# Patient Record
Sex: MALE | Race: WHITE | NOT HISPANIC OR LATINO | Employment: OTHER | URBAN - METROPOLITAN AREA
[De-identification: names, ages, dates, MRNs, and addresses within clinical notes are randomized per-mention and may not be internally consistent; named-entity substitution may affect disease eponyms.]

---

## 2017-01-17 ENCOUNTER — TELEPHONE (OUTPATIENT)
Dept: INFECTIOUS DISEASES | Facility: CLINIC | Age: 72
End: 2017-01-17

## 2017-01-17 DIAGNOSIS — B20 HIV DISEASE: Primary | ICD-10-CM

## 2017-01-17 NOTE — TELEPHONE ENCOUNTER
----- Message from Sherry Castillo sent at 1/17/2017 12:44 PM CST -----  Contact: pt  [phone]  Jama   -   Pt called to get orders put in to have urine done - call back number [phone]  Thanks,

## 2017-01-24 ENCOUNTER — LAB VISIT (OUTPATIENT)
Dept: LAB | Facility: HOSPITAL | Age: 72
End: 2017-01-24
Attending: INTERNAL MEDICINE
Payer: MEDICARE

## 2017-01-24 DIAGNOSIS — B20 HIV DISEASE: ICD-10-CM

## 2017-01-24 LAB
ALBUMIN SERPL BCP-MCNC: 4 G/DL
ALP SERPL-CCNC: 49 U/L
ALT SERPL W/O P-5'-P-CCNC: 24 U/L
ANION GAP SERPL CALC-SCNC: 9 MMOL/L
AST SERPL-CCNC: 23 U/L
BASOPHILS # BLD AUTO: 0.02 K/UL
BASOPHILS NFR BLD: 0.3 %
BILIRUB SERPL-MCNC: 2.4 MG/DL
BUN SERPL-MCNC: 17 MG/DL
CALCIUM SERPL-MCNC: 10 MG/DL
CHLORIDE SERPL-SCNC: 103 MMOL/L
CO2 SERPL-SCNC: 25 MMOL/L
CREAT SERPL-MCNC: 0.9 MG/DL
DIFFERENTIAL METHOD: ABNORMAL
EOSINOPHIL # BLD AUTO: 0.2 K/UL
EOSINOPHIL NFR BLD: 2.5 %
ERYTHROCYTE [DISTWIDTH] IN BLOOD BY AUTOMATED COUNT: 14.1 %
EST. GFR  (AFRICAN AMERICAN): >60 ML/MIN/1.73 M^2
EST. GFR  (NON AFRICAN AMERICAN): >60 ML/MIN/1.73 M^2
GLUCOSE SERPL-MCNC: 88 MG/DL
HCT VFR BLD AUTO: 49.3 %
HGB BLD-MCNC: 17.1 G/DL
LYMPHOCYTES # BLD AUTO: 2.7 K/UL
LYMPHOCYTES NFR BLD: 45.3 %
MCH RBC QN AUTO: 36.8 PG
MCHC RBC AUTO-ENTMCNC: 34.7 %
MCV RBC AUTO: 106 FL
MONOCYTES # BLD AUTO: 0.7 K/UL
MONOCYTES NFR BLD: 11.5 %
NEUTROPHILS # BLD AUTO: 2.4 K/UL
NEUTROPHILS NFR BLD: 40.4 %
PLATELET # BLD AUTO: 254 K/UL
PMV BLD AUTO: 9.6 FL
POTASSIUM SERPL-SCNC: 4.6 MMOL/L
PROT SERPL-MCNC: 8.7 G/DL
RBC # BLD AUTO: 4.65 M/UL
SODIUM SERPL-SCNC: 137 MMOL/L
WBC # BLD AUTO: 5.89 K/UL

## 2017-01-24 PROCEDURE — 85025 COMPLETE CBC W/AUTO DIFF WBC: CPT

## 2017-01-24 PROCEDURE — 80053 COMPREHEN METABOLIC PANEL: CPT

## 2017-01-24 PROCEDURE — 36415 COLL VENOUS BLD VENIPUNCTURE: CPT | Mod: PO

## 2017-01-24 PROCEDURE — 86361 T CELL ABSOLUTE COUNT: CPT

## 2017-01-24 PROCEDURE — 87536 HIV-1 QUANT&REVRSE TRNSCRPJ: CPT

## 2017-01-25 LAB
CD3+CD4+ CELLS # BLD: 628 CELLS/UL (ref 300–1400)
CD3+CD4+ CELLS NFR BLD: 23.7 % (ref 28–57)

## 2017-01-27 LAB
HIV UQ DATE RECEIVED: NORMAL
HIV UQ DATE REPORTED: NORMAL
HIV1 RNA # SERPL NAA+PROBE: <40 COPIES/ML
HIV1 RNA SERPL NAA+PROBE-LOG#: <1.6 LOG (10) COPIES/ML
HIV1 RNA SERPL QL NAA+PROBE: NOT DETECTED

## 2017-01-31 ENCOUNTER — OFFICE VISIT (OUTPATIENT)
Dept: INFECTIOUS DISEASES | Facility: CLINIC | Age: 72
End: 2017-01-31
Payer: MEDICARE

## 2017-01-31 VITALS
HEART RATE: 96 BPM | WEIGHT: 214.75 LBS | BODY MASS INDEX: 30.74 KG/M2 | TEMPERATURE: 98 F | HEIGHT: 70 IN | DIASTOLIC BLOOD PRESSURE: 82 MMHG | SYSTOLIC BLOOD PRESSURE: 130 MMHG

## 2017-01-31 DIAGNOSIS — B20 LIPODYSTROPHY ASSOCIATED WITH HUMAN IMMUNODEFICIENCY VIRUS INFECTION: ICD-10-CM

## 2017-01-31 DIAGNOSIS — G31.84 MILD COGNITIVE IMPAIRMENT: Chronic | ICD-10-CM

## 2017-01-31 DIAGNOSIS — R21 RASH: Primary | ICD-10-CM

## 2017-01-31 DIAGNOSIS — E78.5 HYPERLIPIDEMIA, UNSPECIFIED HYPERLIPIDEMIA TYPE: Chronic | ICD-10-CM

## 2017-01-31 DIAGNOSIS — B20 HIV DISEASE: Chronic | ICD-10-CM

## 2017-01-31 DIAGNOSIS — E88.1 LIPODYSTROPHY ASSOCIATED WITH HUMAN IMMUNODEFICIENCY VIRUS INFECTION: ICD-10-CM

## 2017-01-31 PROCEDURE — 99213 OFFICE O/P EST LOW 20 MIN: CPT | Mod: PBBFAC | Performed by: INTERNAL MEDICINE

## 2017-01-31 PROCEDURE — 99999 PR PBB SHADOW E&M-EST. PATIENT-LVL III: CPT | Mod: PBBFAC,,, | Performed by: INTERNAL MEDICINE

## 2017-01-31 PROCEDURE — 99215 OFFICE O/P EST HI 40 MIN: CPT | Mod: S$PBB,,, | Performed by: INTERNAL MEDICINE

## 2017-01-31 NOTE — PROGRESS NOTES
Subjective:      Patient ID: Haritha Valle is a 71 y.o. male.    Chief Complaint:   Follow up      History of Present Illness    Mild cognitive impairment  Pt feels he is much improved on exelon patch. His partner confirms his observations. He says that he credits his elimination of much of his life stress to the improvement in his cognitive function.             HIV disease  On reyataz + combivir. HIV viral load <40. VH5=804. Has chronic stable lipdystrophy.      CBC, CMP are normal.      Hypertension  He is on lotensin/HCT 10/12.5 and norvasc 5 mg. BP appears well controlled on this. 130/82 today            He has occasional positional hand numbness.   Occasional giddiness without vertigo, lasting few seconds.  Some pain in right buttock with hamstring radiation.  Multiple non-healing sores on forearms.      Review of Systems   Constitution: Negative for chills, decreased appetite, fever, weakness, malaise/fatigue, night sweats, weight gain and weight loss.   HENT: Negative for congestion, ear pain, headaches, hearing loss, hoarse voice, sore throat and tinnitus.    Eyes: Negative for blurred vision, redness and visual disturbance.   Cardiovascular: Negative for chest pain, leg swelling and palpitations.   Respiratory: Negative for cough, hemoptysis, shortness of breath and sputum production.    Hematologic/Lymphatic: Negative for adenopathy. Does not bruise/bleed easily.   Skin: Negative for dry skin, itching, rash and suspicious lesions.   Musculoskeletal: Positive for neck pain. Negative for back pain, joint pain and myalgias.   Gastrointestinal: Negative for abdominal pain, constipation, diarrhea, heartburn, nausea and vomiting.   Genitourinary: Positive for flank pain. Negative for dysuria, frequency, hematuria, hesitancy and urgency.   Neurological: Positive for dizziness, numbness and paresthesias.   Psychiatric/Behavioral: Negative for depression and memory loss. The patient does not have insomnia and is  not nervous/anxious.      Objective:   Physical Exam   Constitutional: He is oriented to person, place, and time. He appears well-developed and well-nourished.   HENT:   Head: Normocephalic and atraumatic.   Mouth/Throat: Oropharynx is clear and moist.   Eyes: Conjunctivae and EOM are normal. Pupils are equal, round, and reactive to light.   Neck: Normal range of motion. Neck supple. No thyromegaly present.   Cardiovascular: Normal rate, regular rhythm and normal heart sounds.    No murmur heard.  Pulmonary/Chest: Effort normal and breath sounds normal. He has no wheezes. He has no rales.   Abdominal: Soft. Bowel sounds are normal. He exhibits no mass. There is no tenderness. There is no rebound.   Musculoskeletal: Normal range of motion.   Lymphadenopathy:     He has no cervical adenopathy.   Neurological: He is alert and oriented to person, place, and time.   Skin: Skin is warm and dry.   Keratosis and scabs on both forearms.   Psychiatric: He has a normal mood and affect. His behavior is normal.   Vitals reviewed.    Assessment:       1. Rash (? Actinic damage on forearms)   2. HIV disease , well controlled   3. 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 Hyperlipidemia, unspecified hyperlipidemia type    5. Mild cognitive impairment          Plan:        1. Continue present meds   2. Derm consult   3. RTC 4 mos with labs

## 2017-02-07 DIAGNOSIS — R79.89 LOW SERUM TESTOSTERONE LEVEL: ICD-10-CM

## 2017-02-07 RX ORDER — TESTOSTERONE CYPIONATE 200 MG/ML
300 INJECTION, SOLUTION INTRAMUSCULAR
Qty: 10 ML | Refills: 5 | Status: SHIPPED | OUTPATIENT
Start: 2017-02-07 | End: 2017-08-15 | Stop reason: SDUPTHER

## 2017-02-07 RX ORDER — AMLODIPINE BESYLATE 5 MG/1
5 TABLET ORAL DAILY
Qty: 30 TABLET | Refills: 11 | Status: SHIPPED | OUTPATIENT
Start: 2017-02-07 | End: 2018-03-15 | Stop reason: SDUPTHER

## 2017-03-22 DIAGNOSIS — G31.84 MILD COGNITIVE IMPAIRMENT: ICD-10-CM

## 2017-03-22 DIAGNOSIS — F32.A DEPRESSION, UNSPECIFIED DEPRESSION TYPE: ICD-10-CM

## 2017-03-22 RX ORDER — LAMIVUDINE AND ZIDOVUDINE 150; 300 MG/1; MG/1
1 TABLET, FILM COATED ORAL EVERY 12 HOURS
Qty: 60 TABLET | Refills: 6 | Status: SHIPPED | OUTPATIENT
Start: 2017-03-22 | End: 2017-11-22 | Stop reason: SDUPTHER

## 2017-03-22 RX ORDER — FLUOXETINE HYDROCHLORIDE 40 MG/1
40 CAPSULE ORAL DAILY
Qty: 30 CAPSULE | Refills: 2 | Status: SHIPPED | OUTPATIENT
Start: 2017-03-22 | End: 2017-07-20 | Stop reason: SDUPTHER

## 2017-04-12 ENCOUNTER — INITIAL CONSULT (OUTPATIENT)
Dept: DERMATOLOGY | Facility: CLINIC | Age: 72
End: 2017-04-12
Payer: MEDICARE

## 2017-04-12 DIAGNOSIS — L82.1 SEBORRHEIC KERATOSES: ICD-10-CM

## 2017-04-12 DIAGNOSIS — L81.4 LENTIGINES: ICD-10-CM

## 2017-04-12 DIAGNOSIS — D48.5 NEOPLASM OF UNCERTAIN BEHAVIOR OF SKIN: Primary | ICD-10-CM

## 2017-04-12 PROCEDURE — 99213 OFFICE O/P EST LOW 20 MIN: CPT | Mod: PBBFAC | Performed by: DERMATOLOGY

## 2017-04-12 PROCEDURE — 99999 PR PBB SHADOW E&M-EST. PATIENT-LVL III: CPT | Mod: PBBFAC,,, | Performed by: DERMATOLOGY

## 2017-04-12 PROCEDURE — 99202 OFFICE O/P NEW SF 15 MIN: CPT | Mod: S$PBB,,, | Performed by: DERMATOLOGY

## 2017-04-12 RX ORDER — MUPIROCIN 20 MG/G
OINTMENT TOPICAL
COMMUNITY
Start: 2017-03-20 | End: 2019-12-17

## 2017-04-12 NOTE — LETTER
April 14, 2017      Saeed Yun MD  [address]  [facility] [address]           [facility]  [address]  [facility] [address]  Phone: [phone]  Fax: [phone]          Patient: Haritha Valle   MR Number: [id number]   YOB: 1945   Date of Visit: 4/12/2017       Dear Dr. Saeed Yun:    Thank you for referring Haritha Valle to me for evaluation. Attached you will find relevant portions of my assessment and plan of care.    If you have questions, please do not hesitate to call me. I look forward to following Haritha Valle along with you.    Sincerely,    Yolis Coto MD    Enclosure  CC:  No Recipients    If you would like to receive this communication electronically, please contact [email or web address] or [phone] to request more information on [employer] Link access.    For providers and/or their staff who would like to refer a patient to [facility], please contact us through our one-stop-shop provider referral line, [facility], at [phone].    If you feel you have received this communication in error or would no longer like to receive these types of communications, please e-mail [email or web address]

## 2017-04-12 NOTE — PROGRESS NOTES
Subjective:       Patient ID:  Haritha Valle is a 71 y.o. male who presents for   Chief Complaint   Patient presents with    Skin Check     spots on face, x yrs, itchy, no tx     HPI  72 yo M presents for evaluation of spots on his face.  The brown spots on his face have been present for years, described as itchy, stable in size, no prior treatments    Derm Hx: BCC on L forehead (2013)  Past Medical History:   Diagnosis Date    Basal cell carcinoma     Depression     Hepatitis B     Hepatitis C antibody test positive     HIV infection     Hypertension     Immune disorder     Mild cognitive impairment 10/1/2010     Review of Systems   Constitutional: Negative for fever, chills, fatigue and malaise.   Skin: Positive for activity-related sunscreen use. Negative for daily sunscreen use and recent sunburn.   Hematologic/Lymphatic: Does not bruise/bleed easily.        Objective:    Physical Exam   Constitutional: He appears well-developed and well-nourished. No distress.   Neurological: He is alert and oriented to person, place, and time. He is not disoriented.   Psychiatric: He has a normal mood and affect.   Skin:   Areas Examined (abnormalities noted in diagram):   Scalp / Hair Palpated and Inspected  Head / Face Inspection Performed  Neck Inspection Performed  RUE Inspected  LUE Inspection Performed                  Diagram Legend     Erythematous scaling macule/papule c/w actinic keratosis       Vascular papule c/w angioma      Pigmented verrucoid papule/plaque c/w seborrheic keratosis      Yellow umbilicated papule c/w sebaceous hyperplasia      Irregularly shaped tan macule c/w lentigo     1-2 mm smooth white papules consistent with Milia      Movable subcutaneous cyst with punctum c/w epidermal inclusion cyst      Subcutaneous movable cyst c/w pilar cyst      Firm pink to brown papule c/w dermatofibroma      Pedunculated fleshy papule(s) c/w skin tag(s)      Evenly pigmented macule c/w junctional  nevus     Mildly variegated pigmented, slightly irregular-bordered macule c/w mildly atypical nevus      Flesh colored to evenly pigmented papule c/w intradermal nevus       Pink pearly papule/plaque c/w basal cell carcinoma      Erythematous hyperkeratotic cursted plaque c/w SCC      Surgical scar with no sign of skin cancer recurrence      Open and closed comedones      Inflammatory papules and pustules      Verrucoid papule consistent consistent with wart     Erythematous eczematous patches and plaques     Dystrophic onycholytic nail with subungual debris c/w onychomycosis     Umbilicated papule    Erythematous-base heme-crusted tan verrucoid plaque consistent with inflamed seborrheic keratosis     Erythematous Silvery Scaling Plaque c/w Psoriasis     See annotation      Assessment / Plan:        Neoplasm of uncertain behavior of skin  Suspect BCC.  Pt would like to postpone biopsy since he has an important event this weekend    Seborrheic keratoses  These are benign inherited growths without a malignant potential. Reassurance given to patient. No treatment is necessary. Discussed LN at follow up     Lentigines  These are benign hyperpigmented sun induced lesions. Daily sun protection will reduce the number of new lesions           Return for after Easter.

## 2017-05-02 DIAGNOSIS — R79.89 LOW SERUM TESTOSTERONE LEVEL: ICD-10-CM

## 2017-05-02 RX ORDER — ALPRAZOLAM 0.5 MG/1
0.5 TABLET ORAL 2 TIMES DAILY PRN
Qty: 30 TABLET | Refills: 5 | Status: SHIPPED | OUTPATIENT
Start: 2017-05-02 | End: 2017-11-21 | Stop reason: SDUPTHER

## 2017-05-05 ENCOUNTER — PROCEDURE VISIT (OUTPATIENT)
Dept: DERMATOLOGY | Facility: CLINIC | Age: 72
End: 2017-05-05
Payer: MEDICARE

## 2017-05-05 DIAGNOSIS — D48.5 NEOPLASM OF UNCERTAIN BEHAVIOR OF SKIN: Primary | ICD-10-CM

## 2017-05-05 DIAGNOSIS — L82.1 SEBORRHEIC KERATOSES: ICD-10-CM

## 2017-05-05 PROCEDURE — 99212 OFFICE O/P EST SF 10 MIN: CPT | Mod: S$PBB,,, | Performed by: DERMATOLOGY

## 2017-05-07 NOTE — PROGRESS NOTES
Subjective:       Patient ID:  Haritha Valle is a 71 y.o. male who presents for   Chief Complaint   Patient presents with    Biopsy     HPI  Pt presents for biopsy.  Last OV just before Easter and pt deferred biopsy at that time.     Derm Hx: BCC on L forehead 2013  Past Medical History:   Diagnosis Date    Basal cell carcinoma     Depression     Hepatitis B     Hepatitis C antibody test positive     HIV infection     Hypertension     Immune disorder     Mild cognitive impairment 10/1/2010        Review of Systems   Constitutional: Negative for fever, chills, fatigue and malaise.   Skin: Positive for activity-related sunscreen use. Negative for daily sunscreen use and recent sunburn.   Hematologic/Lymphatic: Does not bruise/bleed easily.        Objective:    Physical Exam   Constitutional: He appears well-developed and well-nourished.   Neurological: He is alert and oriented to person, place, and time.   Psychiatric: He has a normal mood and affect.   Skin:   Areas Examined (abnormalities noted in diagram):   Head / Face Inspection Performed              Diagram Legend     Erythematous scaling macule/papule c/w actinic keratosis       Vascular papule c/w angioma      Pigmented verrucoid papule/plaque c/w seborrheic keratosis      Yellow umbilicated papule c/w sebaceous hyperplasia      Irregularly shaped tan macule c/w lentigo     1-2 mm smooth white papules consistent with Milia      Movable subcutaneous cyst with punctum c/w epidermal inclusion cyst      Subcutaneous movable cyst c/w pilar cyst      Firm pink to brown papule c/w dermatofibroma      Pedunculated fleshy papule(s) c/w skin tag(s)      Evenly pigmented macule c/w junctional nevus     Mildly variegated pigmented, slightly irregular-bordered macule c/w mildly atypical nevus      Flesh colored to evenly pigmented papule c/w intradermal nevus       Pink pearly papule/plaque c/w basal cell carcinoma      Erythematous hyperkeratotic cursted  plaque c/w SCC      Surgical scar with no sign of skin cancer recurrence      Open and closed comedones      Inflammatory papules and pustules      Verrucoid papule consistent consistent with wart     Erythematous eczematous patches and plaques     Dystrophic onycholytic nail with subungual debris c/w onychomycosis     Umbilicated papule    Erythematous-base heme-crusted tan verrucoid plaque consistent with inflamed seborrheic keratosis     Erythematous Silvery Scaling Plaque c/w Psoriasis     See annotation      Assessment / Plan:        Neoplasm of uncertain behavior of skin  Lesion resolved.  No biopsy today    Seborrheic keratoses  These are benign inherited growths without a malignant potential. Reassurance given to patient. Discussed LN            Return in about 1 year (around 5/5/2018).

## 2017-06-13 DIAGNOSIS — Z21 HIV POSITIVE: ICD-10-CM

## 2017-06-13 RX ORDER — ATAZANAVIR 200 MG/1
400 CAPSULE ORAL DAILY
Qty: 60 CAPSULE | Refills: 6 | Status: SHIPPED | OUTPATIENT
Start: 2017-06-13 | End: 2018-03-20 | Stop reason: SDUPTHER

## 2017-07-12 ENCOUNTER — TELEPHONE (OUTPATIENT)
Dept: INFECTIOUS DISEASES | Facility: CLINIC | Age: 72
End: 2017-07-12

## 2017-07-12 DIAGNOSIS — Z21 HIV POSITIVE: Primary | ICD-10-CM

## 2017-07-12 DIAGNOSIS — E78.00 HYPERCHOLESTEROLEMIA: ICD-10-CM

## 2017-07-12 NOTE — TELEPHONE ENCOUNTER
----- Message from Raquel Leon MA sent at 7/11/2017  3:55 PM CDT -----  Contact: Pt: [phone]  Please put in lab orders for pt   ----- Message -----  From: Elizabeth England  Sent: 7/11/2017   2:26 PM  To: Jama [facility] Staff    Pt called and stated that he needs the staff to call him back in regards to getting labs scheduled.  Pt can be reached at [phone].    Thanks

## 2017-07-13 ENCOUNTER — LAB VISIT (OUTPATIENT)
Dept: LAB | Facility: HOSPITAL | Age: 72
End: 2017-07-13
Attending: INTERNAL MEDICINE
Payer: MEDICARE

## 2017-07-13 DIAGNOSIS — Z21 HIV POSITIVE: ICD-10-CM

## 2017-07-13 DIAGNOSIS — E78.00 HYPERCHOLESTEROLEMIA: ICD-10-CM

## 2017-07-13 LAB
ALBUMIN SERPL BCP-MCNC: 3.6 G/DL
ALP SERPL-CCNC: 33 U/L
ALT SERPL W/O P-5'-P-CCNC: 26 U/L
ANION GAP SERPL CALC-SCNC: 8 MMOL/L
AST SERPL-CCNC: 27 U/L
BASOPHILS # BLD AUTO: 0.01 K/UL
BASOPHILS NFR BLD: 0.2 %
BILIRUB SERPL-MCNC: 1.6 MG/DL
BUN SERPL-MCNC: 15 MG/DL
CALCIUM SERPL-MCNC: 9.5 MG/DL
CHLORIDE SERPL-SCNC: 102 MMOL/L
CHOLEST/HDLC SERPL: 7.1 {RATIO}
CO2 SERPL-SCNC: 25 MMOL/L
CREAT SERPL-MCNC: 0.9 MG/DL
DIFFERENTIAL METHOD: ABNORMAL
EOSINOPHIL # BLD AUTO: 0.1 K/UL
EOSINOPHIL NFR BLD: 1.9 %
ERYTHROCYTE [DISTWIDTH] IN BLOOD BY AUTOMATED COUNT: 15 %
EST. GFR  (AFRICAN AMERICAN): >60 ML/MIN/1.73 M^2
EST. GFR  (NON AFRICAN AMERICAN): >60 ML/MIN/1.73 M^2
GLUCOSE SERPL-MCNC: 100 MG/DL
HCT VFR BLD AUTO: 45 %
HDL/CHOLESTEROL RATIO: 14 %
HDLC SERPL-MCNC: 214 MG/DL
HDLC SERPL-MCNC: 30 MG/DL
HGB BLD-MCNC: 15.6 G/DL
LDLC SERPL CALC-MCNC: ABNORMAL MG/DL
LYMPHOCYTES # BLD AUTO: 1.9 K/UL
LYMPHOCYTES NFR BLD: 35.8 %
MCH RBC QN AUTO: 36.7 PG
MCHC RBC AUTO-ENTMCNC: 34.7 %
MCV RBC AUTO: 106 FL
MONOCYTES # BLD AUTO: 0.7 K/UL
MONOCYTES NFR BLD: 12.9 %
NEUTROPHILS # BLD AUTO: 2.6 K/UL
NEUTROPHILS NFR BLD: 49 %
NONHDLC SERPL-MCNC: 184 MG/DL
PLATELET # BLD AUTO: 230 K/UL
PMV BLD AUTO: 9.4 FL
POTASSIUM SERPL-SCNC: 4.1 MMOL/L
PROT SERPL-MCNC: 7.9 G/DL
RBC # BLD AUTO: 4.25 M/UL
SODIUM SERPL-SCNC: 135 MMOL/L
TRIGL SERPL-MCNC: 405 MG/DL
WBC # BLD AUTO: 5.36 K/UL

## 2017-07-13 PROCEDURE — 80061 LIPID PANEL: CPT

## 2017-07-13 PROCEDURE — 87536 HIV-1 QUANT&REVRSE TRNSCRPJ: CPT

## 2017-07-13 PROCEDURE — 85025 COMPLETE CBC W/AUTO DIFF WBC: CPT

## 2017-07-13 PROCEDURE — 86361 T CELL ABSOLUTE COUNT: CPT

## 2017-07-13 PROCEDURE — 80053 COMPREHEN METABOLIC PANEL: CPT

## 2017-07-13 PROCEDURE — 36415 COLL VENOUS BLD VENIPUNCTURE: CPT | Mod: PO

## 2017-07-14 LAB
CD3+CD4+ CELLS # BLD: 477 CELLS/UL (ref 300–1400)
CD3+CD4+ CELLS NFR BLD: 23 % (ref 28–57)

## 2017-07-20 DIAGNOSIS — F32.A DEPRESSION, UNSPECIFIED DEPRESSION TYPE: ICD-10-CM

## 2017-07-20 RX ORDER — FLUOXETINE HYDROCHLORIDE 40 MG/1
40 CAPSULE ORAL DAILY
Qty: 30 CAPSULE | Refills: 2 | Status: SHIPPED | OUTPATIENT
Start: 2017-07-20 | End: 2017-10-20 | Stop reason: SDUPTHER

## 2017-08-02 ENCOUNTER — OFFICE VISIT (OUTPATIENT)
Dept: INFECTIOUS DISEASES | Facility: CLINIC | Age: 72
End: 2017-08-02
Payer: MEDICARE

## 2017-08-02 ENCOUNTER — HOSPITAL ENCOUNTER (OUTPATIENT)
Dept: CARDIOLOGY | Facility: CLINIC | Age: 72
Discharge: HOME | End: 2017-08-02
Payer: MEDICARE

## 2017-08-02 VITALS
HEIGHT: 70 IN | WEIGHT: 213.38 LBS | DIASTOLIC BLOOD PRESSURE: 73 MMHG | BODY MASS INDEX: 30.55 KG/M2 | TEMPERATURE: 98 F | SYSTOLIC BLOOD PRESSURE: 131 MMHG | HEART RATE: 82 BPM

## 2017-08-02 DIAGNOSIS — I10 ESSENTIAL HYPERTENSION: Primary | ICD-10-CM

## 2017-08-02 DIAGNOSIS — I10 ESSENTIAL HYPERTENSION: ICD-10-CM

## 2017-08-02 PROCEDURE — 99215 OFFICE O/P EST HI 40 MIN: CPT | Mod: S$PBB,,, | Performed by: INTERNAL MEDICINE

## 2017-08-02 PROCEDURE — 1159F MED LIST DOCD IN RCRD: CPT | Mod: ,,, | Performed by: INTERNAL MEDICINE

## 2017-08-02 PROCEDURE — 93010 ELECTROCARDIOGRAM REPORT: CPT | Mod: S$PBB,,, | Performed by: INTERNAL MEDICINE

## 2017-08-02 PROCEDURE — 1126F AMNT PAIN NOTED NONE PRSNT: CPT | Mod: ,,, | Performed by: INTERNAL MEDICINE

## 2017-08-02 PROCEDURE — 99999 PR PBB SHADOW E&M-EST. PATIENT-LVL III: CPT | Mod: PBBFAC,,, | Performed by: INTERNAL MEDICINE

## 2017-08-02 PROCEDURE — 93005 ELECTROCARDIOGRAM TRACING: CPT | Mod: PBBFAC | Performed by: INTERNAL MEDICINE

## 2017-08-02 RX ORDER — MECLIZINE HCL 12.5 MG 12.5 MG/1
12.5 TABLET ORAL 3 TIMES DAILY PRN
Qty: 90 TABLET | Refills: 2 | Status: SHIPPED | OUTPATIENT
Start: 2017-08-02 | End: 2017-11-22 | Stop reason: SDUPTHER

## 2017-08-02 NOTE — PROGRESS NOTES
Subjective:      Patient ID: Haritha Valle is a 72 y.o. male.    Chief Complaint:   followup on multiple problems      History of Present Illness    Mild cognitive impairment  Pt feels he is much improved on exelon patch.            HIV disease  On reyataz + combivir. HIV viral load <40. FM0=271. Has chronic stable lipdystrophy.      CBC, CMP are normal.      Hypertension  He is on lotensin/HCT 10/12.5 and norvasc 5 mg. BP appears well controlled on this. 131/73 today    Hyperlipidemia  On pravastatin 20 mg daily  Lab Results   Component Value Date    CHOL 214 (H) 07/13/2017    CHOL 185 09/20/2016    CHOL 176 08/21/2015     Lab Results   Component Value Date    HDL 30 (L) 07/13/2017    HDL 29 (L) 09/20/2016    HDL 31 (L) 08/21/2015     Lab Results   Component Value Date    LDLCALC Invalid, Trig>400.0 07/13/2017    LDLCALC 108.4 09/20/2016    LDLCALC 70.2 08/21/2015     Lab Results   Component Value Date    TRIG 405 (H) 07/13/2017    TRIG 238 (H) 09/20/2016    TRIG 374 (H) 08/21/2015     Lab Results   Component Value Date    CHOLHDL 14.0 (L) 07/13/2017    CHOLHDL 15.7 (L) 09/20/2016    CHOLHDL 17.6 (L) 08/21/2015       Review of Systems   Constitution: Positive for weight gain. Negative for chills, decreased appetite, fever, weakness, malaise/fatigue, night sweats and weight loss.   HENT: Negative for congestion, ear pain, headaches, hearing loss, hoarse voice, sore throat and tinnitus.    Eyes: Negative for blurred vision, redness and visual disturbance.   Cardiovascular: Negative for chest pain, leg swelling and palpitations.   Respiratory: Negative for cough, hemoptysis, shortness of breath and sputum production.    Hematologic/Lymphatic: Negative for adenopathy. Does not bruise/bleed easily.   Skin: Negative for dry skin, itching, rash and suspicious lesions.   Musculoskeletal: Negative for back pain, joint pain, myalgias and neck pain.   Gastrointestinal: Negative for abdominal pain, constipation, diarrhea,  "heartburn, nausea and vomiting.   Genitourinary: Negative for dysuria, flank pain, frequency, hematuria, hesitancy and urgency.   Neurological: Negative for dizziness, numbness and paresthesias.   Psychiatric/Behavioral: Negative for depression and memory loss. The patient does not have insomnia and is not nervous/anxious.      Objective:   Physical Exam   Constitutional: He is oriented to person, place, and time. He appears well-developed and well-nourished.   HENT:   Head: Normocephalic and atraumatic.   Mouth/Throat: Oropharynx is clear and moist.   Eyes: Conjunctivae and EOM are normal. Pupils are equal, round, and reactive to light.   Neck: Normal range of motion. Neck supple. No thyromegaly present.   Cardiovascular: Normal rate, regular rhythm and normal heart sounds.    No murmur heard.  Pulmonary/Chest: Effort normal and breath sounds normal. He has no wheezes. He has no rales.   Abdominal: Soft. Bowel sounds are normal. He exhibits no mass. There is no tenderness. There is no rebound.   Central fat redistribution, unchanged   Musculoskeletal: Normal range of motion.   Lymphadenopathy:     He has no cervical adenopathy.   Neurological: He is alert and oriented to person, place, and time.   Skin: Skin is warm and dry.   Psychiatric: He has a normal mood and affect. His behavior is normal.   Vitals reviewed.    Assessment:       1. Essential hypertension      2. HIV doing well    3. Mild cognitive impairment, stable to improved   4. Hyperlipidemia, stable  Plan:        1. Trial of meclizine for his imbalance   2.  Trial of CoQ 10 to see if it improves his "energy"   4. RTC 4 mos with monitoring labs     "

## 2017-08-15 DIAGNOSIS — R79.89 LOW SERUM TESTOSTERONE LEVEL: ICD-10-CM

## 2017-08-15 RX ORDER — TESTOSTERONE CYPIONATE 200 MG/ML
300 INJECTION, SOLUTION INTRAMUSCULAR
Qty: 10 ML | Refills: 5 | Status: SHIPPED | OUTPATIENT
Start: 2017-08-15 | End: 2018-05-02 | Stop reason: SDUPTHER

## 2017-09-20 DIAGNOSIS — G31.84 MILD COGNITIVE IMPAIRMENT, SO STATED: ICD-10-CM

## 2017-09-20 RX ORDER — BENAZEPRIL HYDROCHLORIDE AND HYDROCHLOROTHIAZIDE 10; 12.5 MG/1; MG/1
1 TABLET ORAL DAILY
Qty: 90 TABLET | Refills: 4 | Status: SHIPPED | OUTPATIENT
Start: 2017-09-20 | End: 2018-09-18 | Stop reason: SDUPTHER

## 2017-09-27 ENCOUNTER — CLINICAL SUPPORT (OUTPATIENT)
Dept: INFECTIOUS DISEASES | Facility: CLINIC | Age: 72
End: 2017-09-27
Payer: MEDICARE

## 2017-09-27 DIAGNOSIS — Z23 NEED FOR VACCINATION: Primary | ICD-10-CM

## 2017-09-27 PROCEDURE — 99213 OFFICE O/P EST LOW 20 MIN: CPT | Mod: PBBFAC,25

## 2017-09-27 PROCEDURE — G0008 ADMIN INFLUENZA VIRUS VAC: HCPCS | Mod: PBBFAC

## 2017-09-27 PROCEDURE — 99999 PR PBB SHADOW E&M-EST. PATIENT-LVL III: CPT | Mod: PBBFAC,,,

## 2017-10-20 DIAGNOSIS — G31.84 MILD COGNITIVE IMPAIRMENT: ICD-10-CM

## 2017-10-20 DIAGNOSIS — F32.A DEPRESSION, UNSPECIFIED DEPRESSION TYPE: ICD-10-CM

## 2017-10-23 RX ORDER — PRAVASTATIN SODIUM 20 MG/1
20 TABLET ORAL DAILY
Qty: 90 TABLET | Refills: 4 | Status: SHIPPED | OUTPATIENT
Start: 2017-10-23 | End: 2018-05-09 | Stop reason: DRUGHIGH

## 2017-10-23 RX ORDER — FLUOXETINE HYDROCHLORIDE 40 MG/1
40 CAPSULE ORAL DAILY
Qty: 30 CAPSULE | Refills: 2 | Status: SHIPPED | OUTPATIENT
Start: 2017-10-23 | End: 2018-05-31 | Stop reason: SDUPTHER

## 2017-11-21 DIAGNOSIS — R42 DIZZINESS: Primary | ICD-10-CM

## 2017-11-21 DIAGNOSIS — G31.84 MILD COGNITIVE IMPAIRMENT: ICD-10-CM

## 2017-11-21 DIAGNOSIS — R79.89 LOW SERUM TESTOSTERONE LEVEL: ICD-10-CM

## 2017-11-22 RX ORDER — ALPRAZOLAM 0.5 MG/1
0.5 TABLET ORAL 2 TIMES DAILY PRN
Qty: 30 TABLET | Refills: 5 | Status: SHIPPED | OUTPATIENT
Start: 2017-11-22 | End: 2018-05-31 | Stop reason: SDUPTHER

## 2017-11-22 RX ORDER — MECLIZINE HCL 12.5 MG 12.5 MG/1
12.5 TABLET ORAL 3 TIMES DAILY PRN
Qty: 90 TABLET | Refills: 2 | Status: SHIPPED | OUTPATIENT
Start: 2017-11-22 | End: 2018-05-31 | Stop reason: SDUPTHER

## 2017-11-22 RX ORDER — LAMIVUDINE AND ZIDOVUDINE 150; 300 MG/1; MG/1
1 TABLET, FILM COATED ORAL EVERY 12 HOURS
Qty: 60 TABLET | Refills: 6 | Status: SHIPPED | OUTPATIENT
Start: 2017-11-22 | End: 2018-11-21 | Stop reason: SDUPTHER

## 2017-12-11 DIAGNOSIS — M54.40 ACUTE RIGHT-SIDED LOW BACK PAIN WITH SCIATICA, SCIATICA LATERALITY UNSPECIFIED: ICD-10-CM

## 2017-12-12 RX ORDER — CYCLOBENZAPRINE HCL 10 MG
10 TABLET ORAL 3 TIMES DAILY
Qty: 30 TABLET | Refills: 3 | Status: SHIPPED | OUTPATIENT
Start: 2017-12-12 | End: 2022-09-01

## 2017-12-29 DIAGNOSIS — E55.9 VITAMIN D DEFICIENCY: ICD-10-CM

## 2017-12-30 RX ORDER — ERGOCALCIFEROL 1.25 MG/1
50000 CAPSULE ORAL
Qty: 3 CAPSULE | Refills: 4 | Status: SHIPPED | OUTPATIENT
Start: 2017-12-30 | End: 2019-12-17

## 2018-03-15 RX ORDER — AMLODIPINE BESYLATE 5 MG/1
5 TABLET ORAL DAILY
Qty: 30 TABLET | Refills: 11 | Status: SHIPPED | OUTPATIENT
Start: 2018-03-15 | End: 2019-07-03 | Stop reason: SDUPTHER

## 2018-03-20 DIAGNOSIS — Z21 HIV POSITIVE: ICD-10-CM

## 2018-03-20 RX ORDER — ATAZANAVIR 200 MG/1
400 CAPSULE ORAL DAILY
Qty: 60 CAPSULE | Refills: 6 | Status: SHIPPED | OUTPATIENT
Start: 2018-03-20 | End: 2018-11-08 | Stop reason: SDUPTHER

## 2018-04-26 ENCOUNTER — TELEPHONE (OUTPATIENT)
Dept: INFECTIOUS DISEASES | Facility: CLINIC | Age: 73
End: 2018-04-26

## 2018-04-26 DIAGNOSIS — B20 HIV DISEASE: Primary | Chronic | ICD-10-CM

## 2018-04-26 DIAGNOSIS — B20 LIPODYSTROPHY ASSOCIATED WITH HUMAN IMMUNODEFICIENCY VIRUS INFECTION: ICD-10-CM

## 2018-04-26 DIAGNOSIS — E88.1 LIPODYSTROPHY ASSOCIATED WITH HUMAN IMMUNODEFICIENCY VIRUS INFECTION: ICD-10-CM

## 2018-04-26 DIAGNOSIS — E29.1 MALE HYPOGONADISM: ICD-10-CM

## 2018-04-30 ENCOUNTER — LAB VISIT (OUTPATIENT)
Dept: LAB | Facility: HOSPITAL | Age: 73
End: 2018-04-30
Attending: INTERNAL MEDICINE
Payer: MEDICARE

## 2018-04-30 DIAGNOSIS — E29.1 MALE HYPOGONADISM: ICD-10-CM

## 2018-04-30 DIAGNOSIS — B20 HIV DISEASE: Chronic | ICD-10-CM

## 2018-04-30 DIAGNOSIS — B20 LIPODYSTROPHY ASSOCIATED WITH HUMAN IMMUNODEFICIENCY VIRUS INFECTION: ICD-10-CM

## 2018-04-30 DIAGNOSIS — E88.1 LIPODYSTROPHY ASSOCIATED WITH HUMAN IMMUNODEFICIENCY VIRUS INFECTION: ICD-10-CM

## 2018-04-30 LAB
ALBUMIN SERPL BCP-MCNC: 3.9 G/DL
ALP SERPL-CCNC: 36 U/L
ALT SERPL W/O P-5'-P-CCNC: 23 U/L
ANION GAP SERPL CALC-SCNC: 9 MMOL/L
AST SERPL-CCNC: 21 U/L
BASOPHILS # BLD AUTO: 0.03 K/UL
BASOPHILS NFR BLD: 0.4 %
BILIRUB SERPL-MCNC: 0.5 MG/DL
BUN SERPL-MCNC: 33 MG/DL
CALCIUM SERPL-MCNC: 10.1 MG/DL
CHLORIDE SERPL-SCNC: 104 MMOL/L
CHOLEST SERPL-MCNC: 248 MG/DL
CHOLEST/HDLC SERPL: 7.8 {RATIO}
CO2 SERPL-SCNC: 24 MMOL/L
CREAT SERPL-MCNC: 1 MG/DL
DIFFERENTIAL METHOD: ABNORMAL
EOSINOPHIL # BLD AUTO: 0.2 K/UL
EOSINOPHIL NFR BLD: 2.5 %
ERYTHROCYTE [DISTWIDTH] IN BLOOD BY AUTOMATED COUNT: 13.6 %
EST. GFR  (AFRICAN AMERICAN): >60 ML/MIN/1.73 M^2
EST. GFR  (NON AFRICAN AMERICAN): >60 ML/MIN/1.73 M^2
GLUCOSE SERPL-MCNC: 86 MG/DL
HCT VFR BLD AUTO: 44.5 %
HDLC SERPL-MCNC: 32 MG/DL
HDLC SERPL: 12.9 %
HGB BLD-MCNC: 15.4 G/DL
IMM GRANULOCYTES # BLD AUTO: 0.01 K/UL
IMM GRANULOCYTES NFR BLD AUTO: 0.1 %
LDLC SERPL CALC-MCNC: ABNORMAL MG/DL
LYMPHOCYTES # BLD AUTO: 2.9 K/UL
LYMPHOCYTES NFR BLD: 41 %
MCH RBC QN AUTO: 36 PG
MCHC RBC AUTO-ENTMCNC: 34.6 G/DL
MCV RBC AUTO: 104 FL
MONOCYTES # BLD AUTO: 0.7 K/UL
MONOCYTES NFR BLD: 10.3 %
NEUTROPHILS # BLD AUTO: 3.3 K/UL
NEUTROPHILS NFR BLD: 45.7 %
NONHDLC SERPL-MCNC: 216 MG/DL
NRBC BLD-RTO: 0 /100 WBC
PLATELET # BLD AUTO: 236 K/UL
PMV BLD AUTO: 10 FL
POTASSIUM SERPL-SCNC: 4.5 MMOL/L
PROT SERPL-MCNC: 8.5 G/DL
RBC # BLD AUTO: 4.28 M/UL
SODIUM SERPL-SCNC: 137 MMOL/L
TRIGL SERPL-MCNC: 582 MG/DL
WBC # BLD AUTO: 7.12 K/UL

## 2018-04-30 PROCEDURE — 85025 COMPLETE CBC W/AUTO DIFF WBC: CPT

## 2018-04-30 PROCEDURE — 87536 HIV-1 QUANT&REVRSE TRNSCRPJ: CPT

## 2018-04-30 PROCEDURE — 80061 LIPID PANEL: CPT

## 2018-04-30 PROCEDURE — 82040 ASSAY OF SERUM ALBUMIN: CPT | Mod: 91

## 2018-04-30 PROCEDURE — 86361 T CELL ABSOLUTE COUNT: CPT

## 2018-04-30 PROCEDURE — 80053 COMPREHEN METABOLIC PANEL: CPT

## 2018-05-01 LAB
CD3+CD4+ CELLS # BLD: 685 CELLS/UL (ref 300–1400)
CD3+CD4+ CELLS NFR BLD: 23 % (ref 28–57)

## 2018-05-02 DIAGNOSIS — R79.89 LOW SERUM TESTOSTERONE LEVEL: ICD-10-CM

## 2018-05-02 RX ORDER — TESTOSTERONE CYPIONATE 200 MG/ML
300 INJECTION, SOLUTION INTRAMUSCULAR
Qty: 10 ML | Refills: 5 | Status: SHIPPED | OUTPATIENT
Start: 2018-05-02 | End: 2018-06-08 | Stop reason: SDUPTHER

## 2018-05-05 LAB
ALBUMIN SERPL-MCNC: 4.3 G/DL (ref 3.6–5.1)
SHBG SERPL-SCNC: 9 NMOL/L (ref 22–77)
TESTOST FREE SERPL-MCNC: 6.6 PG/ML (ref 6–73)
TESTOST SERPL-MCNC: 24 NG/DL (ref 250–1100)
TESTOSTERONE.FREE+WB SERPL-MCNC: 12.9 NG/DL (ref 15–150)

## 2018-05-09 ENCOUNTER — OFFICE VISIT (OUTPATIENT)
Dept: INFECTIOUS DISEASES | Facility: CLINIC | Age: 73
End: 2018-05-09
Payer: MEDICARE

## 2018-05-09 ENCOUNTER — TELEPHONE (OUTPATIENT)
Dept: DERMATOLOGY | Facility: CLINIC | Age: 73
End: 2018-05-09

## 2018-05-09 ENCOUNTER — CLINICAL SUPPORT (OUTPATIENT)
Dept: INFECTIOUS DISEASES | Facility: CLINIC | Age: 73
End: 2018-05-09
Payer: MEDICARE

## 2018-05-09 VITALS
DIASTOLIC BLOOD PRESSURE: 78 MMHG | TEMPERATURE: 98 F | WEIGHT: 213.38 LBS | HEART RATE: 93 BPM | BODY MASS INDEX: 30.55 KG/M2 | SYSTOLIC BLOOD PRESSURE: 130 MMHG | HEIGHT: 70 IN

## 2018-05-09 DIAGNOSIS — I10 ESSENTIAL HYPERTENSION: ICD-10-CM

## 2018-05-09 DIAGNOSIS — Z12.5 PROSTATE CANCER SCREENING: ICD-10-CM

## 2018-05-09 DIAGNOSIS — B20 HIV DISEASE: Primary | Chronic | ICD-10-CM

## 2018-05-09 DIAGNOSIS — G31.84 MILD COGNITIVE IMPAIRMENT: Chronic | ICD-10-CM

## 2018-05-09 DIAGNOSIS — E88.1 LIPODYSTROPHY ASSOCIATED WITH HUMAN IMMUNODEFICIENCY VIRUS INFECTION: ICD-10-CM

## 2018-05-09 DIAGNOSIS — B20 LIPODYSTROPHY ASSOCIATED WITH HUMAN IMMUNODEFICIENCY VIRUS INFECTION: ICD-10-CM

## 2018-05-09 DIAGNOSIS — B18.1 HEPATITIS B CARRIER: ICD-10-CM

## 2018-05-09 DIAGNOSIS — E29.1 MALE HYPOGONADISM: ICD-10-CM

## 2018-05-09 DIAGNOSIS — Z00.00 PREVENTATIVE HEALTH CARE: ICD-10-CM

## 2018-05-09 DIAGNOSIS — E78.2 MIXED HYPERLIPIDEMIA: Chronic | ICD-10-CM

## 2018-05-09 DIAGNOSIS — R76.8 HEPATITIS C ANTIBODY TEST POSITIVE: ICD-10-CM

## 2018-05-09 DIAGNOSIS — R76.8 HEPATITIS B SURFACE ANTIGEN POSITIVE: Chronic | ICD-10-CM

## 2018-05-09 DIAGNOSIS — B19.20 HEPATITIS C VIRUS INFECTION WITHOUT HEPATIC COMA, UNSPECIFIED CHRONICITY: ICD-10-CM

## 2018-05-09 PROCEDURE — 99999 PR PBB SHADOW E&M-EST. PATIENT-LVL III: CPT | Mod: PBBFAC,,, | Performed by: INTERNAL MEDICINE

## 2018-05-09 PROCEDURE — 99213 OFFICE O/P EST LOW 20 MIN: CPT | Mod: PBBFAC

## 2018-05-09 PROCEDURE — 99213 OFFICE O/P EST LOW 20 MIN: CPT | Mod: PBBFAC,27 | Performed by: INTERNAL MEDICINE

## 2018-05-09 PROCEDURE — 99215 OFFICE O/P EST HI 40 MIN: CPT | Mod: S$PBB,,, | Performed by: INTERNAL MEDICINE

## 2018-05-09 PROCEDURE — 90750 HZV VACC RECOMBINANT IM: CPT | Mod: PBBFAC

## 2018-05-09 PROCEDURE — 99999 PR PBB SHADOW E&M-EST. PATIENT-LVL III: CPT | Mod: PBBFAC,,,

## 2018-05-09 RX ORDER — PRAVASTATIN SODIUM 40 MG/1
40 TABLET ORAL DAILY
Qty: 90 TABLET | Refills: 3 | Status: SHIPPED | OUTPATIENT
Start: 2018-05-09 | End: 2019-06-09 | Stop reason: SDUPTHER

## 2018-05-09 RX ORDER — AMOXICILLIN AND CLAVULANATE POTASSIUM 875; 125 MG/1; MG/1
TABLET, FILM COATED ORAL
COMMUNITY
Start: 2018-04-30 | End: 2018-05-09 | Stop reason: ALTCHOICE

## 2018-05-09 NOTE — Clinical Note
Set up labs ordered today for 4 mos; do labs in BR at least 1 week before my appt. Also set up derm appt for him same day (skin check) and set up [facility] appt same day as me

## 2018-05-09 NOTE — PROGRESS NOTES
Subjective:      Patient ID: Haritha Valle is a 72 y.o. male.    Chief Complaint:Annual Exam      History of Present Illness    Mild cognitive impairment  Pt feels he is much improved and relates this to stress alleviation. He stopped Exelon patch.         HIV disease  On reyataz + combivir. HIV viral load <40. JE3=535. Has chronic stable lipdystrophy.      CBC, CMP are normal.      Hypertension  He is on lotensin/HCT 10/12.5 and norvasc 5 mg. BP appears well controlled on this. 130/78 today     Hyperlipidemia  On pravastatin 20 mg daily  Lab Results   Component Value Date    CHOL 248 (H) 04/30/2018    CHOL 214 (H) 07/13/2017    CHOL 185 09/20/2016     Lab Results   Component Value Date    HDL 32 (L) 04/30/2018    HDL 30 (L) 07/13/2017    HDL 29 (L) 09/20/2016     Lab Results   Component Value Date    LDLCALC Invalid, Trig>400.0 04/30/2018    LDLCALC Invalid, Trig>400.0 07/13/2017    LDLCALC 108.4 09/20/2016     Lab Results   Component Value Date    TRIG 582 (H) 04/30/2018    TRIG 405 (H) 07/13/2017    TRIG 238 (H) 09/20/2016     Lab Results   Component Value Date    CHOLHDL 12.9 (L) 04/30/2018    CHOLHDL 14.0 (L) 07/13/2017    CHOLHDL 15.7 (L) 09/20/2016     I will increase pravachol to 40 mg daily    Right arm/shoulder pain, hand tingling  Has pain in right trapezius for about a month and also has some tingling in his hand. No weakness.     Hypogonadism  He quit testosterone injections because felt it was causing hair loss. Also libido issues less important to him. T level is low (24)      Review of Systems   Constitution: Negative for chills, decreased appetite, fever, weakness, malaise/fatigue, night sweats, weight gain and weight loss.   HENT: Negative for congestion, ear pain, hearing loss, hoarse voice, sore throat and tinnitus.    Eyes: Negative for blurred vision, redness and visual disturbance.   Cardiovascular: Negative for chest pain, leg swelling and palpitations.   Respiratory: Negative for cough,  hemoptysis, shortness of breath and sputum production.    Hematologic/Lymphatic: Negative for adenopathy. Does not bruise/bleed easily.   Skin: Negative for dry skin, itching, rash and suspicious lesions.   Musculoskeletal: Positive for joint pain. Negative for back pain, myalgias and neck pain.        Pain right shoulder/trapezius area    Gastrointestinal: Negative for abdominal pain, constipation, diarrhea, heartburn, nausea and vomiting.   Genitourinary: Negative for dysuria, flank pain, frequency, hematuria, hesitancy and urgency.   Neurological: Negative for dizziness, headaches, numbness and paresthesias.   Psychiatric/Behavioral: Negative for depression and memory loss. The patient does not have insomnia and is not nervous/anxious.      Objective:   Physical Exam   Constitutional: He is oriented to person, place, and time. He appears well-developed and well-nourished. No distress.   Cardiovascular: Normal rate, regular rhythm, normal heart sounds and intact distal pulses.  Exam reveals no gallop and no friction rub.    No murmur heard.  Pulmonary/Chest: Effort normal and breath sounds normal. No respiratory distress. He has no wheezes. He has no rales.   Abdominal: Soft. He exhibits distension. He exhibits no mass. There is no tenderness. There is no rebound and no guarding.   Distension is most likely central fat redistribution   Neurological: He is alert and oriented to person, place, and time.   Psychiatric: He has a normal mood and affect. His behavior is normal. Thought content normal.     Assessment:       1. HIV disease    2. Prostate cancer screening    3. Preventative health care    4. 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 Mixed hyperlipidemia    6. Essential hypertension    7. Mild cognitive impairment    8. Male hypogonadism    9. Hepatitis C antibody test positive    10. Hepatitis B surface antigen positive          Plan:        1. Labs ordered for next visit  in 4 mos   2. Derm consult for next visit (same day as me)   3. Shingrix series to start today   4. Continue present meds

## 2018-05-09 NOTE — TELEPHONE ENCOUNTER
Spoke with pt for an appt. Pt is scheduled for June 13----- Message from Pema Madison sent at 5/9/2018 12:22 PM CDT -----  Contact: pt at [phone] kelsea Coto pt-Pt is from [facility] and would like to see Dr. Coto before she goes to [place].  Pt can be reached at above number.  Thanks

## 2018-05-31 DIAGNOSIS — R79.89 LOW SERUM TESTOSTERONE LEVEL: ICD-10-CM

## 2018-05-31 DIAGNOSIS — F32.A DEPRESSION, UNSPECIFIED DEPRESSION TYPE: ICD-10-CM

## 2018-05-31 DIAGNOSIS — R42 DIZZINESS: ICD-10-CM

## 2018-05-31 RX ORDER — ALPRAZOLAM 0.5 MG/1
0.5 TABLET ORAL 2 TIMES DAILY PRN
Qty: 30 TABLET | Refills: 5 | Status: SHIPPED | OUTPATIENT
Start: 2018-05-31 | End: 2018-06-07 | Stop reason: SDUPTHER

## 2018-05-31 RX ORDER — FLUOXETINE HYDROCHLORIDE 40 MG/1
40 CAPSULE ORAL DAILY
Qty: 30 CAPSULE | Refills: 11 | Status: SHIPPED | OUTPATIENT
Start: 2018-05-31 | End: 2018-06-07 | Stop reason: SDUPTHER

## 2018-05-31 RX ORDER — MECLIZINE HCL 12.5 MG 12.5 MG/1
12.5 TABLET ORAL 3 TIMES DAILY PRN
Qty: 90 TABLET | Refills: 2 | Status: SHIPPED | OUTPATIENT
Start: 2018-05-31 | End: 2018-06-07 | Stop reason: SDUPTHER

## 2018-06-07 DIAGNOSIS — R79.89 LOW SERUM TESTOSTERONE LEVEL: ICD-10-CM

## 2018-06-07 DIAGNOSIS — R42 DIZZINESS: ICD-10-CM

## 2018-06-07 DIAGNOSIS — F32.A DEPRESSION, UNSPECIFIED DEPRESSION TYPE: ICD-10-CM

## 2018-06-07 RX ORDER — MECLIZINE HCL 12.5 MG 12.5 MG/1
12.5 TABLET ORAL 3 TIMES DAILY PRN
Qty: 90 TABLET | Refills: 2 | Status: SHIPPED | OUTPATIENT
Start: 2018-06-07 | End: 2018-12-14 | Stop reason: SDUPTHER

## 2018-06-07 RX ORDER — FLUOXETINE HYDROCHLORIDE 40 MG/1
40 CAPSULE ORAL DAILY
Qty: 30 CAPSULE | Refills: 11 | Status: SHIPPED | OUTPATIENT
Start: 2018-06-07 | End: 2019-06-07

## 2018-06-07 RX ORDER — ALPRAZOLAM 0.5 MG/1
0.5 TABLET ORAL 2 TIMES DAILY PRN
Qty: 30 TABLET | Refills: 5 | Status: SHIPPED | OUTPATIENT
Start: 2018-06-07 | End: 2018-11-29 | Stop reason: SDUPTHER

## 2018-06-11 RX ORDER — TESTOSTERONE CYPIONATE 200 MG/ML
300 INJECTION, SOLUTION INTRAMUSCULAR
Qty: 10 ML | Refills: 5 | Status: SHIPPED | OUTPATIENT
Start: 2018-06-11 | End: 2019-03-18 | Stop reason: SDUPTHER

## 2018-06-13 ENCOUNTER — OFFICE VISIT (OUTPATIENT)
Dept: DERMATOLOGY | Facility: CLINIC | Age: 73
End: 2018-06-13
Payer: MEDICARE

## 2018-06-13 DIAGNOSIS — L98.8 RHYTIDES: ICD-10-CM

## 2018-06-13 DIAGNOSIS — D48.5 NEOPLASM OF UNCERTAIN BEHAVIOR OF SKIN: Primary | ICD-10-CM

## 2018-06-13 PROCEDURE — 99999 PR PBB SHADOW E&M-EST. PATIENT-LVL II: CPT | Mod: PBBFAC,,, | Performed by: DERMATOLOGY

## 2018-06-13 PROCEDURE — 99499 UNLISTED E&M SERVICE: CPT | Mod: S$PBB,,, | Performed by: DERMATOLOGY

## 2018-06-13 PROCEDURE — 88305 TISSUE EXAM BY PATHOLOGIST: CPT | Performed by: PATHOLOGY

## 2018-06-13 PROCEDURE — 11100 PR BIOPSY OF SKIN LESION: CPT | Mod: S$PBB,,, | Performed by: DERMATOLOGY

## 2018-06-13 PROCEDURE — 99212 OFFICE O/P EST SF 10 MIN: CPT | Mod: PBBFAC,25 | Performed by: DERMATOLOGY

## 2018-06-13 PROCEDURE — 11100 PR BIOPSY OF SKIN LESION: CPT | Mod: PBBFAC | Performed by: DERMATOLOGY

## 2018-06-13 RX ORDER — TRETINOIN 0.25 MG/G
CREAM TOPICAL NIGHTLY
Qty: 20 G | Refills: 1 | Status: SHIPPED | OUTPATIENT
Start: 2018-06-13 | End: 2019-12-17

## 2018-06-13 RX ORDER — OMEGA-3-ACID ETHYL ESTERS 1 G/1
CAPSULE, LIQUID FILLED ORAL
COMMUNITY
Start: 2018-05-30 | End: 2019-12-17

## 2018-07-11 ENCOUNTER — PROCEDURE VISIT (OUTPATIENT)
Dept: DERMATOLOGY | Facility: CLINIC | Age: 73
End: 2018-07-11
Payer: MEDICARE

## 2018-07-11 VITALS
HEIGHT: 70 IN | BODY MASS INDEX: 30.49 KG/M2 | WEIGHT: 213 LBS | DIASTOLIC BLOOD PRESSURE: 79 MMHG | HEART RATE: 86 BPM | SYSTOLIC BLOOD PRESSURE: 135 MMHG

## 2018-07-11 DIAGNOSIS — C44.311 BASAL CELL CARCINOMA OF LEFT NASAL TIP: Primary | ICD-10-CM

## 2018-07-11 PROCEDURE — 99499 UNLISTED E&M SERVICE: CPT | Mod: S$PBB,,, | Performed by: DERMATOLOGY

## 2018-07-11 PROCEDURE — 17312 MOHS ADDL STAGE: CPT | Mod: PBBFAC | Performed by: DERMATOLOGY

## 2018-07-11 PROCEDURE — 14060 TIS TRNFR E/N/E/L 10 SQ CM/<: CPT | Mod: PBBFAC | Performed by: DERMATOLOGY

## 2018-07-11 PROCEDURE — 14060 TIS TRNFR E/N/E/L 10 SQ CM/<: CPT | Mod: S$PBB,,, | Performed by: DERMATOLOGY

## 2018-07-11 PROCEDURE — 17311 MOHS 1 STAGE H/N/HF/G: CPT | Mod: 51,S$PBB,, | Performed by: DERMATOLOGY

## 2018-07-11 PROCEDURE — 17311 MOHS 1 STAGE H/N/HF/G: CPT | Mod: PBBFAC | Performed by: DERMATOLOGY

## 2018-07-11 PROCEDURE — 17312 MOHS ADDL STAGE: CPT | Mod: S$PBB,,, | Performed by: DERMATOLOGY

## 2018-07-11 RX ORDER — OXYCODONE AND ACETAMINOPHEN 5; 325 MG/1; MG/1
1 TABLET ORAL
Qty: 20 TABLET | Refills: 0 | Status: SHIPPED | OUTPATIENT
Start: 2018-07-11 | End: 2018-07-18

## 2018-07-11 RX ORDER — CEPHALEXIN 500 MG/1
500 CAPSULE ORAL 3 TIMES DAILY
Qty: 21 CAPSULE | Refills: 0 | Status: SHIPPED | OUTPATIENT
Start: 2018-07-11 | End: 2018-07-18

## 2018-07-11 RX ORDER — OXYCODONE AND ACETAMINOPHEN 5; 325 MG/1; MG/1
1 TABLET ORAL
Qty: 20 TABLET | Refills: 0 | Status: SHIPPED | OUTPATIENT
Start: 2018-07-11 | End: 2018-07-11 | Stop reason: SDUPTHER

## 2018-07-11 RX ORDER — CEPHALEXIN 500 MG/1
500 CAPSULE ORAL 3 TIMES DAILY
Qty: 21 CAPSULE | Refills: 0 | Status: SHIPPED | OUTPATIENT
Start: 2018-07-11 | End: 2018-07-11 | Stop reason: SDUPTHER

## 2018-07-11 NOTE — PROGRESS NOTES
PROCEDURE: Mohs' Micrographic Surgery    INDICATION: Location in mask areas of face including central face, nose, eyelids, eyebrows, lips, chin, preauricular, temple, and ear. Biopsy-proven skin cancer of cosmetically and functionally important areas, including head, neck, genital, hand, foot, or areas known for having difficulty in healing, such as the lower anterior legs. Tumor with ill-defined borders. Tumors in immunosuppressed patients.    REFERRING MD: Yolis Coto MD    CASE NUMBER:     ANESTHETIC: 2 cc 0.5% Lidocaine with Epi 1:200,000 mixed 1:1 with 0.5% Bupivacaine    SURGICAL PREP: Hibiclens    SURGEON: Dena Thompson MD    ASSISTANTS: Teri Logan PA-C and Jennifer Rod MA    PREOPERATIVE DIAGNOSIS: basal cell carcinoma    POSTOPERATIVE DIAGNOSIS: basal cell carcinoma    PATHOLOGIC DIAGNOSIS: basal cell carcinoma- with squamous differentiation, superficial    HISTOLOGY OF SPECIMENS IN FIRST STAGE:   Tumor Type: Tumor seen. Superficial basal cell carcinoma: Foci of basaloid cells with peripheral palisading and focal retraction artifact arising along the dermoepidermal junction and extending into the papillary dermis.   Depth of Invasion: epidermis, dermis and subcutaneous tissue  Perineural Invasion: No    HISTOLOGY OF SPECIMENS IN SUBSEQUENT STAGES:  · Tumor Type: No tumor seen.    STAGES OF MOHS' SURGERY PERFORMED: 2    TUMOR-FREE PLANE ACHIEVED: Yes - with deep tissue removal    HEMOSTASIS: electrocoagulation     SPECIMENS: 4 (2 in stage A and 2 in stage B)    LOCATION: left nasal tip. Patient verified location with hand held mirror.    INITIAL LESION SIZE: 0.4 x 0.4 cm    FINAL DEFECT SIZE: 0.6 x 0.7 cm    WOUND REPAIR/DISPOSITION: The patient tolerated Mohs' Micrographic Surgery for a basal cell carcinoma very well. When the tumor was completely removed, a repair of the surgical defect was undertaken.      PROCEDURE: Burow's Advancement Flap (Adjacent Tissue Transfer)    INDICATION: Status  post Mohs' Micrographic Surgery for basal cell carcinoma.    CASE NUMBER:     ANESTHETIC: 3 cc 1% Lidocaine with Epinephrine 1:100,000    SURGICAL PREP: Hibiclens    SURGEON: Dena Thompson MD    ASSISTANTS: Teri Logan PA-C and Jennifer Rod MA    LOCATION: left nasal tip    DEFECT SIZE: 0.6 x 0.7 cm    WOUND REPAIR/DISPOSITION:  After the patient's carcinoma had been completely removed with Mohs' Micrographic Surgery, a repair of the surgical defect was undertaken. The patient was returned to the operating suite where the area of left nasal tip was prepped, draped, and anesthetized in the usual sterile fashion. A Burow's advancement flap was designed in the inferior surrounding tissue of the nasal tip. A Burow's triangle was drawn in superiorly to help with tissue movement. Then with a #15 blade the flap was incised and the Burow's triangle was excised, the area was widely undermined in the submuscular tissue plane. Then, electrocoagulation was used to obtain meticulous hemostasis. A 5-0 Vicryl pexing suture was performed by attaching the underside of the most medial aspect of the flap to the nasal tip. The flap was then advanced in by a complex closure. Then, 5-0 Vicryl buried vertical mattress sutures were placed into the subcutaneous and dermal plane to close the wound and opal the cutaneous wound edge. The flap was then trimmed to fit the defect. The cutaneous wound edges were closed using interrupted 5-0 Prolene sutures.    The patient tolerated the procedure well.    The area was cleaned and dressed appropriately and the patient was given wound care instructions, as well as an appointment for follow-up evaluation. Patient was placed on Percocet 5 prn postop pain and Keflex 500 mg TID x 7 days.    SIZE OF FLAP: 4 cm squared    Vitals:    07/11/18 0734 07/11/18 1007   BP: 118/70 135/79   BP Location: Left arm    Patient Position: Sitting    BP Method: Medium (Automatic)    Pulse: 83 86   Weight: 96.6  "kg (213 lb)    Height: 5' 10" (1.778 m)          "

## 2018-07-18 ENCOUNTER — CLINICAL SUPPORT (OUTPATIENT)
Dept: INFECTIOUS DISEASES | Facility: CLINIC | Age: 73
End: 2018-07-18
Payer: MEDICARE

## 2018-07-18 ENCOUNTER — OFFICE VISIT (OUTPATIENT)
Dept: DERMATOLOGY | Facility: CLINIC | Age: 73
End: 2018-07-18
Payer: MEDICARE

## 2018-07-18 DIAGNOSIS — Z09 POSTOP CHECK: Primary | ICD-10-CM

## 2018-07-18 DIAGNOSIS — Z00.00 PREVENTATIVE HEALTH CARE: ICD-10-CM

## 2018-07-18 PROCEDURE — 90750 HZV VACC RECOMBINANT IM: CPT | Mod: PBBFAC

## 2018-07-18 PROCEDURE — 99024 POSTOP FOLLOW-UP VISIT: CPT | Mod: POP,,, | Performed by: DERMATOLOGY

## 2018-07-18 PROCEDURE — 99213 OFFICE O/P EST LOW 20 MIN: CPT | Mod: PBBFAC | Performed by: DERMATOLOGY

## 2018-07-18 PROCEDURE — 99999 PR PBB SHADOW E&M-EST. PATIENT-LVL III: CPT | Mod: PBBFAC,,, | Performed by: DERMATOLOGY

## 2018-07-18 NOTE — PROGRESS NOTES
Pt received the second Shingrix vaccination. Pt tolerated the injection well. Pt left the unit in NAD.

## 2018-07-18 NOTE — PROGRESS NOTES
73 y.o. male patient is here for suture removal following Mohs' surgery.    Patient reports no problems left nasal tip.    WOUND PE:  The left nasal tip sutures intact. Wound healing well. Good skin edges. No signs or symptoms of infection. Flap is pink and viable.    IMPRESSION:  Healing operative site from Mohs' surgery BCC, left nasal tip s/p Mohs with Burow's Advancement Flap, postop day # 7    PLAN:  Sutures removed today. Steri-strips applied.  Continue wound care.  Keep moist with Aquaphor.    RTC:  In 1 month.

## 2018-08-16 ENCOUNTER — TELEPHONE (OUTPATIENT)
Dept: DERMATOLOGY | Facility: CLINIC | Age: 73
End: 2018-08-16

## 2018-08-16 NOTE — TELEPHONE ENCOUNTER
Pt had surgery on 7/11/18 for BCC left nasal tip. Pt called today stating that he is having oozing, redness and swelling in the area. Pt also stated that it is sensitive to touch. Pt is scheduled for wound check on 8/22/18. Pt stated he will email a photo of the site.

## 2018-08-16 NOTE — TELEPHONE ENCOUNTER
Called pt back to informed him that I did not received the photo of his nose. Pt stated that he did not send it because the nose is not as sensitive as before. Pt stated he still have redness. Offered pt appointment today and he stated that he if fine and if he feels it has gotten worse he will call on Monday. Pt verbally appreciated the call back.

## 2018-08-16 NOTE — TELEPHONE ENCOUNTER
----- Message from Pema Tesfayemario alberto sent at 8/16/2018  9:34 AM CDT -----  Contact: pt at [phone]  Stjat-Qekzqt-My had surgery recently and states that his nose is red,swollen and has liquid oozing from time to time.  Please call pt today and advise.  Pt has a post op next week Aug. 22 and the soonest that he can get here is Tueday if need be.

## 2018-08-22 ENCOUNTER — LAB VISIT (OUTPATIENT)
Dept: LAB | Facility: HOSPITAL | Age: 73
End: 2018-08-22
Attending: INTERNAL MEDICINE
Payer: MEDICARE

## 2018-08-22 ENCOUNTER — OFFICE VISIT (OUTPATIENT)
Dept: INFECTIOUS DISEASES | Facility: CLINIC | Age: 73
End: 2018-08-22
Payer: MEDICARE

## 2018-08-22 ENCOUNTER — OFFICE VISIT (OUTPATIENT)
Dept: DERMATOLOGY | Facility: CLINIC | Age: 73
End: 2018-08-22
Payer: MEDICARE

## 2018-08-22 VITALS
HEIGHT: 70 IN | BODY MASS INDEX: 31.16 KG/M2 | WEIGHT: 217.63 LBS | TEMPERATURE: 98 F | HEART RATE: 90 BPM | SYSTOLIC BLOOD PRESSURE: 134 MMHG | DIASTOLIC BLOOD PRESSURE: 75 MMHG

## 2018-08-22 DIAGNOSIS — I10 ESSENTIAL HYPERTENSION: ICD-10-CM

## 2018-08-22 DIAGNOSIS — B20 LIPODYSTROPHY ASSOCIATED WITH HUMAN IMMUNODEFICIENCY VIRUS INFECTION: ICD-10-CM

## 2018-08-22 DIAGNOSIS — Z09 POSTOP CHECK: Primary | ICD-10-CM

## 2018-08-22 DIAGNOSIS — R60.9 EDEMA, UNSPECIFIED TYPE: ICD-10-CM

## 2018-08-22 DIAGNOSIS — R60.9 EDEMA, UNSPECIFIED TYPE: Primary | ICD-10-CM

## 2018-08-22 DIAGNOSIS — B20 HIV DISEASE: Chronic | ICD-10-CM

## 2018-08-22 DIAGNOSIS — E88.1 LIPODYSTROPHY ASSOCIATED WITH HUMAN IMMUNODEFICIENCY VIRUS INFECTION: ICD-10-CM

## 2018-08-22 LAB
BILIRUB UR QL STRIP: NEGATIVE
CLARITY UR REFRACT.AUTO: CLEAR
COLOR UR AUTO: YELLOW
GLUCOSE UR QL STRIP: NEGATIVE
HGB UR QL STRIP: NEGATIVE
KETONES UR QL STRIP: NEGATIVE
LEUKOCYTE ESTERASE UR QL STRIP: NEGATIVE
MICROSCOPIC COMMENT: NORMAL
NITRITE UR QL STRIP: NEGATIVE
PH UR STRIP: 5 [PH] (ref 5–8)
PROT UR QL STRIP: NEGATIVE
RBC #/AREA URNS AUTO: 1 /HPF (ref 0–4)
SP GR UR STRIP: 1.02 (ref 1–1.03)
URN SPEC COLLECT METH UR: NORMAL
UROBILINOGEN UR STRIP-ACNC: 2 EU/DL
WBC #/AREA URNS AUTO: 1 /HPF (ref 0–5)

## 2018-08-22 PROCEDURE — 99214 OFFICE O/P EST MOD 30 MIN: CPT | Mod: S$PBB,,, | Performed by: INTERNAL MEDICINE

## 2018-08-22 PROCEDURE — 99999 PR PBB SHADOW E&M-EST. PATIENT-LVL III: CPT | Mod: PBBFAC,,, | Performed by: DERMATOLOGY

## 2018-08-22 PROCEDURE — 99213 OFFICE O/P EST LOW 20 MIN: CPT | Mod: PBBFAC,27 | Performed by: DERMATOLOGY

## 2018-08-22 PROCEDURE — 99999 PR PBB SHADOW E&M-EST. PATIENT-LVL III: CPT | Mod: PBBFAC,,, | Performed by: INTERNAL MEDICINE

## 2018-08-22 PROCEDURE — 81001 URINALYSIS AUTO W/SCOPE: CPT

## 2018-08-22 PROCEDURE — 99213 OFFICE O/P EST LOW 20 MIN: CPT | Mod: PBBFAC | Performed by: INTERNAL MEDICINE

## 2018-08-22 PROCEDURE — 99024 POSTOP FOLLOW-UP VISIT: CPT | Mod: POP,,, | Performed by: DERMATOLOGY

## 2018-08-22 NOTE — PROGRESS NOTES
"Subjective:      Patient ID: Haritha Valle is a 73 y.o. male.    Chief Complaint:Follow-up      History of Present Illness    Pt came in extra today because of concern about leg edema of about 3 mos duration. He has been on amlodipine for about 3 years but did not start with swelling till recently. Has gained about 4 lb since last clinic visit. Has chronic complaint of "hard fat" distribution in central abdomen for years which seems unrelated to me.     Review of Systems   Constitution: Negative for chills, decreased appetite, fever, weakness, malaise/fatigue, night sweats, weight gain and weight loss.   HENT: Negative for congestion, ear pain, hearing loss, hoarse voice, sore throat and tinnitus.    Eyes: Negative for blurred vision, redness and visual disturbance.   Cardiovascular: Positive for leg swelling. Negative for chest pain and palpitations.   Respiratory: Negative for cough, hemoptysis, shortness of breath and sputum production.    Hematologic/Lymphatic: Negative for adenopathy. Does not bruise/bleed easily.   Skin: Negative for dry skin, itching, rash and suspicious lesions.   Musculoskeletal: Negative for back pain, joint pain, myalgias and neck pain.   Gastrointestinal: Negative for abdominal pain, constipation, diarrhea, heartburn, nausea and vomiting.   Genitourinary: Negative for dysuria, flank pain, frequency, hematuria, hesitancy and urgency.   Neurological: Negative for dizziness, headaches, numbness and paresthesias.   Psychiatric/Behavioral: Negative for depression and memory loss. The patient does not have insomnia and is not nervous/anxious.      Objective:   Physical Exam   Constitutional: He is oriented to person, place, and time. He appears well-developed and well-nourished. No distress.   HENT:   Mouth/Throat: Oropharynx is clear and moist.   Cardiovascular: Normal rate, regular rhythm and normal heart sounds. Exam reveals no gallop and no friction rub.   No murmur " heard.  Pulmonary/Chest: Effort normal and breath sounds normal. No stridor. No respiratory distress. He has no wheezes. He has no rales.   Abdominal: Soft. He exhibits no distension and no mass. There is no tenderness. There is no rebound.   Distension as before related to lipidystrophy.   Neurological: He is alert and oriented to person, place, and time.   Psychiatric: He has a normal mood and affect. His behavior is normal. Thought content normal.   Vitals reviewed.    Assessment:       1. Edema, unspecified type    2. 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 Essential hypertension          Plan:        1. DC amlodipine; continue other BP meds   2. Daily BP check at home for 3 weeks and send to me   3. CMP and UA today

## 2018-08-22 NOTE — PROGRESS NOTES
73 y.o. male patient is here for wound check after surgery.    Patient reports no problems.    WOUND PE:  The L nasal tip  Flap is well healed. Mild firmness and erythema of scar. No nodularity.        IMPRESSION:  Healing operative site from Mohs' surgery, BCC L nasal tip s/p Mohs with Burow's Advancement Flap, postop week # 6, well-healed and no evidence of recurrence.    PLAN:      Daily SPF.  Regular skin checks.    RTC:  In 3-6 months with Yolis Coto MD for skin check or sooner if new concern arises.

## 2018-08-23 ENCOUNTER — TELEPHONE (OUTPATIENT)
Dept: INFECTIOUS DISEASES | Facility: CLINIC | Age: 73
End: 2018-08-23

## 2018-08-23 NOTE — TELEPHONE ENCOUNTER
----- Message from Saeed Yun MD sent at 8/23/2018  3:19 PM CDT -----  Call him and tell him:  The amlodipine is to be stopped and he is to check his BP daily for a few weeks and sent to me. We will decide then what next steps are  [place]  ----- Message -----  From: Abbye Cabrera MA  Sent: 8/23/2018   2:52 PM  To: Saeed Yun MD    Patient inquires if going to change medication?  ----- Message -----  From: Saeed Yun MD  Sent: 8/23/2018   1:13 PM  To: Erma Swanson LPN    Please call Haritha and tell him his blood chemistries and UA are ok. No sign of kidney problems. The edema is from his BP med

## 2018-09-18 RX ORDER — BENAZEPRIL HYDROCHLORIDE AND HYDROCHLOROTHIAZIDE 10; 12.5 MG/1; MG/1
TABLET ORAL
Qty: 90 TABLET | Refills: 3 | Status: SHIPPED | OUTPATIENT
Start: 2018-09-18 | End: 2019-07-03 | Stop reason: SDUPTHER

## 2018-11-08 DIAGNOSIS — Z21 HIV POSITIVE: ICD-10-CM

## 2018-11-08 RX ORDER — ATAZANAVIR 200 MG/1
CAPSULE ORAL
Qty: 60 CAPSULE | Refills: 6 | Status: SHIPPED | OUTPATIENT
Start: 2018-11-08 | End: 2019-09-09 | Stop reason: SDUPTHER

## 2018-11-21 DIAGNOSIS — G31.84 MILD COGNITIVE IMPAIRMENT: ICD-10-CM

## 2018-11-21 RX ORDER — LAMIVUDINE AND ZIDOVUDINE 150; 300 MG/1; MG/1
TABLET, FILM COATED ORAL
Qty: 60 TABLET | Refills: 6 | Status: SHIPPED | OUTPATIENT
Start: 2018-11-21 | End: 2019-09-09 | Stop reason: SDUPTHER

## 2018-11-29 DIAGNOSIS — R79.89 LOW SERUM TESTOSTERONE LEVEL: ICD-10-CM

## 2018-11-29 RX ORDER — ALPRAZOLAM 0.5 MG/1
TABLET ORAL
Qty: 30 TABLET | Refills: 4 | Status: SHIPPED | OUTPATIENT
Start: 2018-11-29 | End: 2019-06-08 | Stop reason: SDUPTHER

## 2018-12-12 ENCOUNTER — IMMUNIZATION (OUTPATIENT)
Dept: INTERNAL MEDICINE | Facility: CLINIC | Age: 73
End: 2018-12-12
Payer: MEDICARE

## 2018-12-12 PROCEDURE — 90662 IIV NO PRSV INCREASED AG IM: CPT | Mod: PBBFAC,PO

## 2018-12-14 DIAGNOSIS — R42 DIZZINESS: ICD-10-CM

## 2018-12-14 RX ORDER — MECLIZINE HCL 12.5 MG 12.5 MG/1
TABLET ORAL
Qty: 90 TABLET | Refills: 2 | Status: SHIPPED | OUTPATIENT
Start: 2018-12-14 | End: 2019-04-10 | Stop reason: SDUPTHER

## 2019-03-18 DIAGNOSIS — R79.89 LOW SERUM TESTOSTERONE LEVEL: ICD-10-CM

## 2019-03-18 RX ORDER — TESTOSTERONE CYPIONATE 200 MG/ML
INJECTION, SOLUTION INTRAMUSCULAR
Qty: 10 ML | Refills: 3 | Status: SHIPPED | OUTPATIENT
Start: 2019-03-18 | End: 2019-07-29

## 2019-04-10 DIAGNOSIS — R42 DIZZINESS: ICD-10-CM

## 2019-04-10 RX ORDER — MECLIZINE HCL 12.5 MG 12.5 MG/1
TABLET ORAL
Qty: 90 TABLET | Refills: 2 | Status: SHIPPED | OUTPATIENT
Start: 2019-04-10 | End: 2019-09-09 | Stop reason: SDUPTHER

## 2019-06-08 DIAGNOSIS — R79.89 LOW SERUM TESTOSTERONE LEVEL: ICD-10-CM

## 2019-06-10 RX ORDER — ALPRAZOLAM 0.5 MG/1
TABLET ORAL
Qty: 30 TABLET | Refills: 3 | Status: SHIPPED | OUTPATIENT
Start: 2019-06-10 | End: 2019-07-03

## 2019-06-10 RX ORDER — PRAVASTATIN SODIUM 40 MG/1
TABLET ORAL
Qty: 90 TABLET | Refills: 3 | Status: SHIPPED | OUTPATIENT
Start: 2019-06-10 | End: 2020-08-13

## 2019-06-20 ENCOUNTER — TELEPHONE (OUTPATIENT)
Dept: INFECTIOUS DISEASES | Facility: CLINIC | Age: 74
End: 2019-06-20

## 2019-06-20 DIAGNOSIS — G31.84 MILD COGNITIVE IMPAIRMENT: Primary | Chronic | ICD-10-CM

## 2019-06-20 DIAGNOSIS — E78.00 PURE HYPERCHOLESTEROLEMIA: Chronic | ICD-10-CM

## 2019-06-20 DIAGNOSIS — Z12.5 ENCOUNTER FOR SCREENING FOR MALIGNANT NEOPLASM OF PROSTATE: ICD-10-CM

## 2019-06-20 DIAGNOSIS — I10 ESSENTIAL HYPERTENSION: ICD-10-CM

## 2019-06-20 DIAGNOSIS — E29.1 MALE HYPOGONADISM: ICD-10-CM

## 2019-06-20 DIAGNOSIS — B20 HIV DISEASE: Chronic | ICD-10-CM

## 2019-06-20 NOTE — TELEPHONE ENCOUNTER
----- Message from Edilma Juarez MA sent at 6/20/2019  2:02 PM CDT -----  Contact: Duane / Partner [phone]      ----- Message -----  From: Jacqui Heaton  Sent: 6/20/2019   1:49 PM  To: Jama [facility] Staff    PT is requesting a full blood workup to be done at [facility] in [place].

## 2019-06-25 ENCOUNTER — LAB VISIT (OUTPATIENT)
Dept: LAB | Facility: HOSPITAL | Age: 74
End: 2019-06-25
Attending: INTERNAL MEDICINE
Payer: MEDICARE

## 2019-06-25 DIAGNOSIS — E78.00 PURE HYPERCHOLESTEROLEMIA: Chronic | ICD-10-CM

## 2019-06-25 DIAGNOSIS — I10 ESSENTIAL HYPERTENSION: ICD-10-CM

## 2019-06-25 DIAGNOSIS — E29.1 MALE HYPOGONADISM: ICD-10-CM

## 2019-06-25 DIAGNOSIS — Z12.5 ENCOUNTER FOR SCREENING FOR MALIGNANT NEOPLASM OF PROSTATE: ICD-10-CM

## 2019-06-25 DIAGNOSIS — G31.84 MILD COGNITIVE IMPAIRMENT: Chronic | ICD-10-CM

## 2019-06-25 DIAGNOSIS — B20 HIV DISEASE: Chronic | ICD-10-CM

## 2019-06-25 LAB
ALBUMIN SERPL BCP-MCNC: 3.9 G/DL (ref 3.5–5.2)
ALP SERPL-CCNC: 35 U/L (ref 55–135)
ALT SERPL W/O P-5'-P-CCNC: 27 U/L (ref 10–44)
ANION GAP SERPL CALC-SCNC: 9 MMOL/L (ref 8–16)
AST SERPL-CCNC: 25 U/L (ref 10–40)
BASOPHILS # BLD AUTO: 0.02 K/UL (ref 0–0.2)
BASOPHILS NFR BLD: 0.4 % (ref 0–1.9)
BILIRUB SERPL-MCNC: 0.6 MG/DL (ref 0.1–1)
BUN SERPL-MCNC: 13 MG/DL (ref 8–23)
CALCIUM SERPL-MCNC: 10 MG/DL (ref 8.7–10.5)
CHLORIDE SERPL-SCNC: 103 MMOL/L (ref 95–110)
CHOLEST SERPL-MCNC: 214 MG/DL (ref 120–199)
CHOLEST/HDLC SERPL: 6.7 {RATIO} (ref 2–5)
CO2 SERPL-SCNC: 26 MMOL/L (ref 23–29)
CREAT SERPL-MCNC: 0.9 MG/DL (ref 0.5–1.4)
DIFFERENTIAL METHOD: ABNORMAL
EOSINOPHIL # BLD AUTO: 0.1 K/UL (ref 0–0.5)
EOSINOPHIL NFR BLD: 2.2 % (ref 0–8)
ERYTHROCYTE [DISTWIDTH] IN BLOOD BY AUTOMATED COUNT: 15.5 % (ref 11.5–14.5)
ERYTHROCYTE [SEDIMENTATION RATE] IN BLOOD BY WESTERGREN METHOD: 28 MM/HR (ref 0–23)
EST. GFR  (AFRICAN AMERICAN): >60 ML/MIN/1.73 M^2
EST. GFR  (NON AFRICAN AMERICAN): >60 ML/MIN/1.73 M^2
GLUCOSE SERPL-MCNC: 81 MG/DL (ref 70–110)
HCT VFR BLD AUTO: 50.2 % (ref 40–54)
HDLC SERPL-MCNC: 32 MG/DL (ref 40–75)
HDLC SERPL: 15 % (ref 20–50)
HGB BLD-MCNC: 16.5 G/DL (ref 14–18)
IMM GRANULOCYTES # BLD AUTO: 0.01 K/UL (ref 0–0.04)
IMM GRANULOCYTES NFR BLD AUTO: 0.2 % (ref 0–0.5)
LDLC SERPL CALC-MCNC: 103.6 MG/DL (ref 63–159)
LYMPHOCYTES # BLD AUTO: 2 K/UL (ref 1–4.8)
LYMPHOCYTES NFR BLD: 37.2 % (ref 18–48)
MCH RBC QN AUTO: 36 PG (ref 27–31)
MCHC RBC AUTO-ENTMCNC: 32.9 G/DL (ref 32–36)
MCV RBC AUTO: 110 FL (ref 82–98)
MONOCYTES # BLD AUTO: 0.6 K/UL (ref 0.3–1)
MONOCYTES NFR BLD: 11.1 % (ref 4–15)
NEUTROPHILS # BLD AUTO: 2.7 K/UL (ref 1.8–7.7)
NEUTROPHILS NFR BLD: 48.9 % (ref 38–73)
NONHDLC SERPL-MCNC: 182 MG/DL
NRBC BLD-RTO: 0 /100 WBC
PLATELET # BLD AUTO: 296 K/UL (ref 150–350)
PMV BLD AUTO: 9.5 FL (ref 9.2–12.9)
POTASSIUM SERPL-SCNC: 4.5 MMOL/L (ref 3.5–5.1)
PROT SERPL-MCNC: 8.2 G/DL (ref 6–8.4)
RBC # BLD AUTO: 4.58 M/UL (ref 4.6–6.2)
SODIUM SERPL-SCNC: 138 MMOL/L (ref 136–145)
TRIGL SERPL-MCNC: 392 MG/DL (ref 30–150)
WBC # BLD AUTO: 5.49 K/UL (ref 3.9–12.7)

## 2019-06-25 PROCEDURE — 86361 T CELL ABSOLUTE COUNT: CPT

## 2019-06-25 PROCEDURE — 84153 ASSAY OF PSA TOTAL: CPT

## 2019-06-25 PROCEDURE — 87536 HIV-1 QUANT&REVRSE TRNSCRPJ: CPT

## 2019-06-25 PROCEDURE — 80061 LIPID PANEL: CPT

## 2019-06-25 PROCEDURE — 84403 ASSAY OF TOTAL TESTOSTERONE: CPT

## 2019-06-25 PROCEDURE — 36415 COLL VENOUS BLD VENIPUNCTURE: CPT

## 2019-06-25 PROCEDURE — 80053 COMPREHEN METABOLIC PANEL: CPT

## 2019-06-25 PROCEDURE — 85025 COMPLETE CBC W/AUTO DIFF WBC: CPT

## 2019-06-25 PROCEDURE — 85652 RBC SED RATE AUTOMATED: CPT

## 2019-06-26 LAB
CD3+CD4+ CELLS # BLD: 570 CELLS/UL (ref 300–1400)
CD3+CD4+ CELLS NFR BLD: 23.1 % (ref 28–57)
COMPLEXED PSA SERPL-MCNC: 1.9 NG/ML (ref 0–4)
TESTOST SERPL-MCNC: 161 NG/DL (ref 304–1227)

## 2019-07-03 ENCOUNTER — OFFICE VISIT (OUTPATIENT)
Dept: INFECTIOUS DISEASES | Facility: CLINIC | Age: 74
End: 2019-07-03
Payer: MEDICARE

## 2019-07-03 ENCOUNTER — CLINICAL SUPPORT (OUTPATIENT)
Dept: INFECTIOUS DISEASES | Facility: CLINIC | Age: 74
End: 2019-07-03
Payer: MEDICARE

## 2019-07-03 ENCOUNTER — HOSPITAL ENCOUNTER (OUTPATIENT)
Dept: CARDIOLOGY | Facility: CLINIC | Age: 74
Discharge: HOME | End: 2019-07-03
Payer: MEDICARE

## 2019-07-03 VITALS
TEMPERATURE: 98 F | DIASTOLIC BLOOD PRESSURE: 85 MMHG | HEIGHT: 70 IN | WEIGHT: 218 LBS | HEART RATE: 89 BPM | BODY MASS INDEX: 31.21 KG/M2 | SYSTOLIC BLOOD PRESSURE: 144 MMHG

## 2019-07-03 DIAGNOSIS — F41.9 ANXIETY: ICD-10-CM

## 2019-07-03 DIAGNOSIS — E78.00 PURE HYPERCHOLESTEROLEMIA: Chronic | ICD-10-CM

## 2019-07-03 DIAGNOSIS — I10 ESSENTIAL HYPERTENSION: ICD-10-CM

## 2019-07-03 DIAGNOSIS — R79.89 LOW SERUM TESTOSTERONE LEVEL: ICD-10-CM

## 2019-07-03 DIAGNOSIS — G31.84 MILD COGNITIVE IMPAIRMENT: Chronic | ICD-10-CM

## 2019-07-03 DIAGNOSIS — R42 DIZZINESS: ICD-10-CM

## 2019-07-03 DIAGNOSIS — E29.1 MALE HYPOGONADISM: ICD-10-CM

## 2019-07-03 DIAGNOSIS — Z00.00 PREVENTATIVE HEALTH CARE: ICD-10-CM

## 2019-07-03 DIAGNOSIS — Z00.00 PREVENTATIVE HEALTH CARE: Primary | ICD-10-CM

## 2019-07-03 DIAGNOSIS — E88.1 LIPODYSTROPHY ASSOCIATED WITH HUMAN IMMUNODEFICIENCY VIRUS INFECTION: ICD-10-CM

## 2019-07-03 DIAGNOSIS — F32.0 CURRENT MILD EPISODE OF MAJOR DEPRESSIVE DISORDER WITHOUT PRIOR EPISODE: ICD-10-CM

## 2019-07-03 DIAGNOSIS — B20 HIV DISEASE: Chronic | ICD-10-CM

## 2019-07-03 DIAGNOSIS — R76.8 HEPATITIS B SURFACE ANTIGEN POSITIVE: Chronic | ICD-10-CM

## 2019-07-03 DIAGNOSIS — E78.2 MIXED HYPERLIPIDEMIA: Chronic | ICD-10-CM

## 2019-07-03 DIAGNOSIS — B20 LIPODYSTROPHY ASSOCIATED WITH HUMAN IMMUNODEFICIENCY VIRUS INFECTION: ICD-10-CM

## 2019-07-03 PROCEDURE — 99213 OFFICE O/P EST LOW 20 MIN: CPT | Mod: PBBFAC,25 | Performed by: INTERNAL MEDICINE

## 2019-07-03 PROCEDURE — 93010 EKG 12-LEAD: ICD-10-PCS | Mod: S$PBB,,, | Performed by: INTERNAL MEDICINE

## 2019-07-03 PROCEDURE — 99215 PR OFFICE/OUTPT VISIT, EST, LEVL V, 40-54 MIN: ICD-10-PCS | Mod: S$PBB,,, | Performed by: INTERNAL MEDICINE

## 2019-07-03 PROCEDURE — 93010 ELECTROCARDIOGRAM REPORT: CPT | Mod: S$PBB,,, | Performed by: INTERNAL MEDICINE

## 2019-07-03 PROCEDURE — 99999 PR PBB SHADOW E&M-EST. PATIENT-LVL III: ICD-10-PCS | Mod: PBBFAC,,, | Performed by: INTERNAL MEDICINE

## 2019-07-03 PROCEDURE — 90471 IMMUNIZATION ADMIN: CPT | Mod: PBBFAC

## 2019-07-03 PROCEDURE — 99999 PR PBB SHADOW E&M-EST. PATIENT-LVL III: CPT | Mod: PBBFAC,,, | Performed by: INTERNAL MEDICINE

## 2019-07-03 PROCEDURE — 99215 OFFICE O/P EST HI 40 MIN: CPT | Mod: S$PBB,,, | Performed by: INTERNAL MEDICINE

## 2019-07-03 PROCEDURE — 93005 ELECTROCARDIOGRAM TRACING: CPT | Mod: PBBFAC | Performed by: INTERNAL MEDICINE

## 2019-07-03 RX ORDER — ALPRAZOLAM 0.25 MG/1
TABLET ORAL
Qty: 60 TABLET | Refills: 4 | Status: SHIPPED | OUTPATIENT
Start: 2019-07-03 | End: 2019-10-07 | Stop reason: HOSPADM

## 2019-07-03 RX ORDER — AMLODIPINE BESYLATE 5 MG/1
5 TABLET ORAL DAILY
Qty: 90 TABLET | Refills: 3 | Status: SHIPPED | OUTPATIENT
Start: 2019-07-03 | End: 2019-07-03 | Stop reason: SINTOL

## 2019-07-03 RX ORDER — BENAZEPRIL HYDROCHLORIDE AND HYDROCHLOROTHIAZIDE 10; 12.5 MG/1; MG/1
1 TABLET ORAL DAILY
Qty: 90 TABLET | Refills: 3 | Status: SHIPPED | OUTPATIENT
Start: 2019-07-03 | End: 2019-11-08 | Stop reason: SDUPTHER

## 2019-07-03 NOTE — Clinical Note
Edilma, please set up:1. ENT evaluation by otology re chronic dizziness 3. Home BP record and send in 4. See me back after ENT eval is complete  6. Hep B markers ordered(can you add to existing blood? If not, have him redraw in [place])

## 2019-07-29 ENCOUNTER — OFFICE VISIT (OUTPATIENT)
Dept: UROLOGY | Facility: CLINIC | Age: 74
End: 2019-07-29
Payer: MEDICARE

## 2019-07-29 VITALS
BODY MASS INDEX: 31.56 KG/M2 | HEIGHT: 70 IN | DIASTOLIC BLOOD PRESSURE: 89 MMHG | SYSTOLIC BLOOD PRESSURE: 143 MMHG | WEIGHT: 220.44 LBS | HEART RATE: 101 BPM

## 2019-07-29 DIAGNOSIS — N52.01 ERECTILE DYSFUNCTION DUE TO ARTERIAL INSUFFICIENCY: ICD-10-CM

## 2019-07-29 DIAGNOSIS — E29.1 MALE HYPOGONADISM: Primary | ICD-10-CM

## 2019-07-29 PROCEDURE — 99213 OFFICE O/P EST LOW 20 MIN: CPT | Mod: PBBFAC | Performed by: UROLOGY

## 2019-07-29 PROCEDURE — 99204 PR OFFICE/OUTPT VISIT, NEW, LEVL IV, 45-59 MIN: ICD-10-PCS | Mod: S$PBB,,, | Performed by: UROLOGY

## 2019-07-29 PROCEDURE — 99999 PR PBB SHADOW E&M-EST. PATIENT-LVL III: CPT | Mod: PBBFAC,,, | Performed by: UROLOGY

## 2019-07-29 PROCEDURE — 99204 OFFICE O/P NEW MOD 45 MIN: CPT | Mod: S$PBB,,, | Performed by: UROLOGY

## 2019-07-29 PROCEDURE — 99999 PR PBB SHADOW E&M-EST. PATIENT-LVL III: ICD-10-PCS | Mod: PBBFAC,,, | Performed by: UROLOGY

## 2019-07-29 RX ORDER — AMLODIPINE BESYLATE 5 MG/1
5 TABLET ORAL DAILY
Refills: 3 | COMMUNITY
Start: 2019-07-03 | End: 2019-08-13

## 2019-07-29 RX ORDER — PAPAVERINE HYDROCHLORIDE 30 MG/ML
INJECTION INTRAMUSCULAR; INTRAVENOUS
Qty: 5 ML | Refills: 0 | Status: SHIPPED | OUTPATIENT
Start: 2019-07-29 | End: 2019-10-14

## 2019-07-29 RX ORDER — FLUOXETINE HYDROCHLORIDE 40 MG/1
40 CAPSULE ORAL DAILY
Refills: 0 | COMMUNITY
Start: 2019-07-16 | End: 2019-09-13 | Stop reason: SDUPTHER

## 2019-07-29 NOTE — PATIENT INSTRUCTIONS
Testosterone Implant  What is this medicine?  TESTOSTERONE (sraavanan TOS ter one) is the main male hormone. It supports normal male traits such as muscle growth, facial hair, and deep voice. This medicine is used in males to treat low testosterone levels.  This medicine may be used for other purposes; ask your health care provider or pharmacist if you have questions.  What should I tell my health care provider before I take this medicine?  They need to know if you have any of these conditions:  · breast cancer  · diabetes  · heart disease  · kidney disease  · liver disease  · lung disease  · prostate cancer, enlargement  · an unusual or allergic reaction to testosterone, other medicines, foods, dyes, or preservatives  · this drug is not for use in females  How should I use this medicine?  This medicine will be inserted under your skin by your doctor or health care professional.  Contact your pediatrician or health care professional regarding the use of this medicine in children. While this medicine may be prescribed for children for selected conditions, precautions do apply.  Overdosage: If you think you have taken too much of this medicine contact a poison control center or emergency room at once.  NOTE: This medicine is only for you. Do not share this medicine with others.  What if I miss a dose?  Try not to miss a dose. Your doctor or health care professional will tell you when your next dose is due. Notify the office if you are unable to keep an appointment.  What may interact with this medicine?  · medicines for diabetes  · medicines that treat or prevent blood clots like warfarin  · oxyphenbutazone  · propranolol  · steroid medicines like prednisone or cortisone  This list may not describe all possible interactions. Give your health care provider a list of all the medicines, herbs, non-prescription drugs, or dietary supplements you use. Also tell them if you smoke, drink alcohol, or use illegal drugs. Some items  may interact with your medicine.  What should I watch for while using this medicine?  Visit your doctor or health care professional for regular checks on your progress. They will need to check the level of testosterone in your blood.  This medicine may affect blood sugar levels. If you have diabetes, check with your doctor or health care professional before you change your diet or the dose of your diabetic medicine.  Call your doctor or health care professional if you notice a change in the way this medicine is working. It is possible that the pellets may accidentally fall out.  This drug is banned from use in athletes by most athletic organizations.  What side effects may I notice from receiving this medicine?  Side effects that you should report to your doctor or health care professional as soon as possible:  · allergic reactions like skin rash, itching or hives, swelling of the face, lips, or tongue  · Infection  · breast enlargement  · breathing problems  · changes in mood, especially anger, depression, or rage  · dark urine  · general ill feeling or flu-like symptoms  · light-colored stools  · loss of appetite, nausea  · nausea, vomiting  · right upper belly pain  · stomach pain  · swelling of ankles  · too frequent or persistent erections  · trouble passing urine or change in the amount of urine  · unusually weak or tired  · yellowing of eyes or skin  Side effects that usually do not require medical attention (report to your doctor or health care professional if they continue or are bothersome):  · acne  · change in sex drive or performance  · hair loss  · headache  This list may not describe all possible side effects. Call your doctor for medical advice about side effects. You may report side effects to FDA at 7-176-FDA-5935.  Where should I keep my medicine?  This drug will be inserted under your skin by your doctor. It will not be stored at home.  NOTE:This sheet is a summary. It may not cover all possible  information. If you have questions about this medicine, talk to your doctor, pharmacist, or health care provider. Copyright© 2011 [employer]

## 2019-07-29 NOTE — LETTER
July 29, 2019        Saeed Yun MD  [address]  [facility] [id number]             [facility] - Urology 4th Floor  [address]  [facility] [address]  Phone: [phone]   Patient: Haritha Valle   MR Number: [id number]   YOB: 1945   Date of Visit: 7/29/2019       Dear Dr. Yun:    Thank you for referring Haritha Valle to me for evaluation. Attached you will find relevant portions of my assessment and plan of care.    If you have questions, please do not hesitate to call me. I look forward to following Haritha Valle along with you.    Sincerely,      Pascual Kenny MD            CC  No Recipients    Enclosure

## 2019-07-29 NOTE — PROGRESS NOTES
CHIEF COMPLAINT:    Mr. Valle is a 74 y.o. male presenting for a consultation at the request of Dr. Yun. Patient presents with hypogonadism.    PRESENTING ILLNESS:    Haritha Valle is a 74 y.o. male who c/o hypogonadism.  He is on IM TRT.  While on TRT, he has more energy.  He'd like testopel.    He also c/o ED.  This has been present for > 1 year.  He has tried and failed Viagra. He's used ICI in the remote past with good results.    He has a decreased FOS, but he's pleased with how he voids.    REVIEW OF SYSTEMS:    Haritha Valle denies headache, blurred vision, fever, nausea, vomiting, chills, abdominal pain, bleeding per rectum, cough, SOB, recent loss of consciousness, recent mental status changes, seizures, dizziness, or upper or lower extremity weakness.    EUGENIA  1. 1  2. 1  3. 1  4. 2  5. 1      PATIENT HISTORY:    Past Medical History:   Diagnosis Date    Anxiety 7/3/2019    Basal cell carcinoma     Depression     Hepatitis B     Hepatitis C antibody test positive     HIV infection     Hypertension     Immune disorder     Mild cognitive impairment 10/1/2010       Past Surgical History:   Procedure Laterality Date    COLONOSCOPY N/A 11/3/2016    Performed by Maxi Villasenor MD at [facility] (4TH FLR)       Family History   Problem Relation Age of Onset    Melanoma Neg Hx     Eczema Neg Hx     Lupus Neg Hx     Psoriasis Neg Hx        Social History     Socioeconomic History    Marital status: Single     Spouse name: Not on file    Number of children: Not on file    Years of education: Not on file    Highest education level: Not on file   Occupational History    Occupation: Self-employed   Social Needs    Financial resource strain: Not on file    Food insecurity:     Worry: Not on file     Inability: Not on file    Transportation needs:     Medical: Not on file     Non-medical: Not on file   Tobacco Use    Smoking status: Never Smoker    Smokeless tobacco: Never Used   Substance  and Sexual Activity    Alcohol use: No    Drug use: No    Sexual activity: Yes     Partners: Male   Lifestyle    Physical activity:     Days per week: Not on file     Minutes per session: Not on file    Stress: Not on file   Relationships    Social connections:     Talks on phone: Not on file     Gets together: Not on file     Attends Jain service: Not on file     Active member of club or organization: Not on file     Attends meetings of clubs or organizations: Not on file     Relationship status: Not on file   Other Topics Concern    Not on file   Social History Narrative    Not on file       Allergies:  No known drug allergies    Medications:    Current Outpatient Medications:     ALPRAZolam (XANAX) 0.25 MG tablet, Take 2 tablets at bedtime every night. In addition, may take one tablet twice daily if needed for anxiety, Disp: 60 tablet, Rfl: 4    alprostadil 500 mcg/mL Soln 50 mcg, phentolamine 5 mg SolR 5 mg, papaverine 30 mg/mL Soln 30 mcg, Papaverine 30mg/ml 10 ml Add: phentolamine 10 mg Add: pge1 100 mcg Sig inject 0.25ml (25 units) as directed, Disp: , Rfl:     atazanavir (REYATAZ) 200 MG Cap, TAKE 2 CAPSULES BY MOUTH DAILY, Disp: 60 capsule, Rfl: 6    benazepril-hydrochlorthiazide (LOTENSIN HCT) 10-12.5 mg Tab, Take 1 tablet by mouth once daily., Disp: 90 tablet, Rfl: 3    cyclobenzaprine (FLEXERIL) 10 MG tablet, Take 1 tablet (10 mg total) by mouth 3 (three) times daily., Disp: 30 tablet, Rfl: 3    epinephrine (EPIPEN) 0.3 mg/0.3 mL (1:1,000) AtIn, Inject 0.3 mLs (0.3 mg total) into the muscle once., Disp: 0.3 mL, Rfl: 0    ergocalciferol (VITAMIN D2) 50,000 unit Cap, Take 1 capsule (50,000 Units total) by mouth every 30 days., Disp: 3 capsule, Rfl: 4    FLUoxetine (PROZAC) 40 MG capsule, Take 1 capsule (40 mg total) by mouth once daily., Disp: 30 capsule, Rfl: 11    lamivudine-zidovudine 150-300mg (COMBIVIR) 150-300 mg Tab, TAKE ONE TABLET BY MOUTH EVERY 12 HOURS, Disp: 60 tablet,  "Rfl: 6    meclizine (ANTIVERT) 12.5 mg tablet, TAKE ONE TABLET THREE TIMES DAILY AS NEEDED FOR DIZZINESS, Disp: 90 tablet, Rfl: 2    mupirocin (BACTROBAN) 2 % ointment, , Disp: , Rfl:     needle, disp, 21 G 21 x 1 " Ndle, , Disp: , Rfl:     omega-3 acid ethyl esters (LOVAZA) 1 gram capsule, , Disp: , Rfl:     pravastatin (PRAVACHOL) 40 MG tablet, TAKE (1) TABLET DAILY., Disp: 90 tablet, Rfl: 3    testosterone cypionate (DEPOTESTOTERONE CYPIONATE) 200 mg/mL injection, INJECT 1 & 1/2 MLS INTRAMUSCULARLY EVERY 14 DAYS, Disp: 10 mL, Rfl: 3    tretinoin (RETIN-A) 0.025 % cream, Apply topically every evening., Disp: 45 g, Rfl: 3    tretinoin (RETIN-A) 0.025 % cream, Apply topically every evening., Disp: 20 g, Rfl: 1    varicella-zoster gE-AS01B, PF, (SHINGRIX, PF,) 50 mcg/0.5 mL injection, Inject into the muscle., Disp: 1 mL, Rfl: 0    PHYSICAL EXAMINATION:    The patient generally appears in good health, is appropriately interactive, and is in no apparent distress.     Eyes: anicteric sclerae, moist conjunctivae; no lid-lag; PERRLA     HENT: Atraumatic; oropharynx clear with moist mucous membranes and no mucosal ulcerations;normal hard and soft palate.  No evidence of lymphadenopathy.    Neck: Trachea midline.  No thyromegaly.    Musculoskeletal: No abnormal gait.    Skin: No lesions.    Mental: Cooperative with normal affect.  Is oriented to time, place, and person.    Neuro: Grossly intact.    Chest: Normal inspiratory effort.   No accessory muscles.  No audible wheezes.  Respirations symmetric on inspiration and expiration.    Heart: Regular rhythm.      Abdomen:  Soft, non-tender. No masses or organomegaly. Bladder is not palpable. No evidence of flank discomfort. No evidence of inguinal hernia.    Genitourinary: The penis has no evidence of plaques or induration. The urethral meatus is normal. The testes, epididymides, and cord structures are normal in size and contour bilaterally. The scrotum is normal in " size and contour.    Normal anal sphincter tone. No rectal mass.    The prostate is 30 g. Normal landmarks. Lateral sulci. Median furrow intact.  No nodularity or induration. Seminal vesicles are normal.    Extremities: No clubbing, cyanosis, or edema      LABS:      Lab Results   Component Value Date    PSA 1.9 06/25/2019    PSA 1.2 05/09/2018    PSA 2.7 09/20/2016       IMPRESSION:    Encounter Diagnoses   Name Primary?    Male hypogonadism Yes         PLAN:    1. Discussed the risks and benefits of testosterone replacement today.  This included possible cardiac risks.  He would like to continue this.  He'd like testopel.  2. Risks and benefits of testopel were discussed today.  We discussed infection, pain, bleeding, pellet extrusion.  He was given the chance to ask questions.  3. Discussed options for his ED.  He'd like ICI. Side effects discussed.  A new Rx was given.  Will set up for teaching as it has been a long time since he used it.  4. Will observe his LUTS as they don't bother him.  5. RTC for testopel.    Copy to:

## 2019-08-13 ENCOUNTER — CLINICAL SUPPORT (OUTPATIENT)
Dept: AUDIOLOGY | Facility: CLINIC | Age: 74
End: 2019-08-13
Payer: MEDICARE

## 2019-08-13 ENCOUNTER — OFFICE VISIT (OUTPATIENT)
Dept: OTOLARYNGOLOGY | Facility: CLINIC | Age: 74
End: 2019-08-13
Payer: MEDICARE

## 2019-08-13 ENCOUNTER — OFFICE VISIT (OUTPATIENT)
Dept: INFECTIOUS DISEASES | Facility: CLINIC | Age: 74
End: 2019-08-13
Payer: MEDICARE

## 2019-08-13 VITALS
HEIGHT: 70 IN | BODY MASS INDEX: 31.25 KG/M2 | HEART RATE: 82 BPM | DIASTOLIC BLOOD PRESSURE: 99 MMHG | WEIGHT: 218.25 LBS | SYSTOLIC BLOOD PRESSURE: 154 MMHG | TEMPERATURE: 98 F

## 2019-08-13 VITALS
HEART RATE: 81 BPM | BODY MASS INDEX: 30.99 KG/M2 | SYSTOLIC BLOOD PRESSURE: 178 MMHG | DIASTOLIC BLOOD PRESSURE: 118 MMHG | WEIGHT: 216 LBS

## 2019-08-13 DIAGNOSIS — B20 HIV DISEASE: Chronic | ICD-10-CM

## 2019-08-13 DIAGNOSIS — E29.1 MALE HYPOGONADISM: ICD-10-CM

## 2019-08-13 DIAGNOSIS — R42 DIZZINESS: ICD-10-CM

## 2019-08-13 DIAGNOSIS — R76.8 HEPATITIS B SURFACE ANTIGEN POSITIVE: Chronic | ICD-10-CM

## 2019-08-13 DIAGNOSIS — H90.A21 SENSORINEURAL HEARING LOSS (SNHL) OF RIGHT EAR WITH RESTRICTED HEARING OF LEFT EAR: Primary | ICD-10-CM

## 2019-08-13 DIAGNOSIS — R76.8 HEPATITIS C ANTIBODY TEST POSITIVE: ICD-10-CM

## 2019-08-13 DIAGNOSIS — H90.A22 SENSORINEURAL HEARING LOSS (SNHL) OF LEFT EAR WITH RESTRICTED HEARING OF RIGHT EAR: Primary | ICD-10-CM

## 2019-08-13 DIAGNOSIS — I10 ESSENTIAL HYPERTENSION: Primary | ICD-10-CM

## 2019-08-13 DIAGNOSIS — H90.A32 MIXED CONDUCTIVE AND SENSORINEURAL HEARING LOSS OF LEFT EAR WITH RESTRICTED HEARING OF RIGHT EAR: ICD-10-CM

## 2019-08-13 PROCEDURE — 99203 OFFICE O/P NEW LOW 30 MIN: CPT | Mod: S$PBB,,, | Performed by: NURSE PRACTITIONER

## 2019-08-13 PROCEDURE — 99215 PR OFFICE/OUTPT VISIT, EST, LEVL V, 40-54 MIN: ICD-10-PCS | Mod: S$PBB,,, | Performed by: INTERNAL MEDICINE

## 2019-08-13 PROCEDURE — 99999 PR PBB SHADOW E&M-EST. PATIENT-LVL III: ICD-10-PCS | Mod: PBBFAC,,, | Performed by: INTERNAL MEDICINE

## 2019-08-13 PROCEDURE — 99999 PR PBB SHADOW E&M-EST. PATIENT-LVL I: ICD-10-PCS | Mod: PBBFAC,,,

## 2019-08-13 PROCEDURE — 99211 OFF/OP EST MAY X REQ PHY/QHP: CPT | Mod: PBBFAC

## 2019-08-13 PROCEDURE — 99999 PR PBB SHADOW E&M-EST. PATIENT-LVL III: CPT | Mod: PBBFAC,,, | Performed by: INTERNAL MEDICINE

## 2019-08-13 PROCEDURE — 92557 COMPREHENSIVE HEARING TEST: CPT | Mod: PBBFAC | Performed by: AUDIOLOGIST

## 2019-08-13 PROCEDURE — 92567 TYMPANOMETRY: CPT | Mod: PBBFAC | Performed by: AUDIOLOGIST

## 2019-08-13 PROCEDURE — 99999 PR PBB SHADOW E&M-EST. PATIENT-LVL III: CPT | Mod: PBBFAC,,, | Performed by: NURSE PRACTITIONER

## 2019-08-13 PROCEDURE — 99213 OFFICE O/P EST LOW 20 MIN: CPT | Mod: PBBFAC,27,25 | Performed by: NURSE PRACTITIONER

## 2019-08-13 PROCEDURE — 99215 OFFICE O/P EST HI 40 MIN: CPT | Mod: S$PBB,,, | Performed by: INTERNAL MEDICINE

## 2019-08-13 PROCEDURE — 99999 PR PBB SHADOW E&M-EST. PATIENT-LVL I: CPT | Mod: PBBFAC,,,

## 2019-08-13 PROCEDURE — 99203 PR OFFICE/OUTPT VISIT, NEW, LEVL III, 30-44 MIN: ICD-10-PCS | Mod: S$PBB,,, | Performed by: NURSE PRACTITIONER

## 2019-08-13 PROCEDURE — 99213 OFFICE O/P EST LOW 20 MIN: CPT | Mod: PBBFAC,27,25 | Performed by: INTERNAL MEDICINE

## 2019-08-13 PROCEDURE — 99999 PR PBB SHADOW E&M-EST. PATIENT-LVL III: ICD-10-PCS | Mod: PBBFAC,,, | Performed by: NURSE PRACTITIONER

## 2019-08-13 RX ORDER — METOPROLOL SUCCINATE 25 MG/1
25 TABLET, EXTENDED RELEASE ORAL DAILY
Qty: 30 TABLET | Refills: 11 | Status: SHIPPED | OUTPATIENT
Start: 2019-08-13 | End: 2020-02-06 | Stop reason: SDUPTHER

## 2019-08-13 NOTE — Clinical Note
See back in few weeks, same day as Dr. Long sees him. Do not make it before 2 weeks. Also, if he didn't draw hep B and C tests ordered while here, arrange to draw in BR or when he returns to clinic for next appt

## 2019-08-13 NOTE — LETTER
August 13, 2019      Saeed Yun MD  [address]  [facility] [address]           [facility] - Otorhinol[facility]ology  [address]  [facility] [address]  Phone: [phone]  Fax: [phone]          Patient: Haritha Valle   MR Number: [id number]   YOB: 1945   Date of Visit: 8/13/2019       Dear Dr. Saeed Yun:    Thank you for referring Haritha Valle to me for evaluation. Attached you will find relevant portions of my assessment and plan of care.    If you have questions, please do not hesitate to call me. I look forward to following Haritha Valle along with you.    Sincerely,    Checo Meza, NP    Enclosure  CC:  No Recipients    If you would like to receive this communication electronically, please contact [email or web address] or [phone] to request more information on [facility] access.    For providers and/or their staff who would like to refer a patient to [facility], please contact us through our one-stop-shop provider referral line, [facility], at [phone].    If you feel you have received this communication in error or would no longer like to receive these types of communications, please e-mail [email or web address]

## 2019-08-13 NOTE — PROGRESS NOTES
"Subjective:      Patient ID: Haritha Valle is a 74 y.o. male.    Chief Complaint:Follow-up      History of Present Illness    Dizziness  He  has seen ENT today; abnormal audiogram and he is schedule for additional dizzy workup.       HIV disease  On reyataz + combivir. HIV viral load <40. UQ8=633. Has chronic stable lipdystrophy.      CBC, CMP are normal. He asked about switching his ART to new rx, hoping his dizziness and fat redistribution might get better. I told him I would like him to see one of our other ID docs who do more HIV care than me for a "second opinion"      Hypertension  He is on lotensin/HCT 10/12.5. Amlodipine was stopped because of pedal edema which resolved with stopping it..  154/99  today. Home BP readings 144-161/82-98. Will add metoprolol 25 mg to existing lotensin/HCT.     Hypogonadism  He takes testosterone injections 300 mg every 2 weeks. T level was low on 6/25 which was 9 days after his previous T injection. Dr. Pascual Kenny saw him and recommended using testosterone pellet implant which is currently requested to be authorized by insurance company.      Review of Systems   Constitution: Negative for chills, decreased appetite, fever, malaise/fatigue, night sweats, weight gain and weight loss.   HENT: Negative for congestion, ear pain, hearing loss, hoarse voice, sore throat and tinnitus.    Eyes: Negative for blurred vision, redness and visual disturbance.   Cardiovascular: Negative for chest pain, leg swelling and palpitations.   Respiratory: Negative for cough, hemoptysis, shortness of breath and sputum production.    Hematologic/Lymphatic: Negative for adenopathy. Does not bruise/bleed easily.   Skin: Negative for dry skin, itching, rash and suspicious lesions.   Musculoskeletal: Negative for back pain, joint pain, myalgias and neck pain.   Gastrointestinal: Negative for abdominal pain, constipation, diarrhea, heartburn, nausea and vomiting.   Genitourinary: Negative for dysuria, " flank pain, frequency, hematuria, hesitancy and urgency.   Neurological: Negative for dizziness, headaches, numbness, paresthesias and weakness.   Psychiatric/Behavioral: Negative for depression and memory loss. The patient does not have insomnia and is not nervous/anxious.      Objective:   Physical Exam   Constitutional: He is oriented to person, place, and time. He appears well-developed and well-nourished. No distress.   Neurological: He is alert and oriented to person, place, and time.   Vitals reviewed.    Assessment:       1. Essential hypertension    2. Hepatitis B surface antigen positive    3. Hepatitis C antibody test positive    4. Male hypogonadism    5. HIV disease    6. Dizziness          Plan:        1. Recheck hep B DNA and hep C RNA   2. See Dr. Long   3. Add metoprolol 25 mg to rx   4. See back in 4 weeks

## 2019-08-13 NOTE — PROGRESS NOTES
"  Subjective:      Haritha Valle is a 74 y.o. male who was referred to me by Dr. Saeed Yun in consultation for hearing loss.    Ms. Valle reports hearing loss in both ears for the past several years. He denies tinnitus. 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 He also report dizziness for the past year. He describes he dizziness as a "drunken feeling." He states the dizziness occurs daily and last for several minutes. He denies any specific triggers. He denies any associated change in hearing, tinnitus or vision change.He states his dizziness has occurred while driving in which he felt the horizon move, but his dizziness has also occurred while getting out of his car.  He does have a long history of migraine. There is not a family history of hearing loss at a young age.  There is not a prior history of ear surgery.  There is not a prior history of ear infections .  He denies a history of significant noise exposure.  He does not wear hearing aids currently.  He has not had a hearing test recently.  ETDQ score 1.0 today.      Past Medical History  He has a past medical history of Anxiety, Basal cell carcinoma, Depression, Hepatitis B, Hepatitis C antibody test positive, HIV infection, Hypertension, Immune disorder, and Mild cognitive impairment.    Past Surgical History  He has a past surgical history that includes Colonoscopy (N/A, 11/3/2016).    Family History  His family history is not on file.    Social History  He reports that he has never smoked. He has never used smokeless tobacco. He reports that he does not drink alcohol or use drugs.    Allergies  He is allergic to no known drug allergies.    Medications  He has a current medication list which includes the following prescription(s): alprazolam, amlodipine, atazanavir, benazepril-hydrochlorthiazide, cyclobenzaprine, epinephrine, ergocalciferol, fluoxetine, lamivudine-zidovudine 150-300mg, meclizine, mupirocin, needle " (disp) 21 g, omega-3 acid ethyl esters, papaverine, pravastatin, tretinoin, tretinoin, and varicella-zoster ge-as01b (pf), and the following Facility-Administered Medications: testosterone.    Review of Systems   Constitutional: Negative for chills, fever and unexpected weight change.   HENT: Positive for hearing loss. Negative for ear discharge, ear pain, sore throat, tinnitus and trouble swallowing.    Eyes: Positive for visual disturbance. Negative for pain.   Respiratory: Negative for apnea and shortness of breath.    Cardiovascular: Negative for chest pain and palpitations.   Gastrointestinal: Negative for abdominal pain and nausea.   Endocrine: Negative for cold intolerance and heat intolerance.   Musculoskeletal: Negative for joint swelling and neck stiffness.   Skin: Negative for color change and rash.   Neurological: Positive for dizziness. Negative for facial asymmetry and headaches.   Hematological: Negative for adenopathy. Does not bruise/bleed easily.   Psychiatric/Behavioral: Negative for agitation and sleep disturbance. The patient is not nervous/anxious.           Objective:     BP (!) 178/118   Pulse 81   Wt 98 kg (216 lb)   BMI 30.99 kg/m²      Constitutional:   Vital signs are normal. He appears well-developed and well-nourished.     Head:  Normocephalic and atraumatic.     Ears:    Right Ear: No lacerations. No drainage, swelling or tenderness. No foreign bodies. No mastoid tenderness. Tympanic membrane is not injected, not scarred, not perforated, not erythematous, not retracted and not bulging. Tympanic membrane mobility is normal. No middle ear effusion. No hemotympanum. Decreased hearing is noted.   Left Ear: No lacerations. No drainage, swelling or tenderness. No foreign bodies. No mastoid tenderness. Tympanic membrane is not injected, not scarred, not perforated, not erythematous, not retracted and not bulging. Tympanic membrane mobility is normal.  No middle ear effusion. No  hemotympanum. Decreased hearing is noted.     Nose:  Nose normal including turbinates, nasal mucosa, sinuses and nasal septum.     Mouth/Throat  Lips, teeth, and gums normal and oropharynx normal.     Neck:  Neck normal without thyromegaly masses, asymmetry, normal tracheal structure, crepitus, and tenderness and no adenopathy.     Cardiovascular:   Normal heart sounds and normal pulses.      Pulmonary/Chest:   Effort normal and breath sounds normal.     Psychiatric:   He has a normal mood and affect.       Procedure    None      Data Reviewed    WBC (K/uL)   Date Value   06/25/2019 5.49     Platelets (K/uL)   Date Value   06/25/2019 296      Creatinine (mg/dL)   Date Value   06/25/2019 0.9     TSH (uIU/ml)   Date Value   09/23/2010 1.45     Glucose (mg/dL)   Date Value   06/25/2019 81     Hemoglobin A1C (%)   Date Value   12/02/2009 5.4       I independently reviewed the tracings of the complete audiometric evaluation performed today.  I reviewed the audiogram with the patient as well.  Pertinent findings include right SNHL with left Mixed hearing loss, normal tympanogram in both ears. SRT right 50 and SRT left 55. 72% discrimination at 90 db. in both ears..         Assessment:     1. Sensorineural hearing loss (SNHL) of right ear with restricted hearing of left ear    2. Dizziness    3. Mixed conductive and sensorineural hearing loss of left ear with restricted hearing of right ear         Plan:     I had a long discussion with the patient regarding his condition and the further workup and management options.    I recommend hearing aid evaluation. Mary Braden's card provided to patient.  Follow up in one year with audiogram.  I ordered VNG for evaluation of dizziness. I will call him with the results.

## 2019-08-13 NOTE — PROGRESS NOTES
Haritha Valle was seen today in the clinic for an audiologic evaluation.  Pts main complaint was hearing loss and dizziness.  Pt reported hearing loss that has been present for at least 10 years.  He feels his ears hear equally.  Pt reported a feeling of imbalance that began one year ago.  Pt feels that the landscape around him seems to shift as he moves.  Pt denied tinnitus or pain in his ears.      Tympanometry revealed Type A in the right ear and Type A in the left ear.   Audiogram results revealed mild sloping to severe sensorineural hearing loss (SNHL) in the right ear and mild sloping to profound SNHL in the left ear.  Asymmetry noted.       Recommendations:  1. Otologic evaluation  2. Annual audiogram  3. Noise protection when in noise  4. Hearing aid consultation

## 2019-08-21 ENCOUNTER — CLINICAL SUPPORT (OUTPATIENT)
Dept: AUDIOLOGY | Facility: CLINIC | Age: 74
End: 2019-08-21
Payer: MEDICARE

## 2019-08-21 ENCOUNTER — TELEPHONE (OUTPATIENT)
Dept: OTOLARYNGOLOGY | Facility: CLINIC | Age: 74
End: 2019-08-21

## 2019-08-21 DIAGNOSIS — H81.4 CENTRAL VESTIBULAR VERTIGO: Primary | ICD-10-CM

## 2019-08-21 DIAGNOSIS — R42 DIZZINESS: Primary | ICD-10-CM

## 2019-08-21 PROCEDURE — 92540 BASIC VESTIBULAR EVALUATION: CPT | Mod: PBBFAC | Performed by: AUDIOLOGIST-HEARING AID FITTER

## 2019-08-21 PROCEDURE — 92537 PR CALORIC VSTBLR TEST W/REC BITHERMAL: ICD-10-PCS | Mod: 26,S$PBB,, | Performed by: AUDIOLOGIST-HEARING AID FITTER

## 2019-08-21 PROCEDURE — 92540 PR VESTIBULAR EVAL NYSTAG FOVL&PERPH STIM OSCIL TRACKING: ICD-10-PCS | Mod: 26,S$PBB,, | Performed by: AUDIOLOGIST-HEARING AID FITTER

## 2019-08-21 PROCEDURE — 92540 BASIC VESTIBULAR EVALUATION: CPT | Mod: 26,S$PBB,, | Performed by: AUDIOLOGIST-HEARING AID FITTER

## 2019-08-21 PROCEDURE — 92537 CALORIC VSTBLR TEST W/REC: CPT | Mod: PBBFAC | Performed by: AUDIOLOGIST-HEARING AID FITTER

## 2019-08-21 PROCEDURE — 92537 CALORIC VSTBLR TEST W/REC: CPT | Mod: 26,S$PBB,, | Performed by: AUDIOLOGIST-HEARING AID FITTER

## 2019-08-21 NOTE — PROGRESS NOTES
"    VNG/Posturography Evaluation    Referring provider:  Checo Meza D.N.P.    74 y.o. male complains of lightheadedness, imbalance, headache (past history of migraines) and a "tilting of the horizon".  Symptoms typically occur spontaneously but can sometimes be provoked by bending over and looking up and have been recurring over the past 5 years with symptoms worsening over the past six months to a year. He is unsure if his medications are causing his symptoms. Mr. Valle reported his episodes last seconds to minutes and occur several times throughout the day. His most recent episode occurred this morning. He denied taking any medications prior to testing.      Gaze nystagmus was absent.    Oculomotor function was normal.    Spontaneous nystagmus was absent.    The head-hanging left Hallpike was negative.    The head-hanging right Hallpike was negative.    Static positional nystagmus was absent.    Unilateral weakness: 6% (left)  Directional preponderance 3% (right beating)    RW: 22 d/s  LW: 19 d/s  RC: 11 d/s  LC: 10 d/s    Fixation suppression was abnormal for all 4 caloric irrigations.      Impression: Possible central vestibular abnormality based on fixation failure with caloric testing.    Recommendations: Trial period with Cawthorne exercises to help reduce subjective symptoms. Mr. Valle was given a copy of these to try at home. May also consider a Neurology consult if patient has not yet been evaluated.   "

## 2019-08-21 NOTE — Clinical Note
Checo-Here are the VNG results on your patient. Please let me know if you have any questions.Thanks!Katiana

## 2019-08-29 ENCOUNTER — OFFICE VISIT (OUTPATIENT)
Dept: INFECTIOUS DISEASES | Facility: CLINIC | Age: 74
End: 2019-08-29
Payer: MEDICARE

## 2019-08-29 ENCOUNTER — LAB VISIT (OUTPATIENT)
Dept: LAB | Facility: HOSPITAL | Age: 74
End: 2019-08-29
Attending: INTERNAL MEDICINE
Payer: MEDICARE

## 2019-08-29 VITALS
WEIGHT: 215.63 LBS | TEMPERATURE: 98 F | HEART RATE: 68 BPM | BODY MASS INDEX: 30.87 KG/M2 | HEIGHT: 70 IN | SYSTOLIC BLOOD PRESSURE: 106 MMHG | DIASTOLIC BLOOD PRESSURE: 71 MMHG

## 2019-08-29 DIAGNOSIS — B20 HIV DISEASE: Primary | Chronic | ICD-10-CM

## 2019-08-29 DIAGNOSIS — R76.8 HEPATITIS C ANTIBODY TEST POSITIVE: ICD-10-CM

## 2019-08-29 DIAGNOSIS — R76.8 HEPATITIS B SURFACE ANTIGEN POSITIVE: Chronic | ICD-10-CM

## 2019-08-29 PROCEDURE — 36415 COLL VENOUS BLD VENIPUNCTURE: CPT

## 2019-08-29 PROCEDURE — 99999 PR PBB SHADOW E&M-EST. PATIENT-LVL IV: CPT | Mod: PBBFAC,,, | Performed by: INTERNAL MEDICINE

## 2019-08-29 PROCEDURE — 87522 HEPATITIS C REVRS TRNSCRPJ: CPT

## 2019-08-29 PROCEDURE — 87516 HEPATITIS B DNA AMP PROBE: CPT

## 2019-08-29 PROCEDURE — 99214 OFFICE O/P EST MOD 30 MIN: CPT | Mod: S$PBB,,, | Performed by: INTERNAL MEDICINE

## 2019-08-29 PROCEDURE — 99214 PR OFFICE/OUTPT VISIT, EST, LEVL IV, 30-39 MIN: ICD-10-PCS | Mod: S$PBB,,, | Performed by: INTERNAL MEDICINE

## 2019-08-29 PROCEDURE — 87340 HEPATITIS B SURFACE AG IA: CPT

## 2019-08-29 PROCEDURE — 99214 OFFICE O/P EST MOD 30 MIN: CPT | Mod: PBBFAC | Performed by: INTERNAL MEDICINE

## 2019-08-29 PROCEDURE — 86706 HEP B SURFACE ANTIBODY: CPT

## 2019-08-29 PROCEDURE — 87517 HEPATITIS B DNA QUANT: CPT

## 2019-08-29 PROCEDURE — 99999 PR PBB SHADOW E&M-EST. PATIENT-LVL IV: ICD-10-PCS | Mod: PBBFAC,,, | Performed by: INTERNAL MEDICINE

## 2019-08-29 NOTE — PROGRESS NOTES
Infectious Diseases Clinic Note    Subjective:       Patient ID: Haritha Valle is a 74 y.o. male.    Chief Complaint: No chief complaint on file.    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 HIV viral load <40. CF2=585.     Started initially on AZT, never had any issues with any ARVs  Issues with memory  Concerned about lipodystrophy    Owns construction company, from [employer], live in [facility] and with partner  Dx with HIV in 1984,       Past Medical History:   Diagnosis Date    Anxiety 7/3/2019    Basal cell carcinoma     Depression     Hepatitis B     Hepatitis C antibody test positive     HIV infection     Hypertension     Immune disorder     Mild cognitive impairment 10/1/2010       Social History     Socioeconomic History    Marital status: Single     Spouse name: Not on file    Number of children: Not on file    Years of education: Not on file    Highest education level: Not on file   Occupational History    Occupation: Self-employed   Social Needs    Financial resource strain: Not on file    Food insecurity:     Worry: Not on file     Inability: Not on file    Transportation needs:     Medical: Not on file     Non-medical: Not on file   Tobacco Use    Smoking status: Never Smoker    Smokeless tobacco: Never Used   Substance and Sexual Activity    Alcohol use: No    Drug use: No    Sexual activity: Yes     Partners: Male   Lifestyle    Physical activity:     Days per week: Not on file     Minutes per session: Not on file    Stress: Not on file   Relationships    Social connections:     Talks on phone: Not on file     Gets together: Not on file     Attends [employer] service: Not on file     Active member of club or organization: Not on file     Attends  "meetings of clubs or organizations: Not on file     Relationship status: Not on file   Other Topics Concern    Not on file   Social History Narrative    Not on file         Current Outpatient Medications:     atazanavir (REYATAZ) 200 MG Cap, TAKE 2 CAPSULES BY MOUTH DAILY, Disp: 60 capsule, Rfl: 6    FLUoxetine 40 MG capsule, Take 40 mg by mouth once daily., Disp: , Rfl: 0    lamivudine-zidovudine 150-300mg (COMBIVIR) 150-300 mg Tab, TAKE ONE TABLET BY MOUTH EVERY 12 HOURS, Disp: 60 tablet, Rfl: 6    meclizine (ANTIVERT) 12.5 mg tablet, TAKE ONE TABLET THREE TIMES DAILY AS NEEDED FOR DIZZINESS, Disp: 90 tablet, Rfl: 2    metoprolol succinate (TOPROL-XL) 25 MG 24 hr tablet, Take 1 tablet (25 mg total) by mouth once daily., Disp: 30 tablet, Rfl: 11    pravastatin (PRAVACHOL) 40 MG tablet, TAKE (1) TABLET DAILY., Disp: 90 tablet, Rfl: 3    ALPRAZolam (XANAX) 0.25 MG tablet, Take 2 tablets at bedtime every night. In addition, may take one tablet twice daily if needed for anxiety, Disp: 60 tablet, Rfl: 4    benazepril-hydrochlorthiazide (LOTENSIN HCT) 10-12.5 mg Tab, Take 1 tablet by mouth once daily., Disp: 90 tablet, Rfl: 3    cyclobenzaprine (FLEXERIL) 10 MG tablet, Take 1 tablet (10 mg total) by mouth 3 (three) times daily., Disp: 30 tablet, Rfl: 3    epinephrine (EPIPEN) 0.3 mg/0.3 mL (1:1,000) AtIn, Inject 0.3 mLs (0.3 mg total) into the muscle once., Disp: 0.3 mL, Rfl: 0    ergocalciferol (VITAMIN D2) 50,000 unit Cap, Take 1 capsule (50,000 Units total) by mouth every 30 days., Disp: 3 capsule, Rfl: 4    mupirocin (BACTROBAN) 2 % ointment, , Disp: , Rfl:     needle, disp, 21 G 21 x 1 " Ndle, , Disp: , Rfl:     omega-3 acid ethyl esters (LOVAZA) 1 gram capsule, , Disp: , Rfl:     papaverine 30 mg/mL injection, Add Phentolamine 1 mg/cc Add PGE1 10 mcg/cc  SIG: Bring to office for test dose in physician office, Disp: 5 mL, Rfl: 0    tretinoin (RETIN-A) 0.025 % cream, Apply topically every evening., " Disp: 20 g, Rfl: 1    varicella-zoster gE-AS01B, PF, (SHINGRIX, PF,) 50 mcg/0.5 mL injection, Inject into the muscle., Disp: 1 mL, Rfl: 0    Current Facility-Administered Medications:     [START ON 1/5/2020] testosterone Pllt 12 Pellet, 12 Pellet, Implant, 1 time in Clinic/HOD, Pascual Kenny MD    Review of Systems   Constitutional: Negative for activity change, appetite change, chills, fatigue and fever.   HENT: Negative for congestion, dental problem, mouth sores and sinus pressure.    Eyes: Negative for pain, redness and visual disturbance.   Respiratory: Negative for cough, shortness of breath and wheezing.    Cardiovascular: Negative for chest pain and leg swelling.   Gastrointestinal: Negative for abdominal distention, abdominal pain, diarrhea, nausea and vomiting.   Endocrine: Negative for polyuria.   Genitourinary: Negative for decreased urine volume, dysuria and frequency.   Musculoskeletal: Negative for joint swelling.   Skin: Negative for color change.   Allergic/Immunologic: Negative for food allergies.   Neurological: Negative for dizziness, weakness and headaches.   Hematological: Negative for adenopathy.   Psychiatric/Behavioral: Negative for agitation and confusion. The patient is not nervous/anxious.            Objective:       Vitals:    08/29/19 1506   BP: 106/71   Pulse: 68   Temp: 98.1 °F (36.7 °C)       There were no vitals filed for this visit.  Physical Exam   Constitutional: He is oriented to person, place, and time. He appears well-developed and well-nourished.   HENT:   Head: Normocephalic and atraumatic.   Mouth/Throat: Oropharynx is clear and moist.   Eyes: Conjunctivae are normal.   Neck: Neck supple.   Cardiovascular: Normal rate, regular rhythm and normal heart sounds.   No murmur heard.  Pulmonary/Chest: Effort normal and breath sounds normal. No respiratory distress. He has no wheezes.   Abdominal: Soft. Bowel sounds are normal. He exhibits no distension. There is no  tenderness.   Musculoskeletal: Normal range of motion. He exhibits no edema or tenderness.   Lymphadenopathy:     He has no cervical adenopathy.   Neurological: He is alert and oriented to person, place, and time. Coordination normal.   Skin: Skin is warm and dry. No rash noted.   Psychiatric: He has a normal mood and affect. His behavior is normal.           Assessment/Plan:       No diagnosis found.      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

## 2019-08-30 LAB
HBV SURFACE AB SER-ACNC: POSITIVE M[IU]/ML
HBV SURFACE AG SERPL QL IA: NEGATIVE

## 2019-09-03 LAB
HCV RNA SERPL NAA+PROBE-LOG IU: <1.08 LOG (10) IU/ML
HCV RNA SERPL QL NAA+PROBE: NOT DETECTED IU/ML
HCV RNA SPEC NAA+PROBE-ACNC: <12 IU/ML

## 2019-09-04 ENCOUNTER — TELEPHONE (OUTPATIENT)
Dept: INFECTIOUS DISEASES | Facility: HOSPITAL | Age: 74
End: 2019-09-04

## 2019-09-04 LAB
HBV DNA SERPL NAA+PROBE-ACNC: <10 IU/ML
HBV DNA SERPL NAA+PROBE-LOG IU: <1 LOG (10) IU/ML
HBV DNA SERPL QL NAA+PROBE: NOT DETECTED

## 2019-09-09 DIAGNOSIS — Z21 HIV POSITIVE: ICD-10-CM

## 2019-09-09 DIAGNOSIS — R42 DIZZINESS: ICD-10-CM

## 2019-09-09 DIAGNOSIS — G31.84 MILD COGNITIVE IMPAIRMENT: ICD-10-CM

## 2019-09-09 RX ORDER — MECLIZINE HCL 12.5 MG 12.5 MG/1
TABLET ORAL
Qty: 90 TABLET | Refills: 2 | Status: SHIPPED | OUTPATIENT
Start: 2019-09-09 | End: 2019-12-17

## 2019-09-09 RX ORDER — LAMIVUDINE AND ZIDOVUDINE 150; 300 MG/1; MG/1
TABLET, FILM COATED ORAL
Qty: 60 TABLET | Refills: 6 | Status: SHIPPED | OUTPATIENT
Start: 2019-09-09 | End: 2020-12-15 | Stop reason: SDUPTHER

## 2019-09-09 RX ORDER — ATAZANAVIR 200 MG/1
CAPSULE ORAL
Qty: 60 CAPSULE | Refills: 6 | Status: SHIPPED | OUTPATIENT
Start: 2019-09-09 | End: 2020-12-08

## 2019-09-16 RX ORDER — FLUOXETINE HYDROCHLORIDE 40 MG/1
CAPSULE ORAL
Qty: 30 CAPSULE | Refills: 0 | Status: SHIPPED | OUTPATIENT
Start: 2019-09-16 | End: 2019-12-13 | Stop reason: SDUPTHER

## 2019-10-01 ENCOUNTER — OFFICE VISIT (OUTPATIENT)
Dept: INFECTIOUS DISEASES | Facility: CLINIC | Age: 74
End: 2019-10-01
Payer: MEDICARE

## 2019-10-01 ENCOUNTER — CLINICAL SUPPORT (OUTPATIENT)
Dept: INFECTIOUS DISEASES | Facility: CLINIC | Age: 74
End: 2019-10-01
Payer: MEDICARE

## 2019-10-01 VITALS
TEMPERATURE: 98 F | HEIGHT: 70 IN | WEIGHT: 210.56 LBS | DIASTOLIC BLOOD PRESSURE: 87 MMHG | BODY MASS INDEX: 30.14 KG/M2 | SYSTOLIC BLOOD PRESSURE: 145 MMHG | HEART RATE: 88 BPM

## 2019-10-01 DIAGNOSIS — R42 DIZZINESS: ICD-10-CM

## 2019-10-01 DIAGNOSIS — E88.1 LIPODYSTROPHY ASSOCIATED WITH HUMAN IMMUNODEFICIENCY VIRUS INFECTION: ICD-10-CM

## 2019-10-01 DIAGNOSIS — B20 HIV DISEASE: Primary | Chronic | ICD-10-CM

## 2019-10-01 DIAGNOSIS — G31.84 MILD COGNITIVE IMPAIRMENT: Chronic | ICD-10-CM

## 2019-10-01 DIAGNOSIS — F32.1 CURRENT MODERATE EPISODE OF MAJOR DEPRESSIVE DISORDER WITHOUT PRIOR EPISODE: ICD-10-CM

## 2019-10-01 DIAGNOSIS — E55.9 VITAMIN D DEFICIENCY: ICD-10-CM

## 2019-10-01 DIAGNOSIS — E29.1 MALE HYPOGONADISM: ICD-10-CM

## 2019-10-01 DIAGNOSIS — R42 VERTIGO: ICD-10-CM

## 2019-10-01 DIAGNOSIS — B20 LIPODYSTROPHY ASSOCIATED WITH HUMAN IMMUNODEFICIENCY VIRUS INFECTION: ICD-10-CM

## 2019-10-01 PROCEDURE — 99999 PR PBB SHADOW E&M-EST. PATIENT-LVL IV: CPT | Mod: PBBFAC,,, | Performed by: INTERNAL MEDICINE

## 2019-10-01 PROCEDURE — 99215 PR OFFICE/OUTPT VISIT, EST, LEVL V, 40-54 MIN: ICD-10-PCS | Mod: S$PBB,,, | Performed by: INTERNAL MEDICINE

## 2019-10-01 PROCEDURE — 99214 OFFICE O/P EST MOD 30 MIN: CPT | Mod: PBBFAC | Performed by: INTERNAL MEDICINE

## 2019-10-01 PROCEDURE — 99215 OFFICE O/P EST HI 40 MIN: CPT | Mod: S$PBB,,, | Performed by: INTERNAL MEDICINE

## 2019-10-01 PROCEDURE — 90662 IIV NO PRSV INCREASED AG IM: CPT | Mod: PBBFAC

## 2019-10-01 PROCEDURE — 99999 PR PBB SHADOW E&M-EST. PATIENT-LVL IV: ICD-10-PCS | Mod: PBBFAC,,, | Performed by: INTERNAL MEDICINE

## 2019-10-01 NOTE — PROGRESS NOTES
Subjective:      Patient ID: Haritha Valle is a 74 y.o. male.    Chief Complaint:Follow-up from visit of 7/3/19      History of Present Illness    Dizziness  This is one of his main complaints today. He has been having this problem for about 2 years. Had abnormal  VNG and has f/u appt to see Dr. Cherry coming up    Mild cognitive impairment / Anxiety / Depression    pt expresses concern about low energy, low motivation and depression. Says he has had thoughts of suicide, but would never act on it because of his deep [employer] vidhi. I strongly urged him to see psychiatry and am trying to expedite appt now. I do not think he is an immediate threat. He has been on prozac for years.  He also requests f/u appt with Dr. Yocasta Talley who made the dx of MCI 7 years ago.    HIV disease  On reyataz + combivir. HIV viral load <40. NA0=996. Has chronic stable lipdystrophy.    He saw Dr. Fermin Long at my urging for a second opinion about his ART. He recommended trying Biktarvy, but the copay cost is prohibitive, so pt will remain on his current regimen.    Hypertension  He is on lotensin/HCT 10/12.5. Amlodipine was stopped because of pedal edema which resolved with stopping it.. BPmay be controlled on this. 130/89  today.        Hypogonadism  He takes testosterone injections 300 mg every 2 weeks. T level was low on 6/25 which was 9 days after his previous T injection. He saw Dr. Kelle Kenny who recommended testosterone implants pending insurance coverage, but the pt has not heard back from urology. Message sent to Dr. Kenny    Review of Systems   Constitution: Positive for malaise/fatigue. Negative for chills, decreased appetite, fever, night sweats, weight gain and weight loss.   HENT: Negative for congestion, ear pain, hearing loss, hoarse voice, sore throat and tinnitus.    Eyes: Negative for blurred vision, redness and visual disturbance.   Cardiovascular: Negative for chest pain, leg swelling and palpitations.    Respiratory: Negative for cough, hemoptysis, shortness of breath and sputum production.    Hematologic/Lymphatic: Negative for adenopathy. Does not bruise/bleed easily.   Skin: Negative for dry skin, itching, rash and suspicious lesions.   Musculoskeletal: Negative for back pain, joint pain, myalgias and neck pain.   Gastrointestinal: Negative for abdominal pain, constipation, diarrhea, heartburn, nausea and vomiting.   Genitourinary: Negative for dysuria, flank pain, frequency, hematuria, hesitancy and urgency.   Neurological: Positive for dizziness and weakness. Negative for headaches, numbness and paresthesias.   Psychiatric/Behavioral: Positive for depression and memory loss. The patient is nervous/anxious. The patient does not have insomnia.      Objective:   Physical Exam   Constitutional: He is oriented to person, place, and time. He appears well-developed and well-nourished. No distress.   Neurological: He is alert and oriented to person, place, and time.   Psychiatric: He has a normal mood and affect. His behavior is normal. Thought content normal.   Vitals reviewed.    Assessment:       1. R/O vitamin D deficiency    2. Current moderate episode of major depressive disorder without prior episode    3. Vertigo    4. Mild cognitive impairment    5. HIV disease    6. 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 Male hypogonadism    8. Dizziness          Plan:        1. Vitamin D level   2. Consult psychiatry, expedited   3. F/u apt with Dr. Talley (neurology) and Dr. Kenny (T implant)   4. F/u appt with Dr. Tal Cherry re vertigo   5. Flu shot

## 2019-10-02 ENCOUNTER — PATIENT MESSAGE (OUTPATIENT)
Dept: INFECTIOUS DISEASES | Facility: CLINIC | Age: 74
End: 2019-10-02

## 2019-10-04 ENCOUNTER — TELEPHONE (OUTPATIENT)
Dept: NEUROLOGY | Facility: CLINIC | Age: 74
End: 2019-10-04

## 2019-10-04 NOTE — TELEPHONE ENCOUNTER
----- Message from Saeed Yun MD sent at 10/1/2019 10:33 AM CDT -----  [place]   you saw him in 2012 for cognitive impairment. He wants to see you again, but nothing available for months. Could we arrrange? Not a rush but would like to do this year  Justino

## 2019-10-07 ENCOUNTER — LAB VISIT (OUTPATIENT)
Dept: LAB | Facility: HOSPITAL | Age: 74
End: 2019-10-07
Attending: INTERNAL MEDICINE
Payer: MEDICARE

## 2019-10-07 ENCOUNTER — OFFICE VISIT (OUTPATIENT)
Dept: PSYCHIATRY | Facility: CLINIC | Age: 74
End: 2019-10-07
Payer: MEDICARE

## 2019-10-07 VITALS
DIASTOLIC BLOOD PRESSURE: 80 MMHG | SYSTOLIC BLOOD PRESSURE: 170 MMHG | BODY MASS INDEX: 30.19 KG/M2 | HEART RATE: 68 BPM | HEIGHT: 70 IN | WEIGHT: 210.88 LBS

## 2019-10-07 DIAGNOSIS — E53.8 DEFICIENCY OF OTHER SPECIFIED B GROUP VITAMINS: ICD-10-CM

## 2019-10-07 DIAGNOSIS — F32.A DEPRESSION, UNSPECIFIED DEPRESSION TYPE: ICD-10-CM

## 2019-10-07 DIAGNOSIS — E03.9 HYPOTHYROIDISM, UNSPECIFIED TYPE: ICD-10-CM

## 2019-10-07 DIAGNOSIS — E55.9 VITAMIN D DEFICIENCY: ICD-10-CM

## 2019-10-07 DIAGNOSIS — F41.9 ANXIETY: Primary | ICD-10-CM

## 2019-10-07 PROCEDURE — 99205 PR OFFICE/OUTPT VISIT, NEW, LEVL V, 60-74 MIN: ICD-10-PCS | Mod: S$PBB,,, | Performed by: PSYCHIATRY & NEUROLOGY

## 2019-10-07 PROCEDURE — 36415 COLL VENOUS BLD VENIPUNCTURE: CPT

## 2019-10-07 PROCEDURE — 99205 OFFICE O/P NEW HI 60 MIN: CPT | Mod: S$PBB,,, | Performed by: PSYCHIATRY & NEUROLOGY

## 2019-10-07 PROCEDURE — 99999 PR PBB SHADOW E&M-EST. PATIENT-LVL III: ICD-10-PCS | Mod: PBBFAC,,,

## 2019-10-07 PROCEDURE — 82652 VIT D 1 25-DIHYDROXY: CPT

## 2019-10-07 PROCEDURE — 99999 PR PBB SHADOW E&M-EST. PATIENT-LVL III: CPT | Mod: PBBFAC,,,

## 2019-10-07 PROCEDURE — 99213 OFFICE O/P EST LOW 20 MIN: CPT | Mod: PBBFAC

## 2019-10-07 RX ORDER — MIRTAZAPINE 30 MG/1
30 TABLET, FILM COATED ORAL NIGHTLY
Qty: 30 TABLET | Refills: 2 | Status: SHIPPED | OUTPATIENT
Start: 2019-10-07 | End: 2019-12-17

## 2019-10-07 RX ORDER — LIOTHYRONINE SODIUM 25 UG/1
25 TABLET ORAL EVERY MORNING
Qty: 30 TABLET | Refills: 2 | Status: SHIPPED | OUTPATIENT
Start: 2019-10-07 | End: 2019-11-21

## 2019-10-07 RX ORDER — LIOTHYRONINE SODIUM 25 UG/1
25 TABLET ORAL DAILY
Qty: 30 TABLET | Refills: 2 | Status: SHIPPED | OUTPATIENT
Start: 2019-10-07 | End: 2019-10-07

## 2019-10-07 NOTE — PATIENT INSTRUCTIONS
F/u in 3-4 weeks. Contact number for clinic provided for pt to reach out if any questions or concerns

## 2019-10-07 NOTE — PROGRESS NOTES
Lab results received- TSH, T4 wnl. T3 low @ 2.0. B12 level on lower side of normal.     Called patient and significant other and informed of lab results. Discussed that hypothyroidism may cause symptoms of depression and fatigue. Discussed new medication order for Cytomel 25mcg qAM and for repeat lab testing to be done at next follow up. Patient and SO expresses understanding and agreement.    Discussed with Dr Shyla Jiménez MD    [facility]  PGY-2

## 2019-10-07 NOTE — PROGRESS NOTES
"Outpatient Psychiatry Initial Visit (MD/NP)    10/7/2019    Haritha Valle, a 74 y.o. male, presenting for initial evaluation visit. Met with patient and partner.    Reason for Encounter: Referral from Pt's PCP. Patient complains of anxiety and depression symptoms. Hx of mild cognitive impairment, well controlled HIV, Hep C, HTN.    History of Present Illness:   Pt was referred by his long term PCP, Dr Yun, after communicating with him regarding feelings of being overwhelmed during his last clinic visit. Pt states he is feeling "overwhelmed" more often over the course of the past couple of years- "it's like I have stage fright, like I'm about to jump out of my skin." Pt states these episodes are mostly triggered by his work and stressors in the office. 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 Runs a large [employer] called [employer] of which he is very proud of- "it's my baby." Pt has many employees under him that he is responsible for and also does much to give to pj and helping others in need. Pt is a [employer] and he states his vidhi is very important to him. Pt states he was diagnosed with HIV 33 years ago and has been regularly taking medications for it since; however, pt states he has never has had any symptoms of his illness, for which he is incredibly thankful and feels blessed. Pt states he tries to give back to others and help when he can as he recognizes that he is blessed.     Pt states in the past couple of years, pt has been having progressively more and more difficulty handling the stress of his job and his many responsibilities. States he often feels overwhelmed and has to step out of the office with "any drama that occurs." Long term partner who is in room at time of interview states pt has become more easy to anger and quick tempered recently. Pt denies any personal life stressors; " "states relationship with partner is wonderful. No children. Pt states that he has had thoughts that "sometimes I think my business would do better without me here." However, pt denies hx of SA and denies any actual intent to harm himself or plans to end his life. Pt states his vidhi is his foundation and would not allow that. Endorses increased fatigue and decreased energy level. Denies insomnia, anhedonia, decreased appetite. Pt takes a sleep aid called "Sleep Well" (per internet, contains antihistamine) almost nightly. Denies hx of raymon. Denies significant family hx. Denies hx of previous psych hospitalizations. Denies SI/HI/AVH.    Denies any alcohol or substance history. Non smoker. Pt states he is in the process of handing off his work responsibilities to a member of his company; however, this will take about a year. Pt states PCP prescribes him xanax for anxiety but this is not helpful and just makes him drowsy. Pt states he takes half a pill of the 0.25mg dosage a couple of times a week. Discussed with pt the risks and negative side effects of long term benzo use, including increased risk of falls in elderly. Pt amenable to discontinuing Xanax use at this time, as he uses it infrequently anyway. Pt has been taking same dosage of Prozac for at least 10 years; unsure if it is helpful. Partner manages pt's medications at home; pt noted to have difficulty with memory intermittently throughout interview. Per partner, pt able to perform ADLs independently and memory loss does not significantly impair pt's quality of life; however, pt is forgetful at times. Pt also has been having dizzy spells that PCP is aware about; pending neurology consult.     checked.      Review Of Systems:     Pertinent items are noted in HPI.    Current Evaluation:     Nutritional Screening: Considering the patient's height and weight, medications, medical history and preferences, should a referral be made to the dietitian? " "no    Constitutional  Vitals:  Most recent vital signs, dated less than 90 days prior to this appointment, were reviewed.    Vitals:    10/07/19 0952   BP: (!) 170/80   Pulse: 68   Weight: 95.7 kg (210 lb 13.9 oz)   Height: 5' 10" (1.778 m)        General:  unremarkable, age appropriate, smiling and laughing appropriately     Musculoskeletal  Muscle Strength/Tone:  no tremor, no atrophy   Gait & Station:  non-ataxic     Psychiatric  Speech:  no latency; no press   Mood & Affect:  steady, euthymic, anxious  congruent and appropriate   Thought Process:  normal and logical   Associations:  intact   Thought Content:  normal, no suicidality, no homicidality, delusions, or paranoia   Insight:  intact, has awareness of illness   Judgement: behavior is adequate to circumstances, age appropriate   Orientation:  grossly intact, person, place, situation, time/date, month of year, year   Memory: intact for content of interview, grossly intact, memory >3 objects at five mins, able to remember recent events- yes, able to remember remote events- yes   Language: grossly intact   Attention Span & Concentration:  able to focus   Fund of Knowledge:  intact and appropriate to age and level of education       Relevant Elements of Neurological Exam: normal gait, walks independently    Functioning in Relationships:  Spouse/partner: long term partner of 20+ years. States it is healthy and happy  Peers: Many employees. Pt states he "doesn't know a stranger" and loves speaking with people.   Employers: self employed    Laboratory Data  No visits with results within 1 Month(s) from this visit.   Latest known visit with results is:   Lab Visit on 08/29/2019   Component Date Value Ref Range Status    Hep B S Ab 08/29/2019 Positive*  Final    Hepatitis B Surface Ag 08/29/2019 Negative   Final    Hep B Viral DNA IU/ML 08/29/2019 <10  <10 IU/mL Final    Log HBV IU/mL 08/29/2019 <1.00  <1.00 Log (10) IU/mL Final    Hepatitis B Virus DNA " "08/29/2019 Not detected  Not detected Final    HCV Log 08/29/2019 <1.08  <1.08 Log (10) IU/mL Final    HCV, Qualitative 08/29/2019 Not detected  Not detected IU/mL Final    HCV RNA Quant PCR 08/29/2019 <12  <12 IU/mL Final         Medications  Outpatient Encounter Medications as of 10/7/2019   Medication Sig Dispense Refill    ALPRAZolam (XANAX) 0.25 MG tablet Take 2 tablets at bedtime every night. In addition, may take one tablet twice daily if needed for anxiety 60 tablet 4    atazanavir (REYATAZ) 200 MG Cap TAKE TWO CAPSULES BY MOUTH DAILY 60 capsule 6    benazepril-hydrochlorthiazide (LOTENSIN HCT) 10-12.5 mg Tab Take 1 tablet by mouth once daily. 90 tablet 3    kbnyskebw-tekqmzqq-uiovsdc ala (BIKTARVY) -25 mg per tablet Take 1 tablet by mouth once daily. 90 tablet 5    cyclobenzaprine (FLEXERIL) 10 MG tablet Take 1 tablet (10 mg total) by mouth 3 (three) times daily. 30 tablet 3    epinephrine (EPIPEN) 0.3 mg/0.3 mL (1:1,000) AtIn Inject 0.3 mLs (0.3 mg total) into the muscle once. 0.3 mL 0    ergocalciferol (VITAMIN D2) 50,000 unit Cap Take 1 capsule (50,000 Units total) by mouth every 30 days. 3 capsule 4    FLUoxetine 40 MG capsule TAKE 1 CAPSULE BY MOUTH ONCE DAILY 30 capsule 0    folic acid-vit B6-vit B12 2.5-25-2 mg (FOLBIC OR EQUIV) 2.5-25-2 mg Tab Take 1 tablet by mouth once daily. 30 tablet 3    lamivudine-zidovudine 150-300mg (COMBIVIR) 150-300 mg Tab TAKE ONE TABLET EVERY 12  HOURS 60 tablet 6    meclizine (ANTIVERT) 12.5 mg tablet TAKE ONE TABLET THREE TIMES DAILY AS NEEDED FOR DIZZINESS 90 tablet 2    metoprolol succinate (TOPROL-XL) 25 MG 24 hr tablet Take 1 tablet (25 mg total) by mouth once daily. 30 tablet 11    mirtazapine (REMERON) 30 MG tablet Take 1 tablet (30 mg total) by mouth every evening. 30 tablet 2    mupirocin (BACTROBAN) 2 % ointment       needle, disp, 21 G 21 x 1 " Ndle       omega-3 acid ethyl esters (LOVAZA) 1 gram capsule       papaverine 30 mg/mL " injection Add Phentolamine 1 mg/cc  Add PGE1 10 mcg/cc    SIG:  Bring to office for test dose in physician office 5 mL 0    pravastatin (PRAVACHOL) 40 MG tablet TAKE (1) TABLET DAILY. 90 tablet 3    tretinoin (RETIN-A) 0.025 % cream Apply topically every evening. 20 g 1    varicella-zoster gE-AS01B, PF, (SHINGRIX, PF,) 50 mcg/0.5 mL injection Inject into the muscle. 1 mL 0     Facility-Administered Encounter Medications as of 10/7/2019   Medication Dose Route Frequency Provider Last Rate Last Dose    [START ON 1/5/2020] testosterone Pllt 12 Pellet  12 Pellet Implant 1 time in Clinic/HOD Pascual Kenny MD               Assessment - Diagnosis - Goals:     Impression:   Generalized Anxiety Disorder  Unspecified Mood disorder  Normal reaction to acute work related stress      Strengths and Liabilities: Strength: Patient accepts guidance/feedback, Strength: Patient is intelligent., Strength: Patient is motivated for change., Strength: Patient is stable.    Treatment Goals:  Specify outcomes written in observable, behavioral terms:   Anxiety: eliminating all anxiety symptoms (SCL-90-R scores in normal range) and reducing physical symptoms of anxiety    Treatment Plan/Recommendations:   - Discontinue home Xanax  - Continue home Prozac 40mg qd  - Start Remeron 30mg qPM  - Start Folbic supplementation  - F/u labs for TSH, T4, T3, B12 to rule out metabolic/organic causes of depression/anxiety      Return to Clinic: 3-4 weeks    Counseling time: 1.5 hrs  Total time: 2 hrs  Consulting clinician was informed of the encounter and consult note.

## 2019-10-09 LAB — 1,25(OH)2D3 SERPL-MCNC: 48 PG/ML (ref 20–79)

## 2019-10-09 NOTE — PROGRESS NOTES
Staff Note:      Pt was interviewed personally by me.   I agree with the Resident's findings.     Pt was receptive to augmentation of partially successful medications.  Psychosocial remodeling was emphasized as appropriate to his phase of life and a non-pharmacologic approach to relieving his primary concerns.    Low T3 was discovered in lab drawn after this visit.  Cytomel was started by phone.    BECKI

## 2019-10-14 ENCOUNTER — OFFICE VISIT (OUTPATIENT)
Dept: UROLOGY | Facility: CLINIC | Age: 74
End: 2019-10-14
Payer: MEDICARE

## 2019-10-14 VITALS
SYSTOLIC BLOOD PRESSURE: 136 MMHG | WEIGHT: 216.69 LBS | HEART RATE: 73 BPM | HEIGHT: 71 IN | DIASTOLIC BLOOD PRESSURE: 84 MMHG | BODY MASS INDEX: 30.34 KG/M2

## 2019-10-14 DIAGNOSIS — E78.2 MIXED HYPERLIPIDEMIA: Chronic | ICD-10-CM

## 2019-10-14 DIAGNOSIS — I10 ESSENTIAL HYPERTENSION: ICD-10-CM

## 2019-10-14 DIAGNOSIS — N13.8 BPH WITH URINARY OBSTRUCTION: ICD-10-CM

## 2019-10-14 DIAGNOSIS — E29.1 MALE HYPOGONADISM: Primary | ICD-10-CM

## 2019-10-14 DIAGNOSIS — N40.1 BPH WITH URINARY OBSTRUCTION: ICD-10-CM

## 2019-10-14 DIAGNOSIS — N52.01 ERECTILE DYSFUNCTION DUE TO ARTERIAL INSUFFICIENCY: ICD-10-CM

## 2019-10-14 DIAGNOSIS — Z79.899 ENCOUNTER FOR LONG-TERM (CURRENT) USE OF HIGH-RISK MEDICATION: ICD-10-CM

## 2019-10-14 PROCEDURE — 99212 OFFICE O/P EST SF 10 MIN: CPT | Mod: PBBFAC | Performed by: UROLOGY

## 2019-10-14 PROCEDURE — 99999 PR PBB SHADOW E&M-EST. PATIENT-LVL II: CPT | Mod: PBBFAC,,, | Performed by: UROLOGY

## 2019-10-14 PROCEDURE — 99214 PR OFFICE/OUTPT VISIT, EST, LEVL IV, 30-39 MIN: ICD-10-PCS | Mod: S$PBB,25,, | Performed by: UROLOGY

## 2019-10-14 PROCEDURE — 99214 OFFICE O/P EST MOD 30 MIN: CPT | Mod: S$PBB,25,, | Performed by: UROLOGY

## 2019-10-14 PROCEDURE — 11980 IMPLANT HORMONE PELLET(S): CPT | Mod: PBBFAC | Performed by: UROLOGY

## 2019-10-14 PROCEDURE — 11980 IMPLANT HORMONE PELLET(S): CPT | Mod: S$PBB,,, | Performed by: UROLOGY

## 2019-10-14 PROCEDURE — 11980 PR IMPLANT,HORMONE,SUBCUTANEOUS: ICD-10-PCS | Mod: S$PBB,,, | Performed by: UROLOGY

## 2019-10-14 PROCEDURE — 99999 PR PBB SHADOW E&M-EST. PATIENT-LVL II: ICD-10-PCS | Mod: PBBFAC,,, | Performed by: UROLOGY

## 2019-10-14 PROCEDURE — S0189 TESTOSTERONE PELLET 75 MG: HCPCS | Mod: PBBFAC | Performed by: UROLOGY

## 2019-10-14 RX ORDER — AMLODIPINE BESYLATE 5 MG/1
5 TABLET ORAL DAILY
Refills: 3 | COMMUNITY
Start: 2019-10-03 | End: 2019-12-17

## 2019-10-14 RX ORDER — PAPAVERINE HYDROCHLORIDE 30 MG/ML
INJECTION INTRAMUSCULAR; INTRAVENOUS
Qty: 10 ML | Refills: 11 | Status: SHIPPED | OUTPATIENT
Start: 2019-10-14 | End: 2019-12-17

## 2019-10-14 RX ADMIN — TESTOSTERONE 12 PELLET: 75 PELLET SUBCUTANEOUS at 04:10

## 2019-10-14 NOTE — PROGRESS NOTES
CHIEF COMPLAINT:    Mr. Valle is a 74 y.o. male presenting for a consultation at the request of Dr. Yun. Patient presents with hypogonadism.    PRESENTING ILLNESS:    Haritha Valle is a 74 y.o. male who c/o hypogonadism.  He is on IM TRT.  While on TRT, he has more energy.  He'd like testopel.    He also c/o ED.  This has been present for > 1 year.  He has tried and failed Viagra. He's used ICI in the remote past with good results.    He has a decreased FOS, but he's pleased with how he voids.    REVIEW OF SYSTEMS:    Haritha Valle denies headache, blurred vision, fever, nausea, vomiting, chills, abdominal pain, bleeding per rectum, cough, SOB, recent loss of consciousness, recent mental status changes, seizures, dizziness, or upper or lower extremity weakness.    EUGENIA  1. 1  2. 1  3. 1  4. 2  5. 1      PATIENT HISTORY:    Past Medical History:   Diagnosis Date    Anxiety 7/3/2019    Basal cell carcinoma     Depression     Hepatitis B     Hepatitis C antibody test positive     HIV infection     Hypertension     Immune disorder     Mild cognitive impairment 10/1/2010       Past Surgical History:   Procedure Laterality Date    COLONOSCOPY N/A 11/3/2016    Procedure: COLONOSCOPY;  Surgeon: Maxi Villasenor MD;  Location: [address]);  Service: Endoscopy;  Laterality: N/A;  last colonoscopy with Dr Jarrell       Family History   Problem Relation Age of Onset    Melanoma Neg Hx     Eczema Neg Hx     Lupus Neg Hx     Psoriasis Neg Hx        Social History     Socioeconomic History    Marital status: Significant Other     Spouse name: Shadi    Number of children: 0    Years of education: Not on file    Highest education level: Some college, no degree   Occupational History    Occupation: Self-employed   Social Needs    Financial resource strain: Not on file    Food insecurity:     Worry: Not on file     Inability: Not on file    Transportation needs:     Medical: Not on file      Non-medical: Not on file   Tobacco Use    Smoking status: Never Smoker    Smokeless tobacco: Never Used   Substance and Sexual Activity    Alcohol use: No    Drug use: No    Sexual activity: Yes     Partners: Male   Lifestyle    Physical activity:     Days per week: Not on file     Minutes per session: Not on file    Stress: Not on file   Relationships    Social connections:     Talks on phone: Not on file     Gets together: Not on file     Attends Buddhism service: Not on file     Active member of club or organization: Not on file     Attends meetings of clubs or organizations: Not on file     Relationship status: Living with partner   Other Topics Concern    Not on file   Social History Narrative    Pt states normal developmental history. Recognized he was attracted to males around age 7, which caused him some distress throughout his adolescence but pt currently satisfied and happy with his identity. Pt lives with long term partner. No children. Self employed and financially comfortable.       Allergies:  No known drug allergies    Medications:    Current Outpatient Medications:     atazanavir (REYATAZ) 200 MG Cap, TAKE TWO CAPSULES BY MOUTH DAILY, Disp: 60 capsule, Rfl: 6    benazepril-hydrochlorthiazide (LOTENSIN HCT) 10-12.5 mg Tab, Take 1 tablet by mouth once daily., Disp: 90 tablet, Rfl: 3    owseywmii-dfhrgdqa-ledzggx ala (BIKTARVY) -25 mg per tablet, Take 1 tablet by mouth once daily., Disp: 90 tablet, Rfl: 5    cyclobenzaprine (FLEXERIL) 10 MG tablet, Take 1 tablet (10 mg total) by mouth 3 (three) times daily., Disp: 30 tablet, Rfl: 3    epinephrine (EPIPEN) 0.3 mg/0.3 mL (1:1,000) AtIn, Inject 0.3 mLs (0.3 mg total) into the muscle once., Disp: 0.3 mL, Rfl: 0    ergocalciferol (VITAMIN D2) 50,000 unit Cap, Take 1 capsule (50,000 Units total) by mouth every 30 days., Disp: 3 capsule, Rfl: 4    FLUoxetine 40 MG capsule, TAKE 1 CAPSULE BY MOUTH ONCE DAILY, Disp: 30 capsule, Rfl: 0     "folic acid-vit B6-vit B12 2.5-25-2 mg (FOLBIC OR EQUIV) 2.5-25-2 mg Tab, Take 1 tablet by mouth once daily., Disp: 30 tablet, Rfl: 3    lamivudine-zidovudine 150-300mg (COMBIVIR) 150-300 mg Tab, TAKE ONE TABLET EVERY 12  HOURS, Disp: 60 tablet, Rfl: 6    liothyronine (CYTOMEL) 25 MCG Tab, Take 1 tablet (25 mcg total) by mouth every morning., Disp: 30 tablet, Rfl: 2    meclizine (ANTIVERT) 12.5 mg tablet, TAKE ONE TABLET THREE TIMES DAILY AS NEEDED FOR DIZZINESS, Disp: 90 tablet, Rfl: 2    metoprolol succinate (TOPROL-XL) 25 MG 24 hr tablet, Take 1 tablet (25 mg total) by mouth once daily., Disp: 30 tablet, Rfl: 11    mirtazapine (REMERON) 30 MG tablet, Take 1 tablet (30 mg total) by mouth every evening., Disp: 30 tablet, Rfl: 2    mupirocin (BACTROBAN) 2 % ointment, , Disp: , Rfl:     needle, disp, 21 G 21 x 1 " Ndle, , Disp: , Rfl:     omega-3 acid ethyl esters (LOVAZA) 1 gram capsule, , Disp: , Rfl:     papaverine 30 mg/mL injection, Add Phentolamine 1 mg/cc Add PGE1 10 mcg/cc  SIG: Bring to office for test dose in physician office, Disp: 5 mL, Rfl: 0    pravastatin (PRAVACHOL) 40 MG tablet, TAKE (1) TABLET DAILY., Disp: 90 tablet, Rfl: 3    tretinoin (RETIN-A) 0.025 % cream, Apply topically every evening., Disp: 20 g, Rfl: 1    varicella-zoster gE-AS01B, PF, (SHINGRIX, PF,) 50 mcg/0.5 mL injection, Inject into the muscle., Disp: 1 mL, Rfl: 0    Current Facility-Administered Medications:     [START ON 1/5/2020] testosterone Pllt 12 Pellet, 12 Pellet, Implant, 1 time in Clinic/HOD, Pascual Kenny MD    PHYSICAL EXAMINATION:    The patient generally appears in good health, is appropriately interactive, and is in no apparent distress.     Eyes: anicteric sclerae, moist conjunctivae; no lid-lag; PERRLA     HENT: Atraumatic; oropharynx clear with moist mucous membranes and no mucosal ulcerations;normal hard and soft palate.  No evidence of lymphadenopathy.    Neck: Trachea midline.  No " thyromegaly.    Musculoskeletal: No abnormal gait.    Skin: No lesions.    Mental: Cooperative with normal affect.  Is oriented to time, place, and person.    Neuro: Grossly intact.    Chest: Normal inspiratory effort.   No accessory muscles.  No audible wheezes.  Respirations symmetric on inspiration and expiration.    Heart: Regular rhythm.      Abdomen:  Soft, non-tender. No masses or organomegaly. Bladder is not palpable. No evidence of flank discomfort. No evidence of inguinal hernia.    Genitourinary: The penis has no evidence of plaques or induration. The urethral meatus is normal. The testes, epididymides, and cord structures are normal in size and contour bilaterally. The scrotum is normal in size and contour.    Normal anal sphincter tone. No rectal mass.    The prostate is 30 g. Normal landmarks. Lateral sulci. Median furrow intact.  No nodularity or induration. Seminal vesicles are normal.    Extremities: No clubbing, cyanosis, or edema      LABS:      Lab Results   Component Value Date    PSA 1.9 06/25/2019    PSA 1.2 05/09/2018    PSA 2.7 09/20/2016       IMPRESSION:    Encounter Diagnoses   Name Primary?    Male hypogonadism Yes    Erectile dysfunction due to arterial insufficiency     BPH with urinary obstruction     Mixed hyperlipidemia     Essential hypertension     Encounter for long-term (current) use of high-risk medication    HTN, controlled  Hyperlipidemia, controlled      PLAN:    1. Discussed options for his ED.  Continue ICI.  2. Will observe his LUTS as they don't bother him.  3. Procedure Report:Testopel Insertion    A Time Out was performed with the patient and nurse in the room.   The site was marked with a yes. Verbal consent was obtained. The risks and benefits of testosterone replacement were explained. The alternatives of observation and treatment with injections and gel therapy were discussed.     The risks of testopel explained to the patient include but are not limited to  worsening of BPH, edema with or without congestive heart failure, gynecomastia, cellulitis and abscess, venous thromboembolic events, testicular atrophy, possible cardiac sequelae and with high dose testosterone the risk of liver function elevation (peliosis hepatitis) and hepatocellular carcinoma. Peliosis hepatitis can be a fatal complication.     12 gluteal region prepped and draped in sterile fashion.  20 cc of 2% lidocaine used for local analgesia.  Small incision made with a 15 blade.  Standard trocars used for subcutaneous insertion of L pellets total in a V shaped tract.  No active bleeding noted.  Area cleaned and dried. Steri strips applied. Dressing applied.      He can RTC 6 months with T, psa, cbc, hepatic panel lipid panel.    Will check a T in 1 month.    Copy to:

## 2019-11-01 ENCOUNTER — TELEPHONE (OUTPATIENT)
Dept: INTERNAL MEDICINE | Facility: CLINIC | Age: 74
End: 2019-11-01

## 2019-11-01 NOTE — TELEPHONE ENCOUNTER
Patient has been set up for appointment for 11/21/2019 for 10:40 AM as per Dr Andrade request.    Marie.

## 2019-11-01 NOTE — TELEPHONE ENCOUNTER
Please call  Tori.  Dr. Yun is retiring and he has asked us to  Mr. Valle as a patient.  Please help Mr Valle get an appointment to establish care in our clinic.    Thanks.  D

## 2019-11-04 ENCOUNTER — PATIENT MESSAGE (OUTPATIENT)
Dept: INFECTIOUS DISEASES | Facility: CLINIC | Age: 74
End: 2019-11-04

## 2019-11-08 RX ORDER — BENAZEPRIL HYDROCHLORIDE AND HYDROCHLOROTHIAZIDE 10; 12.5 MG/1; MG/1
TABLET ORAL
Qty: 90 TABLET | Refills: 3 | Status: SHIPPED | OUTPATIENT
Start: 2019-11-08 | End: 2020-07-31 | Stop reason: SDUPTHER

## 2019-11-12 ENCOUNTER — OFFICE VISIT (OUTPATIENT)
Dept: NEUROLOGY | Facility: CLINIC | Age: 74
End: 2019-11-12
Payer: MEDICARE

## 2019-11-12 VITALS
HEART RATE: 77 BPM | DIASTOLIC BLOOD PRESSURE: 92 MMHG | HEIGHT: 71 IN | BODY MASS INDEX: 30.23 KG/M2 | SYSTOLIC BLOOD PRESSURE: 152 MMHG

## 2019-11-12 DIAGNOSIS — R42 POSTURAL DIZZINESS WITH PRESYNCOPE: Primary | ICD-10-CM

## 2019-11-12 DIAGNOSIS — R55 POSTURAL DIZZINESS WITH PRESYNCOPE: Primary | ICD-10-CM

## 2019-11-12 DIAGNOSIS — G31.84 MILD COGNITIVE IMPAIRMENT: Chronic | ICD-10-CM

## 2019-11-12 PROCEDURE — 99999 PR PBB SHADOW E&M-EST. PATIENT-LVL IV: ICD-10-PCS | Mod: PBBFAC,,, | Performed by: PSYCHIATRY & NEUROLOGY

## 2019-11-12 PROCEDURE — 99203 PR OFFICE/OUTPT VISIT, NEW, LEVL III, 30-44 MIN: ICD-10-PCS | Mod: S$PBB,,, | Performed by: PSYCHIATRY & NEUROLOGY

## 2019-11-12 PROCEDURE — 99999 PR PBB SHADOW E&M-EST. PATIENT-LVL IV: CPT | Mod: PBBFAC,,, | Performed by: PSYCHIATRY & NEUROLOGY

## 2019-11-12 PROCEDURE — 99203 OFFICE O/P NEW LOW 30 MIN: CPT | Mod: S$PBB,,, | Performed by: PSYCHIATRY & NEUROLOGY

## 2019-11-12 PROCEDURE — 99214 OFFICE O/P EST MOD 30 MIN: CPT | Mod: PBBFAC | Performed by: PSYCHIATRY & NEUROLOGY

## 2019-11-12 NOTE — PROGRESS NOTES
"Last Name: Tori REINA. Chief Complaints during this visit:   f/u visit for cognitive problems     History of present illness:   74 y.o. M seen for dizziness.  Accompanied by partner, Shadi.  I have seen him 7 years ago for cognitive impairment, but today is new for "dizziness."    For past year, he has sensation of environmental tilt.  Especially when standing up or sitting up from recliner.  Takes a moment to pass, then he is ok.    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  Still under pressure and stress.      My 2012 note:  66 yo M seen in f/u for cognitive impairment.  I last saw him in 2010 when his symptoms and testing were compatible with a mild dementia.  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  He cannot always multitask and this causes some irritability and strain at times.  He also continues to be unable to understand numbers said aloud.  That being said, he comes today because of some recent events over the past two weeks that scared him.  He was unable to drive and get directions from [place] while partner was talking on his phone; he could not understand the word "thirty" when spoken by phone to him and he continues to feel mentally exhausted by end of day.  Partner, Shadi, came with him today and says that the frustrating part for him is that Haritha's difficulties fluctuate.  He can seem to multitask and do a lot one day (drove to an unfamiliar town and gave a eulogy), then melt down over something "simple" the next day.  Haritha does admit do feeling anxious or depressed.    II. Review of systems As in HPI, otherwise, balance 3 systems reviewed and are negative.   III. Past Medical history: HIV x 25 years   Family history: mother had sundowning in her 90's   Social history: No tob or etoh, lives with partner, Shadi; owns a conference center.     Current medications: Reviewed MEDCARD  Namenda   FLUoxetine (Fluoxetine)   Vitamin D (Ergocalciferol (Vitamin D2))   Pravastatin " "  REYATAZ (Atazanavir Sulfate)   AcTOS (Pioglitazone)   Flexeril (Cyclobenzaprine)   Hyzaar (Losartan-Hydrochlorothiazide)   Tricor (Fenofibrate Nanocrystallized)   Valium (Diazepam)   Combivir (Lamivudine-Zidovudine)   Allergies: Per patient/ family report: nkda     IV. Physical Exam     Vitals:    11/12/19 1323 11/12/19 1350 11/12/19 1352   BP: (!) 150/98 (!) 142/82 (!) 152/92   Patient Position:  Sitting    Pulse: 89 91 77   Height: 5' 11" (1.803 m)           General appearance: Well nourished, well developed, no acute distress     -------------------------------------------------------------   Facial Expression: Normal (0)   Affect: full   Orientation to time & place: Oriented to time, place, person and situation   Attention & concentration: Normal attention span and concentration   Memory: 3/5 at 5min   Language: Spontaneous, fluent able to repeat and name objects   Fund of knowledge: Aware of current events   Speech: normal (0)    Cerebellar/ coordination:   Gait: normal (0)   Bradykinesia: none   Tremor at rest: None (0)   Arising from chair: normal (0)   Posture: normal (0)   Postural stability: normal (0)     V. Laboratory/ Radiological Data: Reviewed:   Neuropsych testing 10/2010:  To summarize, Mr. Casillass general cognitive abilities as assessed by the RBANS were in the average range, with mildly impaired attention; low average immediate verbal memory; average language and delayed memory; and high average visuospatial/constructional abilities. Further assessment of specific cognitive abilities revealed no deficits in temporal orientation, naming, verbal fluency, facial recognition, and constructional ability but abstract reasoning, psychomotor speed, and mental flexibility were impaired. Additional memory assessment revealed moderately impaired immediate and delayed memory. Personality test data revealed no evidence of significant depression or anxiety. Evaluation results reveal mildly impaired attention " and abstract reasoning, moderately impaired psychomotor speed and mental flexibility, and variable memory with performances ranging from average to moderately impaired. Memory test performances declined as the amount and complexity of information presented increased. Language abilities, temporal orientation, and visual-perceptual skills are unimpaired. Neuropsychological testing is consistent with very mild dementia which may be related to his history of HIV.      MRI brain 9/23/10: (personally reviewed and agree with findings) IMPRESSION: GENERALIZED CEREBRAL VOLUME LOSS ADVANCED FOR AGE WHICH IN LIGHT OF HISTORY, MAY BE SEQUELA OF UNDERLYING HIV ENCEPHALOPATHY. CLINICAL CORRELATION ADVISED. NO FOCAL PARENCHYMAL SIGNAL ABNORMALITY TO SUGGEST FOCAL LESION. NO EVIDENCE FOR ACUTE INFARCTION.     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     VI. Medical Decision Making       Problem List Items Addressed This Visit        1 - High    Postural dizziness with presyncope - Primary    Current Assessment & Plan     New, positional presyncopal episodes without OH.   -> imaging to r/o cns stenosis         Relevant Orders    MRI Brain Without Contrast    MRA Neck without contrast    MRA Brain without contrast    Creatinine, serum (Completed)       2     Mild cognitive impairment (Chronic)    Relevant Orders    MRI Brain Without Contrast    MRA Neck without contrast    MRA Brain without contrast          No follow-ups on file.

## 2019-11-12 NOTE — LETTER
November 15, 2019      Saeed Yun MD  [address]  [facility] [address]           [facility]  [address]  [facility] [address]  Phone: [phone]  Fax: [phone]          Patient: Haritha Valle   MR Number: [id number]   YOB: 1945   Date of Visit: 11/12/2019       Dear Dr. Saeed Yun:    Thank you for referring Haritha Valle to me for evaluation. Attached you will find relevant portions of my assessment and plan of care.    If you have questions, please do not hesitate to call me. I look forward to following Haritha Valle along with you.    Sincerely,    Yocasta Talley MD    Enclosure  CC:  No Recipients    If you would like to receive this communication electronically, please contact [email or web address] or [phone] to request more information on [employer] Link access.    For providers and/or their staff who would like to refer a patient to [facility], please contact us through our one-stop-shop provider referral line, [facility], at [phone].    If you feel you have received this communication in error or would no longer like to receive these types of communications, please e-mail [email or web address]

## 2019-11-18 DIAGNOSIS — R79.89 LOW SERUM TESTOSTERONE LEVEL: ICD-10-CM

## 2019-11-18 RX ORDER — ALPRAZOLAM 0.5 MG/1
TABLET ORAL
Qty: 30 TABLET | Refills: 3 | Status: SHIPPED | OUTPATIENT
Start: 2019-11-18 | End: 2019-12-17

## 2019-11-21 ENCOUNTER — OFFICE VISIT (OUTPATIENT)
Dept: INTERNAL MEDICINE | Facility: CLINIC | Age: 74
End: 2019-11-21
Payer: MEDICARE

## 2019-11-21 VITALS
HEART RATE: 94 BPM | BODY MASS INDEX: 31.57 KG/M2 | SYSTOLIC BLOOD PRESSURE: 160 MMHG | OXYGEN SATURATION: 98 % | WEIGHT: 225.5 LBS | HEIGHT: 71 IN | DIASTOLIC BLOOD PRESSURE: 90 MMHG

## 2019-11-21 DIAGNOSIS — Z00.00 ROUTINE HISTORY AND PHYSICAL EXAMINATION OF ADULT: Primary | ICD-10-CM

## 2019-11-21 DIAGNOSIS — Z79.899 ENCOUNTER FOR LONG-TERM (CURRENT) USE OF HIGH-RISK MEDICATION: ICD-10-CM

## 2019-11-21 DIAGNOSIS — B20 HIV DISEASE: Chronic | ICD-10-CM

## 2019-11-21 PROCEDURE — 1126F AMNT PAIN NOTED NONE PRSNT: CPT | Mod: ,,, | Performed by: INTERNAL MEDICINE

## 2019-11-21 PROCEDURE — 99999 PR PBB SHADOW E&M-EST. PATIENT-LVL III: CPT | Mod: PBBFAC,,, | Performed by: INTERNAL MEDICINE

## 2019-11-21 PROCEDURE — 1159F MED LIST DOCD IN RCRD: CPT | Mod: ,,, | Performed by: INTERNAL MEDICINE

## 2019-11-21 PROCEDURE — 99999 PR PBB SHADOW E&M-EST. PATIENT-LVL III: ICD-10-PCS | Mod: PBBFAC,,, | Performed by: INTERNAL MEDICINE

## 2019-11-21 PROCEDURE — 99214 OFFICE O/P EST MOD 30 MIN: CPT | Mod: S$PBB,,, | Performed by: INTERNAL MEDICINE

## 2019-11-21 PROCEDURE — 99214 PR OFFICE/OUTPT VISIT, EST, LEVL IV, 30-39 MIN: ICD-10-PCS | Mod: S$PBB,,, | Performed by: INTERNAL MEDICINE

## 2019-11-21 PROCEDURE — 1159F PR MEDICATION LIST DOCUMENTED IN MEDICAL RECORD: ICD-10-PCS | Mod: ,,, | Performed by: INTERNAL MEDICINE

## 2019-11-21 PROCEDURE — 1126F PR PAIN SEVERITY QUANTIFIED, NO PAIN PRESENT: ICD-10-PCS | Mod: ,,, | Performed by: INTERNAL MEDICINE

## 2019-11-21 PROCEDURE — 99213 OFFICE O/P EST LOW 20 MIN: CPT | Mod: PBBFAC | Performed by: INTERNAL MEDICINE

## 2019-11-21 NOTE — PROGRESS NOTES
Subjective:       Patient ID: Haritha Valle is a 74 y.o. male.    Chief Complaint: Establish Care and Annual Exam    HPI - Mr. Valle and his longtime partner are transferring care to me from Dr. Yun, who is retiring.  Mr. Valle feels well, but he doesn't know his medication list well.  He's a nondrinker, nonsmoker.  Occasional marijuana use. Sexually active with one male partner.    PMH:  HIV, with excellent control - ND viral load and CD4 count over 500  HTN  Anxiety and depression  Cognitive impairment  Dizziness, undergoing workup at the moment  Hypogonadism    Meds:  Attempted to review and reconcile with patient today, but he was uncertain.  For instance, not sure if he took one or two pills for HIV.    Review of Systems   Constitutional: Negative for activity change and unexpected weight change.   HENT: Positive for hearing loss. Negative for rhinorrhea and trouble swallowing.    Eyes: Negative for discharge and visual disturbance.   Respiratory: Negative for chest tightness and wheezing.    Cardiovascular: Negative for chest pain and palpitations.   Gastrointestinal: Negative for blood in stool, constipation, diarrhea and vomiting.   Endocrine: Negative for polydipsia and polyuria.   Genitourinary: Negative for difficulty urinating, hematuria and urgency.   Musculoskeletal: Negative for arthralgias, joint swelling and neck pain.   Skin: Negative for rash.   Neurological: Negative for weakness and headaches.   Psychiatric/Behavioral: Negative for confusion and dysphoric mood.       Objective:      Physical Exam   Constitutional: He is oriented to person, place, and time. He appears well-developed and well-nourished. No distress.   Friendly, well-appearing man in no distress   HENT:   Head: Normocephalic and atraumatic.   Cardiovascular: Normal rate, regular rhythm and normal heart sounds. Exam reveals no gallop and no friction rub.   No murmur heard.  Pulmonary/Chest: No respiratory distress. He has no  wheezes. He has no rales. He exhibits no tenderness.   Neurological: He is alert and oriented to person, place, and time.   Skin: Skin is warm. He is not diaphoretic. No erythema.   Psychiatric: He has a normal mood and affect. Thought content normal.   Nursing note and vitals reviewed.      Assessment:       1. Routine history and physical examination of adult    2. HIV disease    3. Encounter for long-term (current) use of high-risk medication        Plan:       Haritha TORRES was seen today for establish care and annual exam.    Diagnoses and all orders for this visit:    Routine history and physical examination of adult - we reviewed recent labs and health maintenance.  Everything looks good.  I did not do a MMSE at this visit, but will complete upon return to clinic.  He should bring all pill bottles with him next visit.    HIV disease - stable    Encounter for long-term (current) use of high-risk medication - stable    rtc one month.    KATHARINE Andrade MD MPH  Staff Internist

## 2019-11-26 ENCOUNTER — TELEPHONE (OUTPATIENT)
Dept: RADIOLOGY | Facility: HOSPITAL | Age: 74
End: 2019-11-26

## 2019-11-27 ENCOUNTER — HOSPITAL ENCOUNTER (OUTPATIENT)
Dept: RADIOLOGY | Facility: HOSPITAL | Age: 74
Discharge: HOME | End: 2019-11-27
Attending: PSYCHIATRY & NEUROLOGY
Payer: MEDICARE

## 2019-11-27 DIAGNOSIS — R55 POSTURAL DIZZINESS WITH PRESYNCOPE: ICD-10-CM

## 2019-11-27 DIAGNOSIS — R42 POSTURAL DIZZINESS WITH PRESYNCOPE: ICD-10-CM

## 2019-11-27 DIAGNOSIS — G31.84 MILD COGNITIVE IMPAIRMENT: Chronic | ICD-10-CM

## 2019-11-27 DIAGNOSIS — G31.84 MILD COGNITIVE IMPAIRMENT: ICD-10-CM

## 2019-11-27 PROCEDURE — 70544 MR ANGIOGRAPHY HEAD W/O DYE: CPT | Mod: TC

## 2019-11-27 PROCEDURE — 70548 MRA NECK WITH CONTRAST: ICD-10-PCS | Mod: 26,,, | Performed by: RADIOLOGY

## 2019-11-27 PROCEDURE — A9585 GADOBUTROL INJECTION: HCPCS | Performed by: PSYCHIATRY & NEUROLOGY

## 2019-11-27 PROCEDURE — 25500020 PHARM REV CODE 255: Performed by: PSYCHIATRY & NEUROLOGY

## 2019-11-27 PROCEDURE — 70553 MRI BRAIN W WO CONTRAST: ICD-10-PCS | Mod: 26,,, | Performed by: RADIOLOGY

## 2019-11-27 PROCEDURE — 70544 MRA BRAIN WITHOUT CONTRAST: ICD-10-PCS | Mod: 26,59,, | Performed by: RADIOLOGY

## 2019-11-27 PROCEDURE — 70553 MRI BRAIN STEM W/O & W/DYE: CPT | Mod: TC

## 2019-11-27 PROCEDURE — 70548 MR ANGIOGRAPHY NECK W/DYE: CPT | Mod: TC

## 2019-11-27 PROCEDURE — 70548 MR ANGIOGRAPHY NECK W/DYE: CPT | Mod: 26,,, | Performed by: RADIOLOGY

## 2019-11-27 PROCEDURE — 70544 MR ANGIOGRAPHY HEAD W/O DYE: CPT | Mod: 26,59,, | Performed by: RADIOLOGY

## 2019-11-27 PROCEDURE — 70553 MRI BRAIN STEM W/O & W/DYE: CPT | Mod: 26,,, | Performed by: RADIOLOGY

## 2019-11-27 RX ORDER — GADOBUTROL 604.72 MG/ML
10 INJECTION INTRAVENOUS
Status: COMPLETED | OUTPATIENT
Start: 2019-11-27 | End: 2019-11-27

## 2019-11-27 RX ADMIN — GADOBUTROL 10 ML: 604.72 INJECTION INTRAVENOUS at 03:11

## 2019-12-03 ENCOUNTER — PATIENT OUTREACH (OUTPATIENT)
Dept: ADMINISTRATIVE | Facility: HOSPITAL | Age: 74
End: 2019-12-03

## 2019-12-14 RX ORDER — FLUOXETINE HYDROCHLORIDE 40 MG/1
40 CAPSULE ORAL DAILY
Qty: 30 CAPSULE | Refills: 11 | Status: SHIPPED | OUTPATIENT
Start: 2019-12-14 | End: 2021-02-22

## 2019-12-17 ENCOUNTER — OFFICE VISIT (OUTPATIENT)
Dept: INTERNAL MEDICINE | Facility: CLINIC | Age: 74
End: 2019-12-17
Payer: MEDICARE

## 2019-12-17 VITALS
DIASTOLIC BLOOD PRESSURE: 80 MMHG | OXYGEN SATURATION: 97 % | WEIGHT: 227.31 LBS | HEART RATE: 74 BPM | HEIGHT: 71 IN | BODY MASS INDEX: 31.82 KG/M2 | SYSTOLIC BLOOD PRESSURE: 130 MMHG

## 2019-12-17 DIAGNOSIS — B00.1 FEVER BLISTER: ICD-10-CM

## 2019-12-17 DIAGNOSIS — F32.A DEPRESSION, UNSPECIFIED DEPRESSION TYPE: ICD-10-CM

## 2019-12-17 DIAGNOSIS — N13.8 BPH WITH URINARY OBSTRUCTION: ICD-10-CM

## 2019-12-17 DIAGNOSIS — N40.1 BPH WITH URINARY OBSTRUCTION: ICD-10-CM

## 2019-12-17 DIAGNOSIS — I10 ESSENTIAL HYPERTENSION: ICD-10-CM

## 2019-12-17 DIAGNOSIS — J06.9 UPPER RESPIRATORY TRACT INFECTION, UNSPECIFIED TYPE: ICD-10-CM

## 2019-12-17 DIAGNOSIS — G31.84 MILD COGNITIVE IMPAIRMENT: Primary | Chronic | ICD-10-CM

## 2019-12-17 PROCEDURE — 1126F AMNT PAIN NOTED NONE PRSNT: CPT | Mod: ,,, | Performed by: INTERNAL MEDICINE

## 2019-12-17 PROCEDURE — 1126F PR PAIN SEVERITY QUANTIFIED, NO PAIN PRESENT: ICD-10-PCS | Mod: ,,, | Performed by: INTERNAL MEDICINE

## 2019-12-17 PROCEDURE — 99999 PR PBB SHADOW E&M-EST. PATIENT-LVL III: CPT | Mod: PBBFAC,,, | Performed by: INTERNAL MEDICINE

## 2019-12-17 PROCEDURE — 1159F PR MEDICATION LIST DOCUMENTED IN MEDICAL RECORD: ICD-10-PCS | Mod: ,,, | Performed by: INTERNAL MEDICINE

## 2019-12-17 PROCEDURE — 1159F MED LIST DOCD IN RCRD: CPT | Mod: ,,, | Performed by: INTERNAL MEDICINE

## 2019-12-17 PROCEDURE — 99214 PR OFFICE/OUTPT VISIT, EST, LEVL IV, 30-39 MIN: ICD-10-PCS | Mod: S$PBB,,, | Performed by: INTERNAL MEDICINE

## 2019-12-17 PROCEDURE — 99213 OFFICE O/P EST LOW 20 MIN: CPT | Mod: PBBFAC | Performed by: INTERNAL MEDICINE

## 2019-12-17 PROCEDURE — 99214 OFFICE O/P EST MOD 30 MIN: CPT | Mod: S$PBB,,, | Performed by: INTERNAL MEDICINE

## 2019-12-17 PROCEDURE — 99999 PR PBB SHADOW E&M-EST. PATIENT-LVL III: ICD-10-PCS | Mod: PBBFAC,,, | Performed by: INTERNAL MEDICINE

## 2019-12-17 RX ORDER — IBUPROFEN 100 MG/5ML
1000 SUSPENSION, ORAL (FINAL DOSE FORM) ORAL DAILY
COMMUNITY
End: 2022-09-01

## 2019-12-17 RX ORDER — ACYCLOVIR 400 MG/1
400 TABLET ORAL 3 TIMES DAILY
Qty: 30 TABLET | Refills: 0 | Status: SHIPPED | OUTPATIENT
Start: 2019-12-17 | End: 2021-06-24

## 2019-12-17 RX ORDER — DONEPEZIL HYDROCHLORIDE 10 MG/1
10 TABLET, FILM COATED ORAL NIGHTLY
Qty: 90 TABLET | Refills: 3 | Status: SHIPPED | OUTPATIENT
Start: 2019-12-17 | End: 2020-06-18

## 2019-12-17 RX ORDER — VITAMIN E 268 MG
400 CAPSULE ORAL DAILY
COMMUNITY
End: 2019-12-17

## 2019-12-17 RX ORDER — UBIDECARENONE 30 MG
100 CAPSULE ORAL 3 TIMES DAILY
COMMUNITY
End: 2022-09-01

## 2019-12-17 NOTE — PROGRESS NOTES
"Subjective:       Patient ID: Haritha Valle is a 74 y.o. male.    Chief Complaint: Hypertension (follow up)    HPI - Mr. Vlale returned for short, one-month f/u for BP and medication reconciliation.  He brought all meds and his  to the visit.  BP looks much better.  We addressed his cognitive insufficiency - has difficulty concentrating when there are multiple things happening at a time (like at their work place), but o/w does OK.  MMSE score was 28/30, but was slow to some answers.  Has a cold.  Wants medication for HSV outbreaks, "just in case".    PMH:  HIV, with excellent control - ND viral load and CD4 count over 500  HTN  Anxiety and depression  Cognitive impairment  Dizziness, undergoing workup at the moment  Hypogonadism     Meds: Reviewed and reconciled in EPIC with patient during visit today.    Review of Systems   Constitutional: Negative for activity change and unexpected weight change.   HENT: Positive for hearing loss. Negative for rhinorrhea and trouble swallowing.    Eyes: Negative for discharge and visual disturbance.   Respiratory: Negative for chest tightness and wheezing.    Cardiovascular: Negative for chest pain and palpitations.   Gastrointestinal: Negative for blood in stool, constipation, diarrhea and vomiting.   Endocrine: Negative for polydipsia and polyuria.   Genitourinary: Negative for difficulty urinating, hematuria and urgency.   Musculoskeletal: Negative for arthralgias, joint swelling and neck pain.   Skin: Negative for rash.   Neurological: Negative for weakness and headaches.   Psychiatric/Behavioral: Negative for confusion and dysphoric mood.       Objective:      Physical Exam   Constitutional: He is oriented to person, place, and time. He appears well-developed and well-nourished. No distress.   HENT:   Head: Normocephalic and atraumatic.   Cardiovascular: Normal rate, regular rhythm and normal heart sounds. Exam reveals no gallop and no friction rub.   No murmur " heard.  Pulmonary/Chest: No stridor. No respiratory distress. He has no wheezes. He has no rales. He exhibits no tenderness.   Neurological: He is alert and oriented to person, place, and time.   Skin: Skin is warm. He is not diaphoretic. No erythema.   Psychiatric: He has a normal mood and affect. Thought content normal.   Nursing note and vitals reviewed.      Assessment:       1. Mild cognitive impairment    2. Essential hypertension    3. Depression, unspecified depression type    4. BPH with urinary obstruction    5. Fever blister    6. Upper respiratory tract infection, unspecified type        Plan:       Haritha was seen today for hypertension.    Diagnoses and all orders for this visit:    Mild cognitive impairment - Discussed importance of exercise of both mind and body to maintain cognition.  Recommended stopping 4 medications which can interfere - xanax, mirtazepine, muscle relaxant and meclizine.  They agreed.  Starting aricept instead of mirtazepine b/c he doesn't have sleep disturbance or appetite concerns  -     donepezil (ARICEPT) 10 MG tablet; Take 1 tablet (10 mg total) by mouth every evening.    Essential hypertension - at goal today.  Stay the course    Depression, unspecified depression type - stable on fluoxetine    BPH with urinary obstruction    Fever blister - will give some acyclovir to keep at home  -     acyclovir (ZOVIRAX) 400 MG tablet; Take 1 tablet (400 mg total) by mouth 3 (three) times daily.    Upper respiratory tract infection, unspecified type -  a good cough syrup.  Stay warm, dry.  Fluids, tylenol for aches and pains.  Rest, stay home and warm until recovered.    rtc prn, or in 6 months    KATHARINE Andrade MD MPH  Staff Internist

## 2019-12-24 RX ORDER — ALPRAZOLAM 0.25 MG/1
TABLET ORAL
Qty: 60 TABLET | Refills: 3 | Status: SHIPPED | OUTPATIENT
Start: 2019-12-24 | End: 2020-08-13

## 2020-01-06 ENCOUNTER — PATIENT MESSAGE (OUTPATIENT)
Dept: INTERNAL MEDICINE | Facility: CLINIC | Age: 75
End: 2020-01-06

## 2020-01-08 ENCOUNTER — PATIENT MESSAGE (OUTPATIENT)
Dept: INTERNAL MEDICINE | Facility: CLINIC | Age: 75
End: 2020-01-08

## 2020-01-08 DIAGNOSIS — R00.2 PALPITATIONS: Primary | ICD-10-CM

## 2020-01-08 DIAGNOSIS — R42 DIZZINESS: ICD-10-CM

## 2020-01-08 RX ORDER — MECLIZINE HCL 12.5 MG 12.5 MG/1
TABLET ORAL
Qty: 90 TABLET | Refills: 2 | OUTPATIENT
Start: 2020-01-08

## 2020-01-14 DIAGNOSIS — R00.2 PALPITATIONS: Primary | ICD-10-CM

## 2020-01-15 ENCOUNTER — LAB VISIT (OUTPATIENT)
Dept: LAB | Facility: HOSPITAL | Age: 75
End: 2020-01-15
Attending: INTERNAL MEDICINE
Payer: MEDICARE

## 2020-01-15 ENCOUNTER — CLINICAL SUPPORT (OUTPATIENT)
Dept: CARDIOLOGY | Facility: CLINIC | Age: 75
End: 2020-01-15
Payer: MEDICARE

## 2020-01-15 ENCOUNTER — OFFICE VISIT (OUTPATIENT)
Dept: CARDIOLOGY | Facility: CLINIC | Age: 75
End: 2020-01-15
Payer: MEDICARE

## 2020-01-15 VITALS
HEART RATE: 78 BPM | SYSTOLIC BLOOD PRESSURE: 136 MMHG | DIASTOLIC BLOOD PRESSURE: 80 MMHG | WEIGHT: 215.19 LBS | BODY MASS INDEX: 30.01 KG/M2

## 2020-01-15 DIAGNOSIS — R55 SYNCOPE AND COLLAPSE: ICD-10-CM

## 2020-01-15 DIAGNOSIS — R00.2 PALPITATIONS: ICD-10-CM

## 2020-01-15 DIAGNOSIS — E29.1 MALE HYPOGONADISM: ICD-10-CM

## 2020-01-15 DIAGNOSIS — I10 ESSENTIAL HYPERTENSION: ICD-10-CM

## 2020-01-15 DIAGNOSIS — R07.89 OTHER CHEST PAIN: ICD-10-CM

## 2020-01-15 DIAGNOSIS — E78.2 MIXED HYPERLIPIDEMIA: Chronic | ICD-10-CM

## 2020-01-15 DIAGNOSIS — B20 HIV DISEASE: Chronic | ICD-10-CM

## 2020-01-15 DIAGNOSIS — N52.01 ERECTILE DYSFUNCTION DUE TO ARTERIAL INSUFFICIENCY: ICD-10-CM

## 2020-01-15 DIAGNOSIS — R55 SYNCOPE AND COLLAPSE: Primary | ICD-10-CM

## 2020-01-15 PROCEDURE — 99999 PR PBB SHADOW E&M-EST. PATIENT-LVL III: ICD-10-PCS | Mod: PBBFAC,,, | Performed by: INTERNAL MEDICINE

## 2020-01-15 PROCEDURE — 93010 EKG 12-LEAD: ICD-10-PCS | Mod: S$PBB,,, | Performed by: INTERNAL MEDICINE

## 2020-01-15 PROCEDURE — 99205 OFFICE O/P NEW HI 60 MIN: CPT | Mod: S$PBB,,, | Performed by: INTERNAL MEDICINE

## 2020-01-15 PROCEDURE — 99205 PR OFFICE/OUTPT VISIT, NEW, LEVL V, 60-74 MIN: ICD-10-PCS | Mod: S$PBB,,, | Performed by: INTERNAL MEDICINE

## 2020-01-15 PROCEDURE — 82088 ASSAY OF ALDOSTERONE: CPT

## 2020-01-15 PROCEDURE — 82533 TOTAL CORTISOL: CPT

## 2020-01-15 PROCEDURE — 83835 ASSAY OF METANEPHRINES: CPT

## 2020-01-15 PROCEDURE — 93010 ELECTROCARDIOGRAM REPORT: CPT | Mod: S$PBB,,, | Performed by: INTERNAL MEDICINE

## 2020-01-15 PROCEDURE — 99999 PR PBB SHADOW E&M-EST. PATIENT-LVL III: CPT | Mod: PBBFAC,,, | Performed by: INTERNAL MEDICINE

## 2020-01-15 PROCEDURE — 1159F MED LIST DOCD IN RCRD: CPT | Mod: ,,, | Performed by: INTERNAL MEDICINE

## 2020-01-15 PROCEDURE — 99213 OFFICE O/P EST LOW 20 MIN: CPT | Mod: PBBFAC | Performed by: INTERNAL MEDICINE

## 2020-01-15 PROCEDURE — 93005 ELECTROCARDIOGRAM TRACING: CPT | Mod: PBBFAC | Performed by: INTERNAL MEDICINE

## 2020-01-15 PROCEDURE — 1159F PR MEDICATION LIST DOCUMENTED IN MEDICAL RECORD: ICD-10-PCS | Mod: ,,, | Performed by: INTERNAL MEDICINE

## 2020-01-15 PROCEDURE — 36415 COLL VENOUS BLD VENIPUNCTURE: CPT

## 2020-01-15 RX ORDER — ICOSAPENT ETHYL 1000 MG/1
2 CAPSULE ORAL 2 TIMES DAILY WITH MEALS
Qty: 60 CAPSULE | Refills: 3 | Status: SHIPPED | OUTPATIENT
Start: 2020-01-15 | End: 2020-02-06 | Stop reason: SDUPTHER

## 2020-01-15 NOTE — PROGRESS NOTES
Subjective:   Patient ID:  Haritha Valle is a 74 y.o. male who presents for cardiac consult of Dizziness; Palpitations; and Loss of Consciousness      HPI  The patient came in today for cardiac consult of Dizziness; Palpitations; and Loss of Consciousness    Referring Physician: Hesham Andrade II, MD   Reason for consult: LOC, palpitations    1/15/20  Hartiha Valle is a 74 y.o. male pt with HTN, HLD, HIV, anxiety, depression, hypogonadism, ED presents for initial CV eval of LOC, palpitations.     About two weeks ago he felt dizzy and couldn't stand and fell then around Dec 30th - BP was 193/114, he was at [facility], vomited and was very weak.  He has been more anxious and fatigue. He had another episode where he blacked out when he got into a verbal argument. His BP dropped 80s/40s. He has been dizzy for a while, had ENT eval as well. He is with partner who explained most of symptoms, pt feels more anxious and not really himself lately he said.     Patient feels  no sob, no leg swelling, no PND.     Patient has fairly good exercise tolerance.    Patient is compliant with medications.    ECG - NSR with sinus arrhythmia, LAD    Past Medical History:   Diagnosis Date    Anxiety 7/3/2019    Basal cell carcinoma     Depression     Hepatitis B     Hepatitis C antibody test positive     HIV infection     Hypertension     Immune disorder     Mild cognitive impairment 10/1/2010       Past Surgical History:   Procedure Laterality Date    APPENDECTOMY      CHOLECYSTECTOMY      COLONOSCOPY N/A 11/3/2016    Procedure: COLONOSCOPY;  Surgeon: Maxi Villasenor MD;  Location: [address];  Service: Endoscopy;  Laterality: N/A;  last colonoscopy with Dr Jarrell       Social History     Tobacco Use    Smoking status: Never Smoker    Smokeless tobacco: Never Used   Substance Use Topics    Alcohol use: No     Frequency: Monthly or less     Drinks per session: 1 or 2     Binge frequency: Never    Drug  use: Not on file       Family History   Problem Relation Age of Onset    Heart disease Father     Heart failure Father     Diabetes Neg Hx     Hypertension Neg Hx        Patient's Medications   New Prescriptions    ICOSAPENT ETHYL (VASCEPA) 1 GRAM CAP    Take 2 g by mouth 2 (two) times daily with meals.   Previous Medications    ACYCLOVIR (ZOVIRAX) 400 MG TABLET    Take 1 tablet (400 mg total) by mouth 3 (three) times daily.    ALPRAZOLAM (XANAX) 0.25 MG TABLET    TAKE TWO TABLETS AT BEDTIME, IN ADDITION, MAY TAKE ONE TABLET TWICE DAILY IF NEEDED FOR ANXIETY    ASCORBIC ACID, VITAMIN C, (VITAMIN C) 1000 MG TABLET    Take 1,000 mg by mouth once daily.    ATAZANAVIR (REYATAZ) 200 MG CAP    TAKE TWO CAPSULES BY MOUTH DAILY    BENAZEPRIL-HYDROCHLORTHIAZIDE (LOTENSIN HCT) 10-12.5 MG TAB    TAKE 1 TABLET BY MOUTH ONCE DAILY    CO-ENZYME Q-10 30 MG CAPSULE    Take 100 mg by mouth 3 (three) times daily.    CYCLOBENZAPRINE (FLEXERIL) 10 MG TABLET    Take 1 tablet (10 mg total) by mouth 3 (three) times daily.    DONEPEZIL (ARICEPT) 10 MG TABLET    Take 1 tablet (10 mg total) by mouth every evening.    FLUOXETINE 40 MG CAPSULE    Take 1 capsule (40 mg total) by mouth once daily.    LAMIVUDINE-ZIDOVUDINE 150-300MG (COMBIVIR) 150-300 MG TAB    TAKE ONE TABLET EVERY 12  HOURS    METOPROLOL SUCCINATE (TOPROL-XL) 25 MG 24 HR TABLET    Take 1 tablet (25 mg total) by mouth once daily.    PRAVASTATIN (PRAVACHOL) 40 MG TABLET    TAKE (1) TABLET DAILY.   Modified Medications    No medications on file   Discontinued Medications    No medications on file       Review of Systems   Constitutional: Positive for malaise/fatigue.   HENT: Negative.    Eyes: Negative.    Respiratory: Positive for shortness of breath.    Cardiovascular: Positive for chest pain and palpitations.   Gastrointestinal: Negative.    Genitourinary: Negative.    Musculoskeletal: Negative.    Skin: Negative.    Neurological: Positive for dizziness and loss of  consciousness.   Endo/Heme/Allergies: Negative.    Psychiatric/Behavioral: Negative.    All 12 systems otherwise negative.      Wt Readings from Last 3 Encounters:   01/15/20 97.6 kg (215 lb 2.7 oz)   12/17/19 103.1 kg (227 lb 4.7 oz)   11/21/19 102.3 kg (225 lb 8.5 oz)     Temp Readings from Last 3 Encounters:   10/01/19 97.5 °F (36.4 °C) (Oral)   08/29/19 98.1 °F (36.7 °C) (Oral)   08/13/19 97.9 °F (36.6 °C) (Oral)     BP Readings from Last 3 Encounters:   01/15/20 136/80   12/17/19 130/80   11/21/19 (!) 160/90     Pulse Readings from Last 3 Encounters:   01/15/20 78   12/17/19 74   11/21/19 94       /80 (BP Location: Right arm, Patient Position: Sitting, BP Method: Medium (Manual))   Pulse 78   Wt 97.6 kg (215 lb 2.7 oz)   BMI 30.01 kg/m²     Objective:   Physical Exam   Constitutional: He is oriented to person, place, and time. He appears well-developed and well-nourished. No distress.   HENT:   Head: Normocephalic and atraumatic.   Nose: Nose normal.   Mouth/Throat: Oropharynx is clear and moist.   Eyes: Conjunctivae and EOM are normal. No scleral icterus.   Neck: Normal range of motion. Neck supple. No JVD present. No thyromegaly present.   Cardiovascular: Normal rate, regular rhythm, S1 normal and S2 normal. Exam reveals no gallop, no S3, no S4 and no friction rub.   No murmur heard.  Pulmonary/Chest: Effort normal and breath sounds normal. No stridor. No respiratory distress. He has no wheezes. He has no rales. He exhibits no tenderness.   Abdominal: Soft. Bowel sounds are normal. He exhibits no distension and no mass. There is no tenderness. There is no rebound.   Genitourinary:   Genitourinary Comments: Deferred   Musculoskeletal: Normal range of motion. He exhibits no edema, tenderness or deformity.   Lymphadenopathy:     He has no cervical adenopathy.   Neurological: He is alert and oriented to person, place, and time. He exhibits normal muscle tone. Coordination normal.   Skin: Skin is warm and  dry. No rash noted. He is not diaphoretic. No erythema. No pallor.   Psychiatric: He has a normal mood and affect. His behavior is normal. Judgment and thought content normal.   Nursing note and vitals reviewed.      Lab Results   Component Value Date     06/25/2019    K 4.5 06/25/2019     06/25/2019    CO2 26 06/25/2019    BUN 13 06/25/2019    CREATININE 1.2 11/12/2019    GLU 81 06/25/2019    HGBA1C 5.4 12/02/2009    MG 2.2 01/26/2012    AST 25 06/25/2019    ALT 27 06/25/2019    ALBUMIN 3.9 06/25/2019    ALBUMIN 4.3 04/30/2018    PROT 8.2 06/25/2019    BILITOT 0.6 06/25/2019    WBC 5.49 06/25/2019    HGB 16.5 06/25/2019    HCT 50.2 06/25/2019     (H) 06/25/2019     06/25/2019    INR 1.0 11/10/2004    TSH 1.105 10/07/2019    CHOL 214 (H) 06/25/2019    HDL 32 (L) 06/25/2019    LDLCALC 103.6 06/25/2019    TRIG 392 (H) 06/25/2019     Assessment:      1. Syncope and collapse    2. Mixed hyperlipidemia    3. Essential hypertension    4. Erectile dysfunction due to arterial insufficiency    5. HIV disease    6. Male hypogonadism    7. Other chest pain        Plan:   1. HTN  - cont meds  - episodes of HTN urgency - r/o sec causes - labs and renal u/s ordered  - Holter and ECHO     2. HLD with elevated TGs  - cont meds  - add - vascepa     3. HIV  - cont meds per PCP/ID    4. ED/hypogonadism  - cont meds per PCP PRN    5. Syncope/dizziness/palpitations with CP  - exercise nuclear stress test to r/o ischemia  - 2D ECHO ordered   - Holter ordered     Thank you for allowing me to participate in this patient's care. Please do not hesitate to contact me with any questions or concerns. Consult note has been forwarded to the referral physician.

## 2020-01-15 NOTE — LETTER
January 15, 2020      Hesham Andrade II, MD  [address]  [facility] [address]           [facility]  [address] [facility] [address] [address]  Phone: [phone]  Fax: [phone]          Patient: Haritha Valle   MR Number: [id number]   YOB: 1945   Date of Visit: 1/15/2020       Dear Dr. Hesham Andarde II:    Thank you for referring Haritha Valle to me for evaluation. Attached you will find relevant portions of my assessment and plan of care.    If you have questions, please do not hesitate to call me. I look forward to following Haritha Valle along with you.    Sincerely,    Deni Nguyen MD    Enclosure  CC:  No Recipients    If you would like to receive this communication electronically, please contact [email or web address] or [phone] to request more information on [employer] Link access.    For providers and/or their staff who would like to refer a patient to [facility], please contact us through our one-stop-shop provider referral line, [facility] , at [phone].    If you feel you have received this communication in error or would no longer like to receive these types of communications, please e-mail [email or web address]

## 2020-01-15 NOTE — H&P (VIEW-ONLY)
Subjective:   Patient ID:  Haritha Valle is a 74 y.o. male who presents for cardiac consult of Dizziness; Palpitations; and Loss of Consciousness      HPI  The patient came in today for cardiac consult of Dizziness; Palpitations; and Loss of Consciousness    Referring Physician: Hesham Andrade II, MD   Reason for consult: LOC, palpitations    1/15/20  Haritha Valle is a 74 y.o. male pt with HTN, HLD, HIV, anxiety, depression, hypogonadism, ED presents for initial CV eval of LOC, palpitations.     About two weeks ago he felt dizzy and couldn't stand and fell then around Dec 30th - BP was 193/114, he was at [facility], vomited and was very weak.  He has been more anxious and fatigue. He had another episode where he blacked out when he got into a verbal argument. His BP dropped 80s/40s. He has been dizzy for a while, had ENT eval as well. He is with partner who explained most of symptoms, pt feels more anxious and not really himself lately he said.     Patient feels  no sob, no leg swelling, no PND.     Patient has fairly good exercise tolerance.    Patient is compliant with medications.    ECG - NSR with sinus arrhythmia, LAD    Past Medical History:   Diagnosis Date    Anxiety 7/3/2019    Basal cell carcinoma     Depression     Hepatitis B     Hepatitis C antibody test positive     HIV infection     Hypertension     Immune disorder     Mild cognitive impairment 10/1/2010       Past Surgical History:   Procedure Laterality Date    APPENDECTOMY      CHOLECYSTECTOMY      COLONOSCOPY N/A 11/3/2016    Procedure: COLONOSCOPY;  Surgeon: Maxi Villasenor MD;  Location: [address];  Service: Endoscopy;  Laterality: N/A;  last colonoscopy with Dr Jarrell       Social History     Tobacco Use    Smoking status: Never Smoker    Smokeless tobacco: Never Used   Substance Use Topics    Alcohol use: No     Frequency: Monthly or less     Drinks per session: 1 or 2     Binge frequency: Never    Drug  use: Not on file       Family History   Problem Relation Age of Onset    Heart disease Father     Heart failure Father     Diabetes Neg Hx     Hypertension Neg Hx        Patient's Medications   New Prescriptions    ICOSAPENT ETHYL (VASCEPA) 1 GRAM CAP    Take 2 g by mouth 2 (two) times daily with meals.   Previous Medications    ACYCLOVIR (ZOVIRAX) 400 MG TABLET    Take 1 tablet (400 mg total) by mouth 3 (three) times daily.    ALPRAZOLAM (XANAX) 0.25 MG TABLET    TAKE TWO TABLETS AT BEDTIME, IN ADDITION, MAY TAKE ONE TABLET TWICE DAILY IF NEEDED FOR ANXIETY    ASCORBIC ACID, VITAMIN C, (VITAMIN C) 1000 MG TABLET    Take 1,000 mg by mouth once daily.    ATAZANAVIR (REYATAZ) 200 MG CAP    TAKE TWO CAPSULES BY MOUTH DAILY    BENAZEPRIL-HYDROCHLORTHIAZIDE (LOTENSIN HCT) 10-12.5 MG TAB    TAKE 1 TABLET BY MOUTH ONCE DAILY    CO-ENZYME Q-10 30 MG CAPSULE    Take 100 mg by mouth 3 (three) times daily.    CYCLOBENZAPRINE (FLEXERIL) 10 MG TABLET    Take 1 tablet (10 mg total) by mouth 3 (three) times daily.    DONEPEZIL (ARICEPT) 10 MG TABLET    Take 1 tablet (10 mg total) by mouth every evening.    FLUOXETINE 40 MG CAPSULE    Take 1 capsule (40 mg total) by mouth once daily.    LAMIVUDINE-ZIDOVUDINE 150-300MG (COMBIVIR) 150-300 MG TAB    TAKE ONE TABLET EVERY 12  HOURS    METOPROLOL SUCCINATE (TOPROL-XL) 25 MG 24 HR TABLET    Take 1 tablet (25 mg total) by mouth once daily.    PRAVASTATIN (PRAVACHOL) 40 MG TABLET    TAKE (1) TABLET DAILY.   Modified Medications    No medications on file   Discontinued Medications    No medications on file       Review of Systems   Constitutional: Positive for malaise/fatigue.   HENT: Negative.    Eyes: Negative.    Respiratory: Positive for shortness of breath.    Cardiovascular: Positive for chest pain and palpitations.   Gastrointestinal: Negative.    Genitourinary: Negative.    Musculoskeletal: Negative.    Skin: Negative.    Neurological: Positive for dizziness and loss of  consciousness.   Endo/Heme/Allergies: Negative.    Psychiatric/Behavioral: Negative.    All 12 systems otherwise negative.      Wt Readings from Last 3 Encounters:   01/15/20 97.6 kg (215 lb 2.7 oz)   12/17/19 103.1 kg (227 lb 4.7 oz)   11/21/19 102.3 kg (225 lb 8.5 oz)     Temp Readings from Last 3 Encounters:   10/01/19 97.5 °F (36.4 °C) (Oral)   08/29/19 98.1 °F (36.7 °C) (Oral)   08/13/19 97.9 °F (36.6 °C) (Oral)     BP Readings from Last 3 Encounters:   01/15/20 136/80   12/17/19 130/80   11/21/19 (!) 160/90     Pulse Readings from Last 3 Encounters:   01/15/20 78   12/17/19 74   11/21/19 94       /80 (BP Location: Right arm, Patient Position: Sitting, BP Method: Medium (Manual))   Pulse 78   Wt 97.6 kg (215 lb 2.7 oz)   BMI 30.01 kg/m²     Objective:   Physical Exam   Constitutional: He is oriented to person, place, and time. He appears well-developed and well-nourished. No distress.   HENT:   Head: Normocephalic and atraumatic.   Nose: Nose normal.   Mouth/Throat: Oropharynx is clear and moist.   Eyes: Conjunctivae and EOM are normal. No scleral icterus.   Neck: Normal range of motion. Neck supple. No JVD present. No thyromegaly present.   Cardiovascular: Normal rate, regular rhythm, S1 normal and S2 normal. Exam reveals no gallop, no S3, no S4 and no friction rub.   No murmur heard.  Pulmonary/Chest: Effort normal and breath sounds normal. No stridor. No respiratory distress. He has no wheezes. He has no rales. He exhibits no tenderness.   Abdominal: Soft. Bowel sounds are normal. He exhibits no distension and no mass. There is no tenderness. There is no rebound.   Genitourinary:   Genitourinary Comments: Deferred   Musculoskeletal: Normal range of motion. He exhibits no edema, tenderness or deformity.   Lymphadenopathy:     He has no cervical adenopathy.   Neurological: He is alert and oriented to person, place, and time. He exhibits normal muscle tone. Coordination normal.   Skin: Skin is warm and  dry. No rash noted. He is not diaphoretic. No erythema. No pallor.   Psychiatric: He has a normal mood and affect. His behavior is normal. Judgment and thought content normal.   Nursing note and vitals reviewed.      Lab Results   Component Value Date     06/25/2019    K 4.5 06/25/2019     06/25/2019    CO2 26 06/25/2019    BUN 13 06/25/2019    CREATININE 1.2 11/12/2019    GLU 81 06/25/2019    HGBA1C 5.4 12/02/2009    MG 2.2 01/26/2012    AST 25 06/25/2019    ALT 27 06/25/2019    ALBUMIN 3.9 06/25/2019    ALBUMIN 4.3 04/30/2018    PROT 8.2 06/25/2019    BILITOT 0.6 06/25/2019    WBC 5.49 06/25/2019    HGB 16.5 06/25/2019    HCT 50.2 06/25/2019     (H) 06/25/2019     06/25/2019    INR 1.0 11/10/2004    TSH 1.105 10/07/2019    CHOL 214 (H) 06/25/2019    HDL 32 (L) 06/25/2019    LDLCALC 103.6 06/25/2019    TRIG 392 (H) 06/25/2019     Assessment:      1. Syncope and collapse    2. Mixed hyperlipidemia    3. Essential hypertension    4. Erectile dysfunction due to arterial insufficiency    5. HIV disease    6. Male hypogonadism    7. Other chest pain        Plan:   1. HTN  - cont meds  - episodes of HTN urgency - r/o sec causes - labs and renal u/s ordered  - Holter and ECHO     2. HLD with elevated TGs  - cont meds  - add - vascepa     3. HIV  - cont meds per PCP/ID    4. ED/hypogonadism  - cont meds per PCP PRN    5. Syncope/dizziness/palpitations with CP  - exercise nuclear stress test to r/o ischemia  - 2D ECHO ordered   - Holter ordered     Thank you for allowing me to participate in this patient's care. Please do not hesitate to contact me with any questions or concerns. Consult note has been forwarded to the referral physician.

## 2020-01-17 LAB — CORTIS SERPL-MCNC: 8.4 UG/DL

## 2020-01-20 LAB
ALDOST SERPL-MCNC: 6 NG/DL
ALDOST/RENIN PLAS-RTO: 4.6 RATIO
RENIN PLAS-CCNC: 1.3 NG/ML/HR

## 2020-01-23 LAB
METANEPH FREE SERPL-MCNC: 49 PG/ML
METANEPHS SERPL-MCNC: 137 PG/ML
NORMETANEPH FREE SERPL-MCNC: 88 PG/ML

## 2020-01-24 ENCOUNTER — TELEPHONE (OUTPATIENT)
Dept: RADIOLOGY | Facility: HOSPITAL | Age: 75
End: 2020-01-24

## 2020-01-27 ENCOUNTER — CLINICAL SUPPORT (OUTPATIENT)
Dept: CARDIOLOGY | Facility: CLINIC | Age: 75
End: 2020-01-27
Attending: INTERNAL MEDICINE
Payer: MEDICARE

## 2020-01-27 ENCOUNTER — HOSPITAL ENCOUNTER (OUTPATIENT)
Dept: RADIOLOGY | Facility: HOSPITAL | Age: 75
Discharge: HOME | End: 2020-01-27
Attending: INTERNAL MEDICINE
Payer: MEDICARE

## 2020-01-27 DIAGNOSIS — R07.89 OTHER CHEST PAIN: ICD-10-CM

## 2020-01-27 DIAGNOSIS — R55 SYNCOPE AND COLLAPSE: ICD-10-CM

## 2020-01-27 LAB
DIASTOLIC DYSFUNCTION: NO
DIASTOLIC DYSFUNCTION: YES
ESTIMATED PA SYSTOLIC PRESSURE: 27.01
MITRAL VALVE MOBILITY: NORMAL
MITRAL VALVE REGURGITATION: ABNORMAL
RETIRED EF AND QEF - SEE NOTES: 45 (ref 55–65)
TRICUSPID VALVE REGURGITATION: ABNORMAL

## 2020-01-27 PROCEDURE — 78452 HT MUSCLE IMAGE SPECT MULT: CPT | Mod: 26,,, | Performed by: INTERNAL MEDICINE

## 2020-01-27 PROCEDURE — 93018 NM MULTI TREAD STRESS CARDIAC COMPONENT: ICD-10-PCS | Mod: S$PBB,,, | Performed by: INTERNAL MEDICINE

## 2020-01-27 PROCEDURE — 93018 CV STRESS TEST I&R ONLY: CPT | Mod: S$PBB,,, | Performed by: INTERNAL MEDICINE

## 2020-01-27 PROCEDURE — 78452 NM MULTI TREAD STRESS CARDIAC COMPONENT: ICD-10-PCS | Mod: 26,,, | Performed by: INTERNAL MEDICINE

## 2020-01-27 PROCEDURE — A9502 TC99M TETROFOSMIN: HCPCS

## 2020-01-27 PROCEDURE — 93017 CV STRESS TEST TRACING ONLY: CPT | Mod: PBBFAC | Performed by: INTERNAL MEDICINE

## 2020-01-27 PROCEDURE — 93016 CV STRESS TEST SUPVJ ONLY: CPT | Mod: S$PBB,,, | Performed by: INTERNAL MEDICINE

## 2020-01-27 PROCEDURE — 93016 NM MULTI TREAD STRESS CARDIAC COMPONENT: ICD-10-PCS | Mod: S$PBB,,, | Performed by: INTERNAL MEDICINE

## 2020-01-27 PROCEDURE — 93306 2D ECHO WITH COLOR FLOW DOPPLER: ICD-10-PCS | Mod: 26,S$PBB,, | Performed by: INTERNAL MEDICINE

## 2020-01-27 PROCEDURE — 93306 TTE W/DOPPLER COMPLETE: CPT | Mod: PBBFAC | Performed by: INTERNAL MEDICINE

## 2020-01-28 ENCOUNTER — TELEPHONE (OUTPATIENT)
Dept: CARDIOLOGY | Facility: CLINIC | Age: 75
End: 2020-01-28

## 2020-01-28 ENCOUNTER — PATIENT MESSAGE (OUTPATIENT)
Dept: CARDIOLOGY | Facility: CLINIC | Age: 75
End: 2020-01-28

## 2020-01-28 ENCOUNTER — HOSPITAL ENCOUNTER (OUTPATIENT)
Dept: RADIOLOGY | Facility: HOSPITAL | Age: 75
Discharge: HOME | End: 2020-01-28
Attending: INTERNAL MEDICINE
Payer: MEDICARE

## 2020-01-28 DIAGNOSIS — R94.39 ABNORMAL STRESS TEST: Primary | ICD-10-CM

## 2020-01-28 DIAGNOSIS — I10 ESSENTIAL HYPERTENSION: ICD-10-CM

## 2020-01-28 DIAGNOSIS — R55 SYNCOPE AND COLLAPSE: ICD-10-CM

## 2020-01-28 PROCEDURE — 93975 VASCULAR STUDY: CPT | Mod: 26,,, | Performed by: RADIOLOGY

## 2020-01-28 PROCEDURE — 93975 US RENAL ARTERY STENOSIS HYPERTEN (XPD): ICD-10-PCS | Mod: 26,,, | Performed by: RADIOLOGY

## 2020-01-28 PROCEDURE — 93975 VASCULAR STUDY: CPT | Mod: TC

## 2020-01-28 RX ORDER — ASPIRIN 81 MG/1
81 TABLET ORAL DAILY
Qty: 30 TABLET | Refills: 0 | Status: SHIPPED | OUTPATIENT
Start: 2020-01-28 | End: 2022-01-21

## 2020-01-28 NOTE — TELEPHONE ENCOUNTER
----- Message from Chele Ko MD sent at 1/28/2020 10:26 AM CST -----  Regarding: RE: Abnormal stress - LHC  Yes next week is fine  ----- Message -----  From: Deni Nguyen MD  Sent: 1/28/2020   9:18 AM CST  To: Chele Ko MD  Subject: Abnormal stress - LHC                            Hi this is a patient I saw HTN, HLD, HIV, anxiety, depression, hypogonadism, ED presented for initial CV eval of LOC, palpitations.     Overall not sure about the presentation or ACS symptoms but stress test is abnormal with mild EF drop, I will treat medically but would you cath him? Thanks    - PM

## 2020-01-28 NOTE — TELEPHONE ENCOUNTER
Telephoned patient to schedule heart cath reviewed NPO status, continuing medications, arrival time 7:30 for 9am start.  Will get lab work today and will get instructions from patient portal.

## 2020-01-31 ENCOUNTER — PATIENT MESSAGE (OUTPATIENT)
Dept: CARDIOLOGY | Facility: CLINIC | Age: 75
End: 2020-01-31

## 2020-01-31 NOTE — TELEPHONE ENCOUNTER
Scheduled for Heart Catheterization with Dr. Chele Ko on Monday, February 3, 2020 for 9:00AM     From  Lyly Mays LPN To  Haritha Valle Sent  1/28/2020  2:28 PM   Please Report to First Floor Registration Dept of [facility] off [address]  for 7:30AM     Do Not Eat or Drink Anything after midnight except for usual morning heart and blood pressure medications no Diabetic or diuretic (fluid pills)   Please continue to take your Aspirin 81 mg daily     Wear loose-fitting clothing and bring an overnight bag depending on findings you may need to stay overnight     Please arrange for transportation home as you will not be allowed to drive after heart cath due to sedation         Coronary Angiography     Angiography is a special type of moving X-ray that lets your doctor view your coronary arteries to see if the blood vessels to your heart are narrowed or blocked. This test is done when someone is having a heart attack. Or it may be done if symptoms may mean a heart attack. It also may be done after an irregular stress test.      Before the procedure    Tell your healthcare team what medicines you take and any allergies you may have.        Tell your healthcare team if you've had a reaction to contrast dye or have had any kidney problems.        Don't eat or drink anything for at least 6 to 8 hours before the procedure. You will likely be told not to have anything after midnight, the night before the procedure.        A nurse will place an IV (intravenous) catheter in your vein to give fluids, and medicine to relieve pain and help you feel less anxious.        He or she will clean your skin and shave the area where the catheter will be inserted, if needed.     During the procedure    Your doctor will place a long, thin tube called a catheter inside an artery in your groin or arm and guide it into your heart. You may feel pressure with the insertion of the catheter.        He or she will inject a  contrast dye through the catheter into your blood vessels or heart chambers. You may feel a warm sensation or feeling like you have to urinate when the contrast is injected. This is normal.        X-rays are taken to show images of the inside of your heart and coronary arteries.     After the procedure    Your healthcare team will tell you how long to lie down and keep the insertion site still.        If the insertion site was in your groin, you may need to lie down with your leg still for several hours. If the insertion site was in your wrist, a pressure bandage will be put on the site. It will be taken off when there is no sign of bleeding. If bleeding occurs, a nurse will put pressure on the area to control it.        A nurse will check your blood pressure and the insertion site often. This is to make sure you remain stable after the procedure.        You may be asked to drink fluid to help flush the contrast liquid out of your system.        Have someone drive you home from the hospital.        If your doctor uses angioplasty or stent to treat a blocked artery, you may stay the night in the hospital. If there are multiple blockages that can't be fixed with a stent or angioplasty, you may need surgery to bypass the blockages (bypass surgery). Your doctor will explain the results of your test and what treatment options that may be best for you.        It's normal to find a small bruise or lump at the insertion site. The lump may be the collagen plug or stitch that you feel, or a small bruise. These common side effects should disappear within a few weeks.        You will be given instructions by your healthcare team on recovering from the coronary angiography. In general, don't lift anything heavier than a gallon of milk for several days. This gives time for the puncture site in the artery wall to heal. Try not to get the puncture site wet. Don't put it under water. Showers are OK. Don't soak in a bathtub,  swimming pool, or hot tub until the skin has healed.     When to call your healthcare provider     Contact your healthcare provider right away if you have any of these:    Chest pain         Shortness of breath        Pain, swelling, redness, bleeding, or drainage at the insertion site        Severe pain, coldness, or a bluish color in the leg or arm that held the catheter        Fever over 100.4°F (38°C), or as advised by your provider     Coronary Angioplasty     Your healthcare team will talk to you about your heart problem and explain how angioplasty can help. Angioplasty relieves symptoms of coronary artery disease by improving blood flow to your heart. Chest pain (angina) can be caused by poor blood flow through a narrow or blocked artery that would normally supply oxygen and nutrients to the heart muscle. Not all blockages can be fixed by coronary angioplasty alone. You may need other treatments including medicines, surgery, or coronary stents to treat your coronary artery disease. A heart specialist called an interventional cardiologist does the angioplasty procedure. He or she has specialized training in using the equipment and in doing the procedure as safely as possible.     The balloon compresses the plaque against the artery wall.       Blood flow to the heart muscle increases.       During the procedure    A member of the team will numb the skin at the insertion site (usually the groin or the wrist) with a local anesthetic. Next, your doctor will make a  needle puncture to insert the catheter.          Your doctor will insert a guide wire through the guiding catheter (a thin, flexible tube) and move it to the narrow spot in your heart artery. Your doctor will use an angiogram to see the blockage. An angiogram is an X-ray movie of blood flow through the heart arteries using contrast.        Your doctor will insert a balloon-tipped catheter through the guiding catheter and thread it over the  guide wire. He or she will position it at the narrow part of the artery.        Next, he or she will inflate and deflate the balloon several times to press the plaque against the artery wall. You may feel pressure or chest pain when the balloon is inflated. Tell your doctor if you do.        Often, a stent is also placed in the artery. This is a small, metal mesh tube that helps prop the sides of the blood vessel open and keeps it from closing again.        Finally, your doctor deflates the balloon and removes the catheters and guide wire. The artery is now open, and blood flow to the heart muscle increases.     After the procedure    A member of the healthcare team will tell you how long to lie down and keep the insertion site still. The amount of time you must lie still may depend on whether a closure device such as a stitch or collagen plug was used to close the opening that was made in your artery. The time you must be still may be shorter if one of these devices was used. The amount of time will also depend on if there is any bleeding at the artery site.        A nurse will check the insertion site and your blood pressure. Before going home, you may have an electrocardiogram (ECG) or other tests.        You usually stay in the hospital for several hours or overnight.        Plan to have someone drive you home.        You may be started on new medicines to prevent blood clots from forming at the site in your artery where the angioplasty was done. Make sure you take this medicine as directed. Other medicines that are often prescribed are to prevent renarrowing of the arteries or to prevent a heart attack. These medicines commonly include a cholesterol-lowering medicine (statin), aspirin, and a medicine such as nitroglycerin to take if you have chest pain.        Your activity will be restricted while the puncture site (groin or wrist) is healing. Your healthcare team will tell you exactly how long  "though. It's often 3 to7 days.        Keep the puncture site clean and dry until the skin heals in the area. Showering is OK. But you should not soak in a bathtub, hot tub, or swimming pool until the skin has healed.        It's normal to have a bruise or to feel a pea-sized bump under the skin at the puncture site. This bump may be a collagen plug or stitches that were used to close the artery. It should get smaller as time goes by. You should not have active bleeding or a growing bruise at the site.     When to call your healthcare provider     Contact your healthcare provider if you have any of the following:    You have chest pain        The insertion site has pain, swelling, redness, bleeding, or drainage        You have severe pain, coldness, or a bluish color in the leg or arm that held the catheter        You have blood in your urine, black or tarry stools, or any other kind of bleeding        You have a fever of 100.4°F (38°C) or higher, or as directed by your healthcare provider        Discharge Instructions for Cardiac Catheterization     Cardiac catheterization is an invasive procedure to evaluate for certain heart problems involving the hearts chambers, valves, and blood vessels.  A thin, flexible tube (catheter) is put in a blood vessel in your groin or arm. Once the catheter is advanced into the heart measurements can be taken to assess blood flow, pressure, and oxygen. The healthcare provider can inject contrast fluid into your blood, which then flows to your heart. X-rays pictures can then be taken of your heart.     Often "coronary angiography" is performed as part of a cardiac catheterization which looks for blocked areas in the arteries that send blood to the heart. If a significant blockage is found your doctor may attempt to open up the artery which often involves placing a stent. Your provider will review the results of your procedure with you. Be sure to ask any questions you have " before you leave. This sheet will help you take care of yourself at home.     Home care    Don't drive or make any important decisions for at least 24 hours after getting any type of sedation or anesthesia.          Arrange to have a responsible adult drive you home after your procedure.        Only do light and easy activities for the next  2 to 3 days. Ask for help with chores and errands while you recover. Have someone drive you to your appointments.        Don't lift anything heavy until your healthcare team tells you when it's safe to lift again.        Ask your healthcare team when you can expect to return to work. Unless your job involves lifting, you may be able to return to your normal activities within a couple of days.        Take your medicines as directed. Don't skip doses.        Drink  6 to 8 glasses of water a day. This is to help flush the contrast dye out of your body. Call your healthcare team if your urine has any change in color.        Take your temperature each day for 7 days. If you feel cold and clammy or start sweating, take your temperature right away and call your healthcare team.        Check your incisions every day for signs of infection. These include redness, swelling, and drainage. It's normal to have a small bruise or bump where the catheter was inserted. A bruise that's getting larger is not normal and should be reported to your healthcare team. If you see blood forming in the incision, call your healthcare team. Go to the emergency department if you have uncontrolled bleeding from the artery site. This is especially true if you take medicines that make it hard for your blood to clot. Examples are aspirin, clopidogrel, and warfarin.        Eat a healthy diet. Make sure it's low in fat, salt, and cholesterol. Ask your healthcare team for diet information.        Stop smoking. Enroll in a stop-smoking program or ask your healthcare team for help. Stop-smoking programs can  be life saving.        Exercise as your healthcare team tells you to. Your healthcare team may recommend you start a cardiac rehabilitation program. Cardiac rehab is an exercise program in which trained healthcare staff watch your progress and stress on your heart while you exercise. Ask your team how to enroll.        Don't swim or take baths until your healthcare team says it's OK. You can shower the day after the procedure. Keep the site clean and dry. This keeps the incision from getting wet and infected until the skin and artery can heal.        Be sure to follow all after-care instructions.     Follow-up care    Make a follow-up appointment as advised by our staff. It's common to have a follow-up appointment 2 to 4 weeks after an angioplasty or coronary stent procedure.        Make a yearly appointment, too. This is to make sure you are still doing well and not having any new symptoms.        Don't wait for a follow-up appointment if your medicines aren't working or you are having heart-related symptoms.       When to seek medical care     Call your healthcare provider right away if you have any of the following:    Chest pain        Constant or increasing pain or numbness in your leg        Fever of 100.4° F ( 38.0°C) or higher, or as directed by your healthcare provider        Symptoms of infection. These include redness, swelling, drainage, or warmth at the incision site.        Shortness of breath        A leg that feels cold or appears blue        Bleeding, bruising, or a lot of swelling where the catheter was inserted        Blood in your urine        Black or tarry stools        Any unusual bleeding

## 2020-02-03 ENCOUNTER — HOSPITAL ENCOUNTER (OUTPATIENT)
Facility: HOSPITAL | Age: 75
Discharge: HOME | End: 2020-02-03
Attending: INTERNAL MEDICINE | Admitting: INTERNAL MEDICINE
Payer: MEDICARE

## 2020-02-03 ENCOUNTER — PATIENT MESSAGE (OUTPATIENT)
Dept: CARDIOLOGY | Facility: CLINIC | Age: 75
End: 2020-02-03

## 2020-02-03 VITALS
HEIGHT: 71 IN | SYSTOLIC BLOOD PRESSURE: 136 MMHG | RESPIRATION RATE: 19 BRPM | HEART RATE: 68 BPM | BODY MASS INDEX: 30.1 KG/M2 | OXYGEN SATURATION: 96 % | TEMPERATURE: 98 F | WEIGHT: 215 LBS | DIASTOLIC BLOOD PRESSURE: 68 MMHG

## 2020-02-03 DIAGNOSIS — R93.1 ABNORMAL NUCLEAR CARDIAC IMAGING TEST: Primary | ICD-10-CM

## 2020-02-03 DIAGNOSIS — R55 SYNCOPE AND COLLAPSE: ICD-10-CM

## 2020-02-03 DIAGNOSIS — R94.39 ABNORMAL STRESS TEST: ICD-10-CM

## 2020-02-03 PROCEDURE — 99152 CATH LAB PROCEDURE: ICD-10-PCS | Mod: ,,, | Performed by: INTERNAL MEDICINE

## 2020-02-03 PROCEDURE — 99152 MOD SED SAME PHYS/QHP 5/>YRS: CPT | Mod: ,,, | Performed by: INTERNAL MEDICINE

## 2020-02-03 PROCEDURE — 93458 CATH LAB PROCEDURE: ICD-10-PCS | Mod: 26,,, | Performed by: INTERNAL MEDICINE

## 2020-02-03 PROCEDURE — 99152 MOD SED SAME PHYS/QHP 5/>YRS: CPT

## 2020-02-03 PROCEDURE — 25000003 PHARM REV CODE 250

## 2020-02-03 PROCEDURE — 93458 L HRT ARTERY/VENTRICLE ANGIO: CPT

## 2020-02-03 PROCEDURE — 63600175 PHARM REV CODE 636 W HCPCS

## 2020-02-03 PROCEDURE — 93458 L HRT ARTERY/VENTRICLE ANGIO: CPT | Mod: 26,,, | Performed by: INTERNAL MEDICINE

## 2020-02-03 RX ORDER — SODIUM CHLORIDE 9 MG/ML
INJECTION, SOLUTION INTRAVENOUS CONTINUOUS
Status: DISCONTINUED | OUTPATIENT
Start: 2020-02-03 | End: 2020-02-03 | Stop reason: HOSPADM

## 2020-02-03 RX ORDER — NAPROXEN SODIUM 220 MG/1
81 TABLET, FILM COATED ORAL ONCE
Status: COMPLETED | OUTPATIENT
Start: 2020-02-03 | End: 2020-02-03

## 2020-02-03 RX ORDER — DIPHENHYDRAMINE HCL 50 MG
50 CAPSULE ORAL ONCE
Status: COMPLETED | OUTPATIENT
Start: 2020-02-03 | End: 2020-02-03

## 2020-02-03 RX ORDER — DIAZEPAM 5 MG/1
5 TABLET ORAL
Status: DISCONTINUED | OUTPATIENT
Start: 2020-02-03 | End: 2020-02-03 | Stop reason: HOSPADM

## 2020-02-03 RX ADMIN — Medication 50 MG: at 07:02

## 2020-02-03 RX ADMIN — NAPROXEN SODIUM 81 MG: 220 TABLET, FILM COATED ORAL at 07:02

## 2020-02-03 RX ADMIN — SODIUM CHLORIDE: 9 INJECTION, SOLUTION INTRAVENOUS at 07:02

## 2020-02-03 RX ADMIN — DIAZEPAM 5 MG: 5 TABLET ORAL at 07:02

## 2020-02-03 NOTE — BRIEF OP NOTE
Date: 02/03/2020  Surgeon/Physician: Mary Ko MD  Assistants: none    Pre Op Diagnosis: abnormal nuclear stress test.    Post OP Diagnosis: abnormal nuclear stress test.    Procedure Performed: [facility]    ANESTHESIA:RN IV SEDATION    COMPLICATION: none    Specimen / Tissue Removed: No    Estimated Blood Loss: <50 cc    Prostetics/Devices: None    Findings / Operative Note:   Non obs rca disease otherwise normal.      PLAN:  Reassure medical therapy      Discharge Note  Short Stay      SUMMARY     Admit Date: 2/3/2020    Attending Physician: Chele Ko MD     Discharge Physician: Mary Ko MD     Discharge Date: 2/3/2020    Final Diagnosis: abnormal nuclear stress test non obs cad    Outcome of Stay:patient tolerated procedure well no complications. He was deemed stable for discharge.he has non obs cad with normal lvf. He will follow up with DR WAYNE.    Disposition: Home or Self Care    Patient Instructions:   Current Discharge Medication List      CONTINUE these medications which have NOT CHANGED    Details   ALPRAZolam (XANAX) 0.25 MG tablet TAKE TWO TABLETS AT BEDTIME, IN ADDITION, MAY TAKE ONE TABLET TWICE DAILY IF NEEDED FOR ANXIETY  Qty: 60 tablet, Refills: 3    Comments: This prescription was filled on 12/13/2019. Any refills authorized will be placed on file.      ascorbic acid, vitamin C, (VITAMIN C) 1000 MG tablet Take 1,000 mg by mouth once daily.      aspirin (ECOTRIN) 81 MG EC tablet Take 1 tablet (81 mg total) by mouth once daily.  Qty: 30 tablet, Refills: 0      atazanavir (REYATAZ) 200 MG Cap TAKE TWO CAPSULES BY MOUTH DAILY  Qty: 60 capsule, Refills: 6    Comments: This prescription was filled on 8/14/2019. Any refills authorized will be placed on file.  Associated Diagnoses: HIV positive      benazepril-hydrochlorthiazide (LOTENSIN HCT) 10-12.5 mg Tab TAKE 1 TABLET BY MOUTH ONCE DAILY  Qty: 90 tablet, Refills: 3    Comments: This prescription was filled on 8/14/2019. Any refills  authorized will be placed on file.      co-enzyme Q-10 30 mg capsule Take 100 mg by mouth 3 (three) times daily.      donepezil (ARICEPT) 10 MG tablet Take 1 tablet (10 mg total) by mouth every evening.  Qty: 90 tablet, Refills: 3    Associated Diagnoses: Mild cognitive impairment      FLUoxetine 40 MG capsule Take 1 capsule (40 mg total) by mouth once daily.  Qty: 30 capsule, Refills: 11      icosapent ethyl (VASCEPA) 1 gram Cap Take 2 g by mouth 2 (two) times daily with meals.  Qty: 60 capsule, Refills: 3    Associated Diagnoses: Mixed hyperlipidemia      lamivudine-zidovudine 150-300mg (COMBIVIR) 150-300 mg Tab TAKE ONE TABLET EVERY 12  HOURS  Qty: 60 tablet, Refills: 6    Comments: This prescription was filled on 8/14/2019. Any refills authorized will be placed on file.  Associated Diagnoses: Mild cognitive impairment      metoprolol succinate (TOPROL-XL) 25 MG 24 hr tablet Take 1 tablet (25 mg total) by mouth once daily.  Qty: 30 tablet, Refills: 11      pravastatin (PRAVACHOL) 40 MG tablet TAKE (1) TABLET DAILY.  Qty: 90 tablet, Refills: 3      acyclovir (ZOVIRAX) 400 MG tablet Take 1 tablet (400 mg total) by mouth 3 (three) times daily.  Qty: 30 tablet, Refills: 0    Associated Diagnoses: Fever blister      cyclobenzaprine (FLEXERIL) 10 MG tablet Take 1 tablet (10 mg total) by mouth 3 (three) times daily.  Qty: 30 tablet, Refills: 3    Associated Diagnoses: Acute right-sided low back pain with sciatica, sciatica laterality unspecified             Discharge Procedure Orders (must include Diet, Follow-up, Activity)   Discharge Procedure Orders (must include Diet, Follow-up, Activity)   Diet general     Call MD for:  temperature >100.4     Call MD for:  persistent nausea and vomiting     Call MD for:  severe uncontrolled pain     Call MD for:  difficulty breathing, headache or visual disturbances     Call MD for:  redness, tenderness, or signs of infection (pain, swelling, redness, odor or green/yellow discharge  around incision site)     Call MD for:  hives     Call MD for:  persistent dizziness or light-headedness     Call MD for:  extreme fatigue     Follow-up Information     Deni Nguyen Md, MD In 1 week.    Specialties:  Cardiology, Internal Medicine  Contact information:  [address]  [place] [address] [phone]

## 2020-02-03 NOTE — DISCHARGE INSTRUCTIONS
"Post-op Heart Catheterization    1. DIET: It is advisable for you to follow a diet that limits the intake of salt, sugar, saturated fats and cholesterol.     2. DRIVING: Due to sedation you received during your procedure, DO NOT drive or operate machinery for 24 hours. Avoid making critical decisions or signing legal documents until tomorrow.    3. ACTIVITY: AVOID activities that require bending of the affected arm/wrist for 3 days and submerging the site in water for 3 days. REMOVE the dressing the day after  the procedure, and shower.  Apply a bandaid to site after shower.  WEAR wrist immobilizer until tomorrow night.    You may RESUME your normal activities or prescribed exercise program as instructed by your physician after 3 days.                                                                                                                 4. WOUND CARE: It is not unusual to have a small amount of bruising to appear at or near the puncture site. It is also common to have a tender "knot" develop beneath the skin at the puncture site of the wrist/arm. This is usually scar tissue and is not a cause for concern or alarm. This tender knot may take several weeks to fully resolve. The bruise will usually spread over several days. However, if the lump gets bigger, call your doctor immediately.    5. DISCOMFORT: For general discomfort at the puncture site, you may take 1 or 2 Acetaminophen (Tylenol) tablets every 4 - 6 hours as needed. (Do not take more than 4000 mg a day)    6. SIGNS AND SYMPTOMS TO REPORT:  Call your physician immediately if any of the following occur:                                            1. Loss of feeling, warmth or color to the affected arm/wrist                                                                                                          2. Mild beeding from the site                 3. Pain that is sudden, sharp or persistent in the affected arm/wrist                 4. Swelling " "or a change in "lump" size, increased redness or drainage at the puncture site                                                                               5. High fever (101 degrees or higher)    7. GO TO  THE EMERGENCY ROOM OR CALL 911 IF YOU HAVE: Chest pains or discomforts not relieved with 3 nitroglycerin doses (sublingual tablets or spray), numbness or severe pain of limb, if your limb becomes cold or discolored or if you develop uncontrolled bleeding from the puncture site (quickly apply firm, direct pressure above the site).    "

## 2020-02-06 ENCOUNTER — OFFICE VISIT (OUTPATIENT)
Dept: CARDIOLOGY | Facility: CLINIC | Age: 75
End: 2020-02-06
Payer: MEDICARE

## 2020-02-06 ENCOUNTER — CLINICAL SUPPORT (OUTPATIENT)
Dept: CARDIOLOGY | Facility: CLINIC | Age: 75
End: 2020-02-06
Attending: INTERNAL MEDICINE
Payer: MEDICARE

## 2020-02-06 ENCOUNTER — PATIENT MESSAGE (OUTPATIENT)
Dept: PULMONOLOGY | Facility: CLINIC | Age: 75
End: 2020-02-06

## 2020-02-06 VITALS
SYSTOLIC BLOOD PRESSURE: 134 MMHG | BODY MASS INDEX: 30.4 KG/M2 | HEART RATE: 80 BPM | WEIGHT: 217.13 LBS | HEIGHT: 71 IN | DIASTOLIC BLOOD PRESSURE: 78 MMHG | OXYGEN SATURATION: 97 %

## 2020-02-06 DIAGNOSIS — I10 ESSENTIAL HYPERTENSION: ICD-10-CM

## 2020-02-06 DIAGNOSIS — G47.39 OTHER SLEEP APNEA: ICD-10-CM

## 2020-02-06 DIAGNOSIS — R00.2 PALPITATIONS: ICD-10-CM

## 2020-02-06 DIAGNOSIS — R93.1 ABNORMAL NUCLEAR CARDIAC IMAGING TEST: Primary | ICD-10-CM

## 2020-02-06 DIAGNOSIS — E78.2 MIXED HYPERLIPIDEMIA: Chronic | ICD-10-CM

## 2020-02-06 PROCEDURE — 93227 HOLTER MONITOR - 48 HOUR: ICD-10-PCS | Mod: S$PBB,,, | Performed by: INTERNAL MEDICINE

## 2020-02-06 PROCEDURE — 93227 XTRNL ECG REC<48 HR R&I: CPT | Mod: S$PBB,,, | Performed by: INTERNAL MEDICINE

## 2020-02-06 PROCEDURE — 99214 PR OFFICE/OUTPT VISIT, EST, LEVL IV, 30-39 MIN: ICD-10-PCS | Mod: S$PBB,,, | Performed by: INTERNAL MEDICINE

## 2020-02-06 PROCEDURE — 99213 OFFICE O/P EST LOW 20 MIN: CPT | Mod: PBBFAC,25 | Performed by: INTERNAL MEDICINE

## 2020-02-06 PROCEDURE — 99999 PR PBB SHADOW E&M-EST. PATIENT-LVL III: CPT | Mod: PBBFAC,,, | Performed by: INTERNAL MEDICINE

## 2020-02-06 PROCEDURE — 93226 XTRNL ECG REC<48 HR SCAN A/R: CPT | Mod: PBBFAC | Performed by: INTERNAL MEDICINE

## 2020-02-06 PROCEDURE — 99214 OFFICE O/P EST MOD 30 MIN: CPT | Mod: S$PBB,,, | Performed by: INTERNAL MEDICINE

## 2020-02-06 PROCEDURE — 99999 PR PBB SHADOW E&M-EST. PATIENT-LVL III: ICD-10-PCS | Mod: PBBFAC,,, | Performed by: INTERNAL MEDICINE

## 2020-02-06 RX ORDER — ICOSAPENT ETHYL 1000 MG/1
2 CAPSULE ORAL 2 TIMES DAILY WITH MEALS
Qty: 60 CAPSULE | Refills: 3 | Status: SHIPPED | OUTPATIENT
Start: 2020-02-06 | End: 2020-02-11 | Stop reason: SDUPTHER

## 2020-02-06 RX ORDER — METOPROLOL SUCCINATE 50 MG/1
50 TABLET, EXTENDED RELEASE ORAL DAILY
Qty: 30 TABLET | Refills: 3 | Status: SHIPPED | OUTPATIENT
Start: 2020-02-06 | End: 2020-06-30 | Stop reason: SDUPTHER

## 2020-02-06 NOTE — PROGRESS NOTES
Subjective:   Patient ID:  Haritha Valle is a 74 y.o. male who presents for cardiac consult of Follow-up; Hyperlipidemia; and Hypertension      Follow-up   Associated symptoms include chest pain.   Hyperlipidemia   Associated symptoms include chest pain and shortness of breath.   Hypertension   Associated symptoms include chest pain, malaise/fatigue, palpitations and shortness of breath.     The patient came in today for cardiac consult of Follow-up; Hyperlipidemia; and Hypertension    Referring Physician: Hesham Andrade II, MD   Reason for initial consult: LOC, palpitations      Haritha Valle is a 74 y.o. male pt with HTN, HLD, HIV, anxiety, depression, hypogonadism, ED presents for follow up CV eval of LOC, palpitations.     1/15/20  About two weeks ago he felt dizzy and couldn't stand and fell then around Dec 30th - BP was 193/114, he was at [facility], vomited and was very weak.  He has been more anxious and fatigue. He had another episode where he blacked out when he got into a verbal argument. His BP dropped 80s/40s. He has been dizzy for a while, had ENT eval as well. He is with partner who explained most of symptoms, pt feels more anxious and not really himself lately he said.     2/6/20  Pt had abnormal nuclear stress and LHC overall normal with non obs CAD. ECHO EF 45-50%. Holter pending. BP still goes up periodically, occ can go up 104 diastolic. Will r/o RAMONITA.     Patient feels  no sob, no leg swelling, no PND.     Patient has fairly good exercise tolerance.    Patient is compliant with medications.    ECG - NSR with sinus arrhythmia, LAD      Nuclear Quantitative Functional Analysis:   LVEF: 44 %    Impression: ABNORMAL MYOCARDIAL PERFUSION  1. There is evidence for mild myocardial ischemia in the inferior and inferoapical walls of the left ventricle.   2. The perfusion scan is free of evidence for myocardial injury.   3. Resting wall motion is physiologic.   4. There is resting LV dysfunction  with a reduced ejection fraction of 44 %.   5. The ventricular volumes are normal at rest and stress.   6. The extracardiac distribution of radioactivity is normal.     This document has been electronically    SIGNED BY: Stephon Corcoran MD On: 01/27/2020 16:38    2/3/20   1.   Non-obstructive CAD.   2.   low normal to mildy depressed ef.      CONCLUSIONS     1 - Mildly depressed left ventricular systolic function (EF 45-50%).     2 - Impaired LV relaxation, normal LAP (grade 1 diastolic dysfunction).     3 - Normal right ventricular systolic function .     4 - The estimated PA systolic pressure is 27 mmHg.     5 - Mild mitral regurgitation.     6 - Mild tricuspid regurgitation.       This document has been electronically    SIGNED BY: Deni Nguyen MD On: 01/27/2020 18:01    Past Medical History:   Diagnosis Date    Anxiety 7/3/2019    Basal cell carcinoma     Depression     Hepatitis B     Hepatitis C antibody test positive     HIV infection     Hypertension     Immune disorder     Mild cognitive impairment 10/1/2010       Past Surgical History:   Procedure Laterality Date    APPENDECTOMY      CHOLECYSTECTOMY      COLONOSCOPY N/A 11/3/2016    Procedure: COLONOSCOPY;  Surgeon: Maxi Villasenor MD;  Location: [facility] ENDO ([address] [place] [address]);  Service: Endoscopy;  Laterality: N/A;  last colonoscopy with Dr Jarrell    LEFT HEART CATHETERIZATION Left 2/3/2020    Procedure: CATHETERIZATION, HEART, LEFT;  Surgeon: Chele Ko MD;  Location: [facility] CATH LAB;  Service: Cardiology;  Laterality: Left;  saima pt       Social History     Tobacco Use    Smoking status: Never Smoker    Smokeless tobacco: Never Used   Substance Use Topics    Alcohol use: No     Frequency: Monthly or less     Drinks per session: 1 or 2     Binge frequency: Never    Drug use: Not on file       Family History   Problem Relation Age of Onset    Heart disease Father     Heart failure Father     Diabetes Neg Hx     Hypertension Neg Hx         Patient's Medications   New Prescriptions    No medications on file   Previous Medications    ACYCLOVIR (ZOVIRAX) 400 MG TABLET    Take 1 tablet (400 mg total) by mouth 3 (three) times daily.    ALPRAZOLAM (XANAX) 0.25 MG TABLET    TAKE TWO TABLETS AT BEDTIME, IN ADDITION, MAY TAKE ONE TABLET TWICE DAILY IF NEEDED FOR ANXIETY    ASCORBIC ACID, VITAMIN C, (VITAMIN C) 1000 MG TABLET    Take 1,000 mg by mouth once daily.    ASPIRIN (ECOTRIN) 81 MG EC TABLET    Take 1 tablet (81 mg total) by mouth once daily.    ATAZANAVIR (REYATAZ) 200 MG CAP    TAKE TWO CAPSULES BY MOUTH DAILY    BENAZEPRIL-HYDROCHLORTHIAZIDE (LOTENSIN HCT) 10-12.5 MG TAB    TAKE 1 TABLET BY MOUTH ONCE DAILY    CO-ENZYME Q-10 30 MG CAPSULE    Take 100 mg by mouth 3 (three) times daily.    CYCLOBENZAPRINE (FLEXERIL) 10 MG TABLET    Take 1 tablet (10 mg total) by mouth 3 (three) times daily.    DONEPEZIL (ARICEPT) 10 MG TABLET    Take 1 tablet (10 mg total) by mouth every evening.    FLUOXETINE 40 MG CAPSULE    Take 1 capsule (40 mg total) by mouth once daily.    LAMIVUDINE-ZIDOVUDINE 150-300MG (COMBIVIR) 150-300 MG TAB    TAKE ONE TABLET EVERY 12  HOURS    PRAVASTATIN (PRAVACHOL) 40 MG TABLET    TAKE (1) TABLET DAILY.   Modified Medications    Modified Medication Previous Medication    ICOSAPENT ETHYL (VASCEPA) 1 GRAM CAP icosapent ethyl (VASCEPA) 1 gram Cap       Take 2 g by mouth 2 (two) times daily with meals.    Take 2 g by mouth 2 (two) times daily with meals.    METOPROLOL SUCCINATE (TOPROL-XL) 50 MG 24 HR TABLET metoprolol succinate (TOPROL-XL) 25 MG 24 hr tablet       Take 1 tablet (50 mg total) by mouth once daily.    Take 1 tablet (25 mg total) by mouth once daily.   Discontinued Medications    No medications on file       Review of Systems   Constitutional: Positive for malaise/fatigue.   HENT: Negative.    Eyes: Negative.    Respiratory: Positive for shortness of breath.    Cardiovascular: Positive for chest pain and palpitations.  "  Gastrointestinal: Negative.    Genitourinary: Negative.    Musculoskeletal: Negative.    Skin: Negative.    Neurological: Positive for dizziness and loss of consciousness.   Endo/Heme/Allergies: Negative.    Psychiatric/Behavioral: Negative.    All 12 systems otherwise negative.      Wt Readings from Last 3 Encounters:   02/06/20 98.5 kg (217 lb 2.5 oz)   02/03/20 97.5 kg (215 lb)   01/15/20 97.6 kg (215 lb 2.7 oz)     Temp Readings from Last 3 Encounters:   02/03/20 97.5 °F (36.4 °C) (Temporal)   10/01/19 97.5 °F (36.4 °C) (Oral)   08/29/19 98.1 °F (36.7 °C) (Oral)     BP Readings from Last 3 Encounters:   02/06/20 134/78   02/03/20 136/68   01/15/20 136/80     Pulse Readings from Last 3 Encounters:   02/06/20 80   02/03/20 68   01/15/20 78       /78 (BP Location: Right arm, Patient Position: Sitting, BP Method: Large (Manual))   Pulse 80   Ht 5' 11" (1.803 m)   Wt 98.5 kg (217 lb 2.5 oz)   SpO2 97%   BMI 30.29 kg/m²     Objective:   Physical Exam   Constitutional: He is oriented to person, place, and time. He appears well-developed and well-nourished. No distress.   HENT:   Head: Normocephalic and atraumatic.   Nose: Nose normal.   Mouth/Throat: Oropharynx is clear and moist.   Eyes: Conjunctivae and EOM are normal. No scleral icterus.   Neck: Normal range of motion. Neck supple. No JVD present. No thyromegaly present.   Cardiovascular: Normal rate, regular rhythm, S1 normal and S2 normal. Exam reveals no gallop, no S3, no S4 and no friction rub.   No murmur heard.  Pulmonary/Chest: Effort normal and breath sounds normal. No stridor. No respiratory distress. He has no wheezes. He has no rales. He exhibits no tenderness.   Abdominal: Soft. Bowel sounds are normal. He exhibits no distension and no mass. There is no tenderness. There is no rebound.   Genitourinary:   Genitourinary Comments: Deferred   Musculoskeletal: Normal range of motion. He exhibits no edema, tenderness or deformity. "   Lymphadenopathy:     He has no cervical adenopathy.   Neurological: He is alert and oriented to person, place, and time. He exhibits normal muscle tone. Coordination normal.   Skin: Skin is warm and dry. No rash noted. He is not diaphoretic. No erythema. No pallor.   Psychiatric: He has a normal mood and affect. His behavior is normal. Judgment and thought content normal.   Nursing note and vitals reviewed.      Lab Results   Component Value Date     01/28/2020    K 4.2 01/28/2020     01/28/2020    CO2 24 01/28/2020    BUN 18 01/28/2020    CREATININE 0.9 01/28/2020    GLU 90 01/28/2020    HGBA1C 5.4 12/02/2009    MG 2.2 01/26/2012    AST 25 06/25/2019    ALT 27 06/25/2019    ALBUMIN 3.9 06/25/2019    ALBUMIN 4.3 04/30/2018    PROT 8.2 06/25/2019    BILITOT 0.6 06/25/2019    WBC 6.06 01/28/2020    HGB 17.7 01/28/2020    HCT 52.3 01/28/2020     (H) 01/28/2020     01/28/2020    INR 1.0 01/28/2020    TSH 1.105 10/07/2019    CHOL 214 (H) 06/25/2019    HDL 32 (L) 06/25/2019    LDLCALC 103.6 06/25/2019    TRIG 392 (H) 06/25/2019     Assessment:      1. Abnormal nuclear cardiac imaging test    2. Mixed hyperlipidemia    3. Essential hypertension    4. Other sleep apnea    5. Palpitations        Plan:   1. HTN  - cont meds - increase BB  - episodes of HTN urgency - r/o sec causes - labs and renal u/s ordered - neg  - r/o RAMONITA  - Holter and ECHO - neg    2. HLD with elevated TGs  - cont meds  - add - vascepa     3. HIV  - cont meds per PCP/ID    4. ED/hypogonadism  - cont meds per PCP PRN    5. Syncope/dizziness/palpitations with CP  - exercise nuclear stress test to r/o ischemia - abnormal, LHC neg  - 2D ECHO - mildly dec  - Holter ordered   - r/o RAMONITA - send for sleep study    6. Mild CHF  - cont meds - Toprol and ACE -I    Thank you for allowing me to participate in this patient's care. Please do not hesitate to contact me with any questions or concerns. Consult note has been forwarded to the  referral physician.

## 2020-02-10 NOTE — TELEPHONE ENCOUNTER
2nd Attempt    Left voicemail for patient on mobile number asking for call back to schedule pulmonary appointment based on referral received by pulmonary department work queue.     Per individual who answered home number, patient unavailable at home number today.    Please direct any possible call back from patient to Sandeep Encinas, patient care navigator, pulmonary services.

## 2020-02-11 DIAGNOSIS — E78.2 MIXED HYPERLIPIDEMIA: Chronic | ICD-10-CM

## 2020-02-11 RX ORDER — ICOSAPENT ETHYL 1000 MG/1
2 CAPSULE ORAL 2 TIMES DAILY WITH MEALS
Qty: 60 CAPSULE | Refills: 3 | Status: SHIPPED | OUTPATIENT
Start: 2020-02-11 | End: 2020-06-18

## 2020-02-11 RX ORDER — ICOSAPENT ETHYL 1000 MG/1
2 CAPSULE ORAL 2 TIMES DAILY WITH MEALS
Qty: 60 CAPSULE | Refills: 3 | Status: CANCELLED | OUTPATIENT
Start: 2020-02-11

## 2020-02-12 DIAGNOSIS — R29.818 SUSPECTED SLEEP APNEA: Primary | ICD-10-CM

## 2020-03-11 NOTE — PROGRESS NOTES
Subjective:       Patient ID: Haritha Valle is a 74 y.o. male.  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    Anxiety    Erectile dysfunction due to arterial insufficiency    Postural dizziness with presyncope    BPH with urinary obstruction    Encounter for long-term (current) use of high-risk medication    Abnormal nuclear cardiac imaging test    Obstructive sleep apnea syndrome      Social History     Tobacco Use   Smoking Status Never Smoker   Smokeless Tobacco Never Used      Immunization History   Administered Date(s) Administered    Influenza - High Dose - PF (65 years and older) 09/24/2013, 12/02/2014, 09/27/2016, 09/27/2017, 12/12/2018, 10/01/2019    Influenza - Quadrivalent - PF (6 months and older) 10/24/2007, 01/08/2009, 11/03/2009, 09/23/2010, 01/24/2013    Influenza A (H1N1) 2009 Monovalent - IM 11/03/2009    Influenza Split 01/24/2013    Pneumococcal Conjugate - 13 Valent 07/23/2015    Pneumococcal Polysaccharide - 23 Valent 09/27/2016    Tdap 01/08/2009, 07/03/2019    Zoster 01/08/2009    Zoster Recombinant 05/09/2018, 06/08/2018, 07/18/2018      EPWORTH SLEEPINESS SCALE 3/12/2020   Sitting and reading 2   Watching TV 3   Sitting, inactive in a public place (e.g. a theatre or a meeting) 0   As a passenger in a car for an hour without a break 3   Lying down to rest in the afternoon when circumstances permit 3   Sitting and talking to someone 0   Sitting quietly after a lunch without alcohol 2   In a car, while stopped for a few minutes in traffic 0   Total score 13      STOP BANG Questionnaire  Patient diagnosed with Obstructive Sleep Apnea?: (YES)  Has loud snoring: (YES)  Disturbed sleep, daytime fatigue,  "daytime somnolence: (YES)  Observed to have interrupted breathing during sleep: (YES)  Takes medication for high blood pressure: (YES)  BMI (Calculated): 29.6  Has large neck size >40cm (15.7in., large male shirt size, large male collar size >16): (17")     Chief Complaint:   Haritha Valle is 74 y.o.   Asked to see me by Hesham Andrade Ii, MD   Asked to see me by Dr Nguyen  Elevated BP and palpations: multiple medication classes, ER 2 months ago for elevated BP  Snoring, Apnea  Fall asleep in movie theater   Sleep disorder questionnaire reviewed.  Bedtime 11:00 p.m. wake-up time 7:00 a.m..  Sleep latency 5 min.  Frequent leg jerks at night.  Take caffeinated beverages after 7:00 p.m.  Take naps.  Treated for anxiety.  Patient endorses having very loud snoring, nocturnal diaphoresis, find it hard to stay a long-acting daytime.  Worked as     The following portions of the patient's history were reviewed and updated as appropriate:   He  has a past medical history of Anxiety (7/3/2019), Basal cell carcinoma, Depression, Hepatitis B, Hepatitis C antibody test positive, HIV infection, Hypertension, Immune disorder, and Mild cognitive impairment (10/1/2010).  He does not have any pertinent problems on file.  He  has a past surgical history that includes Colonoscopy (N/A, 11/3/2016); Cholecystectomy; Appendectomy; and Left heart catheterization (Left, 2/3/2020).  His family history includes Heart disease in his father; Heart failure in his father.  He  reports that he has never smoked. He has never used smokeless tobacco. He reports that he does not drink alcohol. His drug history is not on file.  He has a current medication list which includes the following prescription(s): acyclovir, alprazolam, ascorbic acid (vitamin c), aspirin, atazanavir, benazepril-hydrochlorthiazide, co-enzyme q-10, cyclobenzaprine, donepezil, fluoxetine, icosapent ethyl, lamivudine-zidovudine 150-300mg, metoprolol succinate, and " pravastatin, and the following Facility-Administered Medications: testosterone.  Current Outpatient Medications on File Prior to Visit   Medication Sig Dispense Refill    acyclovir (ZOVIRAX) 400 MG tablet Take 1 tablet (400 mg total) by mouth 3 (three) times daily. 30 tablet 0    ALPRAZolam (XANAX) 0.25 MG tablet TAKE TWO TABLETS AT BEDTIME, IN ADDITION, MAY TAKE ONE TABLET TWICE DAILY IF NEEDED FOR ANXIETY 60 tablet 3    ascorbic acid, vitamin C, (VITAMIN C) 1000 MG tablet Take 1,000 mg by mouth once daily.      aspirin (ECOTRIN) 81 MG EC tablet Take 1 tablet (81 mg total) by mouth once daily. 30 tablet 0    atazanavir (REYATAZ) 200 MG Cap TAKE TWO CAPSULES BY MOUTH DAILY 60 capsule 6    benazepril-hydrochlorthiazide (LOTENSIN HCT) 10-12.5 mg Tab TAKE 1 TABLET BY MOUTH ONCE DAILY 90 tablet 3    co-enzyme Q-10 30 mg capsule Take 100 mg by mouth 3 (three) times daily.      cyclobenzaprine (FLEXERIL) 10 MG tablet Take 1 tablet (10 mg total) by mouth 3 (three) times daily. 30 tablet 3    donepezil (ARICEPT) 10 MG tablet Take 1 tablet (10 mg total) by mouth every evening. 90 tablet 3    FLUoxetine 40 MG capsule Take 1 capsule (40 mg total) by mouth once daily. 30 capsule 11    icosapent ethyl (VASCEPA) 1 gram Cap Take 2 g by mouth 2 (two) times daily with meals. 60 capsule 3    lamivudine-zidovudine 150-300mg (COMBIVIR) 150-300 mg Tab TAKE ONE TABLET EVERY 12  HOURS 60 tablet 6    metoprolol succinate (TOPROL-XL) 50 MG 24 hr tablet Take 1 tablet (50 mg total) by mouth once daily. 30 tablet 3    pravastatin (PRAVACHOL) 40 MG tablet TAKE (1) TABLET DAILY. 90 tablet 3     Current Facility-Administered Medications on File Prior to Visit   Medication Dose Route Frequency Provider Last Rate Last Dose    [START ON 3/22/2020] testosterone Pllt 12 Pellet  12 Pellet Implant 1 time in Clinic/HOD Pascual Kenny MD         He is allergic to no known drug allergies..     Review of Systems   Respiratory: Positive for  "sleep disturbances due to breathing and snoring.    All other systems reviewed and are negative.     Objective:        Vitals:    03/12/20 0947   BP: 130/82   Pulse: 89   Resp: 17   SpO2: 95%   Weight: 96.3 kg (212 lb 4.9 oz)   Height: 5' 11" (1.803 m)     Physical Exam   Constitutional: He is oriented to person, place, and time. He appears well-developed and well-nourished.   HENT:   Head: Normocephalic and atraumatic.   Nose: Nose normal.   Mouth/Throat: Oropharynx is clear and moist.   malamapati 2   Eyes: Pupils are equal, round, and reactive to light. EOM are normal.   Neck: Normal range of motion. Neck supple.   17"   Cardiovascular: Normal rate, regular rhythm and normal heart sounds. Exam reveals no friction rub.   No murmur heard.  Pulmonary/Chest: Effort normal and breath sounds normal. No stridor. No respiratory distress. He has no wheezes. He has no rales.   Abdominal: Soft.   Musculoskeletal: Normal range of motion. He exhibits no edema.   Neurological: He is alert and oriented to person, place, and time. No cranial nerve deficit.   Skin: Skin is warm and dry. Capillary refill takes 2 to 3 seconds.   Psychiatric: He has a normal mood and affect.   Nursing note and vitals reviewed.       Data Review:   CBC:   Lab Results   Component Value Date    WBC 6.06 01/28/2020    RBC 4.96 01/28/2020    HGB 17.7 01/28/2020    HCT 52.3 01/28/2020     01/28/2020     BMP:   Lab Results   Component Value Date    GLU 90 01/28/2020     01/28/2020    K 4.2 01/28/2020     01/28/2020    CO2 24 01/28/2020    BUN 18 01/28/2020    CREATININE 0.9 01/28/2020    CALCIUM 10.2 01/28/2020     Radiology review: *   Diagnostics: Chest x-ray:  EXAMINATION:  XR CHEST PA AND LATERAL    CLINICAL HISTORY:  Other symptoms and signs involving the nervous system    TECHNIQUE:  PA and lateral views of the chest were performed.    COMPARISON:  04/01/2015    FINDINGS:  The lungs are clear and free of infiltrate.  No pleural " effusion or pneumothorax. The heart is enlarged.  There is tortuosity of the descending thoracic aorta with calcification of the aortic knob.      Impression       1.  No acute cardiopulmonary process.            Assessment:      Problem List Items Addressed This Visit     Mild cognitive impairment (Chronic)     STABLE EXCELON and ARICEPT         HIV disease (Chronic)     On reyataz + combivir. HIV viral load not detectable. [id number]. Has chronic stable lipdystrophy         Hyperlipidemia (Chronic)     STABLE on PRAVACHOL and VASCEPA         Essential hypertension     STABLE on LOTENSIN         Obstructive sleep apnea syndrome - Primary     [place] score 13  Snoring.  Comorbidity: HTN HLD, Dementia    My recommendation at this point would be to set up a HOME SLEEP TEST or INLAB POLYSOMNOGRAPHY  through [facility] ( [phone].    We have discussed weight loss , non supine position, good sleep hygiene, and  other interventions to foster good natural healthy sleep.  We have discussed behavioral modifications, as well.  After her study, she  could certainly try PAP therapy or out suitable alternatives            Relevant Orders    Polysomnogram (CPAP will be added if patient meets diagnostic criteria.)         Plan:     My recommendation at this point would be to set up a HOME SLEEP TEST or INLAB POLYSOMNOGRAPHY  through the [facility] ( [phone].    We have discussed weight loss , non supine position, good sleep hygiene, and  other interventions to foster good natural healthy sleep.  We have discussed behavioral modifications, as well.  After her study, she  could certainly try PAP therapy or out suitable alternatives        Follow up in about 8 weeks (around 5/7/2020), or follow after PSG.    This note was prepared using voice recognition system and is likely to have sound alike errors that may have been overlooked even after proof reading.  Please call me with any  questions    Discussed diagnosis, its evaluation, treatment and usual course. All questions answered.    Thank you for the courtesy of participating in the care of this patient    Rico Pineda MD

## 2020-03-12 ENCOUNTER — HOSPITAL ENCOUNTER (OUTPATIENT)
Dept: RADIOLOGY | Facility: HOSPITAL | Age: 75
Discharge: HOME | End: 2020-03-12
Attending: INTERNAL MEDICINE
Payer: MEDICARE

## 2020-03-12 ENCOUNTER — OFFICE VISIT (OUTPATIENT)
Dept: SLEEP MEDICINE | Facility: CLINIC | Age: 75
End: 2020-03-12
Payer: MEDICARE

## 2020-03-12 VITALS
OXYGEN SATURATION: 95 % | HEIGHT: 71 IN | HEART RATE: 89 BPM | WEIGHT: 212.31 LBS | DIASTOLIC BLOOD PRESSURE: 82 MMHG | RESPIRATION RATE: 17 BRPM | SYSTOLIC BLOOD PRESSURE: 130 MMHG | BODY MASS INDEX: 29.72 KG/M2

## 2020-03-12 DIAGNOSIS — B20 HIV DISEASE: Chronic | ICD-10-CM

## 2020-03-12 DIAGNOSIS — I10 ESSENTIAL HYPERTENSION: ICD-10-CM

## 2020-03-12 DIAGNOSIS — R29.818 SUSPECTED SLEEP APNEA: ICD-10-CM

## 2020-03-12 DIAGNOSIS — E78.00 PURE HYPERCHOLESTEROLEMIA: Chronic | ICD-10-CM

## 2020-03-12 DIAGNOSIS — G47.33 OBSTRUCTIVE SLEEP APNEA SYNDROME: Primary | ICD-10-CM

## 2020-03-12 DIAGNOSIS — G31.84 MILD COGNITIVE IMPAIRMENT: Chronic | ICD-10-CM

## 2020-03-12 PROCEDURE — 71046 X-RAY EXAM CHEST 2 VIEWS: CPT | Mod: 26,,, | Performed by: RADIOLOGY

## 2020-03-12 PROCEDURE — 99999 PR PBB SHADOW E&M-EST. PATIENT-LVL IV: ICD-10-PCS | Mod: PBBFAC,,, | Performed by: INTERNAL MEDICINE

## 2020-03-12 PROCEDURE — 71046 XR CHEST PA AND LATERAL: ICD-10-PCS | Mod: 26,,, | Performed by: RADIOLOGY

## 2020-03-12 PROCEDURE — 99205 PR OFFICE/OUTPT VISIT, NEW, LEVL V, 60-74 MIN: ICD-10-PCS | Mod: S$PBB,,, | Performed by: INTERNAL MEDICINE

## 2020-03-12 PROCEDURE — 99214 OFFICE O/P EST MOD 30 MIN: CPT | Mod: PBBFAC,25 | Performed by: INTERNAL MEDICINE

## 2020-03-12 PROCEDURE — 71046 X-RAY EXAM CHEST 2 VIEWS: CPT | Mod: TC

## 2020-03-12 PROCEDURE — 99205 OFFICE O/P NEW HI 60 MIN: CPT | Mod: S$PBB,,, | Performed by: INTERNAL MEDICINE

## 2020-03-12 PROCEDURE — 99999 PR PBB SHADOW E&M-EST. PATIENT-LVL IV: CPT | Mod: PBBFAC,,, | Performed by: INTERNAL MEDICINE

## 2020-03-12 NOTE — PATIENT INSTRUCTIONS
Your provider has scheduled you for a sleep study.   You should be receiving a phone call from the sleep lab shortly after your study has been approved by your insurance. Please make sure you have your current phone numbers [facility]. If you do not hear from anyone in the next 10 business days, please call the sleep lab at [phone] to schedule your sleep study. The sleep studies are performed at [facility] seven nights a week.  When you are scheduling your sleep study, they will also make you a follow up appointment with your provider. This follow up appointment will be 10-14 days after your sleep study to review the results. If it is noted that you do have sleep apnea on your initial sleep study, you may receive a call back for a second night study with the CPAP before you come back to the office.       Obstructive Sleep Apnea  Obstructive sleep apnea is a condition that causes your air passages to become narrowed or blocked during sleep. As a result, breathing stops for short periods. Your body wakes up enough for breathing to begin again, though you don't remember it. The cycle of stopped breathing and brief awakenings can repeat dozens of times a night. This prevents the body from getting to the deeper stages of sleep that are needed for good rest and may cause your body's oxygen level to fall.  Signs of sleep apnea include loud snoring, noisy breathing, and gasping sounds during sleep. Daytime symptoms include waking up tired after a full night's sleep, waking up with headaches, feeling very sleepy or falling asleep during the day, and having problems with memory or concentration.  Risk factors for sleep apnea include:  · Being overweight  · Being a man, or a woman in menopause  · Smoking  · Using alcohol or sedating medicines  · Having enlarged structures in the nose or throat  Home care  Lifestyle changes that can help treat snoring and sleep apnea include the  following:  · If you are overweight, lose weight. Talk to your healthcare provider about a weight-loss plan for you.  · Avoid alcohol for 3 to 4 hours before bedtime. Avoid sedating medications. Ask your healthcare provider about the medicines you take.  · If you smoke, talk to your healthcare provider about ways to quit.  · Sleep on your side. This can help prevent gravity from pulling relaxed throat tissues into your breathing passages.  · If you have allergies or sinus problems that block your nose, ask your healthcare provider for help.  Follow-up care  Follow up with your healthcare provider, or as advised. A diagnosis of sleep apnea is made with a sleep study. Your healthcare provider can tell you more about this test.  When to seek medical advice  Sleep apnea can make you more likely to have certain health problems. These include high blood pressure, heart attack, stroke, and sexual dysfunction. If you have sleep apnea, talk to your healthcare provider about the best treatments for you.  Date Last Reviewed: 4/1/2017  © 5315-6251 [employer] [address] [place] [address], [place] [address]. All rights reserved. This information is not intended as a substitute for professional medical care. Always follow your healthcare professional's instructions.

## 2020-03-12 NOTE — ASSESSMENT & PLAN NOTE
[place] score 13  Snoring.  Comorbidity: HTN HLD, Dementia    My recommendation at this point would be to set up a HOME SLEEP TEST or INLAB POLYSOMNOGRAPHY  through [facility] ( [phone].    We have discussed weight loss , non supine position, good sleep hygiene, and  other interventions to foster good natural healthy sleep.  We have discussed behavioral modifications, as well.  After her study, she  could certainly try PAP therapy or out suitable alternatives

## 2020-03-12 NOTE — LETTER
March 12, 2020      Deni Nguyen MD  [address]  [place] [address]           [facility]  [address]  [place] [address]  Phone: [phone]  Fax: [phone]          Patient: Haritha Valle   MR Number: [id number]   YOB: 1945   Date of Visit: 3/12/2020       Dear Dr. Deni Nguyen:    Thank you for referring Haritha Valle to me for evaluation. Attached you will find relevant portions of my assessment and plan of care.    If you have questions, please do not hesitate to call me. I look forward to following Haritha Valle along with you.    Sincerely,    Rico Pineda MD    Enclosure  CC:  No Recipients    If you would like to receive this communication electronically, please contact [email or web address] or [phone] to request more information on [employer] access.    For providers and/or their staff who would like to refer a patient to [facility], please contact us through our one-stop-shop provider referral line, [facility], at [phone].    If you feel you have received this communication in error or would no longer like to receive these types of communications, please e-mail [email or web address]

## 2020-03-24 ENCOUNTER — TELEPHONE (OUTPATIENT)
Dept: NEUROLOGY | Facility: CLINIC | Age: 75
End: 2020-03-24

## 2020-03-24 NOTE — TELEPHONE ENCOUNTER
Called and spoke to  assistant regarding his appt with  on tomorrow. I stated to her that I was contacting him to switch his appt to a video visit instead of traveling the distance to come in to see . She stated that would be a good idea due to her trying to keep him home. She will give me a call back regarding this matter.      ALL PHONE CALL ROUTES TO HER PHONE!!!

## 2020-03-31 ENCOUNTER — PATIENT MESSAGE (OUTPATIENT)
Dept: UROLOGY | Facility: CLINIC | Age: 75
End: 2020-03-31

## 2020-04-02 ENCOUNTER — PATIENT MESSAGE (OUTPATIENT)
Dept: UROLOGY | Facility: CLINIC | Age: 75
End: 2020-04-02

## 2020-04-08 ENCOUNTER — TELEPHONE (OUTPATIENT)
Dept: UROLOGY | Facility: CLINIC | Age: 75
End: 2020-04-08

## 2020-04-08 NOTE — TELEPHONE ENCOUNTER
Spoke to  on phone. Informed his testopel appointment would need to be rescheduled due to covid pandemic. Would reschedule appt for labs and testopel in June. Pt verbalized understanding.

## 2020-05-08 RX ORDER — ALPRAZOLAM 0.25 MG/1
TABLET ORAL
Qty: 60 TABLET | Refills: 3 | OUTPATIENT
Start: 2020-05-08

## 2020-05-23 DIAGNOSIS — Z21 HIV POSITIVE: ICD-10-CM

## 2020-05-23 DIAGNOSIS — G31.84 MILD COGNITIVE IMPAIRMENT: ICD-10-CM

## 2020-05-25 RX ORDER — ATAZANAVIR 200 MG/1
CAPSULE ORAL
Qty: 60 CAPSULE | Refills: 6 | OUTPATIENT
Start: 2020-05-25

## 2020-05-25 RX ORDER — LAMIVUDINE AND ZIDOVUDINE 150; 300 MG/1; MG/1
TABLET, FILM COATED ORAL
Qty: 60 TABLET | Refills: 6 | OUTPATIENT
Start: 2020-05-25

## 2020-05-27 DIAGNOSIS — G47.33 OBSTRUCTIVE SLEEP APNEA SYNDROME: Primary | ICD-10-CM

## 2020-06-04 ENCOUNTER — PATIENT OUTREACH (OUTPATIENT)
Dept: ADMINISTRATIVE | Facility: HOSPITAL | Age: 75
End: 2020-06-04

## 2020-06-08 ENCOUNTER — HOSPITAL ENCOUNTER (EMERGENCY)
Facility: HOSPITAL | Age: 75
Discharge: HOME | End: 2020-06-08
Attending: EMERGENCY MEDICINE
Payer: MEDICARE

## 2020-06-08 VITALS
TEMPERATURE: 98 F | WEIGHT: 212 LBS | SYSTOLIC BLOOD PRESSURE: 131 MMHG | OXYGEN SATURATION: 98 % | HEIGHT: 70 IN | DIASTOLIC BLOOD PRESSURE: 79 MMHG | HEART RATE: 78 BPM | RESPIRATION RATE: 18 BRPM | BODY MASS INDEX: 30.35 KG/M2

## 2020-06-08 DIAGNOSIS — Z20.822 COVID-19 VIRUS NOT DETECTED: Primary | ICD-10-CM

## 2020-06-08 LAB — SARS-COV-2 RDRP RESP QL NAA+PROBE: NEGATIVE

## 2020-06-08 PROCEDURE — 99282 EMERGENCY DEPT VISIT SF MDM: CPT

## 2020-06-08 PROCEDURE — U0002 COVID-19 LAB TEST NON-CDC: HCPCS

## 2020-06-08 NOTE — ED NOTES
The patient is awake, alert and cooperative with a calm affect, patient is aware of environment, airway is open and patent, respirations are spontaneous, normal effort and rate noted, skin warm and dry, moves all extremities well, appearance: no apparent distress noted. Explanation of care provided to patient. Patient offers no complaints at this time. Patient came to be tested for Covid. Naso-swab collected. Will continue to monitor patient.

## 2020-06-08 NOTE — ED PROVIDER NOTES
HISTORY     Chief Complaint   Patient presents with    COVID-19 Concerns     Wants to be tested for covid     Review of patient's allergies indicates:   Allergen Reactions    No known drug allergies         HPI   75 year old male presents to the ER for covid 19 testing. Pt reports he is scheduled for a sleep apnea test this week and is required to have a negative covid 19 test before he have his testing performed. Pt denies any symptoms     The history is provided by the patient. No  was used.        PCP: Hesham Andrade Ii, MD     Past Medical History:  Past Medical History:   Diagnosis Date    Anxiety 7/3/2019    Basal cell carcinoma     Depression     Hepatitis B     Hepatitis C antibody test positive     HIV infection     Hypertension     Immune disorder     Mild cognitive impairment 10/1/2010        Past Surgical History:  Past Surgical History:   Procedure Laterality Date    APPENDECTOMY      CHOLECYSTECTOMY      COLONOSCOPY N/A 11/3/2016    Procedure: COLONOSCOPY;  Surgeon: Maxi Villasenor MD;  Location: [address]);  Service: Endoscopy;  Laterality: N/A;  last colonoscopy with Dr Jarrell    LEFT HEART CATHETERIZATION Left 2/3/2020    Procedure: CATHETERIZATION, HEART, LEFT;  Surgeon: Chele Ko MD;  Location: [facility] CATH LAB;  Service: Cardiology;  Laterality: Left;  malur pt        Family History:  Family History   Problem Relation Age of Onset    Heart disease Father     Heart failure Father     Diabetes Neg Hx     Hypertension Neg Hx         Social History:  Social History     Tobacco Use    Smoking status: Never Smoker    Smokeless tobacco: Never Used   Substance and Sexual Activity    Alcohol use: No     Frequency: Monthly or less     Drinks per session: 1 or 2     Binge frequency: Never    Drug use: Not on file    Sexual activity: Yes     Partners: Male         ROS   Review of Systems   Constitutional: Negative for chills, fatigue and  "fever.   HENT: Negative for sore throat.    Respiratory: Negative for shortness of breath.    Cardiovascular: Negative for chest pain.   Gastrointestinal: Negative for nausea.   Genitourinary: Negative for dysuria.   Musculoskeletal: Negative for back pain.   Skin: Negative for rash.   Neurological: Negative for weakness.   Hematological: Does not bruise/bleed easily.       PHYSICAL EXAM     Initial Vitals [06/08/20 1207]   BP Pulse Resp Temp SpO2   (!) 119/58 91 18 98.4 °F (36.9 °C) 95 %      MAP       --           Physical Exam    Nursing note and vitals reviewed.  Constitutional: He appears well-developed and well-nourished. He is not diaphoretic. No distress.   HENT:   Head: Normocephalic and atraumatic.   Right Ear: External ear normal.   Left Ear: External ear normal.   Mouth/Throat: Oropharynx is clear and moist. No oropharyngeal exudate.   Eyes: Conjunctivae are normal. Right eye exhibits no discharge. Left eye exhibits no discharge.   Neck: Normal range of motion. Neck supple.   Cardiovascular: Normal rate.   Pulmonary/Chest: Breath sounds normal. No respiratory distress. He has no wheezes. He has no rhonchi. He has no rales.   Abdominal: Soft. Bowel sounds are normal.   Musculoskeletal: Normal range of motion.   Neurological: He is alert and oriented to person, place, and time. He has normal strength.   Skin: Skin is warm and dry.   Psychiatric: He has a normal mood and affect. His behavior is normal. Thought content normal.          ED COURSE   Procedures  ED ONGOING VITALS:  Vitals:    06/08/20 1207   BP: (!) 119/58   Pulse: 91   Resp: 18   Temp: 98.4 °F (36.9 °C)   TempSrc: Oral   SpO2: 95%   Weight: 96.2 kg (212 lb)   Height: 5' 10" (1.778 m)         ABNORMAL LAB VALUES:  Labs Reviewed   SARS-COV-2 RNA AMPLIFICATION, QUAL         ALL LAB VALUES:  Results for orders placed or performed during the hospital encounter of 06/08/20   COVID-19 Rapid Screening   Result Value Ref Range    SARS-CoV-2 RNA, " Amplification, Qual Negative Negative           RADIOLOGY STUDIES:  Imaging Results    None                   The above vital signs and test results have been reviewed by the emergency provider.     ED Medications:  Current Discharge Medication List        Discharge Medications:  New Prescriptions    No medications on file      Follow-up Information     Hesham Andrade Ii, MD.    Specialty:  Internal Medicine  Why:  As needed  Contact information:  [address]  [facility] [address]  [phone]                  1:52 PM    I discussed with patient and/or family/caretaker that evaluation in the ED does not suggest any emergent or life threatening medical conditions requiring immediate intervention beyond what was provided in the ED, and I believe patient is safe for discharge. Regardless, an unremarkable evaluation in the ED does not preclude the development or presence of a serious or life threatening condition. As such, patient was instructed to return immediately for any worsening or change in current symptoms.        MEDICAL DECISION MAKING                 CLINICAL IMPRESSION       ICD-10-CM ICD-9-CM   1. Covid-19 Virus not Detected [id number]        Disposition:   Disposition: Discharged  Condition: Stable         Curt Sykes NP  06/08/20 [id number]

## 2020-06-09 ENCOUNTER — LAB VISIT (OUTPATIENT)
Dept: LAB | Facility: HOSPITAL | Age: 75
End: 2020-06-09
Attending: UROLOGY
Payer: MEDICARE

## 2020-06-09 DIAGNOSIS — N40.1 BPH WITH URINARY OBSTRUCTION: ICD-10-CM

## 2020-06-09 DIAGNOSIS — N13.8 BPH WITH URINARY OBSTRUCTION: ICD-10-CM

## 2020-06-09 DIAGNOSIS — E29.1 MALE HYPOGONADISM: ICD-10-CM

## 2020-06-09 DIAGNOSIS — Z79.899 ENCOUNTER FOR LONG-TERM (CURRENT) USE OF HIGH-RISK MEDICATION: ICD-10-CM

## 2020-06-09 LAB
ALBUMIN SERPL BCP-MCNC: 3.8 G/DL (ref 3.5–5.2)
ALP SERPL-CCNC: 35 U/L (ref 55–135)
ALT SERPL W/O P-5'-P-CCNC: 35 U/L (ref 10–44)
AST SERPL-CCNC: 29 U/L (ref 10–40)
BASOPHILS # BLD AUTO: 0.02 K/UL (ref 0–0.2)
BASOPHILS NFR BLD: 0.3 % (ref 0–1.9)
BILIRUB DIRECT SERPL-MCNC: 0.3 MG/DL (ref 0.1–0.3)
BILIRUB SERPL-MCNC: 3 MG/DL (ref 0.1–1)
CHOLEST SERPL-MCNC: 351 MG/DL (ref 120–199)
CHOLEST/HDLC SERPL: 10.6 {RATIO} (ref 2–5)
COMPLEXED PSA SERPL-MCNC: 1.3 NG/ML (ref 0–4)
DIFFERENTIAL METHOD: ABNORMAL
EOSINOPHIL # BLD AUTO: 0.2 K/UL (ref 0–0.5)
EOSINOPHIL NFR BLD: 3 % (ref 0–8)
ERYTHROCYTE [DISTWIDTH] IN BLOOD BY AUTOMATED COUNT: 14.3 % (ref 11.5–14.5)
HCT VFR BLD AUTO: 41 % (ref 40–54)
HDLC SERPL-MCNC: 33 MG/DL (ref 40–75)
HDLC SERPL: 9.4 % (ref 20–50)
HGB BLD-MCNC: 13.9 G/DL (ref 14–18)
IMM GRANULOCYTES # BLD AUTO: 0.01 K/UL (ref 0–0.04)
IMM GRANULOCYTES NFR BLD AUTO: 0.2 % (ref 0–0.5)
LDLC SERPL CALC-MCNC: ABNORMAL MG/DL (ref 63–159)
LYMPHOCYTES # BLD AUTO: 2.5 K/UL (ref 1–4.8)
LYMPHOCYTES NFR BLD: 39 % (ref 18–48)
MCH RBC QN AUTO: 37.5 PG (ref 27–31)
MCHC RBC AUTO-ENTMCNC: 33.9 G/DL (ref 32–36)
MCV RBC AUTO: 111 FL (ref 82–98)
MONOCYTES # BLD AUTO: 0.7 K/UL (ref 0.3–1)
MONOCYTES NFR BLD: 11.2 % (ref 4–15)
NEUTROPHILS # BLD AUTO: 3 K/UL (ref 1.8–7.7)
NEUTROPHILS NFR BLD: 46.3 % (ref 38–73)
NONHDLC SERPL-MCNC: 318 MG/DL
NRBC BLD-RTO: 0 /100 WBC
PLATELET # BLD AUTO: 241 K/UL (ref 150–350)
PMV BLD AUTO: 9.9 FL (ref 9.2–12.9)
PROT SERPL-MCNC: 8.6 G/DL (ref 6–8.4)
RBC # BLD AUTO: 3.71 M/UL (ref 4.6–6.2)
TESTOST SERPL-MCNC: 27 NG/DL (ref 304–1227)
TRIGL SERPL-MCNC: 1358 MG/DL (ref 30–150)
WBC # BLD AUTO: 6.44 K/UL (ref 3.9–12.7)

## 2020-06-09 PROCEDURE — 84153 ASSAY OF PSA TOTAL: CPT

## 2020-06-09 PROCEDURE — 85025 COMPLETE CBC W/AUTO DIFF WBC: CPT

## 2020-06-09 PROCEDURE — 84403 ASSAY OF TOTAL TESTOSTERONE: CPT

## 2020-06-09 PROCEDURE — 36415 COLL VENOUS BLD VENIPUNCTURE: CPT

## 2020-06-09 PROCEDURE — 80061 LIPID PANEL: CPT

## 2020-06-09 PROCEDURE — 80076 HEPATIC FUNCTION PANEL: CPT

## 2020-06-10 ENCOUNTER — HOSPITAL ENCOUNTER (OUTPATIENT)
Dept: SLEEP MEDICINE | Facility: HOSPITAL | Age: 75
Discharge: HOME | End: 2020-06-10
Attending: INTERNAL MEDICINE
Payer: MEDICARE

## 2020-06-10 ENCOUNTER — TELEPHONE (OUTPATIENT)
Dept: UROLOGY | Facility: CLINIC | Age: 75
End: 2020-06-10

## 2020-06-10 DIAGNOSIS — G47.61 PERIODIC LIMB MOVEMENT DISORDER (PLMD): ICD-10-CM

## 2020-06-10 DIAGNOSIS — Z72.821 INADEQUATE SLEEP HYGIENE: ICD-10-CM

## 2020-06-10 DIAGNOSIS — G47.33 OBSTRUCTIVE SLEEP APNEA SYNDROME: Primary | ICD-10-CM

## 2020-06-10 DIAGNOSIS — F51.12 BEHAVIORALLY INDUCED INSUFFICIENT SLEEP SYNDROME: ICD-10-CM

## 2020-06-10 PROCEDURE — 95810 PR POLYSOMNOGRAPHY, 4 OR MORE: ICD-10-PCS | Mod: 26,,, | Performed by: PSYCHOLOGIST

## 2020-06-10 PROCEDURE — 95810 POLYSOM 6/> YRS 4/> PARAM: CPT

## 2020-06-10 PROCEDURE — 95810 POLYSOM 6/> YRS 4/> PARAM: CPT | Mod: 26,,, | Performed by: PSYCHOLOGIST

## 2020-06-10 NOTE — TELEPHONE ENCOUNTER
Please notify his lipids are high.  He should see his PCP regarding this.    He didn't do his 1 month lab after testopel.

## 2020-06-17 ENCOUNTER — TELEPHONE (OUTPATIENT)
Dept: UROLOGY | Facility: CLINIC | Age: 75
End: 2020-06-17

## 2020-06-17 NOTE — TELEPHONE ENCOUNTER
Spoke to pt, informed testosterone pellets are on backorder and would not be in til august 2020. Pt verbalized understanding. New date scheduled.

## 2020-06-18 ENCOUNTER — OFFICE VISIT (OUTPATIENT)
Dept: INTERNAL MEDICINE | Facility: CLINIC | Age: 75
End: 2020-06-18
Payer: MEDICARE

## 2020-06-18 ENCOUNTER — PATIENT MESSAGE (OUTPATIENT)
Dept: INTERNAL MEDICINE | Facility: CLINIC | Age: 75
End: 2020-06-18

## 2020-06-18 VITALS
BODY MASS INDEX: 31.82 KG/M2 | DIASTOLIC BLOOD PRESSURE: 80 MMHG | HEIGHT: 70 IN | WEIGHT: 222.25 LBS | OXYGEN SATURATION: 97 % | SYSTOLIC BLOOD PRESSURE: 110 MMHG | TEMPERATURE: 98 F | HEART RATE: 86 BPM

## 2020-06-18 DIAGNOSIS — R42 POSTURAL DIZZINESS WITH PRESYNCOPE: ICD-10-CM

## 2020-06-18 DIAGNOSIS — I10 ESSENTIAL HYPERTENSION: ICD-10-CM

## 2020-06-18 DIAGNOSIS — E29.1 MALE HYPOGONADISM: Primary | ICD-10-CM

## 2020-06-18 DIAGNOSIS — G47.33 OSA (OBSTRUCTIVE SLEEP APNEA): Primary | ICD-10-CM

## 2020-06-18 DIAGNOSIS — R53.83 FATIGUE, UNSPECIFIED TYPE: ICD-10-CM

## 2020-06-18 DIAGNOSIS — B20 HIV DISEASE: Chronic | ICD-10-CM

## 2020-06-18 DIAGNOSIS — E78.2 MIXED HYPERLIPIDEMIA: Chronic | ICD-10-CM

## 2020-06-18 DIAGNOSIS — R55 POSTURAL DIZZINESS WITH PRESYNCOPE: ICD-10-CM

## 2020-06-18 PROBLEM — Z79.899 ENCOUNTER FOR LONG-TERM (CURRENT) USE OF HIGH-RISK MEDICATION: Status: RESOLVED | Noted: 2019-10-14 | Resolved: 2020-06-18

## 2020-06-18 PROCEDURE — 99215 OFFICE O/P EST HI 40 MIN: CPT | Mod: PBBFAC | Performed by: INTERNAL MEDICINE

## 2020-06-18 PROCEDURE — 99999 PR PBB SHADOW E&M-EST. PATIENT-LVL V: CPT | Mod: PBBFAC,,, | Performed by: INTERNAL MEDICINE

## 2020-06-18 PROCEDURE — 99214 OFFICE O/P EST MOD 30 MIN: CPT | Mod: S$PBB,,, | Performed by: INTERNAL MEDICINE

## 2020-06-18 PROCEDURE — 99214 PR OFFICE/OUTPT VISIT, EST, LEVL IV, 30-39 MIN: ICD-10-PCS | Mod: S$PBB,,, | Performed by: INTERNAL MEDICINE

## 2020-06-18 PROCEDURE — 99999 PR PBB SHADOW E&M-EST. PATIENT-LVL V: ICD-10-PCS | Mod: PBBFAC,,, | Performed by: INTERNAL MEDICINE

## 2020-06-18 RX ORDER — TESTOSTERONE CYPIONATE 1000 MG/10ML
100 INJECTION, SOLUTION INTRAMUSCULAR
Qty: 10 ML | Refills: 0 | Status: SHIPPED | OUTPATIENT
Start: 2020-06-18 | End: 2020-08-13 | Stop reason: SDUPTHER

## 2020-06-18 NOTE — PROCEDURES
Patient Name: Haritha Valle   Date of Report: 20  Date of PS/10/2020   [facility] No.: [id number]   : 1945                      Time of PSG:  10:25:49 PM - 5:02:54 AM  Sex:  Male   Age:  75   Weight:  212.0 lbs Height:  5  11          Type of PSG:  Diagnostic     REASONS FOR REFERRAL: Mr. Valle is a 75 year old male, referred to Dr. Rico Pineda and the [facility] by [place]. Deni Nguyen for evaluation of possible obstructive sleep apnea / hypopnea syndrome (OSAHS).  Dr. Pineda requested diagnostic polysomnography based on the patients reported loud snoring, observed respiratory pauses in sleep, unrefreshing sleep and daytime somnolence (all positive on STOP-bang).  His Butler Sleepiness Scale score was 13, clinically significant, and his STOP - BANG score was 7, high risk of RAMONITA.  Dr. Hesham Andrade is the patients primary care physician.     STUDY PARAMETERS: This diagnostic study involved analysis of the patient's sleep pattern while breathing unassisted. The study was performed with a sleep technologist in attendance for the entire test period, with video monitoring throughout the study, and routine laboratory clinical parameters recorded:  NOTE: The polysomnography electrophysiological record for the patient has been reviewed in its entirety by Dr. Allison.    SUMMARY STATEMENTS  DIAGNOSTIC IMPRESSIONS  G47.33  /  327.23  Moderate to Severe Obstructive Sleep Apnea, Adult (OSAHS)  G47.61  /  327.51  Severe Periodic Limb Movement (PLM) Disorder   F51.12   /  307.44  Mild Insufficient Sleep Syndrome  Z72.821 /  V69.4  Inadequate Sleep Hygiene      PRIMARY TREATMENT RECOMMENDATIONS  Treat, or refer to Sleep Disorders Center.  1. The diagnostic polysomnography revealed a moderate to severe obstructive sleep apnea / hypopnea syndrome (A + H Index = 26.8 events / hr asleep with 7.0 respiratory event - related arousals / hr asleep for the study, and no RERAs (respiratory effort -  related arousals).   The mean SpO2 value was 90.0 %, significant, minimum oxygen saturation during sleep was   79.0 %, and waking baseline SpO2 was 97 %.   Sporadic / infrequent , moderately loud snoring was noted.  A  CPAP titration polysomnography is recommended.      2. Weight loss to the normal range is recommended as it can decrease respiratory events and snoring in overweight patients.  3. The following changes in sleep hygiene / sleep - related behavior are recommended after medical treatments are successful   Regular bedtimes and wake times, including weekends: Total sleep time / night should not be more than one hour more             than usual, and bedtime or wake time should not be more than one hour earlier or later than usual.     Do not attempt to make up lost sleep by extending sleep periods.     Avoid naps; none longer than 20 min or later than mid - afternoon.    SECONDARY TREATMENT RECOMMENDATIONS  Treat, or refer to SDC if problems are not satisfactorily resolved by the above.  1. A severe  PLM disorder was observed (PLMS Index = 67.6 / hr asleep, but treatment might not be optimal because the PLMS were not very disruptive of sleep (6.6 arousals / hr asleep), and there were no signs of restless legs syndrome in the SDI,   H & P or PSG;  consider treatment of PLM disorder if PLMS symptoms are sufficiently bothersome to the patient.  Note that the benzodiazepine medications sometimes used to treat PLM disorder (e.g., alprazolam / Xanax) may exacerbate some sleep - related respiratory disorders, and that dopaminergic medications such as Mirapex and Requip can be used in such instances.    2. Mr. Valle is taking fluoxetine / Prozac.  Most tricyclic, and many SSRI antidepressants (e.g., fluoxetine - Prozac, sertraline   Zoloft, venlafaxine - Effexor), are associated with increased PLMS in some studies and increased RLS in others.  Consider   replacing Prozac with a medication known not to exacerbate PLMS or  RLS.  3. Consider behavioral and cognitive / behavioral treatments for anxiety and depression to complement the medication and to increase the probability of long - term adaptive change.  Sleep (quality) might be expected to further improve.  4. IMPORTANT  NOTE:   RAMONITA, PLMS, and stress - related arousals (anxiety and depression)  interact to significantly impair sleep quality and restoratives, making treatment of each and all of these disorders very important  for restorative sleep.     See below for a complete interpretation of data from the polysomnography and Sleep Disorders Inventory.     Thank you for referring this patient to the [facility].      Golden Allison, Ph.D., ABPP; Diplomate, American Board of Sleep Medicine

## 2020-06-18 NOTE — PROGRESS NOTES
Subjective:       Patient ID: Haritha Valle is a 75 y.o. male.    Chief Complaint:   Follow-up    HPI - Mr Tori and his long term partner came to evaluate his fatigue.  He has had overwhelming fatigue for months; has been off his testosterone for this period as well.  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  Lots of financial stress.  Uro appt canceled d/t lack of medication.  Last testosterone level was remarkably low.  He's in the midst of evaluation for RAMONITA as well.  HIV has been well controlled as has BP.    PMH:  HIV, with excellent control - ND viral load and CD4 count over 500  HTN  Anxiety and depression  Cognitive impairment  Dizziness, undergoing workup at the moment  Hypogonadism     Meds: Reviewed and reconciled in EPIC with patient during visit today.     Review of Systems   Constitutional: Negative for activity change and unexpected weight change.   HENT: Negative for hearing loss, rhinorrhea and trouble swallowing.    Eyes: Negative for discharge and visual disturbance.   Respiratory: Negative for chest tightness and wheezing.    Cardiovascular: Negative for chest pain and palpitations.   Gastrointestinal: Negative for blood in stool, constipation, diarrhea and vomiting.   Endocrine: Negative for polydipsia and polyuria.   Genitourinary: Negative for difficulty urinating, hematuria and urgency.   Musculoskeletal: Negative for arthralgias, joint swelling and neck pain.   Skin: Negative for rash.   Neurological: Negative for weakness and headaches.   Psychiatric/Behavioral: Negative for confusion and dysphoric mood.       Objective:      Physical Exam  Vitals signs and nursing note reviewed.   Constitutional:       General: He is not in acute distress.     Appearance: He is well-developed. He is not diaphoretic.      Comments: Friendly, well-appearing man in no distress   HENT:      Head: Normocephalic and atraumatic.   Cardiovascular:      Rate and Rhythm: Normal rate  and regular rhythm.      Heart sounds: Normal heart sounds. No murmur. No friction rub. No gallop.    Pulmonary:      Effort: No respiratory distress.      Breath sounds: No wheezing or rales.   Chest:      Chest wall: No tenderness.   Skin:     General: Skin is warm.      Findings: No erythema.   Neurological:      Mental Status: He is alert and oriented to person, place, and time.   Psychiatric:         Thought Content: Thought content normal.         Assessment:       1. Male hypogonadism    2. Essential hypertension    3. HIV disease    4. Fatigue, unspecified type    5. Postural dizziness with presyncope    6. Mixed hyperlipidemia        Plan:       Haritha was seen today for follow-up.    Diagnoses and all orders for this visit:    Male hypogonadism - will go back to injections until more sophisticated prescription is available  -     testosterone cypionate (DEPOTESTOTERONE CYPIONATE) 100 mg/mL injection; Inject 1 mL (100 mg total) into the muscle every 14 (fourteen) days.    Essential hypertension - at goal, stay the course    HIV disease - stable    Fatigue, unspecified type - likely d/t untreated hypogonadism    Postural dizziness with presyncope - as above.  Hard to interpret in this setting of profound hypogonadism    Mixed hyperlipidemia - numbers are quite high    rtc prn, or in 6 months    G Brandy Andrade MD MPH  Staff Internist

## 2020-06-18 NOTE — PROGRESS NOTES
Orders Placed This Encounter   Procedures    CPAP Titration (Must have dx of RAMONITA from previous sleep study.)     1. The diagnostic polysomnography revealed a moderate to severe   obstructive sleep apnea / hypopnea syndrome (A + H Index = 26.8   events / hr asleep with 7.0 respiratory event - related arousals   / hr asleep for the study, and no RERAs (respiratory effort -     related arousals).  The mean SpO2 value was 90.0 %, significant,   minimum oxygen saturation during sleep was   79.0 %, and waking baseline SpO2 was 97 %.   Sporadic /   infrequent , moderately loud snoring was noted.  A  CPAP   titration polysomnography is recommended     Standing Status:   Future     Standing Expiration Date:   6/18/2021

## 2020-06-19 ENCOUNTER — TELEPHONE (OUTPATIENT)
Dept: PULMONOLOGY | Facility: CLINIC | Age: 75
End: 2020-06-19

## 2020-06-19 NOTE — TELEPHONE ENCOUNTER
----- Message from Rico Pineda MD sent at 6/18/2020  5:26 PM CDT -----  Please let patient know test shows MODERATE OBSTRUCTIVE SLEEP APNEA AHI 26.8/hr.  Needs to return for second test with CPAP, I will order

## 2020-06-19 NOTE — TELEPHONE ENCOUNTER
Left vm informing pt of sleep study results and that Girish will call to schedule cpap titration. Return call with any questions.

## 2020-06-30 RX ORDER — METOPROLOL SUCCINATE 50 MG/1
50 TABLET, EXTENDED RELEASE ORAL DAILY
Qty: 30 TABLET | Refills: 3 | Status: SHIPPED | OUTPATIENT
Start: 2020-06-30 | End: 2020-12-14

## 2020-07-05 ENCOUNTER — PATIENT MESSAGE (OUTPATIENT)
Dept: INTERNAL MEDICINE | Facility: CLINIC | Age: 75
End: 2020-07-05

## 2020-07-09 ENCOUNTER — OFFICE VISIT (OUTPATIENT)
Dept: SLEEP MEDICINE | Facility: CLINIC | Age: 75
End: 2020-07-09
Payer: MEDICARE

## 2020-07-09 VITALS
DIASTOLIC BLOOD PRESSURE: 80 MMHG | SYSTOLIC BLOOD PRESSURE: 120 MMHG | BODY MASS INDEX: 31.78 KG/M2 | HEART RATE: 77 BPM | HEIGHT: 70 IN | RESPIRATION RATE: 18 BRPM | WEIGHT: 222 LBS | OXYGEN SATURATION: 96 %

## 2020-07-09 DIAGNOSIS — E78.2 MIXED HYPERLIPIDEMIA: Chronic | ICD-10-CM

## 2020-07-09 DIAGNOSIS — G47.33 OBSTRUCTIVE SLEEP APNEA SYNDROME: Primary | ICD-10-CM

## 2020-07-09 DIAGNOSIS — B20 HIV DISEASE: Chronic | ICD-10-CM

## 2020-07-09 DIAGNOSIS — G47.61 PERIODIC LIMB MOVEMENT DISORDER (PLMD): ICD-10-CM

## 2020-07-09 DIAGNOSIS — I10 ESSENTIAL HYPERTENSION: ICD-10-CM

## 2020-07-09 PROCEDURE — 99213 OFFICE O/P EST LOW 20 MIN: CPT | Mod: PBBFAC | Performed by: INTERNAL MEDICINE

## 2020-07-09 PROCEDURE — 99999 PR PBB SHADOW E&M-EST. PATIENT-LVL III: ICD-10-PCS | Mod: PBBFAC,,, | Performed by: INTERNAL MEDICINE

## 2020-07-09 PROCEDURE — 99214 PR OFFICE/OUTPT VISIT, EST, LEVL IV, 30-39 MIN: ICD-10-PCS | Mod: S$PBB,,, | Performed by: INTERNAL MEDICINE

## 2020-07-09 PROCEDURE — 99214 OFFICE O/P EST MOD 30 MIN: CPT | Mod: S$PBB,,, | Performed by: INTERNAL MEDICINE

## 2020-07-09 PROCEDURE — 99999 PR PBB SHADOW E&M-EST. PATIENT-LVL III: CPT | Mod: PBBFAC,,, | Performed by: INTERNAL MEDICINE

## 2020-07-09 NOTE — ASSESSMENT & PLAN NOTE
[place] score 3  PSG 06/10/2020: AHI 26.8/hr  Comorbidity: HTN HLD, Dementia    My recommendation at this point would be to set up a CPAP titration  Severe PLMD

## 2020-07-09 NOTE — PATIENT INSTRUCTIONS
What Are CPAP and Other Air Pressure Treatments?   Continuous positive air pressure (CPAP) uses gentle air pressure to hold the airway open. CPAP is often the most effective treatment for sleep apnea and severe snoring. It works very well for many people. But keep in mind that it can take several adjustments before the setup is right for you.   How CPAP Works   A small portable pump beside the bed sends air through a hose, which is held over your nose by a mask. Air is gently pushed through your airway. The air pressure nudges sagging tissues aside. This widens the airway so you can breathe better. CPAP may be combined with other kinds of therapy for sleep apnea.      A mask over the nose gently directs air into the throat to keep the airway open.      Types of Air Pressure Treatments   There are different types of CPAP. Your doctor or CPAP technician will help you decide which type is best for you:   Basic CPAP keeps the pressure constant all night long.   A bilevel device gives out more pressure when you breathe in and less when you breathe out.   An autoCPAP device automatically adjusts pressure throughout the night and in response to changes such as body position, sleep stage, and snoring.      Please call office [phone] for any questions

## 2020-07-09 NOTE — ASSESSMENT & PLAN NOTE
Index was     PLMS Index = 67.6 / hr asleep,     Does not have symptoms  This may be related to untreated obstructive sleep apnea  Will monitor after CPAP night titrated

## 2020-07-09 NOTE — PROGRESS NOTES
Subjective:       Patient ID: Haritha Valle is a 75 y.o. male.  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    Anxiety    Erectile dysfunction due to arterial insufficiency    Postural dizziness with presyncope    BPH with urinary obstruction    Abnormal nuclear cardiac imaging test    Obstructive sleep apnea syndrome    Periodic limb movement disorder (PLMD)    Behaviorally induced insufficient sleep syndrome    Inadequate sleep hygiene      Social History     Tobacco Use   Smoking Status Never Smoker   Smokeless Tobacco Never Used      Immunization History   Administered Date(s) Administered    Influenza - High Dose - PF (65 years and older) 09/24/2013, 12/02/2014, 09/27/2016, 09/27/2017, 12/12/2018, 10/01/2019    Influenza - Quadrivalent - PF (6 months and older) 10/24/2007, 01/08/2009, 11/03/2009, 09/23/2010, 01/24/2013    Influenza A (H1N1) 2009 Monovalent - IM 11/03/2009    Influenza Split 01/24/2013    Pneumococcal Conjugate - 13 Valent 07/23/2015    Pneumococcal Polysaccharide - 23 Valent 09/27/2016    Tdap 01/08/2009, 07/03/2019    Zoster 01/08/2009    Zoster Recombinant 05/09/2018, 06/08/2018, 07/18/2018      EPWORTH SLEEPINESS SCALE 7/9/2020   Sitting and reading 0   Watching TV 1   Sitting, inactive in a public place (e.g. a theatre or a meeting) 0   As a passenger in a car for an hour without a break 0   Lying down to rest in the afternoon when circumstances permit 1   Sitting and talking to someone 0   Sitting quietly after a lunch without alcohol 1   In a car, while stopped for a few minutes in traffic 0   Total score 3       STOP BANG Questionnaire  BMI (Calculated): 31.9     Chief Complaint:   Haritha Valle  is 75 y.o.   Here to review PSG: +ve for severe RAMONITA  Goals of CPAP discussed  Will follow after CPAP study  Also had.  It limb movements although not symptomatic  Will follow up after titration    Worked as     The following portions of the patient's history were reviewed and updated as appropriate:   He  has a past medical history of Anxiety (7/3/2019), Basal cell carcinoma, Depression, Hepatitis B, Hepatitis C antibody test positive, HIV infection, Hypertension, Immune disorder, and Mild cognitive impairment (10/1/2010).  He does not have any pertinent problems on file.  He  has a past surgical history that includes Colonoscopy (N/A, 11/3/2016); Cholecystectomy; Appendectomy; and Left heart catheterization (Left, 2/3/2020).  His family history includes Heart disease in his father; Heart failure in his father.  He  reports that he has never smoked. He has never used smokeless tobacco. He reports that he does not drink alcohol. No history on file for drug.  He has a current medication list which includes the following prescription(s): acyclovir, alprazolam, ascorbic acid (vitamin c), aspirin, atazanavir, benazepril-hydrochlorthiazide, co-enzyme q-10, cyclobenzaprine, fluoxetine, lamivudine-zidovudine 150-300mg, metoprolol succinate, pravastatin, and testosterone cypionate, and the following Facility-Administered Medications: testosterone.  Current Outpatient Medications on File Prior to Visit   Medication Sig Dispense Refill    acyclovir (ZOVIRAX) 400 MG tablet Take 1 tablet (400 mg total) by mouth 3 (three) times daily. 30 tablet 0    ALPRAZolam (XANAX) 0.25 MG tablet TAKE TWO TABLETS AT BEDTIME, IN ADDITION, MAY TAKE ONE TABLET TWICE DAILY IF NEEDED FOR ANXIETY 60 tablet 3    ascorbic acid, vitamin C, (VITAMIN C) 1000 MG tablet Take 1,000 mg by mouth once daily.      aspirin (ECOTRIN) 81 MG EC tablet Take 1 tablet (81 mg total) by mouth once daily. 30 tablet 0    atazanavir (REYATAZ) 200 MG Cap  "TAKE TWO CAPSULES BY MOUTH DAILY 60 capsule 6    benazepril-hydrochlorthiazide (LOTENSIN HCT) 10-12.5 mg Tab TAKE 1 TABLET BY MOUTH ONCE DAILY 90 tablet 3    co-enzyme Q-10 30 mg capsule Take 100 mg by mouth 3 (three) times daily.      cyclobenzaprine (FLEXERIL) 10 MG tablet Take 1 tablet (10 mg total) by mouth 3 (three) times daily. 30 tablet 3    FLUoxetine 40 MG capsule Take 1 capsule (40 mg total) by mouth once daily. 30 capsule 11    lamivudine-zidovudine 150-300mg (COMBIVIR) 150-300 mg Tab TAKE ONE TABLET EVERY 12  HOURS 60 tablet 6    metoprolol succinate (TOPROL-XL) 50 MG 24 hr tablet Take 1 tablet (50 mg total) by mouth once daily. 30 tablet 3    pravastatin (PRAVACHOL) 40 MG tablet TAKE (1) TABLET DAILY. 90 tablet 3    testosterone cypionate (DEPOTESTOTERONE CYPIONATE) 100 mg/mL injection Inject 1 mL (100 mg total) into the muscle every 14 (fourteen) days. 10 mL 0     Current Facility-Administered Medications on File Prior to Visit   Medication Dose Route Frequency Provider Last Rate Last Dose    testosterone Pllt 12 Pellet  12 Pellet Implant 1 time in Clinic/HOD Pascual Kenny MD         He is allergic to no known drug allergies..     Review of Systems   Respiratory: Positive for sleep disturbances due to breathing and snoring.    All other systems reviewed and are negative.     Objective:        Vitals:    07/09/20 0817   BP: 120/80   Pulse: 77   Resp: 18   SpO2: 96%   Weight: 100.7 kg (222 lb 0.1 oz)   Height: 5' 10" (1.778 m)     Physical Exam  Vitals signs and nursing note reviewed.   Constitutional:       Appearance: He is well-developed.   HENT:      Head: Normocephalic and atraumatic.      Nose: Nose normal.   Eyes:      Pupils: Pupils are equal, round, and reactive to light.   Neck:      Musculoskeletal: Normal range of motion and neck supple.      Comments: 17"  Cardiovascular:      Rate and Rhythm: Normal rate and regular rhythm.      Heart sounds: Normal heart sounds. No murmur. No " friction rub.   Pulmonary:      Effort: Pulmonary effort is normal. No respiratory distress.      Breath sounds: Normal breath sounds. No stridor. No wheezing or rales.   Abdominal:      Palpations: Abdomen is soft.   Musculoskeletal: Normal range of motion.   Skin:     General: Skin is warm and dry.      Capillary Refill: Capillary refill takes 2 to 3 seconds.   Neurological:      Mental Status: He is alert and oriented to person, place, and time.      Cranial Nerves: No cranial nerve deficit.          Data Review:   CBC:   Lab Results   Component Value Date    WBC 6.44 06/09/2020    RBC 3.71 (L) 06/09/2020    HGB 13.9 (L) 06/09/2020    HCT 41.0 06/09/2020     06/09/2020     BMP:   Lab Results   Component Value Date    GLU 90 01/28/2020     01/28/2020    K 4.2 01/28/2020     01/28/2020    CO2 24 01/28/2020    BUN 18 01/28/2020    CREATININE 0.9 01/28/2020    CALCIUM 10.2 01/28/2020     Radiology review: *   Diagnostics:   SLEEP STUDY  The diagnostic polysomnography revealed a moderate to severe   obstructive sleep apnea / hypopnea syndrome (A + H Index = 26.8   events / hr asleep with 7.0 respiratory event - related arousals   / hr asleep for the study, and no RERAs (respiratory effort -     related arousals).  The mean SpO2 value was 90.0 %, significant,   minimum oxygen saturation during sleep was   79.0 %, and waking baseline SpO2 was 97 %.   Sporadic /   infrequent , moderately loud snoring was noted.  A  CPAP   titration polysomnography is recommended           Assessment:      Problem List Items Addressed This Visit     HIV disease (Chronic)     On reyataz + combivir. HIV viral load not detectable. SY0=677. Has chronic stable lipdystrophy         Hyperlipidemia (Chronic)     STABLE on PRAVACHOL and VASCEPA         Essential hypertension     STABLE on LOTENSIN         Obstructive sleep apnea syndrome - Primary     [place] score 3  PSG 06/10/2020: AHI 26.8/hr  Comorbidity: HTN HLD,  Dementia    My recommendation at this point would be to set up a CPAP titration  Severe PLMD         Periodic limb movement disorder (PLMD)     Index was     PLMS Index = 67.6 / hr asleep,     Does not have symptoms  This may be related to untreated obstructive sleep apnea  Will monitor after CPAP night titrated              Plan:     Discussed therapeutic goals for positive airway pressure therapy(CPAP or BiPAP):   Ideal is usage 100% of nights for 6 - 8 hours per night. Minimum usage is 70% of night for at least 4 hours per night used. Pateint expressed understanding. All Questions answered.          Follow up in about 3 months (around 10/9/2020), or virtual visit after CPAP study and using CPAP.    This note was prepared using voice recognition system and is likely to have sound alike errors that may have been overlooked even after proof reading.  Please call me with any questions    Discussed diagnosis, its evaluation, treatment and usual course. All questions answered.    Thank you for the courtesy of participating in the care of this patient    Rico Pineda MD

## 2020-07-13 ENCOUNTER — LAB VISIT (OUTPATIENT)
Dept: OTOLARYNGOLOGY | Facility: CLINIC | Age: 75
End: 2020-07-13
Payer: MEDICARE

## 2020-07-13 DIAGNOSIS — G47.33 OBSTRUCTIVE SLEEP APNEA SYNDROME: ICD-10-CM

## 2020-07-13 PROCEDURE — U0003 INFECTIOUS AGENT DETECTION BY NUCLEIC ACID (DNA OR RNA); SEVERE ACUTE RESPIRATORY SYNDROME CORONAVIRUS 2 (SARS-COV-2) (CORONAVIRUS DISEASE [COVID-19]), AMPLIFIED PROBE TECHNIQUE, MAKING USE OF HIGH THROUGHPUT TECHNOLOGIES AS DESCRIBED BY CMS-2020-01-R: HCPCS

## 2020-07-14 LAB — SARS-COV-2 RNA RESP QL NAA+PROBE: NOT DETECTED

## 2020-07-16 ENCOUNTER — HOSPITAL ENCOUNTER (OUTPATIENT)
Dept: SLEEP MEDICINE | Facility: HOSPITAL | Age: 75
Discharge: HOME | End: 2020-07-16
Attending: INTERNAL MEDICINE
Payer: MEDICARE

## 2020-07-16 DIAGNOSIS — G47.33 OBSTRUCTIVE SLEEP APNEA: Primary | ICD-10-CM

## 2020-07-16 DIAGNOSIS — G47.61 PERIODIC LIMB MOVEMENT DISORDER (PLMD): ICD-10-CM

## 2020-07-16 DIAGNOSIS — Z72.821 INADEQUATE SLEEP HYGIENE: ICD-10-CM

## 2020-07-16 DIAGNOSIS — F51.12 BEHAVIORALLY INDUCED INSUFFICIENT SLEEP SYNDROME: ICD-10-CM

## 2020-07-16 PROCEDURE — 95811 POLYSOM 6/>YRS CPAP 4/> PARM: CPT

## 2020-07-16 PROCEDURE — 95811 PR POLYSOMNOGRAPHY W/CPAP: ICD-10-PCS | Mod: 26,,, | Performed by: PSYCHOLOGIST

## 2020-07-16 PROCEDURE — 95811 POLYSOM 6/>YRS CPAP 4/> PARM: CPT | Mod: 26,,, | Performed by: PSYCHOLOGIST

## 2020-07-20 NOTE — TELEPHONE ENCOUNTER
Pt has been notified his lipid level is elevated and he needs to follow up with pcp. Pt also notified testopel is on backorder and would not expecting delivery until late august. Pt verbalized understanding. appt rescheduled after expected arrival date of testopel.

## 2020-07-22 NOTE — PROCEDURES
Patient Name: Haritha Valle   Date of Report: 20     Date of PS20  [facility] No.: [id number]   : 46                   Time of PSG:  10:25 PM - 5:00 AM  Sex:  M   Age:  75   Weight:  212 lbs  Height:  5  11       Type of PSG:  CPAP titration    REASONS FOR REFERRAL: Mr. Valle  is a 75 year old male, referred for CPAP titration polysomnography by Rico Pineda, based on his diagnostic PSG of 6-10-20, which revealed Apnea + Hypopnea Index = 26.8 events / hr asleep, moderate to severe obstructive sleep apnea / hypopnea syndrome (OSAHS).  CPAP titration was recommended by the interpreting sleep specialist, Dr. Golden Allison, and Dr. Pineda, the originating referring physician ordered it.  Dr. Hesham Andrade is the patients primary care physician.    STUDY PARAMETERS: This study involved analysis of the patient's sleep pattern while breathing was assisted. The study was performed with a sleep technologist in attendance for the entire test period, with video monitoring throughout the study, and routine laboratory clinical parameters recorded:  NOTE: The polysomnography electrophysiological record for the patient has been reviewed in its entirety by Dr. Allison.    DIAGNOSTIC IMPRESSIONS  G47.33  /  327.23  Moderate to Severe Obstructive Sleep Apnea, Adult (OSAHS)  G47.61  /  327.51  Severe Periodic Limb Movement (PLM) Disorder   F51.12   /   307.44  Mild Insufficient Sleep Syndrome  Z72.821 /  V69.4  Inadequate Sleep Hygiene    ADDITIONAL OR REVISED DIAGNOSTIC IMPRESSIONS   None    SUMMARY STATEMENTS    PRIMARY TREATMENT RECOMMENDATIONS  Treat, or refer to Sleep Disorders Center.  1. CPAP was initiated at  10:26 pm.  The titration polysomnography revealed that 7 cm H2O pressure (C - Flex 3, humidity 2), the most effective pressure most completely tested, was optimal, as it reduced the rate of respiratory events 94 % to A + H Index = 1.3 events / hr asleep in 3.2 hrs sleep (0.5 hrs REM sleep).   Higher pressure also would be successful (9 cm A + H I = 3.0, with 0.4 hrs REM sleep in 1.0 hrs sleep). Snoring was eliminated at all pressures tested. AutoCPAP (4 cm - 20 cm) could be tried if necessary.    The overall A + H Index was 1.7 / hr asleep, with  no respiratory event - related arousals or RERAs for the titration trial.  The mean SpO2 value was 94.1 % throughout the study,  moderate, with a minimum oxygen saturation during sleep of   88 %  (waking baseline SpO2 was 97 %).   A medium ResMed Air Fit  F20 [place]  full -  face  CPAP mask was used and was well - tolerated.  Please see PAP trial  outcomes table, below.  2     Mr. Marc is taking alprazolam / Xanax, a benzodiazepine that can cause daytime hypersomnolence and may exacerbate         OSAHS (and possibly snoring) because of its muscle relaxant properties.  3.    Weight loss to the normal range is recommended as it can decrease respiratory events and snoring in overweight patients.    SECONDARY TREATMENT RECOMMENDATIONS  Treat, or refer to SDC if problems are not satisfactorily resolved by the above.  1. A severe PLM disorder was observed (PLMS Index = 53.8 / hr sleep), but treatment might not be optimal because the PLMS were not disruptive of sleep (2.1 arousals / hr asleep), and there were no signs of restless legs syndrome in the SDI, H & P or PSG;  He also had a severe PLMS disorder during the dx PSG (67.6 events / hr asleep) which suggests considering treatment of PLM disorder if PLMS symptoms are sufficiently bothersome to the patient. Note that the benzodiazepine medications sometimes used to treat PLM disorder (e.g., alprazolam / Xanax) may exacerbate some sleep - related respiratory disorders, and that dopaminergic medications such as Mirapex and Requip can be used in such instances.      The following treatment recommendations from the Sleep Disorder Evaluation of  6-18-20  likely still apply:    PRIMARY TREATMENT RECOMMENDATIONS  Treat,  or refer to Sleep Disorders Center.  1. The diagnostic polysomnography revealed a moderate to severe obstructive sleep apnea / hypopnea syndrome (A + H Index = 26.8 events / hr asleep with 7.0 respiratory event - related arousals / hr asleep for the study, and no RERAs (respiratory effort -  related arousals).  The mean SpO2 value was 90.0 %, significant, minimum oxygen saturation during sleep was 79.0 %, and waking baseline SpO2 was 97 %.   Sporadic / infrequent , moderately loud snoring was noted.  A  CPAP titration polysomnography is recommended.      2. Weight loss to the normal range is recommended as it can decrease respiratory events and snoring in overweight patients.  3. The following changes in sleep hygiene / sleep - related behavior are recommended after medical treatments are successful   Regular bedtimes and wake times, including weekends: Total sleep time / night should not be more than one hour more              than usual, and bedtime or wake time should not be more than one hour earlier or later than usual.     Do not attempt to make up lost sleep by extending sleep periods.     Avoid naps; none longer than 20 min or later than mid - afternoon.    SECONDARY TREATMENT RECOMMENDATIONS  Treat, or refer to SDC if problems are not satisfactorily resolved by the above.  2. A severe  PLM disorder was observed (PLMS Index = 67.6 / hr asleep, but treatment might not be optimal because the PLMS were not very disruptive of sleep (6.6 arousals / hr asleep), and there were no signs of restless legs syndrome in the SDI,   H & P or PSG;  consider treatment of PLM disorder if PLMS symptoms are sufficiently bothersome to the patient.  Note that the benzodiazepine medications sometimes used to treat PLM disorder (e.g., alprazolam / Xanax) may exacerbate some sleep - related respiratory disorders, and that dopaminergic medications such as Mirapex and Requip can be used in such instances.    3. Mr. Valle is taking  fluoxetine / Prozac.  Most tricyclic, and many SSRI antidepressants (e.g., fluoxetine - Prozac, sertraline -   Zoloft, venlafaxine - Effexor), are associated with increased PLMS in some studies and increased RLS in others.  Consider   replacing Prozac with a medication known not to exacerbate PLMS or RLS.  4. Consider behavioral and cognitive / behavioral treatments for anxiety and depression to complement the medication and to increase the probability of long - term adaptive change.  Sleep (quality) might be expected to further improve.  5. IMPORTANT  NOTE:  RAMONITA, PLMS, and stress - related arousals (anxiety and depression)  interact to significantly impair sleep quality and restorativeness, making treatment of each and all of these disorders very important  for restorative sleep     See below for a complete interpretation of data from the polysomnography and Sleep Disorders Inventory.     Thank you for referring this patient to the [facility].      Golden Allison, Ph.D., ABPP; Diplomate, American Board of Sleep Medicine

## 2020-07-24 ENCOUNTER — TELEPHONE (OUTPATIENT)
Dept: PULMONOLOGY | Facility: CLINIC | Age: 75
End: 2020-07-24

## 2020-07-24 DIAGNOSIS — G47.33 OSA (OBSTRUCTIVE SLEEP APNEA): Primary | ICD-10-CM

## 2020-07-24 NOTE — TELEPHONE ENCOUNTER
Orders Placed This Encounter   Procedures    [facility] PATIENT ENTERED CPAP USAGE     Order Specific Question:   Patient consents to share [place] CPAP usage and settings data with [facility]?     Answer:   Yes    CPAP FOR HOME USE     CPAP was initiated at  10:26 pm.  The titration   polysomnography revealed that 7 cm H2O pressure (C - Flex 3,   humidity 2), the most effective pressure most completely tested,   was optimal, as it reduced the rate of respiratory events 94 % to   A + H Index = 1.3 events / hr asleep in 3.2 hrs sleep (0.5 hrs   REM sleep).  Higher pressure also would be successful (9 cm A + H   I = 3.0, with 0.4 hrs REM sleep in 1.0 hrs sleep). Snoring was   eliminated at all pressures tested. AutoCPAP (4 cm - 20 cm) could   be tried if necessary.     Order Specific Question:   Type:     Answer:   CPAP     Order Specific Question:   CPAP setting (cmH20):     Answer:   7     Order Specific Question:   Length of need (1-99 months):     Answer:   99     Order Specific Question:   Humidification:     Answer:   Heated     Order Specific Question:   Type of mask:     Answer:   FFM     Order Specific Question:   Headgear?     Answer:   Yes     Order Specific Question:   Tubing?     Answer:   Yes     Order Specific Question:   Humidifier chamber?     Answer:   Yes     Order Specific Question:   Chin strap?     Answer:   Yes     Order Specific Question:   Filters?     Answer:   Yes

## 2020-07-30 ENCOUNTER — PATIENT MESSAGE (OUTPATIENT)
Dept: INTERNAL MEDICINE | Facility: CLINIC | Age: 75
End: 2020-07-30

## 2020-08-03 ENCOUNTER — PATIENT MESSAGE (OUTPATIENT)
Dept: INTERNAL MEDICINE | Facility: CLINIC | Age: 75
End: 2020-08-03

## 2020-08-03 DIAGNOSIS — G44.59 OTHER COMPLICATED HEADACHE SYNDROME: Primary | ICD-10-CM

## 2020-08-04 ENCOUNTER — TELEPHONE (OUTPATIENT)
Dept: NEUROLOGY | Facility: CLINIC | Age: 75
End: 2020-08-04

## 2020-08-04 NOTE — TELEPHONE ENCOUNTER
Pt returned call. He is not looking for an appt for ha. He really just wanted to follow up with Dr. Talley. He stated he is having all kinds of problems. His most concerning issue is acute dizziness. I explained I do not have any established appts avail with Dr. Talley and she is out until further notice. I attempted to find np for dizziness and pt declined first available with Dr. Roman in Nov. Pt is going to call around and see if he can an appt in [place]. I explained I did look but nothing came back as available. Pt is going to call and see if he can be fitted in. Pt was placed on wait list.

## 2020-08-06 ENCOUNTER — TELEPHONE (OUTPATIENT)
Dept: PULMONOLOGY | Facility: CLINIC | Age: 75
End: 2020-08-06

## 2020-08-06 ENCOUNTER — TELEPHONE (OUTPATIENT)
Dept: INTERNAL MEDICINE | Facility: CLINIC | Age: 75
End: 2020-08-06

## 2020-08-06 DIAGNOSIS — G47.33 OSA ON CPAP: Primary | ICD-10-CM

## 2020-08-06 NOTE — TELEPHONE ENCOUNTER
Orders Placed This Encounter   Procedures    CPAP/BIPAP SUPPLIES     [facility] for CPAP/Oxygen/Nebulizer supplies.  Customer Service: [phone]  Call: [phone]  Fax: [phone]  Billing Inquiries: [phone] or [phone]     Order Specific Question:   Type of mask:     Answer:   Nasal     Order Specific Question:   Headgear?     Answer:   Yes     Order Specific Question:   Tubing?     Answer:   Yes     Order Specific Question:   Humidifier chamber?     Answer:   Yes     Order Specific Question:   Chin strap?     Answer:   Yes     Order Specific Question:   Filters?     Answer:   Yes     Order Specific Question:   Cushions?     Answer:   Yes     Order Specific Question:   Length of need (1-99 months):     Answer:   99

## 2020-08-06 NOTE — TELEPHONE ENCOUNTER
----- Message from Kamala Corcoran sent at 8/6/2020  2:29 PM CDT -----  Contact: [facility]/ [phone]  Please call Celestina with [facility] , patient would like to change the order to nasal pillow for his cpap machine.. Please fax order and clinical notes with the titration study to .

## 2020-08-06 NOTE — TELEPHONE ENCOUNTER
----- Message from Flaquita Gómez MA sent at 8/6/2020  3:51 PM CDT -----  Contact: pt  Can you put in a order for pt to get nasal mask instead of full face so I can fax to [email or web address].   ----- Message -----  From: Clare Mckeon  Sent: 8/6/2020  11:41 AM CDT  To: Miriam Gómez Staff    Pt called to request a call back regarding sleep machine

## 2020-08-07 NOTE — TELEPHONE ENCOUNTER
"Please call.  There is no option for "nasal pillow" in ordering cpap in Epic.  I'm fine with that option - see if she will take a verbal order.    Please print out the cpap study and send to her as well.    Thank you.    D  "

## 2020-08-13 ENCOUNTER — OFFICE VISIT (OUTPATIENT)
Dept: CARDIOLOGY | Facility: CLINIC | Age: 75
End: 2020-08-13
Payer: MEDICARE

## 2020-08-13 ENCOUNTER — OFFICE VISIT (OUTPATIENT)
Dept: INTERNAL MEDICINE | Facility: CLINIC | Age: 75
End: 2020-08-13
Payer: MEDICARE

## 2020-08-13 VITALS
HEIGHT: 70 IN | WEIGHT: 224.63 LBS | OXYGEN SATURATION: 94 % | DIASTOLIC BLOOD PRESSURE: 73 MMHG | BODY MASS INDEX: 32.16 KG/M2 | SYSTOLIC BLOOD PRESSURE: 122 MMHG | HEART RATE: 107 BPM

## 2020-08-13 VITALS
SYSTOLIC BLOOD PRESSURE: 140 MMHG | OXYGEN SATURATION: 96 % | HEART RATE: 81 BPM | BODY MASS INDEX: 32.01 KG/M2 | HEIGHT: 70 IN | DIASTOLIC BLOOD PRESSURE: 90 MMHG | WEIGHT: 223.56 LBS

## 2020-08-13 DIAGNOSIS — I10 ESSENTIAL HYPERTENSION: ICD-10-CM

## 2020-08-13 DIAGNOSIS — I49.9 IRREGULAR CARDIAC RHYTHM: Primary | ICD-10-CM

## 2020-08-13 DIAGNOSIS — H92.09 OTALGIA, UNSPECIFIED LATERALITY: ICD-10-CM

## 2020-08-13 DIAGNOSIS — E78.2 MIXED HYPERLIPIDEMIA: ICD-10-CM

## 2020-08-13 DIAGNOSIS — E78.2 MIXED HYPERLIPIDEMIA: Chronic | ICD-10-CM

## 2020-08-13 DIAGNOSIS — I49.9 IRREGULAR CARDIAC RHYTHM: ICD-10-CM

## 2020-08-13 DIAGNOSIS — G47.33 OBSTRUCTIVE SLEEP APNEA: ICD-10-CM

## 2020-08-13 DIAGNOSIS — B20 HIV DISEASE: Chronic | ICD-10-CM

## 2020-08-13 DIAGNOSIS — R06.02 SHORTNESS OF BREATH: ICD-10-CM

## 2020-08-13 DIAGNOSIS — R51.9 NONINTRACTABLE EPISODIC HEADACHE, UNSPECIFIED HEADACHE TYPE: ICD-10-CM

## 2020-08-13 DIAGNOSIS — H81.10 BENIGN PAROXYSMAL POSITIONAL VERTIGO, UNSPECIFIED LATERALITY: Primary | ICD-10-CM

## 2020-08-13 DIAGNOSIS — R55 POSTURAL DIZZINESS WITH PRESYNCOPE: ICD-10-CM

## 2020-08-13 DIAGNOSIS — R42 POSTURAL DIZZINESS WITH PRESYNCOPE: ICD-10-CM

## 2020-08-13 DIAGNOSIS — E29.1 MALE HYPOGONADISM: ICD-10-CM

## 2020-08-13 PROCEDURE — 99999 PR PBB SHADOW E&M-EST. PATIENT-LVL V: ICD-10-PCS | Mod: PBBFAC,,, | Performed by: INTERNAL MEDICINE

## 2020-08-13 PROCEDURE — 99214 OFFICE O/P EST MOD 30 MIN: CPT | Mod: S$PBB,,, | Performed by: INTERNAL MEDICINE

## 2020-08-13 PROCEDURE — 93010 ELECTROCARDIOGRAM REPORT: CPT | Mod: S$PBB,,, | Performed by: INTERNAL MEDICINE

## 2020-08-13 PROCEDURE — 93010 EKG 12-LEAD: ICD-10-PCS | Mod: S$PBB,,, | Performed by: INTERNAL MEDICINE

## 2020-08-13 PROCEDURE — 99999 PR PBB SHADOW E&M-EST. PATIENT-LVL V: CPT | Mod: PBBFAC,,, | Performed by: INTERNAL MEDICINE

## 2020-08-13 PROCEDURE — 93005 ELECTROCARDIOGRAM TRACING: CPT | Mod: PBBFAC | Performed by: INTERNAL MEDICINE

## 2020-08-13 PROCEDURE — 99215 OFFICE O/P EST HI 40 MIN: CPT | Mod: PBBFAC,25 | Performed by: INTERNAL MEDICINE

## 2020-08-13 PROCEDURE — 99214 PR OFFICE/OUTPT VISIT, EST, LEVL IV, 30-39 MIN: ICD-10-PCS | Mod: S$PBB,,, | Performed by: INTERNAL MEDICINE

## 2020-08-13 PROCEDURE — 99215 OFFICE O/P EST HI 40 MIN: CPT | Mod: PBBFAC,27,25 | Performed by: INTERNAL MEDICINE

## 2020-08-13 RX ORDER — TESTOSTERONE CYPIONATE 1000 MG/10ML
100 INJECTION, SOLUTION INTRAMUSCULAR
Qty: 10 ML | Refills: 0 | Status: SHIPPED | OUTPATIENT
Start: 2020-08-13 | End: 2021-12-10

## 2020-08-13 RX ORDER — ROSUVASTATIN CALCIUM 20 MG/1
20 TABLET, COATED ORAL DAILY
Qty: 90 TABLET | Refills: 3 | Status: SHIPPED | OUTPATIENT
Start: 2020-08-13 | End: 2021-09-01

## 2020-08-13 RX ORDER — MECLIZINE HYDROCHLORIDE 25 MG/1
25 TABLET ORAL 3 TIMES DAILY PRN
Qty: 30 TABLET | Refills: 1 | Status: SHIPPED | OUTPATIENT
Start: 2020-08-13 | End: 2020-10-14 | Stop reason: SDUPTHER

## 2020-08-13 NOTE — PROGRESS NOTES
Subjective:       Patient ID: Haritha Valle is a 75 y.o. male.    Chief Complaint:   Hypertension (with dizziness)    HPI - since our last visit, Mr. Valle has had vertigo - worse with movement, associated with nausea.  Described as dizziness.  No syncope/presyncope.  No falls.  Some gait instability, but mainly vertigo.  He already takes bid meclizine. Occasional headache with this. He's also got worsening sciatica.  Wants refills on his testosterone.  Lab review showed ridiculously high lipids    PMH:  HIV, with excellent control - ND viral load and CD4 count over 500  HTN  HLP  Anxiety and depression  Mild Cognitive impairment  Vertigo, not responding to standard treatment  Hypogonadism     Meds: Reviewed and reconciled in EPIC with patient during visit today.     Review of Systems   Constitutional: Positive for fatigue.   HENT: Negative for congestion.    Respiratory: Positive for shortness of breath.    Cardiovascular: Negative for chest pain.   Gastrointestinal: Negative for abdominal pain.   Genitourinary: Negative for difficulty urinating.   Musculoskeletal: Positive for back pain.   Skin: Negative for rash.   Neurological: Positive for dizziness, light-headedness and headaches.   Psychiatric/Behavioral: Negative for sleep disturbance.       Objective:      Physical Exam  Constitutional:       General: He is not in acute distress.     Appearance: He is well-developed. He is not diaphoretic.   HENT:      Head: Normocephalic and atraumatic.   Cardiovascular:      Rate and Rhythm: Normal rate. Rhythm irregular.      Heart sounds: Normal heart sounds. No murmur. No friction rub. No gallop.    Pulmonary:      Effort: No respiratory distress.      Breath sounds: No wheezing or rales.   Chest:      Chest wall: No tenderness.   Skin:     General: Skin is warm.      Findings: No erythema.   Neurological:      Mental Status: He is alert and oriented to person, place, and time.   Psychiatric:         Thought Content:  Thought content normal.         Assessment:       1. Benign paroxysmal positional vertigo, unspecified laterality    2. Otalgia, unspecified laterality    3. Nonintractable episodic headache, unspecified headache type    4. Essential hypertension    5. HIV disease    6. Irregular cardiac rhythm    7. Mixed hyperlipidemia    8. Male hypogonadism        Plan:       Haritha was seen today for hypertension.    Diagnoses and all orders for this visit:    Benign paroxysmal positional vertigo, unspecified laterality - worsening.  Refilling and increasing doseage  -     meclizine (ANTIVERT) 25 mg tablet; Take 1 tablet (25 mg total) by mouth 3 (three) times daily as needed.    Otalgia, unspecified laterality - more headache on the left.  tylenol    Nonintractable episodic headache, unspecified headache type    Essential hypertension - elevated today, but he's in a little distress    HIV disease - very stable    Irregular cardiac rhythm - noted on exam.  EKG showed PAC's and sinus pauses.  Will ask for cardiology opinion  -     EKG 12-lead  -     Ambulatory referral/consult to Cardiology; Future    Mixed hyperlipidemia - trigs and cholesterol are too high.  Switch to rosuva; consider adding a trig-lowering agent  -     rosuvastatin (CRESTOR) 20 MG tablet; Take 1 tablet (20 mg total) by mouth once daily.    Male hypogonadism - refilling T  -     testosterone cypionate (DEPOTESTOTERONE CYPIONATE) 100 mg/mL injection; Inject 1 mL (100 mg total) into the muscle every 14 (fourteen) days.    rtc prn    KATHARINE Andrade MD MPH  Staff Internist

## 2020-08-13 NOTE — LETTER
August 14, 2020      Hesham Andrade II, MD  [address]  [facility] [address]           [facility] 3rd Floor  [address]  [facility] [address]  Phone: [phone]          Patient: Haritha Valle   MR Number: [id number]   YOB: 1945   Date of Visit: 8/13/2020       Dear Dr. Hesham Andrade II:    Thank you for referring Haritha Valle to me for evaluation. Attached you will find relevant portions of my assessment and plan of care.    If you have questions, please do not hesitate to call me. I look forward to following Haritha Valle along with you.    Sincerely,    Cipriano Rodriguez MD    Enclosure  CC:  No Recipients    If you would like to receive this communication electronically, please contact [email or web address] or [phone] to request more information on [email or web address] Link access.    For providers and/or their staff who would like to refer a patient to [facility], please contact us through our one-stop-shop provider referral line, [facility] [place], at [phone].    If you feel you have received this communication in error or would no longer like to receive these types of communications, please e-mail [email or web address]

## 2020-08-14 NOTE — PROGRESS NOTES
Subjective:   Chief Complaint: Irregular cardiac rhythm and Shortness of Breath (x 2-3 months)  Last Clinic Visit: New Patient    History of Present Illness: Haritha Valle is a 75 y.o. gentleman with HTN, HLD, HIV, remote HCV, hypertriglyceridemia, who has recently been dealing with vertigo.  He has multiple different symptoms around one issue - intermittently light headed, then describes a vertigo type symptom.  He also reports a feeling like he is continually falling over - cannot control coordination.  During the past two months he also has been very short of breath, increased dyspnea on exertion.  Increasing nausea which is also new.  One episode of syncope.  In February he had extensive cardiovascular work up with Dr. Nguyen in [place] - including stress test which was positive in inferior wall - then LHC without significant obstructive disease.  TTE with low normal mildly down EF.  Holter monitor with PACs but no significant AV conduction issues. He has seen Yocasta Tallye in the past, has a good relationship with her, was ruled out for significant dementia in the past.    Dx:  HTN  HLD  HIV  Remote HCV  Hypertriglyceridemia  Past medical and surgical history reviewed as documented.    Review of Systems   Constitution: Negative.   HENT: Negative.    Eyes: Negative.    Cardiovascular: Positive for near-syncope.   Respiratory: Positive for shortness of breath.    Hematologic/Lymphatic: Negative.    Skin: Negative.    Musculoskeletal: Negative.    Gastrointestinal: Negative.    Genitourinary: Negative.    Neurological: Positive for disturbances in coordination and vertigo.     Medications:  Outpatient Encounter Medications as of 8/13/2020   Medication Sig Dispense Refill    acyclovir (ZOVIRAX) 400 MG tablet Take 1 tablet (400 mg total) by mouth 3 (three) times daily. 30 tablet 0    ascorbic acid, vitamin C, (VITAMIN C) 1000 MG tablet Take 1,000 mg by mouth once daily.      aspirin (ECOTRIN) 81 MG EC tablet  Take 1 tablet (81 mg total) by mouth once daily. 30 tablet 0    atazanavir (REYATAZ) 200 MG Cap TAKE TWO CAPSULES BY MOUTH DAILY 60 capsule 6    benazepril-hydrochlorthiazide (LOTENSIN HCT) 10-12.5 mg Tab Take 1 tablet by mouth once daily. 90 tablet 3    co-enzyme Q-10 30 mg capsule Take 100 mg by mouth 3 (three) times daily.      cyclobenzaprine (FLEXERIL) 10 MG tablet Take 1 tablet (10 mg total) by mouth 3 (three) times daily. 30 tablet 3    FLUoxetine 40 MG capsule Take 1 capsule (40 mg total) by mouth once daily. 30 capsule 11    lamivudine-zidovudine 150-300mg (COMBIVIR) 150-300 mg Tab TAKE ONE TABLET EVERY 12  HOURS 60 tablet 6    meclizine (ANTIVERT) 25 mg tablet Take 1 tablet (25 mg total) by mouth 3 (three) times daily as needed. 30 tablet 1    metoprolol succinate (TOPROL-XL) 50 MG 24 hr tablet Take 1 tablet (50 mg total) by mouth once daily. 30 tablet 3    rosuvastatin (CRESTOR) 20 MG tablet Take 1 tablet (20 mg total) by mouth once daily. 90 tablet 3    testosterone cypionate (DEPOTESTOTERONE CYPIONATE) 100 mg/mL injection Inject 1 mL (100 mg total) into the muscle every 14 (fourteen) days. 10 mL 0    [DISCONTINUED] ALPRAZolam (XANAX) 0.25 MG tablet TAKE TWO TABLETS AT BEDTIME, IN ADDITION, MAY TAKE ONE TABLET TWICE DAILY IF NEEDED FOR ANXIETY (Patient not taking: Reported on 8/13/2020) 60 tablet 3    [DISCONTINUED] pravastatin (PRAVACHOL) 40 MG tablet TAKE (1) TABLET DAILY. 90 tablet 3    [DISCONTINUED] testosterone cypionate (DEPOTESTOTERONE CYPIONATE) 100 mg/mL injection Inject 1 mL (100 mg total) into the muscle every 14 (fourteen) days. 10 mL 0    [DISCONTINUED] testosterone Pllt 12 Pellet        No facility-administered encounter medications on file as of 8/13/2020.      Family History:  Haritha's family history includes Heart disease in his father; Heart failure in his father.    Social History:  Haritha reports that he has never smoked. He has never used smokeless tobacco. He reports  "that he does not drink alcohol.    Objective:   /73 (BP Location: Left arm, Patient Position: Sitting, BP Method: Large (Automatic))   Pulse 107   Ht 5' 10" (1.778 m)   Wt 101.9 kg (224 lb 10.4 oz)   SpO2 (!) 94%   BMI 32.23 kg/m²     Physical Exam   Constitutional: He is oriented to person, place, and time. He appears distressed.   Gait instability   HENT:   Head: Normocephalic and atraumatic.   Mouth/Throat: No oropharyngeal exudate.   Eyes: EOM are normal. No scleral icterus.   Neck: No JVD present. No tracheal deviation present. No thyromegaly present.   Cardiovascular: Normal rate. Exam reveals no gallop and no friction rub.   Murmur heard.  Occasional ectopic beat   Pulmonary/Chest: Effort normal and breath sounds normal. No respiratory distress. He has no wheezes. He has no rales. He exhibits no tenderness.   Abdominal: Soft. He exhibits no distension. There is no abdominal tenderness. There is no rebound and no guarding.   Musculoskeletal:         General: No edema.   Neurological: He is alert and oriented to person, place, and time.   Skin: Skin is warm and dry. He is not diaphoretic. No erythema.   Psychiatric: Affect normal.     EKG:  My independent visualization of most recent EKG is Normal Sinus rhythm with PACs    TTE:  1/27/20  CONCLUSIONS     1 - Mildly depressed left ventricular systolic function (EF 45-50%).     2 - Impaired LV relaxation, normal LAP (grade 1 diastolic dysfunction).     3 - Normal right ventricular systolic function .     4 - The estimated PA systolic pressure is 27 mmHg.     5 - Mild mitral regurgitation.     6 - Mild tricuspid regurgitation.     Lipids:  Recent Labs   Lab 06/09/20  0813   LDL Cholesterol Invalid, Trig>400.0   HDL 33 L   Cholesterol 351 H      Renal:  Recent Labs   Lab 01/28/20  1439   Creatinine 0.9   Potassium 4.2   CO2 24   BUN, Bld 18     Liver:  Recent Labs   Lab 06/09/20  0813   AST 29   ALT 35     Holter Monitor  2/6/20  TEST DESCRIPTION "   PREDOMINANT RHYTHM   1. Sinus rhythm with heart rates varying between 50 and 107 bpm with an average of 75 bpm.   VENTRICULAR ARRHYTHMIAS   1. There were very rare PVCs recorded totalling 32 and averaging less than 1 per hour.   2. There were no episodes of ventricular tachycardia.   SUPRA VENTRICULAR ARRHYTHMIAS   1. There were occasional PACs totalling 492 and averaging 10 per hour.  There were 12 monomorphic couplets. There were 12 monomorphic triplets.   2. There were no episdes of sustained supraventricular tachycardia.   SINUS NODE FUNCTION   1. For the 1st 24 hour period, the circadian pattern of sinus rate variability followed a typical curve with HRs.   2. There was no evidence of high grade SA carlo block.   AV CONDUCTION   1. There was no evidence of high grade AV block.   DIARY   1. The diary was not returned   MISCELLANEOUS   1. This was a tape of adequate length (48 hrs).     [facility]   2/3/20   1.   Non-obstructive CAD.    2.   low normal to mildy depressed ef.     SPECT  1/27/20  Impression: ABNORMAL MYOCARDIAL PERFUSION   1. There is evidence for mild myocardial ischemia in the inferior and inferoapical walls of the left ventricle.   2. The perfusion scan is free of evidence for myocardial injury.   3. Resting wall motion is physiologic.   4. There is resting LV dysfunction with a reduced ejection fraction of 44 %.   5. The ventricular volumes are normal at rest and stress.   6. The extracardiac distribution of radioactivity is normal.   Assessment:     1. Irregular cardiac rhythm    2. Shortness of breath    3. Postural dizziness with presyncope    4. Obstructive sleep apnea    5. HIV disease    6. Essential hypertension    7. Mixed hyperlipidemia      Plan:   1. Irregular cardiac rhythm  PACs on holter - nothing suggestive of etiology for symptoms at this time.  No significant pauses noted on holter or exam.  EF low normal, no significant obstructive CAD.  - Ambulatory referral/consult to  Cardiology    2. Shortness of breath  Multifactorial has never had PFTs recently - will recheck this   - Pulmonary function test; Future  - COVID-19 Routine Screening; Future    3. Postural dizziness with presyncope  Possible neurological component - describes blood pressures as all being within normal limits - so nothing there. Has seen ENT without help.  Wonder if there could be a cerebellar issue going on?  Does have some word finding difficulty - dementia+syndrome?  Will talk with Dr. Talley and get her thoughts.    4. Obstructive sleep apnea      5. HIV disease      6. Essential hypertension      7. Mixed hyperlipidemia  Suspect elevated triglycerides related to HAART and diet - will see how he does on crestor - if not <500 would add fibrate.    Follow up in 3 months      Cipriano Rodriguez MD [facility]

## 2020-08-23 ENCOUNTER — LAB VISIT (OUTPATIENT)
Dept: OTOLARYNGOLOGY | Facility: CLINIC | Age: 75
End: 2020-08-23
Payer: MEDICARE

## 2020-08-23 DIAGNOSIS — R06.02 SHORTNESS OF BREATH: ICD-10-CM

## 2020-08-23 PROCEDURE — U0003 INFECTIOUS AGENT DETECTION BY NUCLEIC ACID (DNA OR RNA); SEVERE ACUTE RESPIRATORY SYNDROME CORONAVIRUS 2 (SARS-COV-2) (CORONAVIRUS DISEASE [COVID-19]), AMPLIFIED PROBE TECHNIQUE, MAKING USE OF HIGH THROUGHPUT TECHNOLOGIES AS DESCRIBED BY CMS-2020-01-R: HCPCS

## 2020-08-24 LAB — SARS-COV-2 RNA RESP QL NAA+PROBE: NOT DETECTED

## 2020-08-26 ENCOUNTER — CLINICAL SUPPORT (OUTPATIENT)
Dept: PULMONOLOGY | Facility: CLINIC | Age: 75
End: 2020-08-26
Payer: MEDICARE

## 2020-08-26 DIAGNOSIS — R06.02 SHORTNESS OF BREATH: Primary | ICD-10-CM

## 2020-08-26 DIAGNOSIS — R06.02 SHORTNESS OF BREATH: ICD-10-CM

## 2020-08-26 PROCEDURE — 94729 DIFFUSING CAPACITY: CPT | Mod: PBBFAC

## 2020-08-26 PROCEDURE — 94726 PLETHYSMOGRAPHY LUNG VOLUMES: CPT | Mod: PBBFAC

## 2020-08-26 PROCEDURE — 94010 BREATHING CAPACITY TEST: CPT | Mod: 26,S$PBB,, | Performed by: INTERNAL MEDICINE

## 2020-08-26 PROCEDURE — 94010 BREATHING CAPACITY TEST: ICD-10-PCS | Mod: 26,S$PBB,, | Performed by: INTERNAL MEDICINE

## 2020-08-26 PROCEDURE — 94010 BREATHING CAPACITY TEST: CPT | Mod: PBBFAC

## 2020-08-26 PROCEDURE — 94726 PULM FUNCT TST PLETHYSMOGRAP: ICD-10-PCS | Mod: 26,S$PBB,, | Performed by: INTERNAL MEDICINE

## 2020-08-26 PROCEDURE — 94729 DIFFUSING CAPACITY: CPT | Mod: 26,S$PBB,, | Performed by: INTERNAL MEDICINE

## 2020-08-26 PROCEDURE — 94729 PR C02/MEMBANE DIFFUSE CAPACITY: ICD-10-PCS | Mod: 26,S$PBB,, | Performed by: INTERNAL MEDICINE

## 2020-08-26 PROCEDURE — 94726 PLETHYSMOGRAPHY LUNG VOLUMES: CPT | Mod: 26,S$PBB,, | Performed by: INTERNAL MEDICINE

## 2020-08-28 ENCOUNTER — PATIENT MESSAGE (OUTPATIENT)
Dept: CARDIOLOGY | Facility: CLINIC | Age: 75
End: 2020-08-28

## 2020-08-28 DIAGNOSIS — R06.02 SHORTNESS OF BREATH: ICD-10-CM

## 2020-08-28 DIAGNOSIS — R94.2 ABNORMAL PFTS: Primary | ICD-10-CM

## 2020-08-28 LAB
BRPFT: ABNORMAL
DLCO ADJ PRE: 21.55 ML/(MIN*MMHG) (ref 19.53–33.39)
DLCO SINGLE BREATH LLN: 19.53
DLCO SINGLE BREATH PRE REF: 79.8 %
DLCO SINGLE BREATH REF: 26.46
DLCOC SBVA LLN: 2.56
DLCOC SBVA PRE REF: 137 %
DLCOC SBVA REF: 3.7
DLCOC SINGLE BREATH LLN: 19.53
DLCOC SINGLE BREATH PRE REF: 81.4 %
DLCOC SINGLE BREATH REF: 26.46
DLCOVA LLN: 2.56
DLCOVA PRE REF: 134.2 %
DLCOVA PRE: 4.97 ML/(MIN*MMHG*L) (ref 2.56–4.85)
DLCOVA REF: 3.7
DLVAADJ PRE: 5.08 ML/(MIN*MMHG*L) (ref 2.56–4.85)
ERV LLN: -16448.99
ERV PRE REF: 26.7 %
ERV REF: 1.01
FEF 25 75 LLN: 0.93
FEF 25 75 PRE REF: 68.9 %
FEF 25 75 REF: 2.26
FEV1 FVC LLN: 61
FEV1 FVC PRE REF: 99.7 %
FEV1 FVC REF: 75
FEV1 LLN: 2.18
FEV1 PRE REF: 64.4 %
FEV1 REF: 3.07
FRCPLETH LLN: 2.75
FRCPLETH PREREF: 68.2 %
FRCPLETH REF: 3.74
FVC LLN: 3.01
FVC PRE REF: 64.2 %
FVC REF: 4.1
IVC PRE: 2.61 L (ref 3.01–5.2)
IVC SINGLE BREATH LLN: 3.01
IVC SINGLE BREATH PRE REF: 63.7 %
IVC SINGLE BREATH REF: 4.1
MVV LLN: 103
MVV PRE REF: 30.6 %
MVV REF: 121
PEF LLN: 5.64
PEF PRE REF: 86.4 %
PEF REF: 7.96
PRE DLCO: 21.11 ML/(MIN*MMHG) (ref 19.53–33.39)
PRE ERV: 0.27 L (ref -16448.99–16451.01)
PRE FEF 25 75: 1.55 L/S (ref 0.93–3.59)
PRE FET 100: 8.2 SEC
PRE FEV1 FVC: 75.11 % (ref 61.39–89.35)
PRE FEV1: 1.98 L (ref 2.18–3.96)
PRE FRC PL: 2.55 L
PRE FVC: 2.63 L (ref 3.01–5.2)
PRE MVV: 37 L/MIN (ref 102.61–138.83)
PRE PEF: 6.87 L/S (ref 5.64–10.28)
PRE RV: 2.18 L (ref 2.06–3.4)
PRE TLC: 4.98 L (ref 5.99–8.29)
RAW LLN: 3.06
RAW PRE REF: 192 %
RAW PRE: 5.87 CMH2O*S/L (ref 3.06–3.06)
RAW REF: 3.06
RV LLN: 2.06
RV PRE REF: 79.9 %
RV REF: 2.73
RVTLC LLN: 34
RVTLC PRE REF: 102.3 %
RVTLC PRE: 43.79 % (ref 33.84–51.8)
RVTLC REF: 43
TLC LLN: 5.99
TLC PRE REF: 69.8 %
TLC REF: 7.14
VA PRE: 4.25 L (ref 6.99–6.99)
VA SINGLE BREATH LLN: 6.99
VA SINGLE BREATH PRE REF: 60.7 %
VA SINGLE BREATH REF: 6.99
VC LLN: 3.01
VC PRE REF: 68.3 %
VC PRE: 2.8 L (ref 3.01–5.2)
VC REF: 4.1
VTGRAWPRE: 2.87 L

## 2020-09-06 ENCOUNTER — PATIENT OUTREACH (OUTPATIENT)
Dept: ADMINISTRATIVE | Facility: OTHER | Age: 75
End: 2020-09-06

## 2020-09-09 ENCOUNTER — OFFICE VISIT (OUTPATIENT)
Dept: NEUROLOGY | Facility: CLINIC | Age: 75
End: 2020-09-09
Payer: MEDICARE

## 2020-09-09 VITALS
HEIGHT: 70 IN | WEIGHT: 222.25 LBS | SYSTOLIC BLOOD PRESSURE: 158 MMHG | BODY MASS INDEX: 31.82 KG/M2 | DIASTOLIC BLOOD PRESSURE: 104 MMHG | HEART RATE: 85 BPM

## 2020-09-09 DIAGNOSIS — R42 POSTURAL DIZZINESS WITH PRESYNCOPE: Primary | ICD-10-CM

## 2020-09-09 DIAGNOSIS — R55 POSTURAL DIZZINESS WITH PRESYNCOPE: Primary | ICD-10-CM

## 2020-09-09 PROCEDURE — 99999 PR PBB SHADOW E&M-EST. PATIENT-LVL III: ICD-10-PCS | Mod: PBBFAC,,, | Performed by: NEUROMUSCULOSKELETAL MEDICINE & OMM

## 2020-09-09 PROCEDURE — 99213 PR OFFICE/OUTPT VISIT, EST, LEVL III, 20-29 MIN: ICD-10-PCS | Mod: S$PBB,,, | Performed by: NEUROMUSCULOSKELETAL MEDICINE & OMM

## 2020-09-09 PROCEDURE — 99999 PR PBB SHADOW E&M-EST. PATIENT-LVL III: CPT | Mod: PBBFAC,,, | Performed by: NEUROMUSCULOSKELETAL MEDICINE & OMM

## 2020-09-09 PROCEDURE — 99213 OFFICE O/P EST LOW 20 MIN: CPT | Mod: S$PBB,,, | Performed by: NEUROMUSCULOSKELETAL MEDICINE & OMM

## 2020-09-09 PROCEDURE — 99213 OFFICE O/P EST LOW 20 MIN: CPT | Mod: PBBFAC,PO | Performed by: NEUROMUSCULOSKELETAL MEDICINE & OMM

## 2020-09-09 NOTE — LETTER
September 9, 2020      Cipriano Rodriguez MD  [address] [address]           [place] - Neurology  [address]  [place] [address]  Phone: [phone]          Patient: Haritha Valle   MR Number: [id number]   YOB: 1945   Date of Visit: 9/9/2020       Dear Dr. Cipriano Rodriguez:    Thank you for referring Haritha Valle to me for evaluation. Attached you will find relevant portions of my assessment and plan of care.    If you have questions, please do not hesitate to call me. I look forward to following Haritha Valle along with you.    Sincerely,    Henry Daniel MD    Enclosure  CC:  No Recipients    If you would like to receive this communication electronically, please contact [email or web address] or [phone] to request more information on [facility] access.    For providers and/or their staff who would like to refer a patient to [facility], please contact us through our one-stop-shop provider referral line, [facility], at [phone].    If you feel you have received this communication in error or would no longer like to receive these types of communications, please e-mail [email or web address]

## 2020-09-09 NOTE — PROGRESS NOTES
At This note was dictated with Modal Fluency a word recognition program. There are occasionally word recognition errors which are missed on review.  Haritha TORRES Dea  1945  Review of patient's allergies indicates:   Allergen Reactions    No known drug allergies      [unfilled]    Past Medical History:   Diagnosis Date    Anxiety 7/3/2019    Basal cell carcinoma     Depression     Hepatitis B     Hepatitis C antibody test positive     RNA NEG 8/29/2019    HIV infection     Hypertension     Immune disorder     Mild cognitive impairment 10/1/2010     Social History     Socioeconomic History    Marital status: Significant Other     Spouse name: Shadi    Number of children: 0    Years of education: Not on file    Highest education level: Some college, no degree   Occupational History    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  [employer]   Social Needs    Financial resource strain: Not hard at all    Food insecurity     Worry: Never true     Inability: Never true    Transportation needs     Medical: No     Non-medical: No   Tobacco Use    Smoking status: Never Smoker    Smokeless tobacco: Never Used   Substance and Sexual Activity    Alcohol use: No     Frequency: Monthly or less     Drinks per session: 1 or 2     Binge frequency: Never    Drug use: Not on file    Sexual activity: Yes     Partners: Male   Lifestyle    Physical activity     Days per week: 1 day     Minutes per session: Not on file    Stress: To some extent   Relationships    Social connections     Talks on phone: More than three times a week     Gets together: Never     Attends Evangelical service: Not on file     Active member of club or organization: No     Attends meetings of clubs or organizations: Patient refused     Relationship status: Never    Other Topics Concern    Not on file   Social History Narrative    Not on file     Family History   Problem Relation Age of Onset    Heart  disease Father     Heart failure Father     Diabetes Neg Hx     Hypertension Neg Hx        Review of systems:  Constitutional-negative  Eyes-negative  ENT, mouth-negative  Cardiovascular-negative  Respiratory-negative  GI-negative  - negative  Musculoskeletal-negative  Skin-negative  Neurologic-negative  Psychiatric-negative  Endocrine-negative  Hematology/lymph nodes-negative  Allergies/immunology-negative  Haritha Valle  1945  Review of patient's allergies indicates:   Allergen Reactions    No known drug allergies      [unfilled]    Past Medical History:   Diagnosis Date    Anxiety 7/3/2019    Basal cell carcinoma     Depression     Hepatitis B     Hepatitis C antibody test positive     RNA NEG 8/29/2019    HIV infection     Hypertension     Immune disorder     Mild cognitive impairment 10/1/2010     Social History     Socioeconomic History    Marital status: Significant Other     Spouse name: Shadi    Number of children: 0    Years of education: Not on file    Highest education level: Some college, no degree   Occupational History    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  [employer]   Social Needs    Financial resource strain: Not hard at all    Food insecurity     Worry: Never true     Inability: Never true    Transportation needs     Medical: No     Non-medical: No   Tobacco Use    Smoking status: Never Smoker    Smokeless tobacco: Never Used   Substance and Sexual Activity    Alcohol use: No     Frequency: Monthly or less     Drinks per session: 1 or 2     Binge frequency: Never    Drug use: Not on file    Sexual activity: Yes     Partners: Male   Lifestyle    Physical activity     Days per week: 1 day     Minutes per session: Not on file    Stress: To some extent   Relationships    Social connections     Talks on phone: More than three times a week     Gets together: Never     Attends Confucianism service: Not on file     Active member of  club or organization: No     Attends meetings of clubs or organizations: Patient refused     Relationship status: Never    Other Topics Concern    Not on file   Social History Narrative    Not on file     Family History   Problem Relation Age of Onset    Heart disease Father     Heart failure Father     Diabetes Neg Hx     Hypertension Neg Hx        Review of systems:  Constitutional-negative  Eyes-negative  ENT, mouth-negative  Cardiovascular-negative  Respiratory-negative  GI-negative  - negative  Musculoskeletal-negative  Skin-negative  Neurologic-negative  Psychiatric-negative  Endocrine-negative  Hematology/lymph nodes-negative  Allergies/immunology-negative  Gen. 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  He is accompanied by his partner today; patient is HIV positive for many years.  Present history:  Patient is seen by me strictly for triage to refer to the appropriate doctors.  See Dr. Talley's previous notes for detailed history    This is a 75-year-old white male presents for history of memory problems and dizziness.  Patient has been followed by Dr. Talley who is not seeing patients at the present time.  He describes intermittent lightheaded dizziness for which he takes meclizine that is not working.  Patient has multiple etiologies for the dizziness with fluctuating blood pressure, multiple blood pressure medications, HIV medications, history of pulmonary disease, and uses a CPAP machine at night.    Neurological Exam:  Mental status-alert and oriented to person, place, and time; attention span and concentration is good. Fund of knowledge-patient is aware of current events and able to give detailed history of the current problem.recent and remote memory seems intact. Language function is normal with no  evidence of aphasia  Cranial nerves:Visual acuity to hand chart -normal; visual fields to confrontation normal;pupils were equal and reactive to light ;no evidence of ptosis ;  funduscopic examination was normal with sharp disc margins. external ocular movements were full with no nystagmus. Facial sensation to pinprick : normal ; corneal reflexes intact; Facial muscles were symmetrical. Hearing is unimpaired symmetrical finger rub; Tongue movements - normal ; palate movements - normal ;Swallowing unimpaired. Shoulder shrug was intact with good strength Speech was normal  Motor examination: Upper : normal                                      Lower extremities - Normal;muscle tone was normal ;                  Right-handed  Sensory examination:   Upper; normal pinprick and soft touch ;   Lower extremities - normal and symmetrical.   Vibration sense: 15-20 seconds @ toes  Deep tendon reflexes: upper extremities :1-2+ symmetrical ;     lower extremities KJ- 1 +; AJ - 1+ Both plantar responses were flexor  Cerebellar examination upper: Normal finger to nose and rapid alternating movements  Gait:  Unsteady gait-slightly wide-based;      heel and toe walk normal  Romberg test:  Positive     Tandem gait: Normal    Involuntary movements: Negative  TMJ - no tenderness  Cervical examination: Full range of motion with no pain Cervical tenderness :negative  Lumbar examination: Low back tenderness-negative                  Sciatic notchtenderness-negative            Straight leg raising : negative    Impression:  Dizziness-multiple etiologies including blood pressure and the medication.  Patient has pulmonary issues; HIV history    Recommendations/Plan :  Follow-up with Dr. Talley or coverage; follow-up with PCP; follow-up with pulmonary

## 2020-09-25 DIAGNOSIS — Z21 HIV POSITIVE: ICD-10-CM

## 2020-09-26 ENCOUNTER — PATIENT MESSAGE (OUTPATIENT)
Dept: INTERNAL MEDICINE | Facility: CLINIC | Age: 75
End: 2020-09-26

## 2020-09-28 ENCOUNTER — OFFICE VISIT (OUTPATIENT)
Dept: PULMONOLOGY | Facility: CLINIC | Age: 75
End: 2020-09-28
Payer: MEDICARE

## 2020-09-28 ENCOUNTER — IMMUNIZATION (OUTPATIENT)
Dept: INTERNAL MEDICINE | Facility: CLINIC | Age: 75
End: 2020-09-28
Payer: MEDICARE

## 2020-09-28 ENCOUNTER — PATIENT MESSAGE (OUTPATIENT)
Dept: INTERNAL MEDICINE | Facility: CLINIC | Age: 75
End: 2020-09-28

## 2020-09-28 VITALS
HEIGHT: 70 IN | WEIGHT: 224 LBS | HEART RATE: 75 BPM | BODY MASS INDEX: 32.07 KG/M2 | SYSTOLIC BLOOD PRESSURE: 118 MMHG | OXYGEN SATURATION: 95 % | DIASTOLIC BLOOD PRESSURE: 78 MMHG

## 2020-09-28 DIAGNOSIS — R94.2 ABNORMAL PFTS: ICD-10-CM

## 2020-09-28 DIAGNOSIS — R06.02 SHORTNESS OF BREATH: ICD-10-CM

## 2020-09-28 DIAGNOSIS — G47.33 OBSTRUCTIVE SLEEP APNEA: ICD-10-CM

## 2020-09-28 DIAGNOSIS — R05.9 COUGH: ICD-10-CM

## 2020-09-28 PROCEDURE — 99214 PR OFFICE/OUTPT VISIT, EST, LEVL IV, 30-39 MIN: ICD-10-PCS | Mod: S$PBB,,, | Performed by: INTERNAL MEDICINE

## 2020-09-28 PROCEDURE — G0008 ADMIN INFLUENZA VIRUS VAC: HCPCS | Mod: PBBFAC

## 2020-09-28 PROCEDURE — 99999 PR PBB SHADOW E&M-EST. PATIENT-LVL IV: ICD-10-PCS | Mod: PBBFAC,,, | Performed by: INTERNAL MEDICINE

## 2020-09-28 PROCEDURE — 90694 VACC AIIV4 NO PRSRV 0.5ML IM: CPT | Mod: PBBFAC

## 2020-09-28 PROCEDURE — 99214 OFFICE O/P EST MOD 30 MIN: CPT | Mod: S$PBB,,, | Performed by: INTERNAL MEDICINE

## 2020-09-28 PROCEDURE — 99999 PR PBB SHADOW E&M-EST. PATIENT-LVL IV: CPT | Mod: PBBFAC,,, | Performed by: INTERNAL MEDICINE

## 2020-09-28 PROCEDURE — 99214 OFFICE O/P EST MOD 30 MIN: CPT | Mod: PBBFAC,25 | Performed by: INTERNAL MEDICINE

## 2020-09-28 RX ORDER — ATAZANAVIR 200 MG/1
400 CAPSULE ORAL DAILY
Qty: 60 CAPSULE | Refills: 6 | OUTPATIENT
Start: 2020-09-28

## 2020-09-28 NOTE — PROGRESS NOTES
Subjective:       Patient ID: Haritha Valle is a 75 y.o. male.    Chief Complaint: No chief complaint on file.    HPI:   Haritha Valle is a 75 y.o. male who presents for evaluation of abnormal PFTs.    Patient reports Shortness of breath; Worse in the last 3 months  Sudden onset of light-headedness/dizziness  Out of breath when walking from recliner to the kitchen  More fatigued lately.  Sleeping a lot    Recently started using cpap  Can't tell if it's helping yet.  Wearing it 4-5 hours a night.    No childhood asthma.  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  Never smoker  No family history of lung problems.   History of HIV    Review of Systems   Constitutional: Positive for fatigue. Negative for fever, chills, weight loss and activity change.   HENT: Negative for postnasal drip, rhinorrhea, sinus pressure and congestion.    Respiratory: Positive for cough, shortness of breath and wheezing. Negative for hemoptysis and dyspnea on extertion.    Cardiovascular: Negative for chest pain and leg swelling.   Genitourinary: Negative for difficulty urinating.   Endocrine: Negative for cold intolerance and heat intolerance.    Musculoskeletal: Negative for joint swelling and myalgias.   Gastrointestinal: Negative for nausea, vomiting and abdominal pain.   Neurological: Negative for headaches.   Hematological: Negative for adenopathy. No excessive bruising.   Psychiatric/Behavioral: Negative for confusion and sleep disturbance.         Social History     Tobacco Use    Smoking status: Never Smoker    Smokeless tobacco: Never Used   Substance Use Topics    Alcohol use: No     Frequency: Monthly or less     Drinks per session: 1 or 2     Binge frequency: Never       Review of patient's allergies indicates:   Allergen Reactions    No known drug allergies      Past Medical History:   Diagnosis Date    Anxiety 7/3/2019    Basal cell carcinoma     Depression      Hepatitis B     Hepatitis C antibody test positive     RNA NEG 8/29/2019    HIV infection     Hypertension     Immune disorder     Mild cognitive impairment 10/1/2010     Past Surgical History:   Procedure Laterality Date    APPENDECTOMY      CARDIAC CATHETERIZATION      no stent    CHOLECYSTECTOMY      COLONOSCOPY N/A 11/3/2016    Procedure: COLONOSCOPY;  Surgeon: Maxi Villasenor MD;  Location: [facility] ([address] [place] [address]);  Service: Endoscopy;  Laterality: N/A;  last colonoscopy with Dr Jarrell    LEFT HEART CATHETERIZATION Left 2/3/2020    Procedure: CATHETERIZATION, HEART, LEFT;  Surgeon: Chele Ko MD;  Location: [facility] CATH LAB;  Service: Cardiology;  Laterality: Left;  malur pt     Current Outpatient Medications on File Prior to Visit   Medication Sig    acyclovir (ZOVIRAX) 400 MG tablet Take 1 tablet (400 mg total) by mouth 3 (three) times daily.    ascorbic acid, vitamin C, (VITAMIN C) 1000 MG tablet Take 1,000 mg by mouth once daily.    aspirin (ECOTRIN) 81 MG EC tablet Take 1 tablet (81 mg total) by mouth once daily.    atazanavir (REYATAZ) 200 MG Cap TAKE TWO CAPSULES BY MOUTH DAILY    benazepril-hydrochlorthiazide (LOTENSIN HCT) 10-12.5 mg Tab Take 1 tablet by mouth once daily.    co-enzyme Q-10 30 mg capsule Take 100 mg by mouth 3 (three) times daily.    cyclobenzaprine (FLEXERIL) 10 MG tablet Take 1 tablet (10 mg total) by mouth 3 (three) times daily.    FLUoxetine 40 MG capsule Take 1 capsule (40 mg total) by mouth once daily.    lamivudine-zidovudine 150-300mg (COMBIVIR) 150-300 mg Tab TAKE ONE TABLET EVERY 12  HOURS    metoprolol succinate (TOPROL-XL) 50 MG 24 hr tablet Take 1 tablet (50 mg total) by mouth once daily.    rosuvastatin (CRESTOR) 20 MG tablet Take 1 tablet (20 mg total) by mouth once daily.    testosterone cypionate (DEPOTESTOTERONE CYPIONATE) 100 mg/mL injection Inject 1 mL (100 mg total) into the muscle every 14 (fourteen) days.    meclizine (ANTIVERT) 25 mg  tablet Take 1 tablet (25 mg total) by mouth 3 (three) times daily as needed.     No current facility-administered medications on file prior to visit.        Objective:      Vitals:    09/28/20 1031   BP: 118/78   Pulse: 75   O2 Sat=95%  Physical Exam   Constitutional: He is oriented to person, place, and time. He appears well-developed and well-nourished. No distress.   HENT:   Head: Normocephalic.   Neck: Normal range of motion. No tracheal deviation present. No thyromegaly present.   Cardiovascular: Normal rate, regular rhythm and normal heart sounds.   No murmur heard.  Pulmonary/Chest: Effort normal. He has no wheezes. He has no rhonchi. He has rales (bibasilarly).   Expiratory wheezing with barking cough noted after deep breathing (seemed mostly upper airway in nature)   Abdominal: Soft. Bowel sounds are normal. He exhibits no distension. There is no abdominal tenderness.   Musculoskeletal: Normal range of motion.         General: No edema.   Lymphadenopathy: No supraclavicular adenopathy is present.     He has no cervical adenopathy.   Neurological: He is alert and oriented to person, place, and time.   Skin: Skin is warm and dry. He is not diaphoretic. No cyanosis. Nails show no clubbing.   Psychiatric: He has a normal mood and affect. His behavior is normal. Judgment and thought content normal.     Personal Diagnostic Review    PFTs: 8/28/20: moderate restriction, mildly reduced DLCO  CXR: 3/12/20: no acute pulmonary disease  Assessment:     Orders Placed This Encounter   Procedures    CT Chest Without Contrast     Standing Status:   Future     Standing Expiration Date:   9/28/2021     Scheduling Instructions:      Inspiratory and expiratory images.  R/O tracheomalacia and ILD     Order Specific Question:   May the Radiologist modify the order per protocol to meet the clinical needs of the patient?     Answer:   Yes     1. Abnormal PFTs    2. Shortness of breath        Plan:       Problem List Items  Addressed This Visit        Pulmonary    Abnormal PFTs    Overview     PFTs show moderate restriction with a slightly reduced DLCO.         Current Assessment & Plan     Reviewed PFTs with the patient and his partner.  Discussed the possibility of interstitial lung disease. Also possible that some element of heart failure may be contributing to the abnormalities noted.   Plan for CT Chest to assess for interstitial lung abnormalities and to assess for tracheomalacia and a source of the patient's shortness of breath.    If CT suggests interstitial abnormalities, plan to check 6MWT and have follow up re: potential anti-fibrotic therapy.            Other    Obstructive sleep apnea    Current Assessment & Plan     Continue CPAP nightly.         Shortness of breath      Other Visit Diagnoses     Cough        Relevant Orders    CT Chest Without Contrast          Problem List Items Addressed This Visit     None      Visit Diagnoses     Abnormal PFTs        Shortness of breath

## 2020-09-28 NOTE — LETTER
September 28, 2020      Cipriano Rodriguez MD  [address]  [facility] [address]           [facility] [address] [address]  [address]  [facility] [address]  Phone: [phone]          Patient: Haritha Valle   MR Number: [id number]   YOB: 1945   Date of Visit: 9/28/2020       Dear Dr. Cipriano Rodriguez:    Thank you for referring Haritha Valle to me for evaluation. Attached you will find relevant portions of my assessment and plan of care.    If you have questions, please do not hesitate to call me. I look forward to following Haritha Valle along with you.    Sincerely,    Cyndee Ervin MD    Enclosure  CC:  No Recipients    If you would like to receive this communication electronically, please contact [email or web address] or [phone] to request more information on [address] Link access.    For providers and/or their staff who would like to refer a patient to [facility], please contact us through our one-stop-shop provider referral line, [facility] [place], at [phone].    If you feel you have received this communication in error or would no longer like to receive these types of communications, please e-mail [email or web address]

## 2020-09-28 NOTE — ASSESSMENT & PLAN NOTE
Reviewed PFTs with the patient and his partner.  Discussed the possibility of interstitial lung disease. Also possible that some element of heart failure may be contributing to the abnormalities noted.   Plan for CT Chest to assess for interstitial lung abnormalities and to assess for tracheomalacia and a source of the patient's shortness of breath.    If CT suggests interstitial abnormalities, plan to check 6MWT and have follow up re: potential anti-fibrotic therapy.

## 2020-09-30 ENCOUNTER — HOSPITAL ENCOUNTER (OUTPATIENT)
Dept: RADIOLOGY | Facility: HOSPITAL | Age: 75
Discharge: HOME | End: 2020-09-30
Attending: INTERNAL MEDICINE
Payer: MEDICARE

## 2020-09-30 DIAGNOSIS — R05.9 COUGH: ICD-10-CM

## 2020-09-30 PROCEDURE — 71250 CT CHEST WITHOUT CONTRAST: ICD-10-PCS | Mod: 26,,, | Performed by: RADIOLOGY

## 2020-09-30 PROCEDURE — 71250 CT THORAX DX C-: CPT | Mod: TC

## 2020-09-30 PROCEDURE — 71250 CT THORAX DX C-: CPT | Mod: 26,,, | Performed by: RADIOLOGY

## 2020-10-01 ENCOUNTER — PATIENT MESSAGE (OUTPATIENT)
Dept: OTHER | Facility: OTHER | Age: 75
End: 2020-10-01

## 2020-10-02 DIAGNOSIS — R06.02 SHORTNESS OF BREATH: Primary | ICD-10-CM

## 2020-10-07 ENCOUNTER — PATIENT MESSAGE (OUTPATIENT)
Dept: PULMONOLOGY | Facility: CLINIC | Age: 75
End: 2020-10-07

## 2020-10-14 ENCOUNTER — PATIENT MESSAGE (OUTPATIENT)
Dept: INTERNAL MEDICINE | Facility: CLINIC | Age: 75
End: 2020-10-14

## 2020-10-14 DIAGNOSIS — H81.10 BENIGN PAROXYSMAL POSITIONAL VERTIGO, UNSPECIFIED LATERALITY: ICD-10-CM

## 2020-10-14 RX ORDER — MECLIZINE HYDROCHLORIDE 25 MG/1
25 TABLET ORAL 3 TIMES DAILY PRN
Qty: 90 TABLET | Refills: 0 | Status: SHIPPED | OUTPATIENT
Start: 2020-10-14 | End: 2020-12-01

## 2020-10-15 DIAGNOSIS — G31.84 MILD COGNITIVE IMPAIRMENT: ICD-10-CM

## 2020-10-16 ENCOUNTER — PATIENT MESSAGE (OUTPATIENT)
Dept: PULMONOLOGY | Facility: CLINIC | Age: 75
End: 2020-10-16

## 2020-10-16 RX ORDER — LAMIVUDINE AND ZIDOVUDINE 150; 300 MG/1; MG/1
TABLET, FILM COATED ORAL
Qty: 60 TABLET | Refills: 6 | OUTPATIENT
Start: 2020-10-16

## 2020-10-19 NOTE — TELEPHONE ENCOUNTER
Spoke with patient, informed him that I have received his message. Patient states that he would like to schedule his 6 minute walk test on 10/23 for 11:00 am. I verbalized to patient that I understand and advised patient that I will mail him a appointment letter. Patient verbalized that he understand.

## 2020-10-22 ENCOUNTER — TELEPHONE (OUTPATIENT)
Dept: PULMONOLOGY | Facility: CLINIC | Age: 75
End: 2020-10-22

## 2020-10-22 NOTE — TELEPHONE ENCOUNTER
----- Message from Sarah Stout sent at 10/22/2020  8:32 AM CDT -----  Regarding: Cpap Update  Good Morning,     Mr. Johnson let ms. Love know that his  told him to use a company called [employer].  They apparently set him up on 8/7/2020.

## 2020-10-23 ENCOUNTER — HOSPITAL ENCOUNTER (OUTPATIENT)
Dept: PULMONOLOGY | Facility: CLINIC | Age: 75
Discharge: HOME | End: 2020-10-23
Payer: MEDICARE

## 2020-10-23 VITALS — WEIGHT: 218 LBS | HEIGHT: 70 IN | BODY MASS INDEX: 31.21 KG/M2

## 2020-10-23 DIAGNOSIS — R06.02 SHORTNESS OF BREATH: ICD-10-CM

## 2020-10-23 PROCEDURE — 94618 PULMONARY STRESS TESTING: CPT | Mod: 26,S$PBB,, | Performed by: INTERNAL MEDICINE

## 2020-10-23 PROCEDURE — 94618 PULMONARY STRESS TESTING: CPT | Mod: PBBFAC | Performed by: INTERNAL MEDICINE

## 2020-10-23 PROCEDURE — 94618 PULMONARY STRESS TESTING: ICD-10-PCS | Mod: 26,S$PBB,, | Performed by: INTERNAL MEDICINE

## 2020-10-23 NOTE — PROCEDURES
Haritha Valle is a 75 y.o.  male patient, who presents for a 6 minute walk test ordered by MD Aziza.  The diagnosis is Shortness of Breath.  The patient's BMI is 31.3 kg/m2.  Predicted distance (lower limit of normal) is 297.85 meters.      Test Results:    The test was completed with stops.  The patient stopped 1 time for a total of 16 seconds.  The total time walked was 344 seconds.  During walking, the patient reported:  Dyspnea, Lightheadedness.  The patient used no assistive devices during testing.     10/23/2020---------Distance: 284.07 meters (932 feet)     O2 Sat % Supplemental Oxygen Heart Rate Blood Pressure Johnnie Scale   Pre-exercise  (Resting) 95 % Room Air 102 bpm 138/79 mmHg 4   During Exercise 94 % Room Air 115 bpm 142/87 mmHg 5-6   Post-exercise  (Recovery) 98 % Room Air  99 bpm       Recovery Time: 87 seconds    Performing nurse/tech: Josias BRAGA      PREVIOUS STUDY:   The patient has not had a previous study.      CLINICAL INTERPRETATION:  Six minute walk distance is 284.07 meters (932 feet) with heavy dyspnea.  During exercise, there was no significant desaturation while breathing room air.  Both blood pressure and heart rate remained stable with walking.  Tachycardia was present prior to exercise.  The patient reported non-pulmonary symptoms during exercise.  Significant exercise impairment is likely due to cardiovascular causes and subjective symptoms.  The patient did complete the study, walking 344 seconds of the 360 second test.  No previous study performed.  Based upon age and body mass index, exercise capacity is less than predicted.

## 2020-11-08 ENCOUNTER — PATIENT MESSAGE (OUTPATIENT)
Dept: INTERNAL MEDICINE | Facility: CLINIC | Age: 75
End: 2020-11-08

## 2020-12-11 ENCOUNTER — PATIENT MESSAGE (OUTPATIENT)
Dept: OTHER | Facility: OTHER | Age: 75
End: 2020-12-11

## 2020-12-14 DIAGNOSIS — G31.84 MILD COGNITIVE IMPAIRMENT: ICD-10-CM

## 2020-12-14 RX ORDER — METOPROLOL SUCCINATE 50 MG/1
TABLET, EXTENDED RELEASE ORAL
Qty: 90 TABLET | Refills: 3 | Status: SHIPPED | OUTPATIENT
Start: 2020-12-14 | End: 2021-11-01 | Stop reason: SDUPTHER

## 2020-12-15 RX ORDER — LAMIVUDINE AND ZIDOVUDINE 150; 300 MG/1; MG/1
1 TABLET, FILM COATED ORAL EVERY 12 HOURS
Qty: 180 TABLET | Refills: 3 | Status: SHIPPED | OUTPATIENT
Start: 2020-12-15 | End: 2021-11-01 | Stop reason: SDUPTHER

## 2021-01-11 ENCOUNTER — IMMUNIZATION (OUTPATIENT)
Dept: INTERNAL MEDICINE | Facility: CLINIC | Age: 76
End: 2021-01-11
Payer: MEDICARE

## 2021-01-11 DIAGNOSIS — Z23 NEED FOR VACCINATION: ICD-10-CM

## 2021-01-11 PROCEDURE — 91300 COVID-19, MRNA, LNP-S, PF, 30 MCG/0.3 ML DOSE VACCINE: CPT | Mod: PBBFAC | Performed by: FAMILY MEDICINE

## 2021-02-01 ENCOUNTER — IMMUNIZATION (OUTPATIENT)
Dept: INTERNAL MEDICINE | Facility: CLINIC | Age: 76
End: 2021-02-01
Payer: MEDICARE

## 2021-02-01 DIAGNOSIS — Z23 NEED FOR VACCINATION: Primary | ICD-10-CM

## 2021-02-01 PROCEDURE — 0002A COVID-19, MRNA, LNP-S, PF, 30 MCG/0.3 ML DOSE VACCINE: CPT | Mod: PBBFAC | Performed by: FAMILY MEDICINE

## 2021-02-01 PROCEDURE — 91300 COVID-19, MRNA, LNP-S, PF, 30 MCG/0.3 ML DOSE VACCINE: CPT | Mod: PBBFAC | Performed by: FAMILY MEDICINE

## 2021-03-21 ENCOUNTER — PATIENT MESSAGE (OUTPATIENT)
Dept: INTERNAL MEDICINE | Facility: CLINIC | Age: 76
End: 2021-03-21

## 2021-04-20 ENCOUNTER — PATIENT MESSAGE (OUTPATIENT)
Dept: INTERNAL MEDICINE | Facility: CLINIC | Age: 76
End: 2021-04-20

## 2021-04-20 DIAGNOSIS — H81.10 BENIGN PAROXYSMAL POSITIONAL VERTIGO, UNSPECIFIED LATERALITY: ICD-10-CM

## 2021-04-20 RX ORDER — MECLIZINE HYDROCHLORIDE 25 MG/1
25 TABLET ORAL 3 TIMES DAILY PRN
Qty: 90 TABLET | Refills: 3 | Status: SHIPPED | OUTPATIENT
Start: 2021-04-20 | End: 2021-09-08

## 2021-04-21 ENCOUNTER — PATIENT MESSAGE (OUTPATIENT)
Dept: INTERNAL MEDICINE | Facility: CLINIC | Age: 76
End: 2021-04-21

## 2021-04-21 RX ORDER — FLUOXETINE HYDROCHLORIDE 40 MG/1
40 CAPSULE ORAL DAILY
Qty: 90 CAPSULE | Refills: 3 | Status: SHIPPED | OUTPATIENT
Start: 2021-04-21 | End: 2021-11-01 | Stop reason: SDUPTHER

## 2021-04-26 ENCOUNTER — PATIENT MESSAGE (OUTPATIENT)
Dept: INTERNAL MEDICINE | Facility: CLINIC | Age: 76
End: 2021-04-26

## 2021-05-30 DIAGNOSIS — Z21 HIV POSITIVE: ICD-10-CM

## 2021-05-31 RX ORDER — ATAZANAVIR 200 MG/1
CAPSULE ORAL
Qty: 60 CAPSULE | Refills: 0 | Status: SHIPPED | OUTPATIENT
Start: 2021-05-31 | End: 2021-07-14 | Stop reason: SDUPTHER

## 2021-06-24 ENCOUNTER — OFFICE VISIT (OUTPATIENT)
Dept: INTERNAL MEDICINE | Facility: CLINIC | Age: 76
End: 2021-06-24
Payer: MEDICARE

## 2021-06-24 VITALS
OXYGEN SATURATION: 97 % | WEIGHT: 227.94 LBS | BODY MASS INDEX: 32.63 KG/M2 | SYSTOLIC BLOOD PRESSURE: 134 MMHG | HEART RATE: 71 BPM | HEIGHT: 70 IN | DIASTOLIC BLOOD PRESSURE: 90 MMHG

## 2021-06-24 DIAGNOSIS — G30.9 DEMENTIA IN ALZHEIMER'S DISEASE: ICD-10-CM

## 2021-06-24 DIAGNOSIS — B20 HIV DISEASE: ICD-10-CM

## 2021-06-24 DIAGNOSIS — E29.1 MALE HYPOGONADISM: ICD-10-CM

## 2021-06-24 DIAGNOSIS — Z91.81 PERSONAL HISTORY OF FALL: ICD-10-CM

## 2021-06-24 DIAGNOSIS — I10 ESSENTIAL HYPERTENSION: Primary | ICD-10-CM

## 2021-06-24 DIAGNOSIS — R42 VERTIGO: ICD-10-CM

## 2021-06-24 DIAGNOSIS — F02.80 DEMENTIA IN ALZHEIMER'S DISEASE: ICD-10-CM

## 2021-06-24 DIAGNOSIS — H55.00 NYSTAGMUS: ICD-10-CM

## 2021-06-24 PROBLEM — R06.02 SHORTNESS OF BREATH: Status: RESOLVED | Noted: 2020-09-28 | Resolved: 2021-06-24

## 2021-06-24 PROCEDURE — 99214 PR OFFICE/OUTPT VISIT, EST, LEVL IV, 30-39 MIN: ICD-10-PCS | Mod: S$PBB,,, | Performed by: INTERNAL MEDICINE

## 2021-06-24 PROCEDURE — 99215 OFFICE O/P EST HI 40 MIN: CPT | Mod: PBBFAC | Performed by: INTERNAL MEDICINE

## 2021-06-24 PROCEDURE — 99999 PR PBB SHADOW E&M-EST. PATIENT-LVL V: ICD-10-PCS | Mod: PBBFAC,,, | Performed by: INTERNAL MEDICINE

## 2021-06-24 PROCEDURE — 99214 OFFICE O/P EST MOD 30 MIN: CPT | Mod: S$PBB,,, | Performed by: INTERNAL MEDICINE

## 2021-06-24 PROCEDURE — 99999 PR PBB SHADOW E&M-EST. PATIENT-LVL V: CPT | Mod: PBBFAC,,, | Performed by: INTERNAL MEDICINE

## 2021-06-24 RX ORDER — VALSARTAN 40 MG/1
40 TABLET ORAL DAILY
Qty: 90 TABLET | Refills: 3 | Status: SHIPPED | OUTPATIENT
Start: 2021-06-24 | End: 2021-07-22 | Stop reason: SDUPTHER

## 2021-07-14 ENCOUNTER — PATIENT MESSAGE (OUTPATIENT)
Dept: INTERNAL MEDICINE | Facility: CLINIC | Age: 76
End: 2021-07-14

## 2021-07-14 DIAGNOSIS — Z21 HIV POSITIVE: ICD-10-CM

## 2021-07-14 RX ORDER — ATAZANAVIR 200 MG/1
400 CAPSULE ORAL DAILY
Qty: 180 CAPSULE | Refills: 0 | Status: SHIPPED | OUTPATIENT
Start: 2021-07-14 | End: 2021-10-19 | Stop reason: SDUPTHER

## 2021-07-21 ENCOUNTER — PATIENT MESSAGE (OUTPATIENT)
Dept: INTERNAL MEDICINE | Facility: CLINIC | Age: 76
End: 2021-07-21

## 2021-07-21 DIAGNOSIS — I10 ESSENTIAL HYPERTENSION: ICD-10-CM

## 2021-07-22 ENCOUNTER — PATIENT MESSAGE (OUTPATIENT)
Dept: INTERNAL MEDICINE | Facility: CLINIC | Age: 76
End: 2021-07-22

## 2021-07-22 RX ORDER — VALSARTAN 80 MG/1
80 TABLET ORAL DAILY
Qty: 90 TABLET | Refills: 3 | Status: SHIPPED | OUTPATIENT
Start: 2021-07-22 | End: 2021-08-04

## 2021-07-25 ENCOUNTER — PATIENT MESSAGE (OUTPATIENT)
Dept: INTERNAL MEDICINE | Facility: CLINIC | Age: 76
End: 2021-07-25

## 2021-08-04 ENCOUNTER — PATIENT MESSAGE (OUTPATIENT)
Dept: INTERNAL MEDICINE | Facility: CLINIC | Age: 76
End: 2021-08-04

## 2021-08-04 DIAGNOSIS — I10 ESSENTIAL HYPERTENSION: Primary | ICD-10-CM

## 2021-08-04 RX ORDER — VALSARTAN AND HYDROCHLOROTHIAZIDE 160; 12.5 MG/1; MG/1
1 TABLET, FILM COATED ORAL DAILY
Qty: 90 TABLET | Refills: 3 | Status: SHIPPED | OUTPATIENT
Start: 2021-08-04 | End: 2021-11-01 | Stop reason: SDUPTHER

## 2021-08-16 ENCOUNTER — PATIENT MESSAGE (OUTPATIENT)
Dept: INTERNAL MEDICINE | Facility: CLINIC | Age: 76
End: 2021-08-16

## 2021-08-16 DIAGNOSIS — I10 ESSENTIAL HYPERTENSION: Primary | ICD-10-CM

## 2021-08-17 ENCOUNTER — PATIENT MESSAGE (OUTPATIENT)
Dept: ADMINISTRATIVE | Facility: OTHER | Age: 76
End: 2021-08-17

## 2021-08-17 ENCOUNTER — PATIENT MESSAGE (OUTPATIENT)
Dept: INTERNAL MEDICINE | Facility: CLINIC | Age: 76
End: 2021-08-17

## 2021-09-08 ENCOUNTER — PATIENT MESSAGE (OUTPATIENT)
Dept: INTERNAL MEDICINE | Facility: CLINIC | Age: 76
End: 2021-09-08

## 2021-10-11 ENCOUNTER — IMMUNIZATION (OUTPATIENT)
Dept: INTERNAL MEDICINE | Facility: CLINIC | Age: 76
End: 2021-10-11
Payer: MEDICARE

## 2021-10-11 ENCOUNTER — OFFICE VISIT (OUTPATIENT)
Dept: INTERNAL MEDICINE | Facility: CLINIC | Age: 76
End: 2021-10-11
Payer: MEDICARE

## 2021-10-11 VITALS
SYSTOLIC BLOOD PRESSURE: 158 MMHG | BODY MASS INDEX: 32.69 KG/M2 | DIASTOLIC BLOOD PRESSURE: 80 MMHG | HEIGHT: 70 IN | WEIGHT: 228.38 LBS | HEART RATE: 64 BPM | OXYGEN SATURATION: 98 %

## 2021-10-11 DIAGNOSIS — I10 ESSENTIAL HYPERTENSION: Primary | ICD-10-CM

## 2021-10-11 DIAGNOSIS — B20 HIV DISEASE: Chronic | ICD-10-CM

## 2021-10-11 DIAGNOSIS — E78.2 MIXED HYPERLIPIDEMIA: ICD-10-CM

## 2021-10-11 DIAGNOSIS — L57.0 ACTINIC KERATOSES: ICD-10-CM

## 2021-10-11 DIAGNOSIS — C44.91 BASAL CELL CARCINOMA (BCC), UNSPECIFIED SITE: ICD-10-CM

## 2021-10-11 DIAGNOSIS — Z12.11 SCREENING FOR MALIGNANT NEOPLASM OF COLON: ICD-10-CM

## 2021-10-11 DIAGNOSIS — G31.84 MILD COGNITIVE IMPAIRMENT: Chronic | ICD-10-CM

## 2021-10-11 PROCEDURE — 99999 PR PBB SHADOW E&M-EST. PATIENT-LVL V: CPT | Mod: PBBFAC,,, | Performed by: INTERNAL MEDICINE

## 2021-10-11 PROCEDURE — 99215 OFFICE O/P EST HI 40 MIN: CPT | Mod: PBBFAC | Performed by: INTERNAL MEDICINE

## 2021-10-11 PROCEDURE — 90694 VACC AIIV4 NO PRSRV 0.5ML IM: CPT | Mod: PBBFAC

## 2021-10-11 PROCEDURE — 99214 PR OFFICE/OUTPT VISIT, EST, LEVL IV, 30-39 MIN: ICD-10-PCS | Mod: S$PBB,,, | Performed by: INTERNAL MEDICINE

## 2021-10-11 PROCEDURE — 99214 OFFICE O/P EST MOD 30 MIN: CPT | Mod: S$PBB,,, | Performed by: INTERNAL MEDICINE

## 2021-10-11 PROCEDURE — G0008 ADMIN INFLUENZA VIRUS VAC: HCPCS | Mod: PBBFAC

## 2021-10-11 PROCEDURE — 99999 PR PBB SHADOW E&M-EST. PATIENT-LVL V: ICD-10-PCS | Mod: PBBFAC,,, | Performed by: INTERNAL MEDICINE

## 2021-10-11 RX ORDER — FUROSEMIDE 20 MG/1
1 TABLET ORAL DAILY
COMMUNITY
Start: 2021-09-29 | End: 2021-10-11

## 2021-10-11 RX ORDER — AMLODIPINE BESYLATE 5 MG/1
5 TABLET ORAL
COMMUNITY
End: 2021-10-11

## 2021-10-14 ENCOUNTER — LAB VISIT (OUTPATIENT)
Dept: LAB | Facility: HOSPITAL | Age: 76
End: 2021-10-14
Attending: INTERNAL MEDICINE
Payer: MEDICARE

## 2021-10-14 DIAGNOSIS — I10 ESSENTIAL HYPERTENSION: ICD-10-CM

## 2021-10-14 DIAGNOSIS — B20 HIV DISEASE: Chronic | ICD-10-CM

## 2021-10-14 DIAGNOSIS — E78.2 MIXED HYPERLIPIDEMIA: ICD-10-CM

## 2021-10-14 LAB
ALBUMIN SERPL BCP-MCNC: 3.7 G/DL (ref 3.5–5.2)
ALP SERPL-CCNC: 31 U/L (ref 55–135)
ALT SERPL W/O P-5'-P-CCNC: 33 U/L (ref 10–44)
ANION GAP SERPL CALC-SCNC: 14 MMOL/L (ref 8–16)
AST SERPL-CCNC: 34 U/L (ref 10–40)
BILIRUB SERPL-MCNC: 3.9 MG/DL (ref 0.1–1)
BUN SERPL-MCNC: 20 MG/DL (ref 8–23)
CALCIUM SERPL-MCNC: 10.3 MG/DL (ref 8.7–10.5)
CHLORIDE SERPL-SCNC: 105 MMOL/L (ref 95–110)
CHOLEST SERPL-MCNC: 149 MG/DL (ref 120–199)
CHOLEST/HDLC SERPL: 4.3 {RATIO} (ref 2–5)
CO2 SERPL-SCNC: 19 MMOL/L (ref 23–29)
CREAT SERPL-MCNC: 0.9 MG/DL (ref 0.5–1.4)
EST. GFR  (AFRICAN AMERICAN): >60 ML/MIN/1.73 M^2
EST. GFR  (NON AFRICAN AMERICAN): >60 ML/MIN/1.73 M^2
GLUCOSE SERPL-MCNC: 116 MG/DL (ref 70–110)
HDLC SERPL-MCNC: 35 MG/DL (ref 40–75)
HDLC SERPL: 23.5 % (ref 20–50)
LDLC SERPL CALC-MCNC: ABNORMAL MG/DL (ref 63–159)
NONHDLC SERPL-MCNC: 114 MG/DL
POTASSIUM SERPL-SCNC: 4.2 MMOL/L (ref 3.5–5.1)
PROT SERPL-MCNC: 8 G/DL (ref 6–8.4)
SODIUM SERPL-SCNC: 138 MMOL/L (ref 136–145)
TRIGL SERPL-MCNC: 401 MG/DL (ref 30–150)

## 2021-10-14 PROCEDURE — 87389 HIV-1 AG W/HIV-1&-2 AB AG IA: CPT | Performed by: INTERNAL MEDICINE

## 2021-10-14 PROCEDURE — 80061 LIPID PANEL: CPT | Performed by: INTERNAL MEDICINE

## 2021-10-14 PROCEDURE — 86361 T CELL ABSOLUTE COUNT: CPT | Performed by: INTERNAL MEDICINE

## 2021-10-14 PROCEDURE — 36415 COLL VENOUS BLD VENIPUNCTURE: CPT | Performed by: INTERNAL MEDICINE

## 2021-10-14 PROCEDURE — 80053 COMPREHEN METABOLIC PANEL: CPT | Performed by: INTERNAL MEDICINE

## 2021-10-15 LAB
CD3+CD4+ CELLS # BLD: 557 CELLS/UL (ref 300–1400)
CD3+CD4+ CELLS NFR BLD: 20.6 % (ref 28–57)
HIV 1+2 AB+HIV1 P24 AG SERPL QL IA: ABNORMAL

## 2021-10-19 ENCOUNTER — PATIENT MESSAGE (OUTPATIENT)
Dept: INTERNAL MEDICINE | Facility: CLINIC | Age: 76
End: 2021-10-19

## 2021-10-19 ENCOUNTER — OFFICE VISIT (OUTPATIENT)
Dept: DERMATOLOGY | Facility: CLINIC | Age: 76
End: 2021-10-19
Payer: MEDICARE

## 2021-10-19 DIAGNOSIS — L57.0 ACTINIC KERATOSES: ICD-10-CM

## 2021-10-19 DIAGNOSIS — Z21 HIV POSITIVE: ICD-10-CM

## 2021-10-19 DIAGNOSIS — Z85.828 HISTORY OF NONMELANOMA SKIN CANCER: ICD-10-CM

## 2021-10-19 DIAGNOSIS — D48.5 NEOPLASM OF UNCERTAIN BEHAVIOR OF SKIN: Primary | ICD-10-CM

## 2021-10-19 PROCEDURE — 17003 DESTRUCT PREMALG LES 2-14: CPT | Mod: PBBFAC | Performed by: STUDENT IN AN ORGANIZED HEALTH CARE EDUCATION/TRAINING PROGRAM

## 2021-10-19 PROCEDURE — 11102 TANGNTL BX SKIN SINGLE LES: CPT | Mod: S$PBB,59,, | Performed by: STUDENT IN AN ORGANIZED HEALTH CARE EDUCATION/TRAINING PROGRAM

## 2021-10-19 PROCEDURE — 99999 PR PBB SHADOW E&M-EST. PATIENT-LVL III: ICD-10-PCS | Mod: PBBFAC,,, | Performed by: STUDENT IN AN ORGANIZED HEALTH CARE EDUCATION/TRAINING PROGRAM

## 2021-10-19 PROCEDURE — 17000 PR DESTRUCTION(LASER SURGERY,CRYOSURGERY,CHEMOSURGERY),PREMALIGNANT LESIONS,FIRST LESION: ICD-10-PCS | Mod: S$PBB,,, | Performed by: STUDENT IN AN ORGANIZED HEALTH CARE EDUCATION/TRAINING PROGRAM

## 2021-10-19 PROCEDURE — 88305 TISSUE EXAM BY PATHOLOGIST: CPT | Mod: 26,,, | Performed by: PATHOLOGY

## 2021-10-19 PROCEDURE — 17003 DESTRUCTION, PREMALIGNANT LESIONS; SECOND THROUGH 14 LESIONS: ICD-10-PCS | Mod: S$PBB,,, | Performed by: STUDENT IN AN ORGANIZED HEALTH CARE EDUCATION/TRAINING PROGRAM

## 2021-10-19 PROCEDURE — 11102 TANGNTL BX SKIN SINGLE LES: CPT | Mod: PBBFAC | Performed by: STUDENT IN AN ORGANIZED HEALTH CARE EDUCATION/TRAINING PROGRAM

## 2021-10-19 PROCEDURE — 17000 DESTRUCT PREMALG LESION: CPT | Mod: 59,PBBFAC | Performed by: STUDENT IN AN ORGANIZED HEALTH CARE EDUCATION/TRAINING PROGRAM

## 2021-10-19 PROCEDURE — 17003 DESTRUCT PREMALG LES 2-14: CPT | Mod: S$PBB,,, | Performed by: STUDENT IN AN ORGANIZED HEALTH CARE EDUCATION/TRAINING PROGRAM

## 2021-10-19 PROCEDURE — 99203 PR OFFICE/OUTPT VISIT, NEW, LEVL III, 30-44 MIN: ICD-10-PCS | Mod: 25,S$PBB,, | Performed by: STUDENT IN AN ORGANIZED HEALTH CARE EDUCATION/TRAINING PROGRAM

## 2021-10-19 PROCEDURE — 99213 OFFICE O/P EST LOW 20 MIN: CPT | Mod: PBBFAC | Performed by: STUDENT IN AN ORGANIZED HEALTH CARE EDUCATION/TRAINING PROGRAM

## 2021-10-19 PROCEDURE — 88305 TISSUE EXAM BY PATHOLOGIST: ICD-10-PCS | Mod: 26,,, | Performed by: PATHOLOGY

## 2021-10-19 PROCEDURE — 99203 OFFICE O/P NEW LOW 30 MIN: CPT | Mod: 25,S$PBB,, | Performed by: STUDENT IN AN ORGANIZED HEALTH CARE EDUCATION/TRAINING PROGRAM

## 2021-10-19 PROCEDURE — 11102 PR TANGENTIAL BIOPSY, SKIN, SINGLE LESION: ICD-10-PCS | Mod: S$PBB,59,, | Performed by: STUDENT IN AN ORGANIZED HEALTH CARE EDUCATION/TRAINING PROGRAM

## 2021-10-19 PROCEDURE — 17000 DESTRUCT PREMALG LESION: CPT | Mod: S$PBB,,, | Performed by: STUDENT IN AN ORGANIZED HEALTH CARE EDUCATION/TRAINING PROGRAM

## 2021-10-19 PROCEDURE — 88305 TISSUE EXAM BY PATHOLOGIST: CPT | Performed by: PATHOLOGY

## 2021-10-19 PROCEDURE — 99999 PR PBB SHADOW E&M-EST. PATIENT-LVL III: CPT | Mod: PBBFAC,,, | Performed by: STUDENT IN AN ORGANIZED HEALTH CARE EDUCATION/TRAINING PROGRAM

## 2021-10-19 RX ORDER — ATAZANAVIR 200 MG/1
400 CAPSULE ORAL DAILY
Qty: 180 CAPSULE | Refills: 3 | Status: SHIPPED | OUTPATIENT
Start: 2021-10-19 | End: 2021-11-01 | Stop reason: SDUPTHER

## 2021-10-21 ENCOUNTER — OFFICE VISIT (OUTPATIENT)
Dept: OPHTHALMOLOGY | Facility: CLINIC | Age: 76
End: 2021-10-21
Payer: MEDICARE

## 2021-10-21 DIAGNOSIS — H52.4 ASTIGMATISM WITH PRESBYOPIA, BILATERAL: ICD-10-CM

## 2021-10-21 DIAGNOSIS — H25.13 NUCLEAR SCLEROSIS, BILATERAL: Primary | ICD-10-CM

## 2021-10-21 DIAGNOSIS — H52.203 ASTIGMATISM WITH PRESBYOPIA, BILATERAL: ICD-10-CM

## 2021-10-21 DIAGNOSIS — I10 ESSENTIAL HYPERTENSION: ICD-10-CM

## 2021-10-21 PROCEDURE — 92015 DETERMINE REFRACTIVE STATE: CPT | Mod: ,,, | Performed by: OPTOMETRIST

## 2021-10-21 PROCEDURE — 92002 INTRM OPH EXAM NEW PATIENT: CPT | Mod: S$PBB,,, | Performed by: OPTOMETRIST

## 2021-10-21 PROCEDURE — 92015 PR REFRACTION: ICD-10-PCS | Mod: ,,, | Performed by: OPTOMETRIST

## 2021-10-21 PROCEDURE — 92002 PR EYE EXAM, NEW PATIENT,INTERMED: ICD-10-PCS | Mod: S$PBB,,, | Performed by: OPTOMETRIST

## 2021-10-21 PROCEDURE — 99212 OFFICE O/P EST SF 10 MIN: CPT | Mod: PBBFAC | Performed by: OPTOMETRIST

## 2021-10-21 PROCEDURE — 99999 PR PBB SHADOW E&M-EST. PATIENT-LVL II: CPT | Mod: PBBFAC,,, | Performed by: OPTOMETRIST

## 2021-10-21 PROCEDURE — 99999 PR PBB SHADOW E&M-EST. PATIENT-LVL II: ICD-10-PCS | Mod: PBBFAC,,, | Performed by: OPTOMETRIST

## 2021-10-22 LAB
FINAL PATHOLOGIC DIAGNOSIS: NORMAL
GROSS: NORMAL
Lab: NORMAL
MICROSCOPIC EXAM: NORMAL

## 2021-10-25 DIAGNOSIS — C44.310 BASAL CELL CARCINOMA, FACE: Primary | ICD-10-CM

## 2021-10-26 ENCOUNTER — TELEPHONE (OUTPATIENT)
Dept: DERMATOLOGY | Facility: CLINIC | Age: 76
End: 2021-10-26
Payer: MEDICARE

## 2021-10-29 ENCOUNTER — IMMUNIZATION (OUTPATIENT)
Dept: PHARMACY | Facility: CLINIC | Age: 76
End: 2021-10-29
Payer: MEDICARE

## 2021-10-29 DIAGNOSIS — Z23 NEED FOR VACCINATION: Primary | ICD-10-CM

## 2021-11-01 ENCOUNTER — PATIENT MESSAGE (OUTPATIENT)
Dept: INTERNAL MEDICINE | Facility: CLINIC | Age: 76
End: 2021-11-01
Payer: MEDICARE

## 2021-11-01 DIAGNOSIS — G31.84 MILD COGNITIVE IMPAIRMENT: ICD-10-CM

## 2021-11-01 DIAGNOSIS — I10 ESSENTIAL HYPERTENSION: ICD-10-CM

## 2021-11-01 DIAGNOSIS — Z21 HIV POSITIVE: ICD-10-CM

## 2021-11-01 DIAGNOSIS — E78.2 MIXED HYPERLIPIDEMIA: ICD-10-CM

## 2021-11-01 RX ORDER — FLUOXETINE HYDROCHLORIDE 40 MG/1
40 CAPSULE ORAL DAILY
Qty: 90 CAPSULE | Refills: 3 | Status: SHIPPED | OUTPATIENT
Start: 2021-11-01 | End: 2021-12-20

## 2021-11-01 RX ORDER — ATAZANAVIR 200 MG/1
400 CAPSULE ORAL DAILY
Qty: 180 CAPSULE | Refills: 3 | Status: SHIPPED | OUTPATIENT
Start: 2021-11-01 | End: 2021-11-09 | Stop reason: SDUPTHER

## 2021-11-01 RX ORDER — ROSUVASTATIN CALCIUM 20 MG/1
20 TABLET, COATED ORAL DAILY
Qty: 90 TABLET | Refills: 3 | Status: SHIPPED | OUTPATIENT
Start: 2021-11-01 | End: 2022-04-04 | Stop reason: SDUPTHER

## 2021-11-01 RX ORDER — VALSARTAN AND HYDROCHLOROTHIAZIDE 160; 12.5 MG/1; MG/1
1 TABLET, FILM COATED ORAL DAILY
Qty: 90 TABLET | Refills: 3 | Status: SHIPPED | OUTPATIENT
Start: 2021-11-01 | End: 2022-01-21

## 2021-11-01 RX ORDER — LAMIVUDINE AND ZIDOVUDINE 150; 300 MG/1; MG/1
1 TABLET, FILM COATED ORAL EVERY 12 HOURS
Qty: 180 TABLET | Refills: 3 | Status: SHIPPED | OUTPATIENT
Start: 2021-11-01 | End: 2023-08-21

## 2021-11-01 RX ORDER — METOPROLOL SUCCINATE 50 MG/1
50 TABLET, EXTENDED RELEASE ORAL DAILY
Qty: 90 TABLET | Refills: 3 | Status: SHIPPED | OUTPATIENT
Start: 2021-11-01 | End: 2022-08-17

## 2021-11-03 ENCOUNTER — TELEPHONE (OUTPATIENT)
Dept: INTERNAL MEDICINE | Facility: CLINIC | Age: 76
End: 2021-11-03
Payer: MEDICARE

## 2021-11-05 ENCOUNTER — TELEPHONE (OUTPATIENT)
Dept: INTERNAL MEDICINE | Facility: CLINIC | Age: 76
End: 2021-11-05
Payer: MEDICARE

## 2021-11-08 ENCOUNTER — PATIENT MESSAGE (OUTPATIENT)
Dept: INTERNAL MEDICINE | Facility: CLINIC | Age: 76
End: 2021-11-08
Payer: MEDICARE

## 2021-11-08 ENCOUNTER — TELEPHONE (OUTPATIENT)
Dept: INTERNAL MEDICINE | Facility: CLINIC | Age: 76
End: 2021-11-08
Payer: MEDICARE

## 2021-11-08 DIAGNOSIS — Z21 HIV POSITIVE: ICD-10-CM

## 2021-11-09 RX ORDER — ATAZANAVIR 200 MG/1
400 CAPSULE ORAL DAILY
Qty: 180 CAPSULE | Refills: 3 | Status: SHIPPED | OUTPATIENT
Start: 2021-11-09 | End: 2021-11-11 | Stop reason: SDUPTHER

## 2021-11-10 ENCOUNTER — TELEPHONE (OUTPATIENT)
Dept: INTERNAL MEDICINE | Facility: CLINIC | Age: 76
End: 2021-11-10
Payer: MEDICARE

## 2021-11-11 DIAGNOSIS — Z21 HIV POSITIVE: ICD-10-CM

## 2021-11-11 RX ORDER — ATAZANAVIR 200 MG/1
400 CAPSULE ORAL DAILY
Qty: 180 CAPSULE | Refills: 3 | Status: SHIPPED | OUTPATIENT
Start: 2021-11-11 | End: 2021-11-12 | Stop reason: SDUPTHER

## 2021-11-12 RX ORDER — ATAZANAVIR 200 MG/1
400 CAPSULE ORAL DAILY
Qty: 180 CAPSULE | Refills: 3 | Status: SHIPPED | OUTPATIENT
Start: 2021-11-12 | End: 2023-08-21

## 2021-11-17 ENCOUNTER — PATIENT OUTREACH (OUTPATIENT)
Dept: ADMINISTRATIVE | Facility: OTHER | Age: 76
End: 2021-11-17
Payer: MEDICARE

## 2021-11-18 ENCOUNTER — PROCEDURE VISIT (OUTPATIENT)
Dept: DERMATOLOGY | Facility: CLINIC | Age: 76
End: 2021-11-18
Payer: MEDICARE

## 2021-11-18 VITALS
DIASTOLIC BLOOD PRESSURE: 79 MMHG | HEIGHT: 70 IN | BODY MASS INDEX: 32.64 KG/M2 | SYSTOLIC BLOOD PRESSURE: 128 MMHG | WEIGHT: 228 LBS

## 2021-11-18 DIAGNOSIS — C44.310 BASAL CELL CARCINOMA, FACE: ICD-10-CM

## 2021-11-18 PROCEDURE — 13132 CMPLX RPR F/C/C/M/N/AX/G/H/F: CPT | Mod: PBBFAC,PO,51 | Performed by: DERMATOLOGY

## 2021-11-18 PROCEDURE — 13132 PR RECMPL WND HEAD,FAC,HAND 2.6-7.5 CM: ICD-10-PCS | Mod: S$PBB,51,, | Performed by: DERMATOLOGY

## 2021-11-18 PROCEDURE — 99499 NO LOS: ICD-10-PCS | Mod: S$PBB,,, | Performed by: DERMATOLOGY

## 2021-11-18 PROCEDURE — 17311 MOHS 1 STAGE H/N/HF/G: CPT | Mod: S$PBB,,, | Performed by: DERMATOLOGY

## 2021-11-18 PROCEDURE — 17311 MOHS 1 STAGE H/N/HF/G: CPT | Mod: PBBFAC,PO | Performed by: DERMATOLOGY

## 2021-11-18 PROCEDURE — 99499 UNLISTED E&M SERVICE: CPT | Mod: S$PBB,,, | Performed by: DERMATOLOGY

## 2021-11-18 PROCEDURE — 17311: ICD-10-PCS | Mod: S$PBB,,, | Performed by: DERMATOLOGY

## 2021-11-18 PROCEDURE — 13132 CMPLX RPR F/C/C/M/N/AX/G/H/F: CPT | Mod: S$PBB,51,, | Performed by: DERMATOLOGY

## 2021-11-19 ENCOUNTER — PATIENT MESSAGE (OUTPATIENT)
Dept: ENDOSCOPY | Facility: HOSPITAL | Age: 76
End: 2021-11-19
Payer: MEDICARE

## 2021-11-24 ENCOUNTER — CLINICAL SUPPORT (OUTPATIENT)
Dept: DERMATOLOGY | Facility: CLINIC | Age: 76
End: 2021-11-24
Payer: MEDICARE

## 2021-11-24 DIAGNOSIS — Z48.02 ENCOUNTER FOR REMOVAL OF SUTURES: Primary | ICD-10-CM

## 2021-11-24 PROCEDURE — 99999 PR PBB SHADOW E&M-EST. PATIENT-LVL I: ICD-10-PCS | Mod: PBBFAC,,,

## 2021-11-24 PROCEDURE — 99211 OFF/OP EST MAY X REQ PHY/QHP: CPT | Mod: PBBFAC,PO

## 2021-11-24 PROCEDURE — 99999 PR PBB SHADOW E&M-EST. PATIENT-LVL I: CPT | Mod: PBBFAC,,,

## 2021-12-10 ENCOUNTER — TELEPHONE (OUTPATIENT)
Dept: INTERNAL MEDICINE | Facility: CLINIC | Age: 76
End: 2021-12-10
Payer: MEDICARE

## 2021-12-13 ENCOUNTER — PATIENT MESSAGE (OUTPATIENT)
Dept: INTERNAL MEDICINE | Facility: CLINIC | Age: 76
End: 2021-12-13
Payer: MEDICARE

## 2021-12-14 ENCOUNTER — OFFICE VISIT (OUTPATIENT)
Dept: OPHTHALMOLOGY | Facility: CLINIC | Age: 76
End: 2021-12-14
Payer: MEDICARE

## 2021-12-14 DIAGNOSIS — H35.3111 EARLY DRY STAGE NONEXUDATIVE AGE-RELATED MACULAR DEGENERATION OF RIGHT EYE: ICD-10-CM

## 2021-12-14 DIAGNOSIS — H25.13 NUCLEAR SCLEROSIS, BILATERAL: ICD-10-CM

## 2021-12-14 DIAGNOSIS — H35.3220 EXUDATIVE AGE-RELATED MACULAR DEGENERATION OF LEFT EYE, UNSPECIFIED STAGE: Primary | ICD-10-CM

## 2021-12-14 PROCEDURE — 99999 PR PBB SHADOW E&M-EST. PATIENT-LVL II: ICD-10-PCS | Mod: PBBFAC,,, | Performed by: OPTOMETRIST

## 2021-12-14 PROCEDURE — 92014 COMPRE OPH EXAM EST PT 1/>: CPT | Mod: S$PBB,,, | Performed by: OPTOMETRIST

## 2021-12-14 PROCEDURE — 92014 PR EYE EXAM, EST PATIENT,COMPREHESV: ICD-10-PCS | Mod: S$PBB,,, | Performed by: OPTOMETRIST

## 2021-12-14 PROCEDURE — 92134 CPTRZ OPH DX IMG PST SGM RTA: CPT | Mod: PBBFAC | Performed by: OPTOMETRIST

## 2021-12-14 PROCEDURE — 99212 OFFICE O/P EST SF 10 MIN: CPT | Mod: PBBFAC | Performed by: OPTOMETRIST

## 2021-12-14 PROCEDURE — 99999 PR PBB SHADOW E&M-EST. PATIENT-LVL II: CPT | Mod: PBBFAC,,, | Performed by: OPTOMETRIST

## 2021-12-14 PROCEDURE — 92134 POSTERIOR SEGMENT OCT RETINA (OCULAR COHERENCE TOMOGRAPHY)-BOTH EYES: ICD-10-PCS | Mod: 26,S$PBB,, | Performed by: OPTOMETRIST

## 2021-12-20 RX ORDER — FLUOXETINE HYDROCHLORIDE 40 MG/1
CAPSULE ORAL
Qty: 90 CAPSULE | Refills: 3 | Status: SHIPPED | OUTPATIENT
Start: 2021-12-20 | End: 2022-11-11 | Stop reason: SDUPTHER

## 2021-12-30 ENCOUNTER — PATIENT OUTREACH (OUTPATIENT)
Dept: ADMINISTRATIVE | Facility: OTHER | Age: 76
End: 2021-12-30
Payer: MEDICARE

## 2021-12-31 PROCEDURE — 99457 RPM TX MGMT 1ST 20 MIN: CPT | Mod: S$PBB,,, | Performed by: INTERNAL MEDICINE

## 2021-12-31 PROCEDURE — 99457 PR MONITORING, PHYSIOL PARAM, REMOTE, 1ST 20 MINS, PER MONTH: ICD-10-PCS | Mod: S$PBB,,, | Performed by: INTERNAL MEDICINE

## 2022-01-03 ENCOUNTER — PATIENT MESSAGE (OUTPATIENT)
Dept: INTERNAL MEDICINE | Facility: CLINIC | Age: 77
End: 2022-01-03
Payer: MEDICARE

## 2022-01-04 ENCOUNTER — TELEPHONE (OUTPATIENT)
Dept: OPHTHALMOLOGY | Facility: CLINIC | Age: 77
End: 2022-01-04

## 2022-01-04 NOTE — TELEPHONE ENCOUNTER
----- Message from Mike Rodriguez sent at 1/4/2022  8:57 AM CST -----  Contact: self  Type:  Sooner Apoointment Request    Caller is requesting a sooner appointment.  Caller declined first available appointment listed below.  Caller will not accept being placed on the waitlist and is requesting a message be sent to doctor.  Name of Caller:Haritha Valle    When is the first available appointment? 1/4/2022  Symptoms:reschedule  Would the patient rather a call back or a response via [facility]? callback  Best Call Back Number:[phone]  Additional Information:

## 2022-01-06 ENCOUNTER — PATIENT MESSAGE (OUTPATIENT)
Dept: ENDOSCOPY | Facility: HOSPITAL | Age: 77
End: 2022-01-06
Payer: MEDICARE

## 2022-01-06 DIAGNOSIS — Z01.818 PRE-OP TESTING: Primary | ICD-10-CM

## 2022-01-07 RX ORDER — SODIUM, POTASSIUM,MAG SULFATES 17.5-3.13G
1 SOLUTION, RECONSTITUTED, ORAL ORAL DAILY
Qty: 1 KIT | Refills: 0 | Status: SHIPPED | OUTPATIENT
Start: 2022-01-07 | End: 2022-01-09

## 2022-01-12 ENCOUNTER — TELEPHONE (OUTPATIENT)
Dept: INTERNAL MEDICINE | Facility: CLINIC | Age: 77
End: 2022-01-12
Payer: MEDICARE

## 2022-01-12 ENCOUNTER — TELEPHONE (OUTPATIENT)
Dept: INTERNAL MEDICINE | Facility: CLINIC | Age: 77
End: 2022-01-12

## 2022-01-12 ENCOUNTER — PATIENT MESSAGE (OUTPATIENT)
Dept: INTERNAL MEDICINE | Facility: CLINIC | Age: 77
End: 2022-01-12
Payer: MEDICARE

## 2022-01-12 NOTE — TELEPHONE ENCOUNTER
Spoke to life partner Duane    Symptoms stared week ago     No numbness or  weakness    Patient is a little confusion/ slight mental statuses change     Patient has balance issue and  dizziness     Patient has been having headaches described as a sharp pain above his ear twice    Patient has been having slurring of speech     Patient has been extremely tired, naps frequently, falls asleep often      No facial drooping       Sent  to  for triage

## 2022-01-12 NOTE — TELEPHONE ENCOUNTER
LM for Mr Duane to return my call to triage Haritha Valle's symptoms. Urgent that he return my call at the [facility].

## 2022-01-13 ENCOUNTER — PATIENT MESSAGE (OUTPATIENT)
Dept: INTERNAL MEDICINE | Facility: CLINIC | Age: 77
End: 2022-01-13
Payer: MEDICARE

## 2022-01-13 NOTE — TELEPHONE ENCOUNTER
Slurred speech, no more severe headache, the slurring is intermittent, he is having sharp pain above his ear. Duane will talk to Haritha to try to get him to go to the Emergency Room. Thank you Dr. Andrade. I tried to reach them on yesterday but he thought it was a spam call.

## 2022-01-13 NOTE — TELEPHONE ENCOUNTER
Called home number and office number, answered by a gentlemen who states he will have Duane call me back at Dr. Andrade's office. Thank you.

## 2022-01-14 NOTE — TELEPHONE ENCOUNTER
Called pt and he states that he is feeling fine. Did not go to the ER. Has appt on 01/18. Has not checked BP today states that it is from high to low no particular readings given

## 2022-01-21 ENCOUNTER — OFFICE VISIT (OUTPATIENT)
Dept: OPHTHALMOLOGY | Facility: CLINIC | Age: 77
End: 2022-01-21
Payer: MEDICARE

## 2022-01-21 DIAGNOSIS — H35.3221 EXUDATIVE AGE-RELATED MACULAR DEGENERATION OF LEFT EYE WITH ACTIVE CHOROIDAL NEOVASCULARIZATION: Primary | ICD-10-CM

## 2022-01-21 PROBLEM — H35.712: Status: ACTIVE | Noted: 2022-01-21

## 2022-01-21 PROCEDURE — 99203 OFFICE O/P NEW LOW 30 MIN: CPT | Mod: 25,S$PBB,, | Performed by: OPHTHALMOLOGY

## 2022-01-21 PROCEDURE — 67028 INJECTION EYE DRUG: CPT | Mod: PBBFAC,LT | Performed by: OPHTHALMOLOGY

## 2022-01-21 PROCEDURE — 99213 OFFICE O/P EST LOW 20 MIN: CPT | Mod: PBBFAC | Performed by: OPHTHALMOLOGY

## 2022-01-21 PROCEDURE — 92134 POSTERIOR SEGMENT OCT RETINA (OCULAR COHERENCE TOMOGRAPHY)-BOTH EYES: ICD-10-PCS | Mod: 26,S$PBB,, | Performed by: OPHTHALMOLOGY

## 2022-01-21 PROCEDURE — 92134 CPTRZ OPH DX IMG PST SGM RTA: CPT | Mod: PBBFAC | Performed by: OPHTHALMOLOGY

## 2022-01-21 PROCEDURE — 99203 PR OFFICE/OUTPT VISIT, NEW, LEVL III, 30-44 MIN: ICD-10-PCS | Mod: 25,S$PBB,, | Performed by: OPHTHALMOLOGY

## 2022-01-21 PROCEDURE — 99999 PR PBB SHADOW E&M-EST. PATIENT-LVL III: ICD-10-PCS | Mod: PBBFAC,,, | Performed by: OPHTHALMOLOGY

## 2022-01-21 PROCEDURE — 67028 INJECTION EYE DRUG: CPT | Mod: S$PBB,LT,, | Performed by: OPHTHALMOLOGY

## 2022-01-21 PROCEDURE — 99999 PR PBB SHADOW E&M-EST. PATIENT-LVL III: CPT | Mod: PBBFAC,,, | Performed by: OPHTHALMOLOGY

## 2022-01-21 PROCEDURE — 67028 PR INJECT INTRAVITREAL PHARMCOLOGIC: ICD-10-PCS | Mod: S$PBB,LT,, | Performed by: OPHTHALMOLOGY

## 2022-01-21 RX ORDER — AMLODIPINE BESYLATE 10 MG/1
10 TABLET ORAL DAILY
Status: ON HOLD | COMMUNITY
Start: 2021-08-20 | End: 2022-01-25 | Stop reason: HOSPADM

## 2022-01-21 RX ADMIN — BEVACIZUMAB 1.25 MG: 100 INJECTION, SOLUTION INTRAVENOUS at 11:01

## 2022-01-21 NOTE — PROGRESS NOTES
===============================  Date today is 1/21/2022  Haritha Valle is a 76 y.o. male  Last visit [facility]: :Visit date not found   Last visit eye dept. 12/14/2021    Corrected distance visual acuity was 20/40 in the right eye and 20/70 in the left eye.  Tonometry     Tonometry (Applanation, 10:03 AM)       Right Left    Pressure 16 16              Not recorded       Not recorded       Not recorded       Chief Complaint   Patient presents with    Macular Degeneration     RETINAL EVAL PER DR. ESTEBAN     HPI     Macular Degeneration      Additional comments: RETINAL EVAL PER DR. ESTEBAN              Comments     No dm  No sx's          Last edited by Maya Babcock on 1/21/2022  9:58 AM. 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 Leaving untreated  Will attempt to get records from previous ophthalmologist  Vision should remain stable, maybe improve with injections        Injection Procedure Note:    1/21/2022  Diagnosis :  srn  Today: emergent  Avastin (Bevacizumab) 1.25 mg/0.05 ml Intravitreal Injection , OS    Follow up: 4 weeks    Instructed to call 24/7 for any worsening of vision. Check Both eyes daily. Gave patient my home phone number.  Risks, benefits, and alternatives to treatment discussed in detail with the patient.  The patient voiced understanding and wished to proceed with the procedure.     Patient Identified and Time Out complete  Subconjunctival bleb - xylocaine with epi 2%   and Betadine.  Inject at Avastin (Bevacizumab) 1.25 mg/0.05 ml Intravitreal Injection , OS 6:00 @ 3.5-4mm posterior to limbus  1 stop: no   Post Operative Dx: Same  Complications: None  Follow up as above.          ===============================

## 2022-01-22 ENCOUNTER — LAB VISIT (OUTPATIENT)
Dept: PRIMARY CARE CLINIC | Facility: CLINIC | Age: 77
End: 2022-01-22
Payer: MEDICARE

## 2022-01-22 DIAGNOSIS — Z01.818 PRE-OP TESTING: ICD-10-CM

## 2022-01-22 PROCEDURE — U0005 INFEC AGEN DETEC AMPLI PROBE: HCPCS | Performed by: FAMILY MEDICINE

## 2022-01-22 PROCEDURE — U0003 INFECTIOUS AGENT DETECTION BY NUCLEIC ACID (DNA OR RNA); SEVERE ACUTE RESPIRATORY SYNDROME CORONAVIRUS 2 (SARS-COV-2) (CORONAVIRUS DISEASE [COVID-19]), AMPLIFIED PROBE TECHNIQUE, MAKING USE OF HIGH THROUGHPUT TECHNOLOGIES AS DESCRIBED BY CMS-2020-01-R: HCPCS | Performed by: FAMILY MEDICINE

## 2022-01-24 LAB
SARS-COV-2 RNA RESP QL NAA+PROBE: NOT DETECTED
SARS-COV-2- CYCLE NUMBER: NORMAL

## 2022-01-25 ENCOUNTER — HOSPITAL ENCOUNTER (OUTPATIENT)
Facility: HOSPITAL | Age: 77
Discharge: HOME | End: 2022-01-25
Attending: FAMILY MEDICINE | Admitting: FAMILY MEDICINE
Payer: MEDICARE

## 2022-01-25 ENCOUNTER — ANESTHESIA (OUTPATIENT)
Dept: ENDOSCOPY | Facility: HOSPITAL | Age: 77
End: 2022-01-25
Payer: MEDICARE

## 2022-01-25 ENCOUNTER — ANESTHESIA EVENT (OUTPATIENT)
Dept: ENDOSCOPY | Facility: HOSPITAL | Age: 77
End: 2022-01-25
Payer: MEDICARE

## 2022-01-25 DIAGNOSIS — Z12.11 COLON CANCER SCREENING: Primary | ICD-10-CM

## 2022-01-25 DIAGNOSIS — K64.8 INTERNAL AND EXTERNAL HEMORRHOIDS WITHOUT COMPLICATION: ICD-10-CM

## 2022-01-25 DIAGNOSIS — K64.4 INTERNAL AND EXTERNAL HEMORRHOIDS WITHOUT COMPLICATION: ICD-10-CM

## 2022-01-25 DIAGNOSIS — K57.30 DIVERTICULOSIS OF COLON: ICD-10-CM

## 2022-01-25 DIAGNOSIS — Z86.010 HISTORY OF COLON POLYPS: ICD-10-CM

## 2022-01-25 DIAGNOSIS — K63.5 POLYP OF COLON, UNSPECIFIED PART OF COLON, UNSPECIFIED TYPE: ICD-10-CM

## 2022-01-25 PROBLEM — Z86.0100 HISTORY OF COLON POLYPS: Status: ACTIVE | Noted: 2022-01-25

## 2022-01-25 LAB
CTP QC/QA: YES
SARS-COV-2 AG RESP QL IA.RAPID: NEGATIVE

## 2022-01-25 PROCEDURE — 45385 COLONOSCOPY W/LESION REMOVAL: CPT | Mod: PT,,, | Performed by: FAMILY MEDICINE

## 2022-01-25 PROCEDURE — 45380 COLONOSCOPY AND BIOPSY: CPT | Performed by: FAMILY MEDICINE

## 2022-01-25 PROCEDURE — 37000009 HC ANESTHESIA EA ADD 15 MINS: Performed by: FAMILY MEDICINE

## 2022-01-25 PROCEDURE — 37000008 HC ANESTHESIA 1ST 15 MINUTES: Performed by: FAMILY MEDICINE

## 2022-01-25 PROCEDURE — 25000003 PHARM REV CODE 250: Performed by: FAMILY MEDICINE

## 2022-01-25 PROCEDURE — 27201012 HC FORCEPS, HOT/COLD, DISP: Performed by: FAMILY MEDICINE

## 2022-01-25 PROCEDURE — 27201089 HC SNARE, DISP (ANY): Performed by: FAMILY MEDICINE

## 2022-01-25 PROCEDURE — 88305 TISSUE EXAM BY PATHOLOGIST: CPT | Mod: 59 | Performed by: STUDENT IN AN ORGANIZED HEALTH CARE EDUCATION/TRAINING PROGRAM

## 2022-01-25 PROCEDURE — 88305 TISSUE EXAM BY PATHOLOGIST: ICD-10-PCS | Mod: 26,,, | Performed by: STUDENT IN AN ORGANIZED HEALTH CARE EDUCATION/TRAINING PROGRAM

## 2022-01-25 PROCEDURE — 88305 TISSUE EXAM BY PATHOLOGIST: CPT | Mod: 26,,, | Performed by: STUDENT IN AN ORGANIZED HEALTH CARE EDUCATION/TRAINING PROGRAM

## 2022-01-25 PROCEDURE — 45385 PR COLONOSCOPY,REMV LESN,SNARE: ICD-10-PCS | Mod: PT,,, | Performed by: FAMILY MEDICINE

## 2022-01-25 PROCEDURE — 45380 PR COLONOSCOPY,BIOPSY: ICD-10-PCS | Mod: 59,,, | Performed by: FAMILY MEDICINE

## 2022-01-25 PROCEDURE — 45380 COLONOSCOPY AND BIOPSY: CPT | Mod: 59,,, | Performed by: FAMILY MEDICINE

## 2022-01-25 PROCEDURE — 45385 COLONOSCOPY W/LESION REMOVAL: CPT | Performed by: FAMILY MEDICINE

## 2022-01-25 PROCEDURE — 63600175 PHARM REV CODE 636 W HCPCS: Performed by: FAMILY MEDICINE

## 2022-01-25 RX ORDER — LIDOCAINE HYDROCHLORIDE 20 MG/ML
INJECTION, SOLUTION EPIDURAL; INFILTRATION; INTRACAUDAL; PERINEURAL
Status: DISCONTINUED | OUTPATIENT
Start: 2022-01-25 | End: 2022-01-25

## 2022-01-25 RX ORDER — SODIUM CHLORIDE 9 MG/ML
INJECTION, SOLUTION INTRAVENOUS CONTINUOUS
Status: DISCONTINUED | OUTPATIENT
Start: 2022-01-25 | End: 2022-01-25 | Stop reason: HOSPADM

## 2022-01-25 RX ORDER — PROPOFOL 10 MG/ML
VIAL (ML) INTRAVENOUS
Status: DISCONTINUED | OUTPATIENT
Start: 2022-01-25 | End: 2022-01-25

## 2022-01-25 RX ORDER — PHENYLEPHRINE HYDROCHLORIDE 10 MG/ML
INJECTION INTRAVENOUS
Status: DISCONTINUED | OUTPATIENT
Start: 2022-01-25 | End: 2022-01-25

## 2022-01-25 RX ADMIN — PROPOFOL 20 MG: 10 INJECTION, EMULSION INTRAVENOUS at 06:01

## 2022-01-25 RX ADMIN — LIDOCAINE HYDROCHLORIDE 50 MG: 20 INJECTION, SOLUTION EPIDURAL; INFILTRATION; INTRACAUDAL; PERINEURAL at 06:01

## 2022-01-25 RX ADMIN — PROPOFOL 80 MG: 10 INJECTION, EMULSION INTRAVENOUS at 06:01

## 2022-01-25 RX ADMIN — PHENYLEPHRINE HYDROCHLORIDE 100 MCG: 10 INJECTION INTRAVENOUS at 07:01

## 2022-01-25 RX ADMIN — SODIUM CHLORIDE, SODIUM LACTATE, POTASSIUM CHLORIDE, AND CALCIUM CHLORIDE: .6; .31; .03; .02 INJECTION, SOLUTION INTRAVENOUS at 06:01

## 2022-01-25 NOTE — ANESTHESIA PREPROCEDURE EVALUATION
01/25/2022  Haritha Valle is a 76 y.o., male.    Anesthesia Evaluation    I have reviewed the Patient Summary Reports.    I have reviewed the Nursing Notes. I have reviewed the NPO Status.   I have reviewed the Medications.     Review of Systems  Anesthesia Hx:  No problems with previous Anesthesia  History of prior surgery of interest to airway management or planning:  Denies Personal Hx of Anesthesia complications.   Social:  Non-Smoker    Cardiovascular:   Hypertension    Pulmonary:  Pulmonary Normal    Renal/:  Renal/ Normal     Hepatic/GI:   Hepatitis    Neurological:  Neurology Normal    Endocrine:  Endocrine Normal    Psych:   Psychiatric History anxiety          Physical Exam  General:  Well nourished    Airway/Jaw/Neck:  Airway Findings: Mouth Opening: Normal Tongue: Normal  General Airway Assessment: Adult  Mallampati: II  TM Distance: Normal, at least 6 cm      Dental:  Dental Findings: In tact        Mental Status:  Mental Status Findings:  Cooperative, Alert and Oriented         Anesthesia Plan  Type of Anesthesia, risks & benefits discussed:  Anesthesia Type:  MAC    Patient's Preference:   Plan Factors:          Intra-op Monitoring Plan: standard ASA monitors  Intra-op Monitoring Plan Comments:   Post Op Pain Control Plan: per primary service following discharge from PACU and multimodal analgesia  Post Op Pain Control Plan Comments:     Induction:   IV  Beta Blocker:  Patient is not currently on a Beta-Blocker (No further documentation required).       Informed Consent: Patient understands risks and agrees with Anesthesia plan.  Questions answered. Anesthesia consent signed with patient.  ASA Score: 3     Day of Surgery Review of History & Physical:  There are no significant changes.  H&P update referred to the surgeon.         Ready For Surgery From Anesthesia Perspective.

## 2022-01-25 NOTE — TRANSFER OF CARE
Anesthesia Transfer of Care Note    Patient: Haritha Valle    Procedure(s) Performed: Procedure(s) (LRB):  COLONOSCOPY (N/A)    Patient location: GI    Anesthesia Type: MAC    Transport from OR: Transported from OR on room air with adequate spontaneous ventilation    Post pain: adequate analgesia    Post assessment: no apparent anesthetic complications    Post vital signs: stable    Level of consciousness: awake and responds to stimulation    Nausea/Vomiting: no nausea/vomiting    Complications: none    Transfer of care protocol was followed      Last vitals:   Visit Vitals  /79 (BP Location: Left arm, Patient Position: Lying)   Pulse 69   Temp 36.5 °C (97.7 °F) (Temporal)   Resp 17   SpO2 97%

## 2022-01-25 NOTE — ANESTHESIA POSTPROCEDURE EVALUATION
Anesthesia Post Evaluation    Patient: Haritha Valle    Procedure(s) Performed: Procedure(s) (LRB):  COLONOSCOPY (N/A)    Final Anesthesia Type: MAC      Patient participation: Yes- Able to Participate  Level of consciousness: awake and alert  Post-procedure vital signs: reviewed and stable  Pain management: adequate  Airway patency: patent    PONV status at discharge: No PONV  Anesthetic complications: no      Cardiovascular status: stable  Respiratory status: unassisted and spontaneous ventilation  Hydration status: euvolemic  Follow-up not needed.          Vitals Value Taken Time   BP 87/51 01/25/22 0720   Temp 37.1 °C (98.7 °F) 01/25/22 0720   Pulse 68 01/25/22 0720   Resp 17 01/25/22 0720   SpO2 95 % 01/25/22 0720         No case tracking events are documented in the log.      Pain/Timothy Score: No data recorded

## 2022-01-25 NOTE — PLAN OF CARE
Dr Rosales came to bedside and discussed findings. NO N/V,  no abdominal pain, no GI bleeding, and vitals stable.  Pt discharged from unit.

## 2022-01-25 NOTE — [employer] PATIENT INSTRUCTIONS
Discharge Summary/Instructions after an Endoscopic Procedure  Patient Name: Haritha Valle  Patient MRN: [id number]  Patient YOB: 1945 Tuesday, January 25, 2022 Silvino Rosales MD  Dear patient,  As a result of recent federal legislation (The Federal Cures Act), you may   receive lab or pathology results from your procedure in your [facility]   account before your physician is able to contact you. Your physician or   their representative will relay the results to you with their   recommendations at their soonest availability.  Thank you,  RESTRICTIONS:  During your procedure today, you received medications for sedation.  These   medications may affect your judgment, balance and coordination.  Therefore,   for 24 hours, you have the following restrictions:   - DO NOT drive a car, operate machinery, make legal/financial decisions,   sign important papers or drink alcohol.    ACTIVITY:  Today: no heavy lifting, straining or running due to procedural   sedation/anesthesia.  The following day: return to full activity including work.  DIET:  Eat and drink normally unless instructed otherwise.     TREATMENT FOR COMMON SIDE EFFECTS:  - Mild abdominal pain, nausea, belching, bloating or excessive gas:  rest,   eat lightly and use a heating pad.  - Sore Throat: treat with throat lozenges and/or gargle with warm salt   water.  - Because air was used during the procedure, expelling large amounts of air   from your rectum or belching is normal.  - If a bowel prep was taken, you may not have a bowel movement for 1-3 days.    This is normal.  SYMPTOMS TO WATCH FOR AND REPORT TO YOUR PHYSICIAN:  1. Abdominal pain or bloating, other than gas cramps.  2. Chest pain.  3. Back pain.  4. Signs of infection such as: chills or fever occurring within 24 hours   after the procedure.  5. Rectal bleeding, which would show as bright red, maroon, or black stools.   (A tablespoon of blood from the rectum is not serious, especially if    hemorrhoids are present.)  6. Vomiting.  7. Weakness or dizziness.  GO DIRECTLY TO THE NEAREST EMERGENCY ROOM IF YOU HAVE ANY OF THE FOLLOWING:      Difficulty breathing              Chills and/or fever over 101 F   Persistent vomiting and/or vomiting blood   Severe abdominal pain   Severe chest pain   Black, tarry stools   Bleeding- more than one tablespoon   Any other symptom or condition that you feel may need urgent attention  Your doctor recommends these additional instructions:  If any biopsies were taken, your doctors clinic will contact you in 1 to 2   weeks with any results.  - Patient has a contact number available for emergencies.  The signs and   symptoms of potential delayed complications were discussed with the   patient.  Return to normal activities tomorrow.  Written discharge   instructions were provided to the patient.   - Resume previous diet.   - Continue present medications.   - Await pathology results.   - Repeat colonoscopy in 3 years for surveillance.   - Telephone my office for pathology results in 2 weeks.   - Discharge patient to home (via wheelchair).  For questions, problems or results please call your physician Silvino Rosales MD at Work:  [phone]  If you have any questions about the above instructions, call the GI   department at [phone] or call the endoscopy unit at [phone]   from 7am until 3 pm.  [facility], EMERGENCY ROOM PHONE NUMBER:   [phone]  IF A COMPLICATION OR EMERGENCY SITUATION ARISES AND YOU ARE UNABLE TO REACH   YOUR PHYSICIAN - GO DIRECTLY TO THE EMERGENCY ROOM.  I have read or have had read to me these discharge instructions for my   procedure and have received a written copy.  I understand these   instructions and will follow-up with my physician if I have any questions.     __________________________________       _____________________________________  Nurse Signature                                           Patient/Designated   Responsible Party Signature  Silvino Rosales MD  1/25/2022 7:17:52 AM  This report has been verified and signed electronically.  Dear patient,  As a result of recent federal legislation (The Federal Cures Act), you may   receive lab or pathology results from your procedure in your [facility]   account before your physician is able to contact you. Your physician or   their representative will relay the results to you with their   recommendations at their soonest availability.  Thank you,  PROVATION

## 2022-01-25 NOTE — DISCHARGE INSTRUCTIONS
Patient Education       Colon Polypectomy Discharge Instructions   About this topic   The colon is also called the large intestine. It is a long, hollow tube at the end of your digestive tract. It absorbs water from solid waste and changes it from liquid to a solid bowel movement.  A colon polyp is a growth of extra tissue that is not normally in your colon. Colon polyps do not often cause any signs. Most colon polyps are not cancer, but some polyps may turn into cancer. The doctor takes the polyps out during a procedure called a colonoscopy and sends them to the lab for a check to make sure there is no cancer.  You may have a colon polyp or multiple polyps removed. The doctor will send them to the lab to see what type they are. If a polyp is very large, it may need to be removed by surgery.     What care is needed at home?   · Ask your doctor what you need to do when you go home. Make sure you ask questions if you do not understand what the doctor says.  · Do not drive for 24 hours after a colonoscopy.  · Take your drugs as ordered by your doctor.  · Go back to your normal diet unless your doctor has told you to make some changes in your diet.  · Rest  What follow-up care is needed?   · Your doctor may ask you to make visits to the office to check on your progress. Be sure to keep these visits.  · Your doctor may suggest you get tested regularly. People with colon polyps need to have a colonoscopy regularly to check for the growth of new polyps.  · Some polyps may not be removed and more surgery may be needed.  · The results of the polyp testing will be given to you at one of these visits.  What drugs may be needed?   The doctor may order drugs to:  · Prevent hard stools  · Help with pain  · Reduce your risk of colon polyps or colon cancer  Will physical activity be limited?   You may feel sleepy after the colonoscopy. Try to get some rest.  What can be done to prevent this health problem?   · Have regular  colonoscopies.  · Eat foods high in fiber.  · Eat foods low in fat.  · Limit your intake of beer, wine, and mixed drinks (alcohol).  · Ask your doctor or dietitian for a diet that is right for you. Include calcium in your diet. Good sources of calcium include milk, cheese, and yogurt.  When do I need to call the doctor?   · Signs of infection. These include a fever of 100.4°F (38°C) or higher, chills, and anal itching or pain.  · Bleeding from rectum that gets worse  · Belly becomes swollen and sore  · Upset stomach and throwing up continues after you return home  · Not being able to move your bowels  · Weight loss without trying  · Blood in your stool  Teach Back: Helping You Understand   The Teach Back Method helps you understand the information we are giving you. After you talk with the staff, tell them in your own words what you learned. This helps to make sure the staff has described each thing clearly. It also helps to explain things that may have been confusing. Before going home, make sure you can do these:  · I can tell you about my condition.  · I can tell you what changes I need to make with my diet.  · I can tell you what I will do if my stomach is swollen, I have belly pain, or there is blood in my stool.  Where can I learn more?   [employer]  https://www.betterhealth.tin.gov.au/health/ConditionsAndTreatments/colonoscopy   NHS  https://www.nhs.uk/conditions/bowel-polyps/   UpToDate  https://www.uptodate.com/contents/colon-polyps-beyond-the-basics   Last Reviewed Date   2021-03-10  Consumer Information Use and Disclaimer   This information is not specific medical advice and does not replace information you receive from your health care provider. This is only a brief summary of general information. It does NOT include all information about conditions, illnesses, injuries, tests, procedures, treatments, therapies, discharge instructions or life-style choices that may apply to you. You must talk with your  health care provider for complete information about your health and treatment options. This information should not be used to decide whether or not to accept your health care providers advice, instructions or recommendations. Only your health care provider has the knowledge and training to provide advice that is right for you.  Copyright   Copyright © 2021 UpToDate, Inc. and its affiliates and/or licensors. All rights reserved.  Patient Education       Hemorrhoids Discharge Instructions   About this topic   Hemorrhoids are swollen veins in the rectum. Your rectum is where stool leaves your body. You may be able to see or feel your hemorrhoids outside of your body, but some hemorrhoids are inside of your rectum and cannot be seen. Hemorrhoids can cause itching, pain, and bleeding. Being constipated or having hard stools can make your hemorrhoids worse.     What care is needed at home?   · Ask your doctor what you need to do when you go home. Make sure you ask questions if you do not understand what the doctor says. This way you will know what you need to do.  · Soak your bottom in a few inches of warm water for 10 to 15 minutes at a time. You can do this 2 to 3 times each day. Do not add soap, bubble bath, or anything to the water.  · Use over-the-counter medicines to treat your hemorrhoids. These include ointments and creams to help with pain and swelling. You can also use a product like witch hazel to help dry out the skin in the area.  · To help with constipation:  ? Use stool softeners when needed.  ? Eat high-fiber foods. These include whole grains, fruits, and vegetables.  ? Drink plenty of water and other fluids each day. This helps to keep your stools soft.  ? Set a regular schedule to try and have a bowel movement. Do not ignore the urge to go to the bathroom. Dont hold it in.  ? Give yourself plenty of time to have a bowel movement, but do not linger on the toilet either, by sitting and reading for a long  time.  ? Do mild exercise each day like taking a walk.  · Avoid heavy lifting or straining while the hemorrhoid is healing.  What follow-up care is needed?   If your problem does not get better, other care may be needed. Your doctor may ask you to make visits to the office to check on your progress. Be sure to keep these visits.  What drugs may be needed?   The doctor may order drugs to:  · Help with pain and swelling  · Ease itching  · Soften stools  Will physical activity be limited?   · Working out can help with digestion. It might help keep you from having hard stools. Ask your doctor about the best kind of exercise for you.  What problems could happen?   · You may have very bad bleeding.  · Sometimes, treatments do not work. Some hemorrhoids are very large. You might need surgery for either of these.  When do I need to call the doctor?   · You have a lot of bleeding from your rectum.  · Your bowel movement looks like tar.  · You are not able to pass stool because of pain from your hemorrhoids.  · Your pain gets worse and is not helped by over-the-counter medicines, warm water, or your home care.  · You have a fever of 100.4°F (38°C) or higher.  Teach Back: Helping You Understand   The Teach Back Method helps you understand the information we are giving you. After you talk with the staff, tell them in your own words what you learned. This helps to make sure the staff has described each thing clearly. It also helps to explain things that may have been confusing. Before going home, make sure you can do these:  · I can tell you about my condition.  · I can tell you what may help ease my pain.  · I can tell you what I will do if I have blood in my rectum.  Where can I learn more?   American Academy of Family Physicians  https://familydoctor.org/condition/hemorrhoids/   National Digestive Disease Information Clearinghouse  https://www.niddk.nih.gov/health-information/digestive-diseases/hemorrhoids/definition-facts    Last Reviewed Date   2021-09-28  Consumer Information Use and Disclaimer   This information is not specific medical advice and does not replace information you receive from your health care provider. This is only a brief summary of general information. It does NOT include all information about conditions, illnesses, injuries, tests, procedures, treatments, therapies, discharge instructions or life-style choices that may apply to you. You must talk with your health care provider for complete information about your health and treatment options. This information should not be used to decide whether or not to accept your health care providers advice, instructions or recommendations. Only your health care provider has the knowledge and training to provide advice that is right for you.  Copyright   Copyright © 2021 UpToDate, Inc. and its affiliates and/or licensors. All rights reserved.

## 2022-01-25 NOTE — H&P
Short Stay Endoscopy History and Physical    PCP - Hesham Andrade Ii, MD    Procedure - Colonoscopy  ASA - 2  Mallampati - per anesthesia  History of Anesthesia problems - no  Family history Anesthesia problems -  no     HPI:  This is a 76 y.o. male here for evaluation of :   Active Hospital Problems    Diagnosis  POA    Colon cancer screening [Z12.11]  Not Applicable    History of colon polyps [Z86.010]  Not Applicable      Resolved Hospital Problems   No resolved problems to display.         Health Maintenance       Date Due Completion Date    Colorectal Cancer Screening 11/03/2021 11/3/2016    Lipid Panel 10/14/2022 10/14/2021    TETANUS VACCINE 07/03/2029 7/3/2019          Screening - Yes  History of polyps - Yes     Diarrhea - no  Anemia - no  Blood in stools - no  Abdominal pain - no  Other - no    ROS:  CONSTITUTIONAL: Denies weight change,  fatigue, fevers, chills, night sweats.  CARDIOVASCULAR: Denies chest pain, shortness of breath, orthopnea and edema.  RESPIRATORY: Denies cough, hemoptysis, dyspnea, and wheezing.  GI: See HPI.    Medical History:   Past Medical History:   Diagnosis Date    Anxiety 7/3/2019    Basal cell carcinoma     Depression     Hepatitis B     Hepatitis C antibody test positive     RNA NEG 8/29/2019    HIV infection     Hypertension     Immune disorder     Mild cognitive impairment 10/1/2010       Surgical History:   Past Surgical History:   Procedure Laterality Date    APPENDECTOMY      CARDIAC CATHETERIZATION      no stent    CHOLECYSTECTOMY      COLONOSCOPY N/A 11/3/2016    Procedure: COLONOSCOPY;  Surgeon: Maxi Villasenor MD;  Location: [address]);  Service: Endoscopy;  Laterality: N/A;  last colonoscopy with Dr Jarrell    LEFT HEART CATHETERIZATION Left 2/3/2020    Procedure: CATHETERIZATION, HEART, LEFT;  Surgeon: Chele Ko MD;  Location[facility] CATH LAB;  Service: Cardiology;  Laterality: Left;  malur pt       Family History:   Family History    Problem Relation Age of Onset    Heart disease Father     Heart failure Father     Diabetes Neg Hx     Hypertension Neg Hx        Social History:   Social History     Tobacco Use    Smoking status: Never Smoker    Smokeless tobacco: Never Used   Substance Use Topics    Alcohol use: No       Allergies:   Review of patient's allergies indicates:   Allergen Reactions    No known drug allergies        Medications:   No current facility-administered medications on file prior to encounter.     Current Outpatient Medications on File Prior to Encounter   Medication Sig Dispense Refill    ascorbic acid, vitamin C, (VITAMIN C) 1000 MG tablet Take 1,000 mg by mouth once daily.      co-enzyme Q-10 30 mg capsule Take 100 mg by mouth 3 (three) times daily.      meclizine (ANTIVERT) 25 mg tablet TAKE ONE TABLET BY MOUTH THREE TIMES DAILY AS NEEDED 90 tablet 3    cyclobenzaprine (FLEXERIL) 10 MG tablet Take 1 tablet (10 mg total) by mouth 3 (three) times daily. 30 tablet 3     Physical Exam:  Vital Signs:   Vitals:    01/25/22 0637   BP: 125/79   Pulse: 69   Resp: 17   Temp: 97.7 °F (36.5 °C)     General Appearance: Well appearing in no acute distress  ENT: OP clear  Chest: CTA B  CV: RRR, no m/r/g  Abd: s/nt/nd/nabs  Ext: no edema    Labs:Reviewed    IMP:  Active Hospital Problems    Diagnosis  POA    Colon cancer screening [Z12.11]  Not Applicable    History of colon polyps [Z86.010]  Not Applicable      Resolved Hospital Problems   No resolved problems to display.         Plan:   I have explained the risks and benefits of colonoscopy to the patient including but not limited to bleeding, perforation, infection, and death. The patient wishes to proceed.

## 2022-01-26 VITALS
DIASTOLIC BLOOD PRESSURE: 69 MMHG | TEMPERATURE: 99 F | SYSTOLIC BLOOD PRESSURE: 118 MMHG | OXYGEN SATURATION: 95 % | HEART RATE: 68 BPM | RESPIRATION RATE: 17 BRPM

## 2022-01-31 LAB
FINAL PATHOLOGIC DIAGNOSIS: NORMAL
GROSS: NORMAL
Lab: NORMAL
MICROSCOPIC EXAM: NORMAL

## 2022-02-07 NOTE — PROGRESS NOTES
Dear Hesham Andrade Ii, MD,    I recently cared for Haritha Valle and performed an endoscopy.  Tissue was sent for pathology evaluation and I will have a letter written to ask the patient to repeat the colonoscopy in 3 years.  The pathology showed that there was adenomatous tissue present.  Thank you for allowing me to participate in the care of your patient.  Please call me for any questions that you might have.      Dr. Silvino Rosales  [phone] cell  [phone] office      NURSING STAFF:Please  inform the patient that I reviewed the recent pathology obtained at the time of colonoscopy.    The results showed that there was adenomatous tissue present which is benign and based on that, I recommend that the patient have a repeat colonoscopy performed in 3 years.     If the patient has MyChart, this message has been sent to them.  Confirm that they read the note.  If not, copy the information and print a letter to send to the patient at this time.  Confirm that a notation to the PCP was done.      Dear Haritha Valle,    This is to inform you that I have reviewed your recent colonoscopy pathology.  The results showed that you had adenomatous tissue present which is benign and based on that, I recommend that you have a repeat colonoscopy performed in 3 years.      Dr. Silvino Rosales  [phone]

## 2022-02-23 NOTE — PROGRESS NOTES
===============================  Date today is 2/24/2022  Haritha Valle is a 76 y.o. male  Last visit [facility]: :1/21/2022   Last visit eye dept. 1/21/2022    Corrected distance visual acuity was 20/40 in the right eye and 20/50 in the left eye.  Not recorded       Not recorded       Not recorded       Not recorded       Chief Complaint   Patient presents with    Macular Degeneration     Avastin os       Problem List Items Addressed This Visit    None     Visit Diagnoses     Exudative age-related macular degeneration of left eye with active choroidal neovascularization    -  Primary    Relevant Medications    bevacizumab (AVASTIN) 2.5 mg/0.10 mL injection 1.25 mg (Completed)    Other Relevant Orders    Prior authorization Order    Posterior Segment OCT Retina-Both eyes (Completed)          ________________  2/24/2022 today    OS SRN  OCT much better today    Vision is also better  Will continue with Avastin until improvement ceases, may need Eylea in the future      Injection Procedure Note:    2/24/2022  Diagnosis :  Os  srn  Today:  avastin  , OS   Follow up: rtc  4 weeks     Instructed to call 24/7 for any worsening of vision. Check Both eyes daily. Gave patient my home phone number.  Risks, benefits, and alternatives to treatment discussed in detail with the patient.  The patient voiced understanding and wished to proceed with the procedure.     Patient Identified and Time Out complete  Subconjunctival bleb - xylocaine with epi 2%   and Betadine.  Inject at Avastin (Bevacizumab) 1.25 mg/0.05 ml Intravitreal Injection , OS 6:00 @ 3.5-4mm posterior to limbus  1 stop: no   Post Operative Dx: Same  Complications: None  Follow up as above.      ===============================

## 2022-02-24 ENCOUNTER — PROCEDURE VISIT (OUTPATIENT)
Dept: OPHTHALMOLOGY | Facility: CLINIC | Age: 77
End: 2022-02-24
Payer: MEDICARE

## 2022-02-24 DIAGNOSIS — H35.3221 EXUDATIVE AGE-RELATED MACULAR DEGENERATION OF LEFT EYE WITH ACTIVE CHOROIDAL NEOVASCULARIZATION: Primary | ICD-10-CM

## 2022-02-24 PROCEDURE — 67028 PR INJECT INTRAVITREAL PHARMCOLOGIC: ICD-10-PCS | Mod: S$PBB,LT,, | Performed by: OPHTHALMOLOGY

## 2022-02-24 PROCEDURE — 67028 INJECTION EYE DRUG: CPT | Mod: PBBFAC,LT | Performed by: OPHTHALMOLOGY

## 2022-02-24 PROCEDURE — 99499 NO LOS: ICD-10-PCS | Mod: S$PBB,,, | Performed by: OPHTHALMOLOGY

## 2022-02-24 PROCEDURE — 99499 UNLISTED E&M SERVICE: CPT | Mod: S$PBB,,, | Performed by: OPHTHALMOLOGY

## 2022-02-24 PROCEDURE — 92134 CPTRZ OPH DX IMG PST SGM RTA: CPT | Mod: PBBFAC | Performed by: OPHTHALMOLOGY

## 2022-02-24 PROCEDURE — 67028 INJECTION EYE DRUG: CPT | Mod: S$PBB,LT,, | Performed by: OPHTHALMOLOGY

## 2022-02-24 PROCEDURE — 92134 POSTERIOR SEGMENT OCT RETINA (OCULAR COHERENCE TOMOGRAPHY)-BOTH EYES: ICD-10-PCS | Mod: 26,S$PBB,, | Performed by: OPHTHALMOLOGY

## 2022-02-24 RX ADMIN — BEVACIZUMAB 1.25 MG: 100 INJECTION, SOLUTION INTRAVENOUS at 02:02

## 2022-03-24 ENCOUNTER — PROCEDURE VISIT (OUTPATIENT)
Dept: OPHTHALMOLOGY | Facility: CLINIC | Age: 77
End: 2022-03-24
Payer: MEDICARE

## 2022-03-24 DIAGNOSIS — H35.3221 EXUDATIVE AGE-RELATED MACULAR DEGENERATION OF LEFT EYE WITH ACTIVE CHOROIDAL NEOVASCULARIZATION: Primary | ICD-10-CM

## 2022-03-24 PROCEDURE — 67028 INJECTION EYE DRUG: CPT | Mod: PBBFAC,LT | Performed by: OPHTHALMOLOGY

## 2022-03-24 PROCEDURE — 92134 POSTERIOR SEGMENT OCT RETINA (OCULAR COHERENCE TOMOGRAPHY)-BOTH EYES: ICD-10-PCS | Mod: 26,S$PBB,, | Performed by: OPHTHALMOLOGY

## 2022-03-24 PROCEDURE — 99499 UNLISTED E&M SERVICE: CPT | Mod: S$PBB,,, | Performed by: OPHTHALMOLOGY

## 2022-03-24 PROCEDURE — 99499 NO LOS: ICD-10-PCS | Mod: S$PBB,,, | Performed by: OPHTHALMOLOGY

## 2022-03-24 PROCEDURE — 67028 PR INJECT INTRAVITREAL PHARMCOLOGIC: ICD-10-PCS | Mod: S$PBB,LT,, | Performed by: OPHTHALMOLOGY

## 2022-03-24 PROCEDURE — 67028 INJECTION EYE DRUG: CPT | Mod: S$PBB,LT,, | Performed by: OPHTHALMOLOGY

## 2022-03-24 PROCEDURE — 92134 CPTRZ OPH DX IMG PST SGM RTA: CPT | Mod: PBBFAC | Performed by: OPHTHALMOLOGY

## 2022-03-24 RX ADMIN — BEVACIZUMAB 1.25 MG: 100 INJECTION, SOLUTION INTRAVENOUS at 01:03

## 2022-03-24 NOTE — PROGRESS NOTES
===============================  Date today is 3/24/2022  Haritha Valle is a 76 y.o. male  Last visit [facility]: :2/24/2022   Last visit eye dept. 2/24/2022    Corrected distance visual acuity was 20/25 -2 in the right eye and 20/40 in the left eye.  Tonometry     Tonometry (Tonopen, 1:21 PM)       Right Left    Pressure 17 16              Not recorded       Not recorded       Not recorded       No chief complaint on file.      Problem List Items Addressed This Visit    None     Visit Diagnoses     Exudative age-related macular degeneration of left eye with active choroidal neovascularization    -  Primary    Relevant Medications    bevacizumab (AVASTIN) 2.5 mg/0.10 mL injection 1.25 mg (Completed)    Other Relevant Orders    Posterior Segment OCT Retina-Both eyes (Completed)    Prior authorization Order          ________________  3/24/2022 today    OS SRN      OCT sl better  Will continue with monthly treatment    Injection Procedure Note:    3/24/2022  Diagnosis :  Os srn  Today:   avastin os , OS   Follow up: rtc  1mo       Instructed to call 24/7 for any worsening of vision. Check Both eyes daily. Gave patient my home phone number.  Risks, benefits, and alternatives to treatment discussed in detail with the patient.  The patient voiced understanding and wished to proceed with the procedure.     Patient Identified and Time Out complete  Subconjunctival bleb - xylocaine with epi 2%   and Betadine.  Inject at Avastin (Bevacizumab) 1.25 mg/0.05 ml Intravitreal Injection , OS 6:00 @ 3.5-4mm posterior to limbus  1 stop: no   Post Operative Dx: Same  Complications: None  Follow up as above.    ===============================

## 2022-04-04 ENCOUNTER — PATIENT MESSAGE (OUTPATIENT)
Dept: INTERNAL MEDICINE | Facility: CLINIC | Age: 77
End: 2022-04-04
Payer: MEDICARE

## 2022-04-04 DIAGNOSIS — E78.2 MIXED HYPERLIPIDEMIA: ICD-10-CM

## 2022-04-04 DIAGNOSIS — H81.10 BENIGN PAROXYSMAL POSITIONAL VERTIGO, UNSPECIFIED LATERALITY: ICD-10-CM

## 2022-04-04 NOTE — TELEPHONE ENCOUNTER
No new care gaps identified.  Powered by [employer]. Reference number: [id number].   4/04/2022 4:54:10 PM CDT

## 2022-04-05 RX ORDER — ROSUVASTATIN CALCIUM 20 MG/1
20 TABLET, COATED ORAL DAILY
Qty: 90 TABLET | Refills: 3 | Status: SHIPPED | OUTPATIENT
Start: 2022-04-05 | End: 2023-03-12

## 2022-04-05 RX ORDER — MECLIZINE HYDROCHLORIDE 25 MG/1
25 TABLET ORAL 3 TIMES DAILY PRN
Qty: 90 TABLET | Refills: 3 | Status: SHIPPED | OUTPATIENT
Start: 2022-04-05 | End: 2022-09-01 | Stop reason: SDUPTHER

## 2022-04-22 ENCOUNTER — IMMUNIZATION (OUTPATIENT)
Dept: PHARMACY | Facility: CLINIC | Age: 77
End: 2022-04-22
Payer: MEDICARE

## 2022-04-22 DIAGNOSIS — Z23 NEED FOR VACCINATION: Primary | ICD-10-CM

## 2022-04-28 ENCOUNTER — PROCEDURE VISIT (OUTPATIENT)
Dept: OPHTHALMOLOGY | Facility: CLINIC | Age: 77
End: 2022-04-28
Payer: MEDICARE

## 2022-04-28 DIAGNOSIS — H35.3221 EXUDATIVE AGE-RELATED MACULAR DEGENERATION OF LEFT EYE WITH ACTIVE CHOROIDAL NEOVASCULARIZATION: Primary | ICD-10-CM

## 2022-04-28 PROCEDURE — 92134 POSTERIOR SEGMENT OCT RETINA (OCULAR COHERENCE TOMOGRAPHY)-BOTH EYES: ICD-10-PCS | Mod: 26,S$PBB,, | Performed by: OPHTHALMOLOGY

## 2022-04-28 PROCEDURE — 99499 UNLISTED E&M SERVICE: CPT | Mod: S$PBB,,, | Performed by: OPHTHALMOLOGY

## 2022-04-28 PROCEDURE — 92134 CPTRZ OPH DX IMG PST SGM RTA: CPT | Mod: PBBFAC | Performed by: OPHTHALMOLOGY

## 2022-04-28 PROCEDURE — 67028 INJECTION EYE DRUG: CPT | Mod: PBBFAC,LT | Performed by: OPHTHALMOLOGY

## 2022-04-28 PROCEDURE — 67028 PR INJECT INTRAVITREAL PHARMCOLOGIC: ICD-10-PCS | Mod: S$PBB,LT,, | Performed by: OPHTHALMOLOGY

## 2022-04-28 PROCEDURE — 67028 INJECTION EYE DRUG: CPT | Mod: S$PBB,LT,, | Performed by: OPHTHALMOLOGY

## 2022-04-28 PROCEDURE — 99499 NO LOS: ICD-10-PCS | Mod: S$PBB,,, | Performed by: OPHTHALMOLOGY

## 2022-04-28 RX ADMIN — BEVACIZUMAB 1.25 MG: 100 INJECTION, SOLUTION INTRAVENOUS at 02:04

## 2022-04-28 NOTE — PROGRESS NOTES
===============================  Date today is 4/28/2022  Haritha Valle is a 76 y.o. male  Last visit [facility]: :3/24/2022   Last visit eye dept. 3/24/2022    Corrected distance visual acuity was 20/30 in the right eye and 20/40 in the left eye.  Tonometry     Tonometry (Applanation, 1:18 PM)       Right Left    Pressure 18 18              Not recorded       Not recorded       Not recorded       Chief Complaint   Patient presents with    Macular Degeneration     Avastin os     HPI     Macular Degeneration      Additional comments: Avastin os              Comments     No dm  No sx's    Avastin os 3-24-22          Last edited by Maya Babcock on 4/28/2022  1:06 PM. (History)      Problem List Items Addressed This Visit    None     Visit Diagnoses     Exudative age-related macular degeneration of left eye with active choroidal neovascularization    -  Primary    Relevant Medications    bevacizumab (AVASTIN) 2.5 mg/0.10 mL injection 1.25 mg (Completed) (Start on 4/28/2022  2:45 PM)    Other Relevant Orders    Posterior Segment OCT Retina-Both eyes (Completed)    Prior authorization Order          ________________  4/28/2022 today    Os srn  OCT stable    Injection Procedure Note:    4/28/2022  Diagnosis :  Os srn  Today:   Eylea (afibercept) 2 mg/0.05 ml Intravitreal Injection , OS   Follow up: rtc  1mo - better      Instructed to call 24/7 for any worsening of vision. Check Both eyes daily. Gave patient my home phone number.  Risks, benefits, and alternatives to treatment discussed in detail with the patient.  The patient voiced understanding and wished to proceed with the procedure.     Patient Identified and Time Out complete  Subconjunctival bleb - xylocaine with epi 2%   and Betadine.  Inject at Eylea (afibercept) 2 mg/0.05 ml Intravitreal Injection , OS 6:00 @ 3.5-4mm posterior to limbus  1 stop: no   Post Operative Dx: Same  Complications: None  Follow up as above.      ===============================

## 2022-05-16 ENCOUNTER — PATIENT MESSAGE (OUTPATIENT)
Dept: INTERNAL MEDICINE | Facility: CLINIC | Age: 77
End: 2022-05-16
Payer: MEDICARE

## 2022-05-24 NOTE — TELEPHONE ENCOUNTER
valsartan-hydrochlorothiazide (DIOVAN-HCT) 160-12.5 mg mg tablet D/C 01/21/22 by Maya Babcock    Is pt supposed to be on thi medication?

## 2022-05-24 NOTE — TELEPHONE ENCOUNTER
I can't answer that any more.  He's on the digital HTN program, and I can't tell what they're doing.  I don't know who Maya Babcock is.    He needs to come for a visit with his medication bottles.    D

## 2022-06-14 ENCOUNTER — TELEPHONE (OUTPATIENT)
Dept: OPHTHALMOLOGY | Facility: CLINIC | Age: 77
End: 2022-06-14
Payer: MEDICARE

## 2022-06-14 NOTE — TELEPHONE ENCOUNTER
----- Message from Sally Jane sent at 6/14/2022 10:39 AM CDT -----  Pt is needing a call back to reschedule his procedure he had scheduled. Please call .[phone]

## 2022-06-15 ENCOUNTER — TELEPHONE (OUTPATIENT)
Dept: INTERNAL MEDICINE | Facility: CLINIC | Age: 77
End: 2022-06-15
Payer: MEDICARE

## 2022-06-15 NOTE — TELEPHONE ENCOUNTER
Gali at [facility] wants know which medication the pt should be on. She says that Dr. Andrade sent a request for Valsartan hctz 160 hctz in March, and another provider, Dr. John Landeros sent in a request for valsartan 40 mg. Please advise.

## 2022-06-15 NOTE — TELEPHONE ENCOUNTER
----- Message from Ashley Domingo sent at 6/15/2022 11:15 AM CDT -----  Contact: [employer]/[phone]  Pharmacy called, requested a courtesy call from Dr Andrade's office needing clarification on medication valsartan. Please call and advise. Thank you

## 2022-06-15 NOTE — TELEPHONE ENCOUNTER
Well, I can't answer that question.  That medication isn't listed in his chart, and I think he might have seen someone near  for care.    Can you contact him and (1) ask what he's taking; and (2) get him to come for a visit.    Thanks.  D

## 2022-06-15 NOTE — TELEPHONE ENCOUNTER
LVM trying to schedule appt per dr's request and that the provider has questions regarding one of his medications. Advised pt to contact us here at the clinic to get scheduled.

## 2022-06-21 ENCOUNTER — PROCEDURE VISIT (OUTPATIENT)
Dept: OPHTHALMOLOGY | Facility: CLINIC | Age: 77
End: 2022-06-21
Payer: MEDICARE

## 2022-06-21 DIAGNOSIS — H35.3221 EXUDATIVE AGE-RELATED MACULAR DEGENERATION OF LEFT EYE WITH ACTIVE CHOROIDAL NEOVASCULARIZATION: Primary | ICD-10-CM

## 2022-06-21 PROCEDURE — 99499 UNLISTED E&M SERVICE: CPT | Mod: S$PBB,,, | Performed by: OPHTHALMOLOGY

## 2022-06-21 PROCEDURE — 99499 NO LOS: ICD-10-PCS | Mod: S$PBB,,, | Performed by: OPHTHALMOLOGY

## 2022-06-21 PROCEDURE — 67028 INJECTION EYE DRUG: CPT | Mod: S$PBB,LT,, | Performed by: OPHTHALMOLOGY

## 2022-06-21 PROCEDURE — 67028 PR INJECT INTRAVITREAL PHARMCOLOGIC: ICD-10-PCS | Mod: S$PBB,LT,, | Performed by: OPHTHALMOLOGY

## 2022-06-21 PROCEDURE — 92134 POSTERIOR SEGMENT OCT RETINA (OCULAR COHERENCE TOMOGRAPHY)-BOTH EYES: ICD-10-PCS | Mod: 26,S$PBB,, | Performed by: OPHTHALMOLOGY

## 2022-06-21 PROCEDURE — 67028 INJECTION EYE DRUG: CPT | Mod: PBBFAC,LT | Performed by: OPHTHALMOLOGY

## 2022-06-21 PROCEDURE — 92134 CPTRZ OPH DX IMG PST SGM RTA: CPT | Mod: PBBFAC | Performed by: OPHTHALMOLOGY

## 2022-06-21 RX ORDER — VALSARTAN AND HYDROCHLOROTHIAZIDE 160; 12.5 MG/1; MG/1
TABLET, FILM COATED ORAL
COMMUNITY
Start: 2022-03-23 | End: 2022-09-01

## 2022-06-21 RX ADMIN — BEVACIZUMAB 1.25 MG: 100 INJECTION, SOLUTION INTRAVENOUS at 03:06

## 2022-06-21 NOTE — PROGRESS NOTES
HPI     Macular Degeneration      Additional comments: Avastin os              Comments     No dm  No sx's    Avastin os 3-24-22  Eylea os 4/28/22          Last edited by Maya Babcock on 6/21/2022  2:10 PM. (History)            Assessment /Plan     For exam results, see Encounter Report.    There are no diagnoses linked to this encounter.  ***

## 2022-06-21 NOTE — PROGRESS NOTES
===============================  Date today is 6/21/2022  Haritha Valle is a 77 y.o. male  Last visit [facility]: :4/28/2022   Last visit eye dept. 4/28/2022    Corrected distance visual acuity was 20/30 in the right eye and 20/30 in the left eye.  Tonometry     Tonometry (Applanation, 2:32 PM)       Right Left    Pressure 16 16              Not recorded       Not recorded       Not recorded       Chief Complaint   Patient presents with    Macular Degeneration     Avastin os     HPI     Macular Degeneration      Additional comments: Avastin os              Comments     No dm  No sx's    Avastin os 3-24-22  4/28/22            Last edited by Maya Babcock on 6/21/2022  2:27 PM. (History)      Problem List Items Addressed This Visit    None     Visit Diagnoses     Exudative age-related macular degeneration of left eye with active choroidal neovascularization    -  Primary    Relevant Medications    bevacizumab (AVASTIN) 2.5 mg/0.10 mL injection 1.25 mg (Completed)    Other Relevant Orders    Prior authorization Order    Posterior Segment OCT Retina-Both eyes (Completed)          ________________  6/21/2022 today    OS SRN  today  7 1/2 weeks va better   oct sl worse rec 4 weeks for now    Injection Procedure Note:    6/21/2022  Diagnosis :  Os srn  Today:   Avastin (Bevacizumab) 1.25 mg/0.05 ml Intravitreal Injection , OS   Follow up: rtc 4 weeks      Instructed to call 24/7 for any worsening of vision. Check Both eyes daily. Gave patient my home phone number.  Risks, benefits, and alternatives to treatment discussed in detail with the patient.  The patient voiced understanding and wished to proceed with the procedure.     Patient Identified and Time Out complete  Subconjunctival bleb - xylocaine with epi 2%   and Betadine.  Inject at Avastin (Bevacizumab) 1.25 mg/0.05 ml Intravitreal Injection , OS 6:00 @ 3.5-4mm posterior to limbus  1 stop: no   Post Operative Dx: Same  Complications: None  Follow up as  above.    .      ===============================

## 2022-08-30 ENCOUNTER — PATIENT MESSAGE (OUTPATIENT)
Dept: ADMINISTRATIVE | Facility: OTHER | Age: 77
End: 2022-08-30
Payer: MEDICARE

## 2022-09-01 ENCOUNTER — PATIENT MESSAGE (OUTPATIENT)
Dept: INTERNAL MEDICINE | Facility: CLINIC | Age: 77
End: 2022-09-01

## 2022-09-01 ENCOUNTER — OFFICE VISIT (OUTPATIENT)
Dept: INTERNAL MEDICINE | Facility: CLINIC | Age: 77
End: 2022-09-01
Payer: MEDICARE

## 2022-09-01 ENCOUNTER — IMMUNIZATION (OUTPATIENT)
Dept: PHARMACY | Facility: CLINIC | Age: 77
End: 2022-09-01
Payer: MEDICARE

## 2022-09-01 ENCOUNTER — LAB VISIT (OUTPATIENT)
Dept: LAB | Facility: HOSPITAL | Age: 77
End: 2022-09-01
Attending: INTERNAL MEDICINE
Payer: MEDICARE

## 2022-09-01 VITALS
HEART RATE: 76 BPM | BODY MASS INDEX: 31.18 KG/M2 | WEIGHT: 217.81 LBS | SYSTOLIC BLOOD PRESSURE: 120 MMHG | HEIGHT: 70 IN | DIASTOLIC BLOOD PRESSURE: 80 MMHG | OXYGEN SATURATION: 97 %

## 2022-09-01 DIAGNOSIS — G30.9 DEMENTIA IN ALZHEIMER'S DISEASE: ICD-10-CM

## 2022-09-01 DIAGNOSIS — I10 ESSENTIAL HYPERTENSION: ICD-10-CM

## 2022-09-01 DIAGNOSIS — E78.2 MIXED HYPERLIPIDEMIA: ICD-10-CM

## 2022-09-01 DIAGNOSIS — H81.10 BENIGN PAROXYSMAL POSITIONAL VERTIGO, UNSPECIFIED LATERALITY: ICD-10-CM

## 2022-09-01 DIAGNOSIS — R42 DIZZINESS: ICD-10-CM

## 2022-09-01 DIAGNOSIS — R42 DIZZINESS: Primary | ICD-10-CM

## 2022-09-01 DIAGNOSIS — B20 HIV DISEASE: Chronic | ICD-10-CM

## 2022-09-01 DIAGNOSIS — F02.80 DEMENTIA IN ALZHEIMER'S DISEASE: ICD-10-CM

## 2022-09-01 DIAGNOSIS — F32.1 CURRENT MODERATE EPISODE OF MAJOR DEPRESSIVE DISORDER WITHOUT PRIOR EPISODE: ICD-10-CM

## 2022-09-01 LAB
ALBUMIN SERPL BCP-MCNC: 3.8 G/DL (ref 3.5–5.2)
ALP SERPL-CCNC: 32 U/L (ref 55–135)
ALT SERPL W/O P-5'-P-CCNC: 32 U/L (ref 10–44)
ANION GAP SERPL CALC-SCNC: 9 MMOL/L (ref 8–16)
AST SERPL-CCNC: 27 U/L (ref 10–40)
BASOPHILS # BLD AUTO: 0.02 K/UL (ref 0–0.2)
BASOPHILS NFR BLD: 0.3 % (ref 0–1.9)
BILIRUB SERPL-MCNC: 2.7 MG/DL (ref 0.1–1)
BUN SERPL-MCNC: 22 MG/DL (ref 8–23)
CALCIUM SERPL-MCNC: 9.8 MG/DL (ref 8.7–10.5)
CHLORIDE SERPL-SCNC: 106 MMOL/L (ref 95–110)
CO2 SERPL-SCNC: 24 MMOL/L (ref 23–29)
CREAT SERPL-MCNC: 0.9 MG/DL (ref 0.5–1.4)
DIFFERENTIAL METHOD: ABNORMAL
EOSINOPHIL # BLD AUTO: 0.1 K/UL (ref 0–0.5)
EOSINOPHIL NFR BLD: 2.2 % (ref 0–8)
ERYTHROCYTE [DISTWIDTH] IN BLOOD BY AUTOMATED COUNT: 13.1 % (ref 11.5–14.5)
EST. GFR  (NO RACE VARIABLE): >60 ML/MIN/1.73 M^2
GLUCOSE SERPL-MCNC: 108 MG/DL (ref 70–110)
HCT VFR BLD AUTO: 40.1 % (ref 40–54)
HGB BLD-MCNC: 14 G/DL (ref 14–18)
IMM GRANULOCYTES # BLD AUTO: 0.02 K/UL (ref 0–0.04)
IMM GRANULOCYTES NFR BLD AUTO: 0.3 % (ref 0–0.5)
LYMPHOCYTES # BLD AUTO: 2.8 K/UL (ref 1–4.8)
LYMPHOCYTES NFR BLD: 43.3 % (ref 18–48)
MCH RBC QN AUTO: 37.1 PG (ref 27–31)
MCHC RBC AUTO-ENTMCNC: 34.9 G/DL (ref 32–36)
MCV RBC AUTO: 106 FL (ref 82–98)
MONOCYTES # BLD AUTO: 0.8 K/UL (ref 0.3–1)
MONOCYTES NFR BLD: 12.2 % (ref 4–15)
NEUTROPHILS # BLD AUTO: 2.7 K/UL (ref 1.8–7.7)
NEUTROPHILS NFR BLD: 41.7 % (ref 38–73)
NRBC BLD-RTO: 0 /100 WBC
PLATELET # BLD AUTO: 248 K/UL (ref 150–450)
PMV BLD AUTO: 9.3 FL (ref 9.2–12.9)
POTASSIUM SERPL-SCNC: 4.1 MMOL/L (ref 3.5–5.1)
PROT SERPL-MCNC: 7.5 G/DL (ref 6–8.4)
RBC # BLD AUTO: 3.77 M/UL (ref 4.6–6.2)
SODIUM SERPL-SCNC: 139 MMOL/L (ref 136–145)
WBC # BLD AUTO: 6.39 K/UL (ref 3.9–12.7)

## 2022-09-01 PROCEDURE — 85025 COMPLETE CBC W/AUTO DIFF WBC: CPT | Performed by: INTERNAL MEDICINE

## 2022-09-01 PROCEDURE — 99999 PR PBB SHADOW E&M-EST. PATIENT-LVL III: CPT | Mod: PBBFAC,,, | Performed by: INTERNAL MEDICINE

## 2022-09-01 PROCEDURE — 36415 COLL VENOUS BLD VENIPUNCTURE: CPT | Performed by: INTERNAL MEDICINE

## 2022-09-01 PROCEDURE — 99213 OFFICE O/P EST LOW 20 MIN: CPT | Mod: PBBFAC | Performed by: INTERNAL MEDICINE

## 2022-09-01 PROCEDURE — 99999 PR PBB SHADOW E&M-EST. PATIENT-LVL III: ICD-10-PCS | Mod: PBBFAC,,, | Performed by: INTERNAL MEDICINE

## 2022-09-01 PROCEDURE — 99214 PR OFFICE/OUTPT VISIT, EST, LEVL IV, 30-39 MIN: ICD-10-PCS | Mod: S$PBB,,, | Performed by: INTERNAL MEDICINE

## 2022-09-01 PROCEDURE — 80053 COMPREHEN METABOLIC PANEL: CPT | Performed by: INTERNAL MEDICINE

## 2022-09-01 PROCEDURE — 99214 OFFICE O/P EST MOD 30 MIN: CPT | Mod: S$PBB,,, | Performed by: INTERNAL MEDICINE

## 2022-09-01 RX ORDER — VALSARTAN 40 MG/1
40 TABLET ORAL DAILY
COMMUNITY
Start: 2022-05-25 | End: 2023-09-22

## 2022-09-01 RX ORDER — MECLIZINE HYDROCHLORIDE 25 MG/1
25 TABLET ORAL 2 TIMES DAILY PRN
Qty: 180 TABLET | Refills: 3 | Status: SHIPPED | OUTPATIENT
Start: 2022-09-01 | End: 2023-06-01

## 2022-09-01 NOTE — PROGRESS NOTES
Subjective:       Patient ID: Haritha Valle is a 77 y.o. male.    Chief Complaint:   Follow-up    HPI - Mr. Valle was not feeling well today.  He c/o chronic dizziness, but had difficulty clarifying further.  Not sure if this is presyncope or vertigo, though he does get some relief from meclizine.  He's had a big workup from an outside cardiologist, but of course no records are available and he didn't bring anything.  He says he works for a couple of hours and stops d/t fatigue/dizziness.  He also has fatigue walking from bedroom to bathroom.  He's taking meds as prescribed, plus some otc's.  Agreed to stop the otc's today.  Getting MPX vaccine today; utd on covid vaccinations.  Not a smoker.    PMH:  Chronic dizziness  Living with HIV - followed by ID  HTN, at goal today  HLP  Anxiety and depression  Mild Cognitive impairment  Sleep apnea  Hypogonadism     Meds: Reviewed and reconciled in EPIC with patient during visit today.     Review of Systems   Constitutional:  Negative for activity change and unexpected weight change.   HENT:  Negative for hearing loss, rhinorrhea and trouble swallowing.    Eyes:  Negative for discharge and visual disturbance.   Respiratory:  Negative for chest tightness and wheezing.    Cardiovascular:  Negative for chest pain and palpitations.   Gastrointestinal:  Negative for blood in stool, constipation, diarrhea and vomiting.   Endocrine: Negative for polydipsia and polyuria.   Genitourinary:  Negative for difficulty urinating, hematuria and urgency.   Musculoskeletal:  Negative for arthralgias, joint swelling and neck pain.   Neurological:  Positive for weakness. Negative for headaches.   Psychiatric/Behavioral:  Positive for dysphoric mood. Negative for confusion.      Objective:      Physical Exam  Constitutional:       General: He is not in acute distress.     Appearance: He is well-developed. He is not diaphoretic.   HENT:      Head: Normocephalic and atraumatic.   Cardiovascular:       Rate and Rhythm: Normal rate and regular rhythm.      Heart sounds: Normal heart sounds. No murmur heard.    No friction rub. No gallop.   Pulmonary:      Effort: No respiratory distress.      Breath sounds: No wheezing or rales.   Chest:      Chest wall: No tenderness.   Musculoskeletal:      Comments: Negative porsha-hallpike   Skin:     General: Skin is warm.      Findings: No erythema.   Neurological:      Mental Status: He is alert and oriented to person, place, and time.   Psychiatric:         Thought Content: Thought content normal.       Assessment:       1. Dizziness    2. Current moderate episode of major depressive disorder without prior episode    3. HIV disease    4. Essential hypertension    5. Mixed hyperlipidemia    6. Dementia in Alzheimer's disease    7. Benign paroxysmal positional vertigo, unspecified laterality          Plan:       Haritha was seen today for follow-up.    Diagnoses and all orders for this visit:    Dizziness - worsening.  Please bring in notes and workup from outside physician.  Labs today  -     CBC Auto Differential; Future  -     COMPREHENSIVE METABOLIC PANEL; Future    Current moderate episode of major depressive disorder without prior episode - stable on medication    HIV disease - stable.  Followed by ID.  Stay the course    Essential hypertension - at goal.  Continue present care    Mixed hyperlipidemia    Dementia in Alzheimer's disease - stable.  Has a partner to help    Benign paroxysmal positional vertigo, unspecified laterality - worsening.  Will refill meclizine  -     meclizine (ANTIVERT) 25 mg tablet; Take 1 tablet (25 mg total) by mouth 2 (two) times daily as needed for Dizziness.    Rtc prn, or in 3 months    KATHARINE Andrade MD MPH  Staff Internist

## 2022-10-20 ENCOUNTER — IMMUNIZATION (OUTPATIENT)
Dept: INTERNAL MEDICINE | Facility: CLINIC | Age: 77
End: 2022-10-20
Payer: MEDICARE

## 2022-10-20 PROCEDURE — G0008 ADMIN INFLUENZA VIRUS VAC: HCPCS | Mod: PBBFAC,PN

## 2022-11-09 ENCOUNTER — NURSE TRIAGE (OUTPATIENT)
Dept: ADMINISTRATIVE | Facility: CLINIC | Age: 77
End: 2022-11-09
Payer: MEDICARE

## 2022-11-09 NOTE — TELEPHONE ENCOUNTER
Pt friend called to cancel appt for pt. Called pt directly to confirm, pt states would like to cancel due to testing positive for covid this AM.  Cancellation complete, advised pt to call back for any further questions or concerns.     Reason for Disposition   Question about upcoming scheduled test, no triage required and triager able to answer question    Protocols used: Information Only Call-A-AH

## 2022-11-11 ENCOUNTER — PATIENT MESSAGE (OUTPATIENT)
Dept: INTERNAL MEDICINE | Facility: CLINIC | Age: 77
End: 2022-11-11
Payer: MEDICARE

## 2022-12-02 ENCOUNTER — IMMUNIZATION (OUTPATIENT)
Dept: PHARMACY | Facility: CLINIC | Age: 77
End: 2022-12-02
Payer: MEDICARE

## 2022-12-02 DIAGNOSIS — Z23 NEED FOR VACCINATION: Primary | ICD-10-CM

## 2023-01-05 ENCOUNTER — PATIENT MESSAGE (OUTPATIENT)
Dept: INTERNAL MEDICINE | Facility: CLINIC | Age: 78
End: 2023-01-05
Payer: MEDICARE

## 2023-01-05 DIAGNOSIS — R42 DIZZINESS: Primary | ICD-10-CM

## 2023-01-06 ENCOUNTER — PATIENT MESSAGE (OUTPATIENT)
Dept: INTERNAL MEDICINE | Facility: CLINIC | Age: 78
End: 2023-01-06
Payer: MEDICARE

## 2023-02-06 ENCOUNTER — TELEPHONE (OUTPATIENT)
Dept: OPHTHALMOLOGY | Facility: CLINIC | Age: 78
End: 2023-02-06
Payer: MEDICARE

## 2023-02-06 NOTE — TELEPHONE ENCOUNTER
Called pt and let him know that Dr. Macario's staff is out of the office, but will be back on Thursday, so he should be able to hear from them around then to schedule Avastin eval.     ----- Message from Kenisha Stout sent at 2/6/2023 12:16 PM CST -----  Pt would like to speak with nurse about scheduling proc avastin OS please contact pt in portal he was last seen 7 month ago please advise

## 2023-02-07 ENCOUNTER — PATIENT MESSAGE (OUTPATIENT)
Dept: OPHTHALMOLOGY | Facility: CLINIC | Age: 78
End: 2023-02-07
Payer: MEDICARE

## 2023-02-19 ENCOUNTER — PATIENT MESSAGE (OUTPATIENT)
Dept: OPHTHALMOLOGY | Facility: CLINIC | Age: 78
End: 2023-02-19
Payer: MEDICARE

## 2023-03-09 ENCOUNTER — PATIENT MESSAGE (OUTPATIENT)
Dept: INTERNAL MEDICINE | Facility: CLINIC | Age: 78
End: 2023-03-09
Payer: MEDICARE

## 2023-03-16 ENCOUNTER — OFFICE VISIT (OUTPATIENT)
Dept: OPHTHALMOLOGY | Facility: CLINIC | Age: 78
End: 2023-03-16
Payer: MEDICARE

## 2023-03-16 DIAGNOSIS — H35.3231 EXUDATIVE AGE-RELATED MACULAR DEGENERATION OF BOTH EYES WITH ACTIVE CHOROIDAL NEOVASCULARIZATION: Primary | ICD-10-CM

## 2023-03-16 PROCEDURE — 92134 CPTRZ OPH DX IMG PST SGM RTA: CPT | Mod: PBBFAC | Performed by: OPHTHALMOLOGY

## 2023-03-16 PROCEDURE — 99999 PR PBB SHADOW E&M-EST. PATIENT-LVL II: CPT | Mod: PBBFAC,,, | Performed by: OPHTHALMOLOGY

## 2023-03-16 PROCEDURE — 67028 INJECTION EYE DRUG: CPT | Mod: 50,PBBFAC | Performed by: OPHTHALMOLOGY

## 2023-03-16 PROCEDURE — 67028 INJECTION EYE DRUG: CPT | Mod: 50,S$PBB,, | Performed by: OPHTHALMOLOGY

## 2023-03-16 PROCEDURE — 99999 PR PBB SHADOW E&M-EST. PATIENT-LVL II: ICD-10-PCS | Mod: PBBFAC,,, | Performed by: OPHTHALMOLOGY

## 2023-03-16 PROCEDURE — 99212 OFFICE O/P EST SF 10 MIN: CPT | Mod: PBBFAC | Performed by: OPHTHALMOLOGY

## 2023-03-16 PROCEDURE — 99214 OFFICE O/P EST MOD 30 MIN: CPT | Mod: 25,S$PBB,, | Performed by: OPHTHALMOLOGY

## 2023-03-16 PROCEDURE — 99214 PR OFFICE/OUTPT VISIT, EST, LEVL IV, 30-39 MIN: ICD-10-PCS | Mod: 25,S$PBB,, | Performed by: OPHTHALMOLOGY

## 2023-03-16 PROCEDURE — 67028 PR INJECT INTRAVITREAL PHARMCOLOGIC: ICD-10-PCS | Mod: 50,S$PBB,, | Performed by: OPHTHALMOLOGY

## 2023-03-16 PROCEDURE — 92134 POSTERIOR SEGMENT OCT RETINA (OCULAR COHERENCE TOMOGRAPHY)-BOTH EYES: ICD-10-PCS | Mod: 26,S$PBB,, | Performed by: OPHTHALMOLOGY

## 2023-03-16 RX ORDER — ALPRAZOLAM 0.5 MG/1
0.5 TABLET ORAL
COMMUNITY
Start: 2023-01-09 | End: 2023-09-22

## 2023-03-16 RX ORDER — ICOSAPENT ETHYL 1000 MG/1
CAPSULE ORAL
COMMUNITY
Start: 2022-10-17 | End: 2023-09-13

## 2023-03-16 RX ADMIN — Medication 1.25 MG: at 03:03

## 2023-03-16 RX ADMIN — BEVACIZUMAB 1.25 MG: 100 INJECTION, SOLUTION INTRAVENOUS at 03:03

## 2023-03-16 NOTE — PROGRESS NOTES
===============================  Date today is 3/16/2023  Haritha Valle is a 77 y.o. male  Last visit [facility]: :6/21/2022   Last visit eye dept. Visit date not found    Corrected distance visual acuity was 20/30 in the right eye and 20/60 in the left eye.  Tonometry       Tonometry (Applanation, 1:40 PM)         Right Left    Pressure 16 16                  Not recorded       Not recorded       Not recorded       Chief Complaint   Patient presents with    Macular Degeneration     Lost to follow up/   last avastin os 6/21/22     HPI     Macular Degeneration     Additional comments: Lost to follow up/   last avastin os 6/21/22           Comments    No dm  No sx's    Avastin os 3-24-22  4/28/22 6/21/22            Last edited by Maya Babcock on 3/16/2023  1:24 PM.      Problem List Items Addressed This Visit    None  Visit Diagnoses       Exudative age-related macular degeneration of both eyes with active choroidal neovascularization    -  Primary    Relevant Medications    bevacizumab (Avastin) 25 mg/mL ophthalmic injection syringe 1.25 mg (Completed) (Start on 3/16/2023  3:30 PM)    bevacizumab (Avastin) 25 mg/mL ophthalmic injection syringe 1.25 mg (Completed) (Start on 3/16/2023  3:30 PM)    Other Relevant Orders    Posterior Segment OCT Retina-Both eyes (Completed)    Prior authorization Order          Instructed to call 24/7 for any worsening of vision, visual distortion or pain.  Check OU independently daily.    Gave my office and personal cell phone number.  ________________  3/16/2023 today  Haritha Valle    OU SRN  OCT worse OU  2+ NS OU  New SRN OD today   Recommend emergent Avastin OU today    ..    Injection Procedure Note:    3/16/2023  Diagnosis :  OU SRN  Today:  Emergent Avastin (Bevacizumab) 1.25 mg/0.05 ml Intravitreal Injection , OU   Follow up: rtc 4-5 weeks     Instructed to call 24/7 for any worsening of vision. Check Both eyes daily. Gave patient my home phone number.  Risks, benefits, and  alternatives to treatment discussed in detail with the patient.  The patient voiced understanding and wished to proceed with the procedure.     Patient Identified and Time Out complete  Subconjunctival bleb - xylocaine with epi 2%   and Betadine.  Inject at Avastin (Bevacizumab) 1.25 mg/0.05 ml Intravitreal Injection , OU 6:00 @ 3.5-4mm posterior to limbus  1 stop: no   Post Operative Dx: Same  Complications: None  Follow up as above.    =============================

## 2023-04-13 ENCOUNTER — TELEPHONE (OUTPATIENT)
Dept: NEUROLOGY | Facility: CLINIC | Age: 78
End: 2023-04-13
Payer: MEDICARE

## 2023-04-13 NOTE — TELEPHONE ENCOUNTER
Called Pt to let him know his appt with Dr. Herrera was scheduled incorrectly. Pt has been rescheduled to May 30.

## 2023-04-27 ENCOUNTER — OFFICE VISIT (OUTPATIENT)
Dept: NEUROLOGY | Facility: CLINIC | Age: 78
End: 2023-04-27
Payer: MEDICARE

## 2023-04-27 ENCOUNTER — LAB VISIT (OUTPATIENT)
Dept: LAB | Facility: HOSPITAL | Age: 78
End: 2023-04-27
Attending: PSYCHIATRY & NEUROLOGY
Payer: MEDICARE

## 2023-04-27 VITALS — HEART RATE: 70 BPM | DIASTOLIC BLOOD PRESSURE: 89 MMHG | SYSTOLIC BLOOD PRESSURE: 149 MMHG

## 2023-04-27 DIAGNOSIS — R42 DIZZINESS: ICD-10-CM

## 2023-04-27 DIAGNOSIS — E53.8 DEFICIENCY OF OTHER SPECIFIED B GROUP VITAMINS: ICD-10-CM

## 2023-04-27 DIAGNOSIS — B20 NEUROPATHY DUE TO HIV: ICD-10-CM

## 2023-04-27 DIAGNOSIS — R26.89 IMBALANCE: ICD-10-CM

## 2023-04-27 DIAGNOSIS — B20 HIV INFECTION, UNSPECIFIED SYMPTOM STATUS: ICD-10-CM

## 2023-04-27 DIAGNOSIS — G63 NEUROPATHY DUE TO HIV: ICD-10-CM

## 2023-04-27 DIAGNOSIS — G47.33 OSA (OBSTRUCTIVE SLEEP APNEA): ICD-10-CM

## 2023-04-27 DIAGNOSIS — R06.02 SHORTNESS OF BREATH: ICD-10-CM

## 2023-04-27 DIAGNOSIS — R53.83 FATIGUE, UNSPECIFIED TYPE: ICD-10-CM

## 2023-04-27 DIAGNOSIS — R42 DIZZINESS: Primary | ICD-10-CM

## 2023-04-27 LAB
HCYS SERPL-SCNC: 7.3 UMOL/L (ref 4–16.5)
TSH SERPL DL<=0.005 MIU/L-ACNC: 1.14 UIU/ML (ref 0.4–4)
VIT B12 SERPL-MCNC: 218 PG/ML (ref 210–950)

## 2023-04-27 PROCEDURE — 99215 PR OFFICE/OUTPT VISIT, EST, LEVL V, 40-54 MIN: ICD-10-PCS | Mod: S$PBB,,, | Performed by: PSYCHIATRY & NEUROLOGY

## 2023-04-27 PROCEDURE — 83090 ASSAY OF HOMOCYSTEINE: CPT | Performed by: PSYCHIATRY & NEUROLOGY

## 2023-04-27 PROCEDURE — 99215 OFFICE O/P EST HI 40 MIN: CPT | Mod: S$PBB,,, | Performed by: PSYCHIATRY & NEUROLOGY

## 2023-04-27 PROCEDURE — 99215 OFFICE O/P EST HI 40 MIN: CPT | Mod: PBBFAC | Performed by: PSYCHIATRY & NEUROLOGY

## 2023-04-27 PROCEDURE — 99999 PR PBB SHADOW E&M-EST. PATIENT-LVL V: CPT | Mod: PBBFAC,,, | Performed by: PSYCHIATRY & NEUROLOGY

## 2023-04-27 PROCEDURE — 99999 PR PBB SHADOW E&M-EST. PATIENT-LVL V: ICD-10-PCS | Mod: PBBFAC,,, | Performed by: PSYCHIATRY & NEUROLOGY

## 2023-04-27 PROCEDURE — 86361 T CELL ABSOLUTE COUNT: CPT | Performed by: PSYCHIATRY & NEUROLOGY

## 2023-04-27 PROCEDURE — 82607 VITAMIN B-12: CPT | Performed by: PSYCHIATRY & NEUROLOGY

## 2023-04-27 PROCEDURE — 84443 ASSAY THYROID STIM HORMONE: CPT | Performed by: PSYCHIATRY & NEUROLOGY

## 2023-04-27 PROCEDURE — 83921 ORGANIC ACID SINGLE QUANT: CPT | Performed by: PSYCHIATRY & NEUROLOGY

## 2023-04-27 NOTE — PATIENT INSTRUCTIONS
B12, MMA, homocysteine, CD4 count ordered  Referral for pulmonology  Referral for HIV specialist  Referral for vestibular PT for balance  You do not feel your feet well due to neuropathy. Please wear footwear with good tread at all times. This recommendation includes wearing socks with tread on them. Make sure all of your walkways, hallways, and sourav are well lit at all times day and night. Use night lights to light up commonly used pathways in your home, meaning from the bedroom to the bathroom or kitchen. Make sure all of your walkways, hallways, and sourav are clear of obstacles at all times. Obstacles include decorations, rugs/mats, pet beds, shoes, and clothes. Make sure you have good hand holds to grab onto as you walk around your home.  Discontinue meclizine  Counseled on importance of wearing CPAP as helps prevent stop breathing at night

## 2023-04-27 NOTE — PROGRESS NOTES
"Chief Complaint: Dizziness    Subjective:     Referring Provider: Self Referral  Accompanied by: Partner    History of Present Illness    04/27/2023: Haritha Valle is a 77 y.o. male with h/o anxiety, basal cell carcinoma s/p resection, depression, HBV, HCV, HIV, HTN, HLD, RAMONITA not on CPAP, dementia who presents for dizziness evaluation. Dizziness started 2 years ago and has worsened in the last 1 year. He denies an inciting etiology for dizziness onset or for it's worsening including no injury, medication change, or health change. Per partner, he had had a cardiac workup and told heart fine and told years ago had "water on the brain" and discussed putting a valve back there. Dizziness is defined as imbalance and gets lightheadedness. He gets lightheadedness on standing that does not fully improve with waiting before he moves. He has tried meclizine that does not help and has been taking twice it day for 2 years. Per partner, he drinks a lot of water and he does not like salt. He has not tried increased hydration, extra salt, or compression stockings. During this time of dizziness, he has developed fatigued and SOB. SOB per description starts after a few steps and per partner at home he will get SOB from going to the bathroom and back. He states his taste changed 4 months ago where things do not taste as sweet or salty and has last 6-8 lbs as result. He has been on his same HIV medications for 25 years and once discussed new medication option that was too expensive and his specialist retired (per chart review has not seen someone since 2019) but he notes he recently missed his medications for 2 weeks and actually all of his symptoms felt better and he is now back on his normal HIV medication regimen. He states his headaches have improved a lot and per partner he has not complained for years about them. Per partner, he used to take testosterone injections and has stopped them. He denies weakness, numbness, tingling, " change in vision but per partner he will be have ocular injections for macular degeneration. He is sleeping well and takes Simply Sleep (per partner is benadryl based) and wakes up rested. Per partner, he snores and denies apnea. He is not wearing his CPAP. Mood is mood swings per partner and can quickly get angry and suddenly be fine.    Current Outpatient Medications on File Prior to Visit   Medication Sig Dispense Refill    ALPRAZolam (XANAX) 0.5 MG tablet Take by mouth.      atazanavir (REYATAZ) 200 MG Cap Take 2 capsules (400 mg total) by mouth once daily. 180 capsule 3    FLUoxetine 40 MG capsule Take 1 capsule (40 mg total) by mouth once daily. 90 capsule 3    lamivudine-zidovudine 150-300mg (COMBIVIR) 150-300 mg Tab Take 1 tablet by mouth every 12 (twelve) hours. 180 tablet 3    meclizine (ANTIVERT) 25 mg tablet Take 1 tablet (25 mg total) by mouth 2 (two) times daily as needed for Dizziness. 180 tablet 3    metoprolol succinate (TOPROL-XL) 50 MG 24 hr tablet TAKE 1 TABLET EVERY DAY 90 tablet 2    rosuvastatin (CRESTOR) 20 MG tablet TAKE 1 TABLET EVERY DAY 90 tablet 3    [DISCONTINUED] sars-cov-2, covid-19 pfizer omicron, (PFIZER COVID BIVAL,12Y UP,,PF,) 30 mcg/0.3 mL injection Inject into the muscle. 0.3 mL 0    valsartan (DIOVAN) 40 MG tablet Take 40 mg by mouth once daily.      VASCEPA 1 gram Cap Take by mouth.       No current facility-administered medications on file prior to visit.       Review of patient's allergies indicates:   Allergen Reactions    No known drug allergies        Family History   Problem Relation Age of Onset    Heart disease Father     Heart failure Father     Diabetes Neg Hx     Hypertension Neg Hx        Social History     Tobacco Use    Smoking status: Never    Smokeless tobacco: Never   Substance Use Topics    Alcohol use: No    Drug use: Not Currently       Review of Systems  Constitutional: No fevers, no chills, no change in weight  Eye/Vision: See HPI  Ear/Nose/Mouth/Throat:  See HPI; cough, no runny nose, no sore throat  Respiratory: No shortness of breath, no problems breathing  Cardiovascular: No chest pain  Gastrointestinal: See HPI, no diarrhea, no constipation  Genitourinary: No dysuria  Musculoskeletal: See HPI  Integumentary: No skin changes  Neurologic: See HPI  Psychiatric: Denies depression, anxiety, denies SI and HI.    Objective:     Vitals:    04/27/23 1256   BP: (!) 149/89   Pulse: 70       General: Alert and awake, no acute distress, well groomed, well nourished  Eyes: Pupils are equal, round and reactive to light; Extraocular movements are intact; Normal conjunctiva; no nystagmus; Visual fields are Full to finger counting; No saccadic intrusions of volitional ocular smooth pursuits.   HENT: Normocephalic, Rinne test positive bilaterally, non-lateralizing Chacko tuning fork exam, Oral mucosa is moist, No pharyngeal erythema.  Neck: Supple  Cardiovascular: Normal rate, Regular rhythm, No murmur, No carotid bruit  Musculoskeletal: No swelling.  Integumentary: Warm, Dry, Intact, No pallor, No rash.    Neurologic Exam  Mental Status: orientated to time, person, and place; good recent and remote memory; attention and concentration WNL; naming intact; adequate fund of knowledge. No aphasia or dysarthria. Repetition intact. Follows complex commands    Cranial Nerves: as above, V1-V3 temperature sensation WNL bilaterally, face symmetric, symmetrical palatal rise, SCM 5/5 bilaterally, tongue protrusion midline and movements WNL    Muscle Tone/Motor Function: Normal bulk and tone throughout. No drift. Normal rapidly alternating movements. No tremors. No abnormal movements                                                           Right                            Left                           Deltoid                    5/5                                5/5                           Biceps                    5/5                                 5/5                           Triceps                    5/5                                5/5                           Iliopsoas                 5/5                                5/5                           Quadriceps             5/5                                5/5                           Hamstring               5/5                                5/5                           Dorsiflexion             5/5                                5/5                             Sensory: Vibration sensation WNL x4, Temperature sensation absent below the knee bilaterally, Rhomberg negative    Reflexes: Symmetrical DTR's, Biceps 2+, Brachioradialis 2+, Patellar 2+; No Wartenberg    Coordination: No truncal ataxia. Finger to nose WNL bilaterally.    Gait: Gait WNL, Heel to toe walking impaired    Labs:    No visits with results within 6 Month(s) from this visit.   Latest known visit with results is:   Lab Visit on 09/01/2022   Component Date Value Ref Range Status    WBC 09/01/2022 6.39  3.90 - 12.70 K/uL Final    RBC 09/01/2022 3.77 (L)  4.60 - 6.20 M/uL Final    Hemoglobin 09/01/2022 14.0  14.0 - 18.0 g/dL Final    Hematocrit 09/01/2022 40.1  40.0 - 54.0 % Final    MCV 09/01/2022 106 (H)  82 - 98 fL Final    MCH 09/01/2022 37.1 (H)  27.0 - 31.0 pg Final    MCHC 09/01/2022 34.9  32.0 - 36.0 g/dL Final    RDW 09/01/2022 13.1  11.5 - 14.5 % Final    Platelets 09/01/2022 248  150 - 450 K/uL Final    MPV 09/01/2022 9.3  9.2 - 12.9 fL Final    Immature Granulocytes 09/01/2022 0.3  0.0 - 0.5 % Final    Gran # (ANC) 09/01/2022 2.7  1.8 - 7.7 K/uL Final    Immature Grans (Abs) 09/01/2022 0.02  0.00 - 0.04 K/uL Final    Comment: Mild elevation in immature granulocytes is non specific and   can be seen in a variety of conditions including stress response,   acute inflammation, trauma and pregnancy. Correlation with other   laboratory and clinical findings is essential.      Lymph # 09/01/2022 2.8  1.0 - 4.8 K/uL Final    Mono # 09/01/2022 0.8  0.3 - 1.0 K/uL Final    Eos  # 09/01/2022 0.1  0.0 - 0.5 K/uL Final    Baso # 09/01/2022 0.02  0.00 - 0.20 K/uL Final    nRBC 09/01/2022 0  0 /100 WBC Final    Gran % 09/01/2022 41.7  38.0 - 73.0 % Final    Lymph % 09/01/2022 43.3  18.0 - 48.0 % Final    Mono % 09/01/2022 12.2  4.0 - 15.0 % Final    Eosinophil % 09/01/2022 2.2  0.0 - 8.0 % Final    Basophil % 09/01/2022 0.3  0.0 - 1.9 % Final    Differential Method 09/01/2022 Automated   Final    Sodium 09/01/2022 139  136 - 145 mmol/L Final    Potassium 09/01/2022 4.1  3.5 - 5.1 mmol/L Final    Chloride 09/01/2022 106  95 - 110 mmol/L Final    CO2 09/01/2022 24  23 - 29 mmol/L Final    Glucose 09/01/2022 108  70 - 110 mg/dL Final    BUN 09/01/2022 22  8 - 23 mg/dL Final    Creatinine 09/01/2022 0.9  0.5 - 1.4 mg/dL Final    Calcium 09/01/2022 9.8  8.7 - 10.5 mg/dL Final    Total Protein 09/01/2022 7.5  6.0 - 8.4 g/dL Final    Albumin 09/01/2022 3.8  3.5 - 5.2 g/dL Final    Total Bilirubin 09/01/2022 2.7 (H)  0.1 - 1.0 mg/dL Final    Comment: For infants and newborns, interpretation of results should be based  on gestational age, weight and in agreement with clinical  observations.    Premature Infant recommended reference ranges:  Up to 24 hours.............<8.0 mg/dL  Up to 48 hours............<12.0 mg/dL  3-5 days..................<15.0 mg/dL  6-29 days.................<15.0 mg/dL      Alkaline Phosphatase 09/01/2022 32 (L)  55 - 135 U/L Final    AST 09/01/2022 27  10 - 40 U/L Final    ALT 09/01/2022 32  10 - 44 U/L Final    Anion Gap 09/01/2022 9  8 - 16 mmol/L Final    eGFR 09/01/2022 >60.0  >60 mL/min/1.73 m^2 Final      I personally reviewed all current labs noted in today's chart.    Imaging:    I personally reviewed all diagnostic images and reports and agree with findings in the radiographical report as noted below unless otherwise specified.    MRI Brain 11/27/19:  FINDINGS:  Intracranial Compartment:     There is similar diffuse cerebral atrophy with subsequent similar prominence of  the ventricular system.  Medial temporal lobe atrophy score of 1-2 suspected.  No midline shift or concerning mass effect present.  Stable right frontal lobe developmental venous anomaly present without concerning enhancement.     No restricted diffusion to suggest acute infarct.     Orbits are unremarkable.  Mastoid air cells and paranasal sinuses unremarkable.     Aorta: Normal 3 vessel arch.     Extracranial carotid circulation: No hemodynamically significant stenosis, aneurysmal dilatation, or dissection.     Extracranial vertebral circulation: No hemodynamically significant stenosis, aneurysmal dilatation, or dissection.     Intracranial Arteries: No focal high-grade stenosis, occlusion, or aneurysm.Persistent right PCA fetal circulation noted.     Impression:     No acute intracranial abnormality with similar diffuse atrophy.  Stable right frontal lobe developmental venous anomaly..     Unremarkable MRI brain/neck    My read: No acute intracranial abnormalities. Diffuse atrophy    I personally reviewed all diagnostic reports below.    MRI Brain PET 4/29/21:  FINDINGS:  Diffuse cortical hypometabolism is noted, consistent with diffuse atrophy.  No areas of significant worsening or sparing are seen to suggest a superimposed neurodegenerative process.  Subcortical uptake parallels cortical findings.  Cerebellar    uptake appears normal.     IMPRESSION:       1.  Diffuse atrophic changes, with no evidence for a superimposed neurodegenerative process.     Assessment:       ICD-10-CM ICD-9-CM    1. Dizziness  R42 780.4 Ambulatory referral/consult to Neurology      T-HELPER CELLS (CD4) COUNT      TSH      VITAMIN B12      Methylmalonic Acid, Serum      HOMOCYSTEINE, SERUM      Ambulatory referral/consult to Physical/Occupational Therapy      2. Fatigue, unspecified type  R53.83 780.79 T-HELPER CELLS (CD4) COUNT      TSH      VITAMIN B12      Methylmalonic Acid, Serum      HOMOCYSTEINE, SERUM      3. 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 RAMONITA (obstructive sleep apnea)  G47.33 327.23       5. Shortness of breath  R06.02 786.05 Ambulatory consult to Pulmonology      6. HIV infection, unspecified symptom status  B20 V08 Ambulatory referral/consult to Infectious Disease      7. Imbalance  R26.89 781.2 Ambulatory referral/consult to Physical/Occupational Therapy      8. Deficiency of other specified B group vitamins  E53.8 266.2 VITAMIN B12       77 y.o. male with h/o anxiety, basal cell carcinoma s/p resection, depression, HBV, HCV, HIV, HTN, HLD, RAMONITA not on CPAP, dementia who presents for dizziness evaluation. On exam, he has decreased temperature sensation below the knee bilaterally, imbalance. We discussed for dizziness, could be a combination of HIV neuropathy and meclizine as meclizine can worsen non-vertigo dizziness and can be from his older HIV medications, particularly since he felt better when he did not have access to them for a couple of weeks. For fatigue, could be related to metabolic etiologies and previous lab work indicates potential B12 deficiency so will get some basic fatigue labs. We discussed SOB could be related or unrelated to the above and will refer to pulmonology. We discussed re-establishing with HIV specialist to see if a newer medication may improve your current symptoms. We discussed lack of temperature sensation in lower legs is indicative of small fiber neuropathy, which is caused at least by HIV and other conditions like B12 deficiency could contribute to it.    Plan:     B12, MMA, homocysteine, CD4 count ordered  Referral for pulmonology  Referral for HIV specialist  Referral for vestibular PT for balance  You do not feel your feet well due to neuropathy. Please wear footwear with good tread at all times. This recommendation includes wearing socks with tread on them. Make sure all of your walkways, hallways, and sourav are well lit at all times day and night. Use night lights to  light up commonly used pathways in your home, meaning from the bedroom to the bathroom or kitchen. Make sure all of your walkways, hallways, and sourav are clear of obstacles at all times. Obstacles include decorations, rugs/mats, pet beds, shoes, and clothes. Make sure you have good hand holds to grab onto as you walk around your home.  Discontinue meclizine  Counseled on importance of wearing CPAP as helps prevent stop breathing at night    66 minutes were spent on the date of this patient encounter, which includes: preparing to see the patient, reviewing previous history, obtaining new patient history, performing the physical exam, counseling and educating the patient and/or family/caregiver, ordering necessary medications or tests or referrals, documenting in the electronic medical record, coordinating care.    Faustino Herrera MD  Sports Neurology

## 2023-04-28 LAB
CD3+CD4+ CELLS # BLD: 464 CELLS/UL (ref 300–1400)
CD3+CD4+ CELLS NFR BLD: 13.7 % (ref 28–57)

## 2023-05-02 ENCOUNTER — PROCEDURE VISIT (OUTPATIENT)
Dept: OPHTHALMOLOGY | Facility: CLINIC | Age: 78
End: 2023-05-02
Payer: MEDICARE

## 2023-05-02 DIAGNOSIS — H35.3231 EXUDATIVE AGE-RELATED MACULAR DEGENERATION OF BOTH EYES WITH ACTIVE CHOROIDAL NEOVASCULARIZATION: Primary | ICD-10-CM

## 2023-05-02 LAB — METHYLMALONATE SERPL-SCNC: 0.36 UMOL/L

## 2023-05-02 PROCEDURE — 99499 NO LOS: ICD-10-PCS | Mod: S$PBB,,, | Performed by: OPHTHALMOLOGY

## 2023-05-02 PROCEDURE — 67028 INJECTION EYE DRUG: CPT | Mod: 50,PBBFAC | Performed by: OPHTHALMOLOGY

## 2023-05-02 PROCEDURE — 92134 CPTRZ OPH DX IMG PST SGM RTA: CPT | Mod: PBBFAC | Performed by: OPHTHALMOLOGY

## 2023-05-02 PROCEDURE — 67028 INJECTION EYE DRUG: CPT | Mod: 50,S$PBB,, | Performed by: OPHTHALMOLOGY

## 2023-05-02 PROCEDURE — 67028 PR INJECT INTRAVITREAL PHARMCOLOGIC: ICD-10-PCS | Mod: 50,S$PBB,, | Performed by: OPHTHALMOLOGY

## 2023-05-02 PROCEDURE — 92134 POSTERIOR SEGMENT OCT RETINA (OCULAR COHERENCE TOMOGRAPHY)-BOTH EYES: ICD-10-PCS | Mod: 26,S$PBB,, | Performed by: OPHTHALMOLOGY

## 2023-05-02 PROCEDURE — 99499 UNLISTED E&M SERVICE: CPT | Mod: S$PBB,,, | Performed by: OPHTHALMOLOGY

## 2023-05-02 RX ADMIN — AFLIBERCEPT 2 MG: 40 INJECTION, SOLUTION INTRAVITREAL at 03:05

## 2023-05-02 NOTE — PROGRESS NOTES
===============================  Date today is 5/2/2023  Haritha Valle is a 77 y.o. male  Last visit [facility]: :3/16/2023   Last visit eye dept. 3/16/2023    Corrected distance visual acuity was 20/50 in the right eye and 20/30 in the left eye.  Not recorded       Not recorded       Not recorded       Not recorded       Chief Complaint   Patient presents with    srn     Avastin ou     HPI     srn     Additional comments: Avastin ou           Comments    SRN OS  Avastin OS Emergent 1/21/22, 2/24/22, 3/24/22, 4/28/22, 6/21/22  Emergent Avastin OU 3/16/23          Last edited by MARY JO Crowder on 5/2/2023  2:50 PM.      Problem List Items Addressed This Visit    None  Visit Diagnoses       Exudative age-related macular degeneration of both eyes with active choroidal neovascularization    -  Primary    Relevant Medications    aflibercept Soln 2 mg (Completed)    aflibercept Soln 2 mg (Completed)    Other Relevant Orders    Posterior Segment OCT Retina-Both eyes (Completed)    Prior authorization Order          Instructed to call 24/7 for any worsening of vision, visual distortion or pain.  Check OU independently daily.    Gave my office and personal cell phone number.  ________________  5/2/2023 today  Haritha Valle    OU SRN  OCT worse today  Subjectively worse  Avastin failure  Recommend emergent eylea today    ..    Injection Procedure Note:    5/2/2023  Diagnosis :  OU SRN  Today:  Emergent Eylea (afibercept) 2 mg/0.05 ml Intravitreal Injection , OU   Follow up: rtc 1 month     Instructed to call 24/7 for any worsening of vision. Check Both eyes daily. Gave patient my home phone number.  Risks, benefits, and alternatives to treatment discussed in detail with the patient.  The patient voiced understanding and wished to proceed with the procedure.     Patient Identified and Time Out complete  Subconjunctival bleb - xylocaine with epi 2%   and Betadine.  Inject at Eylea (afibercept) 2 mg/0.05 ml Intravitreal  Injection , OU 6:00 @ 3.5-4mm posterior to limbus  1 stop: no   Post Operative Dx: Same  Complications: None  Follow up as above.    =============================

## 2023-05-09 ENCOUNTER — TELEPHONE (OUTPATIENT)
Dept: NEUROLOGY | Facility: CLINIC | Age: 78
End: 2023-05-09
Payer: MEDICARE

## 2023-06-01 ENCOUNTER — PROCEDURE VISIT (OUTPATIENT)
Dept: OPHTHALMOLOGY | Facility: CLINIC | Age: 78
End: 2023-06-01
Payer: MEDICARE

## 2023-06-01 DIAGNOSIS — H35.3231 EXUDATIVE AGE-RELATED MACULAR DEGENERATION OF BOTH EYES WITH ACTIVE CHOROIDAL NEOVASCULARIZATION: Primary | ICD-10-CM

## 2023-06-01 PROCEDURE — 99499 NO LOS: ICD-10-PCS | Mod: S$PBB,,, | Performed by: OPHTHALMOLOGY

## 2023-06-01 PROCEDURE — 92134 POSTERIOR SEGMENT OCT RETINA (OCULAR COHERENCE TOMOGRAPHY)-BOTH EYES: ICD-10-PCS | Mod: 26,S$PBB,, | Performed by: OPHTHALMOLOGY

## 2023-06-01 PROCEDURE — 99499 UNLISTED E&M SERVICE: CPT | Mod: S$PBB,,, | Performed by: OPHTHALMOLOGY

## 2023-06-01 PROCEDURE — 92134 CPTRZ OPH DX IMG PST SGM RTA: CPT | Mod: PBBFAC | Performed by: OPHTHALMOLOGY

## 2023-06-01 NOTE — PROGRESS NOTES
===============================  Date today is 6/1/2023  Haritha Valle is a 78 y.o. male  Last visit [facility]: :5/2/2023   Last visit eye dept. 5/2/2023    Corrected distance visual acuity was 20/40 -2 in the right eye and 20/30 -2 in the left eye.  Tonometry       Tonometry (icare, 1:42 PM)         Right Left    Pressure 17 16                  Not recorded       Not recorded       Not recorded       Chief Complaint   Patient presents with    Procedure     HPI    SRN OS  Avastin OS Emergent 1/21/22, 2/24/22, 3/24/22, 4/28/22, 6/21/22  Emergent Avastin OU 3/16/23  Avastin failure  Emergent Eylea OU 5/2/23    Last edited by Jed Mares on 6/1/2023  1:33 PM.      Problem List Items Addressed This Visit    None  Visit Diagnoses       Exudative age-related macular degeneration of both eyes with active choroidal neovascularization    -  Primary    Relevant Orders    Posterior Segment OCT Retina-Both eyes (Completed)    Prior authorization Order          Instructed to call 24/7 for any worsening of vision, visual distortion or pain.  Check OU independently daily.    Gave my office and personal cell phone number.  ________________  6/1/2023 today  Haritha Valle      OU SRN  OCT OD worse, OS better  Worse with Eylea tx  Recommend Vabysmo  Will submit pre auth for Vabysmo    RTC 2-3 weeks for Vabysmo OU  Instructed to call 24/7 for any worsening of vision or symptoms. Check OU daily.   Gave my office and cell phone number.    =============================

## 2023-06-15 ENCOUNTER — OFFICE VISIT (OUTPATIENT)
Dept: NEUROLOGY | Facility: CLINIC | Age: 78
End: 2023-06-15
Payer: MEDICARE

## 2023-06-15 VITALS — DIASTOLIC BLOOD PRESSURE: 82 MMHG | SYSTOLIC BLOOD PRESSURE: 146 MMHG | HEART RATE: 71 BPM

## 2023-06-15 DIAGNOSIS — B20 NEUROPATHY DUE TO HIV: ICD-10-CM

## 2023-06-15 DIAGNOSIS — G63 NEUROPATHY DUE TO HIV: ICD-10-CM

## 2023-06-15 DIAGNOSIS — F34.89 OTHER SPECIFIED PERSISTENT MOOD DISORDERS: ICD-10-CM

## 2023-06-15 DIAGNOSIS — R06.02 SHORTNESS OF BREATH: ICD-10-CM

## 2023-06-15 DIAGNOSIS — R42 DIZZINESS: Primary | ICD-10-CM

## 2023-06-15 PROCEDURE — 99999 PR PBB SHADOW E&M-EST. PATIENT-LVL IV: ICD-10-PCS | Mod: PBBFAC,,, | Performed by: PSYCHIATRY & NEUROLOGY

## 2023-06-15 PROCEDURE — 99999 PR PBB SHADOW E&M-EST. PATIENT-LVL IV: CPT | Mod: PBBFAC,,, | Performed by: PSYCHIATRY & NEUROLOGY

## 2023-06-15 PROCEDURE — 99214 OFFICE O/P EST MOD 30 MIN: CPT | Mod: PBBFAC | Performed by: PSYCHIATRY & NEUROLOGY

## 2023-06-15 PROCEDURE — 99213 OFFICE O/P EST LOW 20 MIN: CPT | Mod: S$PBB,,, | Performed by: PSYCHIATRY & NEUROLOGY

## 2023-06-15 PROCEDURE — 99213 PR OFFICE/OUTPT VISIT, EST, LEVL III, 20-29 MIN: ICD-10-PCS | Mod: S$PBB,,, | Performed by: PSYCHIATRY & NEUROLOGY

## 2023-06-15 NOTE — PATIENT INSTRUCTIONS
B12, MMA, homocysteine, CD4 WNL  Referral for pulmonology placed previously  Referral for HIV specialist placed previously  Referral for vestibular PT for balance placed previously  You do not feel your feet well due to neuropathy. Please wear footwear with good tread at all times. This recommendation includes wearing socks with tread on them. Make sure all of your walkways, hallways, and sourav are well lit at all times day and night. Use night lights to light up commonly used pathways in your home, meaning from the bedroom to the bathroom or kitchen. Make sure all of your walkways, hallways, and sourav are clear of obstacles at all times. Obstacles include decorations, rugs/mats, pet beds, shoes, and clothes. Make sure you have good hand holds to grab onto as you walk around your home.  Counseled on importance of wearing CPAP as helps prevent stop breathing at night  Referral for psychology consultation. 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 The question being answered by this consultation is to further identify any mental health, sleep hygiene, and/or cognitive barriers impacting the patient's ability to heal from their TBI/neck injury. The information from this consultation will be used by myself and other providers/therapists to help guide an individual care plan to help treat this patient's symptoms from TBI/neck injury. Any delays or failures to address cognitive, sleep, psychological symptoms after TBI/neck injury can contribute to persistent symptoms, prolong their overall recovery process, and potentially lead to permanent symptoms.

## 2023-06-15 NOTE — PROGRESS NOTES
"Chief Complaint: Dizziness    Subjective:     History of Present Illness    Referring Provider: Self Referral  Accompanied by: Partner     04/27/2023: Haritha Valle is a 77 y.o. male with h/o anxiety, basal cell carcinoma s/p resection, depression, HBV, HCV, HIV, HTN, HLD, RAMONITA not on CPAP, dementia who presents for dizziness evaluation. Dizziness started 2 years ago and has worsened in the last 1 year. He denies an inciting etiology for dizziness onset or for it's worsening including no injury, medication change, or health change. Per partner, he had had a cardiac workup and told heart fine and told years ago had "water on the brain" and discussed putting a valve back there. Dizziness is defined as imbalance and gets lightheadedness. He gets lightheadedness on standing that does not fully improve with waiting before he moves. He has tried meclizine that does not help and has been taking twice it day for 2 years. Per partner, he drinks a lot of water and he does not like salt. He has not tried increased hydration, extra salt, or compression stockings. During this time of dizziness, he has developed fatigued and SOB. SOB per description starts after a few steps and per partner at home he will get SOB from going to the bathroom and back. He states his taste changed 4 months ago where things do not taste as sweet or salty and has last 6-8 lbs as result. He has been on his same HIV medications for 25 years and once discussed new medication option that was too expensive and his specialist retired (per chart review has not seen someone since 2019) but he notes he recently missed his medications for 2 weeks and actually all of his symptoms felt better and he is now back on his normal HIV medication regimen. He states his headaches have improved a lot and per partner he has not complained for years about them. Per partner, he used to take testosterone injections and has stopped them. He denies weakness, numbness, tingling, " change in vision but per partner he will be have ocular injections for macular degeneration. He is sleeping well and takes Simply Sleep (per partner is benadryl based) and wakes up rested. Per partner, he snores and denies apnea. He is not wearing his CPAP. Mood is mood swings per partner and can quickly get angry and suddenly be fine.    06/15/2023: Haritha Valle is a 78 y.o. male with h/o anxiety, basal cell carcinoma s/p resection, depression, HBV, HCV, HIV, HTN, HLD, RAMONITA not on CPAP, dementia who presents for follow up. On last visit, ordered B12, MMA, homocysteine, CD4 count and referred to pulmonology, HIV specialist, vestibular PT and counseled on neuropathy precautions and discontinued meclizine and counseled on CPAP. He states that he is not better or worse. Per partner, he stopped the meclizine. He states dizziness and fatigue is terrible. Dizziness is defined as spinning, imbalance, lightheadedness and worsens when looks up and when gets stressed that he noticed couple weeks. Dizziness is constant and is better in the morning and is bad by noon. He has not done vestibular PT and has not made an appointment yet. He is following with neuropathy precautions. He has not looked into new HIV specialist. He has not seen pulmonology. He is not using CPAP. He states when he tried marijuana gummies that helped calm him down as he describes increased anxiety. He is not following with a mental health provider currently.    Current Outpatient Medications on File Prior to Visit   Medication Sig Dispense Refill    ALPRAZolam (XANAX) 0.5 MG tablet Take by mouth.      atazanavir (REYATAZ) 200 MG Cap Take 2 capsules (400 mg total) by mouth once daily. 180 capsule 3    FLUoxetine 40 MG capsule Take 1 capsule (40 mg total) by mouth once daily. 90 capsule 3    lamivudine-zidovudine 150-300mg (COMBIVIR) 150-300 mg Tab Take 1 tablet by mouth every 12 (twelve) hours. 180 tablet 3    metoprolol succinate (TOPROL-XL) 50 MG 24 hr  tablet TAKE 1 TABLET EVERY DAY 90 tablet 2    rosuvastatin (CRESTOR) 20 MG tablet TAKE 1 TABLET EVERY DAY 90 tablet 3    valsartan (DIOVAN) 40 MG tablet Take 40 mg by mouth once daily.      VASCEPA 1 gram Cap Take by mouth.       No current facility-administered medications on file prior to visit.       Review of patient's allergies indicates:   Allergen Reactions    No known drug allergies        Family History   Problem Relation Age of Onset    Heart disease Father     Heart failure Father     Diabetes Neg Hx     Hypertension Neg Hx        Social History     Tobacco Use    Smoking status: Never    Smokeless tobacco: Never   Substance Use Topics    Alcohol use: No    Drug use: Not Currently       Review of Systems  Constitutional: No fevers, no chills, no change in weight  Eye/Vision: See HPI  Ear/Nose/Mouth/Throat: See HPI; no cough, no runny nose, no sore throat  Respiratory: shortness of breath, no problems breathing  Cardiovascular: No chest pain  Gastrointestinal: See HPI, no diarrhea, no constipation  Genitourinary: No dysuria  Musculoskeletal: See HPI  Integumentary: No skin changes  Neurologic: See HPI  Psychiatric: depression, anxiety, denies SI and HI.    Objective:     Vitals:    06/15/23 1411   BP: (!) 146/82   Pulse: 71     Labs:    No visits with results within 1 Month(s) from this visit.   Latest known visit with results is:   Lab Visit on 04/27/2023   Component Date Value Ref Range Status    CD4 % Fajardo T Cell 04/27/2023 13.7 (L)  28 - 57 % Final    Absolute CD4 04/27/2023 464  300 - 1400 cells/ul Final    Comment: This test was developed and its performance characteristics   determined by [facility].    It has not been cleared or approved by the U.S. Food and Drug   Administration. The FDA has determined that such clearance or   approval is not necessary.  This test is used for clinical purposes.    It should not be regarded as investigational or for  research.  This   laboratory is certified under CLIA-88 as qualified to perform high   complexity clinical laboratory testing.      TSH 04/27/2023 1.137  0.400 - 4.000 uIU/mL Final    Vitamin B-12 04/27/2023 218  210 - 950 pg/mL Final    Methlymalonic Acid 04/27/2023 0.36  <0.40 umol/L Final    Comment: If applicable, any drug confirmation testing reported  here was developed and the performance characteristics  determined by [facility]. This   confirmation testing has not been cleared or approved  by the FDA. The laboratory is regulated under CLIA as  qualified to perform high-complexity testing. This test  is used for patient testing purposes. It should not be  regarded as investigational or for research.    Test performed at [facility],  [address], [place]  [address]     [phone]  Jimena Eli MD, PhD - Medical Director      Homocysteine 04/27/2023 7.3  4.0 - 16.5 umol/L Final     I personally reviewed all current labs noted in today's chart.    Imaging:    No new studies    Assessment:       ICD-10-CM ICD-9-CM    1. Dizziness  R42 780.4       2. Shortness of breath  R06.02 786.05       3. 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 Other specified persistent mood disorders  F34.89 296.99 Ambulatory referral/consult to Psychology         78 y.o. male with h/o anxiety, basal cell carcinoma s/p resection, depression, HBV, HCV, HIV, HTN, HLD, RAMONITA not on CPAP, dementia who presents for follow up. On initial exam, he has decreased temperature sensation below the knee bilaterally, imbalance. We discussed previously for dizziness, could be a combination of HIV neuropathy and meclizine as meclizine can worsen non-vertigo dizziness and can be from his older HIV medications, particularly since he felt better when he did not have access to them for a couple of weeks. For fatigue, could be related to metabolic etiologies and recent labwork WNL. We discussed again SOB  could be related or unrelated to the above and will refer to pulmonology. We discussed again re-establishing with HIV specialist to see if a newer medication may improve your current symptoms. We discussed previously lack of temperature sensation in lower legs is indicative of small fiber neuropathy, which is caused at least by HIV and other conditions like B12 deficiency could contribute to it. Overall, his symptoms are persistent.    Plan:     B12, MMA, homocysteine, CD4 WNL  Referral for pulmonology placed previously  Referral for HIV specialist placed previously  Referral for vestibular PT for balance placed previously  You do not feel your feet well due to neuropathy. Please wear footwear with good tread at all times. This recommendation includes wearing socks with tread on them. Make sure all of your walkways, hallways, and sourav are well lit at all times day and night. Use night lights to light up commonly used pathways in your home, meaning from the bedroom to the bathroom or kitchen. Make sure all of your walkways, hallways, and sourav are clear of obstacles at all times. Obstacles include decorations, rugs/mats, pet beds, shoes, and clothes. Make sure you have good hand holds to grab onto as you walk around your home.  Counseled on importance of wearing CPAP as helps prevent stop breathing at night  Referral for psychology consultation. 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 The question being answered by this consultation is to further identify any mental health, sleep hygiene, and/or cognitive barriers impacting the patient's ability to heal from their TBI/neck injury. The information from this consultation will be used by myself and other providers/therapists to help guide an  individual care plan to help treat this patient's symptoms from TBI/neck injury. Any delays or failures to address cognitive, sleep, psychological symptoms after TBI/neck injury can contribute to persistent symptoms, prolong their overall recovery process, and potentially lead to permanent symptoms.    24 minutes were spent on the date of this patient encounter, which includes: preparing to see the patient, reviewing previous history, obtaining new patient history, performing the physical exam, counseling and educating the patient and/or family/caregiver, ordering necessary medications or tests or referrals, documenting in the electronic medical record, coordinating care.    Faustino Herrera MD  Sports Neurology

## 2023-06-21 ENCOUNTER — PROCEDURE VISIT (OUTPATIENT)
Dept: OPHTHALMOLOGY | Facility: CLINIC | Age: 78
End: 2023-06-21
Payer: MEDICARE

## 2023-06-21 ENCOUNTER — PATIENT MESSAGE (OUTPATIENT)
Dept: PHYSICAL MEDICINE AND REHAB | Facility: CLINIC | Age: 78
End: 2023-06-21
Payer: COMMERCIAL

## 2023-06-21 DIAGNOSIS — H35.3231 EXUDATIVE AGE-RELATED MACULAR DEGENERATION OF BOTH EYES WITH ACTIVE CHOROIDAL NEOVASCULARIZATION: Primary | ICD-10-CM

## 2023-06-21 PROCEDURE — 92134 CPTRZ OPH DX IMG PST SGM RTA: CPT | Mod: PBBFAC | Performed by: OPHTHALMOLOGY

## 2023-06-21 PROCEDURE — 92134 POSTERIOR SEGMENT OCT RETINA (OCULAR COHERENCE TOMOGRAPHY)-BOTH EYES: ICD-10-PCS | Mod: 26,S$PBB,, | Performed by: OPHTHALMOLOGY

## 2023-06-21 PROCEDURE — 99499 NO LOS: ICD-10-PCS | Mod: S$PBB,,, | Performed by: OPHTHALMOLOGY

## 2023-06-21 PROCEDURE — 99499 UNLISTED E&M SERVICE: CPT | Mod: S$PBB,,, | Performed by: OPHTHALMOLOGY

## 2023-06-21 PROCEDURE — 67028 INJECTION EYE DRUG: CPT | Mod: 50,S$PBB,, | Performed by: OPHTHALMOLOGY

## 2023-06-21 PROCEDURE — 67028 INJECTION EYE DRUG: CPT | Mod: 50,PBBFAC | Performed by: OPHTHALMOLOGY

## 2023-06-21 PROCEDURE — 67028 PR INJECT INTRAVITREAL PHARMCOLOGIC: ICD-10-PCS | Mod: 50,S$PBB,, | Performed by: OPHTHALMOLOGY

## 2023-06-21 NOTE — PROGRESS NOTES
===============================  Date today is 6/21/2023  Haritha TORRES Tori is a 78 y.o. male  Last visit [facility]: :6/1/2023   Last visit eye dept. 6/1/2023    Corrected distance visual acuity was 20/40 in the right eye and 20/30 in the left eye.  Tonometry       Tonometry (Applanation, 2:17 PM)         Right Left    Pressure 16 16                  Not recorded       Not recorded       Not recorded       Chief Complaint   Patient presents with    Macular Degeneration     Vabysmo ou     HPI     Macular Degeneration     Additional comments: Vabysmo ou           Comments    SRN OS  Avastin OS Emergent 1/21/22, 2/24/22, 3/24/22, 4/28/22, 6/21/22  Emergent Avastin OU 3/16/23  Avastin failure  Emergent Eylea OU 5/2/23            Last edited by Maya Babcock on 6/21/2023  1:54 PM.      Problem List Items Addressed This Visit    None  Visit Diagnoses       Exudative age-related macular degeneration of both eyes with active choroidal neovascularization    -  Primary    Relevant Medications    faricimab-svoa 6 mg/0.05 mL (120 mg/mL) injection 6 mg (Completed) (Start on 6/21/2023  4:30 PM)    faricimab-svoa 6 mg/0.05 mL (120 mg/mL) injection 6 mg (Completed) (Start on 6/21/2023  4:30 PM)    Other Relevant Orders    Posterior Segment OCT Retina-Both eyes (Completed)    Prior authorization Order          Instructed to call 24/7 for any worsening of vision, visual distortion or pain.  Check OU independently daily.    Gave my office and personal cell phone number.  ________________  6/21/2023 today  Haritha TORRES Dease  :  Ou srn worse  Rec vabysmo today    ..    Injection Procedure Note:    6/21/2023  Diagnosis :  ou srn  Today:    OU vabysmo   Follow up: rtc  2 weeks avastin if needed      Instructed to call 24/7 for any worsening of vision. Check Both eyes daily. Gave patient my home phone number.  Risks, benefits, and alternatives to treatment discussed in detail with the patient.  The patient voiced understanding and wished to  proceed with the procedure.     Patient Identified and Time Out complete  Subconjunctival bleb - xylocaine with epi 2%   and Betadine.  Inject Vabysmo, OU 6:00 @ 3.5-4mm posterior to limbus  1 stop: no   Post Operative Dx: Same  Complications: None  Follow up as above.      RTC 2 weeks avastin with JCC and CTL fitting DKT   Instructed to call 24/7 for any worsening of vision or symptoms. Check OU daily.   Gave my office and cell phone number.    =============================

## 2023-06-23 ENCOUNTER — PATIENT OUTREACH (OUTPATIENT)
Dept: ADMINISTRATIVE | Facility: HOSPITAL | Age: 78
End: 2023-06-23
Payer: COMMERCIAL

## 2023-06-23 ENCOUNTER — PATIENT MESSAGE (OUTPATIENT)
Dept: ADMINISTRATIVE | Facility: HOSPITAL | Age: 78
End: 2023-06-23
Payer: COMMERCIAL

## 2023-07-11 ENCOUNTER — OFFICE VISIT (OUTPATIENT)
Dept: OPHTHALMOLOGY | Facility: CLINIC | Age: 78
End: 2023-07-11
Payer: MEDICARE

## 2023-07-11 DIAGNOSIS — H35.3231 EXUDATIVE AGE-RELATED MACULAR DEGENERATION OF BOTH EYES WITH ACTIVE CHOROIDAL NEOVASCULARIZATION: Primary | ICD-10-CM

## 2023-07-11 DIAGNOSIS — H52.4 ASTIGMATISM WITH PRESBYOPIA, BILATERAL: ICD-10-CM

## 2023-07-11 DIAGNOSIS — H52.203 ASTIGMATISM WITH PRESBYOPIA, BILATERAL: ICD-10-CM

## 2023-07-11 DIAGNOSIS — H25.13 NUCLEAR SCLEROSIS, BILATERAL: ICD-10-CM

## 2023-07-11 PROCEDURE — 99212 OFFICE O/P EST SF 10 MIN: CPT | Mod: PBBFAC,PO | Performed by: OPTOMETRIST

## 2023-07-11 PROCEDURE — 99999 PR PBB SHADOW E&M-EST. PATIENT-LVL II: ICD-10-PCS | Mod: PBBFAC,,, | Performed by: OPTOMETRIST

## 2023-07-11 PROCEDURE — 99213 PR OFFICE/OUTPT VISIT, EST, LEVL III, 20-29 MIN: ICD-10-PCS | Mod: S$PBB,,, | Performed by: OPTOMETRIST

## 2023-07-11 PROCEDURE — 99213 OFFICE O/P EST LOW 20 MIN: CPT | Mod: S$PBB,,, | Performed by: OPTOMETRIST

## 2023-07-11 PROCEDURE — 99999 PR PBB SHADOW E&M-EST. PATIENT-LVL II: CPT | Mod: PBBFAC,,, | Performed by: OPTOMETRIST

## 2023-07-11 NOTE — Clinical Note
Ganesh Rhodes,  Mr Tori will require a bitoric RGP from [employer]. Please let me know when you call to collect and it is Ok for me to order. I'd need to call this one in myself because I need to compensate his Rx for monovision. But the type of lens will be a bitoric from [employer], optimum extreme material.   Thanks, DT

## 2023-07-12 NOTE — PROGRESS NOTES
HPI    Patient here today for RGP fit  Referred by [facility]    SRN OS  Avastin OS Emergent 1/21/22, 2/24/22, 3/24/22, 4/28/22, 6/21/22  Emergent Avastin OU 3/16/23  Avastin failure  Emergent Eylea OU 5/2/23  Vabysmo OU 6/21/23        Last edited by Tony Kennedy, OD on 7/11/2023  3:51 PM.            Assessment /Plan     For exam results, see Encounter Report.    Exudative age-related macular degeneration of both eyes with active choroidal neovascularization  Continue per Dr Macario,   Nuclear sclerosis, bilateral  Observe  Astigmatism with presbyopia, bilateral  Monovision RGP CTL fitted today; OD distance OS near. Will order lenses through [facility] with [facility]. Call patient when lenses arrive for dispense. Appointment needed.   Eyeglass Final Rx       Eyeglass Final Rx         Sphere Cylinder Axis Dist VA Add    Right -0.50 +4.25 085 20/25 +2.50    Left -0.75 +4.75 088 20/20 +2.50                        RTC when CTL arrives for dispense. Appointment needed.

## 2023-07-13 ENCOUNTER — PROCEDURE VISIT (OUTPATIENT)
Dept: OPHTHALMOLOGY | Facility: CLINIC | Age: 78
End: 2023-07-13
Payer: MEDICARE

## 2023-07-13 DIAGNOSIS — H35.3231 EXUDATIVE AGE-RELATED MACULAR DEGENERATION OF BOTH EYES WITH ACTIVE CHOROIDAL NEOVASCULARIZATION: Primary | ICD-10-CM

## 2023-07-13 PROCEDURE — 99499 NO LOS: ICD-10-PCS | Mod: S$PBB,,, | Performed by: OPHTHALMOLOGY

## 2023-07-13 PROCEDURE — 99499 UNLISTED E&M SERVICE: CPT | Mod: S$PBB,,, | Performed by: OPHTHALMOLOGY

## 2023-07-13 PROCEDURE — 92134 CPTRZ OPH DX IMG PST SGM RTA: CPT | Mod: PBBFAC | Performed by: OPHTHALMOLOGY

## 2023-07-13 PROCEDURE — 92134 POSTERIOR SEGMENT OCT RETINA (OCULAR COHERENCE TOMOGRAPHY)-BOTH EYES: ICD-10-PCS | Mod: 26,S$PBB,, | Performed by: OPHTHALMOLOGY

## 2023-07-13 NOTE — PROGRESS NOTES
===============================  Date today is 7/13/2023  Haritha Valle is a 78 y.o. male  Last visit [facility]: :6/21/2023   Last visit eye dept. 6/21/2023    Corrected distance visual acuity was 20/40 -2 in the right eye and 20/40 -2 in the left eye.  Tonometry       Tonometry (icare, 9:55 AM)         Right Left    Pressure 17 18                  Not recorded       Not recorded       Not recorded       Chief Complaint   Patient presents with    Procedure     HPI    SRN OS  Avastin OS Emergent 1/21/22, 2/24/22, 3/24/22, 4/28/22, 6/21/22  Emergent Avastin OU 3/16/23  Avastin failure  Emergent Eylea OU 5/2/23  Vabysmo OU 6/21/23    Monovision RGP OD dominant.    Last edited by Jed Mares on 7/13/2023  9:38 AM.      Problem List Items Addressed This Visit    None  Visit Diagnoses       Exudative age-related macular degeneration of both eyes with active choroidal neovascularization    -  Primary    Relevant Orders    Posterior Segment OCT Retina-Both eyes (Completed)    Prior authorization Order          Instructed to call 24/7 for any worsening of vision, visual distortion or pain.  Check OU independently daily.    Gave my office and personal cell phone number.  ________________  7/13/2023 today  Haritha Valle    OU SRN  OCT today massively better OU after Vabysmo   No injection today    RTC 2 weeks for Vabysmo OU  Instructed to call 24/7 for any worsening of vision or symptoms. Check OU daily.   Gave my office and cell phone number.    =============================

## 2023-07-17 DIAGNOSIS — R06.02 SOB (SHORTNESS OF BREATH): Primary | ICD-10-CM

## 2023-07-20 ENCOUNTER — CLINICAL SUPPORT (OUTPATIENT)
Dept: PULMONOLOGY | Facility: CLINIC | Age: 78
End: 2023-07-20
Payer: MEDICARE

## 2023-07-20 ENCOUNTER — OFFICE VISIT (OUTPATIENT)
Dept: PULMONOLOGY | Facility: CLINIC | Age: 78
End: 2023-07-20
Payer: MEDICARE

## 2023-07-20 ENCOUNTER — HOSPITAL ENCOUNTER (OUTPATIENT)
Dept: RADIOLOGY | Facility: HOSPITAL | Age: 78
Discharge: HOME | End: 2023-07-20
Attending: INTERNAL MEDICINE
Payer: MEDICARE

## 2023-07-20 VITALS
HEART RATE: 74 BPM | SYSTOLIC BLOOD PRESSURE: 118 MMHG | HEIGHT: 70 IN | BODY MASS INDEX: 30.39 KG/M2 | OXYGEN SATURATION: 92 % | WEIGHT: 212.31 LBS | RESPIRATION RATE: 17 BRPM | DIASTOLIC BLOOD PRESSURE: 68 MMHG

## 2023-07-20 DIAGNOSIS — G47.33 OSA (OBSTRUCTIVE SLEEP APNEA): ICD-10-CM

## 2023-07-20 DIAGNOSIS — R09.02 EXERCISE HYPOXEMIA: ICD-10-CM

## 2023-07-20 DIAGNOSIS — G47.34 NOCTURNAL HYPOXEMIA: ICD-10-CM

## 2023-07-20 DIAGNOSIS — R06.09 DOE (DYSPNEA ON EXERTION): ICD-10-CM

## 2023-07-20 DIAGNOSIS — J98.4 CHRONIC RESTRICTIVE LUNG DISEASE: ICD-10-CM

## 2023-07-20 DIAGNOSIS — I51.89 DIASTOLIC DYSFUNCTION: ICD-10-CM

## 2023-07-20 DIAGNOSIS — R06.02 SOB (SHORTNESS OF BREATH): ICD-10-CM

## 2023-07-20 DIAGNOSIS — G47.34 NOCTURNAL HYPOXEMIA: Primary | ICD-10-CM

## 2023-07-20 PROCEDURE — 94762 N-INVAS EAR/PLS OXIMTRY CONT: CPT | Mod: PBBFAC

## 2023-07-20 PROCEDURE — 99999 PR PBB SHADOW E&M-EST. PATIENT-LVL I: CPT | Mod: PBBFAC,,,

## 2023-07-20 PROCEDURE — 99215 OFFICE O/P EST HI 40 MIN: CPT | Mod: 25,S$PBB,, | Performed by: INTERNAL MEDICINE

## 2023-07-20 PROCEDURE — 99999 PR PBB SHADOW E&M-EST. PATIENT-LVL I: ICD-10-PCS | Mod: PBBFAC,,,

## 2023-07-20 PROCEDURE — 71046 X-RAY EXAM CHEST 2 VIEWS: CPT | Mod: 26,,, | Performed by: RADIOLOGY

## 2023-07-20 PROCEDURE — 99211 OFF/OP EST MAY X REQ PHY/QHP: CPT | Mod: PBBFAC,25

## 2023-07-20 PROCEDURE — 99999 PR PBB SHADOW E&M-EST. PATIENT-LVL IV: CPT | Mod: PBBFAC,,, | Performed by: INTERNAL MEDICINE

## 2023-07-20 PROCEDURE — 99215 PR OFFICE/OUTPT VISIT, EST, LEVL V, 40-54 MIN: ICD-10-PCS | Mod: 25,S$PBB,, | Performed by: INTERNAL MEDICINE

## 2023-07-20 PROCEDURE — 99214 OFFICE O/P EST MOD 30 MIN: CPT | Mod: PBBFAC,25,27 | Performed by: INTERNAL MEDICINE

## 2023-07-20 PROCEDURE — 71046 X-RAY EXAM CHEST 2 VIEWS: CPT | Mod: TC

## 2023-07-20 PROCEDURE — 99999 PR PBB SHADOW E&M-EST. PATIENT-LVL IV: ICD-10-PCS | Mod: PBBFAC,,, | Performed by: INTERNAL MEDICINE

## 2023-07-20 PROCEDURE — 71046 XR CHEST PA AND LATERAL: ICD-10-PCS | Mod: 26,,, | Performed by: RADIOLOGY

## 2023-07-20 RX ORDER — ALBUTEROL SULFATE 90 UG/1
2 AEROSOL, METERED RESPIRATORY (INHALATION) EVERY 4 HOURS PRN
Qty: 18 G | Refills: 11 | Status: SHIPPED | OUTPATIENT
Start: 2023-07-20 | End: 2023-08-21 | Stop reason: SDUPTHER

## 2023-07-20 NOTE — LETTER
July 20, 2023      Hesham Andrade II, MD  [address]  [facility] [id number]           [facility]  [address]  [place] [address]  Phone: [phone]  Fax: [phone]          Patient: Haritha Valle   MR Number: [id number]   YOB: 1945   Date of Visit: 7/20/2023           Thank you for referring Haritha Valle to me for evaluation. Attached you will find relevant portions of my assessment and plan of care.    If you have questions, please do not hesitate to call me. I look forward to following Haritha Valle along with you.    Sincerely,    Maksim Chavarria MD    Enclosure  CC:  Saeed Yun MD    If you would like to receive this communication electronically, please contact [email or web address] or [phone] to request [employer] Link access.    [employer] Link is a tool which provides read-only access to select patient information with whom you have a relationship. It's easy to use and provides real time access to review your patients record including encounter summaries, notes, results, and demographic information.    If you feel you have received this communication in error or would no longer like to receive these types of communications, please e-mail [email or web address]

## 2023-07-20 NOTE — PROGRESS NOTES
Subjective:     Patient ID: Haritha Valle is a 78 y.o. male.    Chief Complaint:  dizziness and vertigo    HPI 79 y/o with CC of vertigo and fatigue.   Hx of Migraines - Resolved serveral years ago      Dyspnea  Patient complains of shortness of breath. Symptoms occur at rest, while getting dressed, with one block walking. Symptoms began 1 year ago, gradually worsening since. Associated symptoms include  difficulty breathing, dyspnea on exertion, and shortness of breath. He denies chest pain, located left chest. He does not have had recent travel. Weight has been stable. Symptoms are exacerbated by minimal activity. Symptoms are alleviated by rest.     History of Obstructive Sleep Apnea - failed use of Continuous Positive Airway Pressure   Will evaluate for oxygen at night      Past Medical History:   Diagnosis Date    Anxiety 7/3/2019    Basal cell carcinoma     Depression     Hepatitis B     Hepatitis C antibody test positive     RNA NEG 8/29/2019    HIV infection     Hypertension     Immune disorder     Mild cognitive impairment 10/1/2010     Past Surgical History:   Procedure Laterality Date    APPENDECTOMY      CARDIAC CATHETERIZATION      no stent    CHOLECYSTECTOMY      COLONOSCOPY N/A 11/3/2016    Procedure: COLONOSCOPY;  Surgeon: Maxi Villasenor MD;  Location: [address];  Service: Endoscopy;  Laterality: N/A;  last colonoscopy with Dr Jarrell    COLONOSCOPY N/A 1/25/2022    Procedure: COLONOSCOPY;  Surgeon: Silvino Rosales MD;  Location: [facility];  Service: Endoscopy;  Laterality: N/A;    LEFT HEART CATHETERIZATION Left 2/3/2020    Procedure: CATHETERIZATION, HEART, LEFT;  Surgeon: Chele Ko MD;  Location: [facility] CATH LAB;  Service: Cardiology;  Laterality: Left;  malur pt     Review of patient's allergies indicates:   Allergen Reactions    No known drug allergies      Current Outpatient Medications on File Prior to Visit   Medication Sig Dispense Refill    ALPRAZolam (XANAX) 0.5 MG  tablet Take by mouth.      atazanavir (REYATAZ) 200 MG Cap Take 2 capsules (400 mg total) by mouth once daily. 180 capsule 3    FLUoxetine 40 MG capsule Take 1 capsule (40 mg total) by mouth once daily. 90 capsule 3    lamivudine-zidovudine 150-300mg (COMBIVIR) 150-300 mg Tab Take 1 tablet by mouth every 12 (twelve) hours. 180 tablet 3    metoprolol succinate (TOPROL-XL) 50 MG 24 hr tablet TAKE 1 TABLET EVERY DAY 90 tablet 2    rosuvastatin (CRESTOR) 20 MG tablet TAKE 1 TABLET EVERY DAY 90 tablet 3    valsartan (DIOVAN) 40 MG tablet Take 40 mg by mouth once daily.      VASCEPA 1 gram Cap Take by mouth.       No current facility-administered medications on file prior to visit.     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  [place] [employer]   Tobacco Use    Smoking status: Never    Smokeless tobacco: Never   Substance and Sexual Activity    Alcohol use: No    Drug use: Not Currently    Sexual activity: Yes     Partners: Male     Social Determinants of Health     Financial Resource Strain: Low Risk     Difficulty of Paying Living Expenses: Not hard at all   Food Insecurity: No Food Insecurity    Worried About Running Out of Food in the Last Year: Never true    Ran Out of Food in the Last Year: Never true   Transportation Needs: No Transportation Needs    Lack of Transportation (Medical): No    Lack of Transportation (Non-Medical): No   Physical Activity: Inactive    Days of Exercise per Week: 0 days    Minutes of Exercise per Session: 0 min   Stress: No Stress Concern Present    Feeling of Stress : Only a little   Social Connections: Unknown    Frequency of Communication with Friends and Family: Patient refused    Frequency of Social Gatherings with Friends and Family: Once a week    Active Member  "of Clubs or Organizations: No    Attends Club or Organization Meetings: Never    Marital Status: Living with partner   Housing Stability: Low Risk     Unable to Pay for Housing in the Last Year: No    Number of Places Lived in the Last Year: 1    Unstable Housing in the Last Year: No     Family History   Problem Relation Age of Onset    Heart disease Father     Heart failure Father     Diabetes Neg Hx     Hypertension Neg Hx        Review of Systems   Constitutional:  Positive for fatigue.   HENT:  Positive for postnasal drip.    Respiratory:  Positive for apnea and cough.    Cardiovascular: Negative.    Genitourinary: Negative.    Endocrine: endocrine negative    Musculoskeletal: Negative.    Skin: Negative.    Gastrointestinal: Negative.    Neurological: Negative.    Psychiatric/Behavioral:  Positive for confusion and sleep disturbance.      Objective:      /68   Pulse 74   Resp 17   Ht 5' 10" (1.778 m)   Wt 96.3 kg (212 lb 4.9 oz)   SpO2 (!) 92%   BMI 30.46 kg/m²   Physical Exam  Vitals and nursing note reviewed.   Constitutional:       Appearance: He is well-developed. He is obese. He is ill-appearing.   HENT:      Head: Normocephalic and atraumatic.      Nose: Nose normal.   Eyes:      Conjunctiva/sclera: Conjunctivae normal.      Pupils: Pupils are equal, round, and reactive to light.   Neck:      Thyroid: No thyromegaly.      Vascular: No JVD.      Trachea: No tracheal deviation.   Cardiovascular:      Rate and Rhythm: Normal rate and regular rhythm.      Heart sounds: No murmur heard.  Pulmonary:      Breath sounds: Normal breath sounds. No rhonchi or rales.   Abdominal:      General: Bowel sounds are normal.      Palpations: Abdomen is soft.   Musculoskeletal:         General: No tenderness. Normal range of motion.      Cervical back: Neck supple.   Lymphadenopathy:      Cervical: No cervical adenopathy.   Skin:     General: Skin is warm and dry.   Neurological:      Mental Status: He is alert " and oriented to person, place, and time.      Sensory: Sensory deficit present.      Motor: Weakness present.      Gait: Gait abnormal.      Deep Tendon Reflexes: Reflexes abnormal.     Personal Diagnostic Review  Chest x-ray: WNL     Pulmonary Studies Review 7/20/2023   SpO2 92   Ordering Provider -   Performing nurse/tech/RT -   Diagnosis -   Height 70   Weight 3396.85   BMI (Calculated) 30.5   Predicted Distance 281.52   Patient Race -   6MWT Status -   Patient Reported -   Was O2 used? -   6MW Distance walked (feet) -   Distance walked (meters) -   Did patient stop? -   How many times? -   Stop Time 1 -   Restart Time 1 -   Did patient restart? -   Type of assistive device(s) used? -   Is extra documentation required for this patient? -   Oxygen Saturation -   Supplemental Oxygen -   Heart Rate -   Blood Pressure -   Johnnie Dyspnea Rating  -   Oxygen Saturation -   Supplemental Oxygen -   Heart Rate -   Blood Pressure -   Johnnie Dyspnea Rating  -   Recovery Time (seconds) -   Oxygen Saturation -   Supplemental Oxygen -   Heart Rate -   Is procedure ready for interpretation? -   Predicted Distance Meters (Calculated) 480.92   Oxygen Qualification? -   Some encounter information is confidential and restricted. Go to Review Flowsheets activity to see all data.       Posterior Segment OCT Retina-Both eyes  Right Eye  Quality was good. Scan locations included subfoveal, juxtafoveal,   extrafoveal, nasal, temporal, superior, inferior. Progression has   improved. Findings include normal foveal contour.     Left Eye  Quality was good. Scan locations included subfoveal, juxtafoveal,   extrafoveal, nasal, temporal, superior, inferior. Progression has   improved. Findings include normal foveal contour.     Notes  See progress note.      Office Spirometry Results:     No flowsheet data found.  Pulmonary Studies Review 7/20/2023   SpO2 92   Ordering Provider -   Performing nurse/tech/RT -   Diagnosis -   Height 70   Weight  3396.85   BMI (Calculated) 30.5   Predicted Distance 281.52   Patient Race -   6MWT Status -   Patient Reported -   Was O2 used? -   6MW Distance walked (feet) -   Distance walked (meters) -   Did patient stop? -   How many times? -   Stop Time 1 -   Restart Time 1 -   Did patient restart? -   Type of assistive device(s) used? -   Is extra documentation required for this patient? -   Oxygen Saturation -   Supplemental Oxygen -   Heart Rate -   Blood Pressure -   Johnnie Dyspnea Rating  -   Oxygen Saturation -   Supplemental Oxygen -   Heart Rate -   Blood Pressure -   Johnnie Dyspnea Rating  -   Recovery Time (seconds) -   Oxygen Saturation -   Supplemental Oxygen -   Heart Rate -   Is procedure ready for interpretation? -   Predicted Distance Meters (Calculated) 480.92   Oxygen Qualification? -   Some encounter information is confidential and restricted. Go to Review Flowsheets activity to see all data.     Transthoracic Echocardiogram Complete, (w Contrast, Strain and 3D if needed)    Narrative  Performed by  BIANCA  This result has an attachment that is not available.                                                                Echocardiography Report              [address] [place] [address]                                        Pat.Name:  JAMI LÓPEZ MELISSA          Pat.ID:    [id number]                 .Date:   2021               Refer.MD:  LISA AGUILAR MD     Exam Time: 9:57:00 AM              Study Type:Routine Echo               Height:    70in                    Weight:    218lb                     BSA:       2.17 m2                   Age:  1945,75Y             Sex:       MALE                    BP:        142/73                     HR:        75 bpm                     Sonogrphr: Raquel Bush RDCS, HERMILAT   Pat. Stat.:Outpatient              Room:      LOVE                       Study Status:Final                   Echo Event ID:[id number]               Order ID:   [id number]                 Reason for Study:dyspnea             Procedures: 2D Echo, Colorflow Doppler, Strain   Race:      C                         ------------------------------------   SUMMARY:   ------------------------------------   LV EF is normal.   RV systolic function is lower limits of normal. RV apex is mildly   hypokinetic.   Diastolic dysfunction Grade I (Mild): Impaired relaxation with normal   LV filling pressures.   Insufficient TR jet to estimate PA systolic pressure.   ------------------------------------   FINDINGS:   ------------------------------------   LV:       LV size is normal. Reduced average LV global longitudinal             strain at -15.5%. LV EF is normal. Difficult to assess             regional wall motion; however it appears grossly normal.             Estimated EF is 55-59%.   RV:       RV size is normal. RV systolic function is lower limits of             normal. RV apex is mildly hypokinetic.   LA:       LA volume is upper limits of normal.   RA:       RA size is normal.   AO:       Aortic root diameter is normal.   TALIA:     There is an anterior space consistent with a prominent             epicardial fat pad.   AV:       Mild thickening and calcification of AV leaflets.   MV:       No structural MV abnormalities noted.   PV:       No structural PV abnormalities noted.   TV:       No structural TV abnormalities noted.   Lu:     Diastolic dysfunction Grade I (Mild): Impaired relaxation             with normal LV filling pressures.   Other:    Insufficient TR jet to estimate PA systolic pressure.   ------------------------------------   MEASUREMENTS:   ------------------------------------                                     2D   Parasternal Long Axis    Ao An            2.2 cm            LVPWd              1 cm      Ao Rtd           3.5 cm            Index        1.6 cm/m2                LA Ds            4.3 cm      IVSd               1 cm            RWT             0.39         LVIDd            5.1 cm            Index        2.4 cm/m2                LV Mass          188 g    (122-174)    LVIDs              3 cm            LVM Index         87 g/m²    LV%fs             41 %             LVOT             2.2 cm     LA Sng Plane    LA Area           23 cm²  (8.8-23.4) LA Vol            72 ml      Index        33 ml/m2    LA LngAx         6.3 cm             RA Sng Plane    RA Vol            64 ml            Index        30 ml/m2                RA LngAx         5.4 cm      RA Area           21 cm²  (8.3-19.5)   LVOT      LVOT Area        3.9 cm²                                             DOPPLER   LVOT Stroke Vol & Cardiac Out    LVOT TVI          20 cm            HR                67 bpm   LVOT      LVOT SV           77 ml            LVOT CO          5.2 l/min    SVi               36 ml/m²         LVOT CI          2.4 l/m/m²       Signed 2021 03:39 PM   Lima Freeman MD  Procedure Note    Lima Freeman MD - 2021   Formatting of this note might be different from the original.                             Echocardiography Report              [address] [place] [address]     Pat.Name:  JAMI LÓPEZ          Pat.ID:    [id number]                 .Date:   2021               Refer.MD:  LISA AGUILAR MD     Exam Time: 9:57:00 AM              Study Type:Routine Echo               Height:    70in                    Weight:    218lb                     BSA:       2.17 m2                   Age:  1945,75Y             Sex:       MALE                    BP:        142/73                     HR:        75 bpm                     Sonogrphr: Raquel Bush RDCS, HEIDE   Pat. Stat.:Outpatient              Room:      LOVE                       Study Status:Final                   Echo Event ID:[id number]               Order ID:  [id number]                 Reason for Study:dyspnea             Procedures: 2D Echo, Colorflow Doppler, Strain    Race:      C                         ------------------------------------   SUMMARY:   ------------------------------------   LV EF is normal.   RV systolic function is lower limits of normal. RV apex is mildly   hypokinetic.   Diastolic dysfunction Grade I (Mild): Impaired relaxation with normal   LV filling pressures.   Insufficient TR jet to estimate PA systolic pressure.   ------------------------------------   FINDINGS:   ------------------------------------   LV:       LV size is normal. Reduced average LV global longitudinal             strain at -15.5%. LV EF is normal. Difficult to assess             regional wall motion; however it appears grossly normal.             Estimated EF is 55-59%.   RV:       RV size is normal. RV systolic function is lower limits of             normal. RV apex is mildly hypokinetic.   LA:       LA volume is upper limits of normal.   RA:       RA size is normal.   AO:       Aortic root diameter is normal.   TALIA:     There is an anterior space consistent with a prominent             epicardial fat pad.   AV:       Mild thickening and calcification of AV leaflets.   MV:       No structural MV abnormalities noted.   PV:       No structural PV abnormalities noted.   TV:       No structural TV abnormalities noted.   Lu:     Diastolic dysfunction Grade I (Mild): Impaired relaxation             with normal LV filling pressures.   Other:    Insufficient TR jet to estimate PA systolic pressure.   ------------------------------------   MEASUREMENTS:   ------------------------------------                                     2D   Parasternal Long Axis    Ao An            2.2 cm            LVPWd              1 cm      Ao Rtd           3.5 cm            Index        1.6 cm/m2                LA Ds            4.3 cm      IVSd               1 cm            RWT             0.39        LVIDd            5.1 cm            Index        2.4 cm/m2                LV Mass          188 g    (122-174)     LVIDs              3 cm            LVM Index         87 g/m²    LV%fs             41 %             LVOT             2.2 cm     LA Sng Plane    LA Area           23 cm²  (8.8-23.4) LA Vol            72 ml      Index        33 ml/m2    LA LngAx         6.3 cm             RA Sng Plane    RA Vol            64 ml            Index        30 ml/m2                RA LngAx         5.4 cm      RA Area           21 cm²  (8.3-19.5)   LVOT      LVOT Area        3.9 cm²                                             DOPPLER   LVOT Stroke Vol & Cardiac Out    LVOT TVI          20 cm            HR                67 bpm   LVOT      LVOT SV           77 ml            LVOT CO          5.2 l/min    SVi               36 ml/m²         LVOT CI          2.4 l/m/m²       Signed 2021 03:39 PM   Lima Freeman MD  Images    Image Retrieve    The full-size image has not yet been retrieved from an outside organization. To retrieve, click the link below.  Scan on 2021 11:15 AM: Transthoracic Echocardiogram Complete, wo Contrast, w Doppler        Specimen Collected: 21 09:57 Last Resulted: 21 15:39   Received From: Kevin Aragon  Result Received: 23 13:30    View Encounter       Procedure Notes    Author Status Last  Updated Created   Golden Allison, PhD Signed Golden Allison, PhD 2020  5:22 PM 2020  5:19 PM          Assoc. Orders Procedures   POLYSOMNOGRAM POLYSOMNOGRAM       Pre-Op Dx Post-Op Dx   Obstructive sleep apnea syndrome None               Patient Name: Haritha Valle                            Date of Report: 20           Date of PS/10/2020   [facility] No.: [id number]                            : 1945                      Time of PSG:  10:25:49 PM - 5:02:54 AM  Sex:  Male   Age:  75   Weight:  212.0 lbs      Height:  5  11                         Type of PSG:  Diagnostic      REASONS FOR REFERRAL: Mr. Valle is a 75 year old male, referred to Dr. Rico Pineda  and the SD by Dr. Deni Nguyen for evaluation of possible obstructive sleep apnea / hypopnea syndrome (OSAHS).  Dr. Pineda requested diagnostic polysomnography based on the patients reported loud snoring, observed respiratory pauses in sleep, unrefreshing sleep and daytime somnolence (all positive on STOP-bang).  His [place] Sleepiness Scale score was 13, clinically significant, and his STOP - BANG score was 7, high risk of RAMONITA.  Dr. Hesham Andrade is the patients primary care physician.      STUDY PARAMETERS: This diagnostic study involved analysis of the patient's sleep pattern while breathing unassisted. The study was performed with a sleep technologist in attendance for the entire test period, with video monitoring throughout the study, and routine laboratory clinical parameters recorded:  NOTE: The polysomnography electrophysiological record for the patient has been reviewed in its entirety by Dr. Allison.     SUMMARY STATEMENTS  DIAGNOSTIC IMPRESSIONS  G47.33  /  327.23                    Moderate to Severe Obstructive Sleep Apnea, Adult (OSAHS)  G47.61  /  327.51                    Severe Periodic Limb Movement (PLM) Disorder   F51.12   /  307.44                    Mild Insufficient Sleep Syndrome  Z72.821 /  V69.4                     Inadequate Sleep Hygiene      PRIMARY TREATMENT RECOMMENDATIONS  Treat, or refer to Sleep Disorders Center.  The diagnostic polysomnography revealed a moderate to severe obstructive sleep apnea / hypopnea syndrome (A + H Index = 26.8 events / hr asleep with 7.0 respiratory event - related arousals / hr asleep for the study, and no RERAs (respiratory effort -  related arousals).  The mean SpO2 value was 90.0 %, significant, minimum oxygen saturation during sleep was   79.0 %, and waking baseline SpO2 was 97 %.   Sporadic / infrequent , moderately loud snoring was noted.  A  CPAP titration polysomnography is recommended.      Weight loss to the normal range is recommended as it  can decrease respiratory events and snoring in overweight patients.  The following changes in sleep hygiene / sleep - related behavior are recommended after medical treatments are successful  Regular bedtimes and wake times, including weekends: Total sleep time / night should not be more than one hour more             than usual, and bedtime or wake time should not be more than one hour earlier or later than usual.    Do not attempt to make up lost sleep by extending sleep periods.    Avoid naps; none longer than 20 min or later than mid - afternoon.     SECONDARY TREATMENT RECOMMENDATIONS  Treat, or refer to SDC if problems are not satisfactorily resolved by the above.  A severe  PLM disorder was observed (PLMS Index = 67.6 / hr asleep, but treatment might not be optimal because the PLMS were not very disruptive of sleep (6.6 arousals / hr asleep), and there were no signs of restless legs syndrome in the SDI,   H & P or PSG;  consider treatment of PLM disorder if PLMS symptoms are sufficiently bothersome to the patient.  Note that the benzodiazepine medications sometimes used to treat PLM disorder (e.g., alprazolam / Xanax) may exacerbate some sleep - related respiratory disorders, and that dopaminergic medications such as Mirapex and Requip can be used in such instances.    Mr. Valle is taking fluoxetine / Prozac.  Most tricyclic, and many SSRI antidepressants (e.g., fluoxetine - Prozac, sertraline   Zoloft, venlafaxine - Effexor), are associated with increased PLMS in some studies and increased RLS in others.  Consider   replacing Prozac with a medication known not to exacerbate PLMS or RLS.  Consider behavioral and cognitive / behavioral treatments for anxiety and depression to complement the medication and to increase the probability of long - term adaptive change.  Sleep (quality) might be expected to further improve.  IMPORTANT  NOTE:   RAMONITA, PLMS, and stress - related arousals (anxiety and depression)   interact to significantly impair sleep quality and restoratives, making treatment of each and all of these disorders very important  for restorative sleep.     See below for a complete interpretation of data from the polysomnography and Sleep Disorders Inventory.     Thank you for referring this patient to the [facility].      Golden Allison, Ph.D., ABPP; Diplomate, American Board of Sleep Medicine            Assessment:            Nocturnal hypoxemia  -     PULSE OXIMETRY OVERNIGHT; Future    RAMONITA (obstructive sleep apnea)  -     PULSE OXIMETRY OVERNIGHT; Future    MAIER (dyspnea on exertion)  -     Six Minute Walk Test to qualify for Home Oxygen; Future  -     Echo Saline Bubble? No; Future; Expected date: 07/20/2023  -     CBC Auto Differential; Future; Expected date: 07/20/2023  -     Comprehensive Metabolic Panel; Future; Expected date: 07/20/2023  -     B-TYPE NATRIURETIC PEPTIDE; Future; Expected date: 07/20/2023  -     albuterol (PROVENTIL/VENTOLIN HFA) 90 mcg/actuation inhaler; Inhale 2 puffs into the lungs every 4 (four) hours as needed for Wheezing or Shortness of Breath.  Dispense: 18 g; Refill: 11    Chronic restrictive lung disease  -     Complete PFT with bronchodilator; Future; Expected date: 07/20/2023  -     Six Minute Walk Test to qualify for Home Oxygen; Future    Exercise hypoxemia  -     Six Minute Walk Test to qualify for Home Oxygen; Future    Diastolic dysfunction  -     Echo Saline Bubble? No; Future; Expected date: 07/20/2023          Outpatient Encounter Medications as of 7/20/2023   Medication Sig Dispense Refill    albuterol (PROVENTIL/VENTOLIN HFA) 90 mcg/actuation inhaler Inhale 2 puffs into the lungs every 4 (four) hours as needed for Wheezing or Shortness of Breath. 18 g 11    ALPRAZolam (XANAX) 0.5 MG tablet Take by mouth.      atazanavir (REYATAZ) 200 MG Cap Take 2 capsules (400 mg total) by mouth once daily. 180 capsule 3    FLUoxetine 40 MG capsule Take 1  capsule (40 mg total) by mouth once daily. 90 capsule 3    lamivudine-zidovudine 150-300mg (COMBIVIR) 150-300 mg Tab Take 1 tablet by mouth every 12 (twelve) hours. 180 tablet 3    metoprolol succinate (TOPROL-XL) 50 MG 24 hr tablet TAKE 1 TABLET EVERY DAY 90 tablet 2    rosuvastatin (CRESTOR) 20 MG tablet TAKE 1 TABLET EVERY DAY 90 tablet 3    valsartan (DIOVAN) 40 MG tablet Take 40 mg by mouth once daily.      VASCEPA 1 gram Cap Take by mouth.       No facility-administered encounter medications on file as of 7/20/2023.     Plan:       Requested Prescriptions     Signed Prescriptions Disp Refills    albuterol (PROVENTIL/VENTOLIN HFA) 90 mcg/actuation inhaler 18 g 11     Sig: Inhale 2 puffs into the lungs every 4 (four) hours as needed for Wheezing or Shortness of Breath.     Problem List Items Addressed This Visit    None  Visit Diagnoses       Nocturnal hypoxemia    -  Primary    Relevant Orders    PULSE OXIMETRY OVERNIGHT    RAMONITA (obstructive sleep apnea)        Relevant Orders    PULSE OXIMETRY OVERNIGHT    MAIER (dyspnea on exertion)        Relevant Medications    albuterol (PROVENTIL/VENTOLIN HFA) 90 mcg/actuation inhaler    Other Relevant Orders    Six Minute Walk Test to qualify for Home Oxygen    Echo Saline Bubble? No    CBC Auto Differential    Comprehensive Metabolic Panel    B-TYPE NATRIURETIC PEPTIDE    Chronic restrictive lung disease        Relevant Orders    Complete PFT with bronchodilator    Six Minute Walk Test to qualify for Home Oxygen    Exercise hypoxemia        Relevant Orders    Six Minute Walk Test to qualify for Home Oxygen    Diastolic dysfunction        Relevant Orders    Echo Saline Bubble? No               Follow up in about 4 weeks (around 8/17/2023) for Review progress.    MEDICAL DECISION MAKING: Moderate to high complexity.  Overall, the multiple problems listed are of moderate to high severity that may impact quality of life and activities of daily living. Side effects of  medications, treatment plan as well as options and alternatives reviewed and discussed with patient. There was counseling of patient concerning these issues.    Total time spent in counseling and coordination of care - 60  minutes of total time spent on the encounter, which includes face to face time and non-face to face time preparing to see the patient (eg, review of tests), Obtaining and/or reviewing separately obtained history, Documenting clinical information in the electronic or other health record, Independently interpreting results (not separately reported) and communicating results to the patient/family/caregiver, or Care coordination (not separately reported).    Time was used in discussion of prognosis, risks, benefits of treatment, instructions and compliance with regimen . Discussion with other physicians and/or health care providers - home health or for use of durable medical equipment (oxygen, nebulizers, CPAP, BiPAP) occurred.  60

## 2023-07-24 ENCOUNTER — CLINICAL SUPPORT (OUTPATIENT)
Dept: AUDIOLOGY | Facility: CLINIC | Age: 78
End: 2023-07-24
Payer: MEDICARE

## 2023-07-24 ENCOUNTER — OFFICE VISIT (OUTPATIENT)
Dept: OTOLARYNGOLOGY | Facility: CLINIC | Age: 78
End: 2023-07-24
Payer: MEDICARE

## 2023-07-24 VITALS — WEIGHT: 212.31 LBS | HEIGHT: 70 IN | BODY MASS INDEX: 30.39 KG/M2

## 2023-07-24 DIAGNOSIS — B20 NEUROPATHY DUE TO HIV: ICD-10-CM

## 2023-07-24 DIAGNOSIS — G63 NEUROPATHY DUE TO HIV: ICD-10-CM

## 2023-07-24 DIAGNOSIS — R26.89 IMBALANCE: Primary | ICD-10-CM

## 2023-07-24 DIAGNOSIS — R42 DIZZINESS: Primary | ICD-10-CM

## 2023-07-24 DIAGNOSIS — G31.84 MILD COGNITIVE IMPAIRMENT: Chronic | ICD-10-CM

## 2023-07-24 PROCEDURE — 99204 OFFICE O/P NEW MOD 45 MIN: CPT | Mod: S$PBB,,, | Performed by: OTOLARYNGOLOGY

## 2023-07-24 PROCEDURE — 99204 PR OFFICE/OUTPT VISIT, NEW, LEVL IV, 45-59 MIN: ICD-10-PCS | Mod: S$PBB,,, | Performed by: OTOLARYNGOLOGY

## 2023-07-24 PROCEDURE — 99999 PR PBB SHADOW E&M-EST. PATIENT-LVL III: CPT | Mod: PBBFAC,,, | Performed by: OTOLARYNGOLOGY

## 2023-07-24 PROCEDURE — 99999 PR PBB SHADOW E&M-EST. PATIENT-LVL III: ICD-10-PCS | Mod: PBBFAC,,, | Performed by: OTOLARYNGOLOGY

## 2023-07-24 PROCEDURE — 99213 OFFICE O/P EST LOW 20 MIN: CPT | Mod: PBBFAC | Performed by: OTOLARYNGOLOGY

## 2023-07-24 NOTE — PROCEDURES
[facility]  [address] [address] [facility]. * [place] [address]  Telephone: [phone]  Test date: 23 Start: 23 00:48:30 Haritha Valle  Doctor: Maksim Chavarria MD End: 23 06:07:30 [id number]  Oximetry: Summary Report  Comments: On Room air  Recording time: 05:19:00 Highest pulse: 92 Highest SpO2: 97%  Excluded samplin:02:12 Lowest pulse: 62 Lowest SpO2: 79%  Total valid samplin:16:48 Mean pulse: 75 Mean SpO2: 92.7%  1 S.D.: 3.7 1 S.D.: 2.2  Time with SpO2<90: 0:24:56, 7.9%  Time with SpO2<80: 0:00:08, 0.0%  Time with SpO2<70: 0:00:00, 0.0%  Time with SpO2<60: 0:00:00, 0.0%  Time with SpO2<89: 0:19:56, 6.3%  Time with SpO2 =>90: 4:51:52, 92.1%  Time with SpO2=>80 & <90: 0:24:48, 7.8%  Time with SpO2=>70 & <80: 0:00:08, 0.0%  Time with SpO2=>60 & <70: 0:00:00, 0.0%  The longest continuous time with saturation <=88 was 00:00:32, which started at  23 03:24:50.  A desaturation event was defined as a decrease of saturation by 4 or more.  No events were excluded due to artifact.  There were 6 desaturation events over 3 minutes duration.  There were 105 desaturation events of less than 3 minutes duration during which:  The mean high was 93.6%. The mean low was 86.9%.  The number of these events that were:  > 0 & <10 seconds: 3 > 0 seconds: 105  =>10 & <20 seconds: 36 =>10 seconds: 102  =>20 & <30 seconds: 48 =>20 seconds: 66  =>30 & <40 seconds: 3 =>30 seconds: 18  =>40 & <50 seconds: 2 =>40 seconds: 15  =>50 & <60 seconds: 1 =>50 seconds: 13  =>60 seconds: 12 =>60 seconds: 12  The mean length of desaturation events that were >=10 sec & <=3 mins was: 28.0 sec.  Desaturation event index (events >=10 sec per sampled hour): 19.3  Desaturation event index (events >= 0 sec per sampled hour): 19.9    Overnight Oxygen Saturation Study:    INTERPRETATION:  Conditions of Test: Noted above in report    Interpretation of results of overnight oxygen saturation study.  This was a technically   adequate study.  Overnight oxygen saturation study is abnormal with O2 saturation less than 89%.   Time with SpO2<89: 0:19:56, 6.3% of the study period. Lowest oxygen saturation recorded was 79% .    Based on the above information patient meets criteria for oxygen prescription.  Clinical correlation suggested.  Maksim Chavarria MD    Medicare Criteria Comments:   Overnight Oximetry test results suggest the patient does fall under Medicare Group 1 Criteria and would be eligible for oxygen prescription.   (Arterial oxygen saturation at or below 88% for at least 5 minutes taken during sleep on stable outpatient)    Details about Medicare Group Criteria coverage can be found at http://www.cms.hhs.gov/manuals/downloads/

## 2023-07-24 NOTE — PROGRESS NOTES
"Referring Provider:    No referring provider defined for this encounter.  Subjective:   Patient: Haritha Valle [id number], :1945   Visit date:2023 1:46 PM    Chief Complaint:  Dizziness (Off and on x 1 year )    HPI:    Prior notes reviewed by myself.  Clinical documentation obtained by nursing staff reviewed.     79 y/o gentleman here for evaluation of dizziness that has been present intermittently for the past year.  He describes episodes that happen whenever he gets up from a seated position and when he walks during which he feels as if the world tilts on its axis.  He denies any true spinning dizziness.  He has not noticed any associated acute changes in his hearing or tinnitus.  He does wear hearing aids and has a known history of SNHL.  He has had an extensive workup for this dizziness including cardiology eval, neurology eval, multiple imaging studies.  He also had a VNG in 2019 which demonstrated possible central vestibular abnormality.  He has not attended any vestibular therapy.    Objective:     Physical Exam:  Vitals:  Ht 5' 10" (1.778 m)   Wt 96.3 kg (212 lb 4.9 oz)   BMI 30.46 kg/m²   General appearance:  Well developed, well nourished    Ears:  Otoscopy of external auditory canals and tympanic membranes was normal, clinical speech reception thresholds grossly intact, no mass/lesion of auricle.    Nose:  No masses/lesions of external nose, nasal mucosa, septum, and turbinates were within normal limits.    Mouth:  No mass/lesion of lips, teeth, gums, hard/soft palate, tongue, tonsils, or oropharynx.    Neck & Lymphatics:  No cervical lymphadenopathy, no neck mass/crepitus/ asymmetry, trachea is midline, no thyroid enlargement/tenderness/mass.        [x]  Data Reviewed:    Lab Results   Component Value Date    WBC 7.80 2023    HGB 13.6 (L) 2023    HCT 41.0 2023     (H) 2023    EOSINOPHIL 2.7 2023         [x]  Independent interpretation of test: Normal MRI   "     Kevin Aragon  Outside Information  Results  NM PET BRAIN (Order [id number])     NM PET BRAIN  Order: [id number]  Narrative    PROCEDURE:  PET BRAIN METABOLIC EVAL     INDICATION:  Memory impairment.     TECHNIQUE:  Blood glucose measured at the time of injection was 111 mg/dL. The patient was injected IV with 10.5 mCi of 18F-FDG.  Approximately one hour later, PET images of the brain were obtained.  Corresponding low dose CT scanning was performed as   part of the attenuation correction process.  Automated dose exposure control was utilized.     FINDINGS:  Diffuse cortical hypometabolism is noted, consistent with diffuse atrophy.  No areas of significant worsening or sparing are seen to suggest a superimposed neurodegenerative process.  Subcortical uptake parallels cortical findings.  Cerebellar    uptake appears normal.     IMPRESSION:       1.  Diffuse atrophic changes, with no evidence for a superimposed neurodegenerative process.     [id number]  Exam End: 04/29/21 12:18    Specimen Collected: 04/29/21 13:55 Last Resulted: 04/29/21 14:00   Received From: Kevin Aragon  Result Received: 06/01/23 13:30    View Encounter     Received Information               Kevin Aragon  Outside Information  Results  MRI Brain Without Contrast (Order [id number])     MRI Brain Without Contrast  Order: [id number]  Narrative    This result has an attachment that is not available.   EXAMINATION:  MRI BRAIN WO CONTRAST     CLINICAL HISTORY:  R41.3 Other amnesia, NPH     COMPARISON:  FDG PET/CT on 4/29/2021.     TECHNIQUE: Noncontrast brain MRI, including 3D T1 BRAVO and pCASL with PLD of 2.5 seconds. Regional brain segmentation and volumetric analysis was processed with Neuroreader software on an independent workstation and reviewed. Cardiac-gated dynamic CSF   flow through the cerebral aqueduct was evaluated with sagittal images using VENC of 25 cm/s and oblique axial images using VENC of 20 cm/s.     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 There are minimal areas of T2 prolongation in periventricular and subcortical white matter, which are nonspecific but probably from chronic ischemic small vessel disease.    No evidence of acute infarction, hemorrhage, mass lesion, or midline shift.     Cerebral atrophy is borderline normal to mildly advanced for age with no disproportionate atrophy pattern to suggest specific neurodegenerative disease. Specifically, there is no disproportionate hippocampal or entorhinal cortical atrophy. There is mild   ventriculomegaly of the lateral and third ventricles that appear proportionate to overall brain volume. No significant sulcal crowding or focal dilatation of subarachnoid space.     Right Hippocampus: 3.01 mL, z-score -1.33.   Left Hippocampus: 3.31 mL, z-score -1.04.     Right Frontal Lobe: 155.5 mL, z-score -1.89.   Left Frontal Lobe: 155.0 mL, z-score -1.91.     Right Parietal Lobe: 82.0 mL, z-score -1.69.   Left Parietal Lobe: 80.0 mL, z-score -1.87.     Right Occipital Lobe: 36.1 mL, z-score -1.61.   Left Occipital Lobe: 34.9 mL, z-score -1.69.     Right Temporal Lobe: 98.8 mL, z-score -1.03.   Left Temporal Lobe: 96.2 mL, z-score -1.16.     Right Lateral Ventricle: 38.2 mL, z-score 0.92.   Left Lateral Ventricle: 27.9 mL, z-score 0.19.     Measured William's index is 0.33. Measured callosal angle at level of posterior commissure is 123 degrees.     Measured CSF flow rate through the cerebral aqueduct is 10.4-10.9 mL/min. CSF stroke volume through the cerebral aqueduct is 142-148 uL.     ASL demonstrates asymmetric left occipital hypoperfusion.     Visualized paranasal sinuses and mastoid air cells are clear. Bones, orbits, and soft tissues are unremarkable.     IMPRESSION:     1. Cerebral atrophy borderline normal to mildly advanced for age with no disproportionate atrophy pattern to suggest specific neurodegenerative disease.      2. Mild ventriculomegaly appears proportionate to overall cerebral volume and probably ex vacuo although presence of normal pressure hydrocephalus cannot be entirely excluded.     3. CSF flow rate through the cerebral aqueduct = 10.4-10.9 mL/min. CSF stroke volume through the cerebral aqueduct = 142-148 uL. Patients with suspected normal pressure hydrocephalus who have increased CSF flow and stroke volume are possibly more likely   to respond to CSF shunting.     4. Asymmetric left occipital hypoperfusion of uncertain significance, possibly artifactual.     [facility]  Images on Order [id number]    Image Retrieve    The full-size image has not yet been retrieved from an outside organization. To retrieve, click the link below.  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  I suspect that it is multifactorial.  We agreed on a trial of vestibular therapy to see if this would help with his symptoms.  He will f/u after completing his evaluation/sessions.

## 2023-07-24 NOTE — PROGRESS NOTES
"Haritha Valle was seen 07/24/2023 for BPPV testing. Patient reported progressive dizziness for the past year. He reported he has seen cardiology and neurology for a work-up. Patient described an imbalance when getting up from a sitting position then starting to walk. He noted "it feels like things are tilting". Patient reported he also experiences fatigue and shortness of breath after walking a certain distance. Previous vestibular evaluation on 08/21/2019 suggested possible central vestibular abnormality based on fixation failure with caloric testing.     Right Melissa-Hallpike: Negative for BPPV  Left Melissa-Hallpike: Negative for BPPV    Patient noted lightheadedness upon sitting on both sides. HE was counseled on the above findings.     Recommendations:  ENT Review.       "

## 2023-07-25 ENCOUNTER — PATIENT MESSAGE (OUTPATIENT)
Dept: PULMONOLOGY | Facility: CLINIC | Age: 78
End: 2023-07-25
Payer: COMMERCIAL

## 2023-07-25 DIAGNOSIS — G47.34 NOCTURNAL HYPOXEMIA: Primary | ICD-10-CM

## 2023-07-25 DIAGNOSIS — G47.33 OSA (OBSTRUCTIVE SLEEP APNEA): ICD-10-CM

## 2023-07-25 NOTE — TELEPHONE ENCOUNTER
Order for oxygen submitted    Subjective:     Patient ID: Haritha Valle is a 78 y.o. male.    Chief Complaint:  dizziness and vertigo    HPI 79 y/o with CC of vertigo and fatigue.   Hx of Migraines - Resolved serveral years ago      Dyspnea  Patient complains of shortness of breath. Symptoms occur at rest, while getting dressed, with one block walking. Symptoms began 1 year ago, gradually worsening since. Associated symptoms include  difficulty breathing, dyspnea on exertion, and shortness of breath. He denies chest pain, located left chest. He does not have had recent travel. Weight has been stable. Symptoms are exacerbated by minimal activity. Symptoms are alleviated by rest.     History of Obstructive Sleep Apnea - failed use of Continuous Positive Airway Pressure   Will evaluate for oxygen at night      Past Medical History:   Diagnosis Date    Anxiety 7/3/2019    Basal cell carcinoma     Depression     Hepatitis B     Hepatitis C antibody test positive     RNA NEG 8/29/2019    HIV infection     Hypertension     Immune disorder     Mild cognitive impairment 10/1/2010     Past Surgical History:   Procedure Laterality Date    APPENDECTOMY      CARDIAC CATHETERIZATION      no stent    CHOLECYSTECTOMY      COLONOSCOPY N/A 11/3/2016    Procedure: COLONOSCOPY;  Surgeon: Maxi Villasenor MD;  Location: [address]);  Service: Endoscopy;  Laterality: N/A;  last colonoscopy with Dr Jarrell    COLONOSCOPY N/A 1/25/2022    Procedure: COLONOSCOPY;  Surgeon: Silvino Rosales MD;  Location: [facility];  Service: Endoscopy;  Laterality: N/A;    LEFT HEART CATHETERIZATION Left 2/3/2020    Procedure: CATHETERIZATION, HEART, LEFT;  Surgeon: Chele Ko MD;  Location: [facility] CATH LAB;  Service: Cardiology;  Laterality: Left;  malur pt     Review of patient's allergies indicates:   Allergen Reactions    No known drug allergies      Current Outpatient Medications on File Prior to Visit   Medication Sig Dispense Refill     ALPRAZolam (XANAX) 0.5 MG tablet Take by mouth.      atazanavir (REYATAZ) 200 MG Cap Take 2 capsules (400 mg total) by mouth once daily. 180 capsule 3    FLUoxetine 40 MG capsule Take 1 capsule (40 mg total) by mouth once daily. 90 capsule 3    lamivudine-zidovudine 150-300mg (COMBIVIR) 150-300 mg Tab Take 1 tablet by mouth every 12 (twelve) hours. 180 tablet 3    metoprolol succinate (TOPROL-XL) 50 MG 24 hr tablet TAKE 1 TABLET EVERY DAY 90 tablet 2    rosuvastatin (CRESTOR) 20 MG tablet TAKE 1 TABLET EVERY DAY 90 tablet 3    valsartan (DIOVAN) 40 MG tablet Take 40 mg by mouth once daily.      VASCEPA 1 gram Cap Take by mouth.       No current facility-administered medications on file prior to visit.     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  [place] [employer]   Tobacco Use    Smoking status: Never    Smokeless tobacco: Never   Substance and Sexual Activity    Alcohol use: No    Drug use: Not Currently    Sexual activity: Yes     Partners: Male     Social Determinants of Health     Financial Resource Strain: Low Risk     Difficulty of Paying Living Expenses: Not hard at all   Food Insecurity: No Food Insecurity    Worried About Running Out of Food in the Last Year: Never true    Ran Out of Food in the Last Year: Never true   Transportation Needs: No Transportation Needs    Lack of Transportation (Medical): No    Lack of Transportation (Non-Medical): No   Physical Activity: Inactive    Days of Exercise per Week: 0 days    Minutes of Exercise per Session: 0 min   Stress: No Stress Concern Present    Feeling of Stress : Only a little   Social Connections: Unknown    Frequency of Communication with Friends and Family: Patient refused    Frequency of Social Gatherings with Friends and Family:  "Once a week    Active Member of Clubs or Organizations: No    Attends Club or Organization Meetings: Never    Marital Status: Living with partner   Housing Stability: Low Risk     Unable to Pay for Housing in the Last Year: No    Number of Places Lived in the Last Year: 1    Unstable Housing in the Last Year: No     Family History   Problem Relation Age of Onset    Heart disease Father     Heart failure Father     Diabetes Neg Hx     Hypertension Neg Hx        Review of Systems   Constitutional:  Positive for fatigue.   HENT:  Positive for postnasal drip.    Respiratory:  Positive for apnea and cough.    Cardiovascular: Negative.    Genitourinary: Negative.    Endocrine: endocrine negative    Musculoskeletal: Negative.    Skin: Negative.    Gastrointestinal: Negative.    Neurological: Negative.    Psychiatric/Behavioral:  Positive for confusion and sleep disturbance.      Objective:      /68   Pulse 74   Resp 17   Ht 5' 10" (1.778 m)   Wt 96.3 kg (212 lb 4.9 oz)   SpO2 (!) 92%   BMI 30.46 kg/m²   Physical Exam  Vitals and nursing note reviewed.   Constitutional:       Appearance: He is well-developed. He is obese. He is ill-appearing.   HENT:      Head: Normocephalic and atraumatic.      Nose: Nose normal.   Eyes:      Conjunctiva/sclera: Conjunctivae normal.      Pupils: Pupils are equal, round, and reactive to light.   Neck:      Thyroid: No thyromegaly.      Vascular: No JVD.      Trachea: No tracheal deviation.   Cardiovascular:      Rate and Rhythm: Normal rate and regular rhythm.      Heart sounds: No murmur heard.  Pulmonary:      Breath sounds: Normal breath sounds. No rhonchi or rales.   Abdominal:      General: Bowel sounds are normal.      Palpations: Abdomen is soft.   Musculoskeletal:         General: No tenderness. Normal range of motion.      Cervical back: Neck supple.   Lymphadenopathy:      Cervical: No cervical adenopathy.   Skin:     General: Skin is warm and dry.   Neurological:      " Mental Status: He is alert and oriented to person, place, and time.      Sensory: Sensory deficit present.      Motor: Weakness present.      Gait: Gait abnormal.      Deep Tendon Reflexes: Reflexes abnormal.     Personal Diagnostic Review  Chest x-ray: WNL     Pulmonary Studies Review 7/20/2023   SpO2 92   Ordering Provider -   Performing nurse/tech/RT -   Diagnosis -   Height 70   Weight 3396.85   BMI (Calculated) 30.5   Predicted Distance 281.52   Patient Race -   6MWT Status -   Patient Reported -   Was O2 used? -   6MW Distance walked (feet) -   Distance walked (meters) -   Did patient stop? -   How many times? -   Stop Time 1 -   Restart Time 1 -   Did patient restart? -   Type of assistive device(s) used? -   Is extra documentation required for this patient? -   Oxygen Saturation -   Supplemental Oxygen -   Heart Rate -   Blood Pressure -   Johnnie Dyspnea Rating  -   Oxygen Saturation -   Supplemental Oxygen -   Heart Rate -   Blood Pressure -   Johnnie Dyspnea Rating  -   Recovery Time (seconds) -   Oxygen Saturation -   Supplemental Oxygen -   Heart Rate -   Is procedure ready for interpretation? -   Predicted Distance Meters (Calculated) 480.92   Oxygen Qualification? -   Some encounter information is confidential and restricted. Go to Review Flowsheets activity to see all data.       Posterior Segment OCT Retina-Both eyes  Right Eye  Quality was good. Scan locations included subfoveal, juxtafoveal,   extrafoveal, nasal, temporal, superior, inferior. Progression has   improved. Findings include normal foveal contour.     Left Eye  Quality was good. Scan locations included subfoveal, juxtafoveal,   extrafoveal, nasal, temporal, superior, inferior. Progression has   improved. Findings include normal foveal contour.     Notes  See progress note.      Office Spirometry Results:     No flowsheet data found.  Pulmonary Studies Review 7/20/2023   SpO2 92   Ordering Provider -   Performing nurse/tech/RT -   Diagnosis -    Height 70   Weight 3396.85   BMI (Calculated) 30.5   Predicted Distance 281.52   Patient Race -   6MWT Status -   Patient Reported -   Was O2 used? -   6MW Distance walked (feet) -   Distance walked (meters) -   Did patient stop? -   How many times? -   Stop Time 1 -   Restart Time 1 -   Did patient restart? -   Type of assistive device(s) used? -   Is extra documentation required for this patient? -   Oxygen Saturation -   Supplemental Oxygen -   Heart Rate -   Blood Pressure -   Johnnie Dyspnea Rating  -   Oxygen Saturation -   Supplemental Oxygen -   Heart Rate -   Blood Pressure -   Johnnie Dyspnea Rating  -   Recovery Time (seconds) -   Oxygen Saturation -   Supplemental Oxygen -   Heart Rate -   Is procedure ready for interpretation? -   Predicted Distance Meters (Calculated) 480.92   Oxygen Qualification? -   Some encounter information is confidential and restricted. Go to Review Flowsheets activity to see all data.     Transthoracic Echocardiogram Complete, (w Contrast, Strain and 3D if needed)    Narrative  Performed by  [facility]  This result has an attachment that is not available.                                                                Echocardiography Report              [address] [place] [address]                                        Pat.Name:  JAMI LÓPEZ          Pat.ID:    [id number]                 .Date:   2021               Refer.MD:  LISA AGUILAR MD     Exam Time: 9:57:00 AM              Study Type:Routine Echo               Height:    70in                    Weight:    218lb                     BSA:       2.17 m2                   Age:  1945,75Y             Sex:       MALE                    BP:        142/73                     HR:        75 bpm                     Sonogrphr: Raquel Bush RDCS, RVT   Pat. Stat.:Outpatient              Room:      LOVE                       Study Status:Final                   Echo Event ID:[id number]                Order ID:  [id number]                 Reason for Study:dyspnea             Procedures: 2D Echo, Colorflow Doppler, Strain   Race:      C                         ------------------------------------   SUMMARY:   ------------------------------------   LV EF is normal.   RV systolic function is lower limits of normal. RV apex is mildly   hypokinetic.   Diastolic dysfunction Grade I (Mild): Impaired relaxation with normal   LV filling pressures.   Insufficient TR jet to estimate PA systolic pressure.   ------------------------------------   FINDINGS:   ------------------------------------   LV:       LV size is normal. Reduced average LV global longitudinal             strain at -15.5%. LV EF is normal. Difficult to assess             regional wall motion; however it appears grossly normal.             Estimated EF is 55-59%.   RV:       RV size is normal. RV systolic function is lower limits of             normal. RV apex is mildly hypokinetic.   LA:       LA volume is upper limits of normal.   RA:       RA size is normal.   AO:       Aortic root diameter is normal.   TALIA:     There is an anterior space consistent with a prominent             epicardial fat pad.   AV:       Mild thickening and calcification of AV leaflets.   MV:       No structural MV abnormalities noted.   PV:       No structural PV abnormalities noted.   TV:       No structural TV abnormalities noted.   Lu:     Diastolic dysfunction Grade I (Mild): Impaired relaxation             with normal LV filling pressures.   Other:    Insufficient TR jet to estimate PA systolic pressure.   ------------------------------------   MEASUREMENTS:   ------------------------------------                                     2D   Parasternal Long Axis    Ao An            2.2 cm            LVPWd              1 cm      Ao Rtd           3.5 cm            Index        1.6 cm/m2                LA Ds            4.3 cm      IVSd               1 cm            RWT              0.39        LVIDd            5.1 cm            Index        2.4 cm/m2                LV Mass          188 g    (122-174)    LVIDs              3 cm            LVM Index         87 g/m²    LV%fs             41 %             LVOT             2.2 cm     LA Sng Plane    LA Area           23 cm²  (8.8-23.4) LA Vol            72 ml      Index        33 ml/m2    LA LngAx         6.3 cm             RA Sng Plane    RA Vol            64 ml            Index        30 ml/m2                RA LngAx         5.4 cm      RA Area           21 cm²  (8.3-19.5)   LVOT      LVOT Area        3.9 cm²                                             DOPPLER   LVOT Stroke Vol & Cardiac Out    LVOT TVI          20 cm            HR                67 bpm   LVOT      LVOT SV           77 ml            LVOT CO          5.2 l/min    SVi               36 ml/m²         LVOT CI          2.4 l/m/m²       Signed 2021 03:39 PM   Lima Freeman MD  Procedure Note    Lima Freeman MD - 2021   Formatting of this note might be different from the original.                             Echocardiography Report              [address] [place] [address]     Pat.Name:  JAMI LÓPEZ          Pat.ID:    [id number]                 .Date:   2021               Refer.MD:  LISA AGUILAR MD     Exam Time: 9:57:00 AM              Study Type:Routine Echo               Height:    70in                    Weight:    218lb                     BSA:       2.17 m2                   Age:  1945,75Y             Sex:       MALE                    BP:        142/73                     HR:        75 bpm                     Sonogrphr: Raquel Bush RDCS, RVT   Pat. Stat.:Outpatient              Room:      L[facility]                       Study Status:Final                   Echo Event ID:[id number]               Order ID:  [id number]                 Reason for Study:dyspnea             Procedures: 2D Echo, Colorflow  Doppler, Strain   Race:      C                         ------------------------------------   SUMMARY:   ------------------------------------   LV EF is normal.   RV systolic function is lower limits of normal. RV apex is mildly   hypokinetic.   Diastolic dysfunction Grade I (Mild): Impaired relaxation with normal   LV filling pressures.   Insufficient TR jet to estimate PA systolic pressure.   ------------------------------------   FINDINGS:   ------------------------------------   LV:       LV size is normal. Reduced average LV global longitudinal             strain at -15.5%. LV EF is normal. Difficult to assess             regional wall motion; however it appears grossly normal.             Estimated EF is 55-59%.   RV:       RV size is normal. RV systolic function is lower limits of             normal. RV apex is mildly hypokinetic.   LA:       LA volume is upper limits of normal.   RA:       RA size is normal.   AO:       Aortic root diameter is normal.   TALIA:     There is an anterior space consistent with a prominent             epicardial fat pad.   AV:       Mild thickening and calcification of AV leaflets.   MV:       No structural MV abnormalities noted.   PV:       No structural PV abnormalities noted.   TV:       No structural TV abnormalities noted.   Lu:     Diastolic dysfunction Grade I (Mild): Impaired relaxation             with normal LV filling pressures.   Other:    Insufficient TR jet to estimate PA systolic pressure.   ------------------------------------   MEASUREMENTS:   ------------------------------------                                     2D   Parasternal Long Axis    Ao An            2.2 cm            LVPWd              1 cm      Ao Rtd           3.5 cm            Index        1.6 cm/m2                LA Ds            4.3 cm      IVSd               1 cm            RWT             0.39        LVIDd            5.1 cm            Index        2.4 cm/m2                LV Mass          188  g    (122-174)    LVIDs              3 cm            LVM Index         87 g/m²    LV%fs             41 %             LVOT             2.2 cm     LA Sng Plane    LA Area           23 cm²  (8.8-23.4) LA Vol            72 ml      Index        33 ml/m2    LA LngAx         6.3 cm             RA Sng Plane    RA Vol            64 ml            Index        30 ml/m2                RA LngAx         5.4 cm      RA Area           21 cm²  (8.3-19.5)   LVOT      LVOT Area        3.9 cm²                                             DOPPLER   LVOT Stroke Vol & Cardiac Out    LVOT TVI          20 cm            HR                67 bpm   LVOT      LVOT SV           77 ml            LVOT CO          5.2 l/min    SVi               36 ml/m²         LVOT CI          2.4 l/m/m²       Signed 2021 03:39 PM   Lima Freeman MD  Images    Image Retrieve    The full-size image has not yet been retrieved from an outside organization. To retrieve, click the link below.  Scan on 2021 11:15 AM: Transthoracic Echocardiogram Complete, wo Contrast, w Doppler        Specimen Collected: 21 09:57 Last Resulted: 21 15:39   Received From: Kevin Aragon  Result Received: 23 13:30    View Encounter       Procedure Notes    Author Status Last  Updated Created   Golden Allison, PhD Signed Golden Allison, PhD 2020  5:22 PM 2020  5:19 PM          Assoc. Orders Procedures   POLYSOMNOGRAM POLYSOMNOGRAM       Pre-Op Dx Post-Op Dx   Obstructive sleep apnea syndrome None               Patient Name: Haritha Valle                            Date of Report: 20           Date of PS/10/2020   [facility] No.: [id number]                            : 1945                      Time of PSG:  10:25:49 PM - 5:02:54 AM  Sex:  Male   Age:  75   Weight:  212.0 lbs      Height:  5  11                         Type of PSG:  Diagnostic      REASONS FOR REFERRAL: Mr. Valle is a 75 year old male, referred to   Rico Pineda and the [facility] by Dr. Deni Nguyen for evaluation of possible obstructive sleep apnea / hypopnea syndrome (OSAHS).  Dr. Pineda requested diagnostic polysomnography based on the patients reported loud snoring, observed respiratory pauses in sleep, unrefreshing sleep and daytime somnolence (all positive on STOP-bang).  His Geneva Sleepiness Scale score was 13, clinically significant, and his STOP - BANG score was 7, high risk of RAMONITA.  Dr. Hesham Andrade is the patients primary care physician.      STUDY PARAMETERS: This diagnostic study involved analysis of the patient's sleep pattern while breathing unassisted. The study was performed with a sleep technologist in attendance for the entire test period, with video monitoring throughout the study, and routine laboratory clinical parameters recorded:  NOTE: The polysomnography electrophysiological record for the patient has been reviewed in its entirety by Dr. Allison.     SUMMARY STATEMENTS  DIAGNOSTIC IMPRESSIONS  G47.33  /  327.23                    Moderate to Severe Obstructive Sleep Apnea, Adult (OSAHS)  G47.61  /  327.51                    Severe Periodic Limb Movement (PLM) Disorder   F51.12   /  307.44                    Mild Insufficient Sleep Syndrome  Z72.821 /  V69.4                     Inadequate Sleep Hygiene      PRIMARY TREATMENT RECOMMENDATIONS  Treat, or refer to Sleep Disorders Center.  The diagnostic polysomnography revealed a moderate to severe obstructive sleep apnea / hypopnea syndrome (A + H Index = 26.8 events / hr asleep with 7.0 respiratory event - related arousals / hr asleep for the study, and no RERAs (respiratory effort -  related arousals).  The mean SpO2 value was 90.0 %, significant, minimum oxygen saturation during sleep was   79.0 %, and waking baseline SpO2 was 97 %.   Sporadic / infrequent , moderately loud snoring was noted.  A  CPAP titration polysomnography is recommended.      Weight loss to the normal range is  recommended as it can decrease respiratory events and snoring in overweight patients.  The following changes in sleep hygiene / sleep - related behavior are recommended after medical treatments are successful  Regular bedtimes and wake times, including weekends: Total sleep time / night should not be more than one hour more             than usual, and bedtime or wake time should not be more than one hour earlier or later than usual.    Do not attempt to make up lost sleep by extending sleep periods.    Avoid naps; none longer than 20 min or later than mid - afternoon.     SECONDARY TREATMENT RECOMMENDATIONS  Treat, or refer to [facility] if problems are not satisfactorily resolved by the above.  A severe  PLM disorder was observed (PLMS Index = 67.6 / hr asleep, but treatment might not be optimal because the PLMS were not very disruptive of sleep (6.6 arousals / hr asleep), and there were no signs of restless legs syndrome in the SDI,   H & P or PSG;  consider treatment of PLM disorder if PLMS symptoms are sufficiently bothersome to the patient.  Note that the benzodiazepine medications sometimes used to treat PLM disorder (e.g., alprazolam / Xanax) may exacerbate some sleep - related respiratory disorders, and that dopaminergic medications such as Mirapex and Requip can be used in such instances.    Mr. Valle is taking fluoxetine / Prozac.  Most tricyclic, and many SSRI antidepressants (e.g., fluoxetine - Prozac, sertraline   Zoloft, venlafaxine - Effexor), are associated with increased PLMS in some studies and increased RLS in others.  Consider   replacing Prozac with a medication known not to exacerbate PLMS or RLS.  Consider behavioral and cognitive / behavioral treatments for anxiety and depression to complement the medication and to increase the probability of long - term adaptive change.  Sleep (quality) might be expected to further improve.  IMPORTANT  NOTE:   RAMONITA, PLMS, and stress - related arousals (anxiety and  depression)  interact to significantly impair sleep quality and restoratives, making treatment of each and all of these disorders very important  for restorative sleep.     See below for a complete interpretation of data from the polysomnography and Sleep Disorders Inventory.     Thank you for referring this patient to the [facility].      Golden Allison, Ph.D., ABPP; Diplomate, American Board of Sleep Medicine            Assessment:            Nocturnal hypoxemia  -     PULSE OXIMETRY OVERNIGHT; Future    RAMONITA (obstructive sleep apnea)  -     PULSE OXIMETRY OVERNIGHT; Future    MAIER (dyspnea on exertion)  -     Six Minute Walk Test to qualify for Home Oxygen; Future  -     Echo Saline Bubble? No; Future; Expected date: 07/20/2023  -     CBC Auto Differential; Future; Expected date: 07/20/2023  -     Comprehensive Metabolic Panel; Future; Expected date: 07/20/2023  -     B-TYPE NATRIURETIC PEPTIDE; Future; Expected date: 07/20/2023  -     albuterol (PROVENTIL/VENTOLIN HFA) 90 mcg/actuation inhaler; Inhale 2 puffs into the lungs every 4 (four) hours as needed for Wheezing or Shortness of Breath.  Dispense: 18 g; Refill: 11    Chronic restrictive lung disease  -     Complete PFT with bronchodilator; Future; Expected date: 07/20/2023  -     Six Minute Walk Test to qualify for Home Oxygen; Future    Exercise hypoxemia  -     Six Minute Walk Test to qualify for Home Oxygen; Future    Diastolic dysfunction  -     Echo Saline Bubble? No; Future; Expected date: 07/20/2023          Outpatient Encounter Medications as of 7/20/2023   Medication Sig Dispense Refill    albuterol (PROVENTIL/VENTOLIN HFA) 90 mcg/actuation inhaler Inhale 2 puffs into the lungs every 4 (four) hours as needed for Wheezing or Shortness of Breath. 18 g 11    ALPRAZolam (XANAX) 0.5 MG tablet Take by mouth.      atazanavir (REYATAZ) 200 MG Cap Take 2 capsules (400 mg total) by mouth once daily. 180 capsule 3    FLUoxetine 40 MG capsule  Take 1 capsule (40 mg total) by mouth once daily. 90 capsule 3    lamivudine-zidovudine 150-300mg (COMBIVIR) 150-300 mg Tab Take 1 tablet by mouth every 12 (twelve) hours. 180 tablet 3    metoprolol succinate (TOPROL-XL) 50 MG 24 hr tablet TAKE 1 TABLET EVERY DAY 90 tablet 2    rosuvastatin (CRESTOR) 20 MG tablet TAKE 1 TABLET EVERY DAY 90 tablet 3    valsartan (DIOVAN) 40 MG tablet Take 40 mg by mouth once daily.      VASCEPA 1 gram Cap Take by mouth.       No facility-administered encounter medications on file as of 7/20/2023.     Plan:       Requested Prescriptions     Signed Prescriptions Disp Refills    albuterol (PROVENTIL/VENTOLIN HFA) 90 mcg/actuation inhaler 18 g 11     Sig: Inhale 2 puffs into the lungs every 4 (four) hours as needed for Wheezing or Shortness of Breath.     Problem List Items Addressed This Visit    None  Visit Diagnoses       Nocturnal hypoxemia    -  Primary    Relevant Orders    PULSE OXIMETRY OVERNIGHT    RAMONITA (obstructive sleep apnea)        Relevant Orders    PULSE OXIMETRY OVERNIGHT    MAIER (dyspnea on exertion)        Relevant Medications    albuterol (PROVENTIL/VENTOLIN HFA) 90 mcg/actuation inhaler    Other Relevant Orders    Six Minute Walk Test to qualify for Home Oxygen    Echo Saline Bubble? No    CBC Auto Differential    Comprehensive Metabolic Panel    B-TYPE NATRIURETIC PEPTIDE    Chronic restrictive lung disease        Relevant Orders    Complete PFT with bronchodilator    Six Minute Walk Test to qualify for Home Oxygen    Exercise hypoxemia        Relevant Orders    Six Minute Walk Test to qualify for Home Oxygen    Diastolic dysfunction        Relevant Orders    Echo Saline Bubble? No               Follow up in about 4 weeks (around 8/17/2023) for Review progress.    MEDICAL DECISION MAKING: Moderate to high complexity.  Overall, the multiple problems listed are of moderate to high severity that may impact quality of life and activities of daily living. Side effects of  medications, treatment plan as well as options and alternatives reviewed and discussed with patient. There was counseling of patient concerning these issues.    Total time spent in counseling and coordination of care - 60  minutes of total time spent on the encounter, which includes face to face time and non-face to face time preparing to see the patient (eg, review of tests), Obtaining and/or reviewing separately obtained history, Documenting clinical information in the electronic or other health record, Independently interpreting results (not separately reported) and communicating results to the patient/family/caregiver, or Care coordination (not separately reported).    Time was used in discussion of prognosis, risks, benefits of treatment, instructions and compliance with regimen . Discussion with other physicians and/or health care providers - home health or for use of durable medical equipment (oxygen, nebulizers, CPAP, BiPAP) occurred.  60

## 2023-07-27 ENCOUNTER — PROCEDURE VISIT (OUTPATIENT)
Dept: OPHTHALMOLOGY | Facility: CLINIC | Age: 78
End: 2023-07-27
Payer: MEDICARE

## 2023-07-27 DIAGNOSIS — H35.3231 EXUDATIVE AGE-RELATED MACULAR DEGENERATION OF BOTH EYES WITH ACTIVE CHOROIDAL NEOVASCULARIZATION: Primary | ICD-10-CM

## 2023-07-27 PROCEDURE — 92134 CPTRZ OPH DX IMG PST SGM RTA: CPT | Mod: PBBFAC | Performed by: OPHTHALMOLOGY

## 2023-07-27 PROCEDURE — 67028 PR INJECT INTRAVITREAL PHARMCOLOGIC: ICD-10-PCS | Mod: 50,S$PBB,, | Performed by: OPHTHALMOLOGY

## 2023-07-27 PROCEDURE — 67028 INJECTION EYE DRUG: CPT | Mod: 50,PBBFAC | Performed by: OPHTHALMOLOGY

## 2023-07-27 PROCEDURE — 92134 POSTERIOR SEGMENT OCT RETINA (OCULAR COHERENCE TOMOGRAPHY)-BOTH EYES: ICD-10-PCS | Mod: 26,S$PBB,, | Performed by: OPHTHALMOLOGY

## 2023-07-27 PROCEDURE — 67028 INJECTION EYE DRUG: CPT | Mod: 50,S$PBB,, | Performed by: OPHTHALMOLOGY

## 2023-07-27 PROCEDURE — 99499 UNLISTED E&M SERVICE: CPT | Mod: S$PBB,,, | Performed by: OPHTHALMOLOGY

## 2023-07-27 PROCEDURE — 99999PBSHW PR PBB SHADOW TECHNICAL ONLY FILED TO HB: ICD-10-PCS | Mod: JZ,PBBFAC,,

## 2023-07-27 PROCEDURE — 99999PBSHW PR PBB SHADOW TECHNICAL ONLY FILED TO HB: Mod: JZ,PBBFAC,,

## 2023-07-27 PROCEDURE — 96372 THER/PROPH/DIAG INJ SC/IM: CPT | Mod: PBBFAC,59 | Performed by: OPHTHALMOLOGY

## 2023-07-27 PROCEDURE — 99499 NO LOS: ICD-10-PCS | Mod: S$PBB,,, | Performed by: OPHTHALMOLOGY

## 2023-07-27 RX ADMIN — FARICIMAB 6 MG: 6 INJECTION, SOLUTION INTRAVITREAL at 03:07

## 2023-07-27 NOTE — PROGRESS NOTES
===============================  Date today is 7/27/2023  Haritha Valle is a 78 y.o. male  Last visit [facility]: :7/13/2023   Last visit eye dept. 7/13/2023    Corrected distance visual acuity was 20/30 in the right eye and 20/30 in the left eye.  Not recorded       Not recorded       Not recorded       Not recorded       Chief Complaint   Patient presents with    srn     Vabysmo ou     HPI     srn     Additional comments: Vabysmo ou           Comments    SRN OS  Avastin OS Emergent 1/21/22, 2/24/22, 3/24/22, 4/28/22, 6/21/22  Emergent Avastin OU 3/16/23  Avastin failure  Emergent Eylea OU 5/2/23  Vabysmo OU 6/21/23          Last edited by MARY JO Crowder on 7/27/2023  2:30 PM.      Problem List Items Addressed This Visit    None  Visit Diagnoses       Exudative age-related macular degeneration of both eyes with active choroidal neovascularization    -  Primary    Relevant Medications    faricimab-svoa 6 mg/0.05 mL (120 mg/mL) injection 6 mg (Completed) (Start on 7/27/2023  3:30 PM)    faricimab-svoa 6 mg/0.05 mL (120 mg/mL) injection 6 mg (Completed) (Start on 7/27/2023  3:30 PM)    Other Relevant Orders    Posterior Segment OCT Retina-Both eyes (Completed)    Prior authorization Order          Instructed to call 24/7 for any worsening of vision, visual distortion or pain.  Check OU independently daily.    Gave my office and personal cell phone number.  ________________  7/27/2023 today  Haritha TORRES Dease  :  Ou srn   Worse after vabysmo  Rec vabysmo today with planned avastin in 2 weeks   ..    Injection Procedure Note:    7/27/2023  Diagnosis :  ou srn  Today:   Vabysmo (faricimab-svoa) 6 mg/0.05mL (120 mg/mL) Injection , OU   Follow up: rtc 2 weeks avastin      Instructed to call 24/7 for any worsening of vision. Check Both eyes daily. Gave patient my home phone number.  Risks, benefits, and alternatives to treatment discussed in detail with the patient.  The patient voiced understanding and wished to proceed  with the procedure.     Patient Identified and Time Out complete  Subconjunctival bleb - xylocaine with epi 2%   and Betadine.  Inject at Vabysmo (faricimab-svoa) 6 mg/0.05mL (120 mg/mL) Injection , OU 6:00 @ 3.5-4mm posterior to limbus  1 stop:          Post Operative Dx: Same  Complications: None  Follow up as above.      =============================

## 2023-07-31 ENCOUNTER — OFFICE VISIT (OUTPATIENT)
Dept: OPHTHALMOLOGY | Facility: CLINIC | Age: 78
End: 2023-07-31
Payer: MEDICARE

## 2023-07-31 DIAGNOSIS — H52.7 REFRACTIVE ERROR: Primary | ICD-10-CM

## 2023-07-31 PROCEDURE — 99499 UNLISTED E&M SERVICE: CPT | Mod: S$PBB,,, | Performed by: OPTOMETRIST

## 2023-07-31 PROCEDURE — 99499 NO LOS: ICD-10-PCS | Mod: S$PBB,,, | Performed by: OPTOMETRIST

## 2023-07-31 NOTE — PROGRESS NOTES
HPI     Contact Lens Follow Up            Comments: Pt states va is still a little blurry in contacts. Pt denies   any pain or irritation at this time.          Comments    SRN OS  Avastin OS Emergent 1/21/22, 2/24/22, 3/24/22, 4/28/22, 6/21/22  Emergent Avastin OU 3/16/23  Avastin failure  Emergent Eylea OU 5/2/23  Vabysmo OU 6/21/23, 7/27/23    Monovision RGP OD dominant.            Last edited by Renee Fairchild on 7/31/2023  9:21 AM.            Assessment /Plan     For exam results, see Encounter Report.    Refractive error- Patient is trialing lenses for 1 week and will recheck at [facility]   RGP lens dispensed today, monovision OD distance OS near.     Return to clinic in 1 week R/C in [facility]

## 2023-08-08 ENCOUNTER — OFFICE VISIT (OUTPATIENT)
Dept: OPHTHALMOLOGY | Facility: CLINIC | Age: 78
End: 2023-08-08
Payer: MEDICARE

## 2023-08-08 DIAGNOSIS — H52.7 REFRACTIVE ERROR: Primary | ICD-10-CM

## 2023-08-08 PROCEDURE — 92499 PR CONTACT LENS F/U LEV 1: ICD-10-PCS | Mod: CSM,,, | Performed by: OPTOMETRIST

## 2023-08-08 PROCEDURE — 99999 PR PBB SHADOW E&M-EST. PATIENT-LVL II: ICD-10-PCS | Mod: PBBFAC,,, | Performed by: OPTOMETRIST

## 2023-08-08 PROCEDURE — 99212 OFFICE O/P EST SF 10 MIN: CPT | Mod: PBBFAC,PO | Performed by: OPTOMETRIST

## 2023-08-08 PROCEDURE — 92499 UNLISTED OPH SVC/PROCEDURE: CPT | Mod: CSM,,, | Performed by: OPTOMETRIST

## 2023-08-08 PROCEDURE — 99499 UNLISTED E&M SERVICE: CPT | Mod: S$PBB,,, | Performed by: OPTOMETRIST

## 2023-08-08 PROCEDURE — 99999 PR PBB SHADOW E&M-EST. PATIENT-LVL II: CPT | Mod: PBBFAC,,, | Performed by: OPTOMETRIST

## 2023-08-08 PROCEDURE — 99499 NO LOS: ICD-10-PCS | Mod: S$PBB,,, | Performed by: OPTOMETRIST

## 2023-08-08 NOTE — PROGRESS NOTES
HPI     Contact Lens Follow Up            Comments: SRN OS  Avastin OS Emergent 1/21/22, 2/24/22, 3/24/22, 4/28/22, 6/21/22  Emergent Avastin OU 3/16/23  Avastin failure  Emergent Eylea OU 5/2/23  Vabysmo OU 6/21/23, 7/27/23    Monovision RGP OD dominant.          Comments    Patient here today for CTL F/U  Vision stable            Last edited by Saumya Hernandez, PCT on 8/8/2023 10:36 AM.            Assessment /Plan     For exam results, see Encounter Report.    Refractive error      Contact Lens Final Rx       Final Contact Lens Rx         Brand Base Curve Diameter Sphere Cylinder    Right Bitoric RGP 8.08/7.38 9.4 +3.00 -3.50    Left Bitoric RGP 8.13/7.46 9.4 +8.50 -4.00      Expiration Date: 8/8/2024    Replacement: Yearly    Solutions: Falconer    Wearing Schedule: Daily Wear                  Ordered bitoric RGP at listed above. RTC when lenses arrive for dispense. Appointment needed.

## 2023-08-09 ENCOUNTER — CLINICAL SUPPORT (OUTPATIENT)
Dept: REHABILITATION | Facility: HOSPITAL | Age: 78
End: 2023-08-09
Attending: OTOLARYNGOLOGY
Payer: MEDICARE

## 2023-08-09 DIAGNOSIS — R42 DIZZINESS: ICD-10-CM

## 2023-08-09 PROCEDURE — 97112 NEUROMUSCULAR REEDUCATION: CPT | Mod: 59,PN

## 2023-08-09 PROCEDURE — 95992 CANALITH REPOSITIONING PROC: CPT | Mod: PN

## 2023-08-09 PROCEDURE — 97161 PT EVAL LOW COMPLEX 20 MIN: CPT | Mod: PN

## 2023-08-09 NOTE — PROGRESS NOTES
"[facility]   Physical Therapy Initial Evaluation     Date: 8/9/2023   Name: Haritha Valle  [facility] Number: [id number]    Therapy Diagnosis:   Encounter Diagnosis   Name Primary?    Dizziness      Physician: Rex Fulton MD    Physician Orders: PT Eval and Treat   Medical Diagnosis from Referral: Dizziness  Evaluation Date: 8/9/2023  Authorization Period Expiration: 7/23/2024  Plan of Care Expiration: 10/11/2023  Progress Note Due: 10th visit  Visit # / Visits authorized: 1/ 20   FOTO: 1/ 3     Precautions: Standard and Blood Precautions    Time In: 2:10 pm  Time Out: 3:00 pm  Total Appointment Time (timed & untimed codes): 50 minutes    SUBJECTIVE   Date of onset: 6 months ago    History of current condition - Haritha reports having symptoms of dizziness beginning around 6 months ago. Pt describes dizziness as "like my world is tilted on an axis". Pt states symptoms of dizziness usually appears when bending over, walking, getting out of the car and when cars pass in his peripheral vision field while driving. Pt reports having to hold the beckham of his car after getting out in order to maintain his balance during symptoms. Pt states feeling dizziness and being nausea that began 2-3 weeks ago while getting up from the bed in the mornings. Pt does report having 4 falls where someone had to help him up but also some where he was able to pick himself up. Pt reports holding his heading and sitting down decreases his symptoms.    Referral History of Current Condition:    77 y/o gentleman here for evaluation of dizziness that has been present intermittently for the past year.  He describes episodes that happen whenever he gets up from a seated position and when he walks during which he feels as if the world tilts on its axis.  He denies any true spinning dizziness.  He has not noticed any associated acute changes in his hearing or tinnitus.  He does wear hearing aids and has a known history of SNHL.  " "He has had an extensive workup for this dizziness including cardiology eval, neurology eval, multiple imaging studies.  He also had a VNG in 2019 which demonstrated possible central vestibular abnormality.  He has not attended any vestibular therapy.     Falls: 4    Imaging, MRI studies:  IMPRESSION:     Diffuse atrophic changes, with no evidence for a superimposed neurodegenerative process.     Prior Therapy: No  Social History:  lives with their spouse  Occupation: Owner  Prior Level of Function: Independent w/ ADLs  Current Level of Function: Mod I    Dizziness:  Current 7/10, worst 9/10, best 5/10   Location: bilateral head    Description: "world is tilting on an axis"  Aggravating Factors: Laying, Bending, Walking, Morning, and Getting out of bed/chair  Easing Factors: rest    Patients goals: Pt would like to decrease symptoms of dizziness in order to improve quality of life.     Medical History:   Past Medical History:   Diagnosis Date    Anxiety 7/3/2019    Basal cell carcinoma     Depression     Hepatitis B     Hepatitis C antibody test positive     RNA NEG 8/29/2019    HIV infection     Hypertension     Immune disorder     Mild cognitive impairment 10/1/2010       Surgical History:   Haritha Valle  has a past surgical history that includes Colonoscopy (N/A, 11/3/2016); Cholecystectomy; Appendectomy; Left heart catheterization (Left, 2/3/2020); Cardiac catheterization; and Colonoscopy (N/A, 1/25/2022).    Medications:   Haritha has a current medication list which includes the following prescription(s): albuterol, alprazolam, atazanavir, fluoxetine, lamivudine-zidovudine 150-300mg, metoprolol succinate, rosuvastatin, valsartan, and vascepa.    Allergies:   Review of patient's allergies indicates:   Allergen Reactions    No known drug allergies           OBJECTIVE         CMS Impairment/Limitation/Restriction for FOTO Vestibular Survey    Therapist reviewed FOTO scores for Haritha Valle on 8/9/2023.   FOTO " documents entered into [employer] - see Media section.    Limitation Score: 43%          Vestibular Testing  Horizontal tracking: Normal  Vertical Tracking: Normal  Diagonal tracking:  Normal  Dynamic Visual Acuity (Horz/Vert): 5/3 line loss  Convergence: Abnormal  Saccades: Normal    Vestibular:     Head impulse:  Not able to appropriately test. (pt became dizzy at beginning of test)    Melissa Hallpike  R positive, L negative    Balance testing:  Fukada Test: Slight R tilt    mCTSIB  LOB description   Condition 1:   flat surface, eyes open  20s Slight sway   Condition 2:   flat surface, eyes closed 20s Minimal backward sway   Condition 3:   foam surface, eyes open 20s    Condition 4:   foam surface, eyes closed 20s Minimal backward sway     FTSTS: 32.11 increased nausea and dizziness 7/10    TREATMENT     Total Treatment time (time-based codes) separate from Evaluation: 20 minutes      Haritha received the treatments listed below:    VOR, head circles, targeting, and ankle sways  R Epley Maneuver       PATIENT EDUCATION AND HOME EXERCISES     Education provided:   - Diagnosis and POC  - HEP    Written Home Exercises Provided: yes. Exercises were reviewed and Haritha was able to demonstrate them prior to the end of the session.  Haritha demonstrated good  understanding of the education provided. See EMR under Patient Instructions for exercises provided during therapy sessions.    ASSESSMENT     Haritha is a 78 y.o. male referred to outpatient Physical Therapy with a medical diagnosis of Dizziness, pt presents with signs and symptoms consistent with diagnosis. The patient presents with impairments which include impaired functional mobility, impaired balance, and impaired coordination.  These impairments are limiting patient's functional mobility, quality of life, and ability to complete ADLs independently. Patient will benefit from therapy to decrease symptoms of dizziness, improve balance, and functional mobility.    Pt prognosis is  Good due to personal factors and co-morbidities listed below.   Pt will benefit from skilled outpatient Physical Therapy to address the deficits stated above and in the chart below, provide pt/family education, and to maximize pt's level of independence.     Plan of care discussed with patient: Yes  Pt's spiritual, cultural and educational needs considered and patient is agreeable to the plan of care and goals as stated below:     Anticipated Barriers for therapy:     Medical Necessity is demonstrated by the following  History  Co-morbidities and personal factors that may impact the plan of care [] LOW: no personal factors / co-morbidities  [x] MODERATE: 1-2 personal factors / co-morbidities  [] HIGH: 3+ personal factors / co-morbidities    Moderate / High Support Documentation:   Past Medical History:   Diagnosis Date    Anxiety 7/3/2019    Basal cell carcinoma     Depression     Hepatitis B     Hepatitis C antibody test positive     RNA NEG 8/29/2019    HIV infection     Hypertension     Immune disorder     Mild cognitive impairment 10/1/2010        Examination  Body Structures and Functions, activity limitations and participation restrictions that may impact the plan of care [x] LOW: addressing 1-2 elements  [] MODERATE: 3+ elements  [] HIGH: 4+ elements (please support below)    Moderate / High Support Documentation:      Clinical Presentation [x] LOW: stable  [] MODERATE: Evolving  [] HIGH: Unstable     Decision Making/ Complexity Score: low         STG'S: 6 weeks  1. Patient independent in HEP of habituation and repositioning.  Exercises to be done Daily.  2. Decrease patient's subjective rating of dizziness to a 5 on 0-10 scale) with standing up from the bed in the mornings.  3.  Decrease patient's subjective rating of dizziness to a  3 (on 0-10 scale) or less as baseline.  4. Pt to report less than 4/10 dizziness after standing from chair 2x.       LTG'S : 10 weeks  1. Patient able to ambulate in community  safely without assistive device, on varied terrain with head movement, without LOB or c/o dizziness.   2. Patient's subjective rating of dizziness will decreased to 0-2 on 0-10 scale  3. Patient able to get out of his car without LOB or c/o dizziness.  4. Pt to be I with self management of condition and progression vestibular program for maintenance.        PLAN     Plan of care Certification: 8/9/2023 to 10/11/2023.    Outpatient Physical Therapy 1 times weekly for 10 weeks to include the following interventions: Manual Therapy, Moist Heat/ Ice, Neuromuscular Re-ed, Patient Education, Therapeutic Activities, and Therapeutic Exercise.     Susana Stout, SPT      I CERTIFY THE NEED FOR THESE SERVICES FURNISHED UNDER THIS PLAN OF TREATMENT AND WHILE UNDER MY CARE   Physician's comments:     Physician's Signature: ___________________________________________________

## 2023-08-09 NOTE — PROGRESS NOTES
"[facility]   Physical Therapy Initial Evaluation     Date: 8/9/2023   Name: Haritha Valle  Clinic Number: [id number]    Therapy Diagnosis: No diagnosis found.  Physician: Rex Fulton MD    Physician Orders: PT Eval and Treat   Medical Diagnosis from Referral: ***  Evaluation Date: 8/9/2023  Authorization Period Expiration: 7/23/2024  Plan of Care Expiration: 10/11/2023  Progress Note Due: 10th visit  Visit # / Visits authorized: 1/ 20   FOTO: 1/ 3     Precautions: {IP WOUND PRECAUTIONS OHS:75186}    Time In: ***  Time Out: ***  Total Appointment Time (timed & untimed codes): *** minutes    SUBJECTIVE   Date of onset: ***    History of current condition - Haritha reports: ***    Falls: ***    Imaging, {Mri/ctscan/bone scan:25515}: ***    Prior Therapy: ***  Social History: *** {LIVES WITH:61514}  Occupation: ***  Prior Level of Function: ***  Current Level of Function: ***    Dizziness:  Current {0-10:39174::"0"}/10, worst {0-10:01745::"0"}/10, best {0-10:86761::"0"}/10   Location: {RIGHT LEFT BILATERAL:39915} {LOCATION ON BODY:26114} {Pain Loc:94657}  Description: {Pain Description:91443}  Aggravating Factors: {Causes; Pain:81009}  Easing Factors: {Pain (activities that relieve):06114}    Patients goals: ***     Medical History:   Past Medical History:   Diagnosis Date    Anxiety 7/3/2019    Basal cell carcinoma     Depression     Hepatitis B     Hepatitis C antibody test positive     RNA NEG 8/29/2019    HIV infection     Hypertension     Immune disorder     Mild cognitive impairment 10/1/2010       Surgical History:   Haritha Valle  has a past surgical history that includes Colonoscopy (N/A, 11/3/2016); Cholecystectomy; Appendectomy; Left heart catheterization (Left, 2/3/2020); Cardiac catheterization; and Colonoscopy (N/A, 1/25/2022).    Medications:   Haritha has a current medication list which includes the following prescription(s): albuterol, alprazolam, atazanavir, fluoxetine, " "lamivudine-zidovudine 150-300mg, metoprolol succinate, rosuvastatin, valsartan, and vascepa.    Allergies:   Review of patient's allergies indicates:   Allergen Reactions    No known drug allergies           OBJECTIVE         CMS Impairment/Limitation/Restriction for FOTO Vestibular Survey    Therapist reviewed FOTO scores for Haritha Valle on 8/9/2023.   FOTO documents entered into [employer] - see Media section.    Limitation Score: ***%          CERVICAL  (single inclinometer at top of head) AROM  (degrees/%) Pain/Dysfunction with Movement   Flexion ***    Extension ***    Right side bending ***    Left side bending ***    Right rotation *** %    Left rotation ***%        Vestibular Testing  Visual Fields: ***  Horizontal tracking: ***  Vertical Tracking: ***  Diagonal tracking:  ***  Lateral Visual acuity: ***  Dynamic Visual Acuity: ***  Vestibular Occular Reflex   Vertical: ***   Horizontal: ***  Convergence: ***  [place] Halpike:  ***  Saccades: ***    Vestibular:     Head impulse:  *** Nystagmus   Head shake:  *** nystagmus   [place] Hallpike  R ***, L ***   Roll test R ***, L ***    Balance testing:  Double limb standing eyes open/ eyes closed: ***  Single limb standing eyes open/ eyes closed: ***  Double limb standing on foam eyes open/ closed:  ***  Single limb standign on foam eyes open/ closed: ***  Tandem gait: ***  Tandem stance: ***  Gait eyes closed: ***    mCTSIB  LOB description   Condition 1:   flat surface, eyes open  ***    Condition 2:   flat surface, eyes closed ***    Condition 3:   foam surface, eyes open ***    Condition 4:   foam surface, eyes closed ***        TREATMENT     Total Treatment time (time-based codes) separate from Evaluation: *** minutes      Haritha received the treatments listed below:        PATIENT EDUCATION AND HOME EXERCISES     Education provided:   - ***    Written Home Exercises Provided: {Blank single:78502::"yes","Patient instructed to cont prior HEP"}. Exercises were reviewed " and Haritha was able to demonstrate them prior to the end of the session.  Haritha demonstrated {Desc; good/fair/poor:64794} understanding of the education provided. See EMR under Patient Instructions for exercises provided during therapy sessions.    ASSESSMENT     Haritha is a 78 y.o. male referred to outpatient Physical Therapy with a medical diagnosis of ***, pt presents with signs and symptoms consistent with diagnosis along with ***. The patient presents with impairments which include {Rehab identified problem list/impairments:73114}.  These impairments are limiting patient's ability to ***.     Pt prognosis is {REHAB PROGNOSIS OHS:56191} due to personal factors and co-morbidities listed below.   Pt will benefit from skilled outpatient Physical Therapy to address the deficits stated above and in the chart below, provide pt/family education, and to maximize pt's level of independence.     Plan of care discussed with patient: {YES:59944}  Pt's spiritual, cultural and educational needs considered and patient is agreeable to the plan of care and goals as stated below:     Anticipated Barriers for therapy: ***    Medical Necessity is demonstrated by the following  History  Co-morbidities and personal factors that may impact the plan of care [] LOW: no personal factors / co-morbidities  [] MODERATE: 1-2 personal factors / co-morbidities  [] HIGH: 3+ personal factors / co-morbidities    Moderate / High Support Documentation: ***  Past Medical History:   Diagnosis Date    Anxiety 7/3/2019    Basal cell carcinoma     Depression     Hepatitis B     Hepatitis C antibody test positive     RNA NEG 8/29/2019    HIV infection     Hypertension     Immune disorder     Mild cognitive impairment 10/1/2010        Examination  Body Structures and Functions, activity limitations and participation restrictions that may impact the plan of care [] LOW: addressing 1-2 elements  [] MODERATE: 3+ elements  [] HIGH: 4+ elements (please support  "below)    Moderate / High Support Documentation: ***     Clinical Presentation [] LOW: stable  [] MODERATE: Evolving  [] HIGH: Unstable     Decision Making/ Complexity Score: {Desc; low/moderate/high:493334}         STG'S: *** {WEEKS/MONTHS EC:91383}  1. Patient independent in HEP of {AMB PT VESTIBULAR STG'S:50265}.  Exercises to be done {WOUND FREQUENCY:93269}.  2. Decrease patient's subjective rating of dizziness to a {NUMBERS 1-10:24260} on 0-10 scale) with bed mobility and transfers.   3.  Decrease patient's subjective rating of dizziness to a  {NUMBERS 1-10:42537} (on 0-10 scale) or less as baseline.    LTG'S : *** {WEEKS/MONTHS EC:03372}  1. Patient able to ambulate in {AMB PT VESTIBULAR HOME:47424} safely {WITH-WITHOUT:24936} assistive device, on varied terrain{WITH-WITHOUT:84861} head movement, without LOB or c/o dizziness.  DGI improvement by 1-3 points.  2. Patient's subjective rating of dizziness will decreased to 0-2 on 0-10 scale  3. Patient able to perform *** without LOB or c/o dizziness  4.  Reduce risk of falls with Tai Balance gains by 3-5 points.  5. Pt to be I with self management of condition and progression vestibular program for maintenance.        PLAN     Plan of care Certification: 8/9/2023 to ***.    Outpatient Physical Therapy {NUMBERS 1-5:53853} times weekly for {0-10:20507::"0"} weeks to include the following interventions: {TX PLAN:09130}.     Giselle Gonzalez, PT      I CERTIFY THE NEED FOR THESE SERVICES FURNISHED UNDER THIS PLAN OF TREATMENT AND WHILE UNDER MY CARE   Physician's comments:     Physician's Signature: ___________________________________________________       "

## 2023-08-10 ENCOUNTER — PROCEDURE VISIT (OUTPATIENT)
Dept: OPHTHALMOLOGY | Facility: CLINIC | Age: 78
End: 2023-08-10
Payer: MEDICARE

## 2023-08-10 DIAGNOSIS — H35.3231 EXUDATIVE AGE-RELATED MACULAR DEGENERATION OF BOTH EYES WITH ACTIVE CHOROIDAL NEOVASCULARIZATION: Primary | ICD-10-CM

## 2023-08-10 PROBLEM — R42 DIZZINESS: Status: ACTIVE | Noted: 2023-08-10

## 2023-08-10 PROCEDURE — 99499 UNLISTED E&M SERVICE: CPT | Mod: S$PBB,,, | Performed by: OPHTHALMOLOGY

## 2023-08-10 PROCEDURE — 99499 NO LOS: ICD-10-PCS | Mod: S$PBB,,, | Performed by: OPHTHALMOLOGY

## 2023-08-10 PROCEDURE — 92134 POSTERIOR SEGMENT OCT RETINA (OCULAR COHERENCE TOMOGRAPHY)-BOTH EYES: ICD-10-PCS | Mod: 26,S$PBB,, | Performed by: OPHTHALMOLOGY

## 2023-08-10 PROCEDURE — 92134 CPTRZ OPH DX IMG PST SGM RTA: CPT | Mod: PBBFAC | Performed by: OPHTHALMOLOGY

## 2023-08-10 NOTE — PLAN OF CARE
"[facility]   Physical Therapy Initial Evaluation     Date: 8/9/2023   Name: Haritha Valle  [facility] Number: [id number]    Therapy Diagnosis:   Encounter Diagnosis   Name Primary?    Dizziness      Physician: Rex Fulton MD    Physician Orders: PT Eval and Treat   Medical Diagnosis from Referral: Dizziness  Evaluation Date: 8/9/2023  Authorization Period Expiration: 7/23/2024  Plan of Care Expiration: 10/11/2023  Progress Note Due: 10th visit  Visit # / Visits authorized: 1/ 20   FOTO: 1/ 3     Precautions: Standard and Blood Precautions    Time In: 2:10 pm  Time Out: 3:00 pm  Total Appointment Time (timed & untimed codes): 50 minutes    SUBJECTIVE   Date of onset: 6 months ago    History of current condition - Haritha reports having symptoms of dizziness beginning around 6 months ago. Pt describes dizziness as "like my world is tilted on an axis". Pt states symptoms of dizziness usually appears when bending over, walking, getting out of the car and when cars pass in his peripheral vision field while driving. Pt reports having to hold the beckham of his car after getting out in order to maintain his balance during symptoms. Pt states feeling dizziness and being nausea that began 2-3 weeks ago while getting up from the bed in the mornings. Pt does report having 4 falls where someone had to help him up but also some where he was able to pick himself up. Pt reports holding his heading and sitting down decreases his symptoms.    Referral History of Current Condition:    79 y/o gentleman here for evaluation of dizziness that has been present intermittently for the past year.  He describes episodes that happen whenever he gets up from a seated position and when he walks during which he feels as if the world tilts on its axis.  He denies any true spinning dizziness.  He has not noticed any associated acute changes in his hearing or tinnitus.  He does wear hearing aids and has a known history of SNHL.  " "He has had an extensive workup for this dizziness including cardiology eval, neurology eval, multiple imaging studies.  He also had a VNG in 2019 which demonstrated possible central vestibular abnormality.  He has not attended any vestibular therapy.     Falls: 4    Imaging, MRI studies:  IMPRESSION:     Diffuse atrophic changes, with no evidence for a superimposed neurodegenerative process.     Prior Therapy: No  Social History:  lives with their spouse  Occupation: Owner  Prior Level of Function: Independent w/ ADLs  Current Level of Function: Mod I    Dizziness:  Current 7/10, worst 9/10, best 5/10   Location: bilateral head    Description: "world is tilting on an axis"  Aggravating Factors: Laying, Bending, Walking, Morning, and Getting out of bed/chair  Easing Factors: rest    Patients goals: Pt would like to decrease symptoms of dizziness in order to improve quality of life.     Medical History:   Past Medical History:   Diagnosis Date    Anxiety 7/3/2019    Basal cell carcinoma     Depression     Hepatitis B     Hepatitis C antibody test positive     RNA NEG 8/29/2019    HIV infection     Hypertension     Immune disorder     Mild cognitive impairment 10/1/2010       Surgical History:   Haritha Valle  has a past surgical history that includes Colonoscopy (N/A, 11/3/2016); Cholecystectomy; Appendectomy; Left heart catheterization (Left, 2/3/2020); Cardiac catheterization; and Colonoscopy (N/A, 1/25/2022).    Medications:   Haritha has a current medication list which includes the following prescription(s): albuterol, alprazolam, atazanavir, fluoxetine, lamivudine-zidovudine 150-300mg, metoprolol succinate, rosuvastatin, valsartan, and vascepa.    Allergies:   Review of patient's allergies indicates:   Allergen Reactions    No known drug allergies           OBJECTIVE         CMS Impairment/Limitation/Restriction for FOTO Vestibular Survey    Therapist reviewed FOTO scores for Haritha Valle on 8/9/2023.   FOTO " documents entered into EPIC - see Media section.    Limitation Score: 43%          Vestibular Testing  Horizontal tracking: Normal  Vertical Tracking: Normal  Diagonal tracking:  Normal  Dynamic Visual Acuity (Horz/Vert): 5/3 line loss  Convergence: Abnormal  Saccades: Normal    Vestibular:     Head impulse:  Not able to appropriately test. (pt became dizzy at beginning of test)    Melissa Hallpike  R horizontal nystagmus ageotropic noted , L negative  Horizontal Hybrid CRM on right side        Balance testing:  Fukada Test: Slight R tilt    mCTSIB  LOB description   Condition 1:   flat surface, eyes open  20s Slight sway   Condition 2:   flat surface, eyes closed 20s Minimal backward sway   Condition 3:   foam surface, eyes open 20s    Condition 4:   foam surface, eyes closed 20s Minimal backward sway     FTSTS: 32.11 increased nausea and dizziness 7/10    TREATMENT     Total Treatment time (time-based codes) separate from Evaluation: 20 minutes      Haritha received the treatments listed below:    VOR, head circles, targeting, and ankle sways  R side horizontal hybrid to correct right ageotropic nystagmus noted in SL test      PATIENT EDUCATION AND HOME EXERCISES     Education provided:   - Diagnosis and POC  - HEP    Written Home Exercises Provided: yes. Exercises were reviewed and Haritha was able to demonstrate them prior to the end of the session.  Haritha demonstrated good  understanding of the education provided. See EMR under Patient Instructions for exercises provided during therapy sessions.    ASSESSMENT     Haritha is a 78 y.o. male referred to outpatient Physical Therapy with a medical diagnosis of Dizziness, pt presents with signs and symptoms consistent with diagnosis. The patient presents with impairments which include impaired functional mobility, impaired balance, and impaired coordination.  These impairments are limiting patient's functional mobility, quality of life, and ability to complete ADLs  independently. Patient will benefit from therapy to decrease symptoms of dizziness, improve balance, and functional mobility.    Pt prognosis is Good due to personal factors and co-morbidities listed below.   Pt will benefit from skilled outpatient Physical Therapy to address the deficits stated above and in the chart below, provide pt/family education, and to maximize pt's level of independence.     Plan of care discussed with patient: Yes  Pt's spiritual, cultural and educational needs considered and patient is agreeable to the plan of care and goals as stated below:     Anticipated Barriers for therapy:     Medical Necessity is demonstrated by the following  History  Co-morbidities and personal factors that may impact the plan of care [] LOW: no personal factors / co-morbidities  [x] MODERATE: 1-2 personal factors / co-morbidities  [] HIGH: 3+ personal factors / co-morbidities    Moderate / High Support Documentation:   Past Medical History:   Diagnosis Date    Anxiety 7/3/2019    Basal cell carcinoma     Depression     Hepatitis B     Hepatitis C antibody test positive     RNA NEG 8/29/2019    HIV infection     Hypertension     Immune disorder     Mild cognitive impairment 10/1/2010        Examination  Body Structures and Functions, activity limitations and participation restrictions that may impact the plan of care [x] LOW: addressing 1-2 elements  [] MODERATE: 3+ elements  [] HIGH: 4+ elements (please support below)    Moderate / High Support Documentation:      Clinical Presentation [x] LOW: stable  [] MODERATE: Evolving  [] HIGH: Unstable     Decision Making/ Complexity Score: low         STG'S: 6 weeks  1. Patient independent in HEP of habituation and repositioning.  Exercises to be done Daily.  2. Decrease patient's subjective rating of dizziness to a 5 on 0-10 scale) with standing up from the bed in the mornings.  3.  Decrease patient's subjective rating of dizziness to a  3 (on 0-10 scale) or less as  baseline.  4. Pt to report less than 4/10 dizziness after standing from chair 2x.       LTG'S : 10 weeks  1. Patient able to ambulate in community safely without assistive device, on varied terrain with head movement, without LOB or c/o dizziness.   2. Patient's subjective rating of dizziness will decreased to 0-2 on 0-10 scale  3. Patient able to get out of his car without LOB or c/o dizziness.  4. Pt to be I with self management of condition and progression vestibular program for maintenance.        PLAN     Plan of care Certification: 8/9/2023 to 10/11/2023.    Outpatient Physical Therapy 1 times weekly for 10 weeks to include the following interventions: Manual Therapy, Moist Heat/ Ice, Neuromuscular Re-ed, Patient Education, Therapeutic Activities, and Therapeutic Exercise.     Susana Stout, SPT      I CERTIFY THE NEED FOR THESE SERVICES FURNISHED UNDER THIS PLAN OF TREATMENT AND WHILE UNDER MY CARE   Physician's comments:     Physician's Signature: ___________________________________________________

## 2023-08-10 NOTE — PROGRESS NOTES
===============================  Date today is 8/10/2023  Haritha Valle is a 78 y.o. male  Last visit [facility]: :7/27/2023   Last visit eye dept. 7/31/2023    Corrected distance visual acuity was 20/30 in the right eye and 20/30 in the left eye.  Not recorded       Not recorded       Not recorded       Not recorded       Chief Complaint   Patient presents with    srn     Avastin ou       Problem List Items Addressed This Visit    None  Visit Diagnoses       Exudative age-related macular degeneration of both eyes with active choroidal neovascularization    -  Primary    Relevant Orders    Posterior Segment OCT Retina-Both eyes (Completed)    Prior authorization Order          Instructed to call 24/7 for any worsening of vision, visual distortion or pain.  Check OU independently daily.    Gave my office and personal cell phone number.  ________________  8/10/2023 today  Haritha Valle    OU SRN  OCT perfect post second Vabysmo 7/27/23  No injection today  Plan vabysmo OU in 2 weeks    RTC 2 weeks for Vabysmo OU  Instructed to call 24/7 for any worsening of vision or symptoms. Check OU daily.   Gave my office and cell phone number.      ..    =============================

## 2023-08-17 ENCOUNTER — PATIENT MESSAGE (OUTPATIENT)
Dept: OPTOMETRY | Facility: CLINIC | Age: 78
End: 2023-08-17
Payer: COMMERCIAL

## 2023-08-17 ENCOUNTER — OFFICE VISIT (OUTPATIENT)
Dept: NEUROLOGY | Facility: CLINIC | Age: 78
End: 2023-08-17
Payer: MEDICARE

## 2023-08-17 VITALS — SYSTOLIC BLOOD PRESSURE: 133 MMHG | DIASTOLIC BLOOD PRESSURE: 91 MMHG | HEART RATE: 76 BPM

## 2023-08-17 DIAGNOSIS — B20 NEUROPATHY DUE TO HIV: ICD-10-CM

## 2023-08-17 DIAGNOSIS — G63 NEUROPATHY DUE TO HIV: ICD-10-CM

## 2023-08-17 DIAGNOSIS — R42 DIZZINESS: Primary | ICD-10-CM

## 2023-08-17 DIAGNOSIS — F34.89 OTHER SPECIFIED PERSISTENT MOOD DISORDERS: ICD-10-CM

## 2023-08-17 PROCEDURE — 99999 PR PBB SHADOW E&M-EST. PATIENT-LVL III: CPT | Mod: PBBFAC,,, | Performed by: PSYCHIATRY & NEUROLOGY

## 2023-08-17 PROCEDURE — 99213 OFFICE O/P EST LOW 20 MIN: CPT | Mod: S$PBB,,, | Performed by: PSYCHIATRY & NEUROLOGY

## 2023-08-17 PROCEDURE — 99213 OFFICE O/P EST LOW 20 MIN: CPT | Mod: PBBFAC | Performed by: PSYCHIATRY & NEUROLOGY

## 2023-08-17 PROCEDURE — 99213 PR OFFICE/OUTPT VISIT, EST, LEVL III, 20-29 MIN: ICD-10-PCS | Mod: S$PBB,,, | Performed by: PSYCHIATRY & NEUROLOGY

## 2023-08-17 PROCEDURE — 99999 PR PBB SHADOW E&M-EST. PATIENT-LVL III: ICD-10-PCS | Mod: PBBFAC,,, | Performed by: PSYCHIATRY & NEUROLOGY

## 2023-08-17 NOTE — PROGRESS NOTES
"Chief Complaint: Dizziness    Subjective:     History of Present Illness    Referring Provider: Self Referral  Accompanied by: Partner     04/27/2023: Haritha Valle is a 77 y.o. male with h/o anxiety, basal cell carcinoma s/p resection, depression, HBV, HCV, HIV, HTN, HLD, RAMONITA not on CPAP, dementia who presents for dizziness evaluation. Dizziness started 2 years ago and has worsened in the last 1 year. He denies an inciting etiology for dizziness onset or for it's worsening including no injury, medication change, or health change. Per partner, he had had a cardiac workup and told heart fine and told years ago had "water on the brain" and discussed putting a valve back there. Dizziness is defined as imbalance and gets lightheadedness. He gets lightheadedness on standing that does not fully improve with waiting before he moves. He has tried meclizine that does not help and has been taking twice it day for 2 years. Per partner, he drinks a lot of water and he does not like salt. He has not tried increased hydration, extra salt, or compression stockings. During this time of dizziness, he has developed fatigued and SOB. SOB per description starts after a few steps and per partner at home he will get SOB from going to the bathroom and back. He states his taste changed 4 months ago where things do not taste as sweet or salty and has last 6-8 lbs as result. He has been on his same HIV medications for 25 years and once discussed new medication option that was too expensive and his specialist retired (per chart review has not seen someone since 2019) but he notes he recently missed his medications for 2 weeks and actually all of his symptoms felt better and he is now back on his normal HIV medication regimen. He states his headaches have improved a lot and per partner he has not complained for years about them. Per partner, he used to take testosterone injections and has stopped them. He denies weakness, numbness, tingling, " change in vision but per partner he will be have ocular injections for macular degeneration. He is sleeping well and takes Simply Sleep (per partner is benadryl based) and wakes up rested. Per partner, he snores and denies apnea. He is not wearing his CPAP. Mood is mood swings per partner and can quickly get angry and suddenly be fine.     06/15/2023: Haritha Valle is a 78 y.o. male with h/o anxiety, basal cell carcinoma s/p resection, depression, HBV, HCV, HIV, HTN, HLD, RAMONITA not on CPAP, dementia who presents for follow up. On last visit, ordered B12, MMA, homocysteine, CD4 count and referred to pulmonology, HIV specialist, vestibular PT and counseled on neuropathy precautions and discontinued meclizine and counseled on CPAP. He states that he is not better or worse. Per partner, he stopped the meclizine. He states dizziness and fatigue is terrible. Dizziness is defined as spinning, imbalance, lightheadedness and worsens when looks up and when gets stressed that he noticed couple weeks. Dizziness is constant and is better in the morning and is bad by noon. He has not done vestibular PT and has not made an appointment yet. He is following with neuropathy precautions. He has not looked into new HIV specialist. He has not seen pulmonology. He is not using CPAP. He states when he tried marijuana gummies that helped calm him down as he describes increased anxiety. He is not following with a mental health provider currently.    08/17/2023: Haritha Valle is a 78 y.o. male with h/o anxiety, basal cell carcinoma s/p resection, depression, HBV, HCV, HIV, HTN, HLD, RAMONITA not on CPAP, dementia who presents for follow up. On last visit, referred to pulmonology, HIV specialist, vestibular PT, psychology and counseled on neuropathy precautions and on CPAP. He has done vestibular PT and is still going and notes some of the exercises are difficult to do. He saw pulmonology and describes doing an O2 sat test and is doing a walking  test next week with pulmonologist to see if needs home oxygen per partner. Per partner, he got a breathing treatment. Dizziness is not any better and defined as room tilting to the left currently and sometimes feels like he is going up or down a hill when driving when he is actually on a flat road. Per partner, he has an upcoming appointment with HIV specialist. Per partner, he does not have a psychology appointment schedule. He is using neuropathy socks and following neuropathy precautions. Pulmonology is working with him on sleep. He has stopped his HIV medications as he wanted to see new specialist first. He has some nausea since stopping HIV medications. 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    Current Outpatient Medications on File Prior to Visit   Medication Sig Dispense Refill    albuterol (PROVENTIL/VENTOLIN HFA) 90 mcg/actuation inhaler Inhale 2 puffs into the lungs every 4 (four) hours as needed for Wheezing or Shortness of Breath. 18 g 11    ALPRAZolam (XANAX) 0.5 MG tablet Take by mouth.      FLUoxetine 40 MG capsule Take 1 capsule (40 mg total) by mouth once daily. 90 capsule 3    metoprolol succinate (TOPROL-XL) 50 MG 24 hr tablet TAKE 1 TABLET EVERY DAY 90 tablet 2    rosuvastatin (CRESTOR) 20 MG tablet TAKE 1 TABLET EVERY DAY 90 tablet 3    valsartan (DIOVAN) 40 MG tablet Take 40 mg by mouth once daily.      VASCEPA 1 gram Cap Take by mouth.       No current facility-administered medications on file prior to visit.       Review of patient's allergies indicates:   Allergen Reactions    No known drug allergies        Family History   Problem Relation Age of Onset    Heart disease Father     Heart failure Father     Diabetes Neg Hx     Hypertension Neg Hx        Social History     Tobacco Use    Smoking status: Never    Smokeless tobacco: Never   Substance Use Topics    Alcohol use: No    Drug use: Not Currently       Review of Systems  Constitutional: No fevers, no chills,  no change in weight  Eye/Vision: See HPI  Ear/Nose/Mouth/Throat: See HPI; no cough, no runny nose, no sore throat  Respiratory: No shortness of breath, no problems breathing  Cardiovascular: No chest pain  Gastrointestinal: See HPI, no diarrhea, no constipation  Genitourinary: No dysuria  Musculoskeletal: See HPI  Integumentary: No skin changes  Neurologic: See HPI  Psychiatric: depression, anxiety, denies SI and HI.    Objective:     Vitals:    08/17/23 1456   BP: (!) 133/91   Pulse: 76       Labs:    Lab Visit on 07/20/2023   Component Date Value Ref Range Status    WBC 07/20/2023 7.80  3.90 - 12.70 K/uL Final    RBC 07/20/2023 3.95 (L)  4.60 - 6.20 M/uL Final    Hemoglobin 07/20/2023 13.6 (L)  14.0 - 18.0 g/dL Final    Hematocrit 07/20/2023 41.0  40.0 - 54.0 % Final    MCV 07/20/2023 104 (H)  82 - 98 fL Final    MCH 07/20/2023 34.4 (H)  27.0 - 31.0 pg Final    MCHC 07/20/2023 33.2  32.0 - 36.0 g/dL Final    RDW 07/20/2023 15.8 (H)  11.5 - 14.5 % Final    Platelets 07/20/2023 223  150 - 450 K/uL Final    MPV 07/20/2023 9.4  9.2 - 12.9 fL Final    Immature Granulocytes 07/20/2023 0.1  0.0 - 0.5 % Final    Gran # (ANC) 07/20/2023 2.5  1.8 - 7.7 K/uL Final    Immature Grans (Abs) 07/20/2023 0.01  0.00 - 0.04 K/uL Final    Comment: Mild elevation in immature granulocytes is non specific and   can be seen in a variety of conditions including stress response,   acute inflammation, trauma and pregnancy. Correlation with other   laboratory and clinical findings is essential.      Lymph # 07/20/2023 4.1  1.0 - 4.8 K/uL Final    Mono # 07/20/2023 1.0  0.3 - 1.0 K/uL Final    Eos # 07/20/2023 0.2  0.0 - 0.5 K/uL Final    Baso # 07/20/2023 0.03  0.00 - 0.20 K/uL Final    nRBC 07/20/2023 0  0 /100 WBC Final    Gran % 07/20/2023 31.4 (L)  38.0 - 73.0 % Final    Lymph % 07/20/2023 52.7 (H)  18.0 - 48.0 % Final    Mono % 07/20/2023 12.7  4.0 - 15.0 % Final    Eosinophil % 07/20/2023 2.7  0.0 - 8.0 % Final    Basophil % 07/20/2023  0.4  0.0 - 1.9 % Final    Differential Method 07/20/2023 Automated   Final    Sodium 07/20/2023 137  136 - 145 mmol/L Final    Potassium 07/20/2023 4.8  3.5 - 5.1 mmol/L Final    Chloride 07/20/2023 106  95 - 110 mmol/L Final    CO2 07/20/2023 23  23 - 29 mmol/L Final    Glucose 07/20/2023 100  70 - 110 mg/dL Final    BUN 07/20/2023 22  8 - 23 mg/dL Final    Creatinine 07/20/2023 0.8  0.5 - 1.4 mg/dL Final    Calcium 07/20/2023 9.6  8.7 - 10.5 mg/dL Final    Total Protein 07/20/2023 8.2  6.0 - 8.4 g/dL Final    Albumin 07/20/2023 3.6  3.5 - 5.2 g/dL Final    Total Bilirubin 07/20/2023 0.6  0.1 - 1.0 mg/dL Final    Comment: For infants and newborns, interpretation of results should be based  on gestational age, weight and in agreement with clinical  observations.    Premature Infant recommended reference ranges:  Up to 24 hours.............<8.0 mg/dL  Up to 48 hours............<12.0 mg/dL  3-5 days..................<15.0 mg/dL  6-29 days.................<15.0 mg/dL      Alkaline Phosphatase 07/20/2023 36 (L)  55 - 135 U/L Final    AST 07/20/2023 25  10 - 40 U/L Final    ALT 07/20/2023 30  10 - 44 U/L Final    eGFR 07/20/2023 >60.0  >60 mL/min/1.73 m^2 Final    Anion Gap 07/20/2023 8  8 - 16 mmol/L Final    BNP 07/20/2023 <10  0 - 99 pg/mL Final    Values of less than 100 pg/ml are consistent with non-CHF populations.     I personally reviewed all current labs noted in today's chart.    Imaging:    No new neurological studies    Assessment:       ICD-10-CM ICD-9-CM    1. 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 Other specified persistent mood disorders  F34.89 296.99          78 y.o. male with h/o anxiety, basal cell carcinoma s/p resection, depression, HBV, HCV, HIV, HTN, HLD, RAMONITA not on CPAP, dementia who presents for follow up. On initial exam, he has decreased temperature sensation below the knee bilaterally, imbalance. We discussed previously for dizziness, could be a  combination of HIV neuropathy and meclizine as meclizine can worsen non-vertigo dizziness and can be from his older HIV medications, particularly since he felt better when he did not have access to them for a couple of weeks. For fatigue, could be related to metabolic etiologies and recent labwork WNL. We discussed previously SOB could be related or unrelated to the above. We discussed previously re-establishing with HIV specialist to see if a newer medication may improve your current symptoms. We discussed previously lack of temperature sensation in lower legs is indicative of small fiber neuropathy, which is caused at least by HIV and other conditions like B12 deficiency could contribute to it. Overall, his symptoms are persistent.    Plan:     B12, MMA, homocysteine, CD4 WNL  Follow up with pulmonology for breathing and sleep  Referral for HIV specialist placed previously  Continue vestibular PT for balance  You do not feel your feet well due to neuropathy. Please wear footwear with good tread at all times. This recommendation includes wearing socks with tread on them. Make sure all of your walkways, hallways, and sourav are well lit at all times day and night. Use night lights to light up commonly used pathways in your home, meaning from the bedroom to the bathroom or kitchen. Make sure all of your walkways, hallways, and sourav are clear of obstacles at all times. Obstacles include decorations, rugs/mats, pet beds, shoes, and clothes. Make sure you have good hand holds to grab onto as you walk around your home.  Counseled on importance of wearing CPAP as helps prevent stop breathing at night  Referral for psychology consultation placed previously. The reason for this consultation is to address the patient's cognitive, mood, and/or sleep concerns while focusing on modifiable behavioral factors to improve their physical, cognitive, and emotional health and aid their overall recovery from their TBI/neck  injury. The question being answered by this consultation is to further identify any mental health, sleep hygiene, and/or cognitive barriers impacting the patient's ability to heal from their TBI/neck injury. The information from this consultation will be used by myself and other providers/therapists to help guide an individual care plan to help treat this patient's symptoms from TBI/neck injury. Any delays or failures to address cognitive, sleep, psychological symptoms after TBI/neck injury can contribute to persistent symptoms, prolong their overall recovery process, and potentially lead to permanent symptoms.    23 minutes were spent on the date of this patient encounter, which includes: preparing to see the patient, reviewing previous history, obtaining new patient history, performing the physical exam, counseling and educating the patient and/or family/caregiver, ordering necessary medications or tests or referrals, documenting in the electronic medical record, coordinating care.    Faustino Herrera MD  Sports Neurology

## 2023-08-17 NOTE — PATIENT INSTRUCTIONS
B12, MMA, homocysteine, CD4 WNL  Follow up with pulmonology for breathing and sleep  Referral for HIV specialist placed previously  Continue vestibular PT for balance  You do not feel your feet well due to neuropathy. Please wear footwear with good tread at all times. This recommendation includes wearing socks with tread on them. Make sure all of your walkways, hallways, and sourav are well lit at all times day and night. Use night lights to light up commonly used pathways in your home, meaning from the bedroom to the bathroom or kitchen. Make sure all of your walkways, hallways, and sourav are clear of obstacles at all times. Obstacles include decorations, rugs/mats, pet beds, shoes, and clothes. Make sure you have good hand holds to grab onto as you walk around your home.  Counseled on importance of wearing CPAP as helps prevent stop breathing at night  Referral for psychology consultation placed previously. 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 The question being answered by this consultation is to further identify any mental health, sleep hygiene, and/or cognitive barriers impacting the patient's ability to heal from their TBI/neck injury. The information from this consultation will be used by myself and other providers/therapists to help guide an individual care plan to help treat this patient's symptoms from TBI/neck injury. Any delays or failures to address cognitive, sleep, psychological symptoms after TBI/neck injury can contribute to persistent symptoms, prolong their overall recovery process, and potentially lead to permanent symptoms.

## 2023-08-21 ENCOUNTER — OFFICE VISIT (OUTPATIENT)
Dept: PULMONOLOGY | Facility: CLINIC | Age: 78
End: 2023-08-21
Attending: INTERNAL MEDICINE
Payer: MEDICARE

## 2023-08-21 ENCOUNTER — HOSPITAL ENCOUNTER (OUTPATIENT)
Dept: CARDIOLOGY | Facility: HOSPITAL | Age: 78
Discharge: HOME | End: 2023-08-21
Attending: INTERNAL MEDICINE
Payer: MEDICARE

## 2023-08-21 VITALS
RESPIRATION RATE: 18 BRPM | OXYGEN SATURATION: 93 % | BODY MASS INDEX: 31.16 KG/M2 | WEIGHT: 217.63 LBS | BODY MASS INDEX: 31.16 KG/M2 | HEART RATE: 72 BPM | WEIGHT: 217.63 LBS | HEIGHT: 70 IN | SYSTOLIC BLOOD PRESSURE: 151 MMHG | DIASTOLIC BLOOD PRESSURE: 83 MMHG | HEIGHT: 70 IN

## 2023-08-21 VITALS
HEIGHT: 70 IN | WEIGHT: 212 LBS | DIASTOLIC BLOOD PRESSURE: 72 MMHG | SYSTOLIC BLOOD PRESSURE: 120 MMHG | BODY MASS INDEX: 30.35 KG/M2

## 2023-08-21 DIAGNOSIS — J41.0 SIMPLE CHRONIC BRONCHITIS: ICD-10-CM

## 2023-08-21 DIAGNOSIS — R09.02 EXERCISE HYPOXEMIA: ICD-10-CM

## 2023-08-21 DIAGNOSIS — J98.4 CHRONIC RESTRICTIVE LUNG DISEASE: ICD-10-CM

## 2023-08-21 DIAGNOSIS — R06.09 DOE (DYSPNEA ON EXERTION): ICD-10-CM

## 2023-08-21 DIAGNOSIS — I51.89 DIASTOLIC DYSFUNCTION: ICD-10-CM

## 2023-08-21 DIAGNOSIS — R06.02 SOB (SHORTNESS OF BREATH): Primary | ICD-10-CM

## 2023-08-21 LAB
AORTIC ROOT ANNULUS: 3.77 CM
AV INDEX (PROSTH): 0.65
AV MEAN GRADIENT: 4 MMHG
AV PEAK GRADIENT: 7 MMHG
AV REGURGITATION PRESSURE HALF TIME: 915.93 MS
AV VALVE AREA BY VELOCITY RATIO: 2.01 CM²
AV VALVE AREA: 2.07 CM²
AV VELOCITY RATIO: 0.63
BSA FOR ECHO PROCEDURE: 2.18 M2
CV ECHO LV RWT: 0.42 CM
DOP CALC AO PEAK VEL: 1.29 M/S
DOP CALC AO VTI: 27.8 CM
DOP CALC LVOT AREA: 3.2 CM2
DOP CALC LVOT DIAMETER: 2.02 CM
DOP CALC LVOT PEAK VEL: 0.81 M/S
DOP CALC LVOT STROKE VOLUME: 57.66 CM3
DOP CALC RVOT PEAK VEL: 0.81 M/S
DOP CALC RVOT VTI: 14 CM
DOP CALCLVOT PEAK VEL VTI: 18 CM
E WAVE DECELERATION TIME: 350.78 MSEC
E/A RATIO: 0.56
E/E' RATIO: 6.92 M/S
ECHO LV POSTERIOR WALL: 1.23 CM (ref 0.6–1.1)
FRACTIONAL SHORTENING: 34 % (ref 28–44)
INTERVENTRICULAR SEPTUM: 1.14 CM (ref 0.6–1.1)
IVRT: 110.37 MSEC
LA MAJOR: 4.95 CM
LA MINOR: 4.87 CM
LA WIDTH: 3.6 CM
LEFT ATRIUM SIZE: 4.15 CM
LEFT ATRIUM VOLUME INDEX MOD: 26.1 ML/M2
LEFT ATRIUM VOLUME INDEX: 29.1 ML/M2
LEFT ATRIUM VOLUME MOD: 55.89 CM3
LEFT ATRIUM VOLUME: 62.35 CM3
LEFT INTERNAL DIMENSION IN SYSTOLE: 3.84 CM (ref 2.1–4)
LEFT VENTRICLE DIASTOLIC VOLUME INDEX: 77.77 ML/M2
LEFT VENTRICLE DIASTOLIC VOLUME: 166.43 ML
LEFT VENTRICLE MASS INDEX: 136 G/M2
LEFT VENTRICLE SYSTOLIC VOLUME INDEX: 29.7 ML/M2
LEFT VENTRICLE SYSTOLIC VOLUME: 63.52 ML
LEFT VENTRICULAR INTERNAL DIMENSION IN DIASTOLE: 5.8 CM (ref 3.5–6)
LEFT VENTRICULAR MASS: 291.99 G
LV LATERAL E/E' RATIO: 7.5 M/S
LV SEPTAL E/E' RATIO: 6.43 M/S
LVOT MG: 1.57 MMHG
LVOT MV: 0.58 CM/S
MV PEAK A VEL: 0.81 M/S
MV PEAK E VEL: 0.45 M/S
PISA AR MAX VEL: 2.49 M/S
PISA TR MAX VEL: 2.64 M/S
PV MEAN GRADIENT: 2 MMHG
PV PEAK GRADIENT: 5 MMHG
PV PEAK VELOCITY: 1.12 M/S
RA MAJOR: 4.59 CM
RA PRESSURE ESTIMATED: 3 MMHG
RA WIDTH: 2.65 CM
RIGHT VENTRICULAR END-DIASTOLIC DIMENSION: 2.46 CM
RV TB RVSP: 6 MMHG
SINUS: 2.63 CM
STJ: 3.01 CM
TDI LATERAL: 0.06 M/S
TDI SEPTAL: 0.07 M/S
TDI: 0.07 M/S
TR MAX PG: 28 MMHG
TRICUSPID ANNULAR PLANE SYSTOLIC EXCURSION: 2.14 CM
TV REST PULMONARY ARTERY PRESSURE: 31 MMHG
Z-SCORE OF LEFT VENTRICULAR DIMENSION IN END DIASTOLE: -1.71
Z-SCORE OF LEFT VENTRICULAR DIMENSION IN END SYSTOLE: -0.7

## 2023-08-21 PROCEDURE — 99999 PR PBB SHADOW E&M-EST. PATIENT-LVL IV: CPT | Mod: PBBFAC,,, | Performed by: INTERNAL MEDICINE

## 2023-08-21 PROCEDURE — 99215 OFFICE O/P EST HI 40 MIN: CPT | Mod: 25,S$PBB,, | Performed by: INTERNAL MEDICINE

## 2023-08-21 PROCEDURE — 99214 OFFICE O/P EST MOD 30 MIN: CPT | Mod: PBBFAC,25,27 | Performed by: INTERNAL MEDICINE

## 2023-08-21 PROCEDURE — 99999 PR PBB SHADOW E&M-EST. PATIENT-LVL I: CPT | Mod: PBBFAC,,,

## 2023-08-21 PROCEDURE — 94060 EVALUATION OF WHEEZING: CPT | Mod: PBBFAC

## 2023-08-21 PROCEDURE — 94618 PULMONARY STRESS TESTING: CPT | Mod: PBBFAC

## 2023-08-21 PROCEDURE — 99215 PR OFFICE/OUTPT VISIT, EST, LEVL V, 40-54 MIN: ICD-10-PCS | Mod: 25,S$PBB,, | Performed by: INTERNAL MEDICINE

## 2023-08-21 PROCEDURE — 93306 TTE W/DOPPLER COMPLETE: CPT

## 2023-08-21 PROCEDURE — 94726 PLETHYSMOGRAPHY LUNG VOLUMES: CPT | Mod: PBBFAC

## 2023-08-21 PROCEDURE — 93306 TTE W/DOPPLER COMPLETE: CPT | Mod: 26,,, | Performed by: INTERNAL MEDICINE

## 2023-08-21 PROCEDURE — 93306 ECHO (CUPID ONLY): ICD-10-PCS | Mod: 26,,, | Performed by: INTERNAL MEDICINE

## 2023-08-21 PROCEDURE — 99999 PR PBB SHADOW E&M-EST. PATIENT-LVL IV: ICD-10-PCS | Mod: PBBFAC,,, | Performed by: INTERNAL MEDICINE

## 2023-08-21 PROCEDURE — 99211 OFF/OP EST MAY X REQ PHY/QHP: CPT | Mod: PBBFAC,25

## 2023-08-21 PROCEDURE — 94729 DIFFUSING CAPACITY: CPT | Mod: PBBFAC

## 2023-08-21 PROCEDURE — 99999 PR PBB SHADOW E&M-EST. PATIENT-LVL I: ICD-10-PCS | Mod: PBBFAC,,,

## 2023-08-21 RX ORDER — MECLIZINE HYDROCHLORIDE 25 MG/1
TABLET ORAL
Qty: 180 TABLET | Refills: 3 | Status: SHIPPED | OUTPATIENT
Start: 2023-08-21 | End: 2023-09-13

## 2023-08-21 RX ORDER — FLUTICASONE FUROATE, UMECLIDINIUM BROMIDE AND VILANTEROL TRIFENATATE 200; 62.5; 25 UG/1; UG/1; UG/1
1 POWDER RESPIRATORY (INHALATION) DAILY
Qty: 60 EACH | Refills: 11 | Status: SHIPPED | OUTPATIENT
Start: 2023-08-21 | End: 2023-11-09 | Stop reason: SDUPTHER

## 2023-08-21 RX ORDER — ALBUTEROL SULFATE 90 UG/1
2 AEROSOL, METERED RESPIRATORY (INHALATION) EVERY 4 HOURS PRN
Qty: 18 G | Refills: 11 | Status: SHIPPED | OUTPATIENT
Start: 2023-08-21 | End: 2023-11-09 | Stop reason: SDUPTHER

## 2023-08-21 NOTE — PROCEDURES
"[facility] 3rdFl  Six Minute Walk   SUMMARY   Ordering Provider: Maksim Chavarria MD   Interpreting Provider: Maksim Chavarria MD  Performing nurse/tech/RT: VT, RT  Diagnosis:  (MAIER (dyspnea on exertion); Chronic restrictive lung disease; Exercise hypoxemia)  Height: 5' 10" (177.8 cm)  Weight: 98.7 kg (217 lb 9.5 oz)  BMI (Calculated): 31.2   Patient Race:     Phase Oxygen Assessment Supplemental O2 Heart   Rate Blood Pressure Johnnie Dyspnea Scale Rating   Resting 95 % Room Air 73 bpm 151/83 3   Exercise        Minute        1 94 % Room Air 82 bpm     2 96 % Room Air 83 bpm     3 95 % Room Air 82 bpm     4 95 % Room Air 84 bpm     5 95 % Room Air 84 bpm     6  96 % Room Air 85 bpm 165/83 4   Recovery        Minute        1 95 % Room Air 74 bpm     2 97 % Room Air 76 bpm     3 97 % Room Air 76 bpm     4 97 % Room Air 74 bpm 170/89 3     Six Minute Walk Summary  6MWT Status: completed without stopping  Patient Reported: Dyspnea, Dizziness     Interpretation:  Did the patient stop or pause?: No             Total Time Walked (Calculated): 360 seconds  Final Partial Lap Distance (feet): 0 feet  Total Distance Meters (Calculated): 304.8 meters  Predicted Distance Meters (Calculated): 476.69 meters  Percentage of Predicted (Calculated): 63.94  Peak VO2 (Calculated): 13.12  Mets: 3.75  Has The Patient Had a Previous Six Minute Walk Test?: Yes       Previous 6MWT Results  Has The Patient Had a Previous Six Minute Walk Test?: Yes  Date of Previous Test: 10/23/20  Total Time Walked: 344 seconds  Total Distance (meters): 284.07  Predicted Distance (meters): 297.85 meters  Percent Change (Calculated): -0.07      Interpretation:  Total distance walked in six minutes is mildly reduced indicating a reduction in overall  functional capacity. The patient did not meet criteria for supplemental oxygen prescription.  Clinical correlation suggested.  [] Mild exercise-induced hypoxemia described as an arterial " oxygen saturation of 93-95% (with a fall of 3-4% with exercise),   [] Moderate exercise-induced hypoxemia as a fall in oxygen saturation to  89-93% (with a fall of 3-4 % with exercise)  [] Severe exercise induced hypoxemia as < 89% O2 saturation (88% and below).  Medicare Criteria for Oxygen prescription comments: When arterial oxygen saturation is at or below 88% during exercise (severe exercise induced hypoxemia) then the patient falls under   Details about Medicare Group Criteria coverage can be found at [email or web address]     Maksim Chavarria MD

## 2023-08-21 NOTE — PROGRESS NOTES
Subjective:     Patient ID: Haritha Valle is a 78 y.o. male.    Chief Complaint:  dizziness and vertigo    HPI 77 y/o with CC of vertigo and fatigue.   Hx of Migraines - Resolved serveral years ago.  Dyspnea  Patient complains of shortness of breath. Symptoms occur at rest, while getting dressed, with one block walking. Symptoms began 1 year ago, gradually worsening since. Associated symptoms include  difficulty breathing, dyspnea on exertion, and shortness of breath. He denies chest pain, located left chest. He does not have had recent travel. Weight has been stable. Symptoms are exacerbated by minimal activity. Symptoms are alleviated by rest.     History of Obstructive Sleep Apnea - failed use of Continuous Positive Airway Pressure   Will evaluate for oxygen at night      Past Medical History:   Diagnosis Date    Anxiety 7/3/2019    Basal cell carcinoma     Depression     Hepatitis B     Hepatitis C antibody test positive     RNA NEG 8/29/2019    HIV infection     Hypertension     Immune disorder     Mild cognitive impairment 10/1/2010     Past Surgical History:   Procedure Laterality Date    APPENDECTOMY      CARDIAC CATHETERIZATION      no stent    CHOLECYSTECTOMY      COLONOSCOPY N/A 11/3/2016    Procedure: COLONOSCOPY;  Surgeon: Maxi Villasenor MD;  Location: [address];  Service: Endoscopy;  Laterality: N/A;  last colonoscopy with Dr Jarrell    COLONOSCOPY N/A 1/25/2022    Procedure: COLONOSCOPY;  Surgeon: Silvino Rosales MD;  Location: [facility];  Service: Endoscopy;  Laterality: N/A;    LEFT HEART CATHETERIZATION Left 2/3/2020    Procedure: CATHETERIZATION, HEART, LEFT;  Surgeon: Chele Ko MD;  Location: [facility] CATH LAB;  Service: Cardiology;  Laterality: Left;  malur pt     Review of patient's allergies indicates:   Allergen Reactions    No known drug allergies      Current Outpatient Medications on File Prior to Visit   Medication Sig Dispense Refill    ALPRAZolam (XANAX) 0.5 MG tablet  Take by mouth.      FLUoxetine 40 MG capsule Take 1 capsule (40 mg total) by mouth once daily. 90 capsule 3    meclizine (ANTIVERT) 25 mg tablet TAKE 1 TABLET TWICE DAILY AS NEEDED FOR DIZZINESS 180 tablet 3    metoprolol succinate (TOPROL-XL) 50 MG 24 hr tablet TAKE 1 TABLET EVERY DAY 90 tablet 2    rosuvastatin (CRESTOR) 20 MG tablet TAKE 1 TABLET EVERY DAY 90 tablet 3    valsartan (DIOVAN) 40 MG tablet Take 40 mg by mouth once daily.      VASCEPA 1 gram Cap Take by mouth.       No current facility-administered medications on file prior to visit.     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  [place] [employer]   Tobacco Use    Smoking status: Never    Smokeless tobacco: Never   Substance and Sexual Activity    Alcohol use: No    Drug use: Not Currently    Sexual activity: Yes     Partners: Male     Social Determinants of Health     Financial Resource Strain: Low Risk  (8/16/2023)    Overall Financial Resource Strain (CARDIA)     Difficulty of Paying Living Expenses: Not hard at all   Food Insecurity: No Food Insecurity (8/16/2023)    Hunger Vital Sign     Worried About Running Out of Food in the Last Year: Never true     Ran Out of Food in the Last Year: Never true   Transportation Needs: No Transportation Needs (8/16/2023)    PRAPARE - Transportation     Lack of Transportation (Medical): No     Lack of Transportation (Non-Medical): No   Physical Activity: Inactive (8/16/2023)    Exercise Vital Sign     Days of Exercise per Week: 0 days     Minutes of Exercise per Session: 10 min   Stress: Stress Concern Present (8/16/2023)    [place] [employer] - Occupational Stress Questionnaire     Feeling of Stress : To some extent   Social Connections: Unknown (8/16/2023)    Social  "Connection and Isolation Panel [NHANES]     Frequency of Communication with Friends and Family: More than three times a week     Frequency of Social Gatherings with Friends and Family: Never     Active Member of Clubs or Organizations: No     Attends Club or Organization Meetings: Never     Marital Status: Living with partner   Housing Stability: Low Risk  (8/16/2023)    Housing Stability Vital Sign     Unable to Pay for Housing in the Last Year: No     Number of Places Lived in the Last Year: 1     Unstable Housing in the Last Year: No     Family History   Problem Relation Age of Onset    Heart disease Father     Heart failure Father     Diabetes Neg Hx     Hypertension Neg Hx        Review of Systems   Constitutional:  Positive for fatigue.   HENT:  Positive for postnasal drip.    Respiratory:  Positive for apnea and cough.    Cardiovascular: Negative.    Genitourinary: Negative.    Endocrine: endocrine negative    Musculoskeletal: Negative.    Skin: Negative.    Gastrointestinal: Negative.    Neurological: Negative.    Psychiatric/Behavioral:  Positive for confusion and sleep disturbance.        Objective:      BP (!) 151/83 (BP Location: Left arm, Patient Position: Sitting, BP Method: Large (Manual))   Pulse 72   Resp 18   Ht 5' 10" (1.778 m)   Wt 98.7 kg (217 lb 9.5 oz)   SpO2 (!) 93%   BMI 31.22 kg/m²   Physical Exam  Vitals and nursing note reviewed.   Constitutional:       Appearance: He is well-developed. He is obese. He is ill-appearing.   HENT:      Head: Normocephalic and atraumatic.      Nose: Nose normal.   Eyes:      Conjunctiva/sclera: Conjunctivae normal.      Pupils: Pupils are equal, round, and reactive to light.   Neck:      Thyroid: No thyromegaly.      Vascular: No JVD.      Trachea: No tracheal deviation.   Cardiovascular:      Rate and Rhythm: Normal rate and regular rhythm.      Heart sounds: No murmur heard.  Pulmonary:      Breath sounds: Normal breath sounds. No rhonchi or rales. " "  Abdominal:      General: Bowel sounds are normal.      Palpations: Abdomen is soft.   Musculoskeletal:         General: No tenderness. Normal range of motion.      Cervical back: Neck supple.   Lymphadenopathy:      Cervical: No cervical adenopathy.   Skin:     General: Skin is warm and dry.   Neurological:      Mental Status: He is alert and oriented to person, place, and time.      Sensory: Sensory deficit present.      Motor: Weakness present.      Gait: Gait abnormal.      Deep Tendon Reflexes: Reflexes abnormal.       Personal Diagnostic Review  Chest x-ray: WNL         8/21/2023     3:44 PM   Pulmonary Studies Review   SpO2 93 %   Height 5' 10" (1.778 m)   Weight 98.7 kg (217 lb 9.5 oz)   BMI (Calculated) 31.2   Predicted Distance 277.59   Predicted Distance Meters (Calculated) 476.69 meters       Echo Saline Bubble? No    Left Ventricle: The left ventricle is normal in size. Normal wall   thickness. There is mild concentric hypertrophy. Normal wall motion. There   is low normal systolic function with a visually estimated ejection   fraction of 50 - 55%. Grade I diastolic dysfunction.    Right Ventricle: Normal right ventricular cavity size. Wall thickness   is normal. Right ventricle wall motion  is normal. Systolic function is   normal.    Aortic Valve: There is mild aortic regurgitation.    Mitral Valve: There is mild regurgitation.    Tricuspid Valve: There is mild regurgitation.    IVC/SVC: Normal venous pressure at 3 mmHg.      Office Spirometry Results:         8/21/2023     3:44 PM 8/21/2023     3:36 PM 8/21/2023     2:30 PM 7/24/2023     1:23 PM 7/20/2023    11:06 AM 9/1/2022    11:25 AM 1/25/2022     7:40 AM   Pulmonary Function Tests   SpO2 93 %    92 % 97 % 95 %   Ordering Provider  Maksim Chavarria MD        Performing nurse/tech/RT  VT, RT        Height 5' 10" (1.778 m) 5' 10" (1.778 m) 5' 10" (1.778 m) 5' 10" (1.778 m) 5' 10" (1.778 m) 5' 10" (1.778 m)    Weight 98.7 kg (217 lb 9.5 oz) 98.7 kg " "(217 lb 9.5 oz) 96.2 kg (212 lb) 96.3 kg (212 lb 4.9 oz) 96.3 kg (212 lb 4.9 oz) 98.8 kg (217 lb 13 oz)    BMI (Calculated) 31.2 31.2 30.4 30.5 30.5 31.3    Patient Race          6MWT Status  completed without stopping        Patient Reported  Dyspnea;Dizziness        Was O2 used?  No        6MW Distance walked (feet)  1000 feet        Distance walked (meters)  304.8 meters        Did patient stop?  No        Type of assistive device(s) used?  no assistive devices        Oxygen Saturation  95 %        Supplemental Oxygen  Room Air        Heart Rate  73 bpm        Blood Pressure  151/83        Johnnie Dyspnea Rating   moderate        Oxygen Saturation  96 %        Supplemental Oxygen  Room Air        Heart Rate  85 bpm        Blood Pressure  165/83        Johnnie Dyspnea Rating   somewhat heavy        Recovery Time (seconds)  240 seconds        Oxygen Saturation  97 %        Supplemental Oxygen  Room Air        Heart Rate  74 bpm        Blood Pressure  170/89        Johnnie Dyspnea Rating   moderate        Is procedure ready for interpretation?  Yes        Oxygen Qualification?  No              8/21/2023     3:44 PM   Pulmonary Studies Review   SpO2 93 %   Height 5' 10" (1.778 m)   Weight 98.7 kg (217 lb 9.5 oz)   BMI (Calculated) 31.2   Predicted Distance 277.59   Predicted Distance Meters (Calculated) 476.69 meters     Echocardiogram Summary         Left Ventricle: The left ventricle is normal in size. Normal wall thickness. There is mild concentric hypertrophy. Normal wall motion. There is low normal systolic function with a visually estimated ejection fraction of 50 - 55%. Grade I diastolic dysfunction.    Right Ventricle: Normal right ventricular cavity size. Wall thickness is normal. Right ventricle wall motion  is normal. Systolic function is normal.    Aortic Valve: There is mild aortic regurgitation.    Mitral Valve: There is mild regurgitation.    Tricuspid Valve: There is mild regurgitation.    IVC/SVC: " Normal venous pressure at 3 mmHg.           Transthoracic Echocardiogram Complete, (w Contrast, Strain and 3D if needed)    Narrative  Performed by  BIANCA  This result has an attachment that is not available.                                                                Echocardiography Report              [address] [facility], [facility] [address], [place] [address]                                        Pat.Name:  JAMI LÓPEZ          Pat.ID:    [id number]                 .Date:   2021               Refer.MD:  LISA AGUILAR MD     Exam Time: 9:57:00 AM              Study Type:Routine Echo               Height:    70in                    Weight:    218lb                     BSA:       2.17 m2                   Age:  1945,75Y             Sex:       MALE                    BP:        142/73                     HR:        75 bpm                     Sonogrphr: Raquel Bush RDCS, RVT   Pat. Stat.:Outpatient              Room:      LOVE                       Study Status:Final                   Echo Event ID:[id number]               Order ID:  [id number]                 Reason for Study:dyspnea             Procedures: 2D Echo, Colorflow Doppler, Strain   Race:      C                         ------------------------------------   SUMMARY:   ------------------------------------   LV EF is normal.   RV systolic function is lower limits of normal. RV apex is mildly   hypokinetic.   Diastolic dysfunction Grade I (Mild): Impaired relaxation with normal   LV filling pressures.   Insufficient TR jet to estimate PA systolic pressure.   ------------------------------------   FINDINGS:   ------------------------------------   LV:       LV size is normal. Reduced average LV global longitudinal             strain at -15.5%. LV EF is normal. Difficult to assess             regional wall motion; however it appears grossly normal.             Estimated EF is 55-59%.   RV:       RV size is normal. RV systolic function  is lower limits of             normal. RV apex is mildly hypokinetic.   LA:       LA volume is upper limits of normal.   RA:       RA size is normal.   AO:       Aortic root diameter is normal.   TALIA:     There is an anterior space consistent with a prominent             epicardial fat pad.   AV:       Mild thickening and calcification of AV leaflets.   MV:       No structural MV abnormalities noted.   PV:       No structural PV abnormalities noted.   TV:       No structural TV abnormalities noted.   Lu:     Diastolic dysfunction Grade I (Mild): Impaired relaxation             with normal LV filling pressures.   Other:    Insufficient TR jet to estimate PA systolic pressure.   ------------------------------------   MEASUREMENTS:   ------------------------------------                                     2D   Parasternal Long Axis    Ao An            2.2 cm            LVPWd              1 cm      Ao Rtd           3.5 cm            Index        1.6 cm/m2                LA Ds            4.3 cm      IVSd               1 cm            RWT             0.39        LVIDd            5.1 cm            Index        2.4 cm/m2                LV Mass          188 g    (122-174)    LVIDs              3 cm            LVM Index         87 g/m²    LV%fs             41 %             LVOT             2.2 cm     LA Sng Plane    LA Area           23 cm²  (8.8-23.4) LA Vol            72 ml      Index        33 ml/m2    LA LngAx         6.3 cm             RA Sng Plane    RA Vol            64 ml            Index        30 ml/m2                RA LngAx         5.4 cm      RA Area           21 cm²  (8.3-19.5)   LVOT      LVOT Area        3.9 cm²                                             DOPPLER   LVOT Stroke Vol & Cardiac Out    LVOT TVI          20 cm            HR                67 bpm   LVOT      LVOT SV           77 ml            LVOT CO          5.2 l/min    SVi               36 ml/m²         LVOT CI          2.4 l/m/m²       Signed  2021 03:39 PM   Lima Freeman MD  Procedure Note    Pete, Lima Joya MD - 2021   Formatting of this note might be different from the original.                             Echocardiography Report              [address] [address]     Pat.Name:  JAMI LÓPEZ          Pat.ID:    [id number]                 .Date:   2021               Refer.MD:  LISA AGUILAR MD     Exam Time: 9:57:00 AM              Study Type:Routine Echo               Height:    70in                    Weight:    218lb                     BSA:       2.17 m2                   Age:  1945,75Y             Sex:       MALE                    BP:        142/73                     HR:        75 bpm                     Sonogrphr: Raquel Bush RDCS, RVT   Pat. Stat.:Outpatient              Room:      LOVE                       Study Status:Final                   Echo Event ID:[id number]               Order ID:  [id number]                 Reason for Study:dyspnea             Procedures: 2D Echo, Colorflow Doppler, Strain   Race:      C                         ------------------------------------   SUMMARY:   ------------------------------------   LV EF is normal.   RV systolic function is lower limits of normal. RV apex is mildly   hypokinetic.   Diastolic dysfunction Grade I (Mild): Impaired relaxation with normal   LV filling pressures.   Insufficient TR jet to estimate PA systolic pressure.   ------------------------------------   FINDINGS:   ------------------------------------   LV:       LV size is normal. Reduced average LV global longitudinal             strain at -15.5%. LV EF is normal. Difficult to assess             regional wall motion; however it appears grossly normal.             Estimated EF is 55-59%.   RV:       RV size is normal. RV systolic function is lower limits of             normal. RV apex is mildly hypokinetic.   LA:       LA volume is upper limits of  normal.   RA:       RA size is normal.   AO:       Aortic root diameter is normal.   TALIA:     There is an anterior space consistent with a prominent             epicardial fat pad.   AV:       Mild thickening and calcification of AV leaflets.   MV:       No structural MV abnormalities noted.   PV:       No structural PV abnormalities noted.   TV:       No structural TV abnormalities noted.   Lu:     Diastolic dysfunction Grade I (Mild): Impaired relaxation             with normal LV filling pressures.   Other:    Insufficient TR jet to estimate PA systolic pressure.   ------------------------------------   MEASUREMENTS:   ------------------------------------                                     2D   Parasternal Long Axis    Ao An            2.2 cm            LVPWd              1 cm      Ao Rtd           3.5 cm            Index        1.6 cm/m2                LA Ds            4.3 cm      IVSd               1 cm            RWT             0.39        LVIDd            5.1 cm            Index        2.4 cm/m2                LV Mass          188 g    (122-174)    LVIDs              3 cm            LVM Index         87 g/m²    LV%fs             41 %             LVOT             2.2 cm     LA Sng Plane    LA Area           23 cm²  (8.8-23.4) LA Vol            72 ml      Index        33 ml/m2    LA LngAx         6.3 cm             RA Sng Plane    RA Vol            64 ml            Index        30 ml/m2                RA LngAx         5.4 cm      RA Area           21 cm²  (8.3-19.5)   LVOT      LVOT Area        3.9 cm²                                             DOPPLER   LVOT Stroke Vol & Cardiac Out    LVOT TVI          20 cm            HR                67 bpm   LVOT      LVOT SV           77 ml            LVOT CO          5.2 l/min    SVi               36 ml/m²         LVOT CI          2.4 l/m/m²       Signed 04/29/2021 03:39 PM   Lima Freeman MD  Images    Image Retrieve    The full-size image has not yet been  retrieved from an outside organization. To retrieve, click the link below.  Scan on 2021 11:15 AM: Transthoracic Echocardiogram Complete, wo Contrast, w Doppler        Specimen Collected: 21 09:57 Last Resulted: 21 15:39   Received From: Kevin Aragon  Result Received: 23 13:30    View Encounter       Procedure Notes    Author Status Last  Updated Created   Golden Allison, PhD Signed Golden Allison, PhD 2020  5:22 PM 2020  5:19 PM          Assoc. Orders Procedures   POLYSOMNOGRAM POLYSOMNOGRAM       Pre-Op Dx Post-Op Dx   Obstructive sleep apnea syndrome None               Patient Name: Haritha Valle                            Date of Report: 20           Date of PS/10/2020   [facility] No.: [id number]                            : 1945                      Time of PSG:  10:25:49 PM - 5:02:54 AM  Sex:  Male   Age:  75   Weight:  212.0 lbs      Height:  5  11                         Type of PSG:  Diagnostic      REASONS FOR REFERRAL: Mr. Valle is a 75 year old male, referred to Dr. Rico Pineda and the [facility] by Dr. Deni Nguyen for evaluation of possible obstructive sleep apnea / hypopnea syndrome (OSAHS).  Dr. Pineda requested diagnostic polysomnography based on the patients reported loud snoring, observed respiratory pauses in sleep, unrefreshing sleep and daytime somnolence (all positive on STOP-bang).  His [place] Sleepiness Scale score was 13, clinically significant, and his STOP - BANG score was 7, high risk of RAMONITA.  Dr. Hesham Andrade is the patients primary care physician.      STUDY PARAMETERS: This diagnostic study involved analysis of the patient's sleep pattern while breathing unassisted. The study was performed with a sleep technologist in attendance for the entire test period, with video monitoring throughout the study, and routine laboratory clinical parameters recorded:  NOTE: The polysomnography electrophysiological record for the  patient has been reviewed in its entirety by Dr. Allison.     SUMMARY STATEMENTS  DIAGNOSTIC IMPRESSIONS  G47.33  /  327.23                    Moderate to Severe Obstructive Sleep Apnea, Adult (OSAHS)  G47.61  /  327.51                    Severe Periodic Limb Movement (PLM) Disorder   F51.12   /  307.44                    Mild Insufficient Sleep Syndrome  Z72.821 /  V69.4                     Inadequate Sleep Hygiene      PRIMARY TREATMENT RECOMMENDATIONS  Treat, or refer to Sleep Disorders Center.  The diagnostic polysomnography revealed a moderate to severe obstructive sleep apnea / hypopnea syndrome (A + H Index = 26.8 events / hr asleep with 7.0 respiratory event - related arousals / hr asleep for the study, and no RERAs (respiratory effort -  related arousals).  The mean SpO2 value was 90.0 %, significant, minimum oxygen saturation during sleep was   79.0 %, and waking baseline SpO2 was 97 %.   Sporadic / infrequent , moderately loud snoring was noted.  A  CPAP titration polysomnography is recommended.      Weight loss to the normal range is recommended as it can decrease respiratory events and snoring in overweight patients.  The following changes in sleep hygiene / sleep - related behavior are recommended after medical treatments are successful  Regular bedtimes and wake times, including weekends: Total sleep time / night should not be more than one hour more             than usual, and bedtime or wake time should not be more than one hour earlier or later than usual.    Do not attempt to make up lost sleep by extending sleep periods.    Avoid naps; none longer than 20 min or later than mid - afternoon.     SECONDARY TREATMENT RECOMMENDATIONS  Treat, or refer to SDC if problems are not satisfactorily resolved by the above.  A severe  PLM disorder was observed (PLMS Index = 67.6 / hr asleep, but treatment might not be optimal because the PLMS were not very disruptive of sleep (6.6 arousals / hr asleep), and  there were no signs of restless legs syndrome in the SDI,   H & P or PSG;  consider treatment of PLM disorder if PLMS symptoms are sufficiently bothersome to the patient.  Note that the benzodiazepine medications sometimes used to treat PLM disorder (e.g., alprazolam / Xanax) may exacerbate some sleep - related respiratory disorders, and that dopaminergic medications such as Mirapex and Requip can be used in such instances.    Mr. Valle is taking fluoxetine / Prozac.  Most tricyclic, and many SSRI antidepressants (e.g., fluoxetine - Prozac, sertraline   Zoloft, venlafaxine - Effexor), are associated with increased PLMS in some studies and increased RLS in others.  Consider   replacing Prozac with a medication known not to exacerbate PLMS or RLS.  Consider behavioral and cognitive / behavioral treatments for anxiety and depression to complement the medication and to increase the probability of long - term adaptive change.  Sleep (quality) might be expected to further improve.  IMPORTANT  NOTE:   RAMONITA, PLMS, and stress - related arousals (anxiety and depression)  interact to significantly impair sleep quality and restoratives, making treatment of each and all of these disorders very important  for restorative sleep.     See below for a complete interpretation of data from the polysomnography and Sleep Disorders Inventory.     Thank you for referring this patient to the [facility].      Golden Allison, Ph.D., ABPP; Diplomate, American Board of Sleep Medicine            Assessment:            SOB (shortness of breath)  -     albuterol (PROVENTIL/VENTOLIN HFA) 90 mcg/actuation inhaler; Inhale 2 puffs into the lungs every 4 (four) hours as needed for Wheezing or Shortness of Breath.  Dispense: 18 g; Refill: 11  -     fluticasone-umeclidin-vilanter (TRELEGY ELLIPTA) 200-62.5-25 mcg inhaler; Inhale 1 puff into the lungs once daily. Wash out mouth after use  Dispense: 60 each; Refill: 11  -     X-Ray  Chest 4 Or More View; Future; Expected date: 08/21/2023  -     Complete PFT with bronchodilator; Future; Expected date: 08/21/2023  -     Six Minute Walk Test to qualify for Home Oxygen; Future    MAIER (dyspnea on exertion)  -     albuterol (PROVENTIL/VENTOLIN HFA) 90 mcg/actuation inhaler; Inhale 2 puffs into the lungs every 4 (four) hours as needed for Wheezing or Shortness of Breath.  Dispense: 18 g; Refill: 11  -     fluticasone-umeclidin-vilanter (TRELEGY ELLIPTA) 200-62.5-25 mcg inhaler; Inhale 1 puff into the lungs once daily. Wash out mouth after use  Dispense: 60 each; Refill: 11    Simple chronic bronchitis  -     albuterol (PROVENTIL/VENTOLIN HFA) 90 mcg/actuation inhaler; Inhale 2 puffs into the lungs every 4 (four) hours as needed for Wheezing or Shortness of Breath.  Dispense: 18 g; Refill: 11  -     fluticasone-umeclidin-vilanter (TRELEGY ELLIPTA) 200-62.5-25 mcg inhaler; Inhale 1 puff into the lungs once daily. Wash out mouth after use  Dispense: 60 each; Refill: 11          Outpatient Encounter Medications as of 8/21/2023   Medication Sig Dispense Refill    albuterol (PROVENTIL/VENTOLIN HFA) 90 mcg/actuation inhaler Inhale 2 puffs into the lungs every 4 (four) hours as needed for Wheezing or Shortness of Breath. 18 g 11    ALPRAZolam (XANAX) 0.5 MG tablet Take by mouth.      FLUoxetine 40 MG capsule Take 1 capsule (40 mg total) by mouth once daily. 90 capsule 3    fluticasone-umeclidin-vilanter (TRELEGY ELLIPTA) 200-62.5-25 mcg inhaler Inhale 1 puff into the lungs once daily. Wash out mouth after use 60 each 11    meclizine (ANTIVERT) 25 mg tablet TAKE 1 TABLET TWICE DAILY AS NEEDED FOR DIZZINESS 180 tablet 3    metoprolol succinate (TOPROL-XL) 50 MG 24 hr tablet TAKE 1 TABLET EVERY DAY 90 tablet 2    rosuvastatin (CRESTOR) 20 MG tablet TAKE 1 TABLET EVERY DAY 90 tablet 3    valsartan (DIOVAN) 40 MG tablet Take 40 mg by mouth once daily.      VASCEPA 1 gram Cap Take by mouth.      [DISCONTINUED]  albuterol (PROVENTIL/VENTOLIN HFA) 90 mcg/actuation inhaler Inhale 2 puffs into the lungs every 4 (four) hours as needed for Wheezing or Shortness of Breath. 18 g 11    [DISCONTINUED] atazanavir (REYATAZ) 200 MG Cap Take 2 capsules (400 mg total) by mouth once daily. 180 capsule 3    [DISCONTINUED] lamivudine-zidovudine 150-300mg (COMBIVIR) 150-300 mg Tab Take 1 tablet by mouth every 12 (twelve) hours. 180 tablet 3     No facility-administered encounter medications on file as of 8/21/2023.     Plan:       Requested Prescriptions     Signed Prescriptions Disp Refills    albuterol (PROVENTIL/VENTOLIN HFA) 90 mcg/actuation inhaler 18 g 11     Sig: Inhale 2 puffs into the lungs every 4 (four) hours as needed for Wheezing or Shortness of Breath.    fluticasone-umeclidin-vilanter (TRELEGY ELLIPTA) 200-62.5-25 mcg inhaler 60 each 11     Sig: Inhale 1 puff into the lungs once daily. Wash out mouth after use     Problem List Items Addressed This Visit    None  Visit Diagnoses       SOB (shortness of breath)    -  Primary    Relevant Medications    albuterol (PROVENTIL/VENTOLIN HFA) 90 mcg/actuation inhaler    fluticasone-umeclidin-vilanter (TRELEGY ELLIPTA) 200-62.5-25 mcg inhaler    Other Relevant Orders    X-Ray Chest 4 Or More View    Complete PFT with bronchodilator    Six Minute Walk Test to qualify for Home Oxygen    MAIER (dyspnea on exertion)        Relevant Medications    albuterol (PROVENTIL/VENTOLIN HFA) 90 mcg/actuation inhaler    fluticasone-umeclidin-vilanter (TRELEGY ELLIPTA) 200-62.5-25 mcg inhaler    Simple chronic bronchitis        Relevant Medications    albuterol (PROVENTIL/VENTOLIN HFA) 90 mcg/actuation inhaler    fluticasone-umeclidin-vilanter (TRELEGY ELLIPTA) 200-62.5-25 mcg inhaler             Follow up in about 1 year (around 8/21/2024) for PFT on return, 6 min walk - on return, Review CXR - on return.    MEDICAL DECISION MAKING: Moderate to high complexity.  Overall, the multiple problems listed are  of moderate to high severity that may impact quality of life and activities of daily living. Side effects of medications, treatment plan as well as options and alternatives reviewed and discussed with patient. There was counseling of patient concerning these issues.    Total time spent in counseling and coordination of care - 45 minutes of total time spent on the encounter, which includes face to face time and non-face to face time preparing to see the patient (eg, review of tests), Obtaining and/or reviewing separately obtained history, Documenting clinical information in the electronic or other health record, Independently interpreting results (not separately reported) and communicating results to the patient/family/caregiver, or Care coordination (not separately reported).    Time was used in discussion of prognosis, risks, benefits of treatment, instructions and compliance with regimen . Discussion with other physicians and/or health care providers - home health or for use of durable medical equipment (oxygen, nebulizers, CPAP, BiPAP) occurred.  60

## 2023-08-21 NOTE — TELEPHONE ENCOUNTER
Refill Routing Note   Medication(s) are not appropriate for processing by [facility] for the following reason(s):      Medication outside of protocol    [facility] action(s):  Route Care Due:  None identified            Appointments  past 12m or future 3m with PCP    Date Provider   Last Visit   9/1/2022 Hesham Andrade II, MD   Next Visit   Visit date not found Hesham Andrade II, MD   ED visits in past 90 days: 0        Note composed:1:11 PM 08/21/2023

## 2023-08-22 LAB
BRPFT: ABNORMAL
DLCO ADJ PRE: 16.64 ML/(MIN*MMHG) (ref 18.94–32.8)
DLCO SINGLE BREATH LLN: 18.94
DLCO SINGLE BREATH PRE REF: 64.3 %
DLCO SINGLE BREATH REF: 25.87
DLCOC SBVA LLN: 2.49
DLCOC SBVA PRE REF: 119.1 %
DLCOC SBVA REF: 3.62
DLCOC SINGLE BREATH LLN: 18.94
DLCOC SINGLE BREATH PRE REF: 64.3 %
DLCOC SINGLE BREATH REF: 25.87
DLCOVA LLN: 2.49
DLCOVA PRE REF: 119.1 %
DLCOVA PRE: 4.31 ML/(MIN*MMHG*L) (ref 2.49–4.76)
DLCOVA REF: 3.62
DLVAADJ PRE: 4.31 ML/(MIN*MMHG*L) (ref 2.49–4.76)
ERV LLN: -16449.03
ERV PRE REF: 32.2 %
ERV REF: 0.97
FEF 25 75 CHG: 27.1 %
FEF 25 75 LLN: 0.84
FEF 25 75 POST REF: 116.1 %
FEF 25 75 PRE REF: 91.3 %
FEF 25 75 REF: 2.13
FET100 CHG: -20.8 %
FEV1 CHG: 5.9 %
FEV1 FVC CHG: 4.6 %
FEV1 FVC LLN: 61
FEV1 FVC POST REF: 109.8 %
FEV1 FVC PRE REF: 104.9 %
FEV1 FVC REF: 75
FEV1 LLN: 2.08
FEV1 POST REF: 73.6 %
FEV1 PRE REF: 69.5 %
FEV1 REF: 2.97
FRCPLETH LLN: 2.78
FRCPLETH PREREF: 83.9 %
FRCPLETH REF: 3.77
FVC CHG: 1.2 %
FVC LLN: 2.9
FVC POST REF: 66.6 %
FVC PRE REF: 65.8 %
FVC REF: 4
IVC PRE: 2.31 L (ref 2.9–5.09)
IVC SINGLE BREATH LLN: 2.9
IVC SINGLE BREATH PRE REF: 57.7 %
IVC SINGLE BREATH REF: 4
MVV LLN: 100
MVV PRE REF: 48.6 %
MVV REF: 117
PEF CHG: 8.4 %
PEF LLN: 5.3
PEF POST REF: 72.5 %
PEF PRE REF: 66.9 %
PEF REF: 7.62
POST FEF 25 75: 2.48 L/S (ref 0.84–3.43)
POST FET 100: 3.62 SEC
POST FEV1 FVC: 82.29 % (ref 60.59–89.37)
POST FEV1: 2.19 L (ref 2.08–3.86)
POST FVC: 2.66 L (ref 2.9–5.09)
POST PEF: 5.53 L/S (ref 5.3–9.94)
PRE DLCO: 16.64 ML/(MIN*MMHG) (ref 18.94–32.8)
PRE ERV: 0.31 L (ref -16449.03–16450.97)
PRE FEF 25 75: 1.95 L/S (ref 0.84–3.43)
PRE FET 100: 4.58 SEC
PRE FEV1 FVC: 78.64 % (ref 60.59–89.37)
PRE FEV1: 2.07 L (ref 2.08–3.86)
PRE FRC PL: 3.16 L
PRE FVC: 2.63 L (ref 2.9–5.09)
PRE MVV: 57 L/MIN (ref 99.65–134.83)
PRE PEF: 5.1 L/S (ref 5.3–9.94)
PRE RV: 2.77 L (ref 2.12–3.47)
PRE TLC: 5.44 L (ref 5.99–8.29)
RAW LLN: 3.06
RAW PRE REF: 85.3 %
RAW PRE: 2.61 CMH2O*S/L (ref 3.06–3.06)
RAW REF: 3.06
RV LLN: 2.12
RV PRE REF: 98.9 %
RV REF: 2.8
RVTLC LLN: 35
RVTLC PRE REF: 115.5 %
RVTLC PRE: 50.82 % (ref 35.01–52.97)
RVTLC REF: 44
TLC LLN: 5.99
TLC PRE REF: 76.2 %
TLC REF: 7.14
VA PRE: 3.86 L (ref 6.99–6.99)
VA SINGLE BREATH LLN: 6.99
VA SINGLE BREATH PRE REF: 55.2 %
VA SINGLE BREATH REF: 6.99
VC LLN: 2.9
VC PRE REF: 67 %
VC PRE: 2.68 L (ref 2.9–5.09)
VC REF: 4
VTGRAWPRE: 3.21 L

## 2023-08-22 PROCEDURE — 94060 EVALUATION OF WHEEZING: CPT | Mod: 26,S$PBB,, | Performed by: INTERNAL MEDICINE

## 2023-08-22 PROCEDURE — 94726 PULM FUNCT TST PLETHYSMOGRAP: ICD-10-PCS | Mod: 26,S$PBB,, | Performed by: INTERNAL MEDICINE

## 2023-08-22 PROCEDURE — 94060 PR EVAL OF BRONCHOSPASM: ICD-10-PCS | Mod: 26,S$PBB,, | Performed by: INTERNAL MEDICINE

## 2023-08-22 PROCEDURE — 94729 PR C02/MEMBANE DIFFUSE CAPACITY: ICD-10-PCS | Mod: 26,S$PBB,, | Performed by: INTERNAL MEDICINE

## 2023-08-22 PROCEDURE — 94729 DIFFUSING CAPACITY: CPT | Mod: 26,S$PBB,, | Performed by: INTERNAL MEDICINE

## 2023-08-22 PROCEDURE — 94726 PLETHYSMOGRAPHY LUNG VOLUMES: CPT | Mod: 26,S$PBB,, | Performed by: INTERNAL MEDICINE

## 2023-08-23 ENCOUNTER — OFFICE VISIT (OUTPATIENT)
Dept: OPHTHALMOLOGY | Facility: CLINIC | Age: 78
End: 2023-08-23
Payer: MEDICARE

## 2023-08-23 DIAGNOSIS — H52.203 ASTIGMATISM WITH PRESBYOPIA, BILATERAL: Primary | ICD-10-CM

## 2023-08-23 DIAGNOSIS — H52.7 REFRACTIVE ERROR: ICD-10-CM

## 2023-08-23 DIAGNOSIS — H52.4 ASTIGMATISM WITH PRESBYOPIA, BILATERAL: Primary | ICD-10-CM

## 2023-08-23 PROCEDURE — 92499 UNLISTED OPH SVC/PROCEDURE: CPT | Mod: ,,, | Performed by: OPTOMETRIST

## 2023-08-23 PROCEDURE — 99499 UNLISTED E&M SERVICE: CPT | Mod: S$PBB,,, | Performed by: OPTOMETRIST

## 2023-08-23 PROCEDURE — 99499 NO LOS: ICD-10-PCS | Mod: S$PBB,,, | Performed by: OPTOMETRIST

## 2023-08-23 PROCEDURE — 92499 PR CONTACT LENS F/U LEV 1: ICD-10-PCS | Mod: ,,, | Performed by: OPTOMETRIST

## 2023-08-24 NOTE — PROGRESS NOTES
HPI     Contact Lens Fit            Comments: Pt here for contact lens fit.          Last edited by Fermin Conklin MA on 8/23/2023  3:46 PM.            Assessment /Plan     For exam results, see Encounter Report.    Astigmatism with presbyopia, bilateral  Contact Lens Final Rx       Final Contact Lens Rx         Brand Base Curve Diameter Sphere Addl. Specs    Right Bitoric RGP 8.13/7.46 9.4 +8.50/-4.00 Optimum Extra    Left Bitoric RGP 8.13/7.46 9.4 +3.00/-3.50 Optimum Extra                Monovision RGP lens OD dom OS near.   Contact lens trials fitted in office and dispensed today. Contact lens hygiene reviewed. Patient able to insert the lenses themselves with minimal difficulty. Patient ok to finalize Contact lens after 1 week of wear. RTC if still having difficulty with CTL trial after 1 week.     RTC 1 week for CL follow up, recheck OS near, consider adding -1.00DS on top.

## 2023-08-25 PROCEDURE — 94618 PULMONARY STRESS TESTING: ICD-10-PCS | Mod: 26,S$PBB,, | Performed by: INTERNAL MEDICINE

## 2023-08-25 PROCEDURE — 94618 PULMONARY STRESS TESTING: CPT | Mod: 26,S$PBB,, | Performed by: INTERNAL MEDICINE

## 2023-08-30 ENCOUNTER — PATIENT MESSAGE (OUTPATIENT)
Dept: OPTOMETRY | Facility: CLINIC | Age: 78
End: 2023-08-30
Payer: COMMERCIAL

## 2023-08-31 ENCOUNTER — PROCEDURE VISIT (OUTPATIENT)
Dept: OPHTHALMOLOGY | Facility: CLINIC | Age: 78
End: 2023-08-31
Payer: MEDICARE

## 2023-08-31 DIAGNOSIS — H35.3231 EXUDATIVE AGE-RELATED MACULAR DEGENERATION OF BOTH EYES WITH ACTIVE CHOROIDAL NEOVASCULARIZATION: Primary | ICD-10-CM

## 2023-08-31 PROCEDURE — 99499 UNLISTED E&M SERVICE: CPT | Mod: S$PBB,,, | Performed by: OPHTHALMOLOGY

## 2023-08-31 PROCEDURE — 92134 POSTERIOR SEGMENT OCT RETINA (OCULAR COHERENCE TOMOGRAPHY)-BOTH EYES: ICD-10-PCS | Mod: 26,S$PBB,, | Performed by: OPHTHALMOLOGY

## 2023-08-31 PROCEDURE — 67028 INJECTION EYE DRUG: CPT | Mod: 50,S$PBB,, | Performed by: OPHTHALMOLOGY

## 2023-08-31 PROCEDURE — 67028 INJECTION EYE DRUG: CPT | Mod: 50,PBBFAC | Performed by: OPHTHALMOLOGY

## 2023-08-31 PROCEDURE — 99999PBSHW PR PBB SHADOW TECHNICAL ONLY FILED TO HB: Mod: JZ,PBBFAC,,

## 2023-08-31 PROCEDURE — 99999PBSHW PR PBB SHADOW TECHNICAL ONLY FILED TO HB: ICD-10-PCS | Mod: JZ,PBBFAC,,

## 2023-08-31 PROCEDURE — 92134 CPTRZ OPH DX IMG PST SGM RTA: CPT | Mod: PBBFAC | Performed by: OPHTHALMOLOGY

## 2023-08-31 PROCEDURE — 99499 NO LOS: ICD-10-PCS | Mod: S$PBB,,, | Performed by: OPHTHALMOLOGY

## 2023-08-31 PROCEDURE — 67028 PR INJECT INTRAVITREAL PHARMCOLOGIC: ICD-10-PCS | Mod: 50,S$PBB,, | Performed by: OPHTHALMOLOGY

## 2023-08-31 RX ADMIN — FARICIMAB 6 MG: 6 INJECTION, SOLUTION INTRAVITREAL at 02:08

## 2023-08-31 NOTE — PROGRESS NOTES
===============================  Date today is 8/31/2023  Haritha Valle is a 78 y.o. male  Last visit [facility]: :8/10/2023   Last visit eye dept. 8/23/2023    Corrected distance visual acuity was 20/30 -1 in the right eye and 20/30 -2 in the left eye.  Not recorded       Not recorded       Not recorded       Not recorded       Chief Complaint   Patient presents with    Procedure       Problem List Items Addressed This Visit    None  Visit Diagnoses       Exudative age-related macular degeneration of both eyes with active choroidal neovascularization    -  Primary    Relevant Medications    faricimab-svoa 6 mg/0.05 mL (120 mg/mL) injection 6 mg (Completed) (Start on 8/31/2023  2:30 PM)    faricimab-svoa 6 mg/0.05 mL (120 mg/mL) injection 6 mg (Completed) (Start on 8/31/2023  2:30 PM)    Other Relevant Orders    Posterior Segment OCT Retina-Both eyes (Completed)    Prior authorization Order          Instructed to call 24/7 for any worsening of vision, visual distortion or pain.  Check OU independently daily.    Gave my office and personal cell phone number.  ________________  8/31/2023 today  Haritha Valle    OU SRN  OCT looks good    ..    Injection Procedure Note:    8/31/2023  Diagnosis :  OU SRN  Today:   Vabysmo (faricimab-svoa) 6 mg/0.05mL (120 mg/mL) Injection , OU   Follow up: rtc 6 weeks     Instructed to call 24/7 for any worsening of vision. Check Both eyes daily. Gave patient my home phone number.  Risks, benefits, and alternatives to treatment discussed in detail with the patient.  The patient voiced understanding and wished to proceed with the procedure.     Patient Identified and Time Out complete  Subconjunctival bleb - xylocaine with epi 2%   and Betadine.  Inject at Vabysmo (faricimab-svoa) 6 mg/0.05mL (120 mg/mL) Injection , OU 6:00 @ 3.5-4mm posterior to limbus  1 stop: no   Post Operative Dx: Same  Complications: None  Follow up as above.    =============================

## 2023-09-02 NOTE — TELEPHONE ENCOUNTER
Care Due:                  Date            Visit Type   Department     Provider  --------------------------------------------------------------------------------                                MYCHART                              FOLLOWUP/OF  [facility] INTERNAL  Last Visit: 09-      FICE VISIT   MEDICINE       Hesham Andrade  Next Visit: None Scheduled  None         None Found                                                            Last  Test          Frequency    Reason                     Performed    Due Date  --------------------------------------------------------------------------------    Office Visit  12 months..  FLUoxetine, rosuvastatin.  09- 08-    Lipid Panel.  12 months..  rosuvastatin.............  10-   10-    [facility] Embedded Care Due Messages. Reference number: [id number].   9/02/2023 1:55:01 AM CDT

## 2023-09-03 RX ORDER — FLUOXETINE HYDROCHLORIDE 40 MG/1
40 CAPSULE ORAL
Qty: 90 CAPSULE | Refills: 0 | Status: SHIPPED | OUTPATIENT
Start: 2023-09-03 | End: 2024-01-29

## 2023-09-03 NOTE — TELEPHONE ENCOUNTER
Provider Staff:  Action required for this patient     Please see care gap opportunities below in Care Due Message.    Thanks!  [facility]     Appointments      Date Provider   Last Visit   9/1/2022 Hesham Andrade II, MD   Next Visit   Visit date not found Hesham Andrade II, MD      Refill Decision Note   Harithacatrachito Johnson  is requesting a refill authorization.  Brief Assessment and Rationale for Refill:  Approve     Medication Therapy Plan:         Comments:     Note composed:1:46 AM 09/03/2023

## 2023-09-07 ENCOUNTER — OFFICE VISIT (OUTPATIENT)
Dept: OPHTHALMOLOGY | Facility: CLINIC | Age: 78
End: 2023-09-07
Payer: MEDICARE

## 2023-09-07 DIAGNOSIS — H52.4 ASTIGMATISM WITH PRESBYOPIA, BILATERAL: ICD-10-CM

## 2023-09-07 DIAGNOSIS — H52.7 REFRACTIVE ERROR: Primary | ICD-10-CM

## 2023-09-07 DIAGNOSIS — H52.203 ASTIGMATISM WITH PRESBYOPIA, BILATERAL: ICD-10-CM

## 2023-09-07 DIAGNOSIS — H35.3231 EXUDATIVE AGE-RELATED MACULAR DEGENERATION OF BOTH EYES WITH ACTIVE CHOROIDAL NEOVASCULARIZATION: ICD-10-CM

## 2023-09-07 PROCEDURE — 99499 NO LOS: ICD-10-PCS | Mod: S$PBB,,, | Performed by: OPTOMETRIST

## 2023-09-07 PROCEDURE — 99999 PR PBB SHADOW E&M-EST. PATIENT-LVL II: CPT | Mod: PBBFAC,,, | Performed by: OPTOMETRIST

## 2023-09-07 PROCEDURE — 92499 UNLISTED OPH SVC/PROCEDURE: CPT | Mod: ,,, | Performed by: OPTOMETRIST

## 2023-09-07 PROCEDURE — 99212 OFFICE O/P EST SF 10 MIN: CPT | Mod: PBBFAC,PO | Performed by: OPTOMETRIST

## 2023-09-07 PROCEDURE — 99999 PR PBB SHADOW E&M-EST. PATIENT-LVL II: ICD-10-PCS | Mod: PBBFAC,,, | Performed by: OPTOMETRIST

## 2023-09-07 PROCEDURE — 92499 PR CONTACT LENS F/U LEV 1: ICD-10-PCS | Mod: ,,, | Performed by: OPTOMETRIST

## 2023-09-07 PROCEDURE — 99499 UNLISTED E&M SERVICE: CPT | Mod: S$PBB,,, | Performed by: OPTOMETRIST

## 2023-09-07 NOTE — Clinical Note
Good morning Meagan,   Mr Tori had a bit of trouble with his OS lens due to the fit, I had to remake. Thankfully [employer] allows us unlimited remakes in 90 days so I ordered one last one. I am hopeful he will be satisfied with this one. Please let me know if you have any questions. Thank you for all your help. Ref#[id number]  Thanks, KIM

## 2023-09-08 ENCOUNTER — PATIENT MESSAGE (OUTPATIENT)
Dept: OPTOMETRY | Facility: CLINIC | Age: 78
End: 2023-09-08
Payer: COMMERCIAL

## 2023-09-08 NOTE — PROGRESS NOTES
HPI     Contact Lens Fit            Comments: Contact lens follow up. Right eye contact is wonderful but the   left eye contacts is very uncomfortable, irritating and blurry. Is wearing   the old contact in his left eye because the new contacts is very   uncomfortable.          Comments    SRN OS  Avastin OS Emergent 1/21/22, 2/24/22, 3/24/22, 4/28/22, 6/21/22  Emergent Avastin OU 3/16/23  Avastin failure  Emergent Eylea OU 5/2/23  Vabysmo OU 6/21/23, 7/27/23, 8/31/23    Monovision RGP OD dominant.            Last edited by Wilber Dawn on 9/7/2023 10:56 AM.            Assessment /Plan     For exam results, see Encounter Report.    Refractive error  Contact Lens Final Rx       Final Contact Lens Rx         Brand Base Curve Diameter Sphere Addl. Specs    Right Bitoric RGP 8.08/7.38 9.4 +3.00/-3.50 Optimum Extra    Left Bitoric RGP 8.13/7.46 9.4 +7.00/-4.00 Optimum Extra                Order OS lens with parameters above, call patient when lenses arrive for dispense. Appointment needed.     Astigmatism with presbyopia, bilateral    Exudative age-related macular degeneration of both eyes with active choroidal neovascularization  Sees Dr FONTAINE, continue per Dr Macario.     RTC when OS lens arrives for dispense. Appointment needed.

## 2023-09-12 ENCOUNTER — PATIENT MESSAGE (OUTPATIENT)
Dept: INFECTIOUS DISEASES | Facility: CLINIC | Age: 78
End: 2023-09-12
Payer: COMMERCIAL

## 2023-09-13 ENCOUNTER — LAB VISIT (OUTPATIENT)
Dept: LAB | Facility: HOSPITAL | Age: 78
End: 2023-09-13
Attending: STUDENT IN AN ORGANIZED HEALTH CARE EDUCATION/TRAINING PROGRAM
Payer: MEDICARE

## 2023-09-13 ENCOUNTER — OFFICE VISIT (OUTPATIENT)
Dept: INFECTIOUS DISEASES | Facility: CLINIC | Age: 78
End: 2023-09-13
Payer: MEDICARE

## 2023-09-13 VITALS
BODY MASS INDEX: 30.62 KG/M2 | SYSTOLIC BLOOD PRESSURE: 122 MMHG | WEIGHT: 213.88 LBS | DIASTOLIC BLOOD PRESSURE: 88 MMHG | HEART RATE: 84 BPM | HEIGHT: 70 IN

## 2023-09-13 DIAGNOSIS — I10 ESSENTIAL HYPERTENSION: ICD-10-CM

## 2023-09-13 DIAGNOSIS — B20 HIV DISEASE: Chronic | ICD-10-CM

## 2023-09-13 LAB
HBV SURFACE AB SER-ACNC: 33.36 MIU/ML
HBV SURFACE AB SER-ACNC: REACTIVE M[IU]/ML
HCV AB SERPL QL IA: REACTIVE

## 2023-09-13 PROCEDURE — 86790 VIRUS ANTIBODY NOS: CPT | Performed by: STUDENT IN AN ORGANIZED HEALTH CARE EDUCATION/TRAINING PROGRAM

## 2023-09-13 PROCEDURE — 36415 COLL VENOUS BLD VENIPUNCTURE: CPT | Performed by: STUDENT IN AN ORGANIZED HEALTH CARE EDUCATION/TRAINING PROGRAM

## 2023-09-13 PROCEDURE — 99999 PR PBB SHADOW E&M-EST. PATIENT-LVL IV: CPT | Mod: PBBFAC,,, | Performed by: STUDENT IN AN ORGANIZED HEALTH CARE EDUCATION/TRAINING PROGRAM

## 2023-09-13 PROCEDURE — 86706 HEP B SURFACE ANTIBODY: CPT | Mod: 91 | Performed by: STUDENT IN AN ORGANIZED HEALTH CARE EDUCATION/TRAINING PROGRAM

## 2023-09-13 PROCEDURE — 99204 OFFICE O/P NEW MOD 45 MIN: CPT | Mod: S$PBB,,, | Performed by: STUDENT IN AN ORGANIZED HEALTH CARE EDUCATION/TRAINING PROGRAM

## 2023-09-13 PROCEDURE — 87900 PHENOTYPE INFECT AGENT DRUG: CPT | Performed by: STUDENT IN AN ORGANIZED HEALTH CARE EDUCATION/TRAINING PROGRAM

## 2023-09-13 PROCEDURE — 99214 OFFICE O/P EST MOD 30 MIN: CPT | Mod: PBBFAC | Performed by: STUDENT IN AN ORGANIZED HEALTH CARE EDUCATION/TRAINING PROGRAM

## 2023-09-13 PROCEDURE — 99999 PR PBB SHADOW E&M-EST. PATIENT-LVL IV: ICD-10-PCS | Mod: PBBFAC,,, | Performed by: STUDENT IN AN ORGANIZED HEALTH CARE EDUCATION/TRAINING PROGRAM

## 2023-09-13 PROCEDURE — 87536 HIV-1 QUANT&REVRSE TRNSCRPJ: CPT | Performed by: STUDENT IN AN ORGANIZED HEALTH CARE EDUCATION/TRAINING PROGRAM

## 2023-09-13 PROCEDURE — 86803 HEPATITIS C AB TEST: CPT | Performed by: STUDENT IN AN ORGANIZED HEALTH CARE EDUCATION/TRAINING PROGRAM

## 2023-09-13 PROCEDURE — 86592 SYPHILIS TEST NON-TREP QUAL: CPT | Performed by: STUDENT IN AN ORGANIZED HEALTH CARE EDUCATION/TRAINING PROGRAM

## 2023-09-13 PROCEDURE — 99204 PR OFFICE/OUTPT VISIT, NEW, LEVL IV, 45-59 MIN: ICD-10-PCS | Mod: S$PBB,,, | Performed by: STUDENT IN AN ORGANIZED HEALTH CARE EDUCATION/TRAINING PROGRAM

## 2023-09-13 PROCEDURE — 87906 NFCT AGT GNTYP ALYS HIV1: CPT | Performed by: STUDENT IN AN ORGANIZED HEALTH CARE EDUCATION/TRAINING PROGRAM

## 2023-09-13 PROCEDURE — 86361 T CELL ABSOLUTE COUNT: CPT | Performed by: STUDENT IN AN ORGANIZED HEALTH CARE EDUCATION/TRAINING PROGRAM

## 2023-09-13 RX ORDER — DARUNAVIR, COBICISTAT, EMTRICITABINE, AND TENOFOVIR ALAFENAMIDE 800; 150; 200; 10 MG/1; MG/1; MG/1; MG/1
1 TABLET, FILM COATED ORAL DAILY
Qty: 30 TABLET | Refills: 0 | Status: ACTIVE | OUTPATIENT
Start: 2023-09-13 | End: 2023-09-15 | Stop reason: ALTCHOICE

## 2023-09-13 RX ORDER — BICTEGRAVIR SODIUM, EMTRICITABINE, AND TENOFOVIR ALAFENAMIDE FUMARATE 50; 200; 25 MG/1; MG/1; MG/1
1 TABLET ORAL DAILY
Qty: 30 TABLET | Refills: 0 | Status: ACTIVE | OUTPATIENT
Start: 2023-09-13 | End: 2023-10-18 | Stop reason: SDUPTHER

## 2023-09-13 NOTE — PROGRESS NOTES
Infectious Disease Clinic Note    Patient Name: Haritha Valle  YOB: 1945    PRESENTING HISTORY       History of Present Illness:  Mr. Haritha Valle is a 78 y.o. male w/ significant PMHx of HIV diagnosed in 1984 here for HIV management. Last documented visit with ID in 2019 with [facility] providers. Had been on Reyataz and Combivir with plans to switch to Biktarvy, but this proved cost prohibitive. No recent labs on file. In 2019 HIV VL was undetectable with CD4 570. Expressed concern about lipodystrophy and memory loss. Vaccine history populated below. More recent CD4 count 464 on 4/27/23. Today patient reports taking Reyataz and Combivir over 30 years. Stopped taking month ago due to issues with pharmacy. Has been undetectable for years. Not currently sexually active. No travel outside [place]. No sick contacts. Would like to try new ART. Has two dogs. Agreeable to baseline labs today and switching to Biktarvy or Symtuza based on cost. Will send both to [facility]. Repeating viral load in 4 weeks after ART changed and will await results of Genosure.     Review of Systems:  Constitutional: no fever or chills  Eyes: no visual changes  ENT: no nasal congestion or sore throat  Respiratory: no cough or shortness of breath  Cardiovascular: no chest pain  Gastrointestinal: no nausea or vomiting, no abdominal pain, no constipation, no diarrhea  Genitourinary: no hematuria or dysuria  Musculoskeletal: no arthralgias or myalgias  Skin: no rash  Neurological: no headaches, numbness, or paresthesias    The following portions of the patient's history were reviewed and updated as appropriate: allergies, current medications, past family history, past medical history, past social history, past surgical history, and problem list.    PAST HISTORY:     Immunization History   Administered Date(s) Administered    COVID-19, MRNA, LN-S, PF (Pfizer) (Gray Cap) 04/22/2022    COVID-19, MRNA, LN-S, PF (Pfizer) (Purple Cap) 01/11/2021,  02/01/2021, 10/29/2021    COVID-19, mRNA, LNP-S, bivalent booster, PF (PFIZER OMICRON) 12/02/2022    Influenza (FLUAD) - Quadrivalent - Adjuvanted - PF *Preferred* (65+) 09/28/2020, 10/11/2021, 10/20/2022    Influenza - High Dose - PF (65 years and older) 09/24/2013, 12/02/2014, 09/27/2016, 09/27/2017, 12/12/2018, 10/01/2019    Influenza - Quadrivalent - PF *Preferred* (6 months and older) 10/24/2007, 01/08/2009, 09/23/2010, 01/24/2013    Influenza A (H1N1) 2009 Monovalent - IM 11/03/2009    Influenza Split 01/24/2013    Pneumococcal Conjugate - 13 Valent 07/23/2015    Pneumococcal Polysaccharide - 23 Valent 09/27/2016    Tdap 01/08/2009, 07/03/2019    Vaccinia, smallpox monkeypox vaccine live, PF 09/01/2022    Zoster 01/08/2009    Zoster Recombinant 05/09/2018, 06/08/2018, 07/18/2018       Past Medical History:   Diagnosis Date    Anxiety 7/3/2019    Basal cell carcinoma     Depression     Hepatitis B     Hepatitis C antibody test positive     RNA NEG 8/29/2019    HIV infection     Hypertension     Immune disorder     Mild cognitive impairment 10/1/2010       Past Surgical History:   Procedure Laterality Date    APPENDECTOMY      CARDIAC CATHETERIZATION      no stent    CHOLECYSTECTOMY      COLONOSCOPY N/A 11/3/2016    Procedure: COLONOSCOPY;  Surgeon: Maxi Villasenor MD;  Location: [address];  Service: Endoscopy;  Laterality: N/A;  last colonoscopy with Dr Jarrell    COLONOSCOPY N/A 1/25/2022    Procedure: COLONOSCOPY;  Surgeon: Silvino Rosales MD;  Location: [facility] ENDO;  Service: Endoscopy;  Laterality: N/A;    LEFT HEART CATHETERIZATION Left 2/3/2020    Procedure: CATHETERIZATION, HEART, LEFT;  Surgeon: Chele Ko MD;  Location: [facility] CATH LAB;  Service: Cardiology;  Laterality: Left;  malur pt       Family History   Problem Relation Age of Onset    Heart disease Father     Heart failure Father     Diabetes Neg Hx     Hypertension Neg Hx        Social History     Socioeconomic History    Marital  status: Significant Other     Spouse name: Shadi    Number of children: 0    Highest education level: Some college, no degree   Occupational History    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  [place] [employer]   Tobacco Use    Smoking status: Never    Smokeless tobacco: Never   Substance and Sexual Activity    Alcohol use: No    Drug use: Not Currently    Sexual activity: Yes     Partners: Male     Social Determinants of Health     Financial Resource Strain: Low Risk  (8/16/2023)    Overall Financial Resource Strain (CARDIA)     Difficulty of Paying Living Expenses: Not hard at all   Food Insecurity: No Food Insecurity (8/16/2023)    Hunger Vital Sign     Worried About Running Out of Food in the Last Year: Never true     Ran Out of Food in the Last Year: Never true   Transportation Needs: No Transportation Needs (8/16/2023)    PRAPARE - Transportation     Lack of Transportation (Medical): No     Lack of Transportation (Non-Medical): No   Physical Activity: Inactive (8/16/2023)    Exercise Vital Sign     Days of Exercise per Week: 0 days     Minutes of Exercise per Session: 10 min   Stress: Stress Concern Present (8/16/2023)    [employer] - Occupational Stress Questionnaire     Feeling of Stress : To some extent   Social Connections: Unknown (8/16/2023)    Social Connection and Isolation Panel [NHANES]     Frequency of Communication with Friends and Family: More than three times a week     Frequency of Social Gatherings with Friends and Family: Never     Active Member of Clubs or Organizations: No     Attends Club or Organization Meetings: Never     Marital Status: Living with partner   Housing Stability: Low Risk  (8/16/2023)    Housing Stability Vital Sign     Unable to Pay for Housing in the Last Year: No     Number of Places Lived in the Last Year: 1     Unstable Housing in the Last Year: No       MEDICATIONS & ALLERGIES:     Current Outpatient Medications on  "File Prior to Visit   Medication Sig    albuterol (PROVENTIL/VENTOLIN HFA) 90 mcg/actuation inhaler Inhale 2 puffs into the lungs every 4 (four) hours as needed for Wheezing or Shortness of Breath.    ALPRAZolam (XANAX) 0.5 MG tablet Take 0.5 mg by mouth as needed.    FLUoxetine 40 MG capsule TAKE 1 CAPSULE ONE TIME DAILY    metoprolol succinate (TOPROL-XL) 50 MG 24 hr tablet TAKE 1 TABLET EVERY DAY    rosuvastatin (CRESTOR) 20 MG tablet TAKE 1 TABLET EVERY DAY    valsartan (DIOVAN) 40 MG tablet Take 40 mg by mouth once daily.    fluticasone-umeclidin-vilanter (TRELEGY ELLIPTA) 200-62.5-25 mcg inhaler Inhale 1 puff into the lungs once daily. Wash out mouth after use (Patient not taking: Reported on 9/13/2023)    [DISCONTINUED] meclizine (ANTIVERT) 25 mg tablet TAKE 1 TABLET TWICE DAILY AS NEEDED FOR DIZZINESS    [DISCONTINUED] VASCEPA 1 gram Cap Take by mouth.     No current facility-administered medications on file prior to visit.       Review of patient's allergies indicates:   Allergen Reactions    No known drug allergies        OBJECTIVE:   Vital Signs:  Vitals:    09/13/23 1329   BP: 122/88   Pulse: 84   Weight: 97 kg (213 lb 13.5 oz)   Height: 5' 10" (1.778 m)       No results found for this or any previous visit (from the past 24 hour(s)).      Physical Exam:   General:  Well developed, well nourished, no acute distress  HEENT:  Normocephalic, atraumatic, EOMI, clear sclera, throat clear without erythema or exudates  CVS:  RRR, S1 and S2 normal, no murmurs, rubs, gallops  Resp:  Lungs clear to auscultation, no wheezes, rales, rhonchi  GI:  Abdomen soft, non-tender, non-distended, normoactive bowel sounds, no masses  MSK:  No muscle atrophy, peripheral edema, full range of motion  Skin:  No rashes, ulcers, erythema  Psych:  Alert and oriented to person, place, and time    ASSESSMENT:     HIV disease  --Need updated labs: HIV VL, CD4, hep A/B immunity, acute hep C, RPR   --Genosure archive to be performed " looking for HIV mutations to aid in ART switch   --Recommend starting Biktarvy if covered by insurance  --Alternative would be Symtuza   --No OI ppx indicated at this time based on recent CD4   --Recommended vaccines: Hep A/B TBD based on serologies; annual influenza when available   --Follow up in 1 month; will recheck VL prior to visit     Essential hypertension  Continue current medications. Follow up with PCP.     PLAN:     Haritha was seen today for hiv positive/aids.    Diagnoses and all orders for this visit:    HIV disease  -     CD4 T-Seguin Cells; Future  -     Hepatitis A antibody, IgG; Future  -     Hepatitis B Surface Ab, Qualitative; Future  -     Hepatitis C Antibody; Future  -     HIV RNA, Quantitative, PCR; Future  -     RPR; Future  -     GENOSURE ARCHIVE DNA SEQUENCING; Future  -     akeexlrqx-znextmkl-ipphehx ala (BIKTARVY) -25 mg (25 kg or greater); Take 1 tablet by mouth once daily.  -     Ambulatory referral/consult to Family Practice; Future  -     HIV RNA, Quantitative, PCR; Future    Essential hypertension    Other orders  -     darunavir-radha-emtri-tenof ala (SYMTUZA) 663-699-069-10 mg Tab; Take 1 tablet by mouth once daily.      The total time for evaluation and management services performed on 9/13/23 was greater than 45 minutes.        Vaughn Larios DO   Infectious Diseases

## 2023-09-13 NOTE — ASSESSMENT & PLAN NOTE
--Need updated labs: HIV VL, CD4, hep A/B immunity, acute hep B and C, FTA, RPR   --Genosure archive to be performed looking for HIV mutations  --Recommend starting Biktarvy if covered by insurance  --Alternative would be Symtuza   --No OI ppx indicated at this time based on recent CD4   --Recommended vaccines: Prevnar 20, Menveo (0, 8 weeks); hep A/B TBD based on serologies   --Follow up in 1 month; will recheck VL prior to visit

## 2023-09-14 ENCOUNTER — PATIENT MESSAGE (OUTPATIENT)
Dept: INFECTIOUS DISEASES | Facility: CLINIC | Age: 78
End: 2023-09-14
Payer: COMMERCIAL

## 2023-09-14 LAB
CD3+CD4+ CELLS # BLD: 461 CELLS/UL (ref 300–1400)
CD3+CD4+ CELLS NFR BLD: 11.9 % (ref 28–57)
HAV IGG SER QL IA: NORMAL
HIV1 RNA # SERPL NAA+PROBE: 5272 COPIES/ML
HIV1 RNA SERPL NAA+PROBE-LOG#: 3.72 LOG COPIES/ML
HIV1 RNA SERPL QL NAA+PROBE: DETECTED
RPR SER QL: NORMAL

## 2023-09-15 ENCOUNTER — PATIENT MESSAGE (OUTPATIENT)
Dept: PULMONOLOGY | Facility: CLINIC | Age: 78
End: 2023-09-15
Payer: COMMERCIAL

## 2023-09-18 NOTE — PROGRESS NOTES
Health Maintenance Due   Topic Date Due    Monkeypox Vaccine (2 - Risk 2-dose series) 09/29/2022    COVID-19 Vaccine (6 - Pfizer series) 04/02/2023     Triggered LINKS. Updated Care Everywhere. Portal message sent asking pt to schedule annual PCP visit. Chart review completed as part of [facility] report.      [place] Volume In Cc: 3

## 2023-09-20 ENCOUNTER — OFFICE VISIT (OUTPATIENT)
Dept: OPHTHALMOLOGY | Facility: CLINIC | Age: 78
End: 2023-09-20
Payer: MEDICARE

## 2023-09-20 DIAGNOSIS — H35.3231 EXUDATIVE AGE-RELATED MACULAR DEGENERATION OF BOTH EYES WITH ACTIVE CHOROIDAL NEOVASCULARIZATION: ICD-10-CM

## 2023-09-20 DIAGNOSIS — H52.4 ASTIGMATISM WITH PRESBYOPIA, BILATERAL: ICD-10-CM

## 2023-09-20 DIAGNOSIS — H52.203 ASTIGMATISM WITH PRESBYOPIA, BILATERAL: ICD-10-CM

## 2023-09-20 DIAGNOSIS — H52.7 REFRACTIVE ERROR: Primary | ICD-10-CM

## 2023-09-20 PROCEDURE — 92499 UNLISTED OPH SVC/PROCEDURE: CPT | Mod: CSM,,, | Performed by: OPTOMETRIST

## 2023-09-20 PROCEDURE — 99999 PR PBB SHADOW E&M-EST. PATIENT-LVL II: CPT | Mod: PBBFAC,,, | Performed by: OPTOMETRIST

## 2023-09-20 PROCEDURE — 99499 NO LOS: ICD-10-PCS | Mod: S$PBB,,, | Performed by: OPTOMETRIST

## 2023-09-20 PROCEDURE — 99999 PR PBB SHADOW E&M-EST. PATIENT-LVL II: ICD-10-PCS | Mod: PBBFAC,,, | Performed by: OPTOMETRIST

## 2023-09-20 PROCEDURE — 99499 UNLISTED E&M SERVICE: CPT | Mod: S$PBB,,, | Performed by: OPTOMETRIST

## 2023-09-20 PROCEDURE — 92499 PR CONTACT LENS F/U LEV 1: ICD-10-PCS | Mod: CSM,,, | Performed by: OPTOMETRIST

## 2023-09-20 PROCEDURE — 99212 OFFICE O/P EST SF 10 MIN: CPT | Mod: PBBFAC | Performed by: OPTOMETRIST

## 2023-09-20 NOTE — PROGRESS NOTES
HPI     Contact Lens Fit            Comments: Pt is here today to get his new Left contact lens          Comments    SRN OS  Avastin OS Emergent 1/21/22, 2/24/22, 3/24/22, 4/28/22, 6/21/22  Emergent Avastin OU 3/16/23  Avastin failure  Emergent Eylea OU 5/2/23  Vabysmo OU 6/21/23, 7/27/23, 8/31/23    Monovision RGP OD dominant.            Last edited by Maya Babcock on 9/20/2023  1:45 PM.            Assessment /Plan     For exam results, see Encounter Report.    Refractive error  Eyeglass Final Rx       Eyeglass Final Rx         Sphere Cylinder Axis Add    Right -0.50 +4.25 085 +2.50    Left -0.75 +4.75 088 +2.50      Type: PAL    Expiration Date: 9/20/2024                  Contact Lens Final Rx       Final Contact Lens Rx         Brand Base Curve Diameter Sphere Addl. Specs    Right Bitoric RGP 8.08/7.38 9.4 +3.00/-3.50 Optimum Extra    Left Bitoric RGP 8.13/7.46 9.4 +7.00/-4.00 Optimum Extra      Expiration Date: 9/20/2024    Replacement: Yearly    Solutions: [place]    Wearing Schedule: Daily Wear                Monovision RGP OD distance OS near.     Contact lens trials fitted in office today. Contact lens hygiene reviewed. Patient able to insert the lenses themselves with minimal difficulty. Patient ok to finalize Contact lens after 1 week of wear. RTC if still having difficulty with CTL trial after 1 week.     Astigmatism with presbyopia, bilateral    Exudative age-related macular degeneration of both eyes with active choroidal neovascularization  Continue per Dr Macario.     RTC as scheduled for annual eye exam, sooner if any changes to vision worsening symptoms.

## 2023-09-22 ENCOUNTER — PATIENT MESSAGE (OUTPATIENT)
Dept: OPTOMETRY | Facility: CLINIC | Age: 78
End: 2023-09-22
Payer: COMMERCIAL

## 2023-10-11 ENCOUNTER — PROCEDURE VISIT (OUTPATIENT)
Dept: OPHTHALMOLOGY | Facility: CLINIC | Age: 78
End: 2023-10-11
Payer: MEDICARE

## 2023-10-11 DIAGNOSIS — H35.3231 EXUDATIVE AGE-RELATED MACULAR DEGENERATION OF BOTH EYES WITH ACTIVE CHOROIDAL NEOVASCULARIZATION: Primary | ICD-10-CM

## 2023-10-11 LAB — GENOSURE ARCHIVE DNA SEQUENCING: NORMAL

## 2023-10-11 PROCEDURE — 67028 INJECTION EYE DRUG: CPT | Mod: 50,PBBFAC | Performed by: OPHTHALMOLOGY

## 2023-10-11 PROCEDURE — 92134 POSTERIOR SEGMENT OCT RETINA (OCULAR COHERENCE TOMOGRAPHY)-BOTH EYES: ICD-10-PCS | Mod: 26,S$PBB,, | Performed by: OPHTHALMOLOGY

## 2023-10-11 PROCEDURE — 99499 NO LOS: ICD-10-PCS | Mod: S$PBB,,, | Performed by: OPHTHALMOLOGY

## 2023-10-11 PROCEDURE — 67028 INJECTION EYE DRUG: CPT | Mod: 50,S$PBB,, | Performed by: OPHTHALMOLOGY

## 2023-10-11 PROCEDURE — 99999PBSHW PR PBB SHADOW TECHNICAL ONLY FILED TO HB: ICD-10-PCS | Mod: JZ,PBBFAC,,

## 2023-10-11 PROCEDURE — 92134 CPTRZ OPH DX IMG PST SGM RTA: CPT | Mod: PBBFAC | Performed by: OPHTHALMOLOGY

## 2023-10-11 PROCEDURE — 99999PBSHW PR PBB SHADOW TECHNICAL ONLY FILED TO HB: Mod: JZ,PBBFAC,,

## 2023-10-11 PROCEDURE — 67028 PR INJECT INTRAVITREAL PHARMCOLOGIC: ICD-10-PCS | Mod: 50,S$PBB,, | Performed by: OPHTHALMOLOGY

## 2023-10-11 PROCEDURE — 99499 UNLISTED E&M SERVICE: CPT | Mod: S$PBB,,, | Performed by: OPHTHALMOLOGY

## 2023-10-11 RX ADMIN — FARICIMAB 6 MG: 6 INJECTION, SOLUTION INTRAVITREAL at 02:10

## 2023-10-11 NOTE — PROGRESS NOTES
===============================  Date today is 10/11/2023  Haritha Valle is a 78 y.o. male  Last visit [facility]: :8/31/2023   Last visit eye dept. 9/20/2023    Corrected distance visual acuity was 20/30 in the right eye and 20/30 in the left eye.  Tonometry       Tonometry (Applanation, 1:43 PM)         Right Left    Pressure 14 14                  Not recorded       Not recorded       Not recorded       Chief Complaint   Patient presents with    Macular Degeneration     Vabysmo ou     HPI     Macular Degeneration     Additional comments: Vabysmo ou           Comments    SRN OS  Avastin OS Emergent 1/21/22, 2/24/22, 3/24/22, 4/28/22, 6/21/22  Emergent Avastin OU 3/16/23  Avastin failure  Emergent Eylea OU 5/2/23  Vabysmo OU 6/21/23, 7/27/23, 8/31/23    Monovision RGP OD dominant.            Last edited by Maya Babcock on 10/11/2023  1:30 PM.      Problem List Items Addressed This Visit    None  Visit Diagnoses       Exudative age-related macular degeneration of both eyes with active choroidal neovascularization    -  Primary    Relevant Medications    faricimab-svoa 6 mg/0.05 mL (120 mg/mL) injection 6 mg (Completed)    faricimab-svoa 6 mg/0.05 mL (120 mg/mL) injection 6 mg (Completed)    Other Relevant Orders    Posterior Segment OCT Retina-Both eyes (Completed)    Prior authorization Order          Instructed to call 24/7 for any worsening of vision, visual distortion or pain.  Check OU independently daily.    Gave my office and personal cell phone number.  ________________  10/11/2023 today  Haritha Valle    OU SRN  OCT sl worse at 6 weeks  ..    Injection Procedure Note:    10/11/2023  Diagnosis :  OU SRN  Today:   Vabysmo (faricimab-svoa) 6 mg/0.05mL (120 mg/mL) Injection , OU   Follow up: rtc 5 weeks    Instructed to call 24/7 for any worsening of vision. Check Both eyes daily. Gave patient my home phone number.  Risks, benefits, and alternatives to treatment discussed in detail with the patient.  The  patient voiced understanding and wished to proceed with the procedure.     Patient Identified and Time Out complete  Subconjunctival bleb - xylocaine with epi 2%   and Betadine.  Inject at Vabysmo (faricimab-svoa) 6 mg/0.05mL (120 mg/mL) Injection , OU 6:00 @ 3.5-4mm posterior to limbus  1 stop: no   Post Operative Dx: Same  Complications: None  Follow up as above.    =============================

## 2023-10-18 DIAGNOSIS — B20 HIV DISEASE: Chronic | ICD-10-CM

## 2023-10-19 RX ORDER — BICTEGRAVIR SODIUM, EMTRICITABINE, AND TENOFOVIR ALAFENAMIDE FUMARATE 50; 200; 25 MG/1; MG/1; MG/1
1 TABLET ORAL DAILY
Qty: 30 TABLET | Refills: 0 | Status: ACTIVE | OUTPATIENT
Start: 2023-10-19 | End: 2023-11-15 | Stop reason: SDUPTHER

## 2023-10-23 ENCOUNTER — LAB VISIT (OUTPATIENT)
Dept: LAB | Facility: HOSPITAL | Age: 78
End: 2023-10-23
Attending: STUDENT IN AN ORGANIZED HEALTH CARE EDUCATION/TRAINING PROGRAM
Payer: MEDICARE

## 2023-10-23 DIAGNOSIS — B20 HIV DISEASE: Chronic | ICD-10-CM

## 2023-10-23 PROCEDURE — 87536 HIV-1 QUANT&REVRSE TRNSCRPJ: CPT | Performed by: STUDENT IN AN ORGANIZED HEALTH CARE EDUCATION/TRAINING PROGRAM

## 2023-10-23 PROCEDURE — 36415 COLL VENOUS BLD VENIPUNCTURE: CPT | Performed by: STUDENT IN AN ORGANIZED HEALTH CARE EDUCATION/TRAINING PROGRAM

## 2023-10-24 LAB
HIV1 RNA # SERPL NAA+PROBE: 71 COPIES/ML
HIV1 RNA SERPL NAA+PROBE-LOG#: 2.07 LOG COPIES/ML
HIV1 RNA SERPL QL NAA+PROBE: DETECTED

## 2023-10-26 ENCOUNTER — OFFICE VISIT (OUTPATIENT)
Dept: INFECTIOUS DISEASES | Facility: CLINIC | Age: 78
End: 2023-10-26
Payer: MEDICARE

## 2023-10-26 VITALS
SYSTOLIC BLOOD PRESSURE: 160 MMHG | BODY MASS INDEX: 30.49 KG/M2 | HEIGHT: 70 IN | WEIGHT: 213 LBS | DIASTOLIC BLOOD PRESSURE: 94 MMHG

## 2023-10-26 DIAGNOSIS — I10 ESSENTIAL HYPERTENSION: ICD-10-CM

## 2023-10-26 DIAGNOSIS — B20 HIV DISEASE: Chronic | ICD-10-CM

## 2023-10-26 DIAGNOSIS — E78.2 MIXED HYPERLIPIDEMIA: ICD-10-CM

## 2023-10-26 PROCEDURE — 99213 OFFICE O/P EST LOW 20 MIN: CPT | Mod: PBBFAC | Performed by: STUDENT IN AN ORGANIZED HEALTH CARE EDUCATION/TRAINING PROGRAM

## 2023-10-26 PROCEDURE — 99214 PR OFFICE/OUTPT VISIT, EST, LEVL IV, 30-39 MIN: ICD-10-PCS | Mod: S$PBB,,, | Performed by: STUDENT IN AN ORGANIZED HEALTH CARE EDUCATION/TRAINING PROGRAM

## 2023-10-26 PROCEDURE — 99999 PR PBB SHADOW E&M-EST. PATIENT-LVL III: CPT | Mod: PBBFAC,,, | Performed by: STUDENT IN AN ORGANIZED HEALTH CARE EDUCATION/TRAINING PROGRAM

## 2023-10-26 PROCEDURE — 99214 OFFICE O/P EST MOD 30 MIN: CPT | Mod: S$PBB,,, | Performed by: STUDENT IN AN ORGANIZED HEALTH CARE EDUCATION/TRAINING PROGRAM

## 2023-10-26 PROCEDURE — 99999 PR PBB SHADOW E&M-EST. PATIENT-LVL III: ICD-10-PCS | Mod: PBBFAC,,, | Performed by: STUDENT IN AN ORGANIZED HEALTH CARE EDUCATION/TRAINING PROGRAM

## 2023-10-26 NOTE — PATIENT INSTRUCTIONS
Recommend following vaccines: Hepatitis A (now and in 6 months) and flu shot    Recheck viral load in 1 month with clinic follow up right after

## 2023-10-26 NOTE — ASSESSMENT & PLAN NOTE
--Labs reviewed: no longer immune to hep A but is protected against hep B, CD4 461, hep C ab reactive but had negative viral load back in 2019 so likely SVR; RPR nonreactive  --Repeat viral load on 10/23 at 71, so patient is responding to Biktarvy   --Genosure archive showed less common mutations for tenofovir and Zidovudine; Biktarvy appears to be overcoming resistance   --Continue daily Biktarvy   --No OI ppx indicated at this time based on CD4   --Recommended vaccines: Hep A (0, 6 months) and annual influenza  --Follow up in 1 month; will recheck VL prior to visit

## 2023-10-26 NOTE — PROGRESS NOTES
Infectious Disease Clinic Note    Patient Name: Haritha Valle  YOB: 1945    PRESENTING HISTORY       History of Present Illness:  Mr. Haritha Valle is a 78 y.o. male w/ significant PMHx of HIV diagnosed in 1984 here for HIV follow up. Per last ID note, last documented visit with ID in 2019 with [facility] providers. Had been on Reyataz and Combivir with plans to switch to Biktarvy, but this proved cost prohibitive. No recent labs on file. In 2019 HIV VL was undetectable with CD4 570. Expressed concern about lipodystrophy and memory loss. Vaccine history populated below. More recent CD4 count 464 on 4/27/23. Today patient reports taking Reyataz and Combivir over 30 years. Stopped taking month ago due to issues with pharmacy. Has been undetectable for years. Not currently sexually active. No travel outside [place]. No sick contacts. Would like to try new ART. Has two dogs. Agreeable to baseline labs today and switching to Biktarvy or Symtuza based on cost.     Labs below reviewed and interpreted. Biktarvy was approved by [employer]. Today patient reports he has been taking it each day with no missed doses. Has nighttime low oxygen and apnea but won't wear CPAP. Seeing pulmonology for alternative. Establishing care with PCP as well. Discussed prior labs and explained that mutation likely minor from Genosure. He is clearly responding to ART. Will recheck in 1 month to ensure undetectable. Vaccines discussed as well. Patient voiced understanding.     Hep A IgG non reactive  Hep B surface Ab reactive  CD4 461  Hep C ab reactive but had negative viral load back in 2019 so likely SVR    RPR non-reactive  HIV RNA quant from 5272 to 71                 Review of Systems:  Constitutional: no fever or chills  Eyes: no visual changes  ENT: no nasal congestion or sore throat  Respiratory: no cough or shortness of breath  Cardiovascular: no chest pain  Gastrointestinal: no nausea or vomiting, no abdominal pain, no  constipation, no diarrhea  Genitourinary: no hematuria or dysuria  Musculoskeletal: no arthralgias or myalgias  Skin: no rash  Neurological: no headaches, numbness, or paresthesias    The following portions of the patient's history were reviewed and updated as appropriate: allergies, current medications, past family history, past medical history, past social history, past surgical history, and problem list.    PAST HISTORY:     Immunization History   Administered Date(s) Administered    COVID-19, MRNA, LN-S, PF (Pfizer) (Gray Cap) 04/22/2022    COVID-19, MRNA, LN-S, PF (Pfizer) (Purple Cap) 01/11/2021, 02/01/2021, 10/29/2021    COVID-19, mRNA, LNP-S, bivalent booster, PF (PFIZER OMICRON) 12/02/2022    Influenza (FLUAD) - Quadrivalent - Adjuvanted - PF *Preferred* (65+) 09/28/2020, 10/11/2021, 10/20/2022    Influenza - High Dose - PF (65 years and older) 09/24/2013, 12/02/2014, 09/27/2016, 09/27/2017, 12/12/2018, 10/01/2019    Influenza - Quadrivalent - PF *Preferred* (6 months and older) 10/24/2007, 01/08/2009, 09/23/2010, 01/24/2013    Influenza A (H1N1) 2009 Monovalent - IM 11/03/2009    Influenza Split 01/24/2013    Pneumococcal Conjugate - 13 Valent 07/23/2015    Pneumococcal Polysaccharide - 23 Valent 09/27/2016    Tdap 01/08/2009, 07/03/2019    Vaccinia, smallpox monkeypox vaccine live, PF 09/01/2022    Zoster 01/08/2009    Zoster Recombinant 05/09/2018, 06/08/2018, 07/18/2018       Past Medical History:   Diagnosis Date    Anxiety 7/3/2019    Basal cell carcinoma     Depression     Hepatitis B     Hepatitis C antibody test positive     RNA NEG 8/29/2019    HIV infection     Hypertension     Immune disorder     Mild cognitive impairment 10/1/2010       Past Surgical History:   Procedure Laterality Date    APPENDECTOMY      CARDIAC CATHETERIZATION      no stent    CHOLECYSTECTOMY      COLONOSCOPY N/A 11/3/2016    Procedure: COLONOSCOPY;  Surgeon: Maxi Villasenor MD;  Location: [address]);  Service:  Endoscopy;  Laterality: N/A;  last colonoscopy with Dr Jarrell    COLONOSCOPY N/A 1/25/2022    Procedure: COLONOSCOPY;  Surgeon: Silvino Rosales MD;  Location: [facility] ENDO;  Service: Endoscopy;  Laterality: N/A;    LEFT HEART CATHETERIZATION Left 2/3/2020    Procedure: CATHETERIZATION, HEART, LEFT;  Surgeon: Chele Ko MD;  Location: [facility] CATH LAB;  Service: Cardiology;  Laterality: Left;  malur pt       Family History   Problem Relation Age of Onset    Heart disease Father     Heart failure Father     Diabetes Neg Hx     Hypertension Neg Hx        Social History     Socioeconomic History    Marital status: Significant Other     Spouse name: Shadi    Number of children: 0    Highest education level: Some college, no degree   Occupational History    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  [place] [employer]   Tobacco Use    Smoking status: Never    Smokeless tobacco: Never   Substance and Sexual Activity    Alcohol use: No    Drug use: Not Currently    Sexual activity: Yes     Partners: Male     Social Determinants of Health     Financial Resource Strain: Low Risk  (8/16/2023)    Overall Financial Resource Strain (CARDIA)     Difficulty of Paying Living Expenses: Not hard at all   Food Insecurity: No Food Insecurity (8/16/2023)    Hunger Vital Sign     Worried About Running Out of Food in the Last Year: Never true     Ran Out of Food in the Last Year: Never true   Transportation Needs: No Transportation Needs (8/16/2023)    PRAPARE - Transportation     Lack of Transportation (Medical): No     Lack of Transportation (Non-Medical): No   Physical Activity: Inactive (8/16/2023)    Exercise Vital Sign     Days of Exercise per Week: 0 days     Minutes of Exercise per Session: 10 min   Stress: Stress Concern Present (8/16/2023)    [place] [employer] - Occupational Stress Questionnaire     Feeling of Stress : To some extent   Social Connections: Unknown (8/16/2023)    Social  "Connection and Isolation Panel [NHANES]     Frequency of Communication with Friends and Family: More than three times a week     Frequency of Social Gatherings with Friends and Family: Never     Active Member of Clubs or Organizations: No     Attends Club or Organization Meetings: Never     Marital Status: Living with partner   Housing Stability: Low Risk  (8/16/2023)    Housing Stability Vital Sign     Unable to Pay for Housing in the Last Year: No     Number of Places Lived in the Last Year: 1     Unstable Housing in the Last Year: No       MEDICATIONS & ALLERGIES:     Current Outpatient Medications on File Prior to Visit   Medication Sig    albuterol (PROVENTIL/VENTOLIN HFA) 90 mcg/actuation inhaler Inhale 2 puffs into the lungs every 4 (four) hours as needed for Wheezing or Shortness of Breath.    mtbyvbtow-qucfaxrf-tcehjuj ala (BIKTARVY) -25 mg (25 kg or greater) Take 1 tablet by mouth once daily.    FLUoxetine 40 MG capsule TAKE 1 CAPSULE ONE TIME DAILY    fluticasone-umeclidin-vilanter (TRELEGY ELLIPTA) 200-62.5-25 mcg inhaler Inhale 1 puff into the lungs once daily. Wash out mouth after use (Patient not taking: Reported on 9/13/2023)    metoprolol succinate (TOPROL-XL) 50 MG 24 hr tablet TAKE 1 TABLET EVERY DAY    rosuvastatin (CRESTOR) 20 MG tablet TAKE 1 TABLET EVERY DAY     No current facility-administered medications on file prior to visit.       Review of patient's allergies indicates:   Allergen Reactions    No known drug allergies        OBJECTIVE:   Vital Signs:  Vitals:    10/26/23 1336   BP: (!) 160/94   Weight: 96.6 kg (213 lb)   Height: 5' 10" (1.778 m)       No results found for this or any previous visit (from the past 24 hour(s)).      Physical Exam:   General:  Well developed, well nourished, no acute distress  HEENT:  Normocephalic, atraumatic  CVS:  RRR, S1 and S2 normal, no murmurs, rubs, gallops  Resp:  Lungs clear to auscultation, no wheezes, rales, rhonchi  MSK:  No muscle " atrophy, peripheral edema, full range of motion  Skin:  No rashes, ulcers, erythema  Psych:  Alert and oriented to person, place, and time    ASSESSMENT:     HIV disease  --Labs reviewed: no longer immune to hep A but is protected against hep B, CD4 461, hep C ab reactive but had negative viral load back in 2019 so likely SVR; RPR nonreactive  --Repeat viral load on 10/23 at 71, so patient is responding to Biktarvy   --Genosure archive showed less common mutations for tenofovir and Zidovudine; Biktarvy appears to be overcoming resistance   --Continue daily Biktarvy   --No OI ppx indicated at this time based on CD4   --Recommended vaccines: Hep A (0, 6 months) and annual influenza  --Follow up in 1 month; will recheck VL prior to visit     Essential hypertension  Continue current medications. Follow up with PCP.     Mixed hyperlipidemia  Continue Crestor. Follow up with PCP.     PLAN:     Diagnoses and all orders for this visit:    HIV disease  -     HIV RNA, Quantitative, PCR; Future    Essential hypertension    Mixed hyperlipidemia      The total time for evaluation and management services performed on 10/26/23 was greater than 30 minutes.        Vaughn Larios, DO   Infectious Diseases

## 2023-11-02 RX ORDER — METOPROLOL SUCCINATE 50 MG/1
TABLET, EXTENDED RELEASE ORAL
Qty: 90 TABLET | Refills: 10 | Status: SHIPPED | OUTPATIENT
Start: 2023-11-02 | End: 2023-12-19 | Stop reason: ALTCHOICE

## 2023-11-02 NOTE — TELEPHONE ENCOUNTER
Care Due:                  Date            Visit Type   Department     Provider  --------------------------------------------------------------------------------                                MYCHART                              FOLLOWUP/OF  [facility] INTERNAL  Last Visit: 09-      FICE VISIT   MEDICINE       Hesham Andrade  Next Visit: None Scheduled  None         None Found                                                            Last  Test          Frequency    Reason                     Performed    Due Date  --------------------------------------------------------------------------------    Office Visit  15 months..  FLUoxetine, rosuvastatin.  09- 11-    Lipid Panel.  12 months..  rosuvastatin.............  Not Found    Overdue    [employer] Embedded Care Due Messages. Reference number: [id number].   11/02/2023 2:05:30 AM CDT

## 2023-11-02 NOTE — TELEPHONE ENCOUNTER
Refill Routing Note   Medication(s) are not appropriate for processing by [facility] for the following reason(s):      Required vitals abnormal    [facility] action(s):  [facility] Care Due:  Appointment due            Appointments  past 12m or future 3m with PCP    Date Provider   Last Visit   9/1/2022 Hesham Andrade II, MD   Next Visit   Visit date not found Hesham Andrade II, MD   ED visits in past 90 days: 0        Note composed:11:45 AM 11/02/2023

## 2023-11-09 ENCOUNTER — OFFICE VISIT (OUTPATIENT)
Dept: PULMONOLOGY | Facility: CLINIC | Age: 78
End: 2023-11-09
Payer: MEDICARE

## 2023-11-09 VITALS
DIASTOLIC BLOOD PRESSURE: 80 MMHG | BODY MASS INDEX: 30.68 KG/M2 | SYSTOLIC BLOOD PRESSURE: 132 MMHG | RESPIRATION RATE: 17 BRPM | HEIGHT: 70 IN | HEART RATE: 75 BPM | OXYGEN SATURATION: 96 % | WEIGHT: 214.31 LBS

## 2023-11-09 DIAGNOSIS — G47.33 OBSTRUCTIVE SLEEP APNEA: ICD-10-CM

## 2023-11-09 DIAGNOSIS — R06.02 SOB (SHORTNESS OF BREATH): ICD-10-CM

## 2023-11-09 DIAGNOSIS — R09.02 EXERCISE HYPOXEMIA: ICD-10-CM

## 2023-11-09 DIAGNOSIS — R06.09 DOE (DYSPNEA ON EXERTION): ICD-10-CM

## 2023-11-09 DIAGNOSIS — G47.34 NOCTURNAL HYPOXEMIA: Primary | ICD-10-CM

## 2023-11-09 DIAGNOSIS — G47.33 OSA ON CPAP: ICD-10-CM

## 2023-11-09 DIAGNOSIS — J41.0 SIMPLE CHRONIC BRONCHITIS: ICD-10-CM

## 2023-11-09 PROCEDURE — 99999 PR PBB SHADOW E&M-EST. PATIENT-LVL IV: ICD-10-PCS | Mod: PBBFAC,,, | Performed by: INTERNAL MEDICINE

## 2023-11-09 PROCEDURE — 99999PBSHW FLU VACCINE - QUADRIVALENT - ADJUVANTED: ICD-10-PCS | Mod: PBBFAC,,,

## 2023-11-09 PROCEDURE — G0008 ADMIN INFLUENZA VIRUS VAC: HCPCS | Mod: PBBFAC

## 2023-11-09 PROCEDURE — 99999PBSHW FLU VACCINE - QUADRIVALENT - ADJUVANTED: Mod: PBBFAC,,,

## 2023-11-09 PROCEDURE — 99999 PR PBB SHADOW E&M-EST. PATIENT-LVL IV: CPT | Mod: PBBFAC,,, | Performed by: INTERNAL MEDICINE

## 2023-11-09 PROCEDURE — 99214 PR OFFICE/OUTPT VISIT, EST, LEVL IV, 30-39 MIN: ICD-10-PCS | Mod: S$PBB,,, | Performed by: INTERNAL MEDICINE

## 2023-11-09 PROCEDURE — 99214 OFFICE O/P EST MOD 30 MIN: CPT | Mod: PBBFAC,25 | Performed by: INTERNAL MEDICINE

## 2023-11-09 PROCEDURE — 99214 OFFICE O/P EST MOD 30 MIN: CPT | Mod: S$PBB,,, | Performed by: INTERNAL MEDICINE

## 2023-11-09 RX ORDER — FLUTICASONE FUROATE, UMECLIDINIUM BROMIDE AND VILANTEROL TRIFENATATE 200; 62.5; 25 UG/1; UG/1; UG/1
1 POWDER RESPIRATORY (INHALATION) DAILY
Qty: 60 EACH | Refills: 11 | Status: SHIPPED | OUTPATIENT
Start: 2023-11-09 | End: 2023-12-13

## 2023-11-09 RX ORDER — ALBUTEROL SULFATE 90 UG/1
2 AEROSOL, METERED RESPIRATORY (INHALATION) EVERY 4 HOURS PRN
Qty: 18 G | Refills: 11 | Status: SHIPPED | OUTPATIENT
Start: 2023-11-09 | End: 2023-12-13

## 2023-11-09 NOTE — PROGRESS NOTES
Subjective:     Patient ID: Haritha Valle is a 78 y.o. male.    Chief Complaint:      HPI    Dyspnea  Patient complains of shortness of breath. Symptoms occur while getting dressed, with one block walking. Symptoms began 4 years ago, gradually worsening since. Associated symptoms include  dry cough. He denies chest pain, located left chest. He does not have had recent travel. Weight has been stable. Symptoms are exacerbated by moderate activity. Symptoms are alleviated by rest.     Dental work planned    Obstructive Sleep Apnea on Continuous Positive Airway Pressure:  Patient is using not CPAP as prescribed and not benefiting from therapy. Patient has complaints of unable to tolerate Continuous Positive Airway Pressure mask.  Prescription for oxygen to be used at night sent during last visit  - patient did not have delivery    Past Medical History:   Diagnosis Date    Anxiety 7/3/2019    Basal cell carcinoma     Depression     Hepatitis B     Hepatitis C antibody test positive     RNA NEG 8/29/2019    HIV infection     Hypertension     Immune disorder     Mild cognitive impairment 10/1/2010     Past Surgical History:   Procedure Laterality Date    APPENDECTOMY      CARDIAC CATHETERIZATION      no stent    CHOLECYSTECTOMY      COLONOSCOPY N/A 11/3/2016    Procedure: COLONOSCOPY;  Surgeon: Maxi Villasenor MD;  Location: [address]);  Service: Endoscopy;  Laterality: N/A;  last colonoscopy with Dr Jarrell    COLONOSCOPY N/A 1/25/2022    Procedure: COLONOSCOPY;  Surgeon: Silvino Rosales MD;  Location: [facility];  Service: Endoscopy;  Laterality: N/A;    LEFT HEART CATHETERIZATION Left 2/3/2020    Procedure: CATHETERIZATION, HEART, LEFT;  Surgeon: Chele Ko MD;  Location: [facility] CATH LAB;  Service: Cardiology;  Laterality: Left;  malur pt     Review of patient's allergies indicates:   Allergen Reactions    No known drug allergies      Current Outpatient Medications on File Prior to Visit   Medication Sig  Dispense Refill    uqrxwjavm-jtjyiqyc-hlygumt ala (BIKTARVY) -25 mg (25 kg or greater) Take 1 tablet by mouth once daily. 30 tablet 0    FLUoxetine 40 MG capsule TAKE 1 CAPSULE ONE TIME DAILY 90 capsule 0    metoprolol succinate (TOPROL-XL) 50 MG 24 hr tablet TAKE 1 TABLET EVERY DAY 90 tablet 10    rosuvastatin (CRESTOR) 20 MG tablet TAKE 1 TABLET EVERY DAY 90 tablet 3     No current facility-administered medications on file prior to visit.     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  [place] [employer]   Tobacco Use    Smoking status: Never    Smokeless tobacco: Never   Substance and Sexual Activity    Alcohol use: No    Drug use: Not Currently    Sexual activity: Yes     Partners: Male     Social Determinants of Health     Financial Resource Strain: Low Risk  (11/13/2023)    Overall Financial Resource Strain (CARDIA)     Difficulty of Paying Living Expenses: Not hard at all   Food Insecurity: No Food Insecurity (11/13/2023)    Hunger Vital Sign     Worried About Running Out of Food in the Last Year: Never true     Ran Out of Food in the Last Year: Never true   Transportation Needs: No Transportation Needs (11/13/2023)    PRAPARE - Transportation     Lack of Transportation (Medical): No     Lack of Transportation (Non-Medical): No   Physical Activity: Inactive (11/13/2023)    Exercise Vital Sign     Days of Exercise per Week: 0 days     Minutes of Exercise per Session: 0 min   Stress: Stress Concern Present (11/13/2023)    [place] [employer] - Occupational Stress Questionnaire     Feeling of Stress : To some extent   Social Connections: Unknown (11/13/2023)    Social Connection and Isolation Panel [NHANES]     Frequency of Communication with Friends and Family: Three  "times a week     Frequency of Social Gatherings with Friends and Family: Never     Active Member of Clubs or Organizations: No     Attends Club or Organization Meetings: Never     Marital Status: Living with partner   Housing Stability: Low Risk  (11/13/2023)    Housing Stability Vital Sign     Unable to Pay for Housing in the Last Year: No     Number of Places Lived in the Last Year: 1     Unstable Housing in the Last Year: No     Family History   Problem Relation Age of Onset    Heart disease Father     Heart failure Father     Diabetes Neg Hx     Hypertension Neg Hx        Review of Systems   Constitutional:  Negative for fever and fatigue.   HENT:  Negative for postnasal drip and rhinorrhea.    Eyes:  Negative for redness and itching.   Respiratory:  Positive for apnea, snoring and dyspnea on extertion. Negative for cough, shortness of breath, wheezing and Paroxysmal Nocturnal Dyspnea.    Cardiovascular:  Negative for chest pain.   Genitourinary:  Negative for difficulty urinating and hematuria.   Endocrine:  Negative for polyphagia, cold intolerance and heat intolerance.    Musculoskeletal:  Positive for arthralgias.   Skin:  Negative for rash.   Gastrointestinal:  Negative for nausea, vomiting, abdominal pain and abdominal distention.   Neurological:  Positive for light-headedness. Negative for dizziness and headaches.        Poor memory   Hematological:  Negative for adenopathy. Does not bruise/bleed easily and no excessive bruising.   Psychiatric/Behavioral:  Positive for confusion. The patient is not nervous/anxious.        Objective:      /80   Pulse 75   Resp 17   Ht 5' 10" (1.778 m)   Wt 97.2 kg (214 lb 4.6 oz)   SpO2 96%   BMI 30.75 kg/m²   Physical Exam  Vitals and nursing note reviewed.   Constitutional:       Appearance: He is well-developed.   HENT:      Head: Normocephalic and atraumatic.      Nose: Nose normal.   Eyes:      Conjunctiva/sclera: Conjunctivae normal.      Pupils: Pupils " are equal, round, and reactive to light.   Neck:      Thyroid: No thyromegaly.      Vascular: No JVD.      Trachea: No tracheal deviation.   Cardiovascular:      Rate and Rhythm: Normal rate and regular rhythm.      Heart sounds: Normal heart sounds.   Pulmonary:      Effort: Pulmonary effort is normal.      Breath sounds: Normal breath sounds.   Abdominal:      Palpations: Abdomen is soft.   Musculoskeletal:         General: Normal range of motion.      Cervical back: Neck supple.   Lymphadenopathy:      Cervical: No cervical adenopathy.   Skin:     General: Skin is warm and dry.   Neurological:      Mental Status: He is alert and oriented to person, place, and time.      Gait: Gait abnormal.      Comments: Poor memory and judgement   Psychiatric:         Mood and Affect: Mood normal.         Behavior: Behavior normal.           Echocardiogram Summary         Left Ventricle: The left ventricle is normal in size. Normal wall thickness. There is mild concentric hypertrophy. Normal wall motion. There is low normal systolic function with a visually estimated ejection fraction of 50 - 55%. Grade I diastolic dysfunction.    Right Ventricle: Normal right ventricular cavity size. Wall thickness is normal. Right ventricle wall motion  is normal. Systolic function is normal.    Aortic Valve: There is mild aortic regurgitation.    Mitral Valve: There is mild regurgitation.    Tricuspid Valve: There is mild regurgitation.    IVC/SVC: Normal venous pressure at 3 mmHg.    Personal Diagnostic Review  Spirometry shows a reduced vital capacity suggesting restriction. Spirometry remains unimproved following bronchodilator. Lung volumes show mild restriction is present. Airway mechanics show normal airway resistance and conductance. DLCO is mildly   decreased. MVV is severely decreased.   Â      [facility]-Pulmonary Function 3rdFl  Six Minute Walk   SUMMARY   Ordering Provider: Maksim Chavarria MD        Interpreting Provider: Maksim PATEL  "MD Deon  Performing nurse/tech/RT: VT, RT  Diagnosis:  (MAIER (dyspnea on exertion); Chronic restrictive lung disease; Exercise hypoxemia)  Height: 5' 10" (177.8 cm)  Weight: 98.7 kg (217 lb 9.5 oz)  BMI (Calculated): 31.2              Patient Race:           Phase Oxygen Assessment Supplemental O2 Heart   Rate Blood Pressure Johnnie Dyspnea Scale Rating   Resting 95 % Room Air 73 bpm 151/83 3   Exercise             Minute             1 94 % Room Air 82 bpm       2 96 % Room Air 83 bpm       3 95 % Room Air 82 bpm       4 95 % Room Air 84 bpm       5 95 % Room Air 84 bpm       6  96 % Room Air 85 bpm 165/83 4   Recovery             Minute             1 95 % Room Air 74 bpm       2 97 % Room Air 76 bpm       3 97 % Room Air 76 bpm       4 97 % Room Air 74 bpm 170/89 3      Six Minute Walk Summary  6MWT Status: completed without stopping  Patient Reported: Dyspnea, Dizziness                Interpretation:  Did the patient stop or pause?: No  Total Time Walked (Calculated): 360 seconds  Final Partial Lap Distance (feet): 0 feet  Total Distance Meters (Calculated): 304.8 meters  Predicted Distance Meters (Calculated): 476.69 meters  Percentage of Predicted (Calculated): 63.94  Peak VO2 (Calculated): 13.12  Mets: 3.75  Has The Patient Had a Previous Six Minute Walk Test?: Yes     Previous 6MWT Results  Has The Patient Had a Previous Six Minute Walk Test?: Yes  Date of Previous Test: 10/23/20  Total Time Walked: 344 seconds  Total Distance (meters): 284.07  Predicted Distance (meters): 297.85 meters  Percent Change (Calculated): -0.07        Interpretation:  Total distance walked in six minutes is mildly reduced indicating a reduction in overall  functional capacity. The patient did not meet criteria for supplemental oxygen prescription.  Clinical correlation suggested.  [] Mild exercise-induced hypoxemia described as an arterial oxygen saturation of 93-95% (with a fall of 3-4% with exercise),   [] Moderate " "exercise-induced hypoxemia as a fall in oxygen saturation to  89-93% (with a fall of 3-4 % with exercise)  [] Severe exercise induced hypoxemia as < 89% O2 saturation (88% and below).  Medicare Criteria for Oxygen prescription comments: When arterial oxygen saturation is at or below 88% during exercise (severe exercise induced hypoxemia) then the patient falls under   Details about Medicare Group Criteria coverage can be found at http://www.cms.[facility].gov/manuals/downloads/      Maksim Chavarria MD                     11/9/2023    12:59 PM   Pulmonary Studies Review   SpO2 96 %   Height 5' 10" (1.778 m)   Weight 97.2 kg (214 lb 4.6 oz)   BMI (Calculated) 30.7   Predicted Distance 280.39   Predicted Distance Meters (Calculated) 479.33 meters       Posterior Segment OCT Retina-Both eyes  Right Eye  Quality was good. Scan locations included subfoveal, juxtafoveal,   extrafoveal, nasal, temporal, superior, inferior. Progression has been   stable. Findings include normal foveal contour.     Left Eye  Quality was good. Scan locations included subfoveal, juxtafoveal,   extrafoveal, nasal, temporal, superior, inferior. Progression has been   stable. Findings include normal foveal contour.     Notes  See progress note.      Office Spirometry Results:         11/9/2023    12:59 PM 10/26/2023     1:36 PM 9/13/2023     1:29 PM 8/21/2023     3:44 PM 8/21/2023     3:36 PM 8/21/2023     2:30 PM 7/24/2023     1:23 PM   Pulmonary Function Tests   SpO2 96 %   93 %      Ordering Provider     Maksim Chavarria MD     Performing nurse/tech/RT     VT, RT     Height 5' 10" (1.778 m) 5' 10" (1.778 m) 5' 10" (1.778 m) 5' 10" (1.778 m) 5' 10" (1.778 m) 5' 10" (1.778 m) 5' 10" (1.778 m)   Weight 97.2 kg (214 lb 4.6 oz) 96.6 kg (213 lb) 97 kg (213 lb 13.5 oz) 98.7 kg (217 lb 9.5 oz) 98.7 kg (217 lb 9.5 oz) 96.2 kg (212 lb) 96.3 kg (212 lb 4.9 oz)   BMI (Calculated) 30.7 30.6 30.7 31.2 31.2 30.4 30.5   Patient Race          6MWT Status     " "completed without stopping     Patient Reported     Dyspnea;Dizziness     Was O2 used?     No     6MW Distance walked (feet)     1000 feet     Distance walked (meters)     304.8 meters     Did patient stop?     No     Type of assistive device(s) used?     no assistive devices     Oxygen Saturation     95 %     Supplemental Oxygen     Room Air     Heart Rate     73 bpm     Blood Pressure     151/83     Johnnie Dyspnea Rating      moderate     Oxygen Saturation     96 %     Supplemental Oxygen     Room Air     Heart Rate     85 bpm     Blood Pressure     165/83     Johnnie Dyspnea Rating      somewhat heavy     Recovery Time (seconds)     240 seconds     Oxygen Saturation     97 %     Supplemental Oxygen     Room Air     Heart Rate     74 bpm     Blood Pressure     170/89     Johnnie Dyspnea Rating      moderate     Is procedure ready for interpretation?     Yes     Oxygen Qualification?     No           2023    12:59 PM   Pulmonary Studies Review   SpO2 96 %   Height 5' 10" (1.778 m)   Weight 97.2 kg (214 lb 4.6 oz)   BMI (Calculated) 30.7   Predicted Distance 280.39   Predicted Distance Meters (Calculated) 479.33 meters     Procedure Notes    Author Status Last  Updated Created   Golden Allison, PhD Signed Golden Allison, PhD 2020  5:22 PM 2020  5:19 PM          Assoc. Orders Procedures   POLYSOMNOGRAM POLYSOMNOGRAM       Pre-Op Dx Post-Op Dx   Obstructive sleep apnea syndrome None               Patient Name: Haritha Valle                            Date of Report: 20           Date of PS/10/2020   [facility] No.: [id number]                            : 1945                      Time of PSG:  10:25:49 PM - 5:02:54 AM  Sex:  Male   Age:  75   Weight:  212.0 lbs      Height:  5  11                         Type of PSG:  Diagnostic      REASONS FOR REFERRAL: Mr. Valle is a 75 year old male, [place] Dr. Rico Pineda and [place] [facility] [place] for evaluation of possible " obstructive sleep apnea / hypopnea syndrome (OSAHS).  Dr. Pineda requested diagnostic polysomnography based on the patients reported loud snoring, observed respiratory pauses in sleep, unrefreshing sleep and daytime somnolence (all positive on STOP-bang).  His [place] Sleepiness Scale score was 13, clinically significant, and his STOP - BANG score was 7, high risk of RAMONITA.  Dr. Hesham Andrade is the patients primary care physician.      STUDY PARAMETERS: This diagnostic study involved analysis of the patient's sleep pattern while breathing unassisted. The study was performed with a sleep technologist in attendance for the entire test period, with video monitoring throughout the study, and routine laboratory clinical parameters recorded:  NOTE: The polysomnography electrophysiological record for the patient has been reviewed in its entirety by Dr. Allison.     SUMMARY STATEMENTS  DIAGNOSTIC IMPRESSIONS  G47.33  /  327.23                    Moderate to Severe Obstructive Sleep Apnea, Adult (OSAHS)  G47.61  /  327.51                    Severe Periodic Limb Movement (PLM) Disorder   F51.12   /  307.44                    Mild Insufficient Sleep Syndrome  Z72.821 /  V69.4                     Inadequate Sleep Hygiene      PRIMARY TREATMENT RECOMMENDATIONS  Treat, or refer to Sleep Disorders Center.  The diagnostic polysomnography revealed a moderate to severe obstructive sleep apnea / hypopnea syndrome (A + H Index = 26.8 events / hr asleep with 7.0 respiratory event - related arousals / hr asleep for the study, and no RERAs (respiratory effort -  related arousals).  The mean SpO2 value was 90.0 %, significant, minimum oxygen saturation during sleep was   79.0 %, and waking baseline SpO2 was 97 %.   Sporadic / infrequent , moderately loud snoring was noted.  A  CPAP titration polysomnography is recommended.      Weight loss to the normal range is recommended as it can decrease respiratory events and snoring in overweight  patients.  The following changes in sleep hygiene / sleep - related behavior are recommended after medical treatments are successful  Regular bedtimes and wake times, including weekends: Total sleep time / night should not be more than one hour more             than usual, and bedtime or wake time should not be more than one hour earlier or later than usual.    Do not attempt to make up lost sleep by extending sleep periods.    Avoid naps; none longer than 20 min or later than mid - afternoon.     SECONDARY TREATMENT RECOMMENDATIONS  Treat, or refer to SDC if problems are not satisfactorily resolved by the above.  A severe  PLM disorder was observed (PLMS Index = 67.6 / hr asleep, but treatment might not be optimal because the PLMS were not very disruptive of sleep (6.6 arousals / hr asleep), and there were no signs of restless legs syndrome in the SDI,   H & P or PSG;  consider treatment of PLM disorder if PLMS symptoms are sufficiently bothersome to the patient.  Note that the benzodiazepine medications sometimes used to treat PLM disorder (e.g., alprazolam / Xanax) may exacerbate some sleep - related respiratory disorders, and that dopaminergic medications such as Mirapex and Requip can be used in such instances.    Mr. Valle is taking fluoxetine / Prozac.  Most tricyclic, and many SSRI antidepressants (e.g., fluoxetine - Prozac, sertraline   Zoloft, venlafaxine - Effexor), are associated with increased PLMS in some studies and increased RLS in others.  Consider   replacing Prozac with a medication known not to exacerbate PLMS or RLS.  Consider behavioral and cognitive / behavioral treatments for anxiety and depression to complement the medication and to increase the probability of long - term adaptive change.  Sleep (quality) might be expected to further improve.  IMPORTANT  NOTE:   RAMONITA, PLMS, and stress - related arousals (anxiety and depression)  interact to significantly impair sleep quality and restoratives,  making treatment of each and all of these disorders very important  for restorative sleep.     See below for a complete interpretation of data from the polysomnography and Sleep Disorders Inventory.     Thank you for referring this patient to the [facility].      Golden Allison, Ph.D., ABPP; Diplomate, American Board of Sleep Medicine                Assessment:            Nocturnal hypoxemia  -     OXYGEN FOR HOME USE    RAMONITA on CPAP  -     Ambulatory referral/consult to ENT; Future; Expected date: 11/16/2023    MAIER (dyspnea on exertion)  -     fluticasone-umeclidin-vilanter (TRELEGY ELLIPTA) 200-62.5-25 mcg inhaler; Inhale 1 puff into the lungs once daily. Wash out mouth after use  Dispense: 60 each; Refill: 11  -     albuterol (PROVENTIL/VENTOLIN HFA) 90 mcg/actuation inhaler; Inhale 2 puffs into the lungs every 4 (four) hours as needed for Wheezing or Shortness of Breath.  Dispense: 18 g; Refill: 11    SOB (shortness of breath)  -     fluticasone-umeclidin-vilanter (TRELEGY ELLIPTA) 200-62.5-25 mcg inhaler; Inhale 1 puff into the lungs once daily. Wash out mouth after use  Dispense: 60 each; Refill: 11  -     albuterol (PROVENTIL/VENTOLIN HFA) 90 mcg/actuation inhaler; Inhale 2 puffs into the lungs every 4 (four) hours as needed for Wheezing or Shortness of Breath.  Dispense: 18 g; Refill: 11  -     Spirometry with/without bronchodilator; Future; Expected date: 05/11/2024    Simple chronic bronchitis  -     fluticasone-umeclidin-vilanter (TRELEGY ELLIPTA) 200-62.5-25 mcg inhaler; Inhale 1 puff into the lungs once daily. Wash out mouth after use  Dispense: 60 each; Refill: 11  -     albuterol (PROVENTIL/VENTOLIN HFA) 90 mcg/actuation inhaler; Inhale 2 puffs into the lungs every 4 (four) hours as needed for Wheezing or Shortness of Breath.  Dispense: 18 g; Refill: 11  -     Spirometry with/without bronchodilator; Future; Expected date: 05/11/2024  -     Six Minute Walk Test to qualify for Home  Oxygen; Future    Obstructive sleep apnea  -     OXYGEN FOR HOME USE    Exercise hypoxemia  -     Six Minute Walk Test to qualify for Home Oxygen; Future    Other orders  -     Influenza - Quadrivalent (Adjuvanted)          Outpatient Encounter Medications as of 11/9/2023   Medication Sig Dispense Refill    albuterol (PROVENTIL/VENTOLIN HFA) 90 mcg/actuation inhaler Inhale 2 puffs into the lungs every 4 (four) hours as needed for Wheezing or Shortness of Breath. 18 g 11    gxgrlljux-mczchjmf-cplhegx ala (BIKTARVY) -25 mg (25 kg or greater) Take 1 tablet by mouth once daily. 30 tablet 0    FLUoxetine 40 MG capsule TAKE 1 CAPSULE ONE TIME DAILY 90 capsule 0    fluticasone-umeclidin-vilanter (TRELEGY ELLIPTA) 200-62.5-25 mcg inhaler Inhale 1 puff into the lungs once daily. Wash out mouth after use 60 each 11    metoprolol succinate (TOPROL-XL) 50 MG 24 hr tablet TAKE 1 TABLET EVERY DAY 90 tablet 10    rosuvastatin (CRESTOR) 20 MG tablet TAKE 1 TABLET EVERY DAY 90 tablet 3    [DISCONTINUED] albuterol (PROVENTIL/VENTOLIN HFA) 90 mcg/actuation inhaler Inhale 2 puffs into the lungs every 4 (four) hours as needed for Wheezing or Shortness of Breath. 18 g 11    [DISCONTINUED] fkaectbxj-dubptpgr-sefwpdq ala (BIKTARVY) -25 mg (25 kg or greater) Take 1 tablet by mouth once daily. 30 tablet 0    [DISCONTINUED] fluticasone-umeclidin-vilanter (TRELEGY ELLIPTA) 200-62.5-25 mcg inhaler Inhale 1 puff into the lungs once daily. Wash out mouth after use (Patient not taking: Reported on 9/13/2023) 60 each 11    [DISCONTINUED] metoprolol succinate (TOPROL-XL) 50 MG 24 hr tablet TAKE 1 TABLET EVERY DAY 90 tablet 2     No facility-administered encounter medications on file as of 11/9/2023.     Plan:       Requested Prescriptions     Signed Prescriptions Disp Refills    fluticasone-umeclidin-vilanter (TRELEGY ELLIPTA) 200-62.5-25 mcg inhaler 60 each 11     Sig: Inhale 1 puff into the lungs once daily. Wash out mouth after use     albuterol (PROVENTIL/VENTOLIN HFA) 90 mcg/actuation inhaler 18 g 11     Sig: Inhale 2 puffs into the lungs every 4 (four) hours as needed for Wheezing or Shortness of Breath.     Problem List Items Addressed This Visit       Obstructive sleep apnea    Relevant Orders    OXYGEN FOR HOME USE     Other Visit Diagnoses       Nocturnal hypoxemia    -  Primary    Relevant Orders    OXYGEN FOR HOME USE    RAMONITA on CPAP        Relevant Orders    Ambulatory referral/consult to ENT    MAIER (dyspnea on exertion)        Relevant Medications    fluticasone-umeclidin-vilanter (TRELEGY ELLIPTA) 200-62.5-25 mcg inhaler    albuterol (PROVENTIL/VENTOLIN HFA) 90 mcg/actuation inhaler    SOB (shortness of breath)        Relevant Medications    fluticasone-umeclidin-vilanter (TRELEGY ELLIPTA) 200-62.5-25 mcg inhaler    albuterol (PROVENTIL/VENTOLIN HFA) 90 mcg/actuation inhaler    Other Relevant Orders    Spirometry with/without bronchodilator    Simple chronic bronchitis        Relevant Medications    fluticasone-umeclidin-vilanter (TRELEGY ELLIPTA) 200-62.5-25 mcg inhaler    albuterol (PROVENTIL/VENTOLIN HFA) 90 mcg/actuation inhaler    Other Relevant Orders    Spirometry with/without bronchodilator    Six Minute Walk Test to qualify for Home Oxygen    Exercise hypoxemia        Relevant Orders    Six Minute Walk Test to qualify for Home Oxygen               Follow up in about 6 months (around 5/9/2024) for corine - on return.    MEDICAL DECISION MAKING: Moderate to high complexity.  Overall, the multiple problems listed are of moderate to high severity that may impact quality of life and activities of daily living. Side effects of medications, treatment plan as well as options and alternatives reviewed and discussed with patient. There was counseling of patient concerning these issues.    Total time spent in counseling and coordination of care - 40  minutes of total time spent on the encounter, which includes face to face time and  non-face to face time preparing to see the patient (eg, review of tests), Obtaining and/or reviewing separately obtained history, Documenting clinical information in the electronic or other health record, Independently interpreting results (not separately reported) and communicating results to the patient/family/caregiver, or Care coordination (not separately reported).    Time was used in discussion of prognosis, risks, benefits of treatment, instructions and compliance with regimen . Discussion with other physicians and/or health care providers - home health or for use of durable medical equipment (oxygen, nebulizers, CPAP, BiPAP) occurred.  40

## 2023-11-09 NOTE — PATIENT INSTRUCTIONS
Inspire is an alternative to CPAP that works inside your body while you sleep. Its a small device placed during a same-day, outpatient procedure.  When youre ready for bed, simply click the remote to turn Inspire on. While you sleep, Inspire opens your airway, allowing you to breathe normally and sleep peacefully.    When patients with moderate to severe RAMONITA do not respond to conservative treatments or cannot tolerate CPAP therapy, the ear, nose and throat specialists at [facility] may recommend Inspire surgery as a treatment option.    Also known as hypoglossal nerve stimulation therapy, Inspire involves a surgery in which a device is implanted in the neck and upper chest just below the collarbone (clavicle). The device stimulates the hypoglossal nerve, which controls the muscles of the tongue. While you are sleeping, a sensing lead monitors your breathing, and it delivers mild stimulation to the hypoglossal nerve, causing the tongue to move and open the airway with each breath.    This FDA-approved implantable upper airway stimulation device functions like a pacemaker and stabilizes a patient's throat during sleep in order to prevent obstruction. The device consists of three components: a programmable neuro-stimulator located in a chest pocket, a pressure sensing lead that detects patient's breathing, and a stimulator lead that delivers mild stimulation to the tongue nerve. The stimulator is controlled via the patients handheld remote control. Patients turn on the device before going to bed to gently stimulate the throat muscles while sleeping.    Inspire surgery is generally performed as an outpatient surgery, although rarely patients may require a one-night stay in the hospital for monitoring. The operation itself usually takes about two hours and recovery time is typically shorter than with other types of sleep surgery.    Side effects are usually minimal and may include:  Pain and/or swelling at  the incision site, which is usually mild and temporary  Tongue weakness/soreness, which improves over time  Most patients are able to return to their normal activities after a few days. Approximately one month after implantation, patients will meet with their physician to establish their personal stimulation settings and learn how to use the Inspire sleep remote.        Qualifications:  [] You have moderate to severe RAMONITA with a diagnostic sleep study in the past 2 years.   [] You tried CPAP therapy and it didn't work.  [] You do not have significant trouble falling asleep.  [] You have a body mass index (BMI) of 32-35 or less.    You will need to have a sleep study performed in the hospital to exclude central sleep apnea. ( Central sleep apnea is a special type of sleep apnea where the brain does not transmit signals to your breathing muscles - it is different from Obstructive Sleep Apnea ). The INSPIRE device does not treat central sleep apnea. I have placed an order to have a sleep study performed in the hospital.  If you want to discuss this with an ENT physician first - that is fine , but they will probably need a recent polysomnogram from the hospital to prepare you for the surgery. A home sleep study is inadequate to diagnosis central sleep apnea    Referral to an ENT specialist trained in placement of the INSPIRE device is indicated.  ENT physicians trained for this procedure in the area include:    If you meet these criteria a referral to an ENT specialist trained in placement of the INSPIRE device is indicated.  Contact: Thiago Rodarte  [address] [facility] [address], [place] [address]  Phone: [phone]  Sonia is Board Certified in Otolaryngology- Head and Neck Surgery. He is a member of the American Academy of Otolaryngology - Head and Neck Surgery.  Or    Michele Villalobos MD  Otolaryngologist in [place], [place]  Address: [address] [place] [address]  Phone: [phone]

## 2023-11-15 ENCOUNTER — TELEPHONE (OUTPATIENT)
Dept: CARDIOLOGY | Facility: CLINIC | Age: 78
End: 2023-11-15
Payer: COMMERCIAL

## 2023-11-15 DIAGNOSIS — I10 ESSENTIAL HYPERTENSION: Primary | ICD-10-CM

## 2023-11-15 DIAGNOSIS — B20 HIV DISEASE: Chronic | ICD-10-CM

## 2023-11-15 RX ORDER — BICTEGRAVIR SODIUM, EMTRICITABINE, AND TENOFOVIR ALAFENAMIDE FUMARATE 50; 200; 25 MG/1; MG/1; MG/1
1 TABLET ORAL DAILY
Qty: 30 TABLET | Refills: 0 | Status: ACTIVE | OUTPATIENT
Start: 2023-11-15 | End: 2023-12-18 | Stop reason: SDUPTHER

## 2023-11-16 ENCOUNTER — OFFICE VISIT (OUTPATIENT)
Dept: FAMILY MEDICINE | Facility: CLINIC | Age: 78
End: 2023-11-16
Payer: MEDICARE

## 2023-11-16 VITALS
TEMPERATURE: 98 F | WEIGHT: 215.81 LBS | SYSTOLIC BLOOD PRESSURE: 140 MMHG | RESPIRATION RATE: 18 BRPM | HEART RATE: 75 BPM | DIASTOLIC BLOOD PRESSURE: 90 MMHG | BODY MASS INDEX: 30.97 KG/M2 | OXYGEN SATURATION: 97 %

## 2023-11-16 DIAGNOSIS — G47.33 OBSTRUCTIVE SLEEP APNEA: ICD-10-CM

## 2023-11-16 DIAGNOSIS — B20 HIV DISEASE: Chronic | ICD-10-CM

## 2023-11-16 DIAGNOSIS — I10 ESSENTIAL HYPERTENSION: ICD-10-CM

## 2023-11-16 DIAGNOSIS — E78.2 MIXED HYPERLIPIDEMIA: ICD-10-CM

## 2023-11-16 DIAGNOSIS — G31.84 MILD COGNITIVE IMPAIRMENT: Primary | Chronic | ICD-10-CM

## 2023-11-16 DIAGNOSIS — R09.02 HYPOXIA: ICD-10-CM

## 2023-11-16 DIAGNOSIS — F41.9 ANXIETY: ICD-10-CM

## 2023-11-16 DIAGNOSIS — R42 POSTURAL DIZZINESS WITH PRESYNCOPE: ICD-10-CM

## 2023-11-16 DIAGNOSIS — Z12.5 ENCOUNTER FOR PROSTATE CANCER SCREENING: ICD-10-CM

## 2023-11-16 DIAGNOSIS — R55 POSTURAL DIZZINESS WITH PRESYNCOPE: ICD-10-CM

## 2023-11-16 PROBLEM — E78.5 DYSLIPIDEMIA: Status: ACTIVE | Noted: 2023-11-16

## 2023-11-16 PROCEDURE — 99215 OFFICE O/P EST HI 40 MIN: CPT | Mod: S$PBB,,, | Performed by: INTERNAL MEDICINE

## 2023-11-16 PROCEDURE — 99999 PR PBB SHADOW E&M-EST. PATIENT-LVL IV: ICD-10-PCS | Mod: PBBFAC,,, | Performed by: INTERNAL MEDICINE

## 2023-11-16 PROCEDURE — 99214 OFFICE O/P EST MOD 30 MIN: CPT | Mod: PBBFAC,PO | Performed by: INTERNAL MEDICINE

## 2023-11-16 PROCEDURE — 99999 PR PBB SHADOW E&M-EST. PATIENT-LVL IV: CPT | Mod: PBBFAC,,, | Performed by: INTERNAL MEDICINE

## 2023-11-16 PROCEDURE — 99215 PR OFFICE/OUTPT VISIT, EST, LEVL V, 40-54 MIN: ICD-10-PCS | Mod: S$PBB,,, | Performed by: INTERNAL MEDICINE

## 2023-11-16 NOTE — PROGRESS NOTES
Subjective:       Patient ID: Haritha Valle is a 78 y.o. male.    Chief Complaint: Establish Care, Hypertension, Hyperlipidemia, and Memory Loss    Est care     --------    Hypertension  Pertinent negatives include no chest pain, headaches, neck pain, palpitations or shortness of breath.   Hyperlipidemia  Pertinent negatives include no chest pain, myalgias or shortness of breath.     Past Medical History:   Diagnosis Date    Anxiety 7/3/2019    Basal cell carcinoma     Depression     Hepatitis B     Hepatitis C antibody test positive     RNA NEG 8/29/2019    HIV infection     Hypertension     Immune disorder     Mild cognitive impairment 10/1/2010     Past Surgical History:   Procedure Laterality Date    APPENDECTOMY      CARDIAC CATHETERIZATION      no stent    CHOLECYSTECTOMY      COLONOSCOPY N/A 11/3/2016    Procedure: COLONOSCOPY;  Surgeon: Maxi Villasenor MD;  Location: [address]);  Service: Endoscopy;  Laterality: N/A;  last colonoscopy with Dr Jarrell    COLONOSCOPY N/A 1/25/2022    Procedure: COLONOSCOPY;  Surgeon: Silvino Rosales MD;  Location: [facility] ENDO;  Service: Endoscopy;  Laterality: N/A;    LEFT HEART CATHETERIZATION Left 2/3/2020    Procedure: CATHETERIZATION, HEART, LEFT;  Surgeon: Chele Ko MD;  Location: [facility] CATH LAB;  Service: Cardiology;  Laterality: Left;  malur pt     Family History   Problem Relation Age of Onset    Heart disease Father     Heart failure Father     Diabetes Neg Hx     Hypertension Neg Hx      Social History     Socioeconomic History    Marital status: Significant Other     Spouse name: Shadi    Number of children: 0    Highest education level: Some college, no degree   Occupational History    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  [employer]   Tobacco Use    Smoking status: Never    Smokeless tobacco: Never   Substance and Sexual Activity    Alcohol use: No    Drug use: Not Currently    Sexual activity: Yes     Partners: Male      Social Determinants of Health     Financial Resource Strain: Low Risk  (11/13/2023)    Overall Financial Resource Strain (CARDIA)     Difficulty of Paying Living Expenses: Not hard at all   Food Insecurity: No Food Insecurity (11/13/2023)    Hunger Vital Sign     Worried About Running Out of Food in the Last Year: Never true     Ran Out of Food in the Last Year: Never true   Transportation Needs: No Transportation Needs (11/13/2023)    PRAPARE - Transportation     Lack of Transportation (Medical): No     Lack of Transportation (Non-Medical): No   Physical Activity: Inactive (11/13/2023)    Exercise Vital Sign     Days of Exercise per Week: 0 days     Minutes of Exercise per Session: 0 min   Stress: Stress Concern Present (11/13/2023)    [place] [employer] - Occupational Stress Questionnaire     Feeling of Stress : To some extent   Social Connections: Unknown (11/13/2023)    Social Connection and Isolation Panel [NHANES]     Frequency of Communication with Friends and Family: Three times a week     Frequency of Social Gatherings with Friends and Family: Never     Active Member of Clubs or Organizations: No     Attends Club or Organization Meetings: Never     Marital Status: Living with partner   Housing Stability: Low Risk  (11/13/2023)    Housing Stability Vital Sign     Unable to Pay for Housing in the Last Year: No     Number of Places Lived in the Last Year: 1     Unstable Housing in the Last Year: No     Review of Systems   Constitutional:  Negative for activity change, appetite change, chills, diaphoresis, fatigue, fever and unexpected weight change.   HENT:  Negative for drooling, ear discharge, ear pain, facial swelling, hearing loss, mouth sores, nosebleeds, postnasal drip, rhinorrhea, sinus pressure, sneezing, sore throat, tinnitus, trouble swallowing and voice change.    Eyes:  Negative for photophobia, redness and visual disturbance.   Respiratory:  Negative for apnea, cough,  choking, chest tightness, shortness of breath and wheezing.    Cardiovascular:  Negative for chest pain, palpitations and leg swelling.   Gastrointestinal:  Negative for abdominal distention, abdominal pain, anal bleeding, blood in stool, constipation, diarrhea, nausea and vomiting.   Endocrine: Negative for cold intolerance, heat intolerance, polydipsia, polyphagia and polyuria.   Genitourinary:  Negative for difficulty urinating, dysuria, enuresis, flank pain, frequency, genital sores, hematuria and urgency.   Musculoskeletal:  Positive for arthralgias. Negative for back pain, gait problem, joint swelling, myalgias, neck pain and neck stiffness.   Skin:  Negative for color change, pallor, rash and wound.   Allergic/Immunologic: Negative for food allergies and immunocompromised state.   Neurological:  Positive for dizziness and weakness. Negative for tremors, seizures, syncope, facial asymmetry, speech difficulty, light-headedness, numbness and headaches.        Memory loss     Hematological:  Negative for adenopathy. Does not bruise/bleed easily.   Psychiatric/Behavioral:  Negative for agitation, behavioral problems, decreased concentration, dysphoric mood, hallucinations, self-injury, sleep disturbance and suicidal ideas. The patient is not nervous/anxious and is not hyperactive.        Objective:      Physical Exam  Vitals and nursing note reviewed.   Constitutional:       General: He is not in acute distress.     Appearance: Normal appearance. He is well-developed. He is not diaphoretic.   HENT:      Head: Normocephalic and atraumatic.      Mouth/Throat:      Pharynx: No oropharyngeal exudate.   Eyes:      General: No scleral icterus.     Pupils: Pupils are equal, round, and reactive to light.   Neck:      Thyroid: No thyromegaly.      Vascular: No carotid bruit or JVD.      Trachea: No tracheal deviation.   Cardiovascular:      Rate and Rhythm: Normal rate and regular rhythm.      Heart sounds: Normal heart  sounds.   Pulmonary:      Effort: Pulmonary effort is normal. No respiratory distress.      Breath sounds: Normal breath sounds. No wheezing or rales.   Chest:      Chest wall: No tenderness.   Abdominal:      General: Bowel sounds are normal. There is no distension.      Palpations: Abdomen is soft.      Tenderness: There is no abdominal tenderness. There is no guarding or rebound.   Musculoskeletal:         General: No tenderness. Normal range of motion.      Cervical back: Normal range of motion and neck supple.   Lymphadenopathy:      Cervical: No cervical adenopathy.   Skin:     General: Skin is warm and dry.      Coloration: Skin is not pale.      Findings: No erythema or rash.   Neurological:      Mental Status: He is alert and oriented to person, place, and time.   Psychiatric:         Behavior: Behavior normal.         CMP  Sodium   Date Value Ref Range Status   07/20/2023 137 136 - 145 mmol/L Final     Potassium   Date Value Ref Range Status   07/20/2023 4.8 3.5 - 5.1 mmol/L Final     Chloride   Date Value Ref Range Status   07/20/2023 106 95 - 110 mmol/L Final     CO2   Date Value Ref Range Status   07/20/2023 23 23 - 29 mmol/L Final     Glucose   Date Value Ref Range Status   07/20/2023 100 70 - 110 mg/dL Final     BUN   Date Value Ref Range Status   07/20/2023 22 8 - 23 mg/dL Final     Creatinine   Date Value Ref Range Status   07/20/2023 0.8 0.5 - 1.4 mg/dL Final     Calcium   Date Value Ref Range Status   07/20/2023 9.6 8.7 - 10.5 mg/dL Final     Total Protein   Date Value Ref Range Status   07/20/2023 8.2 6.0 - 8.4 g/dL Final     Albumin   Date Value Ref Range Status   07/20/2023 3.6 3.5 - 5.2 g/dL Final   04/30/2018 4.3 3.6 - 5.1 g/dL Final     Comment:     @ Test Performed By:  [facility]  Latrell Campos M.D., Ph.D.,   [address] [place] [address]  [facility]  [id number]       Total Bilirubin   Date Value Ref Range Status   07/20/2023 0.6 0.1 -  1.0 mg/dL Final     Comment:     For infants and newborns, interpretation of results should be based  on gestational age, weight and in agreement with clinical  observations.    Premature Infant recommended reference ranges:  Up to 24 hours.............<8.0 mg/dL  Up to 48 hours............<12.0 mg/dL  3-5 days..................<15.0 mg/dL  6-29 days.................<15.0 mg/dL       Alkaline Phosphatase   Date Value Ref Range Status   07/20/2023 36 (L) 55 - 135 U/L Final     AST   Date Value Ref Range Status   07/20/2023 25 10 - 40 U/L Final     ALT   Date Value Ref Range Status   07/20/2023 30 10 - 44 U/L Final     Anion Gap   Date Value Ref Range Status   07/20/2023 8 8 - 16 mmol/L Final     eGFR if    Date Value Ref Range Status   10/14/2021 >60.0 >60 mL/min/1.73 m^2 Final     eGFR if non    Date Value Ref Range Status   10/14/2021 >60.0 >60 mL/min/1.73 m^2 Final     Comment:     Calculation used to obtain the estimated glomerular filtration  rate (eGFR) is the CKD-EPI equation.        Lab Results   Component Value Date    WBC 7.80 07/20/2023    HGB 13.6 (L) 07/20/2023    HCT 41.0 07/20/2023     (H) 07/20/2023     07/20/2023     Lab Results   Component Value Date    CHOL 149 10/14/2021     Lab Results   Component Value Date    HDL 35 (L) 10/14/2021     Lab Results   Component Value Date    LDLCALC Invalid, Trig>400.0 10/14/2021     Lab Results   Component Value Date    TRIG 401 (H) 10/14/2021     Lab Results   Component Value Date    CHOLHDL 23.5 10/14/2021     Lab Results   Component Value Date    TSH 1.137 04/27/2023     Lab Results   Component Value Date    HGBA1C 5.4 12/02/2009     Assessment:       1. Mild cognitive impairment    2. HIV disease    3. Essential hypertension    4. Mixed hyperlipidemia    5. Anxiety    6. Postural dizziness with presyncope    7. Obstructive sleep apnea    8. Hypoxia    9. Encounter for prostate cancer screening        Plan:   Mild  cognitive impairment    HIV disease    Essential hypertension  -     Comprehensive Metabolic Panel; Future; Expected date: 11/16/2023  -     CBC Auto Differential; Future; Expected date: 11/16/2023    Mixed hyperlipidemia  -     Lipid Panel; Future; Expected date: 11/16/2023    Anxiety    Postural dizziness with presyncope    Obstructive sleep apnea    Hypoxia----------------------f/u pulm----------------    Encounter for prostate cancer screening  -     PSA, Screening; Future; Expected date: 11/16/2023      Stable---------------------continue meds-------------------as above--------f/u 6 months--

## 2023-11-20 ENCOUNTER — LAB VISIT (OUTPATIENT)
Dept: LAB | Facility: HOSPITAL | Age: 78
End: 2023-11-20
Attending: STUDENT IN AN ORGANIZED HEALTH CARE EDUCATION/TRAINING PROGRAM
Payer: MEDICARE

## 2023-11-20 DIAGNOSIS — B20 HIV DISEASE: Chronic | ICD-10-CM

## 2023-11-20 PROCEDURE — 36415 COLL VENOUS BLD VENIPUNCTURE: CPT | Performed by: STUDENT IN AN ORGANIZED HEALTH CARE EDUCATION/TRAINING PROGRAM

## 2023-11-20 PROCEDURE — 87536 HIV-1 QUANT&REVRSE TRNSCRPJ: CPT | Performed by: STUDENT IN AN ORGANIZED HEALTH CARE EDUCATION/TRAINING PROGRAM

## 2023-11-21 LAB
HIV1 RNA # SERPL NAA+PROBE: <20 COPIES/ML
HIV1 RNA SERPL NAA+PROBE-LOG#: <1.51 LOG COPIES/ML
HIV1 RNA SERPL QL NAA+PROBE: DETECTED

## 2023-11-27 PROBLEM — Z01.810 PREOP CARDIOVASCULAR EXAM: Status: ACTIVE | Noted: 2022-01-25

## 2023-11-27 PROBLEM — I25.10 CAD IN NATIVE ARTERY: Status: ACTIVE | Noted: 2023-11-27

## 2023-11-29 ENCOUNTER — TELEPHONE (OUTPATIENT)
Dept: OPHTHALMOLOGY | Facility: CLINIC | Age: 78
End: 2023-11-29
Payer: COMMERCIAL

## 2023-11-29 NOTE — TELEPHONE ENCOUNTER
----- Message from Jennifer Cardenas sent at 11/29/2023  8:20 AM CST -----  Contact: josh Marie is calling regarding a missed call.  Please give him a call back at [phone].

## 2023-11-29 NOTE — TELEPHONE ENCOUNTER
Spoke with patient let him know that he miss his previous appt and is r/s to dec 13th at 3pm.    ----- Message from Jennifer Cardenas sent at 11/29/2023  8:20 AM CST -----  Contact: josh Marie is calling regarding a missed call.  Please give him a call back at [phone].

## 2023-12-04 ENCOUNTER — OFFICE VISIT (OUTPATIENT)
Dept: INFECTIOUS DISEASES | Facility: CLINIC | Age: 78
End: 2023-12-04
Payer: MEDICARE

## 2023-12-04 VITALS
HEART RATE: 87 BPM | WEIGHT: 215.81 LBS | HEIGHT: 70 IN | BODY MASS INDEX: 30.9 KG/M2 | DIASTOLIC BLOOD PRESSURE: 92 MMHG | SYSTOLIC BLOOD PRESSURE: 179 MMHG | OXYGEN SATURATION: 97 %

## 2023-12-04 DIAGNOSIS — I10 ESSENTIAL HYPERTENSION: ICD-10-CM

## 2023-12-04 DIAGNOSIS — B20 HIV DISEASE: Primary | ICD-10-CM

## 2023-12-04 DIAGNOSIS — E78.2 MIXED HYPERLIPIDEMIA: ICD-10-CM

## 2023-12-04 PROCEDURE — 99213 OFFICE O/P EST LOW 20 MIN: CPT | Mod: PBBFAC | Performed by: STUDENT IN AN ORGANIZED HEALTH CARE EDUCATION/TRAINING PROGRAM

## 2023-12-04 PROCEDURE — 99214 PR OFFICE/OUTPT VISIT, EST, LEVL IV, 30-39 MIN: ICD-10-PCS | Mod: S$PBB,,, | Performed by: STUDENT IN AN ORGANIZED HEALTH CARE EDUCATION/TRAINING PROGRAM

## 2023-12-04 PROCEDURE — 99214 OFFICE O/P EST MOD 30 MIN: CPT | Mod: S$PBB,,, | Performed by: STUDENT IN AN ORGANIZED HEALTH CARE EDUCATION/TRAINING PROGRAM

## 2023-12-04 PROCEDURE — 99999 PR PBB SHADOW E&M-EST. PATIENT-LVL III: CPT | Mod: PBBFAC,,, | Performed by: STUDENT IN AN ORGANIZED HEALTH CARE EDUCATION/TRAINING PROGRAM

## 2023-12-04 PROCEDURE — 99999 PR PBB SHADOW E&M-EST. PATIENT-LVL III: ICD-10-PCS | Mod: PBBFAC,,, | Performed by: STUDENT IN AN ORGANIZED HEALTH CARE EDUCATION/TRAINING PROGRAM

## 2023-12-04 NOTE — ASSESSMENT & PLAN NOTE
--Labs reviewed: no longer immune to hep A but is protected against hep B, CD4 461, hep C ab reactive but had negative viral load back in 2019 so likely SVR; RPR nonreactive  --Repeat viral load on 11/20 undetectable, so patient continues responding to Biktarvy   --Genosure archive showed less common mutations for tenofovir and Zidovudine; Biktarvy appears to be overcoming resistance   --Continue daily Biktarvy   --No OI ppx indicated at this time based on CD4   --Recommended vaccines: Hep A (0, 6 months) and annual influenza; consider RSV; script provided today   --Follow up with ID in 3 months; will recheck VL prior to visit

## 2023-12-04 NOTE — PROGRESS NOTES
Infectious Disease Clinic Note    Patient Name: Haritha Valle  YOB: 1945    PRESENTING HISTORY       History of Present Illness:  Mr. Haritha Valle is a 78 y.o. male w/ significant PMHx of HIV diagnosed in 1984 here for HIV follow up. Per last ID note, last documented visit with ID in 2019 with [place] providers. Had been on Reyataz and Combivir with plans to switch to Biktarvy, but this proved cost prohibitive. No recent labs on file. In 2019 HIV VL was undetectable with CD4 570. Expressed concern about lipodystrophy and memory loss. Vaccine history populated below. More recent CD4 count 464 on 4/27/23. Today patient reports taking Reyataz and Combivir over 30 years. Stopped taking month ago due to issues with pharmacy. Has been undetectable for years. Not currently sexually active. No travel outside [place]. No sick contacts. Would like to try new ART. Has two dogs. Agreeable to baseline labs today and switching to Biktarvy or Symtuza based on cost.      Labs below reviewed and interpreted. Biktarvy was approved by [employer]. Today patient reports he has been taking it each day with no missed doses. Has nighttime low oxygen and apnea but won't wear CPAP. Seeing pulmonology for alternative. Establishing care with PCP as well. Discussed prior labs and explained that mutation likely minor from Genosure. He is clearly responding to ART. Will recheck in 1 month to ensure undetectable. Vaccines discussed as well. Patient voiced understanding.      Hep A IgG non reactive  Hep B surface Ab reactive  CD4 461  Hep C ab reactive but had negative viral load back in 2019 so likely SVR    RPR non-reactive  HIV RNA quant from 5272 to 71 to undetectable      Today patient reports seeing new PCP and pulmonology past month. Has ongoing dizziness. Has been checking BP; high this morning. Otherwise feels well. Undergoing pre dental procedure workup.               Review of Systems:  Constitutional: no fever or chills  Eyes:  no visual changes  ENT: no nasal congestion or sore throat  Respiratory: no cough or shortness of breath  Cardiovascular: no chest pain  Gastrointestinal: no nausea or vomiting, no abdominal pain, no constipation, no diarrhea  Genitourinary: no hematuria or dysuria  Musculoskeletal: no arthralgias or myalgias  Skin: no rash  Neurological: no headaches, numbness, or paresthesias    The following portions of the patient's history were reviewed and updated as appropriate: allergies, current medications, past family history, past medical history, past social history, past surgical history, and problem list.    PAST HISTORY:     Immunization History   Administered Date(s) Administered    COVID-19, MRNA, LN-S, PF (Pfizer) (Gray Cap) 04/22/2022    COVID-19, MRNA, LN-S, PF (Pfizer) (Purple Cap) 01/11/2021, 02/01/2021, 10/29/2021    COVID-19, mRNA, LNP-S, bivalent booster, PF (PFIZER OMICRON) 12/02/2022    Influenza (FLUAD) - Quadrivalent - Adjuvanted - PF *Preferred* (65+) 09/28/2020, 10/11/2021, 10/20/2022, 11/09/2023    Influenza - High Dose - PF (65 years and older) 09/24/2013, 12/02/2014, 09/27/2016, 09/27/2017, 12/12/2018, 10/01/2019    Influenza - Quadrivalent - PF *Preferred* (6 months and older) 10/24/2007, 01/08/2009, 09/23/2010, 01/24/2013    Influenza A (H1N1) 2009 Monovalent - IM 11/03/2009    Influenza Split 01/24/2013    Pneumococcal Conjugate - 13 Valent 07/23/2015    Pneumococcal Polysaccharide - 23 Valent 09/27/2016    Tdap 01/08/2009, 07/03/2019    Vaccinia, smallpox monkeypox vaccine live, PF 09/01/2022    Zoster 01/08/2009    Zoster Recombinant 05/09/2018, 06/08/2018, 07/18/2018       Past Medical History:   Diagnosis Date    Anxiety 7/3/2019    Basal cell carcinoma     Depression     Hepatitis B     Hepatitis C antibody test positive     RNA NEG 8/29/2019    HIV infection     Hypertension     Immune disorder     Mild cognitive impairment 10/1/2010       Past Surgical History:   Procedure Laterality Date     APPENDECTOMY      CARDIAC CATHETERIZATION      no stent    CHOLECYSTECTOMY      COLONOSCOPY N/A 11/3/2016    Procedure: COLONOSCOPY;  Surgeon: Maxi Villasenor MD;  [facility]  Service: Endoscopy;  Laterality: N/A;  last colonoscopy with Dr Jarrell    COLONOSCOPY N/A 1/25/2022    Procedure: COLONOSCOPY;  Surgeon: Silvino Rosales MD;  Location: [facility] ENDO;  Service: Endoscopy;  Laterality: N/A;    LEFT HEART CATHETERIZATION Left 2/3/2020    Procedure: CATHETERIZATION, HEART, LEFT;  Surgeon: Chele Ko MD;  Location: [facility] CATH LAB;  Service: Cardiology;  Laterality: Left;  malur pt       Family History   Problem Relation Age of Onset    Heart disease Father     Heart failure Father     Diabetes Neg Hx     Hypertension Neg Hx        Social History     Socioeconomic History    Marital status: Significant Other     Spouse name: Shadi    Number of children: 0    Highest education level: Some college, no degree   Occupational History    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  [place] [employer]   Tobacco Use    Smoking status: Never    Smokeless tobacco: Never   Substance and Sexual Activity    Alcohol use: No    Drug use: Not Currently    Sexual activity: Yes     Partners: Male     Social Determinants of Health     Financial Resource Strain: Low Risk  (11/13/2023)    Overall Financial Resource Strain (CARDIA)     Difficulty of Paying Living Expenses: Not hard at all   Food Insecurity: No Food Insecurity (11/13/2023)    Hunger Vital Sign     Worried About Running Out of Food in the Last Year: Never true     Ran Out of Food in the Last Year: Never true   Transportation Needs: No Transportation Needs (11/13/2023)    PRAPARE - Transportation     Lack of Transportation (Medical): No     Lack of Transportation (Non-Medical): No   Physical Activity: Inactive (11/13/2023)    Exercise Vital Sign     Days of Exercise per Week: 0 days     Minutes of Exercise per Session: 0 min  "  Stress: Stress Concern Present (11/13/2023)    [employer] [place] [employer] - Occupational Stress Questionnaire     Feeling of Stress : To some extent   Social Connections: Unknown (11/13/2023)    Social Connection and Isolation Panel [NHANES]     Frequency of Communication with Friends and Family: Three times a week     Frequency of Social Gatherings with Friends and Family: Never     Active Member of Clubs or Organizations: No     Attends Club or Organization Meetings: Never     Marital Status: Living with partner   Housing Stability: Low Risk  (11/13/2023)    Housing Stability Vital Sign     Unable to Pay for Housing in the Last Year: No     Number of Places Lived in the Last Year: 1     Unstable Housing in the Last Year: No       MEDICATIONS & ALLERGIES:     Current Outpatient Medications on File Prior to Visit   Medication Sig    albuterol (PROVENTIL/VENTOLIN HFA) 90 mcg/actuation inhaler Inhale 2 puffs into the lungs every 4 (four) hours as needed for Wheezing or Shortness of Breath.    lfawgnwbf-sdimviyx-sllordh ala (BIKTARVY) -25 mg (25 kg or greater) Take 1 tablet by mouth once daily.    FLUoxetine 40 MG capsule TAKE 1 CAPSULE ONE TIME DAILY    fluticasone-umeclidin-vilanter (TRELEGY ELLIPTA) 200-62.5-25 mcg inhaler Inhale 1 puff into the lungs once daily. Wash out mouth after use    metoprolol succinate (TOPROL-XL) 50 MG 24 hr tablet TAKE 1 TABLET EVERY DAY    rosuvastatin (CRESTOR) 20 MG tablet TAKE 1 TABLET EVERY DAY     No current facility-administered medications on file prior to visit.       Review of patient's allergies indicates:   Allergen Reactions    No known drug allergies        OBJECTIVE:   Vital Signs:  Vitals:    12/04/23 1054 12/04/23 1057 12/04/23 1122   BP:  (!) 199/103 (!) 179/92   Pulse: 87     SpO2: 97%     Weight: 97.9 kg (215 lb 13.3 oz)     Height: 5' 10" (1.778 m)         No results found for this or any previous visit (from the past 24 hour(s)).      Physical " Exam:   General:  Well developed, well nourished, no acute distress  HEENT:  Normocephalic, atraumatic  CVS:  RRR, S1 and S2 normal, no murmurs, rubs, gallops  Resp:  Lungs clear to auscultation, no wheezes, rales, rhonchi  MSK:  No muscle atrophy, peripheral edema, full range of motion  Skin:  No rashes, ulcers, erythema  Psych:  Alert and oriented to person, place, and time    ASSESSMENT:     HIV disease  --Labs reviewed: no longer immune to hep A but is protected against hep B, CD4 461, hep C ab reactive but had negative viral load back in 2019 so likely SVR; RPR nonreactive  --Repeat viral load on 11/20 undetectable, so patient continues responding to Biktarvy   --Genosure archive showed less common mutations for tenofovir and Zidovudine; Biktarvy appears to be overcoming resistance   --Continue daily Biktarvy   --No OI ppx indicated at this time based on CD4   --Recommended vaccines: Hep A (0, 6 months) and annual influenza; consider RSV; script provided today   --Follow up with ID in 3 months; will recheck VL prior to visit      Essential hypertension  Continue current medications. Follow up with PCP.      Mixed hyperlipidemia  Continue Crestor. Follow up with PCP.       PLAN:     Diagnoses and all orders for this visit:    HIV disease  -     HIV RNA, Quantitative, PCR; Future    Essential hypertension    Mixed hyperlipidemia      The total time for evaluation and management services performed on 12/4/23 was greater than 20 minutes.        Vaughn Larios, DO   Infectious Diseases

## 2023-12-13 ENCOUNTER — PROCEDURE VISIT (OUTPATIENT)
Dept: OPHTHALMOLOGY | Facility: CLINIC | Age: 78
End: 2023-12-13
Payer: MEDICARE

## 2023-12-13 DIAGNOSIS — H35.3231 EXUDATIVE AGE-RELATED MACULAR DEGENERATION OF BOTH EYES WITH ACTIVE CHOROIDAL NEOVASCULARIZATION: Primary | ICD-10-CM

## 2023-12-13 PROCEDURE — 67028 INJECTION EYE DRUG: CPT | Mod: 50,S$PBB,, | Performed by: OPHTHALMOLOGY

## 2023-12-13 PROCEDURE — 99499 UNLISTED E&M SERVICE: CPT | Mod: S$PBB,,, | Performed by: OPHTHALMOLOGY

## 2023-12-13 PROCEDURE — 67028 INJECTION EYE DRUG: CPT | Mod: 50,PBBFAC | Performed by: OPHTHALMOLOGY

## 2023-12-13 PROCEDURE — 92134 CPTRZ OPH DX IMG PST SGM RTA: CPT | Mod: PBBFAC | Performed by: OPHTHALMOLOGY

## 2023-12-13 PROCEDURE — 92134 POSTERIOR SEGMENT OCT RETINA (OCULAR COHERENCE TOMOGRAPHY)-BOTH EYES: ICD-10-PCS | Mod: 26,S$PBB,, | Performed by: OPHTHALMOLOGY

## 2023-12-13 PROCEDURE — 67028 PR INJECT INTRAVITREAL PHARMCOLOGIC: ICD-10-PCS | Mod: 50,S$PBB,, | Performed by: OPHTHALMOLOGY

## 2023-12-13 PROCEDURE — 99999PBSHW PR PBB SHADOW TECHNICAL ONLY FILED TO HB: Mod: JZ,PBBFAC,,

## 2023-12-13 PROCEDURE — 99499 NO LOS: ICD-10-PCS | Mod: S$PBB,,, | Performed by: OPHTHALMOLOGY

## 2023-12-13 PROCEDURE — 99999PBSHW PR PBB SHADOW TECHNICAL ONLY FILED TO HB: ICD-10-PCS | Mod: JZ,PBBFAC,,

## 2023-12-13 RX ADMIN — FARICIMAB 6 MG: 6 INJECTION, SOLUTION INTRAVITREAL at 03:12

## 2023-12-13 NOTE — PROGRESS NOTES
===============================  Date today is 12/13/2023  Haritha Valle is a 78 y.o. male  Last visit [facility]: :10/11/2023   Last visit eye dept. 10/11/2023    Corrected distance visual acuity was 20/30 in the right eye and 20/30 in the left eye.  Tonometry       Tonometry (Applanation, 3:14 PM)         Right Left    Pressure 16 16                  Not recorded       Not recorded       Not recorded       Chief Complaint   Patient presents with    Macular Degeneration     Vabysmo ou     HPI     Macular Degeneration     Additional comments: Vabysmo ou           Comments    SRN OS  Avastin OS Emergent 1/21/22, 2/24/22, 3/24/22, 4/28/22, 6/21/22  Emergent Avastin OU 3/16/23  Avastin failure  Emergent Eylea OU 5/2/23  Vabysmo OU 6/21/23, 7/27/23, 8/31/23 10/11/23    Monovision RGP OD dominant.            Last edited by Maya Babcokc on 12/13/2023  3:04 PM.      Problem List Items Addressed This Visit    None  Visit Diagnoses       Exudative age-related macular degeneration of both eyes with active choroidal neovascularization    -  Primary    Relevant Medications    faricimab-svoa 6 mg/0.05 mL (120 mg/mL) injection 6 mg (Completed) (Start on 12/13/2023  4:00 PM)    faricimab-svoa 6 mg/0.05 mL (120 mg/mL) injection 6 mg (Completed) (Start on 12/13/2023  4:00 PM)    Other Relevant Orders    Posterior Segment OCT Retina-Both eyes (Completed)    Prior authorization Order          Instructed to call 24/7 for any worsening of vision, visual distortion or pain.  Check OU independently daily.    Gave my office and personal cell phone number.  ________________  12/13/2023 today  Haritha Valle    OU SRN  OCT stable  ..    Injection Procedure Note:    12/13/2023  Diagnosis :  OU SRN  Today:   Vabysmo (faricimab-svoa) 6 mg/0.05mL (120 mg/mL) Injection , OU   Follow up: rtc 6 weeks    Instructed to call 24/7 for any worsening of vision. Check Both eyes daily. Gave patient my home phone number.  Risks, benefits, and  alternatives to treatment discussed in detail with the patient.  The patient voiced understanding and wished to proceed with the procedure.     Patient Identified and Time Out complete  Subconjunctival bleb - xylocaine with epi 2%   and Betadine.  Inject at Vabysmo (faricimab-oa) 6 mg/0.05mL (120 mg/mL) Injection , OU 6:00 @ 3.5-4mm posterior to limbus  1 stop: no   Post Operative Dx: Same  Complications: None  Follow up as above.      =============================

## 2023-12-18 ENCOUNTER — LAB VISIT (OUTPATIENT)
Dept: LAB | Facility: HOSPITAL | Age: 78
End: 2023-12-18
Attending: INTERNAL MEDICINE
Payer: MEDICARE

## 2023-12-18 DIAGNOSIS — E78.2 MIXED HYPERLIPIDEMIA: ICD-10-CM

## 2023-12-18 DIAGNOSIS — I10 ESSENTIAL HYPERTENSION: ICD-10-CM

## 2023-12-18 DIAGNOSIS — B20 HIV DISEASE: Chronic | ICD-10-CM

## 2023-12-18 DIAGNOSIS — Z12.5 ENCOUNTER FOR PROSTATE CANCER SCREENING: ICD-10-CM

## 2023-12-18 LAB
ALBUMIN SERPL BCP-MCNC: 3.6 G/DL (ref 3.5–5.2)
ALP SERPL-CCNC: 36 U/L (ref 55–135)
ALT SERPL W/O P-5'-P-CCNC: 26 U/L (ref 10–44)
ANION GAP SERPL CALC-SCNC: 8 MMOL/L (ref 8–16)
AST SERPL-CCNC: 27 U/L (ref 10–40)
BILIRUB SERPL-MCNC: 0.6 MG/DL (ref 0.1–1)
BUN SERPL-MCNC: 12 MG/DL (ref 8–23)
CALCIUM SERPL-MCNC: 9.6 MG/DL (ref 8.7–10.5)
CHLORIDE SERPL-SCNC: 107 MMOL/L (ref 95–110)
CHOLEST SERPL-MCNC: 172 MG/DL (ref 120–199)
CHOLEST/HDLC SERPL: 4.8 {RATIO} (ref 2–5)
CO2 SERPL-SCNC: 23 MMOL/L (ref 23–29)
COMPLEXED PSA SERPL-MCNC: 1.7 NG/ML (ref 0–4)
CREAT SERPL-MCNC: 0.9 MG/DL (ref 0.5–1.4)
EST. GFR  (NO RACE VARIABLE): >60 ML/MIN/1.73 M^2
GLUCOSE SERPL-MCNC: 93 MG/DL (ref 70–110)
HDLC SERPL-MCNC: 36 MG/DL (ref 40–75)
HDLC SERPL: 20.9 % (ref 20–50)
LDLC SERPL CALC-MCNC: 73.2 MG/DL (ref 63–159)
NONHDLC SERPL-MCNC: 136 MG/DL
POTASSIUM SERPL-SCNC: 4.9 MMOL/L (ref 3.5–5.1)
PROT SERPL-MCNC: 8.7 G/DL (ref 6–8.4)
SODIUM SERPL-SCNC: 138 MMOL/L (ref 136–145)
TRIGL SERPL-MCNC: 314 MG/DL (ref 30–150)

## 2023-12-18 PROCEDURE — 84153 ASSAY OF PSA TOTAL: CPT | Performed by: INTERNAL MEDICINE

## 2023-12-18 PROCEDURE — 80053 COMPREHEN METABOLIC PANEL: CPT | Performed by: INTERNAL MEDICINE

## 2023-12-18 PROCEDURE — 80061 LIPID PANEL: CPT | Performed by: INTERNAL MEDICINE

## 2023-12-18 PROCEDURE — 36415 COLL VENOUS BLD VENIPUNCTURE: CPT | Mod: PN | Performed by: INTERNAL MEDICINE

## 2023-12-18 PROCEDURE — 85025 COMPLETE CBC W/AUTO DIFF WBC: CPT | Performed by: INTERNAL MEDICINE

## 2023-12-19 ENCOUNTER — OFFICE VISIT (OUTPATIENT)
Dept: CARDIOLOGY | Facility: CLINIC | Age: 78
End: 2023-12-19
Payer: MEDICARE

## 2023-12-19 ENCOUNTER — HOSPITAL ENCOUNTER (OUTPATIENT)
Dept: CARDIOLOGY | Facility: HOSPITAL | Age: 78
Discharge: HOME | End: 2023-12-19
Attending: INTERNAL MEDICINE
Payer: MEDICARE

## 2023-12-19 VITALS
HEIGHT: 70 IN | OXYGEN SATURATION: 98 % | WEIGHT: 219.38 LBS | BODY MASS INDEX: 31.41 KG/M2 | SYSTOLIC BLOOD PRESSURE: 150 MMHG | DIASTOLIC BLOOD PRESSURE: 86 MMHG | HEART RATE: 72 BPM

## 2023-12-19 DIAGNOSIS — I10 ESSENTIAL HYPERTENSION: ICD-10-CM

## 2023-12-19 DIAGNOSIS — I25.10 CAD IN NATIVE ARTERY: ICD-10-CM

## 2023-12-19 DIAGNOSIS — I35.1 NONRHEUMATIC AORTIC VALVE INSUFFICIENCY: ICD-10-CM

## 2023-12-19 DIAGNOSIS — E66.09 CLASS 1 OBESITY DUE TO EXCESS CALORIES WITH SERIOUS COMORBIDITY AND BODY MASS INDEX (BMI) OF 30.0 TO 30.9 IN ADULT: ICD-10-CM

## 2023-12-19 DIAGNOSIS — E78.5 DYSLIPIDEMIA: ICD-10-CM

## 2023-12-19 DIAGNOSIS — I34.0 NONRHEUMATIC MITRAL VALVE REGURGITATION: ICD-10-CM

## 2023-12-19 DIAGNOSIS — G47.33 OBSTRUCTIVE SLEEP APNEA: ICD-10-CM

## 2023-12-19 DIAGNOSIS — R93.1 ABNORMAL NUCLEAR CARDIAC IMAGING TEST: ICD-10-CM

## 2023-12-19 DIAGNOSIS — R06.09 DOE (DYSPNEA ON EXERTION): ICD-10-CM

## 2023-12-19 DIAGNOSIS — Z01.810 PREOP CARDIOVASCULAR EXAM: Primary | ICD-10-CM

## 2023-12-19 PROBLEM — E66.811 CLASS 1 OBESITY DUE TO EXCESS CALORIES WITH SERIOUS COMORBIDITY AND BODY MASS INDEX (BMI) OF 30.0 TO 30.9 IN ADULT: Status: ACTIVE | Noted: 2023-12-19

## 2023-12-19 LAB
BASOPHILS # BLD AUTO: 0.04 K/UL (ref 0–0.2)
BASOPHILS NFR BLD: 0.5 % (ref 0–1.9)
DIFFERENTIAL METHOD: NORMAL
EOSINOPHIL # BLD AUTO: 0.2 K/UL (ref 0–0.5)
EOSINOPHIL NFR BLD: 2.1 % (ref 0–8)
ERYTHROCYTE [DISTWIDTH] IN BLOOD BY AUTOMATED COUNT: 14.4 % (ref 11.5–14.5)
HCT VFR BLD AUTO: 45.7 % (ref 40–54)
HGB BLD-MCNC: 14.7 G/DL (ref 14–18)
IMM GRANULOCYTES # BLD AUTO: 0.01 K/UL (ref 0–0.04)
IMM GRANULOCYTES NFR BLD AUTO: 0.1 % (ref 0–0.5)
LYMPHOCYTES # BLD AUTO: 3.2 K/UL (ref 1–4.8)
LYMPHOCYTES NFR BLD: 40.2 % (ref 18–48)
MCH RBC QN AUTO: 29.9 PG (ref 27–31)
MCHC RBC AUTO-ENTMCNC: 32.2 G/DL (ref 32–36)
MCV RBC AUTO: 93 FL (ref 82–98)
MONOCYTES # BLD AUTO: 0.8 K/UL (ref 0.3–1)
MONOCYTES NFR BLD: 10.3 % (ref 4–15)
NEUTROPHILS # BLD AUTO: 3.7 K/UL (ref 1.8–7.7)
NEUTROPHILS NFR BLD: 46.8 % (ref 38–73)
NRBC BLD-RTO: 0 /100 WBC
PLATELET # BLD AUTO: 244 K/UL (ref 150–450)
PMV BLD AUTO: 9.4 FL (ref 9.2–12.9)
RBC # BLD AUTO: 4.91 M/UL (ref 4.6–6.2)
WBC # BLD AUTO: 7.97 K/UL (ref 3.9–12.7)

## 2023-12-19 PROCEDURE — 99999 PR PBB SHADOW E&M-EST. PATIENT-LVL III: CPT | Mod: PBBFAC,,, | Performed by: INTERNAL MEDICINE

## 2023-12-19 PROCEDURE — 99999 PR PBB SHADOW E&M-EST. PATIENT-LVL III: ICD-10-PCS | Mod: PBBFAC,,, | Performed by: INTERNAL MEDICINE

## 2023-12-19 PROCEDURE — 93010 EKG 12-LEAD: ICD-10-PCS | Mod: ,,, | Performed by: INTERNAL MEDICINE

## 2023-12-19 PROCEDURE — 99213 OFFICE O/P EST LOW 20 MIN: CPT | Mod: PBBFAC | Performed by: INTERNAL MEDICINE

## 2023-12-19 PROCEDURE — 99204 OFFICE O/P NEW MOD 45 MIN: CPT | Mod: S$PBB,,, | Performed by: INTERNAL MEDICINE

## 2023-12-19 PROCEDURE — 93005 ELECTROCARDIOGRAM TRACING: CPT

## 2023-12-19 PROCEDURE — 99204 PR OFFICE/OUTPT VISIT, NEW, LEVL IV, 45-59 MIN: ICD-10-PCS | Mod: S$PBB,,, | Performed by: INTERNAL MEDICINE

## 2023-12-19 PROCEDURE — 93010 ELECTROCARDIOGRAM REPORT: CPT | Mod: ,,, | Performed by: INTERNAL MEDICINE

## 2023-12-19 RX ORDER — BICTEGRAVIR SODIUM, EMTRICITABINE, AND TENOFOVIR ALAFENAMIDE FUMARATE 50; 200; 25 MG/1; MG/1; MG/1
1 TABLET ORAL DAILY
Qty: 30 TABLET | Refills: 0 | Status: ACTIVE | OUTPATIENT
Start: 2023-12-19 | End: 2024-01-12 | Stop reason: SDUPTHER

## 2023-12-19 RX ORDER — METOPROLOL SUCCINATE 100 MG/1
100 TABLET, EXTENDED RELEASE ORAL DAILY
Qty: 90 TABLET | Refills: 5 | Status: SHIPPED | OUTPATIENT
Start: 2023-12-19 | End: 2024-12-18

## 2023-12-19 NOTE — PROGRESS NOTES
Subjective:   Patient ID:  Haritha Valle is a 78 y.o. male who presents for evaluation of Pre-op Exam      HPI  Pt presents for preop dental eval.  Has seen Dr. Nguyen and Dr. Ko, Cards, in past but last visit nearly 4 years ago.  Current med conditions include HTN, CAD, hyperlipidemia, MR, AI,  HIV, RAMONITA.  S/p LHC Feb 2020 w Dr. Ko: luminal irregularities RCA, otherwise normal, EF 50%.  LHC done for abnormal nuclear stress test and EF 45% on echo.  Echo Aug 2023: EF 50 - 55%, grade I DD, mild AI, mild MR, mild TR.  Here for dental work clearance.  Due for extractions.  No angina/CP sxs.  No syncope.  Ecg today 12/19/23 NSR, PAC, no ischemia.  Some MAIER, not acute.  Has chronic daily vertigo for years w extensive workup in past.  BP above goal.  TG have improved a lot.  Intermittent use of CPAP.      Past Medical History:   Diagnosis Date    Anxiety 07/03/2019    Basal cell carcinoma     CAD in native artery 11/27/2023    Depression     Dyslipidemia 11/16/2023    Essential hypertension 01/24/2013    Hepatitis B     Hepatitis C antibody test positive     RNA NEG 8/29/2019    HIV infection     Hypertension     Immune disorder     Mild cognitive impairment 10/01/2010    Nonrheumatic aortic valve insufficiency 12/19/2023    Nonrheumatic mitral valve regurgitation 12/19/2023       Current Outpatient Medications:     cueestnkj-nqyhiqpz-otxvmfh ala (BIKTARVY) -25 mg (25 kg or greater), Take 1 tablet by mouth once daily., Disp: 30 tablet, Rfl: 0    FLUoxetine 40 MG capsule, TAKE 1 CAPSULE ONE TIME DAILY, Disp: 90 capsule, Rfl: 0    rosuvastatin (CRESTOR) 20 MG tablet, TAKE 1 TABLET EVERY DAY, Disp: 90 tablet, Rfl: 3    metoprolol succinate (TOPROL-XL) 100 MG 24 hr tablet, Take 1 tablet (100 mg total) by mouth once daily., Disp: 90 tablet, Rfl: 5      Review of Systems   Constitutional: Negative.   HENT: Negative.     Eyes: Negative.    Cardiovascular:  Positive for dyspnea on exertion.   Respiratory:  Positive for  "shortness of breath.    Endocrine: Negative.    Hematologic/Lymphatic: Negative.    Skin: Negative.    Musculoskeletal: Negative.    Gastrointestinal: Negative.    Genitourinary: Negative.    Neurological:  Positive for vertigo.   Psychiatric/Behavioral: Negative.     Allergic/Immunologic: Negative.        BP (!) 150/86 (BP Location: Right arm, Patient Position: Sitting, BP Method: Large (Manual))   Pulse 72   Ht 5' 10" (1.778 m)   Wt 99.5 kg (219 lb 5.7 oz)   SpO2 98%   BMI 31.47 kg/m²     Wt Readings from Last 3 Encounters:   12/19/23 99.5 kg (219 lb 5.7 oz)   12/04/23 97.9 kg (215 lb 13.3 oz)   11/16/23 97.9 kg (215 lb 13.3 oz)     Temp Readings from Last 3 Encounters:   11/16/23 98.1 °F (36.7 °C)   01/25/22 98.7 °F (37.1 °C)   06/18/20 97.6 °F (36.4 °C)     BP Readings from Last 3 Encounters:   12/19/23 (!) 150/86   12/04/23 (!) 179/92   11/16/23 (!) 140/90     Pulse Readings from Last 3 Encounters:   12/19/23 72   12/04/23 87   11/16/23 75         Objective:   Physical Exam  Vitals and nursing note reviewed.   Constitutional:       Appearance: He is well-developed. He is obese.   HENT:      Head: Normocephalic.   Neck:      Thyroid: No thyromegaly.      Vascular: Normal carotid pulses. No carotid bruit or JVD.   Cardiovascular:      Rate and Rhythm: Normal rate and regular rhythm.      Pulses:           Radial pulses are 2+ on the right side and 2+ on the left side.      Heart sounds: S1 normal and S2 normal. Heart sounds not distant. No midsystolic click and no opening snap. No murmur heard.     No friction rub. No S3 or S4 sounds.   Pulmonary:      Effort: Pulmonary effort is normal.      Breath sounds: Normal breath sounds. No wheezing or rales.   Abdominal:      General: Bowel sounds are normal. There is no distension or abdominal bruit.      Palpations: Abdomen is soft.      Tenderness: There is no abdominal tenderness.   Musculoskeletal:      Cervical back: Neck supple.   Skin:     General: Skin is " warm.   Neurological:      Mental Status: He is alert and oriented to person, place, and time.   Psychiatric:         Behavior: Behavior normal.       I have reviewed all pertinent labs and cardiac studies.      Chemistry        Component Value Date/Time     12/18/2023 1240    K 4.9 12/18/2023 1240     12/18/2023 1240    CO2 23 12/18/2023 1240    BUN 12 12/18/2023 1240    CREATININE 0.9 12/18/2023 1240    GLU 93 12/18/2023 1240        Component Value Date/Time    CALCIUM 9.6 12/18/2023 1240    ALKPHOS 36 (L) 12/18/2023 1240    AST 27 12/18/2023 1240    ALT 26 12/18/2023 1240    BILITOT 0.6 12/18/2023 1240    ESTGFRAFRICA >60.0 10/14/2021 0715    EGFRNONAA >60.0 10/14/2021 0715        Lab Results   Component Value Date    WBC 7.97 12/18/2023    HGB 14.7 12/18/2023    HCT 45.7 12/18/2023    MCV 93 12/18/2023     12/18/2023       Lab Results   Component Value Date    TSH 1.137 04/27/2023     Lab Results   Component Value Date    HGBA1C 5.4 12/02/2009         Lab Results   Component Value Date    CHOL 172 12/18/2023    CHOL 149 10/14/2021    CHOL 351 (H) 06/09/2020     Lab Results   Component Value Date    HDL 36 (L) 12/18/2023    HDL 35 (L) 10/14/2021    HDL 33 (L) 06/09/2020     Lab Results   Component Value Date    LDLCALC 73.2 12/18/2023    LDLCALC Invalid, Trig>400.0 10/14/2021    LDLCALC Invalid, Trig>400.0 06/09/2020     Lab Results   Component Value Date    TRIG 314 (H) 12/18/2023    TRIG 401 (H) 10/14/2021    TRIG 1,358 (H) 06/09/2020       Lab Results   Component Value Date    CHOLHDL 20.9 12/18/2023    CHOLHDL 23.5 10/14/2021    CHOLHDL 9.4 (L) 06/09/2020           Results for orders placed during the hospital encounter of 08/21/23    Echo Saline Bubble? No    Interpretation Summary    Left Ventricle: The left ventricle is normal in size. Normal wall thickness. There is mild concentric hypertrophy. Normal wall motion. There is low normal systolic function with a visually estimated ejection  fraction of 50 - 55%. Grade I diastolic dysfunction.    Right Ventricle: Normal right ventricular cavity size. Wall thickness is normal. Right ventricle wall motion  is normal. Systolic function is normal.    Aortic Valve: There is mild aortic regurgitation.    Mitral Valve: There is mild regurgitation.    Tricuspid Valve: There is mild regurgitation.    IVC/SVC: Normal venous pressure at 3 mmHg.      Assessment:      1. Preop cardiovascular exam    2. Abnormal nuclear cardiac imaging test    3. CAD in native artery    4. Dyslipidemia    5. Nonrheumatic aortic valve insufficiency    6. Nonrheumatic mitral valve regurgitation    7. Obstructive sleep apnea    8. MAIER (dyspnea on exertion)    9. Essential hypertension    10. Class 1 obesity due to excess calories with serious comorbidity and body mass index (BMI) of 30.0 to 30.9 in adult        Plan:       Stable CV status.  Preop: pt may proceed with dental work at low CV risk.  CAD: Medical tx.  HTN: goal < 130/80.  Increase Toprol xl to 100 mg qd.  RAMONITA: CPAP advised.  Valvular heart disease: monitor. F/u echo in future.  Obesity: weight loss advised.  Daily exercise.  Cardiac diet.  Lipids: statin tx, low cholesterol diet, exercise, weight loss.    F/u with Dr. Nguyen, primary cardiologist, in 1 year.          I have reviewed all pertinent labs and cardiac studies independently. Plans and recommendations have been formulated under my direct supervision. All questions answered and patient voiced understanding.

## 2024-01-10 PROBLEM — R91.8 PULMONARY NODULES: Status: ACTIVE | Noted: 2024-01-10

## 2024-01-10 PROBLEM — E78.1 HYPERTRIGLYCERIDEMIA: Status: ACTIVE | Noted: 2024-01-10

## 2024-01-10 PROBLEM — I70.0 AORTIC ATHEROSCLEROSIS: Status: ACTIVE | Noted: 2024-01-10

## 2024-01-10 PROBLEM — H35.3231 EXUDATIVE AGE-RELATED MACULAR DEGENERATION OF BOTH EYES WITH ACTIVE CHOROIDAL NEOVASCULARIZATION: Status: ACTIVE | Noted: 2024-01-10

## 2024-01-10 PROBLEM — J41.0 SIMPLE CHRONIC BRONCHITIS: Status: ACTIVE | Noted: 2024-01-10

## 2024-01-12 DIAGNOSIS — B20 HIV DISEASE: Chronic | ICD-10-CM

## 2024-01-12 RX ORDER — BICTEGRAVIR SODIUM, EMTRICITABINE, AND TENOFOVIR ALAFENAMIDE FUMARATE 50; 200; 25 MG/1; MG/1; MG/1
1 TABLET ORAL DAILY
Qty: 30 TABLET | Refills: 0 | Status: ACTIVE | OUTPATIENT
Start: 2024-01-12 | End: 2024-02-15 | Stop reason: SDUPTHER

## 2024-01-24 ENCOUNTER — PROCEDURE VISIT (OUTPATIENT)
Dept: OPHTHALMOLOGY | Facility: CLINIC | Age: 79
End: 2024-01-24
Payer: MEDICARE

## 2024-01-24 ENCOUNTER — OFFICE VISIT (OUTPATIENT)
Dept: INTERNAL MEDICINE | Facility: CLINIC | Age: 79
End: 2024-01-24
Payer: MEDICARE

## 2024-01-24 ENCOUNTER — LAB VISIT (OUTPATIENT)
Dept: LAB | Facility: HOSPITAL | Age: 79
End: 2024-01-24
Attending: NURSE PRACTITIONER
Payer: MEDICARE

## 2024-01-24 VITALS
HEART RATE: 78 BPM | WEIGHT: 218.5 LBS | SYSTOLIC BLOOD PRESSURE: 162 MMHG | DIASTOLIC BLOOD PRESSURE: 90 MMHG | BODY MASS INDEX: 31.28 KG/M2 | HEIGHT: 70 IN

## 2024-01-24 DIAGNOSIS — R76.8 HEPATITIS C ANTIBODY TEST POSITIVE: ICD-10-CM

## 2024-01-24 DIAGNOSIS — G31.84 MILD COGNITIVE IMPAIRMENT: Chronic | ICD-10-CM

## 2024-01-24 DIAGNOSIS — F41.9 ANXIETY: ICD-10-CM

## 2024-01-24 DIAGNOSIS — Z13.1 DIABETES MELLITUS SCREENING: ICD-10-CM

## 2024-01-24 DIAGNOSIS — E29.1 MALE HYPOGONADISM: ICD-10-CM

## 2024-01-24 DIAGNOSIS — R91.8 PULMONARY NODULES: ICD-10-CM

## 2024-01-24 DIAGNOSIS — R79.9 ABNORMAL FINDING OF BLOOD CHEMISTRY, UNSPECIFIED: ICD-10-CM

## 2024-01-24 DIAGNOSIS — Z00.00 ENCOUNTER FOR PREVENTATIVE ADULT HEALTH CARE EXAMINATION: Primary | ICD-10-CM

## 2024-01-24 DIAGNOSIS — I70.0 AORTIC ATHEROSCLEROSIS: ICD-10-CM

## 2024-01-24 DIAGNOSIS — F32.1 CURRENT MODERATE EPISODE OF MAJOR DEPRESSIVE DISORDER WITHOUT PRIOR EPISODE: ICD-10-CM

## 2024-01-24 DIAGNOSIS — R42 POSTURAL DIZZINESS WITH PRESYNCOPE: ICD-10-CM

## 2024-01-24 DIAGNOSIS — H91.90 HEARING LOSS, UNSPECIFIED HEARING LOSS TYPE, UNSPECIFIED LATERALITY: ICD-10-CM

## 2024-01-24 DIAGNOSIS — H35.712: ICD-10-CM

## 2024-01-24 DIAGNOSIS — E78.1 HYPERTRIGLYCERIDEMIA: ICD-10-CM

## 2024-01-24 DIAGNOSIS — F34.89 OTHER SPECIFIED PERSISTENT MOOD DISORDERS: ICD-10-CM

## 2024-01-24 DIAGNOSIS — J41.0 SIMPLE CHRONIC BRONCHITIS: ICD-10-CM

## 2024-01-24 DIAGNOSIS — K64.8 INTERNAL AND EXTERNAL HEMORRHOIDS WITHOUT COMPLICATION: ICD-10-CM

## 2024-01-24 DIAGNOSIS — G63 NEUROPATHY DUE TO HIV: ICD-10-CM

## 2024-01-24 DIAGNOSIS — I35.1 NONRHEUMATIC AORTIC VALVE INSUFFICIENCY: ICD-10-CM

## 2024-01-24 DIAGNOSIS — K64.4 INTERNAL AND EXTERNAL HEMORRHOIDS WITHOUT COMPLICATION: ICD-10-CM

## 2024-01-24 DIAGNOSIS — B20 NEUROPATHY DUE TO HIV: ICD-10-CM

## 2024-01-24 DIAGNOSIS — I25.10 CAD IN NATIVE ARTERY: ICD-10-CM

## 2024-01-24 DIAGNOSIS — E78.2 MIXED HYPERLIPIDEMIA: ICD-10-CM

## 2024-01-24 DIAGNOSIS — Z86.010 HISTORY OF COLON POLYPS: ICD-10-CM

## 2024-01-24 DIAGNOSIS — N40.1 BPH WITH URINARY OBSTRUCTION: ICD-10-CM

## 2024-01-24 DIAGNOSIS — N13.8 BPH WITH URINARY OBSTRUCTION: ICD-10-CM

## 2024-01-24 DIAGNOSIS — R26.9 ABNORMALITY OF GAIT AND MOBILITY: ICD-10-CM

## 2024-01-24 DIAGNOSIS — I34.0 NONRHEUMATIC MITRAL VALVE REGURGITATION: ICD-10-CM

## 2024-01-24 DIAGNOSIS — H35.3231 EXUDATIVE AGE-RELATED MACULAR DEGENERATION OF BOTH EYES WITH ACTIVE CHOROIDAL NEOVASCULARIZATION: Primary | ICD-10-CM

## 2024-01-24 DIAGNOSIS — E66.09 CLASS 1 OBESITY DUE TO EXCESS CALORIES WITH SERIOUS COMORBIDITY AND BODY MASS INDEX (BMI) OF 30.0 TO 30.9 IN ADULT: ICD-10-CM

## 2024-01-24 DIAGNOSIS — H35.3231 EXUDATIVE AGE-RELATED MACULAR DEGENERATION OF BOTH EYES WITH ACTIVE CHOROIDAL NEOVASCULARIZATION: ICD-10-CM

## 2024-01-24 DIAGNOSIS — R76.8 HEPATITIS B SURFACE ANTIGEN POSITIVE: Chronic | ICD-10-CM

## 2024-01-24 DIAGNOSIS — G47.33 OBSTRUCTIVE SLEEP APNEA: ICD-10-CM

## 2024-01-24 DIAGNOSIS — E78.5 DYSLIPIDEMIA: ICD-10-CM

## 2024-01-24 DIAGNOSIS — I10 ESSENTIAL HYPERTENSION: ICD-10-CM

## 2024-01-24 DIAGNOSIS — E88.1 LIPODYSTROPHY ASSOCIATED WITH HUMAN IMMUNODEFICIENCY VIRUS INFECTION: ICD-10-CM

## 2024-01-24 DIAGNOSIS — K57.30 DIVERTICULOSIS OF COLON: ICD-10-CM

## 2024-01-24 DIAGNOSIS — F02.80 DEMENTIA IN ALZHEIMER'S DISEASE: ICD-10-CM

## 2024-01-24 DIAGNOSIS — B20 LIPODYSTROPHY ASSOCIATED WITH HUMAN IMMUNODEFICIENCY VIRUS INFECTION: ICD-10-CM

## 2024-01-24 DIAGNOSIS — R55 POSTURAL DIZZINESS WITH PRESYNCOPE: ICD-10-CM

## 2024-01-24 DIAGNOSIS — N52.01 ERECTILE DYSFUNCTION DUE TO ARTERIAL INSUFFICIENCY: ICD-10-CM

## 2024-01-24 DIAGNOSIS — G30.9 DEMENTIA IN ALZHEIMER'S DISEASE: ICD-10-CM

## 2024-01-24 DIAGNOSIS — Z00.00 ENCOUNTER FOR PREVENTATIVE ADULT HEALTH CARE EXAMINATION: ICD-10-CM

## 2024-01-24 DIAGNOSIS — G47.61 PERIODIC LIMB MOVEMENT DISORDER (PLMD): ICD-10-CM

## 2024-01-24 DIAGNOSIS — B20 HIV DISEASE: ICD-10-CM

## 2024-01-24 PROCEDURE — 99999 PR PBB SHADOW E&M-EST. PATIENT-LVL IV: CPT | Mod: PBBFAC,,, | Performed by: NURSE PRACTITIONER

## 2024-01-24 PROCEDURE — 92134 CPTRZ OPH DX IMG PST SGM RTA: CPT | Mod: PBBFAC | Performed by: OPHTHALMOLOGY

## 2024-01-24 PROCEDURE — 99214 OFFICE O/P EST MOD 30 MIN: CPT | Mod: PBBFAC | Performed by: NURSE PRACTITIONER

## 2024-01-24 PROCEDURE — 36415 COLL VENOUS BLD VENIPUNCTURE: CPT | Performed by: NURSE PRACTITIONER

## 2024-01-24 PROCEDURE — 67028 INJECTION EYE DRUG: CPT | Mod: 50,PBBFAC | Performed by: OPHTHALMOLOGY

## 2024-01-24 PROCEDURE — 99499 UNLISTED E&M SERVICE: CPT | Mod: S$PBB,,, | Performed by: OPHTHALMOLOGY

## 2024-01-24 PROCEDURE — 99999PBSHW PR PBB SHADOW TECHNICAL ONLY FILED TO HB: Mod: JZ,PBBFAC,,

## 2024-01-24 PROCEDURE — G0439 PPPS, SUBSEQ VISIT: HCPCS | Mod: ,,, | Performed by: NURSE PRACTITIONER

## 2024-01-24 PROCEDURE — 84403 ASSAY OF TOTAL TESTOSTERONE: CPT | Performed by: NURSE PRACTITIONER

## 2024-01-24 PROCEDURE — 67028 INJECTION EYE DRUG: CPT | Mod: 50,S$PBB,, | Performed by: OPHTHALMOLOGY

## 2024-01-24 PROCEDURE — 83036 HEMOGLOBIN GLYCOSYLATED A1C: CPT | Performed by: NURSE PRACTITIONER

## 2024-01-24 RX ORDER — IBUPROFEN 100 MG/5ML
1000 SUSPENSION, ORAL (FINAL DOSE FORM) ORAL DAILY
COMMUNITY

## 2024-01-24 RX ORDER — VIT C/E/ZN/COPPR/LUTEIN/ZEAXAN 250 MG-2MG
1 CAPSULE ORAL DAILY
COMMUNITY

## 2024-01-24 RX ADMIN — FARICIMAB 6 MG: 6 INJECTION, SOLUTION INTRAVITREAL at 02:01

## 2024-01-24 NOTE — PROGRESS NOTES
"  Haritha Valle presented for a  Medicare AWV and comprehensive Health Risk Assessment today. The following components were reviewed and updated:    Medical history  Family History  Social history  Allergies and Current Medications  Health Risk Assessment  Health Maintenance  Care Team         ** See Completed Assessments for Annual Wellness Visit within the encounter summary.**         The following assessments were completed:  Living Situation  CAGE  Depression Screening  Timed Get Up and Go  Whisper Test  Cognitive Function Screening  Nutrition Screening  ADL Screening  PAQ Screening        Vitals:    01/24/24 1228   BP: (!) 162/90   BP Location: Right arm   Patient Position: Sitting   Pulse: 78   Weight: 99.1 kg (218 lb 7.6 oz)   Height:    Pain scale 5' 10" (1.778 m)    0     Body mass index is 31.35 kg/m².  Physical Exam  Constitutional:       General: He is not in acute distress.     Appearance: He is not ill-appearing or diaphoretic.   HENT:      Mouth/Throat:      Mouth: Mucous membranes are moist.   Eyes:      General:         Right eye: No discharge.         Left eye: No discharge.      Conjunctiva/sclera: Conjunctivae normal.   Cardiovascular:      Rate and Rhythm: Normal rate and regular rhythm.      Heart sounds: Normal heart sounds.   Pulmonary:      Effort: Pulmonary effort is normal. No respiratory distress.      Breath sounds: Normal breath sounds.   Skin:     General: Skin is warm and dry.   Neurological:      Mental Status: He is alert. Mental status is at baseline.      Gait: Gait abnormal.      Comments: Oriented to person, place, and situation  Forgetful   Responds appropriately to simple commands   Psychiatric:         Mood and Affect: Mood normal.         Behavior: Behavior normal.         Thought Content: Thought content normal.         Judgment: Judgment normal.     Diagnoses and health risks identified today and associated recommendations/orders:    1. Encounter for preventative adult " health care examination  Review for opioid screening: Patient does not have Rx for Opioids  Review for substance use disorder: Patient does not use substance per chart    Patient states he does not drink alcohol     I offered to discuss advanced care planning, including how to pick a person who would make decisions for you if you were unable to make them for yourself, called a health care power of , and what kind of decisions you might make such as use of life sustaining treatments such as ventilators and tube feeding when faced with a life limiting illness recorded on a living will that they will need to know. (How you want to be cared for as you near the end of your natural life)     X Patient is interested in learning more about how to make advanced directives.  I provided them paperwork and offered to discuss this with them.    2. Simple chronic bronchitis  Monitor  Stable  Follow up with Pulmonology as needed    3. 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 Dementia in Alzheimer's disease, Mild cognitive impairment  Monitor  Follow up as directed    5. Current moderate episode of major depressive disorder without prior episode, Other specified persistent mood disorders, Anxiety  Monitor  Stable  Continue home fluoxetine    6. 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 Nonrheumatic aortic valve insufficiency, Nonrheumatic mitral valve regurgitation  Monitor  Follow up with Cardiology    8. Aortic atherosclerosis, CAD in native artery, Dyslipidemia, Hypertriglyceridemia, Mixed hyperlipidemia  9/30/2020 CT scan- Heart and Great vessels:  Scattered atherosclerotic plaque noted involving the thoracic aorta and aortic branch vessels, including the coronary arteries. Dx discussed with patient   Monitor  Follow up with PCP, cardiology  Continue home crestor  Blood pressure control    9. Class 1 obesity due to excess calories with serious comorbidity and body mass index (BMI) of 30.0 to 30.9 in adult  Monitor  Follow up with PCP     10. Pulmonary nodules  9/30/2020 Ct scan-Lungs: Minimal subsegmental atelectasis noted in the left lower lobe along the surface of the diaphragm.  No parenchymal consolidations or pleural effusions.  There is a 5 mm noncalcified solid pulmonary nodule in the right lower lobe as seen on series 4, image 278.  Several additional smaller pulmonary nodules bilaterally, some of which appear calcified.  Largest nodule on the left measures 3 mm and is not definitively calcified.  There is no septal thickening, bronchiectasis, honeycombing, bronchovascular bundle thickening, or ground-glass density to suggest interstitial lung disease.    Monitor  Follow up with Pulmonology  Dx discussed with patient     11. Essential hypertension  Monitor  Stable  Continue home toprol xl    12. BPH with urinary obstruction,  Male hypogonadism, Erectile dysfunction due to arterial insufficiency  Monitor  Follow up with Urology  Check testosterone level    13. Hepatitis B surface antigen positive   Monitor  Follow up as directed    14. Hepatitis C antibody test positive  Monitor  Follow up as directed    15. History of colon polyps, Diverticulosis of colon, Internal and external hemorrhoids without complication   Monitor  Follow up with GI as directed    16. Postural dizziness with presyncope, Abnormality of gait and mobility  Monitor  Patient states he still having issues with dizziness and has had a complete outpatient workup  Follow up as directed- Patient states he is disappointed that so far no one he has seen has been able to tell him why he has  ongoing vertigo- [facility] mentioned as a possibility for further evaluation  Fall precautions     17. Periodic limb movement disorder (PLMD)  Monitor  Follow up as directed    18. Obstructive sleep apnea  Monitor  Follow up with Pulmonology  Patient states he has not been wearing his CPAP    19. Diabetes mellitus screening, Abnormal finding of blood chemistry, unspecified  Health maintenance screening  - HEMOGLOBIN A1C; Future    20. Hearing loss- Wears hearing aids   Monitor  Continue home hearing aids                              Provided Haritha with a 5-10 year written screening schedule and personal prevention plan. Recommendations were developed using the USPSTF age appropriate recommendations. Education, counseling, and referrals were provided as needed. After Visit Summary printed and given to patient which includes a list of additional screenings\tests needed.    Follow up in about 1 year (around 1/24/2025) for Annual wellness visit.    DENNIS Tavares

## 2024-01-24 NOTE — PATIENT INSTRUCTIONS
Counseling and Referral of Other Preventative  (Italic type indicates deductible and co-insurance are waived)    Patient Name: Haritha Valle  Today's Date: 1/24/2024    Health Maintenance       Date Due Completion Date    Aspirin/Antiplatelet Therapy Never done ---    RSV Vaccine (Age 60+ and Pregnant patients) (1 - 1-dose 60+ series) Never done ---    Monkeypox Vaccine (2 - Risk 2-dose series) 09/29/2022 9/1/2022    COVID-19 Vaccine (6 - 2023-24 season) 09/01/2023 12/2/2022    Lipid Panel 12/18/2024 12/18/2023    Colorectal Cancer Screening 01/25/2025 1/25/2022    TETANUS VACCINE 07/03/2029 7/3/2019        No orders of the defined types were placed in this encounter.      The following information is provided to all patients.  This information is to help you find resources for any of the problems found today that may be affecting your health:                  Living healthy guide: www.[facility].[place].[facility]      Understanding Diabetes: www.diabetes.org      Eating healthy: www.cdc.gov/healthyweight      CDC home safety checklist: www.cdc.gov/steadi/patient.html      Agency on Aging: www.goea.[place].gov      Alcoholics anonymous (AA): www.aa.org      Physical Activity: www.mino.nih.gov/dn1ypad      Tobacco use: www.quitwithusla.org

## 2024-01-24 NOTE — PROGRESS NOTES
===============================  Date today is 1/24/2024  Haritha Valle is a 78 y.o. male  Last visit [facility]: :12/13/2023   Last visit eye dept. 12/13/2023    Corrected distance visual acuity was 20/40 in the right eye and 20/30 in the left eye.  Tonometry       Tonometry (Applanation, 1:23 PM)         Right Left    Pressure 16 16                  Not recorded       Not recorded       Not recorded       Chief Complaint   Patient presents with    Macular Degeneration     HPI    SRN OS  Avastin OS Emergent 1/21/22, 2/24/22, 3/24/22, 4/28/22, 6/21/22  Emergent Avastin OU 3/16/23  Avastin failure  Emergent Eylea OU 5/2/23  Vabysmo OU 6/21/23, 7/27/23, 8/31/23 10/11/23 12/13/23    Monovision RGP OD dominant.    Last edited by Maya Babcock on 1/24/2024  1:09 PM.      Problem List Items Addressed This Visit          Eye/Vision problems    Exudative age-related macular degeneration of both eyes with active choroidal neovascularization - Primary    Relevant Medications    faricimab-svoa 6 mg/0.05 mL (120 mg/mL) injection 6 mg (Completed)    faricimab-svoa 6 mg/0.05 mL (120 mg/mL) injection 6 mg (Completed)    Other Relevant Orders    Posterior Segment OCT Retina-Both eyes (Completed)    Prior authorization Order     Instructed to call 24/7 for any worsening of vision, visual distortion or pain.  Check OU independently daily.    Gave my office and personal cell phone number.  ________________  1/24/2024 today  Haritha Valle    OU SRN  OCT stable    ..    Injection Procedure Note:    1/24/2024  Diagnosis :  OU SRN  Today:   Vabysmo (faricimab-svoa) 6 mg/0.05mL (120 mg/mL) Injection , OU   Follow up: rtc 8-9 weeks    Instructed to call 24/7 for any worsening of vision. Check Both eyes daily. Gave patient my home phone number.  Risks, benefits, and alternatives to treatment discussed in detail with the patient.  The patient voiced understanding and wished to proceed with the procedure.     Patient Identified and Time Out  complete  Subconjunctival bleb - xylocaine with epi 2%   and Betadine.  Inject at Vabysmo (faricimab-svoa) 6 mg/0.05mL (120 mg/mL) Injection , OU 6:00 @ 3.5-4mm posterior to limbus  1 stop: no   Post Operative Dx: Same  Complications: None  Follow up as above.    =============================

## 2024-01-25 ENCOUNTER — TELEPHONE (OUTPATIENT)
Dept: FAMILY MEDICINE | Facility: CLINIC | Age: 79
End: 2024-01-25
Payer: COMMERCIAL

## 2024-01-25 ENCOUNTER — OFFICE VISIT (OUTPATIENT)
Dept: UROLOGY | Facility: CLINIC | Age: 79
End: 2024-01-25
Payer: MEDICARE

## 2024-01-25 ENCOUNTER — LAB VISIT (OUTPATIENT)
Dept: LAB | Facility: HOSPITAL | Age: 79
End: 2024-01-25
Attending: INTERNAL MEDICINE
Payer: MEDICARE

## 2024-01-25 VITALS — HEIGHT: 70 IN | BODY MASS INDEX: 31.21 KG/M2 | WEIGHT: 218 LBS

## 2024-01-25 DIAGNOSIS — B19.20 HEPATITIS C TEST POSITIVE: ICD-10-CM

## 2024-01-25 DIAGNOSIS — B19.20 HEPATITIS C TEST POSITIVE: Primary | ICD-10-CM

## 2024-01-25 DIAGNOSIS — E29.1 HYPOGONADISM MALE: Primary | ICD-10-CM

## 2024-01-25 DIAGNOSIS — R79.89 LOW TESTOSTERONE IN MALE: ICD-10-CM

## 2024-01-25 PROBLEM — R73.03 PREDIABETES: Status: ACTIVE | Noted: 2024-01-25

## 2024-01-25 LAB
ESTIMATED AVG GLUCOSE: 117 MG/DL (ref 68–131)
HBA1C MFR BLD: 5.7 % (ref 4–5.6)
TESTOST SERPL-MCNC: 39 NG/DL (ref 304–1227)

## 2024-01-25 PROCEDURE — 99204 OFFICE O/P NEW MOD 45 MIN: CPT | Mod: S$PBB,,, | Performed by: UROLOGY

## 2024-01-25 PROCEDURE — 36415 COLL VENOUS BLD VENIPUNCTURE: CPT | Mod: XB | Performed by: INTERNAL MEDICINE

## 2024-01-25 PROCEDURE — 99214 OFFICE O/P EST MOD 30 MIN: CPT | Mod: PBBFAC | Performed by: UROLOGY

## 2024-01-25 PROCEDURE — 87522 HEPATITIS C REVRS TRNSCRPJ: CPT | Performed by: INTERNAL MEDICINE

## 2024-01-25 PROCEDURE — 99999 PR PBB SHADOW E&M-EST. PATIENT-LVL IV: CPT | Mod: PBBFAC,,, | Performed by: UROLOGY

## 2024-01-25 NOTE — TELEPHONE ENCOUNTER
Urology consult for hypogonad-------low testosterone--------------------and do hep c RNA blood test----positive hep c--------ordered-

## 2024-01-25 NOTE — PROGRESS NOTES
Chief Complaint:   Encounter Diagnosis   Name Primary?    Hypogonadism male Yes       HPI:  78-year-old gentleman who comes in with a longstanding history of hypogonadism.  Patient states that years ago he was treated by testosterone injections every 2 weeks, uncertain of the dosage.  Cutaneous therapy did not assist.  Patient is interested in pellets though.  Patient has decreased energy with fatigue, libido is not an issue nor as erections.  Patient is otherwise voiding well with no other urological history, no family history of urological cancers or stones.    Allergies:  No known drug allergies    Medications:  See MAR    Review of Systems:  General: No fever, chills, fatigability, or weight loss.  Skin: No rashes, itching, or changes in color or texture of skin.  Chest: Denies MAIER, cyanosis, wheezing, cough, and sputum production.  Abdomen: Appetite fine. No weight loss. Denies diarrhea, abdominal pain, hematemesis, or blood in stool.  Musculoskeletal: No joint stiffness or swelling. Denies back pain.  : As above.  All other review of systems negative.    PMH:   has a past medical history of Anxiety (07/03/2019), Basal cell carcinoma, CAD in native artery (11/27/2023), Depression, Dyslipidemia (11/16/2023), Essential hypertension (01/24/2013), Hepatitis B, Hepatitis C antibody test positive, HIV infection, Hypertension, Immune disorder, Mild cognitive impairment (10/01/2010), Nonrheumatic aortic valve insufficiency (12/19/2023), and Nonrheumatic mitral valve regurgitation (12/19/2023).    PSH:   has a past surgical history that includes Colonoscopy (N/A, 11/3/2016); Cholecystectomy; Appendectomy; Left heart catheterization (Left, 2/3/2020); Cardiac catheterization; and Colonoscopy (N/A, 1/25/2022).    FamHx: family history includes Heart disease in his father; Heart failure in his father.    SocHx:  reports that he has never smoked. He has never used smokeless tobacco. He reports that he does not currently  use drugs. He reports that he does not drink alcohol.      Physical Exam:  There were no vitals filed for this visit.  General: A&Ox3, no apparent distress, no deformities  Neck: No masses, normal ROM  Lungs: normal inspiration, no use of accessory muscles  Heart: normal pulse, no arrhythmias  Abdomen: Soft, NT, ND, no masses, no hernias, no hepatosplenomegaly  Skin: The skin is warm and dry. No jaundice.  Ext: No c/c/e.  : 1/24- Test desc sam, no abnormalities of epididymus. Normal penile and scrotal skin. Meatus normal.    Labs/Studies:   PSA 1.7 12/23   Testosterone 39 1/24    Impression/Plan:     Hypogonadism- patient has previously used injections, and has failed creams, would like to pursue testopel insertion.  Therefore obtain a full panel of labs today and proceed as appropriate.  Scheduled for testopel insertion, call with any issues prior to the next appointment.

## 2024-01-26 LAB
HCV RNA SERPL QL NAA+PROBE: NOT DETECTED
HCV RNA SPEC NAA+PROBE-ACNC: NOT DETECTED IU/ML

## 2024-01-29 RX ORDER — FLUOXETINE HYDROCHLORIDE 40 MG/1
40 CAPSULE ORAL
Qty: 90 CAPSULE | Refills: 3 | Status: SHIPPED | OUTPATIENT
Start: 2024-01-29

## 2024-01-29 NOTE — TELEPHONE ENCOUNTER
No care due was identified.  [facility] Embedded Care Due Messages. Reference number: [id number].   1/29/2024 1:49:07 AM CST

## 2024-01-29 NOTE — TELEPHONE ENCOUNTER
Refill Routing Note   Medication(s) are not appropriate for processing by [facility] for the following reason(s):        No active prescription written by provider    [facility] action(s):  Defer               Appointments  past 12m or future 3m with PCP    Date Provider   Last Visit   11/16/2023 Max Herbert MD   Next Visit   5/16/2024 Max Herbert MD   ED visits in past 90 days: 0        Note composed:1:37 PM 01/29/2024

## 2024-02-12 DIAGNOSIS — B20 HIV DISEASE: Chronic | ICD-10-CM

## 2024-02-12 RX ORDER — BICTEGRAVIR SODIUM, EMTRICITABINE, AND TENOFOVIR ALAFENAMIDE FUMARATE 50; 200; 25 MG/1; MG/1; MG/1
1 TABLET ORAL DAILY
Qty: 30 TABLET | Refills: 0 | OUTPATIENT
Start: 2024-02-12

## 2024-02-13 RX ORDER — BICTEGRAVIR SODIUM, EMTRICITABINE, AND TENOFOVIR ALAFENAMIDE FUMARATE 50; 200; 25 MG/1; MG/1; MG/1
1 TABLET ORAL DAILY
Qty: 30 TABLET | Refills: 0 | OUTPATIENT
Start: 2024-02-13

## 2024-02-15 DIAGNOSIS — B20 HIV DISEASE: Chronic | ICD-10-CM

## 2024-02-15 RX ORDER — BICTEGRAVIR SODIUM, EMTRICITABINE, AND TENOFOVIR ALAFENAMIDE FUMARATE 50; 200; 25 MG/1; MG/1; MG/1
1 TABLET ORAL DAILY
Qty: 30 TABLET | Refills: 0 | Status: ACTIVE | OUTPATIENT
Start: 2024-02-15 | End: 2024-03-21 | Stop reason: SDUPTHER

## 2024-02-29 ENCOUNTER — PROCEDURE VISIT (OUTPATIENT)
Dept: UROLOGY | Facility: CLINIC | Age: 79
End: 2024-02-29
Payer: MEDICARE

## 2024-02-29 DIAGNOSIS — R97.21 RISING PSA FOLLOWING TREATMENT FOR MALIGNANT NEOPLASM OF PROSTATE: ICD-10-CM

## 2024-02-29 DIAGNOSIS — E29.1 HYPOGONADISM MALE: Primary | ICD-10-CM

## 2024-02-29 PROCEDURE — 11981 INSERTION DRUG DLVR IMPLANT: CPT | Mod: PBBFAC | Performed by: UROLOGY

## 2024-02-29 PROCEDURE — S0189 TESTOSTERONE PELLET 75 MG: HCPCS | Mod: PBBFAC | Performed by: UROLOGY

## 2024-02-29 PROCEDURE — 11980 IMPLANT HORMONE PELLET(S): CPT | Mod: PBBFAC | Performed by: UROLOGY

## 2024-02-29 PROCEDURE — 99999PBSHW PR PBB SHADOW TECHNICAL ONLY FILED TO HB: Mod: PBBFAC,,,

## 2024-02-29 PROCEDURE — 11980 IMPLANT HORMONE PELLET(S): CPT | Mod: S$PBB,,, | Performed by: UROLOGY

## 2024-02-29 RX ORDER — LEVOFLOXACIN 500 MG/1
500 TABLET, FILM COATED ORAL DAILY
Qty: 3 TABLET | Refills: 0 | Status: SHIPPED | OUTPATIENT
Start: 2024-02-29 | End: 2024-03-03

## 2024-02-29 NOTE — PROCEDURES
Procedures  Chief Complaint:   Encounter Diagnosis   Name Primary?    Hypogonadism male Yes       HPI:    2/29/24- here today for his first testopel.    78-year-old gentleman who comes in with a longstanding history of hypogonadism.  Patient states that years ago he was treated by testosterone injections every 2 weeks, uncertain of the dosage.  Cutaneous therapy did not assist.  Patient is interested in pellets though.  Patient has decreased energy with fatigue, libido is not an issue nor as erections.  Patient is otherwise voiding well with no other urological history, no family history of urological cancers or stones.    Allergies:  No known drug allergies    Medications:  See MAR    Review of Systems:  General: No fever, chills, fatigability, or weight loss.  Skin: No rashes, itching, or changes in color or texture of skin.  Chest: Denies MAIER, cyanosis, wheezing, cough, and sputum production.  Abdomen: Appetite fine. No weight loss. Denies diarrhea, abdominal pain, hematemesis, or blood in stool.  Musculoskeletal: No joint stiffness or swelling. Denies back pain.  : As above.  All other review of systems negative.    PMH:   has a past medical history of Anxiety (07/03/2019), Basal cell carcinoma, CAD in native artery (11/27/2023), Depression, Dyslipidemia (11/16/2023), Essential hypertension (01/24/2013), Hepatitis B, Hepatitis C antibody test positive, HIV infection, Hypertension, Immune disorder, Mild cognitive impairment (10/01/2010), Nonrheumatic aortic valve insufficiency (12/19/2023), and Nonrheumatic mitral valve regurgitation (12/19/2023).    PSH:   has a past surgical history that includes Colonoscopy (N/A, 11/3/2016); Cholecystectomy; Appendectomy; Left heart catheterization (Left, 2/3/2020); Cardiac catheterization; and Colonoscopy (N/A, 1/25/2022).    FamHx: family history includes Heart disease in his father; Heart failure in his father.    SocHx:  reports that he has never smoked. He has never  used smokeless tobacco. He reports that he does not currently use drugs. He reports that he does not drink alcohol.      Physical Exam:  There were no vitals filed for this visit.  General: A&Ox3, no apparent distress, no deformities  Neck: No masses, normal ROM  Lungs: normal inspiration, no use of accessory muscles  Heart: normal pulse, no arrhythmias  Abdomen: Soft, NT, ND, no masses, no hernias, no hepatosplenomegaly  Skin: The skin is warm and dry. No jaundice.  Ext: No c/c/e.  : 1/24- Test desc sam, no abnormalities of epididymus. Normal penile and scrotal skin. Meatus normal.    Labs/Studies:   Testopel  2/29/24  PSA 1.7 12/23   Testosterone 39 1/24    Patient was sterilely prepped and draped, then positioned in the right side up, lateral decubitus position.  Lidocaine was used for local anesthesia.  Eleven blade was used to incise the skin, included trocars with the testopel kit were then used.  They were inserted within the incision site and we placed the pellets in a V location.  10 pellets total were inserted without difficulty.  Trocars were removed and pressure was applied for 2 min.  Steri-Strips applied for local coverage, he will return for lab assessment in 3 months.      Impression/Plan:     Hypogonadism- patient tolerated the procedure well and will call with any issues in the meantime.  Will see him back in 3 months with labs and to determine an interval.  Of note the patient has previously used injections and has failed creams.

## 2024-03-04 ENCOUNTER — LAB VISIT (OUTPATIENT)
Dept: LAB | Facility: HOSPITAL | Age: 79
End: 2024-03-04
Attending: STUDENT IN AN ORGANIZED HEALTH CARE EDUCATION/TRAINING PROGRAM
Payer: MEDICARE

## 2024-03-04 DIAGNOSIS — B20 HIV DISEASE: ICD-10-CM

## 2024-03-04 PROCEDURE — 87536 HIV-1 QUANT&REVRSE TRNSCRPJ: CPT | Performed by: STUDENT IN AN ORGANIZED HEALTH CARE EDUCATION/TRAINING PROGRAM

## 2024-03-04 PROCEDURE — 36415 COLL VENOUS BLD VENIPUNCTURE: CPT | Performed by: STUDENT IN AN ORGANIZED HEALTH CARE EDUCATION/TRAINING PROGRAM

## 2024-03-05 LAB
HIV1 RNA # SERPL NAA+PROBE: 27 COPIES/ML
HIV1 RNA SERPL NAA+PROBE-LOG#: 1.43 LOG COPIES/ML
HIV1 RNA SERPL QL NAA+PROBE: DETECTED

## 2024-03-11 ENCOUNTER — OFFICE VISIT (OUTPATIENT)
Dept: INFECTIOUS DISEASES | Facility: CLINIC | Age: 79
End: 2024-03-11
Payer: MEDICARE

## 2024-03-11 VITALS
SYSTOLIC BLOOD PRESSURE: 130 MMHG | WEIGHT: 224.19 LBS | HEART RATE: 76 BPM | BODY MASS INDEX: 32.1 KG/M2 | DIASTOLIC BLOOD PRESSURE: 80 MMHG | HEIGHT: 70 IN

## 2024-03-11 DIAGNOSIS — B20 HIV DISEASE: Primary | ICD-10-CM

## 2024-03-11 PROCEDURE — 90471 IMMUNIZATION ADMIN: CPT | Mod: PBBFAC

## 2024-03-11 PROCEDURE — 99214 OFFICE O/P EST MOD 30 MIN: CPT | Mod: PBBFAC,25 | Performed by: STUDENT IN AN ORGANIZED HEALTH CARE EDUCATION/TRAINING PROGRAM

## 2024-03-11 PROCEDURE — 99999 PR PBB SHADOW E&M-EST. PATIENT-LVL IV: CPT | Mod: PBBFAC,,, | Performed by: STUDENT IN AN ORGANIZED HEALTH CARE EDUCATION/TRAINING PROGRAM

## 2024-03-11 PROCEDURE — 99214 OFFICE O/P EST MOD 30 MIN: CPT | Mod: S$PBB,,, | Performed by: STUDENT IN AN ORGANIZED HEALTH CARE EDUCATION/TRAINING PROGRAM

## 2024-03-11 PROCEDURE — 99999PBSHW HEPATITIS A VACCINE ADULT IM: Mod: PBBFAC,,,

## 2024-03-11 RX ORDER — PROMETHAZINE HYDROCHLORIDE 25 MG/1
TABLET ORAL
COMMUNITY
Start: 2024-01-30 | End: 2024-05-20

## 2024-03-11 RX ORDER — OXYCODONE AND ACETAMINOPHEN 5; 325 MG/1; MG/1
TABLET ORAL
COMMUNITY
Start: 2024-01-30 | End: 2024-05-20

## 2024-03-11 RX ORDER — DIAZEPAM 5 MG/1
5 TABLET ORAL DAILY PRN
COMMUNITY
Start: 2024-01-30 | End: 2024-05-20

## 2024-03-11 RX ORDER — ACETAMINOPHEN AND CODEINE PHOSPHATE 300; 30 MG/1; MG/1
1 TABLET ORAL EVERY 6 HOURS PRN
COMMUNITY
Start: 2023-11-21 | End: 2024-05-20

## 2024-03-11 RX ORDER — AMOXICILLIN 500 MG/1
500 CAPSULE ORAL 3 TIMES DAILY
COMMUNITY
Start: 2024-01-30 | End: 2024-05-20

## 2024-03-11 RX ORDER — CHLORHEXIDINE GLUCONATE ORAL RINSE 1.2 MG/ML
SOLUTION DENTAL
COMMUNITY
Start: 2024-01-30 | End: 2024-05-20

## 2024-03-11 RX ORDER — IBUPROFEN 800 MG/1
800 TABLET ORAL 3 TIMES DAILY
COMMUNITY
Start: 2024-01-30 | End: 2024-05-20

## 2024-03-11 NOTE — PROGRESS NOTES
Infectious Disease Clinic Note    Patient Name: Haritha Valle  YOB: 1945    PRESENTING HISTORY       History of Present Illness:  Mr. Haritha Valle is a 78 y.o. male w/ significant PMHx of HIV diagnosed in 1984 here for HIV follow up. Per last ID note, last documented visit with ID in 2019 with [facility]. Had been on Reyataz and Combivir with plans to switch to Biktarvy, but this proved cost prohibitive. No recent labs on file. In 2019 HIV VL was undetectable with CD4 570. Expressed concern about lipodystrophy and memory loss. Vaccine history populated below. More recent CD4 count 464 on 4/27/23. Today patient reports taking Reyataz and Combivir over 30 years. Stopped taking month ago due to issues with pharmacy. Has been undetectable for years. Not currently sexually active. No travel outside [place]. No sick contacts. Would like to try new ART. Has two dogs. Agreeable to baseline labs today and switching to Biktarvy or Symtuza based on cost.      Labs below reviewed and interpreted. Biktarvy was approved by [facility]. Today patient reports he has been taking it each day with no missed doses. Has nighttime low oxygen and apnea but won't wear CPAP. Seeing pulmonology for alternative. Establishing care with PCP as well. Discussed prior labs and explained that mutation likely minor from Genosure. He is clearly responding to ART. Will recheck in 1 month to ensure undetectable. Vaccines discussed as well. Patient voiced understanding.     3/11: Blip in viral load at 27. Will recheck in 1 month. Feels well. No missed doses of Biktarvy. Would like hep A vaccine today.      Hep A IgG non reactive  Hep B surface Ab reactive  CD4 461  Hep C ab reactive but had negative viral load back in 2019 so likely SVR    RPR non-reactive  HIV RNA quant from 5272 to 71 to undetectable      Today patient reports seeing new PCP and pulmonology past month. Has ongoing dizziness. Has been checking BP; high this morning.  Otherwise feels well. Undergoing pre dental procedure workup.                  Review of Systems:  Constitutional: no fever or chills  Eyes: no visual changes  ENT: no nasal congestion or sore throat  Respiratory: no cough or shortness of breath  Cardiovascular: no chest pain  Gastrointestinal: no nausea or vomiting, no abdominal pain, no constipation, no diarrhea  Genitourinary: no hematuria or dysuria  Musculoskeletal: no arthralgias or myalgias  Skin: no rash  Neurological: no headaches, numbness, or paresthesias    The following portions of the patient's history were reviewed and updated as appropriate: allergies, current medications, past family history, past medical history, past social history, past surgical history, and problem list.    PAST HISTORY:     Immunization History   Administered Date(s) Administered    COVID-19, MRNA, LN-S, PF (Pfizer) (Gray Cap) 04/22/2022    COVID-19, MRNA, LN-S, PF (Pfizer) (Purple Cap) 01/11/2021, 02/01/2021, 10/29/2021    COVID-19, mRNA, LNP-S, bivalent booster, PF (PFIZER OMICRON) 12/02/2022    Influenza (FLUAD) - Quadrivalent - Adjuvanted - PF *Preferred* (65+) 09/28/2020, 10/11/2021, 10/20/2022, 11/09/2023    Influenza - High Dose - PF (65 years and older) 09/24/2013, 12/02/2014, 09/27/2016, 09/27/2017, 12/12/2018, 10/01/2019    Influenza - Quadrivalent - PF *Preferred* (6 months and older) 10/24/2007, 01/08/2009, 09/23/2010, 01/24/2013    Influenza A (H1N1) 2009 Monovalent - IM 11/03/2009    Influenza Split 01/24/2013    Pneumococcal Conjugate - 13 Valent 07/23/2015    Pneumococcal Polysaccharide - 23 Valent 09/27/2016    RSVpreF (Arexvy) 01/24/2024    Tdap 01/08/2009, 07/03/2019    Vaccinia, smallpox monkeypox vaccine live, PF 09/01/2022    Zoster 01/08/2009    Zoster Recombinant 05/09/2018, 06/08/2018, 07/18/2018       Past Medical History:   Diagnosis Date    Anxiety 07/03/2019    Basal cell carcinoma     CAD in native artery 11/27/2023    Depression     Dyslipidemia  11/16/2023    Essential hypertension 01/24/2013    Hepatitis B     Hepatitis C antibody test positive     RNA NEG 8/29/2019    HIV infection     Hypertension     Immune disorder     Mild cognitive impairment 10/01/2010    Nonrheumatic aortic valve insufficiency 12/19/2023    Nonrheumatic mitral valve regurgitation 12/19/2023       Past Surgical History:   Procedure Laterality Date    APPENDECTOMY      CARDIAC CATHETERIZATION      no stent    CHOLECYSTECTOMY      COLONOSCOPY N/A 11/3/2016    Procedure: COLONOSCOPY;  Surgeon: Maxi Villasenor MD;  [facility]  Service: Endoscopy;  Laterality: N/A;  last colonoscopy with Dr Jarrell    COLONOSCOPY N/A 1/25/2022    Procedure: COLONOSCOPY;  Surgeon: Silvino Rosales MD;  Location: [facility] ENDO;  Service: Endoscopy;  Laterality: N/A;    LEFT HEART CATHETERIZATION Left 2/3/2020    Procedure: CATHETERIZATION, HEART, LEFT;  Surgeon: Chele Ko MD;  Location: [facility] CATH LAB;  Service: Cardiology;  Laterality: Left;  malur pt       Family History   Problem Relation Age of Onset    Heart disease Father     Heart failure Father     Diabetes Neg Hx     Hypertension Neg Hx        Social History     Socioeconomic History    Marital status: Significant Other     Spouse name: Shadi    Number of children: 0    Highest education level: Some college, no degree   Occupational History    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  [place]   Tobacco Use    Smoking status: Never    Smokeless tobacco: Never   Substance and Sexual Activity    Alcohol use: No    Drug use: Not Currently    Sexual activity: Yes     Partners: Male     Social Determinants of Health     Financial Resource Strain: Low Risk  (1/24/2024)    Overall Financial Resource Strain (CARDIA)     Difficulty of Paying Living Expenses: Not hard at all   Food Insecurity: No Food Insecurity (1/24/2024)    Hunger Vital Sign     Worried About Running Out of Food in the Last Year:  Never true     Ran Out of Food in the Last Year: Never true   Transportation Needs: No Transportation Needs (1/24/2024)    PRAPARE - Transportation     Lack of Transportation (Medical): No     Lack of Transportation (Non-Medical): No   Physical Activity: Inactive (1/24/2024)    Exercise Vital Sign     Days of Exercise per Week: 0 days     Minutes of Exercise per Session: 0 min   Stress: Stress Concern Present (1/24/2024)    [employer] [place] [employer] - Occupational Stress Questionnaire     Feeling of Stress : To some extent   Social Connections: Unknown (1/24/2024)    Social Connection and Isolation Panel [NHANES]     Frequency of Communication with Friends and Family: Three times a week     Frequency of Social Gatherings with Friends and Family: Never     Active Member of Clubs or Organizations: No     Attends Club or Organization Meetings: Never     Marital Status: Living with partner   Housing Stability: Low Risk  (1/24/2024)    Housing Stability Vital Sign     Unable to Pay for Housing in the Last Year: No     Number of Places Lived in the Last Year: 1     Unstable Housing in the Last Year: No       MEDICATIONS & ALLERGIES:     Current Outpatient Medications on File Prior to Visit   Medication Sig    acetaminophen-codeine 300-30mg (TYLENOL #3) 300-30 mg Tab Take 1 tablet by mouth every 6 (six) hours as needed.    amoxicillin (AMOXIL) 500 MG capsule Take 500 mg by mouth 3 (three) times daily.    ascorbic acid, vitamin C, (VITAMIN C) 1000 MG tablet Take 1,000 mg by mouth once daily.    esslqqmyq-vnwuqdzr-jhjskxb ala (BIKTARVY) -25 mg (25 kg or greater) Take 1 tablet by mouth once daily.    diazePAM (VALIUM) 5 MG tablet Take 5 mg by mouth daily as needed.    FLUoxetine 40 MG capsule TAKE 1 CAPSULE EVERY DAY    ibuprofen (ADVIL,MOTRIN) 800 MG tablet Take 800 mg by mouth 3 (three) times daily.    metoprolol succinate (TOPROL-XL) 100 MG 24 hr tablet Take 1 tablet (100 mg total) by mouth once daily.  "   rosuvastatin (CRESTOR) 20 MG tablet TAKE 1 TABLET EVERY DAY    ubidecarenone (COENZYME Q10 ORAL) Take by mouth once daily.    vit C,Y-Lq-tfvvo-lutein-zeaxan (EYE MULTIVIT, LUTEIN-ZEAXAN,) 949-66-73-2-5 mg Cap Take 1 tablet by mouth once daily.    kqdlexlee-nnyjbfop-eviowwi ala (BIKTARVY) -25 mg (25 kg or greater) Take 1 tablet by mouth once daily.    chlorhexidine (PERIDEX) 0.12 % solution SMARTSIG:By Mouth    oxyCODONE-acetaminophen (PERCOCET) 5-325 mg per tablet Take by mouth.    promethazine (PHENERGAN) 25 MG tablet Take by mouth.     No current facility-administered medications on file prior to visit.       Review of patient's allergies indicates:   Allergen Reactions    No known drug allergies        OBJECTIVE:   Vital Signs:  Vitals:    03/11/24 1058   BP: 130/80   Pulse: 76   Weight: 101.7 kg (224 lb 3.3 oz)   Height: 5' 10" (1.778 m)       No results found for this or any previous visit (from the past 24 hour(s)).      Physical Exam:   General:  Well developed, well nourished, no acute distress  HEENT:  Normocephalic, atraumatic  CVS:  RRR, S1 and S2 normal, no murmurs, rubs, gallops  Resp:  Lungs clear to auscultation, no wheezes, rales, rhonchi  GI:  Abdomen soft, non-tender, non-distended, normoactive bowel sounds, no masses  MSK:  No muscle atrophy, peripheral edema, full range of motion  Skin:  No rashes, ulcers, erythema  Psych:  Alert and oriented to person, place, and time    ASSESSMENT:     HIV disease  --Labs reviewed: no longer immune to hep A but is protected against hep B, CD4 461, hep C ab reactive but had negative viral load back in 2019 so likely SVR; RPR nonreactive  --Repeat viral load on 3/4 with blip of 27; recheck in 1 month   --Genosure archive showed less common mutations for tenofovir and Zidovudine; Biktarvy appears to be overcoming resistance but will follow viral load trend   --Continue daily Biktarvy   --No OI ppx indicated at this time based on CD4   --Recommended " vaccines: Hep A (0, 6 months); consider RSV  --Follow up with ID in 3 months     Essential hypertension  Continue current medications. Follow up with PCP.      Mixed hyperlipidemia  Continue Crestor. Follow up with PCP.       PLAN:     Diagnoses and all orders for this visit:    HIV disease  -     CD4 T-Hale Cells; Future  -     Comprehensive Metabolic Panel; Future  -     HIV RNA, Quantitative, PCR; Future    Other orders  -     Hepatitis A vaccine adult IM          The total time for evaluation and management services performed on 3/11/24 was greater than 25 minutes.        Vaughn Larios, DO   Infectious Diseases

## 2024-03-13 DIAGNOSIS — E78.2 MIXED HYPERLIPIDEMIA: ICD-10-CM

## 2024-03-13 RX ORDER — ROSUVASTATIN CALCIUM 20 MG/1
TABLET, COATED ORAL
Qty: 90 TABLET | Refills: 0 | Status: SHIPPED | OUTPATIENT
Start: 2024-03-13 | End: 2024-05-26

## 2024-03-13 NOTE — TELEPHONE ENCOUNTER
Refill Routing Note   Medication(s) are not appropriate for processing by [facility] for the following reason(s):        No active prescription written by provider    [facility] action(s):  Defer        Medication Therapy Plan: Last ordered: 1 year ago (3/12/2023) by Hesham Andrade II, MD      Appointments  past 12m or future 3m with PCP    Date Provider   Last Visit   11/16/2023 Max Herbert MD   Next Visit   5/16/2024 Max Herbert MD   ED visits in past 90 days: 0        Note composed:11:06 AM 03/13/2024

## 2024-03-13 NOTE — TELEPHONE ENCOUNTER
No care due was identified.  [facility] Embedded Care Due Messages. Reference number: [id number].   3/13/2024 1:34:27 AM CDT

## 2024-03-21 DIAGNOSIS — B20 HIV DISEASE: Chronic | ICD-10-CM

## 2024-03-21 RX ORDER — BICTEGRAVIR SODIUM, EMTRICITABINE, AND TENOFOVIR ALAFENAMIDE FUMARATE 50; 200; 25 MG/1; MG/1; MG/1
1 TABLET ORAL DAILY
Qty: 90 TABLET | Refills: 6 | Status: ACTIVE | OUTPATIENT
Start: 2024-03-21 | End: 2024-07-04

## 2024-04-15 ENCOUNTER — LAB VISIT (OUTPATIENT)
Dept: LAB | Facility: HOSPITAL | Age: 79
End: 2024-04-15
Attending: STUDENT IN AN ORGANIZED HEALTH CARE EDUCATION/TRAINING PROGRAM
Payer: MEDICARE

## 2024-04-15 DIAGNOSIS — B20 HIV DISEASE: ICD-10-CM

## 2024-04-15 PROCEDURE — 80053 COMPREHEN METABOLIC PANEL: CPT | Performed by: STUDENT IN AN ORGANIZED HEALTH CARE EDUCATION/TRAINING PROGRAM

## 2024-04-15 PROCEDURE — 87536 HIV-1 QUANT&REVRSE TRNSCRPJ: CPT | Performed by: STUDENT IN AN ORGANIZED HEALTH CARE EDUCATION/TRAINING PROGRAM

## 2024-04-15 PROCEDURE — 36415 COLL VENOUS BLD VENIPUNCTURE: CPT | Performed by: STUDENT IN AN ORGANIZED HEALTH CARE EDUCATION/TRAINING PROGRAM

## 2024-04-15 PROCEDURE — 86361 T CELL ABSOLUTE COUNT: CPT | Performed by: STUDENT IN AN ORGANIZED HEALTH CARE EDUCATION/TRAINING PROGRAM

## 2024-04-16 LAB
ALBUMIN SERPL BCP-MCNC: 3.6 G/DL (ref 3.5–5.2)
ALP SERPL-CCNC: 36 U/L (ref 55–135)
ALT SERPL W/O P-5'-P-CCNC: 24 U/L (ref 10–44)
ANION GAP SERPL CALC-SCNC: 11 MMOL/L (ref 8–16)
AST SERPL-CCNC: 28 U/L (ref 10–40)
BILIRUB SERPL-MCNC: 0.6 MG/DL (ref 0.1–1)
BUN SERPL-MCNC: 16 MG/DL (ref 8–23)
CALCIUM SERPL-MCNC: 9.4 MG/DL (ref 8.7–10.5)
CD3+CD4+ CELLS # BLD: 624 CELLS/UL (ref 300–1400)
CD3+CD4+ CELLS NFR BLD: 16.8 % (ref 28–57)
CHLORIDE SERPL-SCNC: 104 MMOL/L (ref 95–110)
CO2 SERPL-SCNC: 23 MMOL/L (ref 23–29)
CREAT SERPL-MCNC: 1 MG/DL (ref 0.5–1.4)
EST. GFR  (NO RACE VARIABLE): >60 ML/MIN/1.73 M^2
GLUCOSE SERPL-MCNC: 98 MG/DL (ref 70–110)
HIV1 RNA # SERPL NAA+PROBE: <20 COPIES/ML
HIV1 RNA SERPL NAA+PROBE-LOG#: <1.3 LOG COPIES/ML
HIV1 RNA SERPL QL NAA+PROBE: DETECTED
POTASSIUM SERPL-SCNC: 4.2 MMOL/L (ref 3.5–5.1)
PROT SERPL-MCNC: 8 G/DL (ref 6–8.4)
SODIUM SERPL-SCNC: 138 MMOL/L (ref 136–145)

## 2024-04-22 ENCOUNTER — TELEPHONE (OUTPATIENT)
Dept: PULMONOLOGY | Facility: CLINIC | Age: 79
End: 2024-04-22
Payer: COMMERCIAL

## 2024-04-29 ENCOUNTER — PATIENT MESSAGE (OUTPATIENT)
Dept: INFECTIOUS DISEASES | Facility: CLINIC | Age: 79
End: 2024-04-29
Payer: COMMERCIAL

## 2024-04-30 ENCOUNTER — PATIENT MESSAGE (OUTPATIENT)
Dept: ADMINISTRATIVE | Facility: OTHER | Age: 79
End: 2024-04-30
Payer: COMMERCIAL

## 2024-05-04 ENCOUNTER — PATIENT MESSAGE (OUTPATIENT)
Dept: PULMONOLOGY | Facility: CLINIC | Age: 79
End: 2024-05-04
Payer: COMMERCIAL

## 2024-05-08 ENCOUNTER — CLINICAL SUPPORT (OUTPATIENT)
Dept: PULMONOLOGY | Facility: CLINIC | Age: 79
End: 2024-05-08
Payer: MEDICARE

## 2024-05-08 ENCOUNTER — OFFICE VISIT (OUTPATIENT)
Dept: PULMONOLOGY | Facility: CLINIC | Age: 79
End: 2024-05-08
Payer: MEDICARE

## 2024-05-08 VITALS
OXYGEN SATURATION: 94 % | RESPIRATION RATE: 20 BRPM | BODY MASS INDEX: 31.59 KG/M2 | WEIGHT: 220.69 LBS | HEART RATE: 72 BPM | BODY MASS INDEX: 31.5 KG/M2 | DIASTOLIC BLOOD PRESSURE: 67 MMHG | SYSTOLIC BLOOD PRESSURE: 154 MMHG | HEIGHT: 70 IN | HEIGHT: 70 IN | WEIGHT: 220 LBS

## 2024-05-08 DIAGNOSIS — R55 NEAR SYNCOPE: ICD-10-CM

## 2024-05-08 DIAGNOSIS — R06.09 DOE (DYSPNEA ON EXERTION): ICD-10-CM

## 2024-05-08 DIAGNOSIS — G47.33 OSA ON CPAP: Primary | ICD-10-CM

## 2024-05-08 DIAGNOSIS — I10 ESSENTIAL HYPERTENSION: ICD-10-CM

## 2024-05-08 DIAGNOSIS — F41.9 ANXIETY: ICD-10-CM

## 2024-05-08 DIAGNOSIS — R09.02 EXERCISE HYPOXEMIA: ICD-10-CM

## 2024-05-08 DIAGNOSIS — J41.0 SIMPLE CHRONIC BRONCHITIS: ICD-10-CM

## 2024-05-08 DIAGNOSIS — G47.33 OBSTRUCTIVE SLEEP APNEA: ICD-10-CM

## 2024-05-08 DIAGNOSIS — R06.02 SOB (SHORTNESS OF BREATH): ICD-10-CM

## 2024-05-08 DIAGNOSIS — R06.09 DYSPNEA ON EXERTION: ICD-10-CM

## 2024-05-08 PROCEDURE — 99214 OFFICE O/P EST MOD 30 MIN: CPT | Mod: 25,S$PBB,, | Performed by: HOSPITALIST

## 2024-05-08 PROCEDURE — 99999 PR PBB SHADOW E&M-EST. PATIENT-LVL V: CPT | Mod: PBBFAC,,, | Performed by: HOSPITALIST

## 2024-05-08 PROCEDURE — 94618 PULMONARY STRESS TESTING: CPT | Mod: 26,S$PBB,, | Performed by: INTERNAL MEDICINE

## 2024-05-08 PROCEDURE — 99212 OFFICE O/P EST SF 10 MIN: CPT | Mod: PBBFAC,27,25

## 2024-05-08 PROCEDURE — 99215 OFFICE O/P EST HI 40 MIN: CPT | Mod: PBBFAC,27,25 | Performed by: HOSPITALIST

## 2024-05-08 PROCEDURE — 99211 OFF/OP EST MAY X REQ PHY/QHP: CPT | Mod: PBBFAC

## 2024-05-08 PROCEDURE — 99999 PR PBB SHADOW E&M-EST. PATIENT-LVL I: CPT | Mod: PBBFAC,,,

## 2024-05-08 PROCEDURE — 99999 PR PBB SHADOW E&M-EST. PATIENT-LVL II: CPT | Mod: PBBFAC,,,

## 2024-05-08 PROCEDURE — 94618 PULMONARY STRESS TESTING: CPT | Mod: PBBFAC

## 2024-05-08 PROCEDURE — 94060 EVALUATION OF WHEEZING: CPT | Mod: PBBFAC

## 2024-05-08 NOTE — ASSESSMENT & PLAN NOTE
- was not able to tolerate CPAP  - information provided for local [facility] surgeons for patient to discuss candidacy

## 2024-05-08 NOTE — PROCEDURES
[facility]     SUMMARY     Ordering Provider: Dr. Chavarria   Interpreting Provider:   Performing nurse/tech/RT: CHER Castro RRT  Diagnosis:  (Simple Chronic Bronchitis, Exercise Hypoxemia)  Height: 5' 10" (177.8 cm)  Weight: 100.1 kg (220 lb 10.9 oz)  BMI (Calculated): 31.7   Patient Race:             Phase Oxygen Assessment Supplemental O2 Heart   Rate Blood Pressure Johnnie Dyspnea Scale Rating   Resting 95 % Room Air 74 bpm 154/67 0   Exercise        Minute        1 95 % Room Air 94 bpm     2 94 % Room Air 88 bpm     3 95 % Room Air 78 bpm     4 95 % Room Air 78 bpm     5 92 % Room Air 80 bpm     6  94 % Room Air 80 bpm (!) 177/101 5-6   Recovery        Minute        1 93 % Room Air 78 bpm     2 97 % Room Air 75 bpm     3 96 % Room Air 75 bpm     4 95 % Room Air 72 bpm 123/62 4     Six Minute Walk Summary  6MWT Status: completed with stops  Patient Reported: Dyspnea, Lightheadedness, Dizziness     Interpretation:  Did the patient stop or pause?: Yes  How many times did the patient stop or pause?: 2  Stop Time 1: 50  Restart Time 1: 70  Pause Time 1: 20 seconds  Stop Time 2: 150  Restart Time 2: 180  Pause Time 2: 30 seconds                    Total Time Walked (Calculated): 310 seconds  Final Partial Lap Distance (feet): 100 feet  Total Distance Meters (Calculated): 152.4 meters  Predicted Distance Meters (Calculated): 474.23 meters  Percentage of Predicted (Calculated): 32.14  Peak VO2 (Calculated): 8.55  Mets: 2.44  Has The Patient Had a Previous Six Minute Walk Test?: Yes       Previous 6MWT Results  Has The Patient Had a Previous Six Minute Walk Test?: Yes  Date of Previous Test: 08/21/23  Total Time Walked: 360 seconds  Total Distance (meters): 304.8  Predicted Distance (meters): 476.69 meters  Percentage of Predicted: 63.94  Percent Change (Calculated): 0.5    "

## 2024-05-08 NOTE — PATIENT INSTRUCTIONS
The following providers perform the [facility] surgery in the [facility]. They may require you to have an updated sleep test. Please let us know if we can be of further assistance.     Dr. Robert Patel  [phone] [address], [address]  [place] [address]     Dr. Michele Villalobos  [phone] [address] [address]

## 2024-05-08 NOTE — PROGRESS NOTES
Subjective:      Patient ID: Haritha Valle is a 78 y.o. male.    Chief Complaint:  Dyspnea, RAMONITA    HPI 5/8/24:    78 year old male with history of HTN, HIV (followed by Dr. Larios), RAMONITA (moderate, CPAP intolerant) who presents to Pulmonary clinic for evaluation of dyspnea. He was last seen in clinic 11/2023 by Dr. Chavarria- he was continued on Trelegy 200mcg, oxygen ordered, pt referred to ENT for snoring/RAMONITA.     Short of breath, dizzy, light-headed with minimal exertion over the past 3 years. Worse over the past 2-3 months. Last Friday- had an episode overnight where he thought he may be having a heart attack- couldn't breathe, short of breath. Stayed up much of the night until it improved and then took a sleeping pill.     Has tried albuterol, nothing helped. Worsening symptoms with albuterol breathing treatment with corine. Wheezing expirations.      Pertinent Work Up:  - 6MW- total time 310sec, resting sat 95%, range 92-97% on RA, 152m, down from 304m without stops 8/2023  - PFT 5/8/24- Mild restriction with FVC 64.8%, no significant BD response, possible small airway disease with .94L increase in CRO88-99, stable/slight decreased compared to 11/2023  - PFT 11/2023- Mild restriction with TLC 76%, no significant BD response, mild decrease DLCO  - ECHO 7/2023- Normal EF, grade 1 diastolic dysfunction, mild TR, ePASP 31mmHg  - CXR 7/2023- Clear lungs  - CT Chest 2020- Negative for interstitial disease, multiple <5mm nodules    Pulmonary Interventions:  - Supplemental O2- Never received     Review of Systems   Constitutional:  Negative for fever.   Respiratory:  Positive for dyspnea on extertion. Negative for cough and sputum production.      Objective:     Physical Exam   Constitutional: He is oriented to person, place, and time. He appears well-developed and well-nourished. He is not obese.   Cardiovascular: Normal rate and regular rhythm.   Pulmonary/Chest:   Normal respirations at rest on room air, no wheezing on  "exam, wheezing expiration on large deep breath   Musculoskeletal:         General: No edema.   Neurological: He is alert and oriented to person, place, and time.   Skin: Skin is warm and dry.     Personal Diagnostic Review  As Above      3/11/2024    10:58 AM 1/25/2024     1:48 PM 1/24/2024    12:28 PM 12/19/2023    10:28 AM 12/4/2023    10:54 AM 11/16/2023     1:35 PM 11/9/2023    12:59 PM   Pulmonary Function Tests   SpO2    98 % 97 % 97 % 96 %   Height 5' 10" (1.778 m) 5' 10" (1.778 m) 5' 10" (1.778 m) 5' 10" (1.778 m) 5' 10" (1.778 m)  5' 10" (1.778 m)   Weight 101.7 kg (224 lb 3.3 oz) 98.9 kg (218 lb) 99.1 kg (218 lb 7.6 oz) 99.5 kg (219 lb 5.7 oz) 97.9 kg (215 lb 13.3 oz) 97.9 kg (215 lb 13.3 oz) 97.2 kg (214 lb 4.6 oz)   BMI (Calculated) 32.2 31.3 31.3 31.5 31  30.7        Assessment:     No diagnosis found.     Outpatient Encounter Medications as of 5/8/2024   Medication Sig Dispense Refill    acetaminophen-codeine 300-30mg (TYLENOL #3) 300-30 mg Tab Take 1 tablet by mouth every 6 (six) hours as needed.      amoxicillin (AMOXIL) 500 MG capsule Take 500 mg by mouth 3 (three) times daily.      ascorbic acid, vitamin C, (VITAMIN C) 1000 MG tablet Take 1,000 mg by mouth once daily.      lbuqdirnp-xphbybhj-mkvmhwz ala (BIKTARVY) -25 mg (25 kg or greater) Take 1 tablet by mouth once daily. 30 tablet 0    nldxhitah-tayqaogh-ehnpech ala (BIKTARVY) -25 mg (25 kg or greater) Take 1 tablet by mouth once daily. 90 tablet 6    chlorhexidine (PERIDEX) 0.12 % solution SMARTSIG:By Mouth      diazePAM (VALIUM) 5 MG tablet Take 5 mg by mouth daily as needed.      FLUoxetine 40 MG capsule TAKE 1 CAPSULE EVERY DAY 90 capsule 3    ibuprofen (ADVIL,MOTRIN) 800 MG tablet Take 800 mg by mouth 3 (three) times daily.      metoprolol succinate (TOPROL-XL) 100 MG 24 hr tablet Take 1 tablet (100 mg total) by mouth once daily. 90 tablet 5    oxyCODONE-acetaminophen (PERCOCET) 5-325 mg per tablet Take by mouth.      " promethazine (PHENERGAN) 25 MG tablet Take by mouth.      rosuvastatin (CRESTOR) 20 MG tablet TAKE 1 TABLET EVERY DAY 90 tablet 0    ubidecarenone (COENZYME Q10 ORAL) Take by mouth once daily.      vit C,L-Gv-nhqvu-lutein-zeaxan (EYE MULTIVIT, LUTEIN-ZEAXAN,) 645-26-54-2-5 mg Cap Take 1 tablet by mouth once daily.       No facility-administered encounter medications on file as of 5/8/2024.     No orders of the defined types were placed in this encounter.        Plan:     Problem List Items Addressed This Visit          Psychiatric    Anxiety     - encouraged patient to reach out to PCP and/or therapy            Cardiac/Vascular    Essential hypertension     - continue regular Cardiology follow up         MAIER (dyspnea on exertion)     - chronic over the past 3-4 years with worsening over the past 2-3 months  - no significant change in corine compared to prior, no hypoxemia on walk though did have significant decrease in distance covered  - dyspnea is severely limiting- having to sit and take breaks walking from one room to another  - further eval with updated ct chest, carotid US and diaphragm fluoro  - advised patient to present to ED if he has another episode similar to last Friday               Other    Obstructive sleep apnea     - was not able to tolerate CPAP  - information provided for local [facility] surgeons for patient to discuss candidacy           Other Visit Diagnoses       RAMONITA on CPAP    -  Primary    Relevant Orders    Ambulatory referral/consult to ENT    Near syncope        Relevant Orders    CV Ultrasound Bilateral Doppler Carotid    Dyspnea on exertion        Relevant Orders    FL Fluoro of Diaphragm    CT Chest Without Contrast          Plan discussed with pt and his spouse, testing at pt convenience, I will follow. RTC as scheduled or sooner as needed.

## 2024-05-08 NOTE — ASSESSMENT & PLAN NOTE
- chronic over the past 3-4 years with worsening over the past 2-3 months  - no significant change in corine compared to prior, no hypoxemia on walk though did have significant decrease in distance covered  - dyspnea is severely limiting- having to sit and take breaks walking from one room to another  - further eval with updated ct chest, carotid US and diaphragm fluoro  - advised patient to present to ED if he has another episode similar to last Friday

## 2024-05-16 ENCOUNTER — OFFICE VISIT (OUTPATIENT)
Dept: FAMILY MEDICINE | Facility: CLINIC | Age: 79
End: 2024-05-16
Payer: MEDICARE

## 2024-05-16 VITALS
WEIGHT: 220.44 LBS | HEART RATE: 69 BPM | OXYGEN SATURATION: 97 % | DIASTOLIC BLOOD PRESSURE: 78 MMHG | RESPIRATION RATE: 18 BRPM | HEIGHT: 70 IN | BODY MASS INDEX: 31.56 KG/M2 | SYSTOLIC BLOOD PRESSURE: 138 MMHG | TEMPERATURE: 98 F

## 2024-05-16 DIAGNOSIS — G47.33 OBSTRUCTIVE SLEEP APNEA: ICD-10-CM

## 2024-05-16 DIAGNOSIS — F03.90 DEMENTIA, UNSPECIFIED DEMENTIA SEVERITY, UNSPECIFIED DEMENTIA TYPE, UNSPECIFIED WHETHER BEHAVIORAL, PSYCHOTIC, OR MOOD DISTURBANCE OR ANXIETY: ICD-10-CM

## 2024-05-16 DIAGNOSIS — E78.5 DYSLIPIDEMIA: Primary | ICD-10-CM

## 2024-05-16 DIAGNOSIS — B20 HIV DISEASE: Chronic | ICD-10-CM

## 2024-05-16 DIAGNOSIS — R42 DIZZINESS: ICD-10-CM

## 2024-05-16 DIAGNOSIS — I10 ESSENTIAL HYPERTENSION: ICD-10-CM

## 2024-05-16 DIAGNOSIS — R06.02 SOB (SHORTNESS OF BREATH): ICD-10-CM

## 2024-05-16 PROCEDURE — 99215 OFFICE O/P EST HI 40 MIN: CPT | Mod: PBBFAC,PO | Performed by: INTERNAL MEDICINE

## 2024-05-16 PROCEDURE — 99214 OFFICE O/P EST MOD 30 MIN: CPT | Mod: S$PBB,,, | Performed by: INTERNAL MEDICINE

## 2024-05-16 PROCEDURE — 99999 PR PBB SHADOW E&M-EST. PATIENT-LVL V: CPT | Mod: PBBFAC,,, | Performed by: INTERNAL MEDICINE

## 2024-05-16 RX ORDER — DONEPEZIL HYDROCHLORIDE 5 MG/1
5 TABLET, FILM COATED ORAL NIGHTLY
Qty: 90 TABLET | Refills: 3 | Status: SHIPPED | OUTPATIENT
Start: 2024-05-16 | End: 2025-05-16

## 2024-05-16 NOTE — PROGRESS NOTES
Subjective:       Patient ID: Haritha Valle is a 79 y.o. male.    Chief Complaint: Follow-up, Dizziness, Shortness of Breath, Hypertension, and Hyperlipidemia    Follow-up  Associated symptoms include arthralgias, fatigue and myalgias. Pertinent negatives include no abdominal pain, chest pain, chills, coughing, diaphoresis, fever, headaches, joint swelling, nausea, neck pain, numbness, rash, sore throat, vomiting or weakness.   Dizziness: no hearing loss, no ear pain, no fever, no headaches, no tinnitus, no nausea, no vomiting, no diaphoresis, no weakness, no light-headedness, no palpitations and no chest pain.  Shortness of Breath  Pertinent negatives include no abdominal pain, chest pain, ear pain, fever, headaches, leg swelling, neck pain, rash, rhinorrhea, sore throat, vomiting or wheezing.   Hypertension  Associated symptoms include shortness of breath. Pertinent negatives include no chest pain, headaches, neck pain or palpitations.   Hyperlipidemia  Associated symptoms include myalgias and shortness of breath. Pertinent negatives include no chest pain.     Past Medical History:   Diagnosis Date    Anxiety 07/03/2019    Basal cell carcinoma     CAD in native artery 11/27/2023    Depression     Dyslipidemia 11/16/2023    Essential hypertension 01/24/2013    Hepatitis B     Hepatitis C antibody test positive     RNA NEG 8/29/2019    HIV infection     Hypertension     Immune disorder     Mild cognitive impairment 10/01/2010    Nonrheumatic aortic valve insufficiency 12/19/2023    Nonrheumatic mitral valve regurgitation 12/19/2023     Past Surgical History:   Procedure Laterality Date    APPENDECTOMY      CARDIAC CATHETERIZATION      no stent    CHOLECYSTECTOMY      COLONOSCOPY N/A 11/3/2016    Procedure: COLONOSCOPY;  Surgeon: Maxi Villasenor MD;  Location: [address];  Service: Endoscopy;  Laterality: N/A;  last colonoscopy with Dr Jarrell    COLONOSCOPY N/A 1/25/2022    Procedure: COLONOSCOPY;   Surgeon: Silvino Rosales MD;  Location: [facility] ENDO;  Service: Endoscopy;  Laterality: N/A;    LEFT HEART CATHETERIZATION Left 2/3/2020    Procedure: CATHETERIZATION, HEART, LEFT;  Surgeon: Chele Ko MD;  Location: 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  [place] [employer]   Tobacco Use    Smoking status: Never    Smokeless tobacco: Never   Substance and Sexual Activity    Alcohol use: No    Drug use: Not Currently    Sexual activity: Yes     Partners: Male     Social Determinants of Health     Financial Resource Strain: Low Risk  (1/24/2024)    Overall Financial Resource Strain (CARDIA)     Difficulty of Paying Living Expenses: Not hard at all   Food Insecurity: No Food Insecurity (1/24/2024)    Hunger Vital Sign     Worried About Running Out of Food in the Last Year: Never true     Ran Out of Food in the Last Year: Never true   Transportation Needs: No Transportation Needs (1/24/2024)    PRAPARE - Transportation     Lack of Transportation (Medical): No     Lack of Transportation (Non-Medical): No   Physical Activity: Inactive (1/24/2024)    Exercise Vital Sign     Days of Exercise per Week: 0 days     Minutes of Exercise per Session: 0 min   Stress: Stress Concern Present (1/24/2024)    [employer] [place] [employer] - Occupational Stress Questionnaire     Feeling of Stress : To some extent   Housing Stability: Low Risk  (1/24/2024)    Housing Stability Vital Sign     Unable to Pay for Housing in the Last Year: No     Number of Places Lived in the Last Year:  1     Unstable Housing in the Last Year: No     Review of Systems   Constitutional:  Positive for fatigue. Negative for activity change, appetite change, chills, diaphoresis, fever and unexpected weight change.   HENT:  Negative for drooling, ear discharge, ear pain, facial swelling, hearing loss, mouth sores, nosebleeds, postnasal drip, rhinorrhea, sinus pressure, sneezing, sore throat, tinnitus, trouble swallowing and voice change.    Eyes:  Negative for photophobia, redness and visual disturbance.   Respiratory:  Positive for shortness of breath. Negative for apnea, cough, choking, chest tightness and wheezing.    Cardiovascular:  Negative for chest pain, palpitations and leg swelling.   Gastrointestinal:  Negative for abdominal distention, abdominal pain, anal bleeding, blood in stool, constipation, diarrhea, nausea and vomiting.   Endocrine: Negative for cold intolerance, heat intolerance, polydipsia, polyphagia and polyuria.   Genitourinary:  Negative for difficulty urinating, dysuria, enuresis, flank pain, frequency, genital sores, hematuria and urgency.   Musculoskeletal:  Positive for arthralgias and myalgias. Negative for back pain, gait problem, joint swelling, neck pain and neck stiffness.   Skin:  Negative for color change, pallor, rash and wound.   Allergic/Immunologic: Negative for food allergies and immunocompromised state.   Neurological:  Positive for dizziness. Negative for tremors, seizures, syncope, facial asymmetry, speech difficulty, weakness, light-headedness, numbness and headaches.        Memory deficit worsening-   Hematological:  Negative for adenopathy. Does not bruise/bleed easily.   Psychiatric/Behavioral:  Negative for agitation, behavioral problems, confusion, decreased concentration, dysphoric mood, hallucinations, self-injury, sleep disturbance and suicidal ideas. The patient is not nervous/anxious and is not hyperactive.        Objective:      Physical Exam  Vitals and nursing  note reviewed.   Constitutional:       General: He is not in acute distress.     Appearance: Normal appearance. He is well-developed. He is not diaphoretic.   HENT:      Head: Normocephalic and atraumatic.      Mouth/Throat:      Pharynx: No oropharyngeal exudate.   Eyes:      General: No scleral icterus.     Pupils: Pupils are equal, round, and reactive to light.   Neck:      Thyroid: No thyromegaly.      Vascular: No carotid bruit or JVD.      Trachea: No tracheal deviation.   Cardiovascular:      Rate and Rhythm: Normal rate and regular rhythm.      Heart sounds: Normal heart sounds.   Pulmonary:      Effort: Pulmonary effort is normal. No respiratory distress.      Breath sounds: Normal breath sounds. No wheezing or rales.   Chest:      Chest wall: No tenderness.   Abdominal:      General: Bowel sounds are normal. There is no distension.      Palpations: Abdomen is soft.      Tenderness: There is no abdominal tenderness. There is no guarding or rebound.   Musculoskeletal:         General: No tenderness. Normal range of motion.      Cervical back: Normal range of motion and neck supple.   Lymphadenopathy:      Cervical: No cervical adenopathy.   Skin:     General: Skin is warm and dry.      Coloration: Skin is not pale.      Findings: No erythema or rash.   Neurological:      Mental Status: He is alert and oriented to person, place, and time.         CMP  Sodium   Date Value Ref Range Status   04/15/2024 138 136 - 145 mmol/L Final     Potassium   Date Value Ref Range Status   04/15/2024 4.2 3.5 - 5.1 mmol/L Final     Chloride   Date Value Ref Range Status   04/15/2024 104 95 - 110 mmol/L Final     CO2   Date Value Ref Range Status   04/15/2024 23 23 - 29 mmol/L Final     Glucose   Date Value Ref Range Status   04/15/2024 98 70 - 110 mg/dL Final     BUN   Date Value Ref Range Status   04/15/2024 16 8 - 23 mg/dL Final     Creatinine   Date Value Ref Range Status   04/15/2024 1.0 0.5 - 1.4 mg/dL Final     Calcium    Date Value Ref Range Status   04/15/2024 9.4 8.7 - 10.5 mg/dL Final     Total Protein   Date Value Ref Range Status   04/15/2024 8.0 6.0 - 8.4 g/dL Final     Albumin   Date Value Ref Range Status   04/15/2024 3.6 3.5 - 5.2 g/dL Final   04/30/2018 4.3 3.6 - 5.1 g/dL Final     Comment:     @ Test Performed By:  [facility]  Latrell Campos M.D., Ph.D.,   [address] [place] [address]  [facility]  [id number]       Total Bilirubin   Date Value Ref Range Status   04/15/2024 0.6 0.1 - 1.0 mg/dL Final     Comment:     For infants and newborns, interpretation of results should be based  on gestational age, weight and in agreement with clinical  observations.    Premature Infant recommended reference ranges:  Up to 24 hours.............<8.0 mg/dL  Up to 48 hours............<12.0 mg/dL  3-5 days..................<15.0 mg/dL  6-29 days.................<15.0 mg/dL       Alkaline Phosphatase   Date Value Ref Range Status   04/15/2024 36 (L) 55 - 135 U/L Final     AST   Date Value Ref Range Status   04/15/2024 28 10 - 40 U/L Final     ALT   Date Value Ref Range Status   04/15/2024 24 10 - 44 U/L Final     Anion Gap   Date Value Ref Range Status   04/15/2024 11 8 - 16 mmol/L Final     eGFR if    Date Value Ref Range Status   10/14/2021 >60.0 >60 mL/min/1.73 m^2 Final     eGFR if non    Date Value Ref Range Status   10/14/2021 >60.0 >60 mL/min/1.73 m^2 Final     Comment:     Calculation used to obtain the estimated glomerular filtration  rate (eGFR) is the CKD-EPI equation.        Lab Results   Component Value Date    WBC 7.97 12/18/2023    HGB 14.7 12/18/2023    HCT 45.7 12/18/2023    MCV 93 12/18/2023     12/18/2023     Lab Results   Component Value Date    CHOL 172 12/18/2023     Lab Results   Component Value Date    HDL 36 (L) 12/18/2023     Lab Results   Component Value Date    LDLCALC 73.2 12/18/2023     Lab Results   Component Value  Date    TRIG 314 (H) 12/18/2023     Lab Results   Component Value Date    CHOLHDL 20.9 12/18/2023     Lab Results   Component Value Date    TSH 1.137 04/27/2023     Lab Results   Component Value Date    HGBA1C 5.7 (H) 01/24/2024     Assessment:       1. Dyslipidemia    2. Essential hypertension    3. HIV disease    4. Obstructive sleep apnea    5. SOB (shortness of breath)    6. Dementia, unspecified dementia severity, unspecified dementia type, unspecified whether behavioral, psychotic, or mood disturbance or anxiety    7. Dizziness        Plan:   Dyslipidemia    Essential hypertension    HIV disease----------sees I.D.-----------------    Obstructive sleep apnea--------------f/u pulm------------    SOB (shortness of breath)---------cards consult-----------seen by pulm----------------  -     Ambulatory referral/consult to Cardiology; Future; Expected date: 05/23/2024    Dementia, unspecified dementia severity, unspecified dementia type, unspecified whether behavioral, psychotic, or mood disturbance or anxiety---------------start aricept---neurology consult---------  -     donepeziL (ARICEPT) 5 MG tablet; Take 1 tablet (5 mg total) by mouth every evening.  Dispense: 90 tablet; Refill: 3  -     Ambulatory referral/consult to Neurology; Future; Expected date: 05/23/2024    Dizziness-----------chronic---seen by neurology-      Continue meds---------------as above--------f/u 3 months

## 2024-05-20 ENCOUNTER — OFFICE VISIT (OUTPATIENT)
Dept: CARDIOLOGY | Facility: CLINIC | Age: 79
End: 2024-05-20
Payer: MEDICARE

## 2024-05-20 ENCOUNTER — LAB VISIT (OUTPATIENT)
Dept: LAB | Facility: HOSPITAL | Age: 79
End: 2024-05-20
Payer: MEDICARE

## 2024-05-20 ENCOUNTER — OFFICE VISIT (OUTPATIENT)
Dept: OTOLARYNGOLOGY | Facility: CLINIC | Age: 79
End: 2024-05-20
Payer: MEDICARE

## 2024-05-20 VITALS
OXYGEN SATURATION: 95 % | BODY MASS INDEX: 31.85 KG/M2 | BODY MASS INDEX: 31.85 KG/M2 | WEIGHT: 222.44 LBS | WEIGHT: 222.44 LBS | SYSTOLIC BLOOD PRESSURE: 162 MMHG | DIASTOLIC BLOOD PRESSURE: 98 MMHG | HEART RATE: 78 BPM | HEIGHT: 70 IN | HEIGHT: 70 IN

## 2024-05-20 DIAGNOSIS — R06.09 DOE (DYSPNEA ON EXERTION): ICD-10-CM

## 2024-05-20 DIAGNOSIS — G47.33 OSA ON CPAP: ICD-10-CM

## 2024-05-20 DIAGNOSIS — I70.0 AORTIC ATHEROSCLEROSIS: ICD-10-CM

## 2024-05-20 DIAGNOSIS — E78.1 HYPERTRIGLYCERIDEMIA: ICD-10-CM

## 2024-05-20 DIAGNOSIS — R06.02 SOB (SHORTNESS OF BREATH): ICD-10-CM

## 2024-05-20 DIAGNOSIS — I34.0 NONRHEUMATIC MITRAL VALVE REGURGITATION: ICD-10-CM

## 2024-05-20 DIAGNOSIS — E78.2 MIXED HYPERLIPIDEMIA: ICD-10-CM

## 2024-05-20 DIAGNOSIS — R93.1 ABNORMAL NUCLEAR CARDIAC IMAGING TEST: ICD-10-CM

## 2024-05-20 DIAGNOSIS — I25.10 CAD IN NATIVE ARTERY: ICD-10-CM

## 2024-05-20 DIAGNOSIS — R06.09 DOE (DYSPNEA ON EXERTION): Primary | ICD-10-CM

## 2024-05-20 DIAGNOSIS — I10 ESSENTIAL HYPERTENSION: Primary | ICD-10-CM

## 2024-05-20 DIAGNOSIS — I10 ESSENTIAL HYPERTENSION: ICD-10-CM

## 2024-05-20 DIAGNOSIS — F41.9 ANXIETY: ICD-10-CM

## 2024-05-20 DIAGNOSIS — E78.5 DYSLIPIDEMIA: ICD-10-CM

## 2024-05-20 DIAGNOSIS — R94.2 ABNORMAL PFTS: ICD-10-CM

## 2024-05-20 DIAGNOSIS — I35.1 NONRHEUMATIC AORTIC VALVE INSUFFICIENCY: ICD-10-CM

## 2024-05-20 LAB
ANION GAP SERPL CALC-SCNC: 11 MMOL/L (ref 8–16)
BNP SERPL-MCNC: 265 PG/ML (ref 0–99)
BUN SERPL-MCNC: 15 MG/DL (ref 8–23)
CALCIUM SERPL-MCNC: 9.4 MG/DL (ref 8.7–10.5)
CHLORIDE SERPL-SCNC: 105 MMOL/L (ref 95–110)
CO2 SERPL-SCNC: 25 MMOL/L (ref 23–29)
CREAT SERPL-MCNC: 1.2 MG/DL (ref 0.5–1.4)
EST. GFR  (NO RACE VARIABLE): >60 ML/MIN/1.73 M^2
GLUCOSE SERPL-MCNC: 119 MG/DL (ref 70–110)
POTASSIUM SERPL-SCNC: 4.3 MMOL/L (ref 3.5–5.1)
SODIUM SERPL-SCNC: 141 MMOL/L (ref 136–145)

## 2024-05-20 PROCEDURE — 99214 OFFICE O/P EST MOD 30 MIN: CPT | Mod: PBBFAC

## 2024-05-20 PROCEDURE — 99214 OFFICE O/P EST MOD 30 MIN: CPT | Mod: PBBFAC,27,25 | Performed by: OTOLARYNGOLOGY

## 2024-05-20 PROCEDURE — 99214 OFFICE O/P EST MOD 30 MIN: CPT | Mod: 25,S$PBB,, | Performed by: OTOLARYNGOLOGY

## 2024-05-20 PROCEDURE — 83880 ASSAY OF NATRIURETIC PEPTIDE: CPT

## 2024-05-20 PROCEDURE — 99999 PR PBB SHADOW E&M-EST. PATIENT-LVL IV: CPT | Mod: PBBFAC,,,

## 2024-05-20 PROCEDURE — 31575 DIAGNOSTIC LARYNGOSCOPY: CPT | Mod: PBBFAC | Performed by: OTOLARYNGOLOGY

## 2024-05-20 PROCEDURE — 36415 COLL VENOUS BLD VENIPUNCTURE: CPT

## 2024-05-20 PROCEDURE — 31575 DIAGNOSTIC LARYNGOSCOPY: CPT | Mod: S$PBB,,, | Performed by: OTOLARYNGOLOGY

## 2024-05-20 PROCEDURE — 80048 BASIC METABOLIC PNL TOTAL CA: CPT

## 2024-05-20 PROCEDURE — 99999 PR PBB SHADOW E&M-EST. PATIENT-LVL IV: CPT | Mod: PBBFAC,,, | Performed by: OTOLARYNGOLOGY

## 2024-05-20 PROCEDURE — 99214 OFFICE O/P EST MOD 30 MIN: CPT | Mod: S$PBB,,,

## 2024-05-20 RX ORDER — LOSARTAN POTASSIUM 25 MG/1
25 TABLET ORAL DAILY
Qty: 90 TABLET | Refills: 3 | Status: SHIPPED | OUTPATIENT
Start: 2024-05-20 | End: 2025-05-20

## 2024-05-20 NOTE — Clinical Note
Hello!  Nice patient.  NO evidence of vocal cord dysfunction or other obvious ENT reason for his shortness of breath.  Do you think it could be anxiety?  Sounds like most of his cardiopulmonary testing has been normal so far.  Let me know if I can help out.  Thanks  Rex

## 2024-05-20 NOTE — PROGRESS NOTES
Subjective:   Patient ID:  Haritha Valle is a 79 y.o. male who presents for evaluation of No chief complaint on file.      HPI 78y/o M with PMHx   HTN, CAD, hyperlipidemia, MR, AI,  HIV, RAMONITA, vertigo, RAMONITA, HIV last seen in cards clinic by Dr. Corcoran for Dental work clearance, previously followed by Dr. Nguyen/. Here today c/o SOB, MAIER weakness, light headedness and near syncope. Pt and his partner state symptoms started 1 year ago and recently got worse. Pt wakes up feeling short of breath, has to take frequent breaks with activity. Denies any CP or palpitations. BP uncontrolled in clinic, compliant with metoprolol    Echo Aug 2023: EF 50 - 55%, grade I DD, mild AI, mild MR, mild TR.   S/p LHC Feb 2020 w Dr. Ko: luminal irregularities RCA, otherwise normal, EF 50%.   Past Medical History:   Diagnosis Date    Anxiety 07/03/2019    Basal cell carcinoma     CAD in native artery 11/27/2023    Depression     Dyslipidemia 11/16/2023    Essential hypertension 01/24/2013    Hepatitis B     Hepatitis C antibody test positive     RNA NEG 8/29/2019    HIV infection     Hypertension     Immune disorder     Mild cognitive impairment 10/01/2010    Nonrheumatic aortic valve insufficiency 12/19/2023    Nonrheumatic mitral valve regurgitation 12/19/2023       Past Surgical History:   Procedure Laterality Date    APPENDECTOMY      CARDIAC CATHETERIZATION      no stent    CHOLECYSTECTOMY      COLONOSCOPY N/A 11/3/2016    Procedure: COLONOSCOPY;  Surgeon: Maxi Villasenor MD;  Location: [address]);  Service: Endoscopy;  Laterality: N/A;  last colonoscopy with Dr Jarrell    COLONOSCOPY N/A 1/25/2022    Procedure: COLONOSCOPY;  Surgeon: Silvino Rosales MD;  Location: [facility] ENDO;  Service: Endoscopy;  Laterality: N/A;    LEFT HEART CATHETERIZATION Left 2/3/2020    Procedure: CATHETERIZATION, HEART, LEFT;  Surgeon: Chele Ko MD;  Location: [facility] CATH LAB;  Service: Cardiology;  Laterality: Left;  saima pt        Social History     Tobacco Use    Smoking status: Never    Smokeless tobacco: Never   Substance Use Topics    Alcohol use: No    Drug use: Not Currently       Family History   Problem Relation Name Age of Onset    Heart disease Father      Heart failure Father      Diabetes Neg Hx      Hypertension Neg Hx         Current Outpatient Medications on File Prior to Visit   Medication Sig Dispense Refill    acetaminophen-codeine 300-30mg (TYLENOL #3) 300-30 mg Tab Take 1 tablet by mouth every 6 (six) hours as needed.      amoxicillin (AMOXIL) 500 MG capsule Take 500 mg by mouth 3 (three) times daily.      ascorbic acid, vitamin C, (VITAMIN C) 1000 MG tablet Take 1,000 mg by mouth once daily.      hhrkbvuix-dgosifqp-slnsysb ala (BIKTARVY) -25 mg (25 kg or greater) Take 1 tablet by mouth once daily. 30 tablet 0    zkqjjeocs-lqnafylh-guvfgup ala (BIKTARVY) -25 mg (25 kg or greater) Take 1 tablet by mouth once daily. 90 tablet 6    chlorhexidine (PERIDEX) 0.12 % solution SMARTSIG:By Mouth      diazePAM (VALIUM) 5 MG tablet Take 5 mg by mouth daily as needed.      donepeziL (ARICEPT) 5 MG tablet Take 1 tablet (5 mg total) by mouth every evening. 90 tablet 3    FLUoxetine 40 MG capsule TAKE 1 CAPSULE EVERY DAY 90 capsule 3    ibuprofen (ADVIL,MOTRIN) 800 MG tablet Take 800 mg by mouth 3 (three) times daily.      metoprolol succinate (TOPROL-XL) 100 MG 24 hr tablet Take 1 tablet (100 mg total) by mouth once daily. 90 tablet 5    oxyCODONE-acetaminophen (PERCOCET) 5-325 mg per tablet Take by mouth.      promethazine (PHENERGAN) 25 MG tablet Take by mouth.      rosuvastatin (CRESTOR) 20 MG tablet TAKE 1 TABLET EVERY DAY 90 tablet 0    ubidecarenone (COENZYME Q10 ORAL) Take by mouth once daily.      vit C,Q-Ra-ivbcw-lutein-zeaxan (EYE MULTIVIT, LUTEIN-ZEAXAN,) 166-46-05-2-5 mg Cap Take 1 tablet by mouth once daily.       No current facility-administered medications on file prior to visit.      Wt Readings from  Last 3 Encounters:   05/16/24 100 kg (220 lb 7.4 oz)   05/08/24 99.8 kg (220 lb)   05/08/24 100.1 kg (220 lb 10.9 oz)     Temp Readings from Last 3 Encounters:   05/16/24 97.8 °F (36.6 °C)   11/16/23 98.1 °F (36.7 °C)   01/25/22 98.7 °F (37.1 °C)     BP Readings from Last 3 Encounters:   05/16/24 138/78   05/08/24 (P) 123/62   03/11/24 130/80     Pulse Readings from Last 3 Encounters:   05/16/24 69   05/08/24 72   03/11/24 76        Review of Systems   Constitutional: Positive for malaise/fatigue.   HENT: Negative.     Eyes: Negative.    Cardiovascular:  Positive for dyspnea on exertion and orthopnea.   Respiratory:  Positive for shortness of breath and sleep disturbances due to breathing.    Skin: Negative.    Musculoskeletal: Negative.    Gastrointestinal: Negative.    Genitourinary: Negative.    Neurological:  Positive for dizziness and weakness.   Psychiatric/Behavioral: Negative.         Objective:   Physical Exam  Vitals and nursing note reviewed.   Constitutional:       Appearance: Normal appearance.   HENT:      Head: Normocephalic and atraumatic.   Eyes:      General:         Right eye: No discharge.         Left eye: No discharge.      Pupils: Pupils are equal, round, and reactive to light.   Cardiovascular:      Rate and Rhythm: Normal rate and regular rhythm.      Heart sounds: S1 normal and S2 normal. No murmur heard.     No friction rub.   Pulmonary:      Effort: Pulmonary effort is normal. No respiratory distress.      Breath sounds: Normal breath sounds. No rales.   Abdominal:      Palpations: Abdomen is soft.      Tenderness: There is no abdominal tenderness.   Musculoskeletal:      Cervical back: Neck supple.      Right lower leg: No edema.      Left lower leg: No edema.   Skin:     General: Skin is warm and dry.   Neurological:      General: No focal deficit present.      Mental Status: He is alert and oriented to person, place, and time.   Psychiatric:         Mood and Affect: Mood normal.          Behavior: Behavior normal.         Thought Content: Thought content normal.         Lab Results   Component Value Date    CHOL 172 12/18/2023    CHOL 149 10/14/2021    CHOL 351 (H) 06/09/2020     Lab Results   Component Value Date    HDL 36 (L) 12/18/2023    HDL 35 (L) 10/14/2021    HDL 33 (L) 06/09/2020     Lab Results   Component Value Date    LDLCALC 73.2 12/18/2023    LDLCALC Invalid, Trig>400.0 10/14/2021    LDLCALC Invalid, Trig>400.0 06/09/2020     Lab Results   Component Value Date    TRIG 314 (H) 12/18/2023    TRIG 401 (H) 10/14/2021    TRIG 1,358 (H) 06/09/2020     Lab Results   Component Value Date    CHOLHDL 20.9 12/18/2023    CHOLHDL 23.5 10/14/2021    CHOLHDL 9.4 (L) 06/09/2020       Chemistry        Component Value Date/Time     04/15/2024 1325    K 4.2 04/15/2024 1325     04/15/2024 1325    CO2 23 04/15/2024 1325    BUN 16 04/15/2024 1325    CREATININE 1.0 04/15/2024 1325    GLU 98 04/15/2024 1325        Component Value Date/Time    CALCIUM 9.4 04/15/2024 1325    ALKPHOS 36 (L) 04/15/2024 1325    AST 28 04/15/2024 1325    ALT 24 04/15/2024 1325    BILITOT 0.6 04/15/2024 1325    ESTGFRAFRICA >60.0 10/14/2021 0715    EGFRNONAA >60.0 10/14/2021 0715          Lab Results   Component Value Date    TSH 1.137 04/27/2023     Lab Results   Component Value Date    INR 1.0 01/28/2020    INR 1.0 11/10/2004    INR 1.2 07/07/2004     @RESUFAST(WBC,HGB,HCT,MCV,PLT)  @LABRCNTIP(BNP,BNPTRIAGEBLO)@  CrCl cannot be calculated (Patient's most recent lab result is older than the maximum 7 days allowed.).     Results for orders placed during the hospital encounter of 08/21/23    Echo Saline Bubble? No    Interpretation Summary    Left Ventricle: The left ventricle is normal in size. Normal wall thickness. There is mild concentric hypertrophy. Normal wall motion. There is low normal systolic function with a visually estimated ejection fraction of 50 - 55%. Grade I diastolic dysfunction.    Right Ventricle:  Normal right ventricular cavity size. Wall thickness is normal. Right ventricle wall motion  is normal. Systolic function is normal.    Aortic Valve: There is mild aortic regurgitation.    Mitral Valve: There is mild regurgitation.    Tricuspid Valve: There is mild regurgitation.    IVC/SVC: Normal venous pressure at 3 mmHg.     No results found for this or any previous visit.     Assessment:      1. Essential hypertension    2. Mixed hyperlipidemia    3. Abnormal nuclear cardiac imaging test    4. MAIER (dyspnea on exertion)    5. Dyslipidemia    6. CAD in native artery    7. Nonrheumatic aortic valve insufficiency    8. Nonrheumatic mitral valve regurgitation    9. Aortic atherosclerosis    10. Hypertriglyceridemia        Plan:   Essential hypertension    Mixed hyperlipidemia    Abnormal nuclear cardiac imaging test    MAIER (dyspnea on exertion)    Dyslipidemia    CAD in native artery    Nonrheumatic aortic valve insufficiency    Nonrheumatic mitral valve regurgitation    Aortic atherosclerosis    Hypertriglyceridemia        Carotid US pending  Recheck Echo- ongoing SOB, worsening with lightheadedness and dizziness, near syncope  Add losartan and cont BB, cont profiling BP at home  Check labs today    RTC 1 month for BP check    Courtney Guillot, FNP-C [facility], Cardiology

## 2024-05-20 NOTE — PROGRESS NOTES
"Referring Provider:    Anuradha Castillo Pa-c  [address] [place] [address]  Subjective:   Patient: Haritha Valle [id number], :1945   Visit date:2024 2:02 PM    Chief Complaint:  Consult (Pt has been short of breath x 6 months )    HPI:    Prior notes reviewed by myself.  Clinical documentation obtained by nursing staff reviewed.     80 y/o gentleman here for evaluation of dyspnea.  He reports having an extensive mostly normal workup from the cardiac and pulmonary department.  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  He denies any stridor, recent intubations, neck surgery.  Obstructive sleep apnea and admittedly only wears his CPAP sporadically.      Objective:     Physical Exam:  Vitals:  Ht 5' 10" (1.778 m)   Wt 100.9 kg (222 lb 7.1 oz)   BMI 31.92 kg/m²   General appearance:  Well developed, well nourished    Ears:  Otoscopy of external auditory canals and tympanic membranes was normal, clinical speech reception thresholds grossly intact, no mass/lesion of auricle.    Nose:  No masses/lesions of external nose, nasal mucosa, septum, and turbinates were within normal limits.    Mouth:  No mass/lesion of lips, teeth, gums, hard/soft palate, tongue, tonsils, or oropharynx.    Neck & Lymphatics:  No cervical lymphadenopathy, no neck mass/crepitus/ asymmetry, trachea is midline, no thyroid enlargement/tenderness/mass.    Due to indication in patient's history, presentation or risk factors,  a fiber optic exam was performed.    SEPARATE PROCEDURE NOTE:    ANESTHESIA:  Topical xylocaine with lena-synephrine  FINDINGS:  Normal exam      PROCEDURE:  After verbal consent was obtained, the flexible scope was passed through the patient's nasal cavity without difficulty.  The nasopharynx (adenoid pad) and eustachian tube orifices were first visualized and were found to be normal, without masses or irregularity.  The posterior pharyngeal wall " and base of tongue were then examined and no mass or irregular tissue was seen.  The scope was then advanced to the larynx, and the epiglottis, valleculae, and piriform sinuses were normal, without masses or mucosal irregularity.  The false vocal folds and true vocal folds were then examined and were found to have normal mobility (full abduction and adduction) and no masses or mucosal irregularity was seen.  The arytenoids and the interarytenoid area were normal.      [x]  Data Reviewed:    Lab Results   Component Value Date    WBC 7.97 12/18/2023    HGB 14.7 12/18/2023    HCT 45.7 12/18/2023    MCV 93 12/18/2023    EOSINOPHIL 2.1 12/18/2023       Reviewed notes from Dr. Herbert and SULEIMAN Murrell    Assessment & Plan:   MAIER (dyspnea on exertion)    RAMONITA on CPAP  -     Ambulatory referral/consult to ENT    Anxiety    Abnormal PFTs        No evidence of vocal cord dysfunction on exam.  It seems as if the majority of his testing has been fairly normal.  We discussed the high level of stress that he is experiencing currently with his business and the contributions that anxiety may be making towards his symptomatology.  He has some pending pulmonary and cardiac testing which he plans on completing.  I will reach out to his PCP about my thoughts as well    Rex Fulton M.D.  Department of Otolaryngology - Head & Neck Surgery  [address] [facility].  [place] [address]  P: [phone]  F: [phone]        DISCLAIMER: This note was prepared with [employer] voice recognition transcription software. Garbled syntax, mangled pronouns, and other bizarre constructions may be attributed to that software system. While efforts were made to correct any mistakes made by this voice recognition program, some errors and/or omissions may remain in the note that were missed when the note was originally created.

## 2024-05-21 DIAGNOSIS — R06.02 SOB (SHORTNESS OF BREATH): Primary | ICD-10-CM

## 2024-05-21 RX ORDER — FUROSEMIDE 20 MG/1
20 TABLET ORAL DAILY
Qty: 30 TABLET | Refills: 11 | Status: SHIPPED | OUTPATIENT
Start: 2024-05-21 | End: 2025-05-21

## 2024-05-21 NOTE — TELEPHONE ENCOUNTER
Contacted pt and informed him Fluid marker is elevated. Will add 20mg daily of Lasix, ok to take 2 tablets for 3 days  and return to 1 tablet daily. Added D-Dimer to labs on 05/22/24. Pt vu w/o q/c    ----- Message from Lamar Blue NP sent at 5/21/2024  6:21 AM CDT -----  Fluid marker is elevated. Will add 20mg daily of Lasix, ok to take 2 tablets for 3 days  and return to 1 tablet daily. Also I would like to check a D Dimer, please add to the blood work he is doing on Wednesday.

## 2024-05-22 ENCOUNTER — HOSPITAL ENCOUNTER (OUTPATIENT)
Dept: RADIOLOGY | Facility: HOSPITAL | Age: 79
Discharge: HOME | End: 2024-05-22
Attending: HOSPITALIST
Payer: MEDICARE

## 2024-05-22 DIAGNOSIS — R06.09 DYSPNEA ON EXERTION: ICD-10-CM

## 2024-05-22 LAB
BRPFT: ABNORMAL
FEF 25 75 CHG: 80.9 %
FEF 25 75 LLN: 0.82
FEF 25 75 POST REF: 99.1 %
FEF 25 75 PRE REF: 54.8 %
FEF 25 75 REF: 2.11
FET100 CHG: -34.5 %
FEV1 CHG: 6.5 %
FEV1 FVC CHG: 11.4 %
FEV1 FVC LLN: 60
FEV1 FVC POST REF: 105.6 %
FEV1 FVC PRE REF: 94.7 %
FEV1 FVC REF: 75
FEV1 LLN: 2.06
FEV1 POST REF: 65.9 %
FEV1 PRE REF: 61.9 %
FEV1 REF: 2.95
FVC CHG: -4.4 %
FVC LLN: 2.88
FVC POST REF: 61.9 %
FVC PRE REF: 64.8 %
FVC REF: 3.97
PEF CHG: -4.3 %
PEF LLN: 5.3
PEF POST REF: 62 %
PEF PRE REF: 64.8 %
PEF REF: 7.62
POST FEF 25 75: 2.09 L/S (ref 0.82–3.39)
POST FET 100: 7.16 SEC
POST FEV1 FVC: 79.05 % (ref 60.4–89.37)
POST FEV1: 1.94 L (ref 2.06–3.84)
POST FVC: 2.46 L (ref 2.88–5.06)
POST PEF: 4.72 L/S (ref 5.3–9.94)
PRE FEF 25 75: 1.15 L/S (ref 0.82–3.39)
PRE FET 100: 10.94 SEC
PRE FEV1 FVC: 70.95 % (ref 60.4–89.37)
PRE FEV1: 1.82 L (ref 2.06–3.84)
PRE FVC: 2.57 L (ref 2.88–5.06)
PRE PEF: 4.93 L/S (ref 5.3–9.94)

## 2024-05-22 PROCEDURE — 71250 CT THORAX DX C-: CPT | Mod: 26,,, | Performed by: RADIOLOGY

## 2024-05-22 PROCEDURE — 76000 FLUOROSCOPY <1 HR PHYS/QHP: CPT | Mod: TC

## 2024-05-22 PROCEDURE — 76000 FLUOROSCOPY <1 HR PHYS/QHP: CPT | Mod: 26,,, | Performed by: RADIOLOGY

## 2024-05-22 PROCEDURE — 94060 EVALUATION OF WHEEZING: CPT | Mod: 26,S$PBB,, | Performed by: INTERNAL MEDICINE

## 2024-05-22 PROCEDURE — 71250 CT THORAX DX C-: CPT | Mod: TC

## 2024-05-24 ENCOUNTER — TELEPHONE (OUTPATIENT)
Dept: CARDIOLOGY | Facility: CLINIC | Age: 79
End: 2024-05-24
Payer: COMMERCIAL

## 2024-05-24 NOTE — TELEPHONE ENCOUNTER
Contacted pt and informed him D dimer is normal. Pt vu w/o q/c    ----- Message from Lamar Blue NP sent at 5/23/2024  9:38 PM CDT -----  D dimer nml

## 2024-05-25 DIAGNOSIS — E78.2 MIXED HYPERLIPIDEMIA: ICD-10-CM

## 2024-05-25 NOTE — TELEPHONE ENCOUNTER
No care due was identified.  [facility] Embedded Care Due Messages. Reference number: [id number].   5/25/2024 1:38:15 AM CDT

## 2024-05-25 NOTE — TELEPHONE ENCOUNTER
Refill Routing Note   Medication(s) are not appropriate for processing by [facility] for the following reason(s):        New or recently adjusted medication    [facility] action(s):  Defer               Appointments  past 12m or future 3m with PCP    Date Provider   Last Visit   5/16/2024 Max Herbert MD   Next Visit   8/19/2024 Max Herbert MD   ED visits in past 90 days: 0        Note composed:2:28 PM 05/25/2024

## 2024-05-26 RX ORDER — ROSUVASTATIN CALCIUM 20 MG/1
TABLET, COATED ORAL
Qty: 90 TABLET | Refills: 1 | Status: SHIPPED | OUTPATIENT
Start: 2024-05-26

## 2024-05-29 ENCOUNTER — PATIENT MESSAGE (OUTPATIENT)
Dept: CARDIOLOGY | Facility: CLINIC | Age: 79
End: 2024-05-29
Payer: COMMERCIAL

## 2024-05-29 NOTE — PROGRESS NOTES
"Subjective:      Patient ID: Haritha Valle is a 79 y.o. male.    Chief Complaint:  Memory loss.       History of Present Illness  HPI 79 Years old C. Male with PMHx of HTN / Dementia / HIV and others Medical issues came by Himself for the evaluation and recommendation of " Memory loss ".   Lives with a Partner.      Started: about 3 to 4 years " ago.   Describes: short term memory difficulties.   Timing: constant.   Frequency: daily.   Pain: none.   Location: CNS.   Family: Grand Mothers with Memory issues.   Medications: Aricept / Fluoxetine / Crestor.   Worsen: stress.   Alleviated: none.   Associated symptoms: getting lost driving / having problems at work " because of his memory difficulties ".   Triggers: none.   Prodrome symptoms: none.           He does not know who sent Him here.         Upon questioning - He indicated " came because memory issues ".         He feels " no much memory " / but " other People noticed ".         No behavioral issues / no visual hallucinations / no Paranoid / no burning food.          Review of Systems  Review of Systems   Neurological:         Memory difficulties.    All other systems reviewed and are negative.    Objective:     Neurologic Exam     Mental Status   Oriented to country, city, area and street.   Oriented to year, month, date, day and season.   Registration: recalls 3 of 3 objects.   Attention: normal. Concentration: normal.   Speech: speech is normal   Level of consciousness: alert  Knowledge: good.   Able to name object. Able to read. Able to repeat. Able to write. Normal comprehension.     Cranial Nerves     CN II   Visual fields full to confrontation.     CN III, IV, VI   Pupils are equal, round, and reactive to light.  Extraocular motions are normal.   Upgaze: normal  Downgaze: normal  Conjugate gaze: present  Vestibulo-ocular reflex: present    CN V   Facial sensation intact.     CN VII   Facial expression full, symmetric.     CN VIII   Hearing: impaired    CN " IX, X   CN IX normal.   CN X normal.     CN XI   CN XI normal.     CN XII   CN XII normal.     Motor Exam   Muscle bulk: normal  Overall muscle tone: normal    Strength   Strength 5/5 throughout.     Sensory Exam   Light touch normal.   Vibration normal.   Proprioception normal.   Pinprick normal.   Graphesthesia: normal  Stereognosis: normal    Gait, Coordination, and Reflexes     Gait  Gait: normal    Coordination   Romberg: negative  Finger to nose coordination: normal  Heel to shin coordination: normal  Tandem walking coordination: normal    Tremor   Resting tremor: absent  Intention tremor: absent  Action tremor: absent    Reflexes   Right brachioradialis: 2+  Left brachioradialis: 2+  Right biceps: 2+  Left biceps: 2+  Right triceps: 2+  Left triceps: 2+  Right patellar: 2+  Left patellar: 2+  Right achilles: 2+  Left achilles: 2+  Right : 2+  Left : 2+  Right plantar: normal  Left plantar: normal  Right Staton: absent  Left Staton: absent  Right ankle clonus: absent  Left ankle clonus: absent  Right pendular knee jerk: absent  Left pendular knee jerk: absent      Physical Exam  Constitutional:       Appearance: Normal appearance. He is normal weight.   HENT:      Head: Normocephalic and atraumatic.      Right Ear: Tympanic membrane normal.      Left Ear: Tympanic membrane normal.      Nose: Nose normal.      Mouth/Throat:      Mouth: Mucous membranes are moist.      Pharynx: Oropharynx is clear.   Eyes:      Extraocular Movements: Extraocular movements intact and EOM normal.      Conjunctiva/sclera: Conjunctivae normal.      Pupils: Pupils are equal, round, and reactive to light.   Cardiovascular:      Rate and Rhythm: Normal rate and regular rhythm.      Pulses: Normal pulses.      Heart sounds: Normal heart sounds.   Pulmonary:      Effort: Pulmonary effort is normal.      Breath sounds: Normal breath sounds.   Abdominal:      General: Abdomen is flat. Bowel sounds are normal.      Palpations:  "Abdomen is soft.   Genitourinary:     Comments: Deferred.   Musculoskeletal:         General: Normal range of motion.      Cervical back: Normal range of motion and neck supple.   Skin:     General: Skin is warm and dry.      Capillary Refill: Capillary refill takes less than 2 seconds.   Neurological:      Mental Status: He is alert. Mental status is at baseline.      Motor: Motor strength is normal.     Coordination: Finger-Nose-Finger Test, Heel to Shin Test and Romberg Test normal.      Gait: Gait is intact. Tandem walk normal.      Deep Tendon Reflexes:      Reflex Scores:       Tricep reflexes are 2+ on the right side and 2+ on the left side.       Bicep reflexes are 2+ on the right side and 2+ on the left side.       Brachioradialis reflexes are 2+ on the right side and 2+ on the left side.       Patellar reflexes are 2+ on the right side and 2+ on the left side.       Achilles reflexes are 2+ on the right side and 2+ on the left side.  Psychiatric:         Mood and Affect: Mood normal.         Speech: Speech normal.         Behavior: Behavior normal.         Thought Content: Thought content normal.         Judgment: Judgment normal.        Assessment:   79 Years old C.Male / Female with PMHX as above came for the evaluation of " Memory difficulties ".   - MCI W/ Memory difficulties.   - HIV.   - RAMONITA.  Plan:   Patient Neurological Assessment is remarkable for some mild cognitive impairment.   RAMONITA and HIV - risk factors for cognitive impairments.     MRI Brain.     MOCA: 26/ 30 - missed 1 out of 5 in 5 minutes.     RAMONITA: CPaP.   Labs: HIV reactive.  HIV - follow by ID.     Please do not hesitate to contact me with any updates, questions or concerns.    Miles Mccray MD.  General Neurologist.   "

## 2024-05-30 ENCOUNTER — PATIENT MESSAGE (OUTPATIENT)
Dept: ADMINISTRATIVE | Facility: OTHER | Age: 79
End: 2024-05-30
Payer: COMMERCIAL

## 2024-05-30 ENCOUNTER — PROCEDURE VISIT (OUTPATIENT)
Dept: UROLOGY | Facility: CLINIC | Age: 79
End: 2024-05-30
Payer: MEDICARE

## 2024-05-30 DIAGNOSIS — E29.1 HYPOGONADISM MALE: Primary | ICD-10-CM

## 2024-05-30 PROBLEM — R97.21 RISING PSA FOLLOWING TREATMENT FOR MALIGNANT NEOPLASM OF PROSTATE: Status: ACTIVE | Noted: 2024-05-30

## 2024-05-30 PROCEDURE — 99999PBSHW PR PBB SHADOW TECHNICAL ONLY FILED TO HB: Mod: PBBFAC,,,

## 2024-05-30 PROCEDURE — 11980 IMPLANT HORMONE PELLET(S): CPT | Mod: S$PBB,,, | Performed by: UROLOGY

## 2024-05-30 PROCEDURE — 11980 IMPLANT HORMONE PELLET(S): CPT | Mod: PBBFAC | Performed by: UROLOGY

## 2024-05-30 RX ORDER — LEVOFLOXACIN 500 MG/1
500 TABLET, FILM COATED ORAL DAILY
Qty: 3 TABLET | Refills: 0 | Status: SHIPPED | OUTPATIENT
Start: 2024-05-30 | End: 2024-06-02

## 2024-05-30 RX ADMIN — TESTOSTERONE 750 MG: 75 PELLET SUBCUTANEOUS at 01:05

## 2024-05-30 NOTE — PROCEDURES
Procedures  Chief Complaint:   Encounter Diagnosis   Name Primary?    Hypogonadism male Yes       HPI:    5/30/24- here today for testopel insertion.    78-year-old gentleman who comes in with a longstanding history of hypogonadism.  Patient states that years ago he was treated by testosterone injections every 2 weeks, uncertain of the dosage.  Cutaneous therapy did not assist.  Patient is interested in pellets though.  Patient has decreased energy with fatigue, libido is not an issue nor as erections.  Patient is otherwise voiding well with no other urological history, no family history of urological cancers or stones.    Allergies:  No known drug allergies    Medications:  See MAR    Review of Systems:  General: No fever, chills, fatigability, or weight loss.  Skin: No rashes, itching, or changes in color or texture of skin.  Chest: Denies MAIER, cyanosis, wheezing, cough, and sputum production.  Abdomen: Appetite fine. No weight loss. Denies diarrhea, abdominal pain, hematemesis, or blood in stool.  Musculoskeletal: No joint stiffness or swelling. Denies back pain.  : As above.  All other review of systems negative.    PMH:   has a past medical history of Anxiety (07/03/2019), Basal cell carcinoma, CAD in native artery (11/27/2023), Depression, Dyslipidemia (11/16/2023), Essential hypertension (01/24/2013), Hepatitis B, Hepatitis C antibody test positive, HIV infection, Hypertension, Immune disorder, Mild cognitive impairment (10/01/2010), Nonrheumatic aortic valve insufficiency (12/19/2023), and Nonrheumatic mitral valve regurgitation (12/19/2023).    PSH:   has a past surgical history that includes Colonoscopy (N/A, 11/3/2016); Cholecystectomy; Appendectomy; Left heart catheterization (Left, 2/3/2020); Cardiac catheterization; and Colonoscopy (N/A, 1/25/2022).    FamHx: family history includes Heart disease in his father; Heart failure in his father.    SocHx:  reports that he has never smoked. He has never  used smokeless tobacco. He reports that he does not currently use drugs. He reports that he does not drink alcohol.      Physical Exam:  There were no vitals filed for this visit.  General: A&Ox3, no apparent distress, no deformities  Neck: No masses, normal ROM  Lungs: normal inspiration, no use of accessory muscles  Heart: normal pulse, no arrhythmias  Abdomen: Soft, NT, ND, no masses, no hernias, no hepatosplenomegaly  Skin: The skin is warm and dry. No jaundice.  Ext: No c/c/e.  : 1/24- Test desc sam, no abnormalities of epididymus. Normal penile and scrotal skin. Meatus normal.    Labs/Studies:   Testopel  5/30/24, 2/29/24  PSA 1.7 12/23   Testosterone 39 1/24  Estrogen 107 1/24    Patient was sterilely prepped and draped, then positioned in the left side up, lateral decubitus position.  Lidocaine was used for local anesthesia.  Eleven blade was used to incise the skin, included trocars with the testopel kit were then used.  They were inserted within the incision site and we placed the pellets in a V location.  10 pellets total were inserted without difficulty.  Trocars were removed and pressure was applied for 2 min.  Steri-Strips applied for local coverage, he will return for lab assessment in 3 months.      Impression/Plan:     Hypogonadism- patient tolerated the procedure well and will call with any issues in the meantime.  Will see him back in 3 months with labs and to repeat insertion.  Of note the patient has previously used injections and has failed creams.

## 2024-05-31 ENCOUNTER — TELEPHONE (OUTPATIENT)
Dept: NEUROLOGY | Facility: CLINIC | Age: 79
End: 2024-05-31
Payer: COMMERCIAL

## 2024-06-03 ENCOUNTER — OFFICE VISIT (OUTPATIENT)
Dept: INFECTIOUS DISEASES | Facility: CLINIC | Age: 79
End: 2024-06-03
Payer: MEDICARE

## 2024-06-03 ENCOUNTER — OFFICE VISIT (OUTPATIENT)
Dept: NEUROLOGY | Facility: CLINIC | Age: 79
End: 2024-06-03
Payer: MEDICARE

## 2024-06-03 VITALS
HEIGHT: 70 IN | WEIGHT: 219.56 LBS | HEART RATE: 74 BPM | SYSTOLIC BLOOD PRESSURE: 155 MMHG | RESPIRATION RATE: 16 BRPM | OXYGEN SATURATION: 99 % | DIASTOLIC BLOOD PRESSURE: 88 MMHG | BODY MASS INDEX: 31.43 KG/M2

## 2024-06-03 VITALS
HEART RATE: 86 BPM | SYSTOLIC BLOOD PRESSURE: 148 MMHG | HEIGHT: 70 IN | DIASTOLIC BLOOD PRESSURE: 100 MMHG | WEIGHT: 219.56 LBS | BODY MASS INDEX: 31.43 KG/M2

## 2024-06-03 DIAGNOSIS — R41.3 OTHER AMNESIA: ICD-10-CM

## 2024-06-03 DIAGNOSIS — E78.2 MIXED HYPERLIPIDEMIA: ICD-10-CM

## 2024-06-03 DIAGNOSIS — I10 ESSENTIAL HYPERTENSION: ICD-10-CM

## 2024-06-03 DIAGNOSIS — G31.84 MCI (MILD COGNITIVE IMPAIRMENT) WITH MEMORY LOSS: Primary | ICD-10-CM

## 2024-06-03 DIAGNOSIS — B20 HIV DISEASE: Primary | ICD-10-CM

## 2024-06-03 PROCEDURE — 99215 OFFICE O/P EST HI 40 MIN: CPT | Mod: PBBFAC,27 | Performed by: PSYCHIATRY & NEUROLOGY

## 2024-06-03 PROCEDURE — 99214 OFFICE O/P EST MOD 30 MIN: CPT | Mod: S$PBB,,, | Performed by: STUDENT IN AN ORGANIZED HEALTH CARE EDUCATION/TRAINING PROGRAM

## 2024-06-03 PROCEDURE — 99999 PR PBB SHADOW E&M-EST. PATIENT-LVL IV: CPT | Mod: PBBFAC,,, | Performed by: STUDENT IN AN ORGANIZED HEALTH CARE EDUCATION/TRAINING PROGRAM

## 2024-06-03 PROCEDURE — 99214 OFFICE O/P EST MOD 30 MIN: CPT | Mod: S$PBB,,, | Performed by: PSYCHIATRY & NEUROLOGY

## 2024-06-03 PROCEDURE — 99999 PR PBB SHADOW E&M-EST. PATIENT-LVL V: CPT | Mod: PBBFAC,,, | Performed by: PSYCHIATRY & NEUROLOGY

## 2024-06-03 PROCEDURE — 99214 OFFICE O/P EST MOD 30 MIN: CPT | Mod: PBBFAC | Performed by: STUDENT IN AN ORGANIZED HEALTH CARE EDUCATION/TRAINING PROGRAM

## 2024-06-03 RX ORDER — CHLORHEXIDINE GLUCONATE ORAL RINSE 1.2 MG/ML
SOLUTION DENTAL
COMMUNITY

## 2024-06-03 RX ORDER — FLUTICASONE PROPIONATE 50 MCG
SPRAY, SUSPENSION (ML) NASAL
COMMUNITY
Start: 2024-05-17

## 2024-06-03 RX ORDER — HYDROCHLOROTHIAZIDE 12.5 MG/1
TABLET ORAL
COMMUNITY
Start: 2024-05-20

## 2024-06-03 RX ORDER — PROMETHAZINE HYDROCHLORIDE 25 MG/1
TABLET ORAL
COMMUNITY

## 2024-06-03 RX ORDER — MECLIZINE HYDROCHLORIDE 25 MG/1
TABLET ORAL
COMMUNITY

## 2024-06-03 RX ORDER — ALBUTEROL SULFATE 90 UG/1
AEROSOL, METERED RESPIRATORY (INHALATION)
COMMUNITY

## 2024-06-03 RX ORDER — AMOXICILLIN 500 MG/1
CAPSULE ORAL
COMMUNITY
End: 2024-06-03

## 2024-06-03 NOTE — PROGRESS NOTES
Infectious Disease Clinic Note    Patient Name: Haritha Valle  YOB: 1945    PRESENTING HISTORY       History of Present Illness:  Mr. Haritha Valle is a 79 y.o. male w/ significant PMHx of HIV diagnosed in 1984 here for HIV follow up. Per last ID note, last documented visit with ID in 2019 with [place] providers. Had been on Reyataz and Combivir with plans to switch to Biktarvy, but this proved cost prohibitive. No recent labs on file. In 2019 HIV VL was undetectable with CD4 570. Expressed concern about lipodystrophy and memory loss. Vaccine history populated below. More recent CD4 count 464 on 4/27/23. Today patient reports taking Reyataz and Combivir over 30 years. Stopped taking month ago due to issues with pharmacy. Has been undetectable for years. Not currently sexually active. No travel outside [place]. No sick contacts. Would like to try new ART. Has two dogs. Agreeable to baseline labs today and switching to Biktarvy or Symtuza based on cost.      Labs below reviewed and interpreted. Biktarvy was approved by [employer]. Today patient reports he has been taking it each day with no missed doses. Has nighttime low oxygen and apnea but won't wear CPAP. Seeing pulmonology for alternative. Establishing care with PCP as well. Discussed prior labs and explained that mutation likely minor from Genosure. He is clearly responding to ART. Will recheck in 1 month to ensure undetectable. Vaccines discussed as well. Patient voiced understanding.      3/11: Blip in viral load at 27. Will recheck in 1 month. Feels well. No missed doses of Biktarvy. Would like hep A vaccine today.     6/3: Blip confirmed as VL back to undetectable. Still with vertigo. Seeing neurologist today. No missed doses of Biktarvy.      Hep A IgG non reactive  Hep B surface Ab reactive  CD4 624  Hep C ab reactive but had negative viral load back in 2019 so likely SVR    RPR non-reactive  HIV RNA quant undetectable                  Review of  Systems:  Constitutional: no fever or chills  Eyes: no visual changes  ENT: no nasal congestion or sore throat  Respiratory: no cough or shortness of breath  Cardiovascular: no chest pain  Gastrointestinal: no nausea or vomiting, no abdominal pain, no constipation, no diarrhea  Genitourinary: no hematuria or dysuria  Musculoskeletal: no arthralgias or myalgias  Skin: no rash  Neurological: no headaches, numbness, or paresthesias    The following portions of the patient's history were reviewed and updated as appropriate: allergies, current medications, past family history, past medical history, past social history, past surgical history, and problem list.    PAST HISTORY:     Immunization History   Administered Date(s) Administered    COVID-19, MRNA, LN-S, PF (Pfizer) (Gray Cap) 04/22/2022    COVID-19, MRNA, LN-S, PF (Pfizer) (Purple Cap) 01/11/2021, 02/01/2021, 10/29/2021    COVID-19, mRNA, LNP-S, bivalent booster, PF (PFIZER OMICRON) 12/02/2022    Hepatitis A, Adult 03/11/2024    Influenza (FLUAD) - Quadrivalent - Adjuvanted - PF *Preferred* (65+) 09/28/2020, 10/11/2021, 10/20/2022, 11/09/2023    Influenza - High Dose - PF (65 years and older) 09/24/2013, 12/02/2014, 09/27/2016, 09/27/2017, 12/12/2018, 10/01/2019    Influenza - Quadrivalent - PF *Preferred* (6 months and older) 10/24/2007, 01/08/2009, 09/23/2010, 01/24/2013    Influenza A (H1N1) 2009 Monovalent - IM 11/03/2009    Influenza Split 01/24/2013    Pneumococcal Conjugate - 13 Valent 07/23/2015    Pneumococcal Polysaccharide - 23 Valent 09/27/2016    RSVpreF (Arexvy) 01/24/2024    Tdap 01/08/2009, 07/03/2019    Vaccinia, smallpox monkeypox vaccine live, PF 09/01/2022    Zoster 01/08/2009    Zoster Recombinant 05/09/2018, 06/08/2018, 07/18/2018       Past Medical History:   Diagnosis Date    Anxiety 07/03/2019    Basal cell carcinoma     CAD in native artery 11/27/2023    Depression     Dyslipidemia 11/16/2023    Essential hypertension 01/24/2013     Hepatitis B     Hepatitis C antibody test positive     RNA NEG 8/29/2019    HIV infection     Hypertension     Immune disorder     Mild cognitive impairment 10/01/2010    Nonrheumatic aortic valve insufficiency 12/19/2023    Nonrheumatic mitral valve regurgitation 12/19/2023       Past Surgical History:   Procedure Laterality Date    APPENDECTOMY      CARDIAC CATHETERIZATION      no stent    CHOLECYSTECTOMY      COLONOSCOPY N/A 11/3/2016    Procedure: COLONOSCOPY;  Surgeon: Maxi Villasenor MD;  Location: [facility] ENDO (4TH FLR);  Service: Endoscopy;  Laterality: N/A;  last colonoscopy with Dr Jrarell    COLONOSCOPY N/A 1/25/2022    Procedure: COLONOSCOPY;  Surgeon: Silvino Rosales MD;  Location: [facility] ENDO;  Service: Endoscopy;  Laterality: N/A;    LEFT HEART CATHETERIZATION Left 2/3/2020    Procedure: CATHETERIZATION, HEART, LEFT;  Surgeon: Chele Ko MD;  Location: [facility] CATH LAB;  Service: Cardiology;  Laterality: Left;  malur pt       Family History   Problem Relation Name Age of Onset    Heart disease Father      Heart failure Father      Diabetes Neg Hx      Hypertension Neg Hx         Social History     Socioeconomic History    Marital status: Significant Other     Spouse name: Shadi    Number of children: 0    Highest education level: Some college, no degree   Occupational History    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  [place] [employer]   Tobacco Use    Smoking status: Never    Smokeless tobacco: Never   Substance and Sexual Activity    Alcohol use: No    Drug use: Not Currently    Sexual activity: Yes     Partners: Male     Social Determinants of Health     Financial Resource Strain: Low Risk  (1/24/2024)    Overall Financial Resource Strain (CARDIA)     Difficulty of Paying Living Expenses: Not hard at all   Food Insecurity: No Food Insecurity (1/24/2024)    Hunger Vital Sign     Worried About Running Out of Food in the Last Year: Never true     Ran Out of Food in the Last  Year: Never true   Transportation Needs: No Transportation Needs (2024)    PRAPARE - Transportation     Lack of Transportation (Medical): No     Lack of Transportation (Non-Medical): No   Physical Activity: Inactive (2024)    Exercise Vital Sign     Days of Exercise per Week: 0 days     Minutes of Exercise per Session: 0 min   Stress: Stress Concern Present (2024)    [employer] - Occupational Stress Questionnaire     Feeling of Stress : To some extent   Housing Stability: Low Risk  (2024)    Housing Stability Vital Sign     Unable to Pay for Housing in the Last Year: No     Number of Places Lived in the Last Year: 1     Unstable Housing in the Last Year: No       MEDICATIONS & ALLERGIES:     Current Outpatient Medications on File Prior to Visit   Medication Sig    ascorbic acid, vitamin C, (VITAMIN C) 1000 MG tablet Take 1,000 mg by mouth once daily.    hzfgknhlt-zyfukany-qigtzii ala (BIKTARVY) -25 mg (25 kg or greater) Take 1 tablet by mouth once daily.    qboefhlxy-aphcqmrf-lmityok ala (BIKTARVY) -25 mg (25 kg or greater) Take 1 tablet by mouth once daily.    donepeziL (ARICEPT) 5 MG tablet Take 1 tablet (5 mg total) by mouth every evening.    FLUoxetine 40 MG capsule TAKE 1 CAPSULE EVERY DAY    furosemide (LASIX) 20 MG tablet Take 1 tablet (20 mg total) by mouth once daily.    losartan (COZAAR) 25 MG tablet Take 1 tablet (25 mg total) by mouth once daily.    metoprolol succinate (TOPROL-XL) 100 MG 24 hr tablet Take 1 tablet (100 mg total) by mouth once daily.    pep injection Inject 0.25 ml as directed     For compounding pharmacy use:   Add PAPAVERINE 30 mcg  Add PHENTOLAMINE 1 mg  Add PGE 10 mcg    rosuvastatin (CRESTOR) 20 MG tablet TAKE 1 TABLET EVERY DAY    ubidecarenone (COENZYME Q10 ORAL) Take by mouth once daily.    vit C,V-Ie-jpazg-lutein-zeaxan (EYE MULTIVIT, LUTEIN-ZEAXAN,) 362-28-12-2-5 mg Cap Take 1 tablet by mouth once daily.    []  "levoFLOXacin (LEVAQUIN) 500 MG tablet Take 1 tablet (500 mg total) by mouth once daily. for 3 days     No current facility-administered medications on file prior to visit.       Review of patient's allergies indicates:   Allergen Reactions    No known drug allergies        OBJECTIVE:   Vital Signs:  Vitals:    06/03/24 1344   BP: (!) 148/100   Pulse: 86   Weight: 99.6 kg (219 lb 9.3 oz)   Height: 5' 10" (1.778 m)       No results found for this or any previous visit (from the past 24 hour(s)).      Physical Exam:   General:  Well developed, well nourished, no acute distress  HEENT:  Normocephalic, atraumatic  CVS:  RRR, S1 and S2 normal, no murmurs, rubs, gallops  Resp:  Lungs clear to auscultation, no wheezes, rales, rhonchi  GI:  Abdomen soft, non-tender, non-distended, normoactive bowel sounds, no masses  MSK:  No muscle atrophy, peripheral edema, full range of motion  Skin:  No rashes, ulcers, erythema  Psych:  Alert and oriented to person, place, and time    ASSESSMENT:     HIV disease  --Labs reviewed: no longer immune to hep A but is protected against hep B, CD4 461, hep C ab reactive but had negative viral load back in 2019 so likely SVR; RPR nonreactive  --Repeat viral load on 3/4 with blip of 27; now undetectable   --Genosure archive showed less common mutations for tenofovir and Zidovudine; Biktarvy appears to be overcoming resistance   --Continue daily Biktarvy   --No OI ppx indicated at this time based on CD4   --Recommended vaccines: Hep A (0, 6 months)  --Follow up with me in 3 months      Essential hypertension  Continue current medications. Follow up with PCP.      Mixed hyperlipidemia  Continue Crestor. Follow up with PCP.       PLAN:     Diagnoses and all orders for this visit:    HIV disease  -     HIV RNA, Quantitative, PCR; Standing  -     CD4 T-[place] Cells; Standing    Essential hypertension    Mixed hyperlipidemia          The total time for evaluation and management services performed on " 6/3/24 was greater than 25 minutes.        Vaughn Larios, DO   Infectious Diseases

## 2024-06-05 ENCOUNTER — HOSPITAL ENCOUNTER (OUTPATIENT)
Dept: CARDIOLOGY | Facility: HOSPITAL | Age: 79
Discharge: HOME | End: 2024-06-05
Attending: HOSPITALIST
Payer: MEDICARE

## 2024-06-05 ENCOUNTER — HOSPITAL ENCOUNTER (OUTPATIENT)
Dept: CARDIOLOGY | Facility: HOSPITAL | Age: 79
Discharge: HOME | End: 2024-06-05
Payer: MEDICARE

## 2024-06-05 VITALS
BODY MASS INDEX: 31.35 KG/M2 | HEIGHT: 70 IN | DIASTOLIC BLOOD PRESSURE: 78 MMHG | WEIGHT: 219 LBS | SYSTOLIC BLOOD PRESSURE: 125 MMHG

## 2024-06-05 VITALS
DIASTOLIC BLOOD PRESSURE: 88 MMHG | HEIGHT: 70 IN | SYSTOLIC BLOOD PRESSURE: 155 MMHG | BODY MASS INDEX: 31.35 KG/M2 | WEIGHT: 219 LBS

## 2024-06-05 DIAGNOSIS — R55 NEAR SYNCOPE: ICD-10-CM

## 2024-06-05 DIAGNOSIS — I10 ESSENTIAL HYPERTENSION: ICD-10-CM

## 2024-06-05 LAB
LEFT ARM DIASTOLIC BLOOD PRESSURE: 77 MMHG
LEFT ARM SYSTOLIC BLOOD PRESSURE: 111 MMHG
LEFT CBA DIAS: 17 CM/S
LEFT CBA SYS: 83 CM/S
LEFT CCA DIST DIAS: 9 CM/S
LEFT CCA DIST SYS: 64 CM/S
LEFT CCA MID DIAS: 9 CM/S
LEFT CCA MID SYS: 71 CM/S
LEFT CCA PROX DIAS: 12 CM/S
LEFT CCA PROX SYS: 112 CM/S
LEFT ECA DIAS: 7 CM/S
LEFT ECA SYS: 101 CM/S
LEFT ICA DIST DIAS: 13 CM/S
LEFT ICA DIST SYS: 54 CM/S
LEFT ICA MID DIAS: 17 CM/S
LEFT ICA MID SYS: 64 CM/S
LEFT ICA PROX DIAS: 15 CM/S
LEFT ICA PROX SYS: 89 CM/S
LEFT VERTEBRAL DIAS: 8 CM/S
LEFT VERTEBRAL SYS: 58 CM/S
OHS CV CAROTID RIGHT ICA EDV HIGHEST: 14
OHS CV CAROTID ULTRASOUND LEFT ICA/CCA RATIO: 1.39
OHS CV CAROTID ULTRASOUND RIGHT ICA/CCA RATIO: 0.89
OHS CV PV CAROTID LEFT HIGHEST CCA: 112
OHS CV PV CAROTID LEFT HIGHEST ICA: 89
OHS CV PV CAROTID RIGHT HIGHEST CCA: 106
OHS CV PV CAROTID RIGHT HIGHEST ICA: 65
OHS CV US CAROTID LEFT HIGHEST EDV: 17
RIGHT ARM DIASTOLIC BLOOD PRESSURE: 78 MMHG
RIGHT ARM SYSTOLIC BLOOD PRESSURE: 125 MMHG
RIGHT CBA DIAS: 8 CM/S
RIGHT CBA SYS: 60 CM/S
RIGHT CCA DIST DIAS: 14 CM/S
RIGHT CCA DIST SYS: 73 CM/S
RIGHT CCA MID DIAS: 10 CM/S
RIGHT CCA MID SYS: 80 CM/S
RIGHT CCA PROX DIAS: 9 CM/S
RIGHT CCA PROX SYS: 106 CM/S
RIGHT ECA DIAS: 7 CM/S
RIGHT ECA SYS: 109 CM/S
RIGHT ICA DIST DIAS: 8 CM/S
RIGHT ICA DIST SYS: 35 CM/S
RIGHT ICA MID DIAS: 14 CM/S
RIGHT ICA MID SYS: 65 CM/S
RIGHT ICA PROX DIAS: 11 CM/S
RIGHT ICA PROX SYS: 60 CM/S
RIGHT VERTEBRAL DIAS: 10 CM/S
RIGHT VERTEBRAL SYS: 40 CM/S

## 2024-06-05 PROCEDURE — 93880 EXTRACRANIAL BILAT STUDY: CPT | Mod: 26,,, | Performed by: INTERNAL MEDICINE

## 2024-06-05 PROCEDURE — 93880 EXTRACRANIAL BILAT STUDY: CPT

## 2024-06-05 PROCEDURE — 93306 TTE W/DOPPLER COMPLETE: CPT

## 2024-06-05 PROCEDURE — 93306 TTE W/DOPPLER COMPLETE: CPT | Mod: 26,,, | Performed by: STUDENT IN AN ORGANIZED HEALTH CARE EDUCATION/TRAINING PROGRAM

## 2024-06-06 LAB
AORTIC ROOT ANNULUS: 3.38 CM
ASCENDING AORTA: 3.29 CM
AV INDEX (PROSTH): 0.64
AV MEAN GRADIENT: 5 MMHG
AV PEAK GRADIENT: 8 MMHG
AV VALVE AREA BY VELOCITY RATIO: 2.17 CM²
AV VALVE AREA: 2.14 CM²
AV VELOCITY RATIO: 0.65
BSA FOR ECHO PROCEDURE: 2.21 M2
CV ECHO LV RWT: 0.44 CM
DOP CALC AO PEAK VEL: 1.4 M/S
DOP CALC AO VTI: 29.2 CM
DOP CALC LVOT AREA: 3.3 CM2
DOP CALC LVOT DIAMETER: 2.06 CM
DOP CALC LVOT PEAK VEL: 0.91 M/S
DOP CALC LVOT STROKE VOLUME: 62.63 CM3
DOP CALC RVOT PEAK VEL: 0.62 M/S
DOP CALC RVOT VTI: 10.6 CM
DOP CALCLVOT PEAK VEL VTI: 18.8 CM
E WAVE DECELERATION TIME: 247.87 MSEC
E/A RATIO: 0.64
E/E' RATIO: 10.75 M/S
ECHO LV POSTERIOR WALL: 1.21 CM (ref 0.6–1.1)
FRACTIONAL SHORTENING: 29 % (ref 28–44)
INTERVENTRICULAR SEPTUM: 1.23 CM (ref 0.6–1.1)
IVC DIAMETER: 1.29 CM
IVRT: 117.98 MSEC
LA MAJOR: 5.53 CM
LA MINOR: 5.08 CM
LA WIDTH: 4 CM
LEFT ATRIUM SIZE: 4.33 CM
LEFT ATRIUM VOLUME INDEX MOD: 31.1 ML/M2
LEFT ATRIUM VOLUME INDEX: 35.9 ML/M2
LEFT ATRIUM VOLUME MOD: 67.45 CM3
LEFT ATRIUM VOLUME: 77.96 CM3
LEFT INTERNAL DIMENSION IN SYSTOLE: 3.91 CM (ref 2.1–4)
LEFT VENTRICLE DIASTOLIC VOLUME INDEX: 66.97 ML/M2
LEFT VENTRICLE DIASTOLIC VOLUME: 145.32 ML
LEFT VENTRICLE MASS INDEX: 127 G/M2
LEFT VENTRICLE SYSTOLIC VOLUME INDEX: 30.6 ML/M2
LEFT VENTRICLE SYSTOLIC VOLUME: 66.5 ML
LEFT VENTRICULAR INTERNAL DIMENSION IN DIASTOLE: 5.47 CM (ref 3.5–6)
LEFT VENTRICULAR MASS: 276.2 G
LV LATERAL E/E' RATIO: 10.75 M/S
LV SEPTAL E/E' RATIO: 10.75 M/S
LVOT MG: 1.89 MMHG
LVOT MV: 0.63 CM/S
MV PEAK A VEL: 0.67 M/S
MV PEAK E VEL: 0.43 M/S
MV STENOSIS PRESSURE HALF TIME: 71.88 MS
MV VALVE AREA P 1/2 METHOD: 3.06 CM2
OHS CV RV/LV RATIO: 0.71 CM
PISA TR MAX VEL: 1.95 M/S
PULM VEIN S/D RATIO: 1.5
PV MEAN GRADIENT: 1 MMHG
PV PEAK D VEL: 0.36 M/S
PV PEAK GRADIENT: 2 MMHG
PV PEAK S VEL: 0.54 M/S
PV PEAK VELOCITY: 0.67 M/S
RA MAJOR: 4.89 CM
RA PRESSURE ESTIMATED: 3 MMHG
RA WIDTH: 3.93 CM
RIGHT VENTRICULAR END-DIASTOLIC DIMENSION: 3.86 CM
RV TB RVSP: 5 MMHG
SINUS: 3.15 CM
STJ: 2.83 CM
TDI LATERAL: 0.04 M/S
TDI SEPTAL: 0.04 M/S
TDI: 0.04 M/S
TR MAX PG: 15 MMHG
TRICUSPID ANNULAR PLANE SYSTOLIC EXCURSION: 1.91 CM
TV REST PULMONARY ARTERY PRESSURE: 18 MMHG
Z-SCORE OF LEFT VENTRICULAR DIMENSION IN END DIASTOLE: -2.72
Z-SCORE OF LEFT VENTRICULAR DIMENSION IN END SYSTOLE: -0.88

## 2024-06-07 ENCOUNTER — TELEPHONE (OUTPATIENT)
Dept: CARDIOLOGY | Facility: CLINIC | Age: 79
End: 2024-06-07
Payer: COMMERCIAL

## 2024-06-07 NOTE — TELEPHONE ENCOUNTER
----- Message from Lamar Blue NP sent at 6/7/2024  7:03 AM CDT -----  Heart ultrasound looks good with normal function

## 2024-06-17 ENCOUNTER — HOSPITAL ENCOUNTER (OUTPATIENT)
Dept: RADIOLOGY | Facility: HOSPITAL | Age: 79
Discharge: HOME | End: 2024-06-17
Attending: PSYCHIATRY & NEUROLOGY
Payer: MEDICARE

## 2024-06-17 DIAGNOSIS — G31.84 MCI (MILD COGNITIVE IMPAIRMENT) WITH MEMORY LOSS: ICD-10-CM

## 2024-06-17 DIAGNOSIS — R41.3 OTHER AMNESIA: ICD-10-CM

## 2024-06-18 ENCOUNTER — TELEPHONE (OUTPATIENT)
Dept: NEUROLOGY | Facility: CLINIC | Age: 79
End: 2024-06-18
Payer: COMMERCIAL

## 2024-06-19 DIAGNOSIS — F40.240 CLAUSTROPHOBIA: Primary | ICD-10-CM

## 2024-06-19 DIAGNOSIS — G30.9 DEMENTIA IN ALZHEIMER'S DISEASE: Primary | ICD-10-CM

## 2024-06-19 DIAGNOSIS — F02.80 DEMENTIA IN ALZHEIMER'S DISEASE: Primary | ICD-10-CM

## 2024-06-19 RX ORDER — ALPRAZOLAM 0.5 MG/1
0.5 TABLET ORAL ONCE
Qty: 1 TABLET | Refills: 0 | Status: SHIPPED | OUTPATIENT
Start: 2024-06-19 | End: 2024-06-19

## 2024-06-20 ENCOUNTER — TELEPHONE (OUTPATIENT)
Dept: NEUROLOGY | Facility: CLINIC | Age: 79
End: 2024-06-20
Payer: COMMERCIAL

## 2024-06-20 NOTE — TELEPHONE ENCOUNTER
----- Message from Miles Seaman MD sent at 6/19/2024  5:40 PM CDT -----  Regarding: RE: Patient Concerns  Tell Patient I sent one Xanax to the Pharmacy - to take 30 minutes before the MRI / has come with   Someone else driving / He can not drive after take the Xanax.  ----- Message -----  From: Susana Hinojosa MA  Sent: 6/19/2024   8:31 AM CDT  To: Miles Seaman MD  Subject: FW: Patient Concerns                             It is already in his chart.     [facility] - [place], [place] - [address]  ----- Message -----  From: Miles Seaman MD  Sent: 6/19/2024   8:10 AM CDT  To: Susana Hinojosa MA  Subject: RE: Patient Concerns                             I want to send a prescription of Xanax for the patient to take the day of the MRI - ask Him what pharmacy He wants.  ----- Message -----  From: Susana Hinojosa MA  Sent: 6/18/2024   3:22 PM CDT  To: Miles Seaman MD  Subject: FW: Patient Concerns                             Please advise.  ----- Message -----  From: Joseph Carrero  Sent: 6/18/2024   3:03 PM CDT  To: Jurgen Aquino Staff  Subject: Patient Concerns                                 Mr. Valle has rescheduled his MRI to 6/27/24 due to nausea, acid reflux and vertigo.  Mr. Valle is inquiring about having something prescribed for this issues to be to lie still for the 25 minutes or so for his MRI.   Mr. Valle is requesting a call back. He can be reached at [phone]. He also stated that if something can be prescribed to please use the [facility].    Thank you

## 2024-06-27 ENCOUNTER — HOSPITAL ENCOUNTER (OUTPATIENT)
Dept: RADIOLOGY | Facility: HOSPITAL | Age: 79
Discharge: HOME | End: 2024-06-27
Attending: PSYCHIATRY & NEUROLOGY
Payer: MEDICARE

## 2024-06-27 PROCEDURE — 70551 MRI BRAIN STEM W/O DYE: CPT | Mod: TC

## 2024-06-27 PROCEDURE — 70551 MRI BRAIN STEM W/O DYE: CPT | Mod: 26,,, | Performed by: STUDENT IN AN ORGANIZED HEALTH CARE EDUCATION/TRAINING PROGRAM

## 2024-06-28 ENCOUNTER — TELEPHONE (OUTPATIENT)
Dept: NEUROLOGY | Facility: CLINIC | Age: 79
End: 2024-06-28
Payer: COMMERCIAL

## 2024-07-22 ENCOUNTER — LAB VISIT (OUTPATIENT)
Dept: LAB | Facility: HOSPITAL | Age: 79
End: 2024-07-22
Payer: MEDICARE

## 2024-07-22 ENCOUNTER — OFFICE VISIT (OUTPATIENT)
Dept: CARDIOLOGY | Facility: CLINIC | Age: 79
End: 2024-07-22
Payer: MEDICARE

## 2024-07-22 VITALS
HEART RATE: 67 BPM | DIASTOLIC BLOOD PRESSURE: 80 MMHG | SYSTOLIC BLOOD PRESSURE: 136 MMHG | WEIGHT: 221.56 LBS | BODY MASS INDEX: 31.79 KG/M2 | OXYGEN SATURATION: 97 %

## 2024-07-22 DIAGNOSIS — I10 ESSENTIAL HYPERTENSION: ICD-10-CM

## 2024-07-22 DIAGNOSIS — I34.0 NONRHEUMATIC MITRAL VALVE REGURGITATION: ICD-10-CM

## 2024-07-22 DIAGNOSIS — I35.1 NONRHEUMATIC AORTIC VALVE INSUFFICIENCY: ICD-10-CM

## 2024-07-22 DIAGNOSIS — R06.09 DOE (DYSPNEA ON EXERTION): ICD-10-CM

## 2024-07-22 DIAGNOSIS — R06.09 DOE (DYSPNEA ON EXERTION): Primary | ICD-10-CM

## 2024-07-22 DIAGNOSIS — I25.10 CAD IN NATIVE ARTERY: ICD-10-CM

## 2024-07-22 DIAGNOSIS — E78.2 MIXED HYPERLIPIDEMIA: ICD-10-CM

## 2024-07-22 DIAGNOSIS — I70.0 AORTIC ATHEROSCLEROSIS: ICD-10-CM

## 2024-07-22 DIAGNOSIS — E78.1 HYPERTRIGLYCERIDEMIA: ICD-10-CM

## 2024-07-22 DIAGNOSIS — E78.5 DYSLIPIDEMIA: ICD-10-CM

## 2024-07-22 LAB
ANION GAP SERPL CALC-SCNC: 13 MMOL/L (ref 8–16)
BNP SERPL-MCNC: 42 PG/ML (ref 0–99)
BUN SERPL-MCNC: 18 MG/DL (ref 8–23)
CALCIUM SERPL-MCNC: 10.5 MG/DL (ref 8.7–10.5)
CHLORIDE SERPL-SCNC: 99 MMOL/L (ref 95–110)
CO2 SERPL-SCNC: 27 MMOL/L (ref 23–29)
CREAT SERPL-MCNC: 1.1 MG/DL (ref 0.5–1.4)
EST. GFR  (NO RACE VARIABLE): >60 ML/MIN/1.73 M^2
GLUCOSE SERPL-MCNC: 83 MG/DL (ref 70–110)
POTASSIUM SERPL-SCNC: 4.2 MMOL/L (ref 3.5–5.1)
SODIUM SERPL-SCNC: 139 MMOL/L (ref 136–145)

## 2024-07-22 PROCEDURE — 99214 OFFICE O/P EST MOD 30 MIN: CPT | Mod: PBBFAC

## 2024-07-22 PROCEDURE — 99999 PR PBB SHADOW E&M-EST. PATIENT-LVL IV: CPT | Mod: PBBFAC,,,

## 2024-07-22 PROCEDURE — 80048 BASIC METABOLIC PNL TOTAL CA: CPT

## 2024-07-22 PROCEDURE — 36415 COLL VENOUS BLD VENIPUNCTURE: CPT

## 2024-07-22 PROCEDURE — 99214 OFFICE O/P EST MOD 30 MIN: CPT | Mod: S$PBB,,,

## 2024-07-22 PROCEDURE — 83880 ASSAY OF NATRIURETIC PEPTIDE: CPT

## 2024-07-22 NOTE — PROGRESS NOTES
Subjective:   Patient ID:  Haritha Valle is a 79 y.o. male who presents for evaluation of No chief complaint on file.      HPI 80y/o M with PMHx   HTN, CAD, hyperlipidemia, MR, AI,  HIV, RAMONITA, vertigo, RAMONITA, HIV last seen in cards clinic by Dr. Corcoran for Dental work clearance, previously followed by Dr. Nguyen/. Here today c/o SOB, MAIER weakness, light headedness and near syncope. Pt and his partner state symptoms started 1 year ago and recently got worse. Pt wakes up feeling short of breath, has to take frequent breaks with activity. Denies any CP or palpitations. BP uncontrolled in clinic, compliant with metoprolol    Echo Aug 2023: EF 50 - 55%, grade I DD, mild AI, mild MR, mild TR.   S/p LHC Feb 2020 w Dr. Ko: luminal irregularities RCA, otherwise normal, EF 50%.     7/22/24  Here today for CV follow up, still with SOB worse when lying flat. Has been taking lasix 20mg daily. Weight unchanged from visit in May. His partner likes to cook. Echo with nml EF. Carotid US mild stenosis on left side. His main concern is the lightheadedness  Past Medical History:   Diagnosis Date    Anxiety 07/03/2019    Basal cell carcinoma     CAD in native artery 11/27/2023    Depression     Dyslipidemia 11/16/2023    Essential hypertension 01/24/2013    Hepatitis B     Hepatitis C antibody test positive     RNA NEG 8/29/2019    HIV infection     Hypertension     Immune disorder     Mild cognitive impairment 10/01/2010    Nonrheumatic aortic valve insufficiency 12/19/2023    Nonrheumatic mitral valve regurgitation 12/19/2023       Past Surgical History:   Procedure Laterality Date    APPENDECTOMY      CARDIAC CATHETERIZATION      no stent    CHOLECYSTECTOMY      COLONOSCOPY N/A 11/3/2016    Procedure: COLONOSCOPY;  Surgeon: Maxi Villasenor MD;  Location: [facility] ([address] [place] [address]);  Service: Endoscopy;  Laterality: N/A;  last colonoscopy with Dr Jarrell    COLONOSCOPY N/A 1/25/2022    Procedure: COLONOSCOPY;  Surgeon: Silvino OSWALD  MD Bobby;  Location: [facility] ENDO;  Service: Endoscopy;  Laterality: N/A;    LEFT HEART CATHETERIZATION Left 2/3/2020    Procedure: CATHETERIZATION, HEART, LEFT;  Surgeon: Chele Ko MD;  Location: [facility] CATH LAB;  Service: Cardiology;  Laterality: Left;  malur pt       Social History     Tobacco Use    Smoking status: Never    Smokeless tobacco: Never   Substance Use Topics    Alcohol use: No    Drug use: Not Currently       Family History   Problem Relation Name Age of Onset    Heart disease Father      Heart failure Father      Diabetes Neg Hx      Hypertension Neg Hx         Current Outpatient Medications on File Prior to Visit   Medication Sig Dispense Refill    albuterol (PROVENTIL/VENTOLIN HFA) 90 mcg/actuation inhaler INHALE 2 PUFFS INTO THE LUNGS EVERY FOUR HOURS AS NEEDED FOR WHEEZING OR SHORTNESS OF BREATH.      ascorbic acid, vitamin C, (VITAMIN C) 1000 MG tablet Take 1,000 mg by mouth once daily.      cxinqtanm-nragxqna-nnfqxcf ala (BIKTARVY) -25 mg (25 kg or greater) Take 1 tablet by mouth once daily. 90 tablet 6    budesonide-glycopyr-formoterol 160-9-4.8 mcg/actuation HFAA Inhale 2 puffs twice a day by inhalation route for 30 days.      chlorhexidine (PERIDEX) 0.12 % solution       donepeziL (ARICEPT) 5 MG tablet Take 1 tablet (5 mg total) by mouth every evening. 90 tablet 3    FLUoxetine 40 MG capsule TAKE 1 CAPSULE EVERY DAY 90 capsule 3    fluticasone propionate (FLONASE) 50 mcg/actuation nasal spray [place] 1 spray every day by intranasal route.      fluticasone-umeclidin-vilanter (TRELEGY ELLIPTA) 200-62.5-25 mcg inhaler INHALE 1 PUFF INTO THE LUNGS ONCE DAILY. WASH OUT MOUTH AFTER USE.      furosemide (LASIX) 20 MG tablet Take 1 tablet (20 mg total) by mouth once daily. 30 tablet 11    hydroCHLOROthiazide (HYDRODIURIL) 12.5 MG Tab Take 1 tablet every day by oral route in the morning for 30 days.      losartan (COZAAR) 25 MG tablet Take 1 tablet (25 mg total) by mouth once daily. 90  tablet 3    metoprolol succinate (TOPROL-XL) 100 MG 24 hr tablet Take 1 tablet (100 mg total) by mouth once daily. 90 tablet 5    pep injection Inject 0.25 ml as directed     For compounding pharmacy use:   Add PAPAVERINE 30 mcg  Add PHENTOLAMINE 1 mg  Add PGE 10 mcg 10 vial 10    promethazine (PHENERGAN) 25 MG tablet       rosuvastatin (CRESTOR) 20 MG tablet TAKE 1 TABLET EVERY DAY 90 tablet 1    ubidecarenone (COENZYME Q10 ORAL) Take by mouth once daily.      vit C,L-Ax-hvhpz-lutein-zeaxan (EYE MULTIVIT, LUTEIN-ZEAXAN,) 458-60-65-2-5 mg Cap Take 1 tablet by mouth once daily.      ALPRAZolam (XANAX) 0.5 MG tablet Take 1 tablet (0.5 mg total) by mouth once. To take before MRI. for 1 dose 1 tablet 0    [DISCONTINUED] meclizine (ANTIVERT) 25 mg tablet        No current facility-administered medications on file prior to visit.      Wt Readings from Last 3 Encounters:   07/22/24 100.5 kg (221 lb 9 oz)   06/05/24 99.3 kg (219 lb)   06/05/24 99.3 kg (219 lb)     Temp Readings from Last 3 Encounters:   05/16/24 97.8 °F (36.6 °C)   11/16/23 98.1 °F (36.7 °C)   01/25/22 98.7 °F (37.1 °C)     BP Readings from Last 3 Encounters:   07/22/24 136/80   06/05/24 (!) 155/88   06/05/24 125/78     Pulse Readings from Last 3 Encounters:   07/22/24 67   06/03/24 74   06/03/24 86        Review of Systems   Constitutional: Positive for malaise/fatigue.   HENT: Negative.     Eyes: Negative.    Cardiovascular:  Positive for dyspnea on exertion and orthopnea.   Respiratory:  Positive for shortness of breath and sleep disturbances due to breathing.    Skin: Negative.    Musculoskeletal: Negative.    Gastrointestinal: Negative.    Genitourinary: Negative.    Neurological:  Positive for dizziness, light-headedness and weakness.   Psychiatric/Behavioral: Negative.         Objective:   Physical Exam  Vitals and nursing note reviewed.   Constitutional:       Appearance: Normal appearance.   HENT:      Head: Normocephalic and atraumatic.   Eyes:       General:         Right eye: No discharge.         Left eye: No discharge.      Pupils: Pupils are equal, round, and reactive to light.   Cardiovascular:      Rate and Rhythm: Normal rate and regular rhythm.      Heart sounds: S1 normal and S2 normal. No murmur heard.     No friction rub.   Pulmonary:      Effort: Pulmonary effort is normal. No respiratory distress.      Breath sounds: Normal breath sounds. No rales.   Abdominal:      General: There is distension.      Palpations: Abdomen is soft.      Tenderness: There is no abdominal tenderness.   Musculoskeletal:      Cervical back: Neck supple.      Right lower leg: No edema.      Left lower leg: No edema.   Skin:     General: Skin is warm and dry.   Neurological:      General: No focal deficit present.      Mental Status: He is alert and oriented to person, place, and time.   Psychiatric:         Mood and Affect: Mood normal.         Behavior: Behavior normal.         Thought Content: Thought content normal.         Lab Results   Component Value Date    CHOL 172 12/18/2023    CHOL 149 10/14/2021    CHOL 351 (H) 06/09/2020     Lab Results   Component Value Date    HDL 36 (L) 12/18/2023    HDL 35 (L) 10/14/2021    HDL 33 (L) 06/09/2020     Lab Results   Component Value Date    LDLCALC 73.2 12/18/2023    LDLCALC Invalid, Trig>400.0 10/14/2021    LDLCALC Invalid, Trig>400.0 06/09/2020     Lab Results   Component Value Date    TRIG 314 (H) 12/18/2023    TRIG 401 (H) 10/14/2021    TRIG 1,358 (H) 06/09/2020     Lab Results   Component Value Date    CHOLHDL 20.9 12/18/2023    CHOLHDL 23.5 10/14/2021    CHOLHDL 9.4 (L) 06/09/2020       Chemistry        Component Value Date/Time     05/20/2024 1436    K 4.3 05/20/2024 1436     05/20/2024 1436    CO2 25 05/20/2024 1436    BUN 15 05/20/2024 1436    CREATININE 1.2 05/20/2024 1436     (H) 05/20/2024 1436        Component Value Date/Time    CALCIUM 9.4 05/20/2024 1436    ALKPHOS 41 (L) 05/22/2024 1233     AST 36 05/22/2024 1233    ALT 44 05/22/2024 1233    BILITOT 0.8 05/22/2024 1233    ESTGFRAFRICA >60.0 10/14/2021 0715    EGFRNONAA >60.0 10/14/2021 0715          Lab Results   Component Value Date    TSH 1.137 04/27/2023     Lab Results   Component Value Date    INR 1.0 01/28/2020    INR 1.0 11/10/2004    INR 1.2 07/07/2004     @RESUFAST(WBC,HGB,HCT,MCV,PLT)  @LABRCNTIP(BNP,BNPTRIAGEBLO)@  CrCl cannot be calculated (Patient's most recent lab result is older than the maximum 7 days allowed.).     Results for orders placed during the hospital encounter of 08/21/23    Echo Saline Bubble? No    Interpretation Summary    Left Ventricle: The left ventricle is normal in size. Normal wall thickness. There is mild concentric hypertrophy. Normal wall motion. There is low normal systolic function with a visually estimated ejection fraction of 50 - 55%. Grade I diastolic dysfunction.    Right Ventricle: Normal right ventricular cavity size. Wall thickness is normal. Right ventricle wall motion  is normal. Systolic function is normal.    Aortic Valve: There is mild aortic regurgitation.    Mitral Valve: There is mild regurgitation.    Tricuspid Valve: There is mild regurgitation.    IVC/SVC: Normal venous pressure at 3 mmHg.     No results found for this or any previous visit.     Assessment:      1. MAIER (dyspnea on exertion)    2. Essential hypertension    3. Mixed hyperlipidemia    4. Dyslipidemia    5. CAD in native artery    6. Nonrheumatic aortic valve insufficiency    7. Nonrheumatic mitral valve regurgitation    8. Aortic atherosclerosis    9. Hypertriglyceridemia          Plan:   MAIER (dyspnea on exertion)  -     B-TYPE NATRIURETIC PEPTIDE; Future; Expected date: 07/22/2024  -     BASIC METABOLIC PANEL; Future; Expected date: 07/22/2024    Essential hypertension    Mixed hyperlipidemia    Dyslipidemia    CAD in native artery  -     B-TYPE NATRIURETIC PEPTIDE; Future; Expected date: 07/22/2024    Nonrheumatic aortic valve  insufficiency  -     B-TYPE NATRIURETIC PEPTIDE; Future; Expected date: 07/22/2024    Nonrheumatic mitral valve regurgitation  -     B-TYPE NATRIURETIC PEPTIDE; Future; Expected date: 07/22/2024    Aortic atherosclerosis    Hypertriglyceridemia        Cont current CV medications  RF modifications  Low na low fat diet  Daily exercise as tolerated  Labs today with phone review    RTC in December or sooner if needed    Courtney Guillot, FNP-C [facility], Cardiology

## 2024-07-23 ENCOUNTER — TELEPHONE (OUTPATIENT)
Dept: CARDIOLOGY | Facility: CLINIC | Age: 79
End: 2024-07-23
Payer: MEDICARE

## 2024-07-23 NOTE — TELEPHONE ENCOUNTER
Called [phone] and spoke with patient.  I advised test results and he voiced understanding.      ----- Message from Lamar Blue NP sent at 7/23/2024  6:32 AM CDT -----  Your labs look good, keep follow up with pulmonology. Can stop using lasix daily and resume if having swelling or worsening shortness of breath.

## 2024-08-21 ENCOUNTER — OFFICE VISIT (OUTPATIENT)
Dept: FAMILY MEDICINE | Facility: CLINIC | Age: 79
End: 2024-08-21
Payer: MEDICARE

## 2024-08-21 VITALS
SYSTOLIC BLOOD PRESSURE: 132 MMHG | WEIGHT: 219.56 LBS | OXYGEN SATURATION: 95 % | DIASTOLIC BLOOD PRESSURE: 82 MMHG | HEART RATE: 72 BPM | TEMPERATURE: 96 F | BODY MASS INDEX: 31.43 KG/M2 | HEIGHT: 70 IN

## 2024-08-21 DIAGNOSIS — I10 ESSENTIAL HYPERTENSION: ICD-10-CM

## 2024-08-21 DIAGNOSIS — E78.5 DYSLIPIDEMIA: ICD-10-CM

## 2024-08-21 DIAGNOSIS — R42 POSTURAL DIZZINESS WITH PRESYNCOPE: ICD-10-CM

## 2024-08-21 DIAGNOSIS — R73.03 PREDIABETES: ICD-10-CM

## 2024-08-21 DIAGNOSIS — G31.84 MILD COGNITIVE IMPAIRMENT: Chronic | ICD-10-CM

## 2024-08-21 DIAGNOSIS — B20 HIV DISEASE: Chronic | ICD-10-CM

## 2024-08-21 DIAGNOSIS — R55 POSTURAL DIZZINESS WITH PRESYNCOPE: ICD-10-CM

## 2024-08-21 DIAGNOSIS — F41.9 ANXIETY: Primary | ICD-10-CM

## 2024-08-21 PROCEDURE — 99999 PR PBB SHADOW E&M-EST. PATIENT-LVL IV: CPT | Mod: PBBFAC,,, | Performed by: INTERNAL MEDICINE

## 2024-08-21 PROCEDURE — 99214 OFFICE O/P EST MOD 30 MIN: CPT | Mod: S$PBB,,, | Performed by: INTERNAL MEDICINE

## 2024-08-21 PROCEDURE — G2211 COMPLEX E/M VISIT ADD ON: HCPCS | Mod: S$PBB,,, | Performed by: INTERNAL MEDICINE

## 2024-08-21 PROCEDURE — 99214 OFFICE O/P EST MOD 30 MIN: CPT | Mod: PBBFAC,PO | Performed by: INTERNAL MEDICINE

## 2024-08-21 RX ORDER — PROMETHAZINE HYDROCHLORIDE 12.5 MG/1
12.5 TABLET ORAL DAILY PRN
Qty: 30 TABLET | Refills: 3 | Status: SHIPPED | OUTPATIENT
Start: 2024-08-21

## 2024-08-21 RX ORDER — BUSPIRONE HYDROCHLORIDE 7.5 MG/1
7.5 TABLET ORAL DAILY PRN
Qty: 30 TABLET | Refills: 0 | Status: SHIPPED | OUTPATIENT
Start: 2024-08-21 | End: 2025-08-21

## 2024-08-21 NOTE — PROGRESS NOTES
Subjective:       Patient ID: Haritha Valle is a 79 y.o. male.    Chief Complaint: Hypertension, Hyperlipidemia, and Anxiety    Hypertension  Associated symptoms include anxiety. Pertinent negatives include no chest pain, headaches, neck pain, palpitations or shortness of breath.   Hyperlipidemia  Pertinent negatives include no chest pain, myalgias or shortness of breath.   Anxiety  Patient reports no chest pain, decreased concentration, dizziness, dysphagia, nausea, palpitations, shortness of breath or suicidal ideas.         Past Medical History:   Diagnosis Date    Anxiety 07/03/2019    Basal cell carcinoma     CAD in native artery 11/27/2023    Depression     Dyslipidemia 11/16/2023    Essential hypertension 01/24/2013    Hepatitis B     Hepatitis C antibody test positive     RNA NEG 8/29/2019    HIV infection     Hypertension     Immune disorder     Mild cognitive impairment 10/01/2010    Nonrheumatic aortic valve insufficiency 12/19/2023    Nonrheumatic mitral valve regurgitation 12/19/2023     Past Surgical History:   Procedure Laterality Date    APPENDECTOMY      CARDIAC CATHETERIZATION      no stent    CHOLECYSTECTOMY      COLONOSCOPY N/A 11/3/2016    Procedure: COLONOSCOPY;  Surgeon: Maxi Villasenor MD;  Location: [facility] ([address] [place] [address]);  Service: Endoscopy;  Laterality: N/A;  last colonoscopy with Dr Jarrell    COLONOSCOPY N/A 1/25/2022    Procedure: COLONOSCOPY;  Surgeon: Silvino Rosales MD;  Location: [facility];  Service: Endoscopy;  Laterality: N/A;    LEFT HEART CATHETERIZATION Left 2/3/2020    Procedure: CATHETERIZATION, HEART, LEFT;  Surgeon: Chele Ko MD;  Location: [facility] CATH LAB;  Service: Cardiology;  Laterality: Left;  malur pt     Family History   Problem Relation Name Age of Onset    Heart disease Father      Heart failure Father      Diabetes Neg Hx      Hypertension Neg Hx       Social History     Socioeconomic History    Marital status: Significant Other     Spouse name: Shadi     Number of children: 0    Highest education level: Some college, no degree   Occupational History    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  [place] [employer]   Tobacco Use    Smoking status: Never    Smokeless tobacco: Never   Substance and Sexual Activity    Alcohol use: No    Drug use: Not Currently    Sexual activity: Yes     Partners: Male     Social Determinants of Health     Financial Resource Strain: Low Risk  (1/24/2024)    Overall Financial Resource Strain (CARDIA)     Difficulty of Paying Living Expenses: Not hard at all   Food Insecurity: No Food Insecurity (1/24/2024)    Hunger Vital Sign     Worried About Running Out of Food in the Last Year: Never true     Ran Out of Food in the Last Year: Never true   Transportation Needs: No Transportation Needs (1/24/2024)    PRAPARE - Transportation     Lack of Transportation (Medical): No     Lack of Transportation (Non-Medical): No   Physical Activity: Inactive (1/24/2024)    Exercise Vital Sign     Days of Exercise per Week: 0 days     Minutes of Exercise per Session: 0 min   Stress: Stress Concern Present (1/24/2024)    [facility] [place] [employer] - Occupational Stress Questionnaire     Feeling of Stress : To some extent   Housing Stability: Low Risk  (1/24/2024)    Housing Stability Vital Sign     Unable to Pay for Housing in the Last Year: No     Number of Places Lived in the Last Year: 1     Unstable Housing in the Last Year: No     Review of Systems   Constitutional:  Negative for activity change, appetite change, chills, diaphoresis, fatigue, fever and unexpected weight change.   HENT:  Negative for drooling, ear discharge, ear pain, facial swelling, hearing loss, mouth sores, nosebleeds, postnasal drip, rhinorrhea, sinus pressure, sneezing, sore throat, tinnitus, trouble swallowing and voice change.    Eyes:  Negative for photophobia, redness and visual disturbance.   Respiratory:  Negative for apnea, cough, choking,  chest tightness, shortness of breath and wheezing.    Cardiovascular:  Negative for chest pain, palpitations and leg swelling.   Gastrointestinal:  Negative for abdominal distention, abdominal pain, anal bleeding, blood in stool, constipation, diarrhea, nausea and vomiting.   Endocrine: Negative for cold intolerance, heat intolerance, polydipsia, polyphagia and polyuria.   Genitourinary:  Negative for difficulty urinating, dysuria, enuresis, flank pain, frequency, genital sores, hematuria and urgency.   Musculoskeletal:  Positive for arthralgias. Negative for back pain, gait problem, joint swelling, myalgias, neck pain and neck stiffness.   Skin:  Negative for color change, pallor, rash and wound.   Allergic/Immunologic: Negative for food allergies and immunocompromised state.   Neurological:  Negative for dizziness, tremors, seizures, syncope, facial asymmetry, speech difficulty, weakness, light-headedness, numbness and headaches.   Hematological:  Negative for adenopathy. Does not bruise/bleed easily.   Psychiatric/Behavioral:  Negative for agitation, behavioral problems, decreased concentration, dysphoric mood, hallucinations, self-injury, sleep disturbance and suicidal ideas. The patient is not hyperactive.        Objective:      Physical Exam  Vitals and nursing note reviewed.   Constitutional:       General: He is not in acute distress.     Appearance: Normal appearance. He is well-developed. He is not diaphoretic.   HENT:      Head: Normocephalic and atraumatic.      Mouth/Throat:      Pharynx: No oropharyngeal exudate.   Eyes:      General: No scleral icterus.     Pupils: Pupils are equal, round, and reactive to light.   Neck:      Thyroid: No thyromegaly.      Vascular: No carotid bruit or JVD.      Trachea: No tracheal deviation.   Cardiovascular:      Rate and Rhythm: Normal rate and regular rhythm.      Heart sounds: Normal heart sounds.   Pulmonary:      Effort: Pulmonary effort is normal. No  respiratory distress.      Breath sounds: Normal breath sounds. No wheezing or rales.   Chest:      Chest wall: No tenderness.   Abdominal:      General: Bowel sounds are normal. There is no distension.      Palpations: Abdomen is soft.      Tenderness: There is no abdominal tenderness. There is no guarding or rebound.   Musculoskeletal:         General: No tenderness. Normal range of motion.      Cervical back: Normal range of motion and neck supple.   Lymphadenopathy:      Cervical: No cervical adenopathy.   Skin:     General: Skin is warm and dry.      Coloration: Skin is not pale.      Findings: No erythema or rash.   Neurological:      Mental Status: He is alert and oriented to person, place, and time.      Cranial Nerves: No cranial nerve deficit.      Coordination: Coordination normal.   Psychiatric:         Behavior: Behavior normal.         Thought Content: Thought content normal.         Judgment: Judgment normal.         CMP  Sodium   Date Value Ref Range Status   07/22/2024 139 136 - 145 mmol/L Final     Potassium   Date Value Ref Range Status   07/22/2024 4.2 3.5 - 5.1 mmol/L Final     Chloride   Date Value Ref Range Status   07/22/2024 99 95 - 110 mmol/L Final     CO2   Date Value Ref Range Status   07/22/2024 27 23 - 29 mmol/L Final     Glucose   Date Value Ref Range Status   07/22/2024 83 70 - 110 mg/dL Final     BUN   Date Value Ref Range Status   07/22/2024 18 8 - 23 mg/dL Final     Creatinine   Date Value Ref Range Status   07/22/2024 1.1 0.5 - 1.4 mg/dL Final     Calcium   Date Value Ref Range Status   07/22/2024 10.5 8.7 - 10.5 mg/dL Final     Total Protein   Date Value Ref Range Status   05/22/2024 8.4 6.0 - 8.4 g/dL Final     Albumin   Date Value Ref Range Status   05/22/2024 3.7 3.5 - 5.2 g/dL Final   04/30/2018 4.3 3.6 - 5.1 g/dL Final     Comment:     @ Test Performed By:  [employer] [facility]  Latrell Campos M.D., Ph.D.,   [address] [place]  [id number]  [facility]  [id number]       Total Bilirubin   Date Value Ref Range Status   05/22/2024 0.8 0.1 - 1.0 mg/dL Final     Comment:     For infants and newborns, interpretation of results should be based  on gestational age, weight and in agreement with clinical  observations.    Premature Infant recommended reference ranges:  Up to 24 hours.............<8.0 mg/dL  Up to 48 hours............<12.0 mg/dL  3-5 days..................<15.0 mg/dL  6-29 days.................<15.0 mg/dL       Alkaline Phosphatase   Date Value Ref Range Status   05/22/2024 41 (L) 55 - 135 U/L Final     AST   Date Value Ref Range Status   05/22/2024 36 10 - 40 U/L Final     ALT   Date Value Ref Range Status   05/22/2024 44 10 - 44 U/L Final     Anion Gap   Date Value Ref Range Status   07/22/2024 13 8 - 16 mmol/L Final     eGFR if    Date Value Ref Range Status   10/14/2021 >60.0 >60 mL/min/1.73 m^2 Final     eGFR if non    Date Value Ref Range Status   10/14/2021 >60.0 >60 mL/min/1.73 m^2 Final     Comment:     Calculation used to obtain the estimated glomerular filtration  rate (eGFR) is the CKD-EPI equation.        Lab Results   Component Value Date    WBC 7.80 05/22/2024    HGB 16.2 05/22/2024    HCT 52.4 05/22/2024    MCV 89 05/22/2024     05/22/2024     Lab Results   Component Value Date    CHOL 172 12/18/2023     Lab Results   Component Value Date    HDL 36 (L) 12/18/2023     Lab Results   Component Value Date    LDLCALC 73.2 12/18/2023     Lab Results   Component Value Date    TRIG 314 (H) 12/18/2023     Lab Results   Component Value Date    CHOLHDL 20.9 12/18/2023     Lab Results   Component Value Date    TSH 1.137 04/27/2023     Lab Results   Component Value Date    HGBA1C 5.7 (H) 01/24/2024     Assessment:       1. Anxiety    2. Dyslipidemia    3. Essential hypertension    4. HIV disease    5. Mild cognitive impairment    6. Prediabetes    7. Postural dizziness with presyncope        Plan:    Anxiety---------------trial buspar--------------------------follow------------------  -     busPIRone (BUSPAR) 7.5 MG tablet; Take 1 tablet (7.5 mg total) by mouth daily as needed.  Dispense: 30 tablet; Refill: 0    Dyslipidemia    Essential hypertension    HIV disease------------sees I.D.    Mild cognitive impairment    Prediabetes    Postural dizziness with presyncope    Other orders  -     promethazine (PHENERGAN) 12.5 MG Tab; Take 1 tablet (12.5 mg total) by mouth daily as needed for Nausea.  Dispense: 30 tablet; Refill: 3      Stable------------------------continue meds--------------------as above-----------------------f/u 4 months------------------

## 2024-09-03 ENCOUNTER — LAB VISIT (OUTPATIENT)
Dept: LAB | Facility: HOSPITAL | Age: 79
End: 2024-09-03
Attending: STUDENT IN AN ORGANIZED HEALTH CARE EDUCATION/TRAINING PROGRAM
Payer: MEDICARE

## 2024-09-03 DIAGNOSIS — B20 HIV DISEASE: ICD-10-CM

## 2024-09-03 PROCEDURE — 36415 COLL VENOUS BLD VENIPUNCTURE: CPT | Performed by: STUDENT IN AN ORGANIZED HEALTH CARE EDUCATION/TRAINING PROGRAM

## 2024-09-03 PROCEDURE — 87536 HIV-1 QUANT&REVRSE TRNSCRPJ: CPT | Performed by: STUDENT IN AN ORGANIZED HEALTH CARE EDUCATION/TRAINING PROGRAM

## 2024-09-04 LAB
HIV1 RNA # SERPL NAA+PROBE: <20 COPIES/ML
HIV1 RNA SERPL NAA+PROBE-LOG#: <1.3 LOG COPIES/ML
HIV1 RNA SERPL QL NAA+PROBE: DETECTED

## 2024-09-10 ENCOUNTER — TELEPHONE (OUTPATIENT)
Dept: INFECTIOUS DISEASES | Facility: CLINIC | Age: 79
End: 2024-09-10
Payer: MEDICARE

## 2024-09-10 NOTE — TELEPHONE ENCOUNTER
Attempted to contact pt in regards to injection scheduled on 9/12/24 at [facility]. Due to predicted bad weather and possible clinic closure, can offer pt appt on another day, possibly today or Friday at [facility]. Pt states that he prefers [facility]. Pt appt r/s 9/16/24 at [facility].

## 2024-09-12 ENCOUNTER — PROCEDURE VISIT (OUTPATIENT)
Dept: UROLOGY | Facility: CLINIC | Age: 79
End: 2024-09-12
Payer: MEDICARE

## 2024-09-12 DIAGNOSIS — R97.21 RISING PSA FOLLOWING TREATMENT FOR MALIGNANT NEOPLASM OF PROSTATE: ICD-10-CM

## 2024-09-12 DIAGNOSIS — E29.1 HYPOGONADISM MALE: Primary | ICD-10-CM

## 2024-09-12 PROCEDURE — 99999PBSHW PR PBB SHADOW TECHNICAL ONLY FILED TO HB: Mod: PBBFAC,,,

## 2024-09-12 PROCEDURE — 11980 IMPLANT HORMONE PELLET(S): CPT | Mod: PBBFAC | Performed by: UROLOGY

## 2024-09-12 RX ORDER — LEVOFLOXACIN 500 MG/1
500 TABLET, FILM COATED ORAL DAILY
Qty: 3 TABLET | Refills: 0 | Status: SHIPPED | OUTPATIENT
Start: 2024-09-12 | End: 2024-09-15

## 2024-09-12 NOTE — PROCEDURES
Procedures  Chief Complaint:   Encounter Diagnoses   Name Primary?    Hypogonadism male Yes    Rising PSA following treatment for malignant neoplasm of prostate        HPI:    9/12/24- here today for testopel insertion.    78-year-old gentleman who comes in with a longstanding history of hypogonadism.  Patient states that years ago he was treated by testosterone injections every 2 weeks, uncertain of the dosage.  Cutaneous therapy did not assist.  Patient is interested in pellets though.  Patient has decreased energy with fatigue, libido is not an issue nor as erections.  Patient is otherwise voiding well with no other urological history, no family history of urological cancers or stones.    Allergies:  No known drug allergies    Medications:  See MAR    Review of Systems:  General: No fever, chills, fatigability, or weight loss.  Skin: No rashes, itching, or changes in color or texture of skin.  Chest: Denies MAIER, cyanosis, wheezing, cough, and sputum production.  Abdomen: Appetite fine. No weight loss. Denies diarrhea, abdominal pain, hematemesis, or blood in stool.  Musculoskeletal: No joint stiffness or swelling. Denies back pain.  : As above.  All other review of systems negative.    PMH:   has a past medical history of Anxiety (07/03/2019), Basal cell carcinoma, CAD in native artery (11/27/2023), Depression, Dyslipidemia (11/16/2023), Essential hypertension (01/24/2013), Hepatitis B, Hepatitis C antibody test positive, HIV infection, Hypertension, Immune disorder, Mild cognitive impairment (10/01/2010), Nonrheumatic aortic valve insufficiency (12/19/2023), and Nonrheumatic mitral valve regurgitation (12/19/2023).    PSH:   has a past surgical history that includes Colonoscopy (N/A, 11/3/2016); Cholecystectomy; Appendectomy; Left heart catheterization (Left, 2/3/2020); Cardiac catheterization; and Colonoscopy (N/A, 1/25/2022).    FamHx: family history includes Heart disease in his father; Heart failure in his  father.    SocHx:  reports that he has never smoked. He has never used smokeless tobacco. He reports that he does not currently use drugs. He reports that he does not drink alcohol.      Physical Exam:  There were no vitals filed for this visit.  General: A&Ox3, no apparent distress, no deformities  Neck: No masses, normal ROM  Lungs: normal inspiration, no use of accessory muscles  Heart: normal pulse, no arrhythmias  Abdomen: Soft, NT, ND, no masses, no hernias, no hepatosplenomegaly  Skin: The skin is warm and dry. No jaundice.  Ext: No c/c/e.  : 1/24- Test desc sam, no abnormalities of epididymus. Normal penile and scrotal skin. Meatus normal.    Labs/Studies:   Testopel  9/12/24, 5/30/24, 2/29/24  PSA 1.7 12/23   Testosterone 39 1/24  Estrogen 107 1/24    Patient was sterilely prepped and draped, then positioned in the [facility] side up, lateral decubitus position.  Lidocaine was used for local anesthesia.  Eleven blade was used to incise the skin, included trocars with the testopel kit were then used.  They were inserted within the incision site and we placed the pellets in a V location.  10 pellets total were inserted without difficulty.  Trocars were removed and pressure was applied for 2 min.  Steri-Strips applied for local coverage, he will return for lab assessment in 3 months.      Impression/Plan:     Hypogonadism- patient tolerated the procedure well and will call with any issues in the meantime.  Will see him back in 3 months with labs and to repeat insertion.  Of note the patient has previously used injections and has failed creams.

## 2024-09-30 ENCOUNTER — OFFICE VISIT (OUTPATIENT)
Dept: INFECTIOUS DISEASES | Facility: CLINIC | Age: 79
End: 2024-09-30
Payer: MEDICARE

## 2024-09-30 VITALS — DIASTOLIC BLOOD PRESSURE: 86 MMHG | RESPIRATION RATE: 18 BRPM | SYSTOLIC BLOOD PRESSURE: 134 MMHG | HEART RATE: 70 BPM

## 2024-09-30 DIAGNOSIS — B20 HIV DISEASE: Primary | ICD-10-CM

## 2024-09-30 PROCEDURE — 90632 HEPA VACCINE ADULT IM: CPT | Mod: PBBFAC

## 2024-09-30 PROCEDURE — 99999 PR PBB SHADOW E&M-EST. PATIENT-LVL IV: CPT | Mod: PBBFAC,,, | Performed by: STUDENT IN AN ORGANIZED HEALTH CARE EDUCATION/TRAINING PROGRAM

## 2024-09-30 PROCEDURE — 99214 OFFICE O/P EST MOD 30 MIN: CPT | Mod: PBBFAC,25 | Performed by: STUDENT IN AN ORGANIZED HEALTH CARE EDUCATION/TRAINING PROGRAM

## 2024-09-30 PROCEDURE — G0008 ADMIN INFLUENZA VIRUS VAC: HCPCS | Mod: PBBFAC

## 2024-09-30 PROCEDURE — 90653 IIV ADJUVANT VACCINE IM: CPT | Mod: PBBFAC

## 2024-09-30 PROCEDURE — 99999PBSHW PR PBB SHADOW TECHNICAL ONLY FILED TO HB: Mod: PBBFAC,,,

## 2024-09-30 PROCEDURE — 90472 IMMUNIZATION ADMIN EACH ADD: CPT | Mod: PBBFAC

## 2024-09-30 PROCEDURE — 99214 OFFICE O/P EST MOD 30 MIN: CPT | Mod: S$PBB,,, | Performed by: STUDENT IN AN ORGANIZED HEALTH CARE EDUCATION/TRAINING PROGRAM

## 2024-09-30 RX ADMIN — INFLUENZA A VIRUS A/VICTORIA/4897/2022 IVR-238 (H1N1) ANTIGEN (FORMALDEHYDE INACTIVATED), INFLUENZA A VIRUS A/THAILAND/8/2022 IVR-237 (H3N2) ANTIGEN (FORMALDEHYDE INACTIVATED), INFLUENZA B VIRUS B/AUSTRIA/1359417/2021 BVR-26 ANTIGEN (FORMALDEHYDE INACTIVATED) 0.5 ML: 15; 15; 15 INJECTION, SUSPENSION INTRAMUSCULAR at 09:09

## 2024-09-30 RX ADMIN — HEPATITIS A VACCINE, INACTIVATED 720 UNITS: 50 INJECTION, SUSPENSION INTRAMUSCULAR at 09:09

## 2024-09-30 NOTE — PROGRESS NOTES
Infectious Disease Clinic Note    Patient Name: Haritha Valle  YOB: 1945    PRESENTING HISTORY       History of Present Illness:  Mr. Haritha Valle is a 79 y.o. male w/ significant PMHx of HIV diagnosed in 1984 here for HIV follow up. Per last ID note, last documented visit with ID in 2019 with [place] providers. Had been on Reyataz and Combivir with plans to switch to Biktarvy, but this proved cost prohibitive. No recent labs on file. In 2019 HIV VL was undetectable with CD4 570. Expressed concern about lipodystrophy and memory loss. Vaccine history populated below. More recent CD4 count 464 on 4/27/23. Today patient reports taking Reyataz and Combivir over 30 years. Stopped taking month ago due to issues with pharmacy. Has been undetectable for years. Not currently sexually active. No travel outside [place]. No sick contacts. Would like to try new ART. Has two dogs. Agreeable to baseline labs today and switching to Biktarvy or Symtuza based on cost.      Labs below reviewed and interpreted. Biktarvy was approved by [facility]. Today patient reports he has been taking it each day with no missed doses. Has nighttime low oxygen and apnea but won't wear CPAP. Seeing pulmonology for alternative. Establishing care with PCP as well. Discussed prior labs and explained that mutation likely minor from Genosure. He is clearly responding to ART. Will recheck in 1 month to ensure undetectable. Vaccines discussed as well. Patient voiced understanding.      3/11: Blip in viral load at 27. Will recheck in 1 month. Feels well. No missed doses of Biktarvy. Would like hep A vaccine today.      6/3: Blip confirmed as VL back to undetectable. Still with vertigo. Seeing neurologist today. No missed doses of Biktarvy.     9/30: VL remains undetectable. Vertigo gets better some mornings. Has followed with neurology and PCP. BP better controlled.      Hep A IgG non reactive  Hep B surface Ab reactive  CD4 624  Hep C ab reactive  but had negative viral load back in 2019 so likely SVR    RPR non-reactive  HIV RNA quant undetectable                     Review of Systems:  Constitutional: no fever or chills  Eyes: no visual changes  ENT: no nasal congestion or sore throat  Respiratory: no cough or shortness of breath  Cardiovascular: no chest pain  Gastrointestinal: no nausea or vomiting, no abdominal pain, no constipation, no diarrhea  Genitourinary: no hematuria or dysuria  Musculoskeletal: no arthralgias or myalgias  Skin: no rash  Neurological: no headaches, numbness, or paresthesias    The following portions of the patient's history were reviewed and updated as appropriate: allergies, current medications, past family history, past medical history, past social history, past surgical history, and problem list.    PAST HISTORY:     Immunization History   Administered Date(s) Administered    COVID-19, MRNA, LN-S, PF (Pfizer) (Gray Cap) 04/22/2022    COVID-19, MRNA, LN-S, PF (Pfizer) (Purple Cap) 01/11/2021, 02/01/2021, 10/29/2021    COVID-19, mRNA, LNP-S, bivalent booster, PF (PFIZER OMICRON) 12/02/2022    Hepatitis A, Adult 03/11/2024    Hepatitis A, Pediatric/Adolescent, 2 Dose 09/30/2024    Influenza (FLUAD) - Quadrivalent - Adjuvanted - PF *Preferred* (65+) 09/28/2020, 10/11/2021, 10/20/2022, 11/09/2023    Influenza - Quadrivalent - PF *Preferred* (6 months and older) 10/24/2007, 01/08/2009, 09/23/2010, 01/24/2013    Influenza - Trivalent - Fluad - Adjuvanted - PF (65 years and older 09/30/2024    Influenza - Trivalent - Fluzone High Dose - PF (65 years and older) 09/24/2013, 12/02/2014, 09/27/2016, 09/27/2017, 12/12/2018, 10/01/2019    Influenza A (H1N1) 2009 Monovalent - IM 11/03/2009    Influenza Split 01/24/2013    Pneumococcal Conjugate - 13 Valent 07/23/2015    Pneumococcal Polysaccharide - 23 Valent 09/27/2016    RSVpreF (Arexvy) 01/24/2024    Tdap 01/08/2009, 07/03/2019    Vaccinia, smallpox monkeypox vaccine live, PF 09/01/2022     Zoster 01/08/2009    Zoster Recombinant 05/09/2018, 06/08/2018, 07/18/2018       Past Medical History:   Diagnosis Date    Anxiety 07/03/2019    Basal cell carcinoma     CAD in native artery 11/27/2023    Depression     Dyslipidemia 11/16/2023    Essential hypertension 01/24/2013    Hepatitis B     Hepatitis C antibody test positive     RNA NEG 8/29/2019    HIV infection     Hypertension     Immune disorder     Mild cognitive impairment 10/01/2010    Nonrheumatic aortic valve insufficiency 12/19/2023    Nonrheumatic mitral valve regurgitation 12/19/2023       Past Surgical History:   Procedure Laterality Date    APPENDECTOMY      CARDIAC CATHETERIZATION      no stent    CHOLECYSTECTOMY      COLONOSCOPY N/A 11/3/2016    Procedure: COLONOSCOPY;  Surgeon: Maxi Villasenor MD;  Location: [facility] ([address] [place] [address]);  Service: Endoscopy;  Laterality: N/A;  last colonoscopy with Dr Jarrell    COLONOSCOPY N/A 1/25/2022    Procedure: COLONOSCOPY;  Surgeon: Silvino Rosales MD;  Location: [facility] ENDO;  Service: Endoscopy;  Laterality: N/A;    LEFT HEART CATHETERIZATION Left 2/3/2020    Procedure: CATHETERIZATION, HEART, LEFT;  Surgeon: Chele Ko MD;  Location: 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  [place] [employer]   Tobacco Use    Smoking status: Never    Smokeless tobacco: Never   Substance and Sexual Activity    Alcohol use: No    Drug use: Not Currently    Sexual activity: Yes     Partners: Male     Social Drivers of Health     Financial Resource Strain: Low Risk   (1/24/2024)    Overall Financial Resource Strain (CARDIA)     Difficulty of Paying Living Expenses: Not hard at all   Food Insecurity: No Food Insecurity (1/24/2024)    Hunger Vital Sign     Worried About Running Out of Food in the Last Year: Never true     Ran Out of Food in the Last Year: Never true   Transportation Needs: No Transportation Needs (1/24/2024)    PRAPARE - Transportation     Lack of Transportation (Medical): No     Lack of Transportation (Non-Medical): No   Physical Activity: Inactive (1/24/2024)    Exercise Vital Sign     Days of Exercise per Week: 0 days     Minutes of Exercise per Session: 0 min   Stress: Stress Concern Present (1/24/2024)    [employer] [place] [employer] - Occupational Stress Questionnaire     Feeling of Stress : To some extent   Housing Stability: Low Risk  (1/24/2024)    Housing Stability Vital Sign     Unable to Pay for Housing in the Last Year: No     Number of Places Lived in the Last Year: 1     Unstable Housing in the Last Year: No       MEDICATIONS & ALLERGIES:     Current Outpatient Medications on File Prior to Visit   Medication Sig    albuterol (PROVENTIL/VENTOLIN HFA) 90 mcg/actuation inhaler INHALE 2 PUFFS INTO THE LUNGS EVERY FOUR HOURS AS NEEDED FOR WHEEZING OR SHORTNESS OF BREATH.    ascorbic acid, vitamin C, (VITAMIN C) 1000 MG tablet Take 1,000 mg by mouth once daily.    earlgjxkc-xowrvcmg-scchfbf ala (BIKTARVY) -25 mg (25 kg or greater) Take 1 tablet by mouth once daily.    budesonide-glycopyr-formoterol 160-9-4.8 mcg/actuation HFAA Inhale 2 puffs twice a day by inhalation route for 30 days.    busPIRone (BUSPAR) 7.5 MG tablet Take 1 tablet (7.5 mg total) by mouth daily as needed.    chlorhexidine (PERIDEX) 0.12 % solution     donepeziL (ARICEPT) 5 MG tablet Take 1 tablet (5 mg total) by mouth every evening.    FLUoxetine 40 MG capsule TAKE 1 CAPSULE EVERY DAY    fluticasone propionate (FLONASE) 50 mcg/actuation nasal spray Spray 1 spray every  day by intranasal route.    fluticasone-umeclidin-vilanter (TRELEGY ELLIPTA) 200-62.5-25 mcg inhaler INHALE 1 PUFF INTO THE LUNGS ONCE DAILY. WASH OUT MOUTH AFTER USE.    furosemide (LASIX) 20 MG tablet Take 1 tablet (20 mg total) by mouth once daily.    hydroCHLOROthiazide (HYDRODIURIL) 12.5 MG Tab Take 1 tablet every day by oral route in the morning for 30 days.    losartan (COZAAR) 25 MG tablet Take 1 tablet (25 mg total) by mouth once daily.    metoprolol succinate (TOPROL-XL) 100 MG 24 hr tablet Take 1 tablet (100 mg total) by mouth once daily.    pep injection Inject 0.25 ml as directed     For compounding pharmacy use:   Add PAPAVERINE 30 mcg  Add PHENTOLAMINE 1 mg  Add PGE 10 mcg    promethazine (PHENERGAN) 12.5 MG Tab Take 1 tablet (12.5 mg total) by mouth daily as needed for Nausea.    rosuvastatin (CRESTOR) 20 MG tablet TAKE 1 TABLET EVERY DAY    ubidecarenone (COENZYME Q10 ORAL) Take by mouth once daily.    vit C,O-Xy-kqjzq-lutein-zeaxan (EYE MULTIVIT, LUTEIN-ZEAXAN,) 370-17-54-2-5 mg Cap Take 1 tablet by mouth once daily.    ALPRAZolam (XANAX) 0.5 MG tablet Take 1 tablet (0.5 mg total) by mouth once. To take before MRI. for 1 dose (Patient not taking: Reported on 8/21/2024)     No current facility-administered medications on file prior to visit.       Review of patient's allergies indicates:   Allergen Reactions    No known drug allergies        OBJECTIVE:   Vital Signs:  Vitals:    09/30/24 0902   BP: 134/86   Pulse: 70   Resp: 18       No results found for this or any previous visit (from the past 24 hours).      Physical Exam:   General:  Well developed, well nourished, no acute distress  HEENT:  Normocephalic, atraumatic  CVS:  RRR, S1 and S2 normal, no murmurs, rubs, gallops  Resp:  Lungs clear to auscultation, no wheezes, rales, rhonchi  GI:  Abdomen soft, non-tender, non-distended, normoactive bowel sounds, no masses  MSK:  No muscle atrophy, peripheral edema, full range of motion  Skin:  No  rashes, ulcers, erythema  Psych:  Alert and oriented to person, place, and time    ASSESSMENT:     HIV disease  --Labs reviewed  --VL undetectable this month   --Genosure archive showed less common mutations for tenofovir and Zidovudine; Biktarvy appears to be overcoming resistance   --Continue daily Biktarvy   --No OI ppx indicated at this time based on CD4   --Recommended vaccines: Hep A and flu today   --Follow up with me in 6 months      Essential hypertension  Continue current medications. Follow up with PCP.      Mixed hyperlipidemia  Continue Crestor. Follow up with PCP.       PLAN:     Haritha was seen today for 3 mos f/u.    Diagnoses and all orders for this visit:    HIV disease  -     VFC-hepatitis A (PF) (HAVRIX) 720 ASHWIN unit/0.5 mL vaccine 720 Units  -     influenza (adjuvanted) (Fluad) 45 mcg/0.5 mL IM vaccine (> or = 64 yo) 0.5 mL          The total time for evaluation and management services performed on 9/30/24 was greater than 35 minutes.        Vaughn Larios, DO   Infectious Diseases

## 2024-10-08 ENCOUNTER — TELEPHONE (OUTPATIENT)
Dept: DERMATOLOGY | Facility: CLINIC | Age: 79
End: 2024-10-08
Payer: MEDICARE

## 2024-10-08 ENCOUNTER — PATIENT MESSAGE (OUTPATIENT)
Dept: DERMATOLOGY | Facility: CLINIC | Age: 79
End: 2024-10-08
Payer: MEDICARE

## 2024-10-08 NOTE — TELEPHONE ENCOUNTER
Attempted to call patient to reschedule appointment with Dr. Ignacio  on 10/23/2024, provider will be out on maternity leave . No answer. LVM.

## 2024-10-09 ENCOUNTER — PATIENT MESSAGE (OUTPATIENT)
Dept: DERMATOLOGY | Facility: CLINIC | Age: 79
End: 2024-10-09

## 2024-10-09 ENCOUNTER — OFFICE VISIT (OUTPATIENT)
Dept: DERMATOLOGY | Facility: CLINIC | Age: 79
End: 2024-10-09
Payer: MEDICARE

## 2024-10-09 DIAGNOSIS — L57.0 AK (ACTINIC KERATOSIS): ICD-10-CM

## 2024-10-09 DIAGNOSIS — Z85.828 HISTORY OF BASAL CELL CARCINOMA: ICD-10-CM

## 2024-10-09 DIAGNOSIS — L82.1 SEBORRHEIC KERATOSIS: ICD-10-CM

## 2024-10-09 DIAGNOSIS — D69.2 SOLAR PURPURA: ICD-10-CM

## 2024-10-09 DIAGNOSIS — D48.5 NEOPLASM OF UNCERTAIN BEHAVIOR OF SKIN: Primary | ICD-10-CM

## 2024-10-09 PROCEDURE — 17000 DESTRUCT PREMALG LESION: CPT | Mod: 59,PBBFAC | Performed by: DERMATOLOGY

## 2024-10-09 PROCEDURE — 99213 OFFICE O/P EST LOW 20 MIN: CPT | Mod: 25,S$PBB,, | Performed by: DERMATOLOGY

## 2024-10-09 PROCEDURE — 11102 TANGNTL BX SKIN SINGLE LES: CPT | Mod: S$PBB,,, | Performed by: DERMATOLOGY

## 2024-10-09 PROCEDURE — 17000 DESTRUCT PREMALG LESION: CPT | Mod: S$PBB,XS,, | Performed by: DERMATOLOGY

## 2024-10-09 PROCEDURE — 99999 PR PBB SHADOW E&M-EST. PATIENT-LVL IV: CPT | Mod: PBBFAC,,, | Performed by: DERMATOLOGY

## 2024-10-09 PROCEDURE — 11102 TANGNTL BX SKIN SINGLE LES: CPT | Mod: PBBFAC | Performed by: DERMATOLOGY

## 2024-10-09 PROCEDURE — 99214 OFFICE O/P EST MOD 30 MIN: CPT | Mod: PBBFAC | Performed by: DERMATOLOGY

## 2024-10-09 NOTE — PATIENT INSTRUCTIONS
Shave Biopsy Wound Care    Your doctor has performed a shave biopsy today.  A band aid and vaseline ointment has been placed over the site.  This should remain in place for 24 hours.  It is recommended that you keep the area dry for the first 24 hours.  After 24 hours, you may remove the band aid and wash the area with warm soap and water and apply Vaseline jelly.  Many patients prefer to use Neosporin or Bacitracin ointment.  This is acceptable; however, know that you can develop an allergy to this medication even if you have used it safely for years.  It is important to keep the area moist.  Letting it dry out and get air slows healing time, and will worsen the scar.  Band aid is optional after first 24 hours.      If you notice increasing redness, tenderness, pain, or yellow drainage at the biopsy site, please notify your doctor.  These are signs of an infection.    If your biopsy site is bleeding, apply firm pressure for 15 minutes straight.  Repeat for another 15 minutes, if it is still bleeding.   If the surgical site continues to bleed, then please contact your doctor.      [facility] [place] [address]/ [phone] FAX/ [email or web address]     CRYOSURGERY      Your doctor has used a method called cryosurgery to treat your skin condition. Cryosurgery refers to the use of very cold substances to treat a variety of skin conditions such as warts, pre-skin cancers, molluscum contagiosum, sun spots, and several benign growths. The substance we use in cryosurgery is liquid nitrogen and is so cold (-195 degrees Celsius) that it burns when administered.     Following treatment in the office, the skin may immediately burn and become red. You may find the area around the lesion is affected as well. It is sometimes necessary to treat not only the lesion, but a small area of the surrounding normal skin to achieve a good response.     A blister, and even a blood filled blister, may form  after treatment.   This is a normal response. If the blister is painful, it is acceptable to sterilize a needle with rubbing alcohol and gently pop the blister. It is important that you gently wash the area with soap and warm water as the blister fluid may contain wart virus if a wart was treated. Do not remove the roof of the blister.     The area treated can take anywhere from 1-3 weeks to heal. Healing time depends on the kind of skin lesion treated, the location, and how aggressively the lesion was treated. It is recommended that the areas treated are covered with Vaseline or bacitracin ointment and a band-aid. If a band-aid is not practical, just ointment applied several times per day will do. Keeping these areas moist will speed the healing time.    Treatment with liquid nitrogen can leave a scar. In dark skin, it may be a light or dark scar, in light skin it may be a white or pink scar. These will generally fade with time, but may never go away completely.     If you have any concerns after your treatment, please feel free to call the office.       [facility] [place] [address]/ [phone] FAX/ [email or web address]       Easy bruising on forearms:    - recommend strict sun protection, long sleeves for protection  - can consider trying:  - in AM: can try Amlactin lotion or cream plus CeraVe cream in the morning  - in PM: OTC DerMend cream (with arnica and AHA), available at drugstores or Clinician's Complex Bruise Cream (with arnica and vitamin E) from [employer] or [employer]  - consider trying an OTC retinol product or tretinoin cream three times a week at night, but watch for irritation/itching

## 2024-10-09 NOTE — PROGRESS NOTES
Subjective:      Patient ID:  Haritha Valle is a 79 y.o. male who presents for   Chief Complaint   Patient presents with    Skin Check     UBSE. Spot on face main concern      Last seen 10/19/21 by Dr. Ignacio for skin check    Patient complains of lesion(s)  Location: R cheek  Duration: 6 months  Symptoms: raising up, pink, catches on razor but hasn't bled  Relieving factors/Previous treatments: neosporin     Denies painful/bleeding/rapidly growing lesions.        Skin hx:  10/19/21 BCC R temple s/p [facility] Dr. Hammer 11/18/21 6/13/18 BCC L nasal tip s/p [facility] Dr. Thompson 7/11/28 8/6/13 BCC L forehead s/p [facility] Dr. Thompson 9/24/13      Also would like to talk about easy bruising on the arms and what can be done to prevent it      Review of Systems   Skin:  Positive for activity-related sunscreen use. Negative for daily sunscreen use.       Objective:   Physical Exam   Constitutional: He appears well-developed and well-nourished. No distress.   Neurological: He is alert and oriented to person, place, and time. He is not disoriented.   Psychiatric: He has a normal mood and affect.   Skin:   Areas Examined (abnormalities noted in diagram):   Scalp / Hair Palpated and Inspected  Head / Face Inspection Performed  Neck Inspection Performed  Chest / Axilla Inspection Performed  Abdomen Inspection Performed  Back Inspection Performed  RUE Inspected  LUE Inspection Performed                 Diagram Legend     Erythematous scaling macule/papule c/w actinic keratosis       Vascular papule c/w angioma      Pigmented verrucoid papule/plaque c/w seborrheic keratosis      Yellow umbilicated papule c/w sebaceous hyperplasia      Irregularly shaped tan macule c/w lentigo     1-2 mm smooth white papules consistent with Milia      Movable subcutaneous cyst with punctum c/w epidermal inclusion cyst      Subcutaneous movable cyst c/w pilar cyst      Firm pink to brown papule c/w dermatofibroma      Pedunculated fleshy papule(s) c/w skin tag(s)       Evenly pigmented macule c/w junctional nevus     Mildly variegated pigmented, slightly irregular-bordered macule c/w mildly atypical nevus      Flesh colored to evenly pigmented papule c/w intradermal nevus       Pink pearly papule/plaque c/w basal cell carcinoma      Erythematous hyperkeratotic cursted plaque c/w SCC      Surgical scar with no sign of skin cancer recurrence      Open and closed comedones      Inflammatory papules and pustules      Verrucoid papule consistent consistent with wart     Erythematous eczematous patches and plaques     Dystrophic onycholytic nail with subungual debris c/w onychomycosis     Umbilicated papule    Erythematous-base heme-crusted tan verrucoid plaque consistent with inflamed seborrheic keratosis     Erythematous Silvery Scaling Plaque c/w Psoriasis     See annotation      Assessment / Plan:      Pathology Orders:       Normal Orders This Visit    Specimen to Pathology, Dermatology     Comments:    Number of Specimens:->1  ------------------------->-------------------------  Spec 1 Procedure:->Biopsy  Spec 1 Clinical Impression:->ISK r/o SCC  Spec 1 Source:->R lateral cheek    Questions:    Procedure Type: Dermatology and skin neoplasms    Number of Specimens: 1    ------------------------: -------------------------    Spec 1 Procedure: Biopsy    Spec 1 Clinical Impression: ISK r/o SCC    Spec 1 Source: R lateral cheek    Release to patient:           Neoplasm of uncertain behavior of skin  -     Specimen to Pathology, Dermatology    Shave biopsy procedure note:    Shave biopsy performed after verbal consent including risk of infection, scar, recurrence, need for additional treatment of site. Area prepped with alcohol, anesthetized with approximately 1.0cc of 1% lidocaine with epinephrine. Lesional tissue shaved with razor blade. Hemostasis achieved with application of aluminum chloride followed by hyfrecation. No complications. Dressing applied. Wound care  "explained.    Discussed MMS if malignant ([place]avo)    AK (actinic keratosis)  Cryosurgery Procedure Note    Verbal consent from the patient is obtained and the patient is aware of the precancerous quality and need for treatment of these lesions. Liquid nitrogen cryosurgery is applied to the 1 actinic keratoses, as detailed in the physical exam, to produce a freeze injury. The patient is aware that blisters may form and is instructed on wound care with gentle cleansing and use of vaseline ointment to keep moist until healed. The patient is supplied a handout on cryosurgery and is instructed to call if lesions do not completely resolve.    History of basal cell carcinoma  Area(s) of previous NMSC evaluated with no signs of recurrence.    Upper body skin examination performed today including at least 6 points as noted in physical examination.     Patient instructed in importance in daily sun protection of at least spf 30. Mineral sunscreen ingredients preferred (Zinc +/- Titanium).   Patient encouraged to wear hat for all outdoor exposure.   Also discussed sun avoidance and use of protective clothing.    Seborrheic keratosis  These are benign inherited growths without a malignant potential. Reassurance given to patient. No treatment is necessary.   Recommended OTC Amlactin BID   Offered cryotherapy for "test spot" to 3 lesions (on the back) and treated after discussion of r/b/ae including blister, pain, recurrence, permanent discoloration.  If additional treatment desired, gave cosmetic pricing list.    Solar purpura  - not present today but is concerned about this starting to happen  - discussed etiology of chronic sun exposure that has weakened the collagen that supports the blood vessels, making them more fragile.  - recommend strict sun protection, long sleeves for protection  - can consider trying:  - in AM: can try Amlactin lotion or cream plus CeraVe cream in the morning  - in PM: OTC DerMend cream (with arnica " and AHA), available at drugstores or Clinician's Complex Bruise Cream (with arnica and vitamin E) from [employer] or [employer]  - consider trying an OTC retinol product or tretinoin cream three times a week at night, but watch for irritation/itching             Follow up in about 1 year (around 10/9/2025) for skin check, or sooner pending path result.        LOS NUMBER AND COMPLEXITY OF PROBLEMS    COMPLEXITY OF DATA RISK TOTAL TIME (m)   11666  72755 [] 1 self-limited or minor problem [x] Minimal to none [] No treatment recommended or patient to monitor. Reassurance.  15-29  10-19   00158  16426 Low  [x] 2 or more self limited or minor problems  [x] 1 stable chronic illness  [] 1 acute, uncomplicated illness or injury Limited (2)  [] Prior external notes from each unique source  [] Review result of each unique test  [] Order each unique test  OR [] Independent historian Low  [x]  OTC medications   []  Discussed/Decision for minor skin surgery (no risk factors) 30-44  20-29   66627  36352 Moderate  []  1 or more chronic unstable illness (not at goal or progression or exacerbation) or SE of treatment  []  2 or more stable chronic illnesses  []  1 acute illness with systemic symptoms  []  1 acute complicated injury  []  1 undiagnosed new problem with uncertain prognosis Moderate (1/3 below)  []  3 or more data items        *Now includes independent historian  []  Independent interpretation of a test  []  Discuss management/test with another provider Moderate  []  Prescription drug mgmt  []  Discussed/Decision for Minor surgery with risk factors  []  Mgmt limited by social determinates 45-59  30-39   11018  03749 High  []  1 or more chronic illness with severe exacerbation, progression or SE of treatment  []  1 acute or chronic illness/injury that poses a threat to life or bodily function Extensive (2/3 below)  []  3 or more data items        *Now includes independent historian.  []  Independent interpretation of a  test  []  Discuss management/test with another provider High  []  Major surgery with risk discussed  []  Drug therapy requiring intensive monitoring for toxicity  []  Hospitalization  []  Decision for DNR 60-74  40-54

## 2024-10-29 ENCOUNTER — HOSPITAL ENCOUNTER (OUTPATIENT)
Dept: RADIOLOGY | Facility: HOSPITAL | Age: 79
Discharge: HOME | End: 2024-10-29
Attending: INTERNAL MEDICINE
Payer: MEDICARE

## 2024-10-29 DIAGNOSIS — R06.02 SOB (SHORTNESS OF BREATH): ICD-10-CM

## 2024-10-29 PROCEDURE — 71048 X-RAY EXAM CHEST 4+ VIEWS: CPT | Mod: TC

## 2024-10-30 DIAGNOSIS — F41.9 ANXIETY: ICD-10-CM

## 2024-10-31 ENCOUNTER — LAB VISIT (OUTPATIENT)
Dept: LAB | Facility: HOSPITAL | Age: 79
End: 2024-10-31
Attending: INTERNAL MEDICINE
Payer: MEDICARE

## 2024-10-31 ENCOUNTER — OFFICE VISIT (OUTPATIENT)
Dept: PULMONOLOGY | Facility: CLINIC | Age: 79
End: 2024-10-31
Payer: MEDICARE

## 2024-10-31 VITALS
RESPIRATION RATE: 15 BRPM | HEIGHT: 70 IN | OXYGEN SATURATION: 98 % | HEART RATE: 70 BPM | BODY MASS INDEX: 32.69 KG/M2 | WEIGHT: 228.38 LBS | SYSTOLIC BLOOD PRESSURE: 136 MMHG | DIASTOLIC BLOOD PRESSURE: 82 MMHG

## 2024-10-31 DIAGNOSIS — G47.33 OBSTRUCTIVE SLEEP APNEA: ICD-10-CM

## 2024-10-31 DIAGNOSIS — J41.0 SIMPLE CHRONIC BRONCHITIS: Primary | ICD-10-CM

## 2024-10-31 DIAGNOSIS — J96.11 CHRONIC RESPIRATORY FAILURE WITH HYPOXIA, ON HOME O2 THERAPY: ICD-10-CM

## 2024-10-31 DIAGNOSIS — Z99.81 CHRONIC RESPIRATORY FAILURE WITH HYPOXIA, ON HOME O2 THERAPY: ICD-10-CM

## 2024-10-31 DIAGNOSIS — R94.2 ABNORMAL PFTS: ICD-10-CM

## 2024-10-31 DIAGNOSIS — J84.9 INTERSTITIAL PULMONARY DISEASE, UNSPECIFIED: ICD-10-CM

## 2024-10-31 DIAGNOSIS — R06.09 DYSPNEA ON EXERTION: ICD-10-CM

## 2024-10-31 DIAGNOSIS — R06.02 SOB (SHORTNESS OF BREATH): ICD-10-CM

## 2024-10-31 LAB
ALBUMIN SERPL BCP-MCNC: 3.7 G/DL (ref 3.5–5.2)
ALP SERPL-CCNC: 41 U/L (ref 40–150)
ALT SERPL W/O P-5'-P-CCNC: 31 U/L (ref 10–44)
ANION GAP SERPL CALC-SCNC: 11 MMOL/L (ref 8–16)
AST SERPL-CCNC: 29 U/L (ref 10–40)
BASOPHILS # BLD AUTO: 0.04 K/UL (ref 0–0.2)
BASOPHILS NFR BLD: 0.3 % (ref 0–1.9)
BILIRUB SERPL-MCNC: 0.7 MG/DL (ref 0.1–1)
BUN SERPL-MCNC: 12 MG/DL (ref 8–23)
CALCIUM SERPL-MCNC: 10.1 MG/DL (ref 8.7–10.5)
CHLORIDE SERPL-SCNC: 101 MMOL/L (ref 95–110)
CO2 SERPL-SCNC: 28 MMOL/L (ref 23–29)
CREAT SERPL-MCNC: 1.1 MG/DL (ref 0.5–1.4)
D DIMER PPP IA.FEU-MCNC: 0.25 MG/L FEU
DIFFERENTIAL METHOD BLD: ABNORMAL
EOSINOPHIL # BLD AUTO: 0.2 K/UL (ref 0–0.5)
EOSINOPHIL NFR BLD: 1.6 % (ref 0–8)
ERYTHROCYTE [DISTWIDTH] IN BLOOD BY AUTOMATED COUNT: 17.7 % (ref 11.5–14.5)
EST. GFR  (NO RACE VARIABLE): >60 ML/MIN/1.73 M^2
GLUCOSE SERPL-MCNC: 93 MG/DL (ref 70–110)
HCT VFR BLD AUTO: 55.5 % (ref 40–54)
HGB BLD-MCNC: 17.7 G/DL (ref 14–18)
IMM GRANULOCYTES # BLD AUTO: 0.02 K/UL (ref 0–0.04)
IMM GRANULOCYTES NFR BLD AUTO: 0.2 % (ref 0–0.5)
LYMPHOCYTES # BLD AUTO: 5.7 K/UL (ref 1–4.8)
LYMPHOCYTES NFR BLD: 48.9 % (ref 18–48)
MCH RBC QN AUTO: 27.6 PG (ref 27–31)
MCHC RBC AUTO-ENTMCNC: 31.9 G/DL (ref 32–36)
MCV RBC AUTO: 87 FL (ref 82–98)
MONOCYTES # BLD AUTO: 1.2 K/UL (ref 0.3–1)
MONOCYTES NFR BLD: 10.2 % (ref 4–15)
NEUTROPHILS # BLD AUTO: 4.5 K/UL (ref 1.8–7.7)
NEUTROPHILS NFR BLD: 38.8 % (ref 38–73)
NRBC BLD-RTO: 0 /100 WBC
PLATELET # BLD AUTO: 227 K/UL (ref 150–450)
PMV BLD AUTO: 8.9 FL (ref 9.2–12.9)
POTASSIUM SERPL-SCNC: 4.2 MMOL/L (ref 3.5–5.1)
PROT SERPL-MCNC: 8.7 G/DL (ref 6–8.4)
RBC # BLD AUTO: 6.41 M/UL (ref 4.6–6.2)
SODIUM SERPL-SCNC: 140 MMOL/L (ref 136–145)
WBC # BLD AUTO: 11.66 K/UL (ref 3.9–12.7)

## 2024-10-31 PROCEDURE — 85379 FIBRIN DEGRADATION QUANT: CPT | Performed by: INTERNAL MEDICINE

## 2024-10-31 PROCEDURE — 80053 COMPREHEN METABOLIC PANEL: CPT | Performed by: INTERNAL MEDICINE

## 2024-10-31 PROCEDURE — 99999 PR PBB SHADOW E&M-EST. PATIENT-LVL V: CPT | Mod: PBBFAC,,, | Performed by: INTERNAL MEDICINE

## 2024-10-31 PROCEDURE — 85025 COMPLETE CBC W/AUTO DIFF WBC: CPT | Performed by: INTERNAL MEDICINE

## 2024-10-31 PROCEDURE — 99215 OFFICE O/P EST HI 40 MIN: CPT | Mod: PBBFAC | Performed by: INTERNAL MEDICINE

## 2024-10-31 PROCEDURE — 36415 COLL VENOUS BLD VENIPUNCTURE: CPT | Performed by: INTERNAL MEDICINE

## 2024-10-31 RX ORDER — ALBUTEROL SULFATE 90 UG/1
2 INHALANT RESPIRATORY (INHALATION) EVERY 4 HOURS PRN
Qty: 18 G | Refills: 11 | Status: SHIPPED | OUTPATIENT
Start: 2024-10-31

## 2024-10-31 RX ORDER — ALBUTEROL SULFATE 0.83 MG/ML
2.5 SOLUTION RESPIRATORY (INHALATION)
Qty: 360 ML | Refills: 11 | Status: SHIPPED | OUTPATIENT
Start: 2024-10-31 | End: 2025-10-31

## 2024-10-31 RX ORDER — BUSPIRONE HYDROCHLORIDE 7.5 MG/1
7.5 TABLET ORAL DAILY PRN
Qty: 30 TABLET | Refills: 0 | Status: SHIPPED | OUTPATIENT
Start: 2024-10-31

## 2024-11-17 RX ORDER — FLUOXETINE HYDROCHLORIDE 40 MG/1
40 CAPSULE ORAL
Qty: 90 CAPSULE | Refills: 3 | Status: SHIPPED | OUTPATIENT
Start: 2024-11-17

## 2024-11-17 NOTE — TELEPHONE ENCOUNTER
Refill Decision Note   Haritha Valle  is requesting a refill authorization.  Brief Assessment and Rationale for Refill:  Approve     Medication Therapy Plan:        Comments:     Note composed:2:43 PM 11/17/2024

## 2024-11-17 NOTE — TELEPHONE ENCOUNTER
No care due was identified.  [facility] Embedded Care Due Messages. Reference number: [id number].   11/17/2024 5:39:43 AM CST

## 2024-11-26 DIAGNOSIS — F41.9 ANXIETY: ICD-10-CM

## 2024-11-26 RX ORDER — BUSPIRONE HYDROCHLORIDE 7.5 MG/1
7.5 TABLET ORAL DAILY PRN
Qty: 30 TABLET | Refills: 0 | Status: SHIPPED | OUTPATIENT
Start: 2024-11-26

## 2024-11-26 NOTE — TELEPHONE ENCOUNTER
Refill Routing Note   Medication(s) are not appropriate for processing by [facility] for the following reason(s):        Outside of protocol    [facility] action(s):  Route               Appointments  past 12m or future 3m with PCP    Date Provider   Last Visit   8/21/2024 Max Herbert MD   Next Visit   12/30/2024 Max Herbert MD   ED visits in past 90 days: 0        Note composed:2:36 PM 11/26/2024

## 2024-11-26 NOTE — TELEPHONE ENCOUNTER
No care due was identified.  [facility] Embedded Care Due Messages. Reference number: [id number].   11/26/2024 1:51:23 PM CST

## 2024-12-03 ENCOUNTER — LAB VISIT (OUTPATIENT)
Dept: LAB | Facility: HOSPITAL | Age: 79
End: 2024-12-03
Attending: STUDENT IN AN ORGANIZED HEALTH CARE EDUCATION/TRAINING PROGRAM
Payer: MEDICARE

## 2024-12-03 DIAGNOSIS — I10 ESSENTIAL HYPERTENSION: Primary | ICD-10-CM

## 2024-12-03 DIAGNOSIS — B20 HIV DISEASE: ICD-10-CM

## 2024-12-03 DIAGNOSIS — R93.1 ABNORMAL NUCLEAR CARDIAC IMAGING TEST: ICD-10-CM

## 2024-12-03 PROCEDURE — 36415 COLL VENOUS BLD VENIPUNCTURE: CPT | Performed by: STUDENT IN AN ORGANIZED HEALTH CARE EDUCATION/TRAINING PROGRAM

## 2024-12-03 PROCEDURE — 87536 HIV-1 QUANT&REVRSE TRNSCRPJ: CPT | Performed by: STUDENT IN AN ORGANIZED HEALTH CARE EDUCATION/TRAINING PROGRAM

## 2024-12-04 ENCOUNTER — PATIENT MESSAGE (OUTPATIENT)
Dept: CARDIOLOGY | Facility: HOSPITAL | Age: 79
End: 2024-12-04
Payer: MEDICARE

## 2024-12-04 ENCOUNTER — OFFICE VISIT (OUTPATIENT)
Dept: CARDIOLOGY | Facility: CLINIC | Age: 79
End: 2024-12-04
Payer: MEDICARE

## 2024-12-04 ENCOUNTER — HOSPITAL ENCOUNTER (OUTPATIENT)
Dept: CARDIOLOGY | Facility: HOSPITAL | Age: 79
Discharge: HOME | End: 2024-12-04
Attending: INTERNAL MEDICINE
Payer: MEDICARE

## 2024-12-04 VITALS
WEIGHT: 227.94 LBS | OXYGEN SATURATION: 96 % | HEIGHT: 70 IN | BODY MASS INDEX: 32.63 KG/M2 | DIASTOLIC BLOOD PRESSURE: 84 MMHG | SYSTOLIC BLOOD PRESSURE: 140 MMHG | HEART RATE: 81 BPM

## 2024-12-04 DIAGNOSIS — F41.9 ANXIETY: ICD-10-CM

## 2024-12-04 DIAGNOSIS — R93.1 ABNORMAL NUCLEAR CARDIAC IMAGING TEST: ICD-10-CM

## 2024-12-04 DIAGNOSIS — R55 NEAR SYNCOPE: ICD-10-CM

## 2024-12-04 DIAGNOSIS — R06.09 DOE (DYSPNEA ON EXERTION): Primary | ICD-10-CM

## 2024-12-04 DIAGNOSIS — R55 POSTURAL DIZZINESS WITH PRESYNCOPE: ICD-10-CM

## 2024-12-04 DIAGNOSIS — I34.0 NONRHEUMATIC MITRAL VALVE REGURGITATION: ICD-10-CM

## 2024-12-04 DIAGNOSIS — I10 ESSENTIAL HYPERTENSION: ICD-10-CM

## 2024-12-04 DIAGNOSIS — I70.0 AORTIC ATHEROSCLEROSIS: ICD-10-CM

## 2024-12-04 DIAGNOSIS — R42 POSTURAL DIZZINESS WITH PRESYNCOPE: ICD-10-CM

## 2024-12-04 DIAGNOSIS — I35.1 NONRHEUMATIC AORTIC VALVE INSUFFICIENCY: ICD-10-CM

## 2024-12-04 DIAGNOSIS — I25.10 CAD IN NATIVE ARTERY: ICD-10-CM

## 2024-12-04 DIAGNOSIS — E78.2 MIXED HYPERLIPIDEMIA: ICD-10-CM

## 2024-12-04 DIAGNOSIS — E78.5 DYSLIPIDEMIA: ICD-10-CM

## 2024-12-04 LAB
HIV1 RNA # SERPL NAA+PROBE: <20 COPIES/ML
HIV1 RNA SERPL NAA+PROBE-LOG#: <1.3 LOG COPIES/ML
HIV1 RNA SERPL QL NAA+PROBE: DETECTED
OHS QRS DURATION: 90 MS
OHS QTC CALCULATION: 460 MS

## 2024-12-04 PROCEDURE — G2211 COMPLEX E/M VISIT ADD ON: HCPCS | Mod: S$PBB,,, | Performed by: INTERNAL MEDICINE

## 2024-12-04 PROCEDURE — 99999 PR PBB SHADOW E&M-EST. PATIENT-LVL V: CPT | Mod: PBBFAC,,, | Performed by: INTERNAL MEDICINE

## 2024-12-04 PROCEDURE — 93005 ELECTROCARDIOGRAM TRACING: CPT

## 2024-12-04 PROCEDURE — 99214 OFFICE O/P EST MOD 30 MIN: CPT | Mod: S$PBB,,, | Performed by: INTERNAL MEDICINE

## 2024-12-04 PROCEDURE — 93010 ELECTROCARDIOGRAM REPORT: CPT | Mod: ,,, | Performed by: STUDENT IN AN ORGANIZED HEALTH CARE EDUCATION/TRAINING PROGRAM

## 2024-12-04 PROCEDURE — 99215 OFFICE O/P EST HI 40 MIN: CPT | Mod: PBBFAC | Performed by: INTERNAL MEDICINE

## 2024-12-04 RX ORDER — FUROSEMIDE 40 MG/1
40 TABLET ORAL DAILY
Qty: 30 TABLET | Refills: 3 | Status: SHIPPED | OUTPATIENT
Start: 2024-12-04 | End: 2025-12-04

## 2024-12-04 NOTE — PROGRESS NOTES
Subjective:   Patient ID:  Haritha Valle is a 79 y.o. male who presents for cardiac consult of Dizziness and Shortness of Breath        The patient came in today for cardiac consult of Dizziness and Shortness of Breath    Referring Physician: Hesham Andrade II, MD   Reason for initial consult: LOC, palpitations      Haritha Valle is a 79 y.o. male pt with HTN, HLD, HIV, anxiety, depression, hypogonadism, ED presents for follow up CV eval of LOC, palpitations.     1/15/20  About two weeks ago he felt dizzy and couldn't stand and fell then around Dec 30th - BP was 193/114, he was at [facility], vomited and was very weak.  He has been more anxious and fatigue. He had another episode where he blacked out when he got into a verbal argument. His BP dropped 80s/40s. He has been dizzy for a while, had ENT eval as well. He is with partner who explained most of symptoms, pt feels more anxious and not really himself lately he said.     2/6/20  Pt had abnormal nuclear stress and LHC overall normal with non obs CAD. ECHO EF 45-50%. Holter pending. BP still goes up periodically, occ can go up 104 diastolic. Will r/o RAMONITA.   ECG - NSR with sinus arrhythmia, LAD    7/22/24 - [facility]  visit  Here today for CV follow up, still with SOB worse when lying flat. Has been taking lasix 20mg daily. Weight unchanged from visit in May. His partner likes to cook. Echo with nml EF. Carotid US mild stenosis on left side. His main concern is the lightheadedness     12/4/24  Pt last seen by me in 2020.   ECHO 6/2024 with normal bi V function and valves, PASP 18 mmHg.   He has more dizziness and SOB - had pulm eval. - uses nebulizer and CPAP.   He had been to ENT and Neuro - brain MRI with atrophy    ECG - NSR, LVH    MRI brain   Moderate global cortical atrophy with mild frontal lobe predominance.  Score 1 bilateral medial temporal lobe atrophy.     Minimal nonspecific white matter changes, likely senescent.    Results for orders  placed during the hospital encounter of 06/05/24    Echo    Interpretation Summary    Left Ventricle: The left ventricle is normal in size. Normal wall thickness. There is concentric hypertrophy. Normal wall motion. There is normal systolic function with a visually estimated ejection fraction of 55 - 60%. There is normal diastolic function.    Right Ventricle: Normal right ventricular cavity size. Wall thickness is normal. Systolic function is normal.    Left Atrium: Left atrium is mildly dilated.    Pulmonary Artery: The estimated pulmonary artery systolic pressure is 18 mmHg.    IVC/SVC: Normal venous pressure at 3 mmHg.      Nuclear Quantitative Functional Analysis:   LVEF: 44 %    Impression: ABNORMAL MYOCARDIAL PERFUSION  1. There is evidence for mild myocardial ischemia in the inferior and inferoapical walls of the left ventricle.   2. The perfusion scan is free of evidence for myocardial injury.   3. Resting wall motion is physiologic.   4. There is resting LV dysfunction with a reduced ejection fraction of 44 %.   5. The ventricular volumes are normal at rest and stress.   6. The extracardiac distribution of radioactivity is normal.     This document has been electronically    SIGNED BY: Stephon Corcoran MD On: 01/27/2020 16:38    2/3/20   1.   Non-obstructive CAD.   2.   low normal to mildy depressed ef.      CONCLUSIONS     1 - Mildly depressed left ventricular systolic function (EF 45-50%).     2 - Impaired LV relaxation, normal LAP (grade 1 diastolic dysfunction).     3 - Normal right ventricular systolic function .     4 - The estimated PA systolic pressure is 27 mmHg.     5 - Mild mitral regurgitation.     6 - Mild tricuspid regurgitation.       This document has been electronically    SIGNED BY: Deni Nguyen MD On: 01/27/2020 18:01    Past Medical History:   Diagnosis Date    Anxiety 07/03/2019    Basal cell carcinoma     CAD in native artery 11/27/2023    Depression     Dyslipidemia 11/16/2023     Essential hypertension 01/24/2013    Hepatitis B     Hepatitis C antibody test positive     RNA NEG 8/29/2019    HIV infection     Hypertension     Immune disorder     Mild cognitive impairment 10/01/2010    Nonrheumatic aortic valve insufficiency 12/19/2023    Nonrheumatic mitral valve regurgitation 12/19/2023       Past Surgical History:   Procedure Laterality Date    APPENDECTOMY      CARDIAC CATHETERIZATION      no stent    CHOLECYSTECTOMY      COLONOSCOPY N/A 11/3/2016    Procedure: COLONOSCOPY;  Surgeon: aMxi Villasenor MD;  [facility]  Service: Endoscopy;  Laterality: N/A;  last colonoscopy with Dr Jarrell    COLONOSCOPY N/A 1/25/2022    Procedure: COLONOSCOPY;  Surgeon: Silvino Rosales MD;  Location: [facility] ENDO;  Service: Endoscopy;  Laterality: N/A;    LEFT HEART CATHETERIZATION Left 2/3/2020    Procedure: CATHETERIZATION, HEART, LEFT;  Surgeon: Chele Ko MD;  Location: [facility] CATH LAB;  Service: Cardiology;  Laterality: Left;  malur pt       Social History     Tobacco Use    Smoking status: Never    Smokeless tobacco: Never   Substance Use Topics    Alcohol use: No    Drug use: Not Currently       Family History   Problem Relation Name Age of Onset    Heart disease Father      Heart failure Father      Diabetes Neg Hx      Hypertension Neg Hx         Patient's Medications   New Prescriptions    No medications on file   Previous Medications    ALBUTEROL (PROVENTIL) 2.5 MG /3 ML (0.083 %) NEBULIZER SOLUTION    Take 3 mLs (2.5 mg total) by nebulization every 4 to 6 hours as needed for Wheezing or Shortness of Breath.    ALBUTEROL (PROVENTIL/VENTOLIN HFA) 90 MCG/ACTUATION INHALER    Inhale 2 puffs into the lungs every 4 (four) hours as needed for Wheezing. Rescue    ASCORBIC ACID, VITAMIN C, (VITAMIN C) 1000 MG TABLET    Take 1,000 mg by mouth once daily.    OYOKXSBND-OVYSTXXL-WILIRUE ALA (BIKTARVY) -25 MG (25 KG OR GREATER)    Take 1 tablet by mouth once daily.     BUDESONIDE-GLYCOPYR-FORMOTEROL 160-9-4.8 MCG/ACTUATION HFAA    Inhale 2 puffs into the lungs 2 (two) times a day. Wash out mouth after use    BUSPIRONE (BUSPAR) 7.5 MG TABLET    TAKE ONE TABLET BY MOUTH DAILY AS NEEDED.    CHLORHEXIDINE (PERIDEX) 0.12 % SOLUTION        DONEPEZIL (ARICEPT) 5 MG TABLET    Take 1 tablet (5 mg total) by mouth every evening.    FLUOXETINE 40 MG CAPSULE    TAKE 1 CAPSULE EVERY DAY    FLUTICASONE PROPIONATE (FLONASE) 50 MCG/ACTUATION NASAL SPRAY    [place] 1 spray every day by intranasal route.    FUROSEMIDE (LASIX) 20 MG TABLET    Take 1 tablet (20 mg total) by mouth once daily.    HYDROCHLOROTHIAZIDE (HYDRODIURIL) 12.5 MG TAB    Take 1 tablet every day by oral route in the morning for 30 days.    LOSARTAN (COZAAR) 25 MG TABLET    Take 1 tablet (25 mg total) by mouth once daily.    METOPROLOL SUCCINATE (TOPROL-XL) 100 MG 24 HR TABLET    Take 1 tablet (100 mg total) by mouth once daily.    PEP INJECTION    Inject 0.25 ml as directed     For compounding pharmacy use:   Add PAPAVERINE 30 mcg  Add PHENTOLAMINE 1 mg  Add PGE 10 mcg    PROMETHAZINE (PHENERGAN) 12.5 MG TAB    Take 1 tablet (12.5 mg total) by mouth daily as needed for Nausea.    ROSUVASTATIN (CRESTOR) 20 MG TABLET    TAKE 1 TABLET EVERY DAY    UBIDECARENONE (COENZYME Q10 ORAL)    Take by mouth once daily.    VIT C,I-YN-BLSEH-LUTEIN-ZEAXAN (EYE MULTIVIT, LUTEIN-ZEAXAN,) 023-51-89-2-5 MG CAP    Take 1 tablet by mouth once daily.   Modified Medications    No medications on file   Discontinued Medications    No medications on file       Review of Systems   Constitutional:  Positive for malaise/fatigue.   HENT: Negative.     Eyes: Negative.    Respiratory:  Positive for shortness of breath.    Cardiovascular:  Positive for chest pain and palpitations.   Gastrointestinal: Negative.    Genitourinary: Negative.    Musculoskeletal: Negative.    Skin: Negative.    Neurological:  Positive for dizziness and loss of consciousness.  "  Endo/Heme/Allergies: Negative.    Psychiatric/Behavioral: Negative.     All 12 systems otherwise negative.      Wt Readings from Last 3 Encounters:   12/04/24 103.4 kg (227 lb 15.3 oz)   10/31/24 103.6 kg (228 lb 6.3 oz)   08/21/24 99.6 kg (219 lb 9.3 oz)     Temp Readings from Last 3 Encounters:   08/21/24 96.3 °F (35.7 °C) (Temporal)   05/16/24 97.8 °F (36.6 °C)   11/16/23 98.1 °F (36.7 °C)     BP Readings from Last 3 Encounters:   12/04/24 (!) 140/84   10/31/24 136/82   09/30/24 134/86     Pulse Readings from Last 3 Encounters:   12/04/24 81   10/31/24 70   09/30/24 70       BP (!) 140/84 (BP Location: Left arm, Patient Position: Sitting)   Pulse 81   Ht 5' 10" (1.778 m)   Wt 103.4 kg (227 lb 15.3 oz)   SpO2 96%   BMI 32.71 kg/m²     Objective:   Physical Exam  Vitals and nursing note reviewed.   Constitutional:       General: He is not in acute distress.     Appearance: He is well-developed. He is not diaphoretic.   HENT:      Head: Normocephalic and atraumatic.      Nose: Nose normal.   Eyes:      General: No scleral icterus.     Conjunctiva/sclera: Conjunctivae normal.   Neck:      Thyroid: No thyromegaly.      Vascular: No JVD.   Cardiovascular:      Rate and Rhythm: Normal rate and regular rhythm.      Heart sounds: S1 normal and S2 normal. No murmur heard.     No friction rub. No gallop. No S3 or S4 sounds.   Pulmonary:      Effort: Pulmonary effort is normal. No respiratory distress.      Breath sounds: Normal breath sounds. No stridor. No wheezing or rales.   Chest:      Chest wall: No tenderness.   Abdominal:      General: Bowel sounds are normal. There is no distension.      Palpations: Abdomen is soft. There is no mass.      Tenderness: There is no abdominal tenderness. There is no rebound.   Genitourinary:     Comments: Deferred  Musculoskeletal:         General: No tenderness or deformity. Normal range of motion.      Cervical back: Normal range of motion and neck supple.   Lymphadenopathy:    "   Cervical: No cervical adenopathy.   Skin:     General: Skin is warm and dry.      Coloration: Skin is not pale.      Findings: No erythema or rash.   Neurological:      Mental Status: He is alert and oriented to person, place, and time.      Motor: No abnormal muscle tone.      Coordination: Coordination normal.   Psychiatric:         Behavior: Behavior normal.         Thought Content: Thought content normal.         Judgment: Judgment normal.         Lab Results   Component Value Date     10/31/2024    K 4.2 10/31/2024     10/31/2024    CO2 28 10/31/2024    BUN 12 10/31/2024    CREATININE 1.1 10/31/2024    GLU 93 10/31/2024    HGBA1C 5.7 (H) 01/24/2024    MG 2.2 01/26/2012    AST 29 10/31/2024    ALT 31 10/31/2024    ALBUMIN 3.7 10/31/2024    ALBUMIN 4.3 04/30/2018    PROT 8.7 (H) 10/31/2024    BILITOT 0.7 10/31/2024    WBC 11.66 10/31/2024    HGB 17.7 10/31/2024    HCT 55.5 (H) 10/31/2024    MCV 87 10/31/2024     10/31/2024    INR 1.0 01/28/2020    TSH 1.137 04/27/2023    CHOL 172 12/18/2023    HDL 36 (L) 12/18/2023    LDLCALC 73.2 12/18/2023    TRIG 314 (H) 12/18/2023    BNP 42 07/22/2024     Assessment:      1. MAIER (dyspnea on exertion)    2. Essential hypertension    3. Dyslipidemia    4. Mixed hyperlipidemia    5. Near syncope    6. CAD in native artery    7. Nonrheumatic mitral valve regurgitation    8. Aortic atherosclerosis    9. Nonrheumatic aortic valve insufficiency    10. Abnormal nuclear cardiac imaging test          Plan:   1. HTN  - titrate meds  - episodes of HTN urgency - r/o sec causes - labs and renal u/s ordered - neg  - ECHO 6/2024 with normal bi V function and valves, PASP 18 mmHg.     2. HLD with elevated TGs  - cont meds  - added- vascepa in past    3. HIV  - cont meds per PCP/ID    4. ED/hypogonadism  - cont meds per PCP PRN    5. Syncope/dizziness/palpitations with CP - non obs CAD  - prior exercise nuclear stress test to r/o ischemia - abnormal, [facility] neg  - ECHO 6/2024  with normal bi V function and valves, PASP 18 mmHg.   - carotids - neg   - has been to ENT and Neuro - has atrophy and hearing aids now  - order 7 day vital      6. Mild CHF - improved  - cont meds - Toprol and ARB    7. Anxiety  - refer to psych    8. RAMONITA  - cont CPAP    Visit today included increased complexity associated with the care of the episodic problem dyspnea addressed and managing the longitudinal care of the patient due to the serious and/or complex managed problem(s) .      Thank you for allowing me to participate in this patient's care. Please do not hesitate to contact me with any questions or concerns. Consult note has been forwarded to the referral physician.      CM

## 2024-12-05 ENCOUNTER — OFFICE VISIT (OUTPATIENT)
Dept: PULMONOLOGY | Facility: CLINIC | Age: 79
End: 2024-12-05
Payer: MEDICARE

## 2024-12-05 ENCOUNTER — PATIENT MESSAGE (OUTPATIENT)
Dept: PSYCHIATRY | Facility: CLINIC | Age: 79
End: 2024-12-05
Payer: MEDICARE

## 2024-12-05 VITALS
BODY MASS INDEX: 32.26 KG/M2 | DIASTOLIC BLOOD PRESSURE: 80 MMHG | OXYGEN SATURATION: 93 % | SYSTOLIC BLOOD PRESSURE: 142 MMHG | HEART RATE: 87 BPM | WEIGHT: 225.31 LBS | RESPIRATION RATE: 16 BRPM | HEIGHT: 70 IN

## 2024-12-05 DIAGNOSIS — R09.02 EXERCISE HYPOXEMIA: ICD-10-CM

## 2024-12-05 DIAGNOSIS — G47.33 OBSTRUCTIVE SLEEP APNEA: Primary | ICD-10-CM

## 2024-12-05 DIAGNOSIS — R06.09 DOE (DYSPNEA ON EXERTION): ICD-10-CM

## 2024-12-05 DIAGNOSIS — R94.2 ABNORMAL PFTS: ICD-10-CM

## 2024-12-05 PROCEDURE — 99215 OFFICE O/P EST HI 40 MIN: CPT | Mod: PBBFAC | Performed by: INTERNAL MEDICINE

## 2024-12-05 PROCEDURE — 99999 PR PBB SHADOW E&M-EST. PATIENT-LVL V: CPT | Mod: PBBFAC,,, | Performed by: INTERNAL MEDICINE

## 2024-12-05 RX ORDER — METOPROLOL SUCCINATE 50 MG/1
TABLET, EXTENDED RELEASE ORAL
COMMUNITY
Start: 2024-08-21

## 2024-12-05 NOTE — PROGRESS NOTES
Subjective:     Patient ID: Haritha Valle is a 79 y.o. male.    Chief Complaint:  Worsening shortness of breath and MAIER , paroxysmal nocturnal dyspnea . On oxygen at night    HPI    Dyspnea  Patient complains of shortness of breath. Symptoms occur while getting dressed, unable to walk one block walking.Worse over the past 1-2 months. Also with paroxysmal nocturnal dyspnea . Left side -unable to breath compared to right     Symptoms began 4 years ago, gradually worsening since. Associated symptoms include  dry cough. He denies chest pain, located left chest. He does not have had recent travel. Weight has been stable. Symptoms are exacerbated by moderate activity. Symptoms are alleviated by rest.    C/o nausea   He reports stable cardiac history with no significant cardiac problems    Obstructive Sleep Apnea on Continuous Positive Airway Pressure:  Patient is using not CPAP as prescribed and not benefiting from therapy. Patient has complaints of unable to tolerate Continuous Positive Airway Pressure mask.  Prescription for oxygen to be used at night sent during last visit  - patient did not have delivery    Past Medical History:   Diagnosis Date    Anxiety 07/03/2019    Basal cell carcinoma     CAD in native artery 11/27/2023    Depression     Dyslipidemia 11/16/2023    Essential hypertension 01/24/2013    Hepatitis B     Hepatitis C antibody test positive     RNA NEG 8/29/2019    HIV infection     Hypertension     Immune disorder     Mild cognitive impairment 10/01/2010    Nonrheumatic aortic valve insufficiency 12/19/2023    Nonrheumatic mitral valve regurgitation 12/19/2023     Past Surgical History:   Procedure Laterality Date    APPENDECTOMY      CARDIAC CATHETERIZATION      no stent    CHOLECYSTECTOMY      COLONOSCOPY N/A 11/3/2016    Procedure: COLONOSCOPY;  Surgeon: Maxi Villasenor MD;  Location: [facility] ([address] [place] [address]);  Service: Endoscopy;  Laterality: N/A;  last colonoscopy with Dr Jarrell    COLONOSCOPY N/A  1/25/2022    Procedure: COLONOSCOPY;  Surgeon: Silvino Rosales MD;  Location: [facility] ENDO;  Service: Endoscopy;  Laterality: N/A;    LEFT HEART CATHETERIZATION Left 2/3/2020    Procedure: CATHETERIZATION, HEART, LEFT;  Surgeon: Chele Ko MD;  Location: [facility] CATH LAB;  Service: Cardiology;  Laterality: Left;  malur pt     Review of patient's allergies indicates:   Allergen Reactions    No known drug allergies      Current Outpatient Medications on File Prior to Visit   Medication Sig Dispense Refill    albuterol (PROVENTIL) 2.5 mg /3 mL (0.083 %) nebulizer solution Take 3 mLs (2.5 mg total) by nebulization every 4 to 6 hours as needed for Wheezing or Shortness of Breath. 360 mL 11    albuterol (PROVENTIL/VENTOLIN HFA) 90 mcg/actuation inhaler Inhale 2 puffs into the lungs every 4 (four) hours as needed for Wheezing. Rescue 18 g 11    ascorbic acid, vitamin C, (VITAMIN C) 1000 MG tablet Take 1,000 mg by mouth once daily.      rbhbfewlr-xncmitep-csjaxkn ala (BIKTARVY) -25 mg (25 kg or greater) Take 1 tablet by mouth once daily. 90 tablet 6    budesonide-glycopyr-formoterol 160-9-4.8 mcg/actuation HFAA Inhale 2 puffs into the lungs 2 (two) times a day. Wash out mouth after use 10.7 g 11    busPIRone (BUSPAR) 7.5 MG tablet TAKE ONE TABLET BY MOUTH DAILY AS NEEDED. 30 tablet 0    chlorhexidine (PERIDEX) 0.12 % solution       donepeziL (ARICEPT) 5 MG tablet Take 1 tablet (5 mg total) by mouth every evening. 90 tablet 3    FLUoxetine 40 MG capsule TAKE 1 CAPSULE EVERY DAY 90 capsule 3    fluticasone propionate (FLONASE) 50 mcg/actuation nasal spray [place] 1 spray every day by intranasal route.      furosemide (LASIX) 40 MG tablet Take 1 tablet (40 mg total) by mouth once daily. Do not take if BP is below 110/70 or feeling dry 30 tablet 3    hydroCHLOROthiazide (HYDRODIURIL) 12.5 MG Tab Take 1 tablet every day by oral route in the morning for 30 days.      losartan (COZAAR) 25 MG tablet Take 1 tablet (25 mg total)  by mouth once daily. 90 tablet 3    metoprolol succinate (TOPROL-XL) 50 MG 24 hr tablet       promethazine (PHENERGAN) 12.5 MG Tab Take 1 tablet (12.5 mg total) by mouth daily as needed for Nausea. 30 tablet 3    rosuvastatin (CRESTOR) 20 MG tablet TAKE 1 TABLET EVERY DAY 90 tablet 0    ubidecarenone (COENZYME Q10 ORAL) Take by mouth once daily.      vit C,I-Mw-mmdtf-lutein-zeaxan (EYE MULTIVIT, LUTEIN-ZEAXAN,) 236-04-88-2-5 mg Cap Take 1 tablet by mouth once daily.      metoprolol succinate (TOPROL-XL) 100 MG 24 hr tablet Take 1 tablet (100 mg total) by mouth once daily. 90 tablet 5    pep injection Inject 0.25 ml as directed     For compounding pharmacy use:   Add PAPAVERINE 30 mcg  Add PHENTOLAMINE 1 mg  Add PGE 10 mcg (Patient not taking: Reported on 12/5/2024) 10 vial 10     No current facility-administered medications on file prior to visit.     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  [place] [employer]   Tobacco Use    Smoking status: Never    Smokeless tobacco: Never   Substance and Sexual Activity    Alcohol use: No    Drug use: Not Currently    Sexual activity: Yes     Partners: Male     Social Drivers of Health     Financial Resource Strain: Low Risk  (1/24/2024)    Overall Financial Resource Strain (CARDIA)     Difficulty of Paying Living Expenses: Not hard at all   Food Insecurity: No Food Insecurity (1/24/2024)    Hunger Vital Sign     Worried About Running Out of Food in the Last Year: Never true     Ran Out of Food in the Last Year: Never true   Transportation Needs: No Transportation Needs (1/24/2024)    PRAPARE - Transportation     Lack of Transportation (Medical): No     Lack of Transportation (Non-Medical): No   Physical Activity: Inactive (1/24/2024)    Exercise Vital Sign  "    Days of Exercise per Week: 0 days     Minutes of Exercise per Session: 0 min   Stress: Stress Concern Present (1/24/2024)    [employer] - Occupational Stress Questionnaire     Feeling of Stress : To some extent   Housing Stability: Low Risk  (1/24/2024)    Housing Stability Vital Sign     Unable to Pay for Housing in the Last Year: No     Number of Places Lived in the Last Year: 1     Unstable Housing in the Last Year: No     Family History   Problem Relation Name Age of Onset    Heart disease Father      Heart failure Father      Diabetes Neg Hx      Hypertension Neg Hx         Review of Systems   Constitutional:  Negative for fever and fatigue.   HENT:  Negative for postnasal drip and rhinorrhea.    Eyes:  Negative for redness and itching.   Respiratory:  Positive for apnea, snoring and dyspnea on extertion. Negative for cough, shortness of breath, wheezing and Paroxysmal Nocturnal Dyspnea.    Cardiovascular:  Negative for chest pain.   Genitourinary:  Negative for difficulty urinating and hematuria.   Endocrine:  Negative for polyphagia, cold intolerance and heat intolerance.    Musculoskeletal:  Positive for arthralgias.   Skin:  Negative for rash.   Gastrointestinal:  Negative for nausea, vomiting, abdominal pain and abdominal distention.   Neurological:  Positive for light-headedness. Negative for dizziness and headaches.        Poor memory   Hematological:  Negative for adenopathy. Does not bruise/bleed easily and no excessive bruising.   Psychiatric/Behavioral:  Positive for confusion. The patient is not nervous/anxious.        Objective:      BP (!) 142/80 (Patient Position: Sitting)   Pulse 87   Resp 16   Ht 5' 10" (1.778 m)   Wt 102.2 kg (225 lb 5 oz)   SpO2 (!) 93%   BMI 32.33 kg/m²   Physical Exam  Vitals and nursing note reviewed.   Constitutional:       Appearance: He is well-developed.   HENT:      Head: Normocephalic and atraumatic.      Nose: Nose normal. "   Eyes:      Conjunctiva/sclera: Conjunctivae normal.      Pupils: Pupils are equal, round, and reactive to light.   Neck:      Thyroid: No thyromegaly.      Vascular: No JVD.      Trachea: No tracheal deviation.   Cardiovascular:      Rate and Rhythm: Normal rate and regular rhythm.      Heart sounds: Normal heart sounds.   Pulmonary:      Effort: Pulmonary effort is normal.      Breath sounds: Normal breath sounds.   Abdominal:      Palpations: Abdomen is soft.   Musculoskeletal:         General: Normal range of motion.      Cervical back: Neck supple.   Lymphadenopathy:      Cervical: No cervical adenopathy.   Skin:     General: Skin is warm and dry.   Neurological:      Mental Status: He is alert and oriented to person, place, and time.      Gait: Gait abnormal.      Comments: Poor memory and judgement   Psychiatric:         Mood and Affect: Mood normal.         Behavior: Behavior normal.       Diagnoses     Codes Comments   Obstructive sleep apnea syndrome  - Primary ICD-10-CM: G47.33  ICD-9-CM: 327.23    Periodic limb movement disorder (PLMD) ICD-10-CM: G47.61  ICD-9-CM: 327.51    Behaviorally induced insufficient sleep syndrome ICD-10-CM: F51.12  ICD-9-CM: 307.44    Inadequate sleep hygiene ICD-10-CM: Z72.821  ICD-9-CM: 307.49            Reason for Visit    Reason for Visit History       Progress Notes    No notes of this type exist for this encounter.  Procedure Notes    Author Status Last  Updated Created   Golden Allison, PhD Signed Golden Allison, PhD 2020  5:22 PM 2020  5:19 PM          Assoc. Orders Procedures   POLYSOMNOGRAM POLYSOMNOGRAM       Pre-Op Dx Post-Op Dx   Obstructive sleep apnea syndrome None            Patient Name: Haritha Valle                            Date of Report: 20           Date of PS/10/2020   [facility] No.: [id number]                            : 1945                      Time of PSG:  10:25:49 PM - 5:02:54 AM  Sex:  Male   Age:  75    Weight:  212.0 lbs      Height:  5  11                         Type of PSG:  Diagnostic      REASONS FOR REFERRAL: Mr. Valle is a 75 year old male, referred to Dr. Rico Pineda and the [facility] by Dr. Deni Nguyen for evaluation of possible obstructive sleep apnea / hypopnea syndrome (OSAHS).  Dr. Pineda requested diagnostic polysomnography based on the patients reported loud snoring, observed respiratory pauses in sleep, unrefreshing sleep and daytime somnolence (all positive on STOP-bang).  His [place] Sleepiness Scale score was 13, clinically significant, and his STOP - BANG score was 7, high risk of RAMONITA.  Dr. Hesham Andrade is the patients primary care physician.      STUDY PARAMETERS: This diagnostic study involved analysis of the patient's sleep pattern while breathing unassisted. The study was performed with a sleep technologist in attendance for the entire test period, with video monitoring throughout the study, and routine laboratory clinical parameters recorded:  NOTE: The polysomnography electrophysiological record for the patient has been reviewed in its entirety by Dr. Allison.     SUMMARY STATEMENTS  DIAGNOSTIC IMPRESSIONS  G47.33  /  327.23                    Moderate to Severe Obstructive Sleep Apnea, Adult (OSAHS)  G47.61  /  327.51                    Severe Periodic Limb Movement (PLM) Disorder   F51.12   /  307.44                    Mild Insufficient Sleep Syndrome  Z72.821 /  V69.4                     Inadequate Sleep Hygiene      PRIMARY TREATMENT RECOMMENDATIONS  Treat, or refer to Sleep Disorders Center.  The diagnostic polysomnography revealed a moderate to severe obstructive sleep apnea / hypopnea syndrome (A + H Index = 26.8 events / hr asleep with 7.0 respiratory event - related arousals / hr asleep for the study, and no RERAs (respiratory effort -  related arousals).  The mean SpO2 value was 90.0 %, significant, minimum oxygen saturation during sleep was   79.0 %, and waking baseline  SpO2 was 97 %.   Sporadic / infrequent , moderately loud snoring was noted.  A  CPAP titration polysomnography is recommended.      Weight loss to the normal range is recommended as it can decrease respiratory events and snoring in overweight patients.  The following changes in sleep hygiene / sleep - related behavior are recommended after medical treatments are successful  Regular bedtimes and wake times, including weekends: Total sleep time / night should not be more than one hour more             than usual, and bedtime or wake time should not be more than one hour earlier or later than usual.    Do not attempt to make up lost sleep by extending sleep periods.    Avoid naps; none longer than 20 min or later than mid - afternoon.     SECONDARY TREATMENT RECOMMENDATIONS  Treat, or refer to SDC if problems are not satisfactorily resolved by the above.  A severe  PLM disorder was observed (PLMS Index = 67.6 / hr asleep, but treatment might not be optimal because the PLMS were not very disruptive of sleep (6.6 arousals / hr asleep), and there were no signs of restless legs syndrome in the SDI,   H & P or PSG;  consider treatment of PLM disorder if PLMS symptoms are sufficiently bothersome to the patient.  Note that the benzodiazepine medications sometimes used to treat PLM disorder (e.g., alprazolam / Xanax) may exacerbate some sleep - related respiratory disorders, and that dopaminergic medications such as Mirapex and Requip can be used in such instances.    Mr. Valle is taking fluoxetine / Prozac.  Most tricyclic, and many SSRI antidepressants (e.g., fluoxetine - Prozac, sertraline   Zoloft, venlafaxine - Effexor), are associated with increased PLMS in some studies and increased RLS in others.  Consider   replacing Prozac with a medication known not to exacerbate PLMS or RLS.  Consider behavioral and cognitive / behavioral treatments for anxiety and depression to complement the medication and to increase the  "probability of long - term adaptive change.  Sleep (quality) might be expected to further improve.  IMPORTANT  NOTE:   RAMONITA, PLMS, and stress - related arousals (anxiety and depression)  interact to significantly impair sleep quality and restoratives, making treatment of each and all of these disorders very important  for restorative sleep.     See below for a complete interpretation of data from the polysomnography and Sleep Disorders Inventory.     Thank you for referring this patient to the [facility].      Golden Allison, Ph.D., ABPP; Diplomate, American Board of Sleep Medicine          Echocardiogram Summary         Left Ventricle: The left ventricle is normal in size. Normal wall thickness. There is mild concentric hypertrophy. Normal wall motion. There is low normal systolic function with a visually estimated ejection fraction of 50 - 55%. Grade I diastolic dysfunction.    Right Ventricle: Normal right ventricular cavity size. Wall thickness is normal. Right ventricle wall motion  is normal. Systolic function is normal.    Aortic Valve: There is mild aortic regurgitation.    Mitral Valve: There is mild regurgitation.    Tricuspid Valve: There is mild regurgitation.    IVC/SVC: Normal venous pressure at 3 mmHg.    Personal Diagnostic Review  Spirometry shows a reduced vital capacity suggesting restriction. Spirometry remains unimproved following bronchodilator. Lung volumes show mild restriction is present. Airway mechanics show normal airway resistance and conductance. DLCO is mildly   decreased. MVV is severely decreased.   Â      [facility]-Pulmonary Function 3rdFl  Six Minute Walk   SUMMARY   Ordering Provider: Maksim Chavarria MD        Interpreting Provider: Maksim Chavarria MD  Performing nurse/tech/RT: VT, RT  Diagnosis:  (MAIER (dyspnea on exertion); Chronic restrictive lung disease; Exercise hypoxemia)  Height: 5' 10" (177.8 cm)  Weight: 98.7 kg (217 lb 9.5 oz)  BMI (Calculated): 31.2  "             Patient Race:           Phase Oxygen Assessment Supplemental O2 Heart   Rate Blood Pressure Johnnie Dyspnea Scale Rating   Resting 95 % Room Air 73 bpm 151/83 3   Exercise             Minute             1 94 % Room Air 82 bpm       2 96 % Room Air 83 bpm       3 95 % Room Air 82 bpm       4 95 % Room Air 84 bpm       5 95 % Room Air 84 bpm       6  96 % Room Air 85 bpm 165/83 4   Recovery             Minute             1 95 % Room Air 74 bpm       2 97 % Room Air 76 bpm       3 97 % Room Air 76 bpm       4 97 % Room Air 74 bpm 170/89 3      Six Minute Walk Summary  6MWT Status: completed without stopping  Patient Reported: Dyspnea, Dizziness                Interpretation:  Did the patient stop or pause?: No  Total Time Walked (Calculated): 360 seconds  Final Partial Lap Distance (feet): 0 feet  Total Distance Meters (Calculated): 304.8 meters  Predicted Distance Meters (Calculated): 476.69 meters  Percentage of Predicted (Calculated): 63.94  Peak VO2 (Calculated): 13.12  Mets: 3.75  Has The Patient Had a Previous Six Minute Walk Test?: Yes     Previous 6MWT Results  Has The Patient Had a Previous Six Minute Walk Test?: Yes  Date of Previous Test: 10/23/20  Total Time Walked: 344 seconds  Total Distance (meters): 284.07  Predicted Distance (meters): 297.85 meters  Percent Change (Calculated): -0.07        Interpretation:  Total distance walked in six minutes is mildly reduced indicating a reduction in overall  functional capacity. The patient did not meet criteria for supplemental oxygen prescription.  Clinical correlation suggested.  [] Mild exercise-induced hypoxemia described as an arterial oxygen saturation of 93-95% (with a fall of 3-4% with exercise),   [] Moderate exercise-induced hypoxemia as a fall in oxygen saturation to  89-93% (with a fall of 3-4 % with exercise)  [] Severe exercise induced hypoxemia as < 89% O2 saturation (88% and below).  Medicare Criteria for Oxygen prescription  "comments: When arterial oxygen saturation is at or below 88% during exercise (severe exercise induced hypoxemia) then the patient falls under   Details about Medicare Group Criteria coverage can be found at http://www.cms.[facility].gov/manuals/downloads/      Maksim Chavarria MD                     12/5/2024     2:32 PM   Pulmonary Studies Review   SpO2 93 %   Height 5' 10" (1.778 m)   Weight 102.2 kg (225 lb 5 oz)   BMI (Calculated) 32.3   Predicted Distance 264.48   Predicted Distance Meters (Calculated) 465.51 meters       X-Ray Chest 4 Or More View  Narrative: EXAMINATION:  XR CHEST 4 OR MORE VIEW    CLINICAL HISTORY:  SOB; Shortness of breath    TECHNIQUE:  PA and lateral views of the chest were performed.    COMPARISON:  Chest x-ray from 07/20/2023    FINDINGS:  Cardiomegaly.  Aortic calcification.    The lungs are clear and free of infiltrate.  No pleural effusion or pneumothorax.    No evidence of acute osseous abnormality.  Impression: 1.  No acute cardiopulmonary process.    Electronically signed by: Brennen Royal  Date:    10/29/2024  Time:    14:22      Office Spirometry Results:         12/5/2024     2:32 PM 12/4/2024    10:26 AM 10/31/2024     2:59 PM 8/21/2024     2:09 PM 7/22/2024     2:10 PM 6/5/2024    12:09 PM 6/5/2024    11:19 AM   Pulmonary Function Tests   SpO2 93 % 96 % 98 % 95 % 97 %     Height 5' 10" (1.778 m) 5' 10" (1.778 m) 5' 10" (1.778 m) 5' 10" (1.778 m)  5' 10" (1.778 m) 5' 10" (1.778 m)   Weight 102.2 kg (225 lb 5 oz) 103.4 kg (227 lb 15.3 oz) 103.6 kg (228 lb 6.3 oz) 99.6 kg (219 lb 9.3 oz) 100.5 kg (221 lb 9 oz) 99.3 kg (219 lb) 99.3 kg (219 lb)   BMI (Calculated) 32.3 32.7 32.8 31.5  31.4 31.4         12/5/2024     2:32 PM   Pulmonary Studies Review   SpO2 93 %   Height 5' 10" (1.778 m)   Weight 102.2 kg (225 lb 5 oz)   BMI (Calculated) 32.3   Predicted Distance 264.48   Predicted Distance Meters (Calculated) 465.51 meters     Procedure Notes    Author Status Last  Updated Created "   Golden Allison, PhD Signed Golden Allison, PhD 2020  5:22 PM 2020  5:19 PM          Assoc. Orders Procedures   POLYSOMNOGRAM POLYSOMNOGRAM       Pre-Op Dx Post-Op Dx   Obstructive sleep apnea syndrome None               Patient Name: Haritha Valle                            Date of Report: 20           Date of PS/10/2020   [facility] Clinic No.: [id number]                            : 1945                      Time of PSG:  10:25:49 PM - 5:02:54 AM  Sex:  Male   Age:  75   Weight:  212.0 lbs      Height:  5  11                         Type of PSG:  Diagnostic      REASONS FOR REFERRAL: Mr. Valle is a 75 year old male, referred to Dr. Rico Pineda and the [facility] by Dr. Deni Nguyen for evaluation of possible obstructive sleep apnea / hypopnea syndrome (OSAHS).  Dr. Pineda requested diagnostic polysomnography based on the patients reported loud snoring, observed respiratory pauses in sleep, unrefreshing sleep and daytime somnolence (all positive on STOP-bang).  His [place] Sleepiness Scale score was 13, clinically significant, and his STOP - BANG score was 7, high risk of RAMONITA.  Dr. Hesham Andrade is the patients primary care physician.      STUDY PARAMETERS: This diagnostic study involved analysis of the patient's sleep pattern while breathing unassisted. The study was performed with a sleep technologist in attendance for the entire test period, with video monitoring throughout the study, and routine laboratory clinical parameters recorded:  NOTE: The polysomnography electrophysiological record for the patient has been reviewed in its entirety by Dr. Allison.     SUMMARY STATEMENTS  DIAGNOSTIC IMPRESSIONS  G47.33  /  327.23                    Moderate to Severe Obstructive Sleep Apnea, Adult (OSAHS)  G47.61  /  327.51                    Severe Periodic Limb Movement (PLM) Disorder   F51.12   /  307.44                    Mild Insufficient Sleep Syndrome  Z72.821 /  V69.4                      Inadequate Sleep Hygiene      PRIMARY TREATMENT RECOMMENDATIONS  Treat, or refer to Sleep Disorders Center.  The diagnostic polysomnography revealed a moderate to severe obstructive sleep apnea / hypopnea syndrome (A + H Index = 26.8 events / hr asleep with 7.0 respiratory event - related arousals / hr asleep for the study, and no RERAs (respiratory effort -  related arousals).  The mean SpO2 value was 90.0 %, significant, minimum oxygen saturation during sleep was   79.0 %, and waking baseline SpO2 was 97 %.   Sporadic / infrequent , moderately loud snoring was noted.  A  CPAP titration polysomnography is recommended.      Weight loss to the normal range is recommended as it can decrease respiratory events and snoring in overweight patients.  The following changes in sleep hygiene / sleep - related behavior are recommended after medical treatments are successful  Regular bedtimes and wake times, including weekends: Total sleep time / night should not be more than one hour more             than usual, and bedtime or wake time should not be more than one hour earlier or later than usual.    Do not attempt to make up lost sleep by extending sleep periods.    Avoid naps; none longer than 20 min or later than mid - afternoon.     SECONDARY TREATMENT RECOMMENDATIONS  Treat, or refer to SDC if problems are not satisfactorily resolved by the above.  A severe  PLM disorder was observed (PLMS Index = 67.6 / hr asleep, but treatment might not be optimal because the PLMS were not very disruptive of sleep (6.6 arousals / hr asleep), and there were no signs of restless legs syndrome in the SDI,   H & P or PSG;  consider treatment of PLM disorder if PLMS symptoms are sufficiently bothersome to the patient.  Note that the benzodiazepine medications sometimes used to treat PLM disorder (e.g., alprazolam / Xanax) may exacerbate some sleep - related respiratory disorders, and that dopaminergic medications such as Mirapex and  Requip can be used in such instances.    Mr. Valle is taking fluoxetine / Prozac.  Most tricyclic, and many SSRI antidepressants (e.g., fluoxetine - Prozac, sertraline   Zoloft, venlafaxine - Effexor), are associated with increased PLMS in some studies and increased RLS in others.  Consider   replacing Prozac with a medication known not to exacerbate PLMS or RLS.  Consider behavioral and cognitive / behavioral treatments for anxiety and depression to complement the medication and to increase the probability of long - term adaptive change.  Sleep (quality) might be expected to further improve.  IMPORTANT  NOTE:   RAMONITA, PLMS, and stress - related arousals (anxiety and depression)  interact to significantly impair sleep quality and restoratives, making treatment of each and all of these disorders very important  for restorative sleep.     See below for a complete interpretation of data from the polysomnography and Sleep Disorders Inventory.     Thank you for referring this patient to the [facility].      Golden Allison, Ph.D., ABPP; Diplomate, American Board of Sleep Medicine                Assessment:            Obstructive sleep apnea  -     CPAP/BIPAP SUPPLIES  -     Six Minute Walk Test to qualify for Home Oxygen; Future    MAIER (dyspnea on exertion)  -     Six Minute Walk Test to qualify for Home Oxygen; Future    Abnormal PFTs    Exercise hypoxemia  -     Six Minute Walk Test to qualify for Home Oxygen; Future              Outpatient Encounter Medications as of 12/5/2024   Medication Sig Dispense Refill    albuterol (PROVENTIL) 2.5 mg /3 mL (0.083 %) nebulizer solution Take 3 mLs (2.5 mg total) by nebulization every 4 to 6 hours as needed for Wheezing or Shortness of Breath. 360 mL 11    albuterol (PROVENTIL/VENTOLIN HFA) 90 mcg/actuation inhaler Inhale 2 puffs into the lungs every 4 (four) hours as needed for Wheezing. Rescue 18 g 11    ascorbic acid, vitamin C, (VITAMIN C) 1000 MG tablet Take  1,000 mg by mouth once daily.      lawgxopdb-nxsxlcmt-bpydtdt ala (BIKTARVY) -25 mg (25 kg or greater) Take 1 tablet by mouth once daily. 90 tablet 6    budesonide-glycopyr-formoterol 160-9-4.8 mcg/actuation HFAA Inhale 2 puffs into the lungs 2 (two) times a day. Wash out mouth after use 10.7 g 11    busPIRone (BUSPAR) 7.5 MG tablet TAKE ONE TABLET BY MOUTH DAILY AS NEEDED. 30 tablet 0    chlorhexidine (PERIDEX) 0.12 % solution       donepeziL (ARICEPT) 5 MG tablet Take 1 tablet (5 mg total) by mouth every evening. 90 tablet 3    FLUoxetine 40 MG capsule TAKE 1 CAPSULE EVERY DAY 90 capsule 3    fluticasone propionate (FLONASE) 50 mcg/actuation nasal spray [place] 1 spray every day by intranasal route.      furosemide (LASIX) 40 MG tablet Take 1 tablet (40 mg total) by mouth once daily. Do not take if BP is below 110/70 or feeling dry 30 tablet 3    hydroCHLOROthiazide (HYDRODIURIL) 12.5 MG Tab Take 1 tablet every day by oral route in the morning for 30 days.      losartan (COZAAR) 25 MG tablet Take 1 tablet (25 mg total) by mouth once daily. 90 tablet 3    metoprolol succinate (TOPROL-XL) 50 MG 24 hr tablet       promethazine (PHENERGAN) 12.5 MG Tab Take 1 tablet (12.5 mg total) by mouth daily as needed for Nausea. 30 tablet 3    rosuvastatin (CRESTOR) 20 MG tablet TAKE 1 TABLET EVERY DAY 90 tablet 0    ubidecarenone (COENZYME Q10 ORAL) Take by mouth once daily.      vit C,I-Lb-secns-lutein-zeaxan (EYE MULTIVIT, LUTEIN-ZEAXAN,) 044-13-03-2-5 mg Cap Take 1 tablet by mouth once daily.      metoprolol succinate (TOPROL-XL) 100 MG 24 hr tablet Take 1 tablet (100 mg total) by mouth once daily. 90 tablet 5    pep injection Inject 0.25 ml as directed     For compounding pharmacy use:   Add PAPAVERINE 30 mcg  Add PHENTOLAMINE 1 mg  Add PGE 10 mcg (Patient not taking: Reported on 12/5/2024) 10 vial 10    [DISCONTINUED] busPIRone (BUSPAR) 7.5 MG tablet TAKE ONE TABLET BY MOUTH DAILY AS NEEDED. 30 tablet 0     [DISCONTINUED] FLUoxetine 40 MG capsule TAKE 1 CAPSULE EVERY DAY 90 capsule 3    [DISCONTINUED] furosemide (LASIX) 20 MG tablet Take 1 tablet (20 mg total) by mouth once daily. 30 tablet 11     No facility-administered encounter medications on file as of 12/5/2024.     Plan:       Requested Prescriptions      No prescriptions requested or ordered in this encounter     Problem List Items Addressed This Visit       Abnormal PFTs    Overview     PFTs show moderate restriction with a slightly reduced DLCO.         MAIER (dyspnea on exertion)    Relevant Orders    Six Minute Walk Test to qualify for Home Oxygen    Obstructive sleep apnea - Primary    Relevant Orders    CPAP/BIPAP SUPPLIES    Six Minute Walk Test to qualify for Home Oxygen     Other Visit Diagnoses       Exercise hypoxemia        Relevant Orders    Six Minute Walk Test to qualify for Home Oxygen                   Follow up in about 6 months (around 6/5/2025) for CPAP download - annual review, 6 min walk - on return.    MEDICAL DECISION MAKING: Moderate to high complexity.  Overall, the multiple problems listed are of moderate to high severity that may impact quality of life and activities of daily living. Side effects of medications, treatment plan as well as options and alternatives reviewed and discussed with patient. There was counseling of patient concerning these issues.    Total time spent in counseling and coordination of care - 40  minutes of total time spent on the encounter, which includes face to face time and non-face to face time preparing to see the patient (eg, review of tests), Obtaining and/or reviewing separately obtained history, Documenting clinical information in the electronic or other health record, Independently interpreting results (not separately reported) and communicating results to the patient/family/caregiver, or Care coordination (not separately reported).    Time was used in discussion of prognosis, risks, benefits of  treatment, instructions and compliance with regimen . Discussion with other physicians and/or health care providers - home health or for use of durable medical equipment (oxygen, nebulizers, CPAP, BiPAP) occurred.  40

## 2024-12-05 NOTE — PATIENT INSTRUCTIONS
Inspire is an alternative to CPAP that works inside your body while you sleep. Its a small device placed during a same-day, outpatient procedure.  When youre ready for bed, simply click the remote to turn Inspire on. While you sleep, Inspire opens your airway, allowing you to breathe normally and sleep peacefully.    When patients with moderate to severe RAMONITA do not respond to conservative treatments or cannot tolerate CPAP therapy, the ear, nose and throat specialists at [facility] may recommend Inspire surgery as a treatment option.    Also known as hypoglossal nerve stimulation therapy, Inspire involves a surgery in which a device is implanted in the neck and upper chest just below the collarbone (clavicle). The device stimulates the hypoglossal nerve, which controls the muscles of the tongue. While you are sleeping, a sensing lead monitors your breathing, and it delivers mild stimulation to the hypoglossal nerve, causing the tongue to move and open the airway with each breath.    This FDA-approved implantable upper airway stimulation device functions like a pacemaker and stabilizes a patient's throat during sleep in order to prevent obstruction. The device consists of three components: a programmable neuro-stimulator located in a chest pocket, a pressure sensing lead that detects patient's breathing, and a stimulator lead that delivers mild stimulation to the tongue nerve. The stimulator is controlled via the patients handheld remote control. Patients turn on the device before going to bed to gently stimulate the throat muscles while sleeping.    Inspire surgery is generally performed as an outpatient surgery, although rarely patients may require a one-night stay in the hospital for monitoring. The operation itself usually takes about two hours and recovery time is typically shorter than with other types of sleep surgery.    Side effects are usually minimal and may include:  Pain and/or swelling at  the incision site, which is usually mild and temporary  Tongue weakness/soreness, which improves over time  Most patients are able to return to their normal activities after a few days. Approximately one month after implantation, patients will meet with their physician to establish their personal stimulation settings and learn how to use the Inspire sleep remote.        Qualifications:  [] You have moderate to severe RAMONITA with a diagnostic sleep study in the past 2 years.   [] You tried CPAP therapy and it didn't work.  [] You do not have significant trouble falling asleep.  [] You have a body mass index (BMI) of 32-35 or less.(Criteria vary based on insurance carrier)    You will need to have a sleep study (polysomnogram) performed in the hospital to exclude central sleep apnea. ( Central sleep apnea is a special type of sleep apnea where the brain does not transmit signals to your breathing muscles - it is different from Obstructive Sleep Apnea ). The INSPIRE device does not treat central sleep apnea.   As a result you will need a recent polysomnogram (sleep study) from the hospital to prepare you for the surgery. A home sleep study is inadequate to diagnosis central sleep apnea    Referral to an ENT specialist trained in placement of the INSPIRE device is indicated.  ENT physicians trained for this procedure in the area include:    Contact:   Darin Jimenez MD  [phone]  [facility] [address], [address]rd [place] [address]    [place] - Ear Nose Throat  [address] [facility] [phone]    [place] - Otolaryngology  [address] [facility] [phone]      Thiago Rodarte MD  [address] [facility] [address], [place] [address]  Phone: [phone]  Sonia is Board Certified in Otolaryngology- Head and Neck Surgery. He is a member of the American Academy of Otolaryngology - Head and Neck Surgery.

## 2024-12-12 ENCOUNTER — PROCEDURE VISIT (OUTPATIENT)
Dept: UROLOGY | Facility: CLINIC | Age: 79
End: 2024-12-12
Payer: MEDICARE

## 2024-12-12 ENCOUNTER — HOSPITAL ENCOUNTER (OUTPATIENT)
Dept: CARDIOLOGY | Facility: HOSPITAL | Age: 79
Discharge: HOME | End: 2024-12-12
Attending: INTERNAL MEDICINE
Payer: MEDICARE

## 2024-12-12 VITALS — BODY MASS INDEX: 32.33 KG/M2 | WEIGHT: 225.31 LBS

## 2024-12-12 DIAGNOSIS — I70.0 AORTIC ATHEROSCLEROSIS: ICD-10-CM

## 2024-12-12 DIAGNOSIS — F41.9 ANXIETY: ICD-10-CM

## 2024-12-12 DIAGNOSIS — R55 NEAR SYNCOPE: ICD-10-CM

## 2024-12-12 DIAGNOSIS — R55 POSTURAL DIZZINESS WITH PRESYNCOPE: ICD-10-CM

## 2024-12-12 DIAGNOSIS — R97.21 RISING PSA FOLLOWING TREATMENT FOR MALIGNANT NEOPLASM OF PROSTATE: ICD-10-CM

## 2024-12-12 DIAGNOSIS — I25.10 CAD IN NATIVE ARTERY: ICD-10-CM

## 2024-12-12 DIAGNOSIS — R06.09 DOE (DYSPNEA ON EXERTION): ICD-10-CM

## 2024-12-12 DIAGNOSIS — R42 POSTURAL DIZZINESS WITH PRESYNCOPE: ICD-10-CM

## 2024-12-12 DIAGNOSIS — E78.5 DYSLIPIDEMIA: ICD-10-CM

## 2024-12-12 DIAGNOSIS — R93.1 ABNORMAL NUCLEAR CARDIAC IMAGING TEST: ICD-10-CM

## 2024-12-12 DIAGNOSIS — E29.1 HYPOGONADISM MALE: Primary | ICD-10-CM

## 2024-12-12 PROCEDURE — 99999PBSHW PR PBB SHADOW TECHNICAL ONLY FILED TO HB: Mod: PBBFAC,,,

## 2024-12-12 PROCEDURE — 11980 IMPLANT HORMONE PELLET(S): CPT | Mod: PBBFAC | Performed by: UROLOGY

## 2024-12-12 RX ORDER — LEVOFLOXACIN 500 MG/1
500 TABLET, FILM COATED ORAL DAILY
Qty: 3 TABLET | Refills: 0 | Status: SHIPPED | OUTPATIENT
Start: 2024-12-12 | End: 2024-12-15

## 2024-12-12 NOTE — PROCEDURES
Procedures  Chief Complaint:   Encounter Diagnoses   Name Primary?    Hypogonadism male Yes    Rising PSA following treatment for malignant neoplasm of prostate        HPI:    12/12/24- here today for testopel insertion.    78-year-old gentleman who comes in with a longstanding history of hypogonadism.  Patient states that years ago he was treated by testosterone injections every 2 weeks, uncertain of the dosage.  Cutaneous therapy did not assist.  Patient is interested in pellets though.  Patient has decreased energy with fatigue, libido is not an issue nor as erections.  Patient is otherwise voiding well with no other urological history, no family history of urological cancers or stones.    Allergies:  No known drug allergies    Medications:  See MAR    Review of Systems:  General: No fever, chills, fatigability, or weight loss.  Skin: No rashes, itching, or changes in color or texture of skin.  Chest: Denies MAIER, cyanosis, wheezing, cough, and sputum production.  Abdomen: Appetite fine. No weight loss. Denies diarrhea, abdominal pain, hematemesis, or blood in stool.  Musculoskeletal: No joint stiffness or swelling. Denies back pain.  : As above.  All other review of systems negative.    PMH:   has a past medical history of Anxiety (07/03/2019), Basal cell carcinoma, CAD in native artery (11/27/2023), Depression, Dyslipidemia (11/16/2023), Essential hypertension (01/24/2013), Hepatitis B, Hepatitis C antibody test positive, HIV infection, Hypertension, Immune disorder, Mild cognitive impairment (10/01/2010), Nonrheumatic aortic valve insufficiency (12/19/2023), and Nonrheumatic mitral valve regurgitation (12/19/2023).    PSH:   has a past surgical history that includes Colonoscopy (N/A, 11/3/2016); Cholecystectomy; Appendectomy; Left heart catheterization (Left, 2/3/2020); Cardiac catheterization; and Colonoscopy (N/A, 1/25/2022).    FamHx: family history includes Heart disease in his father; Heart failure in  his father.    SocHx:  reports that he has never smoked. He has never used smokeless tobacco. He reports that he does not currently use drugs. He reports that he does not drink alcohol.      Physical Exam:  There were no vitals filed for this visit.  General: A&Ox3, no apparent distress, no deformities  Neck: No masses, normal ROM  Lungs: normal inspiration, no use of accessory muscles  Heart: normal pulse, no arrhythmias  Abdomen: Soft, NT, ND, no masses, no hernias, no hepatosplenomegaly  Skin: The skin is warm and dry. No jaundice.  Ext: No c/c/e.  : 1/24- Test desc sam, no abnormalities of epididymus. Normal penile and scrotal skin. Meatus normal.    Labs/Studies:   Testopel  12/12/24, 9/12/24, 5/30/24, 2/29/24  PSA 1.7 12/23   Testosterone 39 1/24  Estrogen 107 1/24    Patient was sterilely prepped and draped, then positioned in the left side up, lateral decubitus position.  Lidocaine was used for local anesthesia.  Eleven blade was used to incise the skin, included trocars with the testopel kit were then used.  They were inserted within the incision site and we placed the pellets in a V location.  10 pellets total were inserted without difficulty.  Trocars were removed and pressure was applied for 2 min.  Steri-Strips applied for local coverage, he will return for lab assessment in 3 months.      Impression/Plan:     Hypogonadism- patient tolerated the procedure well and will call with any issues in the meantime.  Will see him back in 3 months with labs and to repeat insertion.  Of note the patient has previously used injections and has failed creams.

## 2024-12-26 ENCOUNTER — PATIENT MESSAGE (OUTPATIENT)
Dept: CARDIOLOGY | Facility: HOSPITAL | Age: 79
End: 2024-12-26
Payer: MEDICARE

## 2024-12-26 ENCOUNTER — DOCUMENTATION ONLY (OUTPATIENT)
Dept: CARDIOLOGY | Facility: HOSPITAL | Age: 79
End: 2024-12-26
Payer: MEDICARE

## 2024-12-26 NOTE — PROGRESS NOTES
Hello,    This email serves to notify you that the patient listed below has less than 1 day of wear time on their 7 day Extended Holter service. We do not have an End of Service Report for this patient.  [facility]: [facility]  PT Name: Haritha Valle  BTID: [id number]  Service Type: Extended Holter  : 1945  Prescriber: Deni Nguyen    We recommend submitting a new intake and repatching the patient.    Thank you,       --   BRANDIE Black CCT  Supervisor of Cardiac Monitoring- Reports  (She, Her)     SUPPORT [phone]     [facility] [phone]  [email or web address]  [address] [place] [address], [address], [place] [address]  [email or web address].

## 2025-01-02 DIAGNOSIS — E78.2 MIXED HYPERLIPIDEMIA: ICD-10-CM

## 2025-01-02 NOTE — TELEPHONE ENCOUNTER
Care Due:                  Date            Visit Type   Department     Provider  --------------------------------------------------------------------------------                                EP -                              PRIMARY      [facility]  Last Visit: 08-      CARE ([facility])   JULIO Herbert                              EP -                              PRIMARY      [facility]  Next Visit: 02-      CARE ([facility])   JULIO Herbert                                                            Last  Test          Frequency    Reason                     Performed    Due Date  --------------------------------------------------------------------------------    Lipid Panel.  12 months..  rosuvastatin.............  12- 12-    [employer] Embedded Care Due Messages. Reference number: [id number].   1/02/2025 1:29:42 AM CST

## 2025-01-03 RX ORDER — ROSUVASTATIN CALCIUM 20 MG/1
TABLET, COATED ORAL
Qty: 90 TABLET | Refills: 3 | Status: SHIPPED | OUTPATIENT
Start: 2025-01-03

## 2025-01-03 NOTE — TELEPHONE ENCOUNTER
Refill Routing Note   Medication(s) are not appropriate for processing by [facility] for the following reason(s):        Required labs outdated    [facility] action(s):  Defer     Requires labs : Yes             Appointments  past 12m or future 3m with PCP    Date Provider   Last Visit   8/21/2024 Max Herbert MD   Next Visit   2/25/2025 Max Herbert MD   ED visits in past 90 days: 0        Note composed:8:20 PM 01/02/2025

## 2025-01-06 ENCOUNTER — OFFICE VISIT (OUTPATIENT)
Dept: FAMILY MEDICINE | Facility: CLINIC | Age: 80
End: 2025-01-06
Payer: MEDICARE

## 2025-01-06 ENCOUNTER — TELEPHONE (OUTPATIENT)
Dept: CARDIOLOGY | Facility: CLINIC | Age: 80
End: 2025-01-06
Payer: MEDICARE

## 2025-01-06 VITALS
TEMPERATURE: 98 F | BODY MASS INDEX: 32.48 KG/M2 | RESPIRATION RATE: 18 BRPM | DIASTOLIC BLOOD PRESSURE: 88 MMHG | HEART RATE: 95 BPM | OXYGEN SATURATION: 96 % | SYSTOLIC BLOOD PRESSURE: 144 MMHG | WEIGHT: 226.88 LBS | HEIGHT: 70 IN

## 2025-01-06 DIAGNOSIS — R06.09 DOE (DYSPNEA ON EXERTION): Primary | ICD-10-CM

## 2025-01-06 PROCEDURE — 99215 OFFICE O/P EST HI 40 MIN: CPT | Mod: PBBFAC,PO | Performed by: INTERNAL MEDICINE

## 2025-01-06 PROCEDURE — 99999 PR PBB SHADOW E&M-EST. PATIENT-LVL V: CPT | Mod: PBBFAC,,, | Performed by: INTERNAL MEDICINE

## 2025-01-06 PROCEDURE — 99213 OFFICE O/P EST LOW 20 MIN: CPT | Mod: S$PBB,,, | Performed by: INTERNAL MEDICINE

## 2025-01-06 NOTE — TELEPHONE ENCOUNTER
Returning call from patient with questions, no answer. Unable to leave .  ----- Message from [employer] sent at 1/6/2025  4:27 PM CST -----  Contact: [phone]  Type:  Needs Medical Advice    Who Called: JAMI LÓPEZ [[id number]]  Symptoms (please be specific): CV 3-15 DAY HOLTER/CLINIC PT   How long has patient had these symptoms:  Need to talk to the nurse  Pharmacy name and phone #:    Would the patient rather a call back or a response via [facility]? Call back  Best Call Back Number:  [phone]  Additional Information: [id number]

## 2025-01-06 NOTE — PROGRESS NOTES
Subjective:       Patient ID: Haritha Valle is a 79 y.o. male.    Chief Complaint: Shortness of Breath    Pt would like referral to pulm dr lanny tanner at [facility] pulmonary for his chronic SOB-which he feels is worsening over last several weeks-----but better today----.       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           Seen recently by [facility] and cardiology for this-----and continues to f/u with them. Does not want additional w/u in clinic today with me----.---does not want to be seen in ER----just the referral/.          Shortness of Breath  Pertinent negatives include no abdominal pain, chest pain, fever, vomiting or wheezing.     Past Medical History:   Diagnosis Date    Anxiety 07/03/2019    Basal cell carcinoma     CAD in native artery 11/27/2023    Depression     Dyslipidemia 11/16/2023    Essential hypertension 01/24/2013    Hepatitis B     Hepatitis C antibody test positive     RNA NEG 8/29/2019    HIV infection     Hypertension     Immune disorder     Mild cognitive impairment 10/01/2010    Nonrheumatic aortic valve insufficiency 12/19/2023    Nonrheumatic mitral valve regurgitation 12/19/2023     Past Surgical History:   Procedure Laterality Date    APPENDECTOMY      CARDIAC CATHETERIZATION      no stent    CHOLECYSTECTOMY      COLONOSCOPY N/A 11/3/2016    Procedure: COLONOSCOPY;  Surgeon: Maxi Villasenor MD;  Location: [address];  Service: Endoscopy;  Laterality: N/A;  last colonoscopy with Dr Jarrell    COLONOSCOPY N/A 1/25/2022    Procedure: COLONOSCOPY;  Surgeon: Silvino Rosales MD;  Location: [facility] ENDO;  Service: Endoscopy;  Laterality: N/A;    LEFT HEART CATHETERIZATION Left 2/3/2020    Procedure: CATHETERIZATION, HEART, LEFT;  Surgeon: Chele Ko MD;  Location: 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  [place] [employer]   Tobacco Use    Smoking status: Never    Smokeless tobacco: Never   Substance and Sexual Activity    Alcohol use: No    Drug use: Never    Sexual activity: Not Currently     Partners: Male     Birth control/protection: Abstinence, See Surgical Hx, None     Social Drivers of Health     Financial Resource Strain: Low Risk  (1/24/2024)    Overall Financial Resource Strain (CARDIA)     Difficulty of Paying Living Expenses: Not hard at all   Food Insecurity: No Food Insecurity (1/24/2024)    Hunger Vital Sign     Worried About Running Out of Food in the Last Year: Never true     Ran Out of Food in the Last Year: Never true   Transportation Needs: No Transportation Needs (1/24/2024)    PRAPARE - Transportation     Lack of Transportation (Medical): No     Lack of Transportation (Non-Medical): No   Physical Activity: Inactive (1/24/2024)    Exercise Vital Sign     Days of Exercise per Week: 0 days     Minutes of Exercise per Session: 0 min   Stress: Stress Concern Present (1/24/2024)    [place] [employer] - Occupational Stress Questionnaire     Feeling of Stress : To some extent   Housing Stability: Low Risk  (1/24/2024)    Housing Stability Vital Sign     Unable to Pay for Housing in the Last Year: No     Number of Places Lived in the Last Year: 1     Unstable Housing in the Last Year: No     Review of Systems   Constitutional:  Negative for chills and fever.   HENT:  Negative for congestion.    Respiratory:  Positive for shortness of breath. Negative for apnea, choking, chest tightness, wheezing and  stridor.    Cardiovascular:  Negative for chest pain and palpitations.   Gastrointestinal:  Negative for abdominal pain, nausea and vomiting.   Neurological:  Negative for dizziness and weakness.       Objective:      Physical Exam  Vitals and nursing note reviewed.   Constitutional:       General: He is not in acute distress.     Appearance: Normal appearance. He is well-developed. He is not diaphoretic.   HENT:      Head: Normocephalic and atraumatic.      Mouth/Throat:      Pharynx: No oropharyngeal exudate.   Eyes:      General: No scleral icterus.     Pupils: Pupils are equal, round, and reactive to light.   Neck:      Thyroid: No thyromegaly.      Vascular: No carotid bruit or JVD.      Trachea: No tracheal deviation.   Cardiovascular:      Rate and Rhythm: Normal rate and regular rhythm.      Heart sounds: Normal heart sounds.   Pulmonary:      Effort: Pulmonary effort is normal. No respiratory distress.      Breath sounds: Normal breath sounds. No stridor. No wheezing, rhonchi or rales.   Chest:      Chest wall: No tenderness.   Abdominal:      General: Bowel sounds are normal. There is no distension.      Palpations: Abdomen is soft.      Tenderness: There is no abdominal tenderness. There is no guarding or rebound.   Musculoskeletal:         General: No tenderness. Normal range of motion.      Cervical back: Normal range of motion and neck supple.   Lymphadenopathy:      Cervical: No cervical adenopathy.   Skin:     General: Skin is warm and dry.      Coloration: Skin is not pale.      Findings: No erythema or rash.   Neurological:      Mental Status: He is alert and oriented to person, place, and time.         CMP  Sodium   Date Value Ref Range Status   10/31/2024 140 136 - 145 mmol/L Final     Potassium   Date Value Ref Range Status   10/31/2024 4.2 3.5 - 5.1 mmol/L Final     Chloride   Date Value Ref Range Status   10/31/2024 101 95 - 110 mmol/L Final     CO2   Date Value Ref Range Status   10/31/2024  28 23 - 29 mmol/L Final     Glucose   Date Value Ref Range Status   10/31/2024 93 70 - 110 mg/dL Final     BUN   Date Value Ref Range Status   10/31/2024 12 8 - 23 mg/dL Final     Creatinine   Date Value Ref Range Status   10/31/2024 1.1 0.5 - 1.4 mg/dL Final     Calcium   Date Value Ref Range Status   10/31/2024 10.1 8.7 - 10.5 mg/dL Final     Total Protein   Date Value Ref Range Status   10/31/2024 8.7 (H) 6.0 - 8.4 g/dL Final     Albumin   Date Value Ref Range Status   10/31/2024 3.7 3.5 - 5.2 g/dL Final   04/30/2018 4.3 3.6 - 5.1 g/dL Final     Comment:     @ Test Performed By:  [facility]  Latrell Campos M.D., Ph.D.,   [address] [place] [id number]  [facility]  [id number]       Total Bilirubin   Date Value Ref Range Status   10/31/2024 0.7 0.1 - 1.0 mg/dL Final     Comment:     For infants and newborns, interpretation of results should be based  on gestational age, weight and in agreement with clinical  observations.    Premature Infant recommended reference ranges:  Up to 24 hours.............<8.0 mg/dL  Up to 48 hours............<12.0 mg/dL  3-5 days..................<15.0 mg/dL  6-29 days.................<15.0 mg/dL       Alkaline Phosphatase   Date Value Ref Range Status   10/31/2024 41 40 - 150 U/L Final     AST   Date Value Ref Range Status   10/31/2024 29 10 - 40 U/L Final     ALT   Date Value Ref Range Status   10/31/2024 31 10 - 44 U/L Final     Anion Gap   Date Value Ref Range Status   10/31/2024 11 8 - 16 mmol/L Final     eGFR if    Date Value Ref Range Status   10/14/2021 >60.0 >60 mL/min/1.73 m^2 Final     eGFR if non    Date Value Ref Range Status   10/14/2021 >60.0 >60 mL/min/1.73 m^2 Final     Comment:     Calculation used to obtain the estimated glomerular filtration  rate (eGFR) is the CKD-EPI equation.        Lab Results   Component Value Date    WBC 11.66 10/31/2024    HGB 17.7 10/31/2024    HCT 55.5 (H)  10/31/2024    MCV 87 10/31/2024     10/31/2024     Lab Results   Component Value Date    CHOL 172 12/18/2023     Lab Results   Component Value Date    HDL 36 (L) 12/18/2023     Lab Results   Component Value Date    LDLCALC 73.2 12/18/2023     Lab Results   Component Value Date    TRIG 314 (H) 12/18/2023     Lab Results   Component Value Date    CHOLHDL 20.9 12/18/2023     Lab Results   Component Value Date    TSH 1.137 04/27/2023     Lab Results   Component Value Date    HGBA1C 5.7 (H) 01/24/2024     Assessment:       1. MAIER (dyspnea on exertion)        Plan:   MAIER (dyspnea on exertion)--------------referral to pulm dr lanny tanner at [facility] pulm---per pt request---  -     Ambulatory referral/consult to Pulmonology; Future; Expected date: 01/13/2025      Continue meds-----------f/u 1 month-----------go to er for worsening symptoms--------------------f/u [facility]/pul[facility] as scheduled------------

## 2025-01-08 ENCOUNTER — HOSPITAL ENCOUNTER (OUTPATIENT)
Dept: CARDIOLOGY | Facility: HOSPITAL | Age: 80
Discharge: HOME | End: 2025-01-08
Attending: INTERNAL MEDICINE
Payer: MEDICARE

## 2025-01-24 ENCOUNTER — PATIENT MESSAGE (OUTPATIENT)
Dept: CARDIOLOGY | Facility: CLINIC | Age: 80
End: 2025-01-24
Payer: MEDICARE

## 2025-01-27 ENCOUNTER — LAB VISIT (OUTPATIENT)
Dept: LAB | Facility: HOSPITAL | Age: 80
End: 2025-01-27
Attending: INTERNAL MEDICINE
Payer: MEDICARE

## 2025-01-27 ENCOUNTER — OFFICE VISIT (OUTPATIENT)
Dept: CARDIOLOGY | Facility: CLINIC | Age: 80
End: 2025-01-27
Payer: MEDICARE

## 2025-01-27 VITALS
HEART RATE: 77 BPM | BODY MASS INDEX: 32.14 KG/M2 | WEIGHT: 224 LBS | OXYGEN SATURATION: 97 % | SYSTOLIC BLOOD PRESSURE: 178 MMHG | DIASTOLIC BLOOD PRESSURE: 110 MMHG

## 2025-01-27 DIAGNOSIS — R42 POSTURAL DIZZINESS WITH PRESYNCOPE: ICD-10-CM

## 2025-01-27 DIAGNOSIS — I35.1 NONRHEUMATIC AORTIC VALVE INSUFFICIENCY: ICD-10-CM

## 2025-01-27 DIAGNOSIS — I50.33 ACUTE ON CHRONIC HEART FAILURE WITH PRESERVED EJECTION FRACTION: ICD-10-CM

## 2025-01-27 DIAGNOSIS — I34.0 NONRHEUMATIC MITRAL VALVE REGURGITATION: ICD-10-CM

## 2025-01-27 DIAGNOSIS — I11.0 HYPERTENSIVE HEART DISEASE WITH HEART FAILURE: Primary | ICD-10-CM

## 2025-01-27 DIAGNOSIS — R06.02 SOB (SHORTNESS OF BREATH): ICD-10-CM

## 2025-01-27 DIAGNOSIS — R55 NEAR SYNCOPE: ICD-10-CM

## 2025-01-27 DIAGNOSIS — I10 ESSENTIAL HYPERTENSION: ICD-10-CM

## 2025-01-27 DIAGNOSIS — I70.0 AORTIC ATHEROSCLEROSIS: ICD-10-CM

## 2025-01-27 DIAGNOSIS — G47.33 OBSTRUCTIVE SLEEP APNEA: ICD-10-CM

## 2025-01-27 DIAGNOSIS — R55 POSTURAL DIZZINESS WITH PRESYNCOPE: ICD-10-CM

## 2025-01-27 PROCEDURE — 99999 PR PBB SHADOW E&M-EST. PATIENT-LVL III: CPT | Mod: PBBFAC,,, | Performed by: INTERNAL MEDICINE

## 2025-01-27 PROCEDURE — 83735 ASSAY OF MAGNESIUM: CPT | Performed by: INTERNAL MEDICINE

## 2025-01-27 PROCEDURE — 99214 OFFICE O/P EST MOD 30 MIN: CPT | Mod: S$PBB,,, | Performed by: INTERNAL MEDICINE

## 2025-01-27 PROCEDURE — 83880 ASSAY OF NATRIURETIC PEPTIDE: CPT | Performed by: INTERNAL MEDICINE

## 2025-01-27 PROCEDURE — 80053 COMPREHEN METABOLIC PANEL: CPT | Performed by: INTERNAL MEDICINE

## 2025-01-27 PROCEDURE — 99213 OFFICE O/P EST LOW 20 MIN: CPT | Mod: PBBFAC,PO | Performed by: INTERNAL MEDICINE

## 2025-01-27 PROCEDURE — G2211 COMPLEX E/M VISIT ADD ON: HCPCS | Mod: S$PBB,,, | Performed by: INTERNAL MEDICINE

## 2025-01-27 PROCEDURE — 36415 COLL VENOUS BLD VENIPUNCTURE: CPT | Mod: PO | Performed by: INTERNAL MEDICINE

## 2025-01-27 RX ORDER — LANOLIN ALCOHOL/MO/W.PET/CERES
400 CREAM (GRAM) TOPICAL DAILY
Qty: 90 TABLET | Refills: 1 | Status: SHIPPED | OUTPATIENT
Start: 2025-01-27

## 2025-01-27 RX ORDER — POTASSIUM CHLORIDE 750 MG/1
20 TABLET, EXTENDED RELEASE ORAL DAILY
Qty: 90 TABLET | Refills: 1 | Status: SHIPPED | OUTPATIENT
Start: 2025-01-27

## 2025-01-27 RX ORDER — METOPROLOL SUCCINATE 100 MG/1
100 TABLET, EXTENDED RELEASE ORAL NIGHTLY
Qty: 90 TABLET | Refills: 1 | Status: SHIPPED | OUTPATIENT
Start: 2025-01-27

## 2025-01-27 RX ORDER — FUROSEMIDE 40 MG/1
40 TABLET ORAL DAILY
Qty: 90 TABLET | Refills: 1 | Status: SHIPPED | OUTPATIENT
Start: 2025-01-27 | End: 2026-01-27

## 2025-01-27 NOTE — PROGRESS NOTES
Subjective:   Patient ID:  Haritha Valle is a 79 y.o. male who presents for cardiac consult of No chief complaint on file.        The patient came in today for cardiac consult of No chief complaint on file.    Referring Physician: Hesham Andrade II, MD   Reason for initial consult: LOC, palpitations      Haritha Valle is a 79 y.o. male pt with HFpEF, HTN, HLD, HIV, anxiety, depression, hypogonadism, ED presents for follow up CV eval     12/4/24  Pt last seen by me in 2020.   ECHO 6/2024 with normal bi V function and valves, PASP 18 mmHg.   He has more dizziness and SOB - had pulm eval. - uses nebulizer and CPAP.   He had been to ENT and Neuro - brain MRI with atrophy  ECG - NSR, LVH    1/27/25  Vital monitor - 1/2025 with freq PACs, SVT episdoes, AVG HR 79 BPM. Overall PSVC [place] at 23.12 %  Pt had ER eval for SOB/fluid build up and elevated BP.     BP elevated today 178/110. HR 77. BMI 32 - 223 lbs   He was given IV lasix at ER - diursed 2L.   He was not taking lasix daily due to worsening breathing.       MRI brain   Moderate global cortical atrophy with mild frontal lobe predominance.  Score 1 bilateral medial temporal lobe atrophy.  Minimal nonspecific white matter changes, likely senescent.    Results for orders placed during the hospital encounter of 06/05/24    Echo    Interpretation Summary    Left Ventricle: The left ventricle is normal in size. Normal wall thickness. There is concentric hypertrophy. Normal wall motion. There is normal systolic function with a visually estimated ejection fraction of 55 - 60%. There is normal diastolic function.    Right Ventricle: Normal right ventricular cavity size. Wall thickness is normal. Systolic function is normal.    Left Atrium: Left atrium is mildly dilated.    Pulmonary Artery: The estimated pulmonary artery systolic pressure is 18 mmHg.    IVC/SVC: Normal venous pressure at 3 mmHg.      Nuclear Quantitative Functional Analysis:   LVEF: 44 %    Impression:  ABNORMAL MYOCARDIAL PERFUSION  1. There is evidence for mild myocardial ischemia in the inferior and inferoapical walls of the left ventricle.   2. The perfusion scan is free of evidence for myocardial injury.   3. Resting wall motion is physiologic.   4. There is resting LV dysfunction with a reduced ejection fraction of 44 %.   5. The ventricular volumes are normal at rest and stress.   6. The extracardiac distribution of radioactivity is normal.     This document has been electronically    SIGNED BY: Stephon Corcoran MD On: 01/27/2020 16:38    2/3/20   1.   Non-obstructive CAD.   2.   low normal to mildy depressed ef.      Past Medical History:   Diagnosis Date    Anxiety 07/03/2019    Basal cell carcinoma     CAD in native artery 11/27/2023    Depression     Dyslipidemia 11/16/2023    Essential hypertension 01/24/2013    Hepatitis B     Hepatitis C antibody test positive     RNA NEG 8/29/2019    HIV infection     Hypertension     Immune disorder     Mild cognitive impairment 10/01/2010    Nonrheumatic aortic valve insufficiency 12/19/2023    Nonrheumatic mitral valve regurgitation 12/19/2023       Past Surgical History:   Procedure Laterality Date    APPENDECTOMY      CARDIAC CATHETERIZATION      no stent    CHOLECYSTECTOMY      COLONOSCOPY N/A 11/3/2016    Procedure: COLONOSCOPY;  Surgeon: Maxi Villasenor MD;  Location: [facility] ([address] [place] [address]);  Service: Endoscopy;  Laterality: N/A;  last colonoscopy with Dr Jarrell    COLONOSCOPY N/A 1/25/2022    Procedure: COLONOSCOPY;  Surgeon: Silvino Rosales MD;  Location: [facility] ENDO;  Service: Endoscopy;  Laterality: N/A;    LEFT HEART CATHETERIZATION Left 2/3/2020    Procedure: CATHETERIZATION, HEART, LEFT;  Surgeon: Chele Ko MD;  Location: [facility] CATH LAB;  Service: Cardiology;  Laterality: Left;  malur pt       Social History     Tobacco Use    Smoking status: Never    Smokeless tobacco: Never   Substance Use Topics    Alcohol use: No    Drug use: Never       Family  History   Problem Relation Name Age of Onset    Hearing loss Mother Sonam Valle     Vision loss Mother Sonam Valle     Heart disease Father Demarco Valle     Heart failure Father Demarco Valle     Diabetes Neg Hx      Hypertension Neg Hx         Patient's Medications   New Prescriptions    MAGNESIUM OXIDE (MAG-OX) 400 MG (241.3 MG MAGNESIUM) TABLET    Take 1 tablet (400 mg total) by mouth once daily.    POTASSIUM CHLORIDE SA (K-DUR,KLOR-CON M) 10 MEQ TABLET    Take 2 tablets (20 mEq total) by mouth once daily.   Previous Medications    ALBUTEROL (PROVENTIL) 2.5 MG /3 ML (0.083 %) NEBULIZER SOLUTION    Take 3 mLs (2.5 mg total) by nebulization every 4 to 6 hours as needed for Wheezing or Shortness of Breath.    ALBUTEROL (PROVENTIL/VENTOLIN HFA) 90 MCG/ACTUATION INHALER    Inhale 2 puffs into the lungs every 4 (four) hours as needed for Wheezing. Rescue    ASCORBIC ACID, VITAMIN C, (VITAMIN C) 1000 MG TABLET    Take 1,000 mg by mouth once daily.    NXSQYCMNU-KDMSSAHM-UCEDLBG ALA (BIKTARVY) -25 MG (25 KG OR GREATER)    Take 1 tablet by mouth once daily.    BUDESONIDE-GLYCOPYR-FORMOTEROL 160-9-4.8 MCG/ACTUATION HFAA    Inhale 2 puffs into the lungs 2 (two) times a day. Wash out mouth after use    BUSPIRONE (BUSPAR) 7.5 MG TABLET    TAKE ONE TABLET BY MOUTH DAILY AS NEEDED.    CHLORHEXIDINE (PERIDEX) 0.12 % SOLUTION        DONEPEZIL (ARICEPT) 5 MG TABLET    Take 1 tablet (5 mg total) by mouth every evening.    FLUOXETINE 40 MG CAPSULE    TAKE 1 CAPSULE EVERY DAY    FLUTICASONE PROPIONATE (FLONASE) 50 MCG/ACTUATION NASAL SPRAY    Otho 1 spray every day by intranasal route.    LOSARTAN (COZAAR) 25 MG TABLET    Take 1 tablet (25 mg total) by mouth once daily.    PEP INJECTION    Inject 0.25 ml as directed     For compounding pharmacy use:   Add PAPAVERINE 30 mcg  Add PHENTOLAMINE 1 mg  Add PGE 10 mcg    PROMETHAZINE (PHENERGAN) 12.5 MG TAB    Take 1 tablet (12.5 mg total) by mouth daily as needed for Nausea.     ROSUVASTATIN (CRESTOR) 20 MG TABLET    TAKE 1 TABLET EVERY DAY    UBIDECARENONE (COENZYME Q10 ORAL)    Take by mouth once daily.    VIT C,S-NE-NNQXY-LUTEIN-ZEAXAN (EYE MULTIVIT, LUTEIN-ZEAXAN,) 866-02-98-2-5 MG CAP    Take 1 tablet by mouth once daily.   Modified Medications    Modified Medication Previous Medication    FUROSEMIDE (LASIX) 40 MG TABLET furosemide (LASIX) 40 MG tablet       Take 1 tablet (40 mg total) by mouth once daily. Do not take if BP is below 110/70 or feeling dry; for next 4 days take twice a day (7 am and noon)    Take 1 tablet (40 mg total) by mouth once daily. Do not take if BP is below 110/70 or feeling dry    METOPROLOL SUCCINATE (TOPROL-XL) 100 MG 24 HR TABLET metoprolol succinate (TOPROL-XL) 50 MG 24 hr tablet       Take 1 tablet (100 mg total) by mouth every evening.       Discontinued Medications    HYDROCHLOROTHIAZIDE (HYDRODIURIL) 12.5 MG TAB    Take 1 tablet every day by oral route in the morning for 30 days.       Review of Systems   Constitutional:  Positive for malaise/fatigue.   HENT: Negative.     Eyes: Negative.    Respiratory:  Positive for shortness of breath.    Cardiovascular:  Positive for chest pain and palpitations.   Gastrointestinal: Negative.    Genitourinary: Negative.    Musculoskeletal: Negative.    Skin: Negative.    Neurological:  Positive for dizziness and loss of consciousness.   Endo/Heme/Allergies: Negative.    Psychiatric/Behavioral: Negative.     All 12 systems otherwise negative.      Wt Readings from Last 3 Encounters:   01/27/25 101.6 kg (223 lb 15.8 oz)   01/06/25 102.9 kg (226 lb 13.7 oz)   12/12/24 102.2 kg (225 lb 5 oz)     Temp Readings from Last 3 Encounters:   01/06/25 98.3 °F (36.8 °C) (Tympanic)   08/21/24 96.3 °F (35.7 °C) (Temporal)   05/16/24 97.8 °F (36.6 °C)     BP Readings from Last 3 Encounters:   01/27/25 (!) 178/110   01/06/25 (!) 144/88   12/05/24 (!) 142/80     Pulse Readings from Last 3 Encounters:   01/27/25 77   01/06/25 95    12/05/24 87       BP (!) 178/110 (BP Location: Right arm, Patient Position: Sitting)   Pulse 77   Wt 101.6 kg (223 lb 15.8 oz)   SpO2 97%   BMI 32.14 kg/m²     Objective:   Physical Exam  Vitals and nursing note reviewed.   Constitutional:       General: He is not in acute distress.     Appearance: He is well-developed. He is not diaphoretic.   HENT:      Head: Normocephalic and atraumatic.      Nose: Nose normal.   Eyes:      General: No scleral icterus.     Conjunctiva/sclera: Conjunctivae normal.   Neck:      Thyroid: No thyromegaly.      Vascular: No JVD.   Cardiovascular:      Rate and Rhythm: Normal rate and regular rhythm.      Heart sounds: S1 normal and S2 normal. No murmur heard.     No friction rub. No gallop. No S3 or S4 sounds.   Pulmonary:      Effort: Pulmonary effort is normal. No respiratory distress.      Breath sounds: Normal breath sounds. No stridor. No wheezing or rales.   Chest:      Chest wall: No tenderness.   Abdominal:      General: Bowel sounds are normal. There is no distension.      Palpations: Abdomen is soft. There is no mass.      Tenderness: There is no abdominal tenderness. There is no rebound.   Genitourinary:     Comments: Deferred  Musculoskeletal:         General: No tenderness or deformity. Normal range of motion.      Cervical back: Normal range of motion and neck supple.   Lymphadenopathy:      Cervical: No cervical adenopathy.   Skin:     General: Skin is warm and dry.      Coloration: Skin is not pale.      Findings: No erythema or rash.   Neurological:      Mental Status: He is alert and oriented to person, place, and time.      Motor: No abnormal muscle tone.      Coordination: Coordination normal.   Psychiatric:         Behavior: Behavior normal.         Thought Content: Thought content normal.         Judgment: Judgment normal.         Lab Results   Component Value Date     10/31/2024    K 4.2 10/31/2024     10/31/2024    CO2 28 10/31/2024    BUN 12  10/31/2024    CREATININE 1.1 10/31/2024    GLU 93 10/31/2024    HGBA1C 5.7 (H) 01/24/2024    MG 2.2 01/26/2012    AST 29 10/31/2024    ALT 31 10/31/2024    ALBUMIN 3.7 10/31/2024    ALBUMIN 4.3 04/30/2018    PROT 8.7 (H) 10/31/2024    BILITOT 0.7 10/31/2024    WBC 11.66 10/31/2024    HGB 17.7 10/31/2024    HCT 55.5 (H) 10/31/2024    MCV 87 10/31/2024     10/31/2024    INR 1.0 01/28/2020    TSH 1.137 04/27/2023    CHOL 172 12/18/2023    HDL 36 (L) 12/18/2023    LDLCALC 73.2 12/18/2023    TRIG 314 (H) 12/18/2023    BNP 42 07/22/2024     Assessment:      1. Hypertensive heart disease with heart failure    2. Aortic atherosclerosis    3. Acute on chronic heart failure with preserved ejection fraction    4. Near syncope    5. Postural dizziness with presyncope    6. Essential hypertension    7. Nonrheumatic mitral valve regurgitation    8. Nonrheumatic aortic valve insufficiency    9. SOB (shortness of breath)    10. Obstructive sleep apnea          Plan:   1. HTN - elevated  - titrate meds  - episodes of HTN urgency - r/o sec causes - labs and renal u/s ordered - neg  - ECHO 6/2024 with normal bi V function and valves, PASP 18 mmHg.     2. HLD with elevated TGs  - cont meds  - added- vascepa in past    3. HIV  - cont meds per PCP/ID    4. ED/hypogonadism  - cont meds per PCP PRN    5. Syncope/dizziness/palpitations with CP - non obs CAD  - prior exercise nuclear stress test to r/o ischemia - abnormal, C neg  - ECHO 6/2024 with normal bi V function and valves, PASP 18 mmHg.   - carotids - neg   - has been to ENT and Neuro - has atrophy and hearing aids now  -Vital monitor - 1/2025 with freq PACs, SVT episdoes, AVG HR 79 BPM. Overall PSVC [place] at 23.12 %  - repeat ECHO      6. Mild CHF - with recent CHF exac  - cont meds - Toprol and ARB  - s/p IV lasix   - cont lasix - daily - told to double up next 4 days   - labs next week     7. Anxiety  - refer to psych    8. RAMONITA  - cont CPAP    Visit today included  increased complexity associated with the care of the episodic problem dyspnea addressed and managing the longitudinal care of the patient due to the serious and/or complex managed problem(s) .      Thank you for allowing me to participate in this patient's care. Please do not hesitate to contact me with any questions or concerns. Consult note has been forwarded to the referral physician.

## 2025-01-28 ENCOUNTER — TELEPHONE (OUTPATIENT)
Dept: CARDIOLOGY | Facility: CLINIC | Age: 80
End: 2025-01-28
Payer: MEDICARE

## 2025-01-28 LAB
ALBUMIN SERPL BCP-MCNC: 3.4 G/DL (ref 3.5–5.2)
ALP SERPL-CCNC: 40 U/L (ref 40–150)
ALT SERPL W/O P-5'-P-CCNC: 45 U/L (ref 10–44)
ANION GAP SERPL CALC-SCNC: 9 MMOL/L (ref 8–16)
AST SERPL-CCNC: 43 U/L (ref 10–40)
BILIRUB SERPL-MCNC: 1.2 MG/DL (ref 0.1–1)
BNP SERPL-MCNC: 359 PG/ML (ref 0–99)
BUN SERPL-MCNC: 14 MG/DL (ref 8–23)
CALCIUM SERPL-MCNC: 9.5 MG/DL (ref 8.7–10.5)
CHLORIDE SERPL-SCNC: 101 MMOL/L (ref 95–110)
CO2 SERPL-SCNC: 30 MMOL/L (ref 23–29)
CREAT SERPL-MCNC: 1.2 MG/DL (ref 0.5–1.4)
EST. GFR  (NO RACE VARIABLE): >60 ML/MIN/1.73 M^2
GLUCOSE SERPL-MCNC: 86 MG/DL (ref 70–110)
MAGNESIUM SERPL-MCNC: 1.9 MG/DL (ref 1.6–2.6)
POTASSIUM SERPL-SCNC: 3.6 MMOL/L (ref 3.5–5.1)
PROT SERPL-MCNC: 8.2 G/DL (ref 6–8.4)
SODIUM SERPL-SCNC: 140 MMOL/L (ref 136–145)

## 2025-01-28 NOTE — TELEPHONE ENCOUNTER
Clarified Lasix instructions from recent office note.    ----- Message from Summer sent at 1/28/2025  8:25 AM CST -----  Contact: [facility]  [facility] pharmacy called regarding furosemide (LASIX) 40 MG tablet for above patient. They are requesting one of the following: Rx clarification direction. Thanks!  [facility] - [place], [place] - [address] [place] [address]  [address] [address]  [address]  [place] [address]  Phone: [phone] Fax: [phone]

## 2025-01-29 ENCOUNTER — TELEPHONE (OUTPATIENT)
Dept: CARDIOLOGY | Facility: CLINIC | Age: 80
End: 2025-01-29
Payer: MEDICARE

## 2025-02-05 ENCOUNTER — HOSPITAL ENCOUNTER (OUTPATIENT)
Dept: CARDIOLOGY | Facility: HOSPITAL | Age: 80
Discharge: HOME | End: 2025-02-05
Attending: INTERNAL MEDICINE
Payer: MEDICARE

## 2025-02-05 ENCOUNTER — HOSPITAL ENCOUNTER (OUTPATIENT)
Facility: HOSPITAL | Age: 80
Discharge: HOME | End: 2025-02-06
Attending: EMERGENCY MEDICINE | Admitting: HOSPITALIST
Payer: MEDICARE

## 2025-02-05 VITALS
HEIGHT: 70 IN | SYSTOLIC BLOOD PRESSURE: 147 MMHG | WEIGHT: 223 LBS | DIASTOLIC BLOOD PRESSURE: 79 MMHG | BODY MASS INDEX: 31.92 KG/M2

## 2025-02-05 DIAGNOSIS — I34.0 NONRHEUMATIC MITRAL VALVE REGURGITATION: ICD-10-CM

## 2025-02-05 DIAGNOSIS — I11.0 HYPERTENSIVE HEART DISEASE WITH HEART FAILURE: ICD-10-CM

## 2025-02-05 DIAGNOSIS — I70.0 AORTIC ATHEROSCLEROSIS: ICD-10-CM

## 2025-02-05 DIAGNOSIS — R06.02 SOB (SHORTNESS OF BREATH): ICD-10-CM

## 2025-02-05 DIAGNOSIS — I10 ESSENTIAL HYPERTENSION: ICD-10-CM

## 2025-02-05 DIAGNOSIS — R55 NEAR SYNCOPE: ICD-10-CM

## 2025-02-05 DIAGNOSIS — G47.33 OBSTRUCTIVE SLEEP APNEA: ICD-10-CM

## 2025-02-05 DIAGNOSIS — R55 POSTURAL DIZZINESS WITH PRESYNCOPE: ICD-10-CM

## 2025-02-05 DIAGNOSIS — R42 POSTURAL DIZZINESS WITH PRESYNCOPE: ICD-10-CM

## 2025-02-05 DIAGNOSIS — B20 HIV DISEASE: Chronic | ICD-10-CM

## 2025-02-05 DIAGNOSIS — I50.9 CHF (CONGESTIVE HEART FAILURE): Primary | ICD-10-CM

## 2025-02-05 DIAGNOSIS — I50.43 ACUTE ON CHRONIC COMBINED SYSTOLIC AND DIASTOLIC CONGESTIVE HEART FAILURE: ICD-10-CM

## 2025-02-05 DIAGNOSIS — I50.33 ACUTE ON CHRONIC HEART FAILURE WITH PRESERVED EJECTION FRACTION: ICD-10-CM

## 2025-02-05 DIAGNOSIS — R07.9 CHEST PAIN: ICD-10-CM

## 2025-02-05 DIAGNOSIS — I35.1 NONRHEUMATIC AORTIC VALVE INSUFFICIENCY: ICD-10-CM

## 2025-02-05 PROBLEM — I50.40 COMBINED SYSTOLIC AND DIASTOLIC CONGESTIVE HEART FAILURE: Status: ACTIVE | Noted: 2025-02-05

## 2025-02-05 LAB
ALBUMIN SERPL BCP-MCNC: 3.5 G/DL (ref 3.5–5.2)
ALP SERPL-CCNC: 36 U/L (ref 40–150)
ALT SERPL W/O P-5'-P-CCNC: 52 U/L (ref 10–44)
ANION GAP SERPL CALC-SCNC: 10 MMOL/L (ref 8–16)
AORTIC ROOT ANNULUS: 3.29 CM
ASCENDING AORTA: 3.4 CM
AST SERPL-CCNC: 61 U/L (ref 10–40)
AV INDEX (PROSTH): 0.82
AV MEAN GRADIENT: 2 MMHG
AV PEAK GRADIENT: 4 MMHG
AV VALVE AREA BY VELOCITY RATIO: 2.5 CM²
AV VALVE AREA: 2.6 CM²
AV VELOCITY RATIO: 0.8
BASOPHILS # BLD AUTO: 0.03 K/UL (ref 0–0.2)
BASOPHILS NFR BLD: 0.3 % (ref 0–1.9)
BILIRUB SERPL-MCNC: 0.9 MG/DL (ref 0.1–1)
BNP SERPL-MCNC: 100 PG/ML (ref 0–99)
BSA FOR ECHO PROCEDURE: 2.23 M2
BUN SERPL-MCNC: 18 MG/DL (ref 8–23)
CALCIUM SERPL-MCNC: 10 MG/DL (ref 8.7–10.5)
CHLORIDE SERPL-SCNC: 105 MMOL/L (ref 95–110)
CO2 SERPL-SCNC: 23 MMOL/L (ref 23–29)
CREAT SERPL-MCNC: 1.4 MG/DL (ref 0.5–1.4)
CV ECHO LV RWT: 0.48 CM
DIFFERENTIAL METHOD BLD: ABNORMAL
DOP CALC AO PEAK VEL: 1 M/S
DOP CALC AO VTI: 17.8 CM
DOP CALC LVOT AREA: 3.1 CM2
DOP CALC LVOT DIAMETER: 2 CM
DOP CALC LVOT PEAK VEL: 0.8 M/S
DOP CALC LVOT STROKE VOLUME: 45.8 CM3
DOP CALC RVOT PEAK VEL: 0.58 M/S
DOP CALC RVOT VTI: 9.7 CM
DOP CALCLVOT PEAK VEL VTI: 14.6 CM
E WAVE DECELERATION TIME: 272 MSEC
E/A RATIO: 1.09
E/E' RATIO: 14 M/S
ECHO LV POSTERIOR WALL: 1.3 CM (ref 0.6–1.1)
EJECTION FRACTION: 30 %
EOSINOPHIL # BLD AUTO: 0.1 K/UL (ref 0–0.5)
EOSINOPHIL NFR BLD: 1.3 % (ref 0–8)
ERYTHROCYTE [DISTWIDTH] IN BLOOD BY AUTOMATED COUNT: 17.2 % (ref 11.5–14.5)
EST. GFR  (NO RACE VARIABLE): 51 ML/MIN/1.73 M^2
FRACTIONAL SHORTENING: 13 % (ref 28–44)
GLUCOSE SERPL-MCNC: 104 MG/DL (ref 70–110)
HCT VFR BLD AUTO: 55.7 % (ref 40–54)
HCV AB SERPL QL IA: POSITIVE
HEP C VIRUS HOLD SPECIMEN: NORMAL
HGB BLD-MCNC: 17.8 G/DL (ref 14–18)
HIV 1+2 AB+HIV1 P24 AG SERPL QL IA: POSITIVE
IMM GRANULOCYTES # BLD AUTO: 0.03 K/UL (ref 0–0.04)
IMM GRANULOCYTES NFR BLD AUTO: 0.3 % (ref 0–0.5)
INTERVENTRICULAR SEPTUM: 1.2 CM (ref 0.6–1.1)
IVC DIAMETER: 1.72 CM
IVRT: 129 MSEC
LA MAJOR: 5.9 CM
LA MINOR: 5.9 CM
LA WIDTH: 4.3 CM
LEFT ATRIUM AREA SYSTOLIC (APICAL 2 CHAMBER): 21.51 CM2
LEFT ATRIUM AREA SYSTOLIC (APICAL 4 CHAMBER): 22.79 CM2
LEFT ATRIUM SIZE: 4.7 CM
LEFT ATRIUM VOLUME INDEX MOD: 33 ML/M2
LEFT ATRIUM VOLUME INDEX: 46 ML/M2
LEFT ATRIUM VOLUME MOD: 73 ML
LEFT ATRIUM VOLUME: 101 CM3
LEFT INTERNAL DIMENSION IN SYSTOLE: 4.7 CM (ref 2.1–4)
LEFT VENTRICLE DIASTOLIC VOLUME INDEX: 64.95 ML/M2
LEFT VENTRICLE DIASTOLIC VOLUME: 142.24 ML
LEFT VENTRICLE END SYSTOLIC VOLUME APICAL 2 CHAMBER: 68.23 ML
LEFT VENTRICLE END SYSTOLIC VOLUME APICAL 4 CHAMBER: 77.84 ML
LEFT VENTRICLE MASS INDEX: 127.8 G/M2
LEFT VENTRICLE SYSTOLIC VOLUME INDEX: 47.2 ML/M2
LEFT VENTRICLE SYSTOLIC VOLUME: 103.46 ML
LEFT VENTRICULAR INTERNAL DIMENSION IN DIASTOLE: 5.4 CM (ref 3.5–6)
LEFT VENTRICULAR MASS: 279.8 G
LV LATERAL E/E' RATIO: 12.3 M/S
LV SEPTAL E/E' RATIO: 16.3 M/S
LVED V (TEICH): 142.24 ML
LVES V (TEICH): 103.46 ML
LVOT MG: 1.52 MMHG
LVOT MV: 0.56 CM/S
LYMPHOCYTES # BLD AUTO: 3 K/UL (ref 1–4.8)
LYMPHOCYTES NFR BLD: 33.2 % (ref 18–48)
MAGNESIUM SERPL-MCNC: 1.9 MG/DL (ref 1.6–2.6)
MCH RBC QN AUTO: 28 PG (ref 27–31)
MCHC RBC AUTO-ENTMCNC: 32 G/DL (ref 32–36)
MCV RBC AUTO: 88 FL (ref 82–98)
MONOCYTES # BLD AUTO: 0.9 K/UL (ref 0.3–1)
MONOCYTES NFR BLD: 10 % (ref 4–15)
MV PEAK A VEL: 0.45 M/S
MV PEAK E VEL: 0.49 M/S
NEUTROPHILS # BLD AUTO: 5 K/UL (ref 1.8–7.7)
NEUTROPHILS NFR BLD: 54.9 % (ref 38–73)
NRBC BLD-RTO: 0 /100 WBC
OHS CV RV/LV RATIO: 0.65 CM
OHS QRS DURATION: 86 MS
OHS QTC CALCULATION: 443 MS
PLATELET # BLD AUTO: 259 K/UL (ref 150–450)
PMV BLD AUTO: 9 FL (ref 9.2–12.9)
POTASSIUM SERPL-SCNC: 5.4 MMOL/L (ref 3.5–5.1)
PROT SERPL-MCNC: 8.7 G/DL (ref 6–8.4)
PV MEAN GRADIENT: 1 MMHG
PV PEAK GRADIENT: 2 MMHG
PV PEAK VELOCITY: 0.75 M/S
RA MAJOR: 5.41 CM
RA PRESSURE ESTIMATED: 3 MMHG
RA WIDTH: 3.36 CM
RBC # BLD AUTO: 6.36 M/UL (ref 4.6–6.2)
RIGHT VENTRICLE DIASTOLIC BASEL DIMENSION: 3.5 CM
RIGHT VENTRICULAR END-DIASTOLIC DIMENSION: 3.46 CM
SINUS: 3.18 CM
SODIUM SERPL-SCNC: 138 MMOL/L (ref 136–145)
STJ: 2.91 CM
TDI LATERAL: 0.04 M/S
TDI SEPTAL: 0.03 M/S
TDI: 0.04 M/S
TRICUSPID ANNULAR PLANE SYSTOLIC EXCURSION: 1.55 CM
TROPONIN I SERPL DL<=0.01 NG/ML-MCNC: 0.01 NG/ML (ref 0–0.03)
TROPONIN I SERPL DL<=0.01 NG/ML-MCNC: 0.01 NG/ML (ref 0–0.03)
WBC # BLD AUTO: 9.14 K/UL (ref 3.9–12.7)
Z-SCORE OF LEFT VENTRICULAR DIMENSION IN END DIASTOLE: -3.15
Z-SCORE OF LEFT VENTRICULAR DIMENSION IN END SYSTOLE: 0.35

## 2025-02-05 PROCEDURE — 83735 ASSAY OF MAGNESIUM: CPT | Performed by: NURSE PRACTITIONER

## 2025-02-05 PROCEDURE — 93005 ELECTROCARDIOGRAM TRACING: CPT

## 2025-02-05 PROCEDURE — 93306 TTE W/DOPPLER COMPLETE: CPT | Mod: 26,,, | Performed by: INTERNAL MEDICINE

## 2025-02-05 PROCEDURE — G0378 HOSPITAL OBSERVATION PER HR: HCPCS

## 2025-02-05 PROCEDURE — 87522 HEPATITIS C REVRS TRNSCRPJ: CPT | Performed by: EMERGENCY MEDICINE

## 2025-02-05 PROCEDURE — 63600175 PHARM REV CODE 636 W HCPCS: Performed by: NURSE PRACTITIONER

## 2025-02-05 PROCEDURE — 84484 ASSAY OF TROPONIN QUANT: CPT | Mod: 91 | Performed by: HOSPITALIST

## 2025-02-05 PROCEDURE — 87389 HIV-1 AG W/HIV-1&-2 AB AG IA: CPT | Performed by: EMERGENCY MEDICINE

## 2025-02-05 PROCEDURE — 86803 HEPATITIS C AB TEST: CPT | Performed by: EMERGENCY MEDICINE

## 2025-02-05 PROCEDURE — 83880 ASSAY OF NATRIURETIC PEPTIDE: CPT | Performed by: EMERGENCY MEDICINE

## 2025-02-05 PROCEDURE — 80053 COMPREHEN METABOLIC PANEL: CPT | Performed by: EMERGENCY MEDICINE

## 2025-02-05 PROCEDURE — 96374 THER/PROPH/DIAG INJ IV PUSH: CPT

## 2025-02-05 PROCEDURE — 84484 ASSAY OF TROPONIN QUANT: CPT | Performed by: EMERGENCY MEDICINE

## 2025-02-05 PROCEDURE — 25000003 PHARM REV CODE 250: Performed by: HOSPITALIST

## 2025-02-05 PROCEDURE — 85025 COMPLETE CBC W/AUTO DIFF WBC: CPT | Performed by: EMERGENCY MEDICINE

## 2025-02-05 PROCEDURE — 93010 ELECTROCARDIOGRAM REPORT: CPT | Mod: ,,, | Performed by: INTERNAL MEDICINE

## 2025-02-05 PROCEDURE — 83036 HEMOGLOBIN GLYCOSYLATED A1C: CPT | Performed by: NURSE PRACTITIONER

## 2025-02-05 PROCEDURE — 93306 TTE W/DOPPLER COMPLETE: CPT

## 2025-02-05 PROCEDURE — 87389 HIV-1 AG W/HIV-1&-2 AB AG IA: CPT | Mod: 91 | Performed by: EMERGENCY MEDICINE

## 2025-02-05 PROCEDURE — 99285 EMERGENCY DEPT VISIT HI MDM: CPT | Mod: 25

## 2025-02-05 PROCEDURE — 36415 COLL VENOUS BLD VENIPUNCTURE: CPT | Performed by: EMERGENCY MEDICINE

## 2025-02-05 PROCEDURE — 63600175 PHARM REV CODE 636 W HCPCS: Performed by: EMERGENCY MEDICINE

## 2025-02-05 PROCEDURE — 96372 THER/PROPH/DIAG INJ SC/IM: CPT | Performed by: NURSE PRACTITIONER

## 2025-02-05 PROCEDURE — 25500020 PHARM REV CODE 255: Performed by: EMERGENCY MEDICINE

## 2025-02-05 RX ORDER — ONDANSETRON HYDROCHLORIDE 2 MG/ML
4 INJECTION, SOLUTION INTRAVENOUS EVERY 8 HOURS PRN
Status: DISCONTINUED | OUTPATIENT
Start: 2025-02-05 | End: 2025-02-06 | Stop reason: HOSPADM

## 2025-02-05 RX ORDER — ATORVASTATIN CALCIUM 40 MG/1
80 TABLET, FILM COATED ORAL DAILY
Status: DISCONTINUED | OUTPATIENT
Start: 2025-02-06 | End: 2025-02-06 | Stop reason: HOSPADM

## 2025-02-05 RX ORDER — FUROSEMIDE 20 MG/1
20 TABLET ORAL
COMMUNITY
Start: 2025-02-04 | End: 2025-02-05 | Stop reason: DRUGHIGH

## 2025-02-05 RX ORDER — METOPROLOL SUCCINATE 50 MG/1
100 TABLET, EXTENDED RELEASE ORAL NIGHTLY
Status: DISCONTINUED | OUTPATIENT
Start: 2025-02-05 | End: 2025-02-06 | Stop reason: HOSPADM

## 2025-02-05 RX ORDER — ENOXAPARIN SODIUM 100 MG/ML
40 INJECTION SUBCUTANEOUS EVERY 24 HOURS
Status: DISCONTINUED | OUTPATIENT
Start: 2025-02-05 | End: 2025-02-06 | Stop reason: HOSPADM

## 2025-02-05 RX ORDER — TALC
6 POWDER (GRAM) TOPICAL NIGHTLY PRN
Status: DISCONTINUED | OUTPATIENT
Start: 2025-02-05 | End: 2025-02-06 | Stop reason: HOSPADM

## 2025-02-05 RX ORDER — SODIUM CHLORIDE 0.9 % (FLUSH) 0.9 %
10 SYRINGE (ML) INJECTION
Status: DISCONTINUED | OUTPATIENT
Start: 2025-02-05 | End: 2025-02-06 | Stop reason: HOSPADM

## 2025-02-05 RX ORDER — HYDRALAZINE HYDROCHLORIDE 20 MG/ML
10 INJECTION INTRAMUSCULAR; INTRAVENOUS EVERY 6 HOURS PRN
Status: DISCONTINUED | OUTPATIENT
Start: 2025-02-05 | End: 2025-02-06 | Stop reason: HOSPADM

## 2025-02-05 RX ORDER — FUROSEMIDE 10 MG/ML
80 INJECTION INTRAMUSCULAR; INTRAVENOUS
Status: COMPLETED | OUTPATIENT
Start: 2025-02-05 | End: 2025-02-05

## 2025-02-05 RX ORDER — DONEPEZIL HYDROCHLORIDE 5 MG/1
5 TABLET, FILM COATED ORAL NIGHTLY
Status: DISCONTINUED | OUTPATIENT
Start: 2025-02-05 | End: 2025-02-06 | Stop reason: HOSPADM

## 2025-02-05 RX ORDER — FLUOXETINE HYDROCHLORIDE 20 MG/1
40 CAPSULE ORAL DAILY
Status: DISCONTINUED | OUTPATIENT
Start: 2025-02-06 | End: 2025-02-06 | Stop reason: HOSPADM

## 2025-02-05 RX ORDER — ACETAMINOPHEN 325 MG/1
650 TABLET ORAL EVERY 6 HOURS PRN
Status: DISCONTINUED | OUTPATIENT
Start: 2025-02-05 | End: 2025-02-06 | Stop reason: HOSPADM

## 2025-02-05 RX ADMIN — IOHEXOL 100 ML: 350 INJECTION, SOLUTION INTRAVENOUS at 05:02

## 2025-02-05 RX ADMIN — DONEPEZIL HYDROCHLORIDE 5 MG: 5 TABLET, FILM COATED ORAL at 10:02

## 2025-02-05 RX ADMIN — METOPROLOL SUCCINATE 100 MG: 50 TABLET, EXTENDED RELEASE ORAL at 10:02

## 2025-02-05 RX ADMIN — FUROSEMIDE 80 MG: 10 INJECTION, SOLUTION INTRAMUSCULAR; INTRAVENOUS at 05:02

## 2025-02-05 RX ADMIN — ENOXAPARIN SODIUM 40 MG: 40 INJECTION SUBCUTANEOUS at 10:02

## 2025-02-05 NOTE — PHARMACY MED REC
"Admission Medication History     The home medication history was taken by Delphine Damico.    You may go to "Admission" then "Reconcile Home Medications" tabs to review and/or act upon these items.     The home medication list has been updated by the Pharmacy department.   Please read ALL comments highlighted in yellow.   Please address this information as you see fit.    Feel free to contact us if you have any questions or require assistance.      The medications listed below were removed from the home medication list. Please reorder if appropriate:  Patient reports no longer taking the following medication(s):  Breztri 160-9-4.8 mcg/atuation HFAA  Peridex 0.12% solution  Pep injection      Medications listed below were obtained from: Patient/family and Analytic software- [employer], Duane Ulkines (patient's significant other at bedside)      LAST MED REC COMPLETED:     Delphine Damico  [id number]    Current Outpatient Medications on File Prior to Encounter   Medication Sig Dispense Refill Last Dose/Taking    albuterol (PROVENTIL) 2.5 mg /3 mL (0.083 %) nebulizer solution Take 3 mLs (2.5 mg total) by nebulization every 4 to 6 hours as needed for Wheezing or Shortness of Breath. 360 mL 11 2/4/2025    albuterol (PROVENTIL/VENTOLIN HFA) 90 mcg/actuation inhaler Inhale 2 puffs into the lungs every 4 (four) hours as needed for Wheezing. Rescue 18 g 11 2/4/2025    ascorbic acid, vitamin C, (VITAMIN C) 1000 MG tablet Take 1,000 mg by mouth once daily.   2/4/2025    eqdiwtbsf-mraeudds-npzcbov ala (BIKTARVY) -25 mg (25 kg or greater) Take 1 tablet by mouth once daily. 90 tablet 6 2/5/2025    donepeziL (ARICEPT) 5 MG tablet Take 1 tablet (5 mg total) by mouth every evening. 90 tablet 3 2/4/2025    FLUoxetine 40 MG capsule TAKE 1 CAPSULE EVERY DAY (Patient taking differently: Take 40 mg by mouth once daily.) 90 capsule 3 2/4/2025    fluticasone propionate (FLONASE) 50 mcg/actuation nasal spray [place] 1 spray every day by " intranasal route.   2/4/2025    furosemide (LASIX) 40 MG tablet Take 1 tablet (40 mg total) by mouth once daily. Do not take if BP is below 110/70 or feeling dry; for next 4 days take twice a day (7 am and noon) 90 tablet 1 2/5/2025    losartan (COZAAR) 25 MG tablet Take 1 tablet (25 mg total) by mouth once daily. 90 tablet 3 2/4/2025    magnesium oxide (MAG-OX) 400 mg (241.3 mg magnesium) tablet Take 1 tablet (400 mg total) by mouth once daily. 90 tablet 1 2/4/2025    metoprolol succinate (TOPROL-XL) 100 MG 24 hr tablet Take 1 tablet (100 mg total) by mouth every evening. 90 tablet 1 2/4/2025    potassium chloride SA (K-DUR,KLOR-CON M) 10 MEQ tablet Take 2 tablets (20 mEq total) by mouth once daily. 90 tablet 1 2/5/2025    promethazine (PHENERGAN) 12.5 MG Tab Take 1 tablet (12.5 mg total) by mouth daily as needed for Nausea. 30 tablet 3 2/4/2025    rosuvastatin (CRESTOR) 20 MG tablet TAKE 1 TABLET EVERY DAY (Patient taking differently: Take 20 mg by mouth every evening.) 90 tablet 3 2/4/2025    ubidecarenone (COENZYME Q10 ORAL) Take by mouth once daily.   2/4/2025    vit C,Q-Uu-envkf-lutein-zeaxan (EYE MULTIVIT, LUTEIN-ZEAXAN,) 105-79-12-2-5 mg Cap Take 1 tablet by mouth once daily.   2/4/2025    busPIRone (BUSPAR) 7.5 MG tablet TAKE ONE TABLET BY MOUTH DAILY AS NEEDED. 30 tablet 0 Unknown                           .

## 2025-02-05 NOTE — ED PROVIDER NOTES
"SCRIBE #1 NOTE: I, Cheko Ritter, am scribing for, and in the presence of, Teagan Wiggins DO. I have scribed the entire note.       History     Chief Complaint   Patient presents with    Chest Pain     Chest pain. States he was sent from MD office "for serious heart issues". Pt denies pain at this moment. States he is very SOB. On home o2 at 2 LPM. States symptoms for 4-6 months and getting worse.     Review of patient's allergies indicates:   Allergen Reactions    No known drug allergies          History of Present Illness     HPI    2/5/2025, 3:47 PM  History obtained from the patient, son at bedside      History of Present Illness: Haritha Valle is a 79 y.o. male patient with a PMHx of anxiety, CAD, HTN, HIV, dyslipidemia, basal cell carcinoma, and basal cell carcinoma who presents to the Emergency Department for evaluation of at request of cardiologist (Dr. Nguyen) due to abnormal echo PTA. Per pt's SO pt has had fluid in his chest and legs. Pt's metoprolol and lasix were doubled recently; pt's BP has not improved. Pt is on 2L O2 at home. Pt complains of SOB which has not abated with medication changes for past 2 weeks. Symptoms are constant and moderate in severity. No mitigating or exacerbating factors reported. Associated sxs include nausea. Patient denies any CP, vomiting, palpitations, fever, swelling of legs, and all other sxs at this time. No further complaints or concerns at this time.       Arrival mode: Personal vehicle      PCP: Max Herbert MD        Past Medical History:  Past Medical History:   Diagnosis Date    Anxiety 07/03/2019    Basal cell carcinoma     CAD in native artery 11/27/2023    Depression     Dyslipidemia 11/16/2023    Essential hypertension 01/24/2013    Hepatitis B     Hepatitis C antibody test positive     RNA NEG 8/29/2019    HIV infection     Hypertension     Immune disorder     Mild cognitive impairment 10/01/2010    Nonrheumatic aortic valve insufficiency " 12/19/2023    Nonrheumatic mitral valve regurgitation 12/19/2023       Past Surgical History:  Past Surgical History:   Procedure Laterality Date    APPENDECTOMY      CARDIAC CATHETERIZATION      no stent    CHOLECYSTECTOMY      COLONOSCOPY N/A 11/3/2016    Procedure: COLONOSCOPY;  Surgeon: Maxi Villasenor MD;  Location: [facility] (4TH FLR);  Service: Endoscopy;  Laterality: N/A;  last colonoscopy with Dr Jarrell    COLONOSCOPY N/A 1/25/2022    Procedure: COLONOSCOPY;  Surgeon: Silvino Rosales MD;  Location: [facility] ENDO;  Service: Endoscopy;  Laterality: N/A;    LEFT HEART CATHETERIZATION Left 2/3/2020    Procedure: CATHETERIZATION, HEART, LEFT;  Surgeon: Chele Ko MD;  Location: [facility] CATH LAB;  Service: Cardiology;  Laterality: Left;  malur pt         Family History:  Family History   Problem Relation Name Age of Onset    Hearing loss Mother Sonam Dease     Vision loss Mother Sonam Dease     Heart disease Father Edalbaro Dease     Heart failure Father Edalbaro Dease     Diabetes Neg Hx      Hypertension Neg Hx         Social History:  Social History     Tobacco Use    Smoking status: Never    Smokeless tobacco: Never   Substance and Sexual Activity    Alcohol use: No    Drug use: Never    Sexual activity: Not Currently     Partners: Male     Birth control/protection: Abstinence, See Surgical Hx, None        Review of Systems     Review of Systems   Constitutional:  Negative for diaphoresis and fever.   Respiratory:  Positive for shortness of breath.    Cardiovascular:  Negative for chest pain, palpitations and leg swelling.   Gastrointestinal:  Positive for nausea. Negative for vomiting.   Neurological:  Negative for dizziness and light-headedness.        Physical Exam     Initial Vitals [02/05/25 1508]   BP Pulse Resp Temp SpO2   133/79 69 (!) 22 97.5 °F (36.4 °C) 96 %      MAP       --          Physical Exam  Nursing Notes and Vital Signs Reviewed.  Constitutional: Patient is in no acute distress.  Well-developed and well-nourished.  Head: Atraumatic. Normocephalic.  Eyes: PERRL. EOM intact. No scleral icterus.  ENT: Mucous membranes are moist.   Neck: Supple. Full ROM. No JVD.   Cardiovascular: Regular rate. Regular rhythm. No murmurs, rubs, or gallops.   Pulmonary/Chest: Tachpneic. Diminished breath sounds bilaterally.   Abdominal: Soft and non-distended.  There is no tenderness.  No rebound, guarding, or rigidity.   Musculoskeletal: Moves all extremities. No obvious deformities. No edema. No calf tenderness.  Skin: Warm and dry.  Neurological:  Alert, awake, and appropriate.  Normal speech.  No acute focal neurological deficits are appreciated.  Psychiatric: Normal affect. Good eye contact. Appropriate in content.     ED Course   Critical Care    Date/Time: 2/5/2025 6:35 PM    Performed by: Teagan Wiggins DO  Authorized by: Teagan Wiggins DO  Direct patient critical care time: 25 minutes  Additional history critical care time: 5 minutes  Ordering / reviewing critical care time: 5 minutes  Documentation critical care time: 5 minutes  Consulting other physicians critical care time: 5 minutes  Total critical care time (exclusive of procedural time) : 45 minutes  Critical care time was exclusive of separately billable procedures and treating other patients and teaching time.  Critical care was necessary to treat or prevent imminent or life-threatening deterioration of the following conditions: cardiac failure.  Critical care was time spent personally by me on the following activities: development of treatment plan with patient or surrogate, discussions with consultants, examination of patient, obtaining history from patient or surrogate, evaluation of patient's response to treatment, interpretation of cardiac output measurements, ordering and performing treatments and interventions, ordering and review of laboratory studies, ordering and review of radiographic studies, pulse oximetry, re-evaluation of  patient's condition and review of old charts.        ED Vital Signs:  Vitals:    02/05/25 1546 02/05/25 1632 02/05/25 1702 02/05/25 1735   BP:  (!) 149/82 (!) 162/92 (!) 170/125   Pulse: 71 72 76 85   Resp:  17     Temp:       TempSrc:       SpO2:  97% 95% 96%   Weight:        02/05/25 1802 02/05/25 1832 02/05/25 2000 02/05/25 2134   BP: (!) 170/112 (!) 155/91  (!) 180/107   Pulse: 85 83  85   Resp:  16 20 16   Temp:   98.1 °F (36.7 °C) 98.5 °F (36.9 °C)   TempSrc:   Oral Oral   SpO2: 97% 96%  (!) 92%   Weight:        02/05/25 2300 02/06/25 0004 02/06/25 0352 02/06/25 0354   BP:  135/81 (!) 143/86    Pulse: 80 81 78 78   Resp:  18 20 20   Temp:  98.6 °F (37 °C) 97.4 °F (36.3 °C)    TempSrc:  Oral Oral    SpO2:  95% 97% 97%   Weight:        02/06/25 0400 02/06/25 0912 02/06/25 0913   BP:   (!) 146/85   Pulse: 74 78 75   Resp: 18     Temp:      TempSrc:      SpO2: 97%  95%   Weight:          Abnormal Lab Results:  Labs Reviewed   HEPATITIS C ANTIBODY - Abnormal       Result Value    Hepatitis C Ab Positive (*)     Narrative:     Release to patient->Immediate   HIV 1 / 2 ANTIBODY - Abnormal    HIV 1/2 Ag/Ab Positive (*)     Narrative:     Release to patient->Immediate   CBC W/ AUTO DIFFERENTIAL - Abnormal    WBC 9.14      RBC 6.36 (*)     Hemoglobin 17.8      Hematocrit 55.7 (*)     MCV 88      MCH 28.0      MCHC 32.0      RDW 17.2 (*)     Platelets 259      MPV 9.0 (*)     Immature Granulocytes 0.3      Gran # (ANC) 5.0      Immature Grans (Abs) 0.03      Lymph # 3.0      Mono # 0.9      Eos # 0.1      Baso # 0.03      nRBC 0      Gran % 54.9      Lymph % 33.2      Mono % 10.0      Eosinophil % 1.3      Basophil % 0.3      Differential Method Automated     COMPREHENSIVE METABOLIC PANEL - Abnormal    Sodium 138      Potassium 5.4 (*)     Chloride 105      CO2 23      Glucose 104      BUN 18      Creatinine 1.4      Calcium 10.0      Total Protein 8.7 (*)     Albumin 3.5      Total Bilirubin 0.9      Alkaline  Phosphatase 36 (*)     AST 61 (*)     ALT 52 (*)     eGFR 51 (*)     Anion Gap 10     B-TYPE NATRIURETIC PEPTIDE - Abnormal     (*)    HEP C VIRUS HOLD SPECIMEN    HEP C Virus Hold Specimen Hold for HCV sendout      Narrative:     Release to patient->Immediate   TROPONIN I    Troponin I 0.011     TROPONIN I    Troponin I 0.007          All Lab Results:  Results for orders placed or performed during the hospital encounter of 02/05/25   EKG 12-lead    Collection Time: 02/05/25  3:07 PM   Result Value Ref Range    QRS Duration 86 ms    [facility] QTC Calculation 443 ms   Hepatitis C Antibody    Collection Time: 02/05/25  3:52 PM   Result Value Ref Range    Hepatitis C Ab Positive (A) Negative   HCV Virus Hold Specimen    Collection Time: 02/05/25  3:52 PM   Result Value Ref Range    HEP C Virus Hold Specimen Hold for HCV sendout    HIV 1/2 Ag/Ab (4th Gen)    Collection Time: 02/05/25  3:52 PM   Result Value Ref Range    HIV 1/2 Ag/Ab Positive (A) Negative   CBC auto differential    Collection Time: 02/05/25  3:52 PM   Result Value Ref Range    WBC 9.14 3.90 - 12.70 K/uL    RBC 6.36 (H) 4.60 - 6.20 M/uL    Hemoglobin 17.8 14.0 - 18.0 g/dL    Hematocrit 55.7 (H) 40.0 - 54.0 %    MCV 88 82 - 98 fL    MCH 28.0 27.0 - 31.0 pg    MCHC 32.0 32.0 - 36.0 g/dL    RDW 17.2 (H) 11.5 - 14.5 %    Platelets 259 150 - 450 K/uL    MPV 9.0 (L) 9.2 - 12.9 fL    Immature Granulocytes 0.3 0.0 - 0.5 %    Gran # (ANC) 5.0 1.8 - 7.7 K/uL    Immature Grans (Abs) 0.03 0.00 - 0.04 K/uL    Lymph # 3.0 1.0 - 4.8 K/uL    Mono # 0.9 0.3 - 1.0 K/uL    Eos # 0.1 0.0 - 0.5 K/uL    Baso # 0.03 0.00 - 0.20 K/uL    nRBC 0 0 /100 WBC    Gran % 54.9 38.0 - 73.0 %    Lymph % 33.2 18.0 - 48.0 %    Mono % 10.0 4.0 - 15.0 %    Eosinophil % 1.3 0.0 - 8.0 %    Basophil % 0.3 0.0 - 1.9 %    Differential Method Automated    Comprehensive metabolic panel    Collection Time: 02/05/25  3:52 PM   Result Value Ref Range    Sodium 138 136 - 145 mmol/L    Potassium 5.4  (H) 3.5 - 5.1 mmol/L    Chloride 105 95 - 110 mmol/L    CO2 23 23 - 29 mmol/L    Glucose 104 70 - 110 mg/dL    BUN 18 8 - 23 mg/dL    Creatinine 1.4 0.5 - 1.4 mg/dL    Calcium 10.0 8.7 - 10.5 mg/dL    Total Protein 8.7 (H) 6.0 - 8.4 g/dL    Albumin 3.5 3.5 - 5.2 g/dL    Total Bilirubin 0.9 0.1 - 1.0 mg/dL    Alkaline Phosphatase 36 (L) 40 - 150 U/L    AST 61 (H) 10 - 40 U/L    ALT 52 (H) 10 - 44 U/L    eGFR 51 (A) >60 mL/min/1.73 m^2    Anion Gap 10 8 - 16 mmol/L   Troponin I #1    Collection Time: 02/05/25  3:52 PM   Result Value Ref Range    Troponin I 0.011 0.000 - 0.026 ng/mL   BNP    Collection Time: 02/05/25  3:52 PM   Result Value Ref Range     (H) 0 - 99 pg/mL   HIV 1/2 Supplemental Assay    Collection Time: 02/05/25  3:52 PM   Result Value Ref Range    HIV-1 Result Positive (A) Negative    HIV-2 Result Negative Negative    HIV Supplemental Assay Interpretation HIV-1 Positive (A) Negative   Hemoglobin A1c    Collection Time: 02/05/25  3:52 PM   Result Value Ref Range    Hemoglobin A1C 6.5 (H) 4.0 - 5.6 %    Estimated Avg Glucose 140 (H) 68 - 131 mg/dL   Troponin I    Collection Time: 02/05/25  7:51 PM   Result Value Ref Range    Troponin I 0.007 0.000 - 0.026 ng/mL   Magnesium    Collection Time: 02/05/25  7:51 PM   Result Value Ref Range    Magnesium 1.9 1.6 - 2.6 mg/dL   Basic metabolic panel    Collection Time: 02/06/25  5:00 AM   Result Value Ref Range    Sodium 139 136 - 145 mmol/L    Potassium 4.1 3.5 - 5.1 mmol/L    Chloride 98 95 - 110 mmol/L    CO2 31 (H) 23 - 29 mmol/L    Glucose 118 (H) 70 - 110 mg/dL    BUN 19 8 - 23 mg/dL    Creatinine 1.6 (H) 0.5 - 1.4 mg/dL    Calcium 10.0 8.7 - 10.5 mg/dL    Anion Gap 10 8 - 16 mmol/L    eGFR 44 (A) >60 mL/min/1.73 m^2     *Note: Due to a large number of results and/or encounters for the requested time period, some results have not been displayed. A complete set of results can be found in Results Review.         Imaging Results:  Imaging Results               CTA Chest Non-Coronary (PE Studies) (Final result)  Result time 02/05/25 17:52:16      Final result by Dilma Pedroza MD (02/05/25 17:52:16)                   Impression:      Negative for PE      Electronically signed by: Dilma Pedroza  Date:    02/05/2025  Time:    17:52               Narrative:    EXAMINATION:  CTA CHEST NON CORONARY (PE STUDIES)    CLINICAL HISTORY:  Pulmonary embolism (PE) suspected, unknown D-dimer;    TECHNIQUE:  Angiographic technique PE protocol with MIPS and post processing volumetric    Iterative technique with low-dose parameters for diminishing radiation dose as reasonably achievable    COMPARISON:  No relevant comparison CT, radiographic comparison    FINDINGS:  No pulmonary thromboemboli seen.  Thoracic aorta ectatic.    No pleural effusion    Reticular/ground-glass pulmonary opacity bilateral mild dependent.  Bronchial wall thickening possible                                       X-Ray Chest AP Portable (Final result)  Result time 02/05/25 16:49:21      Final result by Dilma Pedroza MD (02/05/25 16:49:21)                   Impression:      Stable chest exam      Electronically signed by: Dilma Pedroza  Date:    02/05/2025  Time:    16:49               Narrative:    EXAMINATION:  XR CHEST AP PORTABLE    CLINICAL HISTORY:  Chest Pain;    TECHNIQUE:  Single frontal portable view of the chest was performed.    COMPARISON:  October 29, 2024    FINDINGS:  No new pulmonary opacity or sizable pleural effusion                                       The EKG was ordered, reviewed, and independently interpreted by the ED provider.  Interpretation time: 15:07  Rate: 69 BPM  Rhythm: normal sinus rhythm  Interpretation: Normal sinus rhythm. Left axis deviation. Nonspecific ST and T wave abnormality. No STEMI.             The Emergency Provider reviewed the vital signs and test results, which are outlined above.     ED Discussion       6:45 PM: Discussed pt's case with   Matt (Cardiology) who recommends admit if pt shows CHF sxs.     7:00 PM: Discussed case with Betsy Lucas NP ([facility]). Dr. Orlando agrees with current care and management of pt and accepts admission.   Admitting Service: Hospital Medicine  Admitting Physician: Dr. Orlando  Admit to: Med/tele obs    7:02 PM: Re-evaluated pt. I have discussed test results, shared treatment plan, and the need for admission with patient and family at bedside. Pt and family express understanding at this time and agree with all information. All questions answered. Pt and family have no further questions or concerns at this time. Pt is ready for admit.    ED Course as of 02/06/25 1115   Wed Feb 05, 2025   1832 79-year-old male sent in by Cardiology for tachycardia, hypertension, and decreased EF on echocardiogram.  They request admission to hospital medicine for cardiology consult with medication adjustment and IV diuresis.  Chest x-ray negative.  .  Troponin normal.  EKG shows nonspecific changes.  CTA negative for PE.  No fever or leukocytosis.  Normal H&H.  Mild hypokalemia.  Mild transaminitis.  BUN creatinine normal with a GFR 51.    Ddx:  CHF, hypertensive emergency, pulmonary hypertension, COPD [NF]      ED Course User Index  [NF] Teagan Wiggins, DO     Medical Decision Making  Amount and/or Complexity of Data Reviewed  Labs: ordered. Decision-making details documented in ED Course.  Radiology: ordered. Decision-making details documented in ED Course.  ECG/medicine tests: ordered and independent interpretation performed. Decision-making details documented in ED Course.    Risk  Prescription drug management.    Critical Care  Total time providing critical care: 45 minutes                ED Medication(s):  Medications   sodium chloride 0.9% flush 10 mL (has no administration in time range)   enoxaparin injection 40 mg (40 mg Subcutaneous Given 2/5/25 2235)   ondansetron injection 4 mg (has no administration  in time range)   acetaminophen tablet 650 mg (has no administration in time range)   melatonin tablet 6 mg (6 mg Oral Given 2/6/25 0002)   zgyqtancz-ejqdurng-kngajne ala -25 mg (25 kg or greater) 1 tablet (1 tablet Oral Given 2/6/25 0956)   donepeziL tablet 5 mg (5 mg Oral Given 2/5/25 2235)   FLUoxetine capsule 40 mg (40 mg Oral Given 2/6/25 0955)   metoprolol succinate (TOPROL-XL) 24 hr tablet 100 mg (100 mg Oral Given 2/5/25 2234)   atorvastatin tablet 80 mg (80 mg Oral Given 2/6/25 0956)   hydrALAZINE injection 10 mg (0 mg Intravenous Return to Cabinet 2/5/25 7198)   albuterol-ipratropium 2.5 mg-0.5 mg/3 mL nebulizer solution 3 mL (3 mLs Nebulization Given 2/6/25 2774)   furosemide injection 20 mg (20 mg Intravenous Given 2/6/25 0955)   magnesium oxide tablet 400 mg (400 mg Oral Given 2/6/25 0956)   sacubitriL-valsartan 24-26 mg per tablet 1 tablet (has no administration in time range)   iohexoL (OMNIPAQUE 350) injection 100 mL (100 mLs Intravenous Given 2/5/25 1736)   furosemide injection 80 mg (80 mg Intravenous Given 2/5/25 1755)       Current Discharge Medication List                  Scribe Attestation:   Scribe #1: I performed the above scribed service and the documentation accurately describes the services I performed. I attest to the accuracy of the note.     Attending:   Physician Attestation Statement for Scribe #1: I, Teagan Wiggins DO, personally performed the services described in this documentation, as scribed by Cheko Ritter, in my presence, and it is both accurate and complete.           Clinical Impression       ICD-10-CM ICD-9-CM   1. CHF (congestive heart failure)  I50.9 428.0   2. SOB (shortness of breath)  R06.02 786.05   3. Chest pain  R07.9 786.50   4. HIV disease  B20 042       Disposition:   Disposition: Placed in Observation  Condition: Fair        Teagan Wiggins DO  02/06/25 1115

## 2025-02-05 NOTE — Clinical Note
Diagnosis: SOB (shortness of breath) [899922]   Future Attending Provider: CLARENCE FERNANDEZ [[id number]]

## 2025-02-06 ENCOUNTER — DOCUMENTATION ONLY (OUTPATIENT)
Dept: CARDIOLOGY | Facility: CLINIC | Age: 80
End: 2025-02-06
Payer: MEDICARE

## 2025-02-06 VITALS
HEART RATE: 77 BPM | BODY MASS INDEX: 31.16 KG/M2 | TEMPERATURE: 97 F | OXYGEN SATURATION: 97 % | DIASTOLIC BLOOD PRESSURE: 96 MMHG | RESPIRATION RATE: 18 BRPM | SYSTOLIC BLOOD PRESSURE: 165 MMHG | WEIGHT: 217.13 LBS

## 2025-02-06 LAB
ANION GAP SERPL CALC-SCNC: 10 MMOL/L (ref 8–16)
BUN SERPL-MCNC: 19 MG/DL (ref 8–23)
CALCIUM SERPL-MCNC: 10 MG/DL (ref 8.7–10.5)
CHLORIDE SERPL-SCNC: 98 MMOL/L (ref 95–110)
CO2 SERPL-SCNC: 31 MMOL/L (ref 23–29)
CREAT SERPL-MCNC: 1.6 MG/DL (ref 0.5–1.4)
EST. GFR  (NO RACE VARIABLE): 44 ML/MIN/1.73 M^2
ESTIMATED AVG GLUCOSE: 140 MG/DL (ref 68–131)
GLUCOSE SERPL-MCNC: 118 MG/DL (ref 70–110)
HBA1C MFR BLD: 6.5 % (ref 4–5.6)
HCV RNA SERPL QL NAA+PROBE: NOT DETECTED
HCV RNA SPEC NAA+PROBE-ACNC: NOT DETECTED IU/ML
HIV SUPPLEMENTAL ASSAY INTERPRETATION: ABNORMAL
HIV-1 RESULT: POSITIVE
HIV-2 RESULT: NEGATIVE
POTASSIUM SERPL-SCNC: 4.1 MMOL/L (ref 3.5–5.1)
SODIUM SERPL-SCNC: 139 MMOL/L (ref 136–145)

## 2025-02-06 PROCEDURE — 27000221 HC OXYGEN, UP TO 24 HOURS

## 2025-02-06 PROCEDURE — 25000242 PHARM REV CODE 250 ALT 637 W/ HCPCS: Performed by: NURSE PRACTITIONER

## 2025-02-06 PROCEDURE — 94640 AIRWAY INHALATION TREATMENT: CPT

## 2025-02-06 PROCEDURE — 25000003 PHARM REV CODE 250: Performed by: HOSPITALIST

## 2025-02-06 PROCEDURE — 94761 N-INVAS EAR/PLS OXIMETRY MLT: CPT

## 2025-02-06 PROCEDURE — 99900035 HC TECH TIME PER 15 MIN (STAT)

## 2025-02-06 PROCEDURE — 80048 BASIC METABOLIC PNL TOTAL CA: CPT | Performed by: NURSE PRACTITIONER

## 2025-02-06 PROCEDURE — 96376 TX/PRO/DX INJ SAME DRUG ADON: CPT

## 2025-02-06 PROCEDURE — 36415 COLL VENOUS BLD VENIPUNCTURE: CPT | Performed by: NURSE PRACTITIONER

## 2025-02-06 PROCEDURE — 63600175 PHARM REV CODE 636 W HCPCS: Performed by: NURSE PRACTITIONER

## 2025-02-06 PROCEDURE — 94640 AIRWAY INHALATION TREATMENT: CPT | Mod: XB

## 2025-02-06 PROCEDURE — 25000003 PHARM REV CODE 250: Performed by: INTERNAL MEDICINE

## 2025-02-06 PROCEDURE — G0378 HOSPITAL OBSERVATION PER HR: HCPCS

## 2025-02-06 RX ORDER — IPRATROPIUM BROMIDE AND ALBUTEROL SULFATE 2.5; .5 MG/3ML; MG/3ML
3 SOLUTION RESPIRATORY (INHALATION) EVERY 4 HOURS PRN
Status: DISCONTINUED | OUTPATIENT
Start: 2025-02-06 | End: 2025-02-06 | Stop reason: HOSPADM

## 2025-02-06 RX ORDER — SACUBITRIL AND VALSARTAN 24; 26 MG/1; MG/1
1 TABLET, FILM COATED ORAL 2 TIMES DAILY
Status: DISCONTINUED | OUTPATIENT
Start: 2025-02-06 | End: 2025-02-06 | Stop reason: HOSPADM

## 2025-02-06 RX ORDER — SACUBITRIL AND VALSARTAN 24; 26 MG/1; MG/1
1 TABLET, FILM COATED ORAL 2 TIMES DAILY
Qty: 60 TABLET | Refills: 1 | Status: SHIPPED | OUTPATIENT
Start: 2025-02-06 | End: 2025-02-11 | Stop reason: SDUPTHER

## 2025-02-06 RX ORDER — LANOLIN ALCOHOL/MO/W.PET/CERES
400 CREAM (GRAM) TOPICAL DAILY
Status: DISCONTINUED | OUTPATIENT
Start: 2025-02-06 | End: 2025-02-06 | Stop reason: HOSPADM

## 2025-02-06 RX ORDER — FUROSEMIDE 10 MG/ML
20 INJECTION INTRAMUSCULAR; INTRAVENOUS EVERY 12 HOURS
Status: DISCONTINUED | OUTPATIENT
Start: 2025-02-06 | End: 2025-02-06 | Stop reason: HOSPADM

## 2025-02-06 RX ADMIN — IPRATROPIUM BROMIDE AND ALBUTEROL SULFATE 3 ML: 2.5; .5 SOLUTION RESPIRATORY (INHALATION) at 12:02

## 2025-02-06 RX ADMIN — FLUOXETINE HYDROCHLORIDE 40 MG: 20 CAPSULE ORAL at 09:02

## 2025-02-06 RX ADMIN — IPRATROPIUM BROMIDE AND ALBUTEROL SULFATE 3 ML: 2.5; .5 SOLUTION RESPIRATORY (INHALATION) at 03:02

## 2025-02-06 RX ADMIN — SACUBITRIL AND VALSARTAN 1 TABLET: 24; 26 TABLET, FILM COATED ORAL at 12:02

## 2025-02-06 RX ADMIN — BICTEGRAVIR SODIUM, EMTRICITABINE, AND TENOFOVIR ALAFENAMIDE FUMARATE 1 TABLET: 50; 200; 25 TABLET ORAL at 09:02

## 2025-02-06 RX ADMIN — Medication 6 MG: at 12:02

## 2025-02-06 RX ADMIN — FUROSEMIDE 20 MG: 10 INJECTION, SOLUTION INTRAMUSCULAR; INTRAVENOUS at 09:02

## 2025-02-06 RX ADMIN — Medication 400 MG: at 09:02

## 2025-02-06 RX ADMIN — ATORVASTATIN CALCIUM 80 MG: 40 TABLET, FILM COATED ORAL at 09:02

## 2025-02-06 NOTE — ASSESSMENT & PLAN NOTE
Patient has Combined Systolic and Diastolic heart failure that is Acute on chronic. On presentation their CHF was decompensated. Evidence of decompensated CHF on presentation includes: orthopnea, paroxysmal nocturnal dyspnea (PND), dyspnea on exertion (MAIER), and shortness of breath. The etiology of their decompensation is likely dietary indiscretion and increased fluid intake. Most recent BNP and echo results are listed below.  Recent Labs     02/05/25  1552   *     Latest ECHO  Results for orders placed during the hospital encounter of 02/05/25    Echo    Interpretation Summary    Left Ventricle: The left ventricle is normal in size. Normal wall thickness. There is concentric hypertrophy. There is moderately reduced systolic function with a visually estimated ejection fraction of 30 - 40%. Ejection fraction is approximately 30%. Grade I diastolic dysfunction.    Right Ventricle: Normal right ventricular cavity size. Wall thickness is normal. Systolic function is mildly reduced.    Left Atrium: Left atrium is moderately dilated.    IVC/SVC: Normal venous pressure at 3 mmHg.    Patient sent to ER due to increased dyspnea, HTN,  and drop in EF    Current Heart Failure Medications  metoprolol succinate (TOPROL-XL) 24 hr tablet 100 mg, Nightly, Oral  hydrALAZINE injection 10 mg, Every 6 hours PRN, Intravenous    Plan  - Monitor strict I&Os and daily weights.    - Place on telemetry  - Low sodium diet  - Place on fluid restriction of 2 L.   - Cardiology has been consulted  - The patient's volume status is improving but not at their baseline as indicated by orthopnea, paroxysmal nocturnal dyspnea (PND), dyspnea on exertion (MAIER), and shortness of breath

## 2025-02-06 NOTE — HPI
Haritha Valle is a 79 y.o. male with a PMH  has a past medical history of Anxiety (07/03/2019), Basal cell carcinoma, CAD in native artery (11/27/2023), Depression, Dyslipidemia (11/16/2023), Essential hypertension (01/24/2013), Hepatitis B, Hepatitis C antibody test positive, HIV infection, Hypertension, Immune disorder, Mild cognitive impairment (10/01/2010), Nonrheumatic aortic valve insufficiency (12/19/2023), and Nonrheumatic mitral valve regurgitation (12/19/2023).presented to the Emergency Department for evaluation of at request of cardiologist (Dr. Nguyen) due to abnormal results from Echo, which he had performed earlier today at the [facility]. Per pt's son pt has had fluid in his chest and legs. Pt's metoprolol and lasix were doubled recently; pt's BP has not improved. Pt is on 2L O2 at home. Pt complains of SOB which has not abated with medication changes for past 2 weeks. Symptoms are constant and moderate in severity. No mitigating or exacerbating factors reported. Associated sxs include nausea. Patient denies any CP, vomiting, palpitations, fever, swelling of legs, and all other sxs at this time. No further complaints or concerns at this time.     ER workup revealed CBC to be unremarkable.  CMP revealed potassium of 5.4.  .  Troponin negative x2.  Hepatitis C and HIV positive.  CTA of the chest negative for PE.  Chest x-ray negative for acute findings.  Echocardiogram revealed LVEF 30-40% with grade 1 diastolic dysfunction and moderately reduced systolic function.  EKG revealed normal sinus rhythm with nonspecific ST and T-wave abnormalities with a ventricular rate of 69 beats per minute and a QT/QTC of 414/443.  BP moderately elevated with systolic readings 150s-170s.  O2 saturation 97% on 2 liters/minute via nasal cannula.  Afebrile.  Patient received 80 mg Lasix IV in the ER.  Hospital Medicine consulted to admit patient for CHF exacerbation.  Patient in agreement with treatment plan.  Patient  admitted under observation status.    PCP: Max Herbert

## 2025-02-06 NOTE — CONSULTS
"Food & Nutrition Education       Diet Education: Cardiac, Fluid restriction diet    Learners: Patient       Nutrition Education provided with handouts:   "Heart Healthy Nutrition Therapy"  "Fluid Restriction Nutrition Therapy"  (nutritioncaremanual.org)       Comments:   PMH: Mild cognitive impairment, HIV disease, HTN, HLD, Anxiety    79 y.o. Male admitted for Combined systolic and diastolic congestive heart failure. Pt is currently on a Low sodium 2 gm, 1500 mL fluid restriction diet. Visited pt at bedside, pt sitting in bed, pt's family member present. Pt confirmed that he had a "so/so" appetite PTA, only eating 2 meals/day, stated currently eating 50% of meals. Pt reported his UBW is 218 lbs, current weight charted 2/5/25 217 lbs.     RD provided pt with nutrition education handouts on low sodium, general healthful diet r/t recent hospital diagnosis and fluid restriction.     Handouts recommend a well balanced diet with a variety of fresh foods, fruits and vegetables (5 cups/day), whole grains (3 oz/day), and fat-free or low fat dairy; salt free seasonings and other herbs and spices in meals to enhance flavor without additional sodium; to aim for total fat less than 25-35% of calories and using a cup with measurements for fluids and to try to consume small sips spread throughout the day rather than a lot at one time.      Handouts discuss importance of reading food packages, food labels, and nutrition facts labels to identify nutrient content of food; the importance of limiting sodium to less than 2,000 mg per day; dietary sources of cholesterol and the importance of incorporating healthy fats into the diet, and avoiding saturated and trans fats for heart health; the importance of fiber (especially soluble fiber), dietary sources, and a goal intake of >20-30g/day; and 1500 mL fluid restriction per MD and dietary sources of fluid.     Pt and pt's family member reported he has been watching his fluid and salt " intake, expressed good previous nutritional knowledge. Pt was discharging, encouraged pt and pt family member to read handouts and use RD contact information if they have any questions/concerns.     Nutrition Related Social Determinants of Health: SDOH: Adequate food in home environment and None Identified      Visual NFPE performed, pt appears nourished.   All questions and concerns answered.   Provided handout with dietitian's contact information.   *Please re-consult as needed.   Thank you,   Camille Barlow, DAYANA, RDN, LDN

## 2025-02-06 NOTE — PLAN OF CARE
[facility] - Telemetry (Hospital)  Discharge Final Note    Primary Care Provider: Max Herbert MD    Expected Discharge Date: 2/6/2025    Final Discharge Note (most recent)       Final Note - 02/06/25 1547          Final Note    Assessment Type Final Discharge Note     Anticipated Discharge Disposition Home or Self Care     Hospital Resources/Appts/Education Provided Appointments scheduled and added to AVS                     Important Message from Medicare               DC Dispo: home  Cards f/u: Feb 11th

## 2025-02-06 NOTE — CONSULTS
[facility] - Telemetry (Hospital)  Cardiology  Consult Note    Patient Name: Haritha Valle  MRN: [id number]  Admission Date: 2/5/2025  Hospital Length of Stay: 0 days  Code Status: Full Code   Attending Provider: Mona Rice,*   Consulting Provider: Shea Rojas PA-C  Primary Care Physician: Max Herbert MD  Principal Problem:Combined systolic and diastolic congestive heart failure    Patient information was obtained from patient, past medical records, and ER records.     Inpatient consult to Cardiology  Consult performed by: Shea Rojas PA-C  Consult ordered by: Teagan Wiggins DO      Inpatient consult to Cardiology  Consult performed by: Shea Rojas PA-C  Consult ordered by: Diomedes Orlando NP        Subjective:     Chief Complaint:  SOB/CHF    HPI:   Mr. Valle is a 79 year old male patient whose current medical conditions include HTN, HIV, BCC, CAD, depression, and dyslipidemia who was sent to [facility] ED from cardiology clinic due to abnormal echo findings. Patient complained of increased SOB, ongoing for the past few months. Associated symptoms included BLE edema, nausea, and elevated BP readings. He denied any associated fever, chills, nikhil CP, palpitations, near syncope, or syncope. Initial workup in ED revealed hyperkalemia (K 5.4), and BNP of 100. CTA was negative for PE but did reveal reticular/ground-glass pulmonary opacity bilateral mild dependent bronchial wall thickening possible. Patient was subsequently admitted for further evaluation and treatment. Cardiology consulted to assist with management. Patient seen and examined today, resting in bed. Feeling better. Still SOB but very anxious, asking to be discharged home. No CP symptoms. BP improved. Diuresed well overnight, creatinine bumped to 1.6. Followed routinely in clinic by Dr. Nguyen. Recently had his dosage of Lasix and Metoprolol increased without any improvement in his symptoms. Prior LHC in 2019 with  non-obs CAD. Troponin negative. EKG reviewed, no acute ischemic changes appreciated.        Past Medical History:   Diagnosis Date    Anxiety 07/03/2019    Basal cell carcinoma     CAD in native artery 11/27/2023    Depression     Dyslipidemia 11/16/2023    Essential hypertension 01/24/2013    Hepatitis B     Hepatitis C antibody test positive     RNA NEG 8/29/2019    HIV infection     Hypertension     Immune disorder     Mild cognitive impairment 10/01/2010    Nonrheumatic aortic valve insufficiency 12/19/2023    Nonrheumatic mitral valve regurgitation 12/19/2023       Past Surgical History:   Procedure Laterality Date    APPENDECTOMY      CARDIAC CATHETERIZATION      no stent    CHOLECYSTECTOMY      COLONOSCOPY N/A 11/3/2016    Procedure: COLONOSCOPY;  Surgeon: Maxi Villasenor MD;  Location: [facility] ENDO ([address] [place] [address]);  Service: Endoscopy;  Laterality: N/A;  last colonoscopy with Dr Jarrell    COLONOSCOPY N/A 1/25/2022    Procedure: COLONOSCOPY;  Surgeon: Silvino Rosales MD;  Location: [facility] ENDO;  Service: Endoscopy;  Laterality: N/A;    LEFT HEART CATHETERIZATION Left 2/3/2020    Procedure: CATHETERIZATION, HEART, LEFT;  Surgeon: Chele Ko MD;  Location: [facility] CATH LAB;  Service: Cardiology;  Laterality: Left;  malur pt       Review of patient's allergies indicates:   Allergen Reactions    No known drug allergies        No current facility-administered medications on file prior to encounter.     Current Outpatient Medications on File Prior to Encounter   Medication Sig    albuterol (PROVENTIL) 2.5 mg /3 mL (0.083 %) nebulizer solution Take 3 mLs (2.5 mg total) by nebulization every 4 to 6 hours as needed for Wheezing or Shortness of Breath.    albuterol (PROVENTIL/VENTOLIN HFA) 90 mcg/actuation inhaler Inhale 2 puffs into the lungs every 4 (four) hours as needed for Wheezing. Rescue    ascorbic acid, vitamin C, (VITAMIN C) 1000 MG tablet Take 1,000 mg by mouth once daily.    gedlhukff-mqpcivjw-nqpejnd ala  (BIKTARVY) -25 mg (25 kg or greater) Take 1 tablet by mouth once daily.    donepeziL (ARICEPT) 5 MG tablet Take 1 tablet (5 mg total) by mouth every evening.    FLUoxetine 40 MG capsule TAKE 1 CAPSULE EVERY DAY (Patient taking differently: Take 40 mg by mouth once daily.)    fluticasone propionate (FLONASE) 50 mcg/actuation nasal spray [place] 1 spray every day by intranasal route.    furosemide (LASIX) 40 MG tablet Take 1 tablet (40 mg total) by mouth once daily. Do not take if BP is below 110/70 or feeling dry; for next 4 days take twice a day (7 am and noon)    losartan (COZAAR) 25 MG tablet Take 1 tablet (25 mg total) by mouth once daily.    magnesium oxide (MAG-OX) 400 mg (241.3 mg magnesium) tablet Take 1 tablet (400 mg total) by mouth once daily.    metoprolol succinate (TOPROL-XL) 100 MG 24 hr tablet Take 1 tablet (100 mg total) by mouth every evening.    potassium chloride SA (K-DUR,KLOR-CON M) 10 MEQ tablet Take 2 tablets (20 mEq total) by mouth once daily.    promethazine (PHENERGAN) 12.5 MG Tab Take 1 tablet (12.5 mg total) by mouth daily as needed for Nausea.    rosuvastatin (CRESTOR) 20 MG tablet TAKE 1 TABLET EVERY DAY (Patient taking differently: Take 20 mg by mouth every evening.)    ubidecarenone (COENZYME Q10 ORAL) Take by mouth once daily.    vit C,U-Rs-stsny-lutein-zeaxan (EYE MULTIVIT, LUTEIN-ZEAXAN,) 580-19-72-2-5 mg Cap Take 1 tablet by mouth once daily.    busPIRone (BUSPAR) 7.5 MG tablet TAKE ONE TABLET BY MOUTH DAILY AS NEEDED.     Family History       Problem Relation (Age of Onset)    Hearing loss Mother    Heart disease Father    Heart failure Father    Vision loss Mother          Tobacco Use    Smoking status: Never    Smokeless tobacco: Never   Substance and Sexual Activity    Alcohol use: No    Drug use: Never    Sexual activity: Not Currently     Partners: Male     Birth control/protection: Abstinence, See Surgical Hx, None     Review of Systems   Constitutional: Positive for  malaise/fatigue.   HENT: Negative.     Eyes: Negative.    Cardiovascular:  Positive for dyspnea on exertion and orthopnea.   Respiratory:  Positive for shortness of breath.    Endocrine: Negative.    Hematologic/Lymphatic: Negative.    Skin: Negative.    Musculoskeletal: Negative.    Gastrointestinal:  Positive for nausea.   Genitourinary: Negative.    Neurological: Negative.    Psychiatric/Behavioral: Negative.     Allergic/Immunologic: Negative.      Objective:     Vital Signs (Most Recent):  Temp: 97.4 °F (36.3 °C) (02/06/25 0352)  Pulse: 74 (02/06/25 0400)  Resp: 18 (02/06/25 0400)  BP: (!) 143/86 (02/06/25 0352)  SpO2: 97 % (02/06/25 0400) Vital Signs (24h Range):  Temp:  [97.4 °F (36.3 °C)-98.6 °F (37 °C)] 97.4 °F (36.3 °C)  Pulse:  [69-85] 74  Resp:  [16-22] 18  SpO2:  [92 %-97 %] 97 %  BP: (133-180)/() 143/86     Weight: 98.5 kg (217 lb 2.5 oz)  Body mass index is 31.16 kg/m².    SpO2: 97 %         Intake/Output Summary (Last 24 hours) at 2/6/2025 0800  Last data filed at 2/6/2025 0245  Gross per 24 hour   Intake 550 ml   Output 3975 ml   Net -3425 ml       Lines/Drains/Airways       Peripheral Intravenous Line  Duration                  Peripheral IV - Single Lumen 02/05/25 1546 20 G Left;Posterior Forearm <1 day                     Physical Exam  Vitals and nursing note reviewed.   Constitutional:       General: He is not in acute distress.     Appearance: Normal appearance. He is well-developed. He is not diaphoretic.      Comments: On supplemental o2   HENT:      Head: Normocephalic and atraumatic.   Eyes:      General:         Right eye: No discharge.         Left eye: No discharge.      Pupils: Pupils are equal, round, and reactive to light.   Cardiovascular:      Rate and Rhythm: Normal rate and regular rhythm.      Heart sounds: Normal heart sounds, S1 normal and S2 normal. No murmur heard.  Pulmonary:      Effort: Pulmonary effort is normal. No respiratory distress.      Comments: Slightly  "diminished right base/coarse  Abdominal:      General: There is no distension.   Musculoskeletal:      Right lower leg: Edema present.   Skin:     General: Skin is warm and dry.      Findings: No erythema.   Neurological:      General: No focal deficit present.      Mental Status: He is alert and oriented to person, place, and time.   Psychiatric:         Mood and Affect: Mood normal.         Behavior: Behavior normal.          Significant Labs: CMP   Recent Labs   Lab 02/05/25  1552 02/06/25  0500    139   K 5.4* 4.1    98   CO2 23 31*    118*   BUN 18 19   CREATININE 1.4 1.6*   CALCIUM 10.0 10.0   PROT 8.7*  --    ALBUMIN 3.5  --    BILITOT 0.9  --    ALKPHOS 36*  --    AST 61*  --    ALT 52*  --    ANIONGAP 10 10   , CBC   Recent Labs   Lab 02/05/25  1552   WBC 9.14   HGB 17.8   HCT 55.7*      , Troponin No results for input(s): "TROPONINIHS" in the last 48 hours., and All pertinent lab results from the last 24 hours have been reviewed.    Significant Imaging: Echocardiogram: Transthoracic echo (TTE) complete ([facility] Only):   Results for orders placed or performed during the hospital encounter of 02/05/25   Echo   Result Value Ref Range    RA Width 3.36 cm    LA Vol (MOD) 73 mL    LV ESV A2C 68.23 mL    LA area A4C 22.79 cm2    LA area A2C 21.51 cm2    LV ESV A4C 77.84 mL    Left Atrium Major Axis 5.9 cm    Left Atrium Minor Axis 5.9 cm    RA Major Axis 5.41 cm    LV Diastolic Volume 142.24 mL    LV Systolic Volume 103.46 mL    MV Peak A Mark 0.45 m/s    MV Peak E Mark 0.49 m/s    PV mean gradient 1 mmHg    RVOT peak VTI 9.7 cm    RVOT peak mark 0.58 m/s    Ao VTI 17.8 cm    Ao peak mark 1.0 m/s    LVOT peak VTI 14.6 cm    LVOT peak mark 0.8 m/s    LVOT diameter 2.0 cm    IVRT 129 msec    E wave deceleration time 272 msec    PV peak gradient 2 mmHg    AV mean gradient 2 mmHg    RV- lozano basal diam 3.5 cm    TAPSE 1.55 cm    RVDD 3.46 cm    LA size 4.7 cm    Ascending aorta 3.40 cm    STJ 2.91 " cm    Sinus 3.18 cm    LVIDs 4.7 (A) 2.1 - 4.0 cm    Ao root annulus 3.29 cm    PW 1.3 (A) 0.6 - 1.1 cm    IVS 1.2 (A) 0.6 - 1.1 cm    LVIDd 5.4 3.5 - 6.0 cm    PV PEAK VELOCITY 0.75 m/s    TDI LATERAL 0.04 m/s    Left Ventricular Outflow Tract Mean Gradient 1.52 mmHg    Left Ventricular Outflow Tract Mean Velocity 0.56 cm/s    Left Ventricular End Systolic Volume by Teichholz Method 103.46 mL    Left Ventricular End Diastolic Volume by Teichholz Method 142.24 mL    IVC diameter 1.72 cm    TDI SEPTAL 0.03 m/s    LV LATERAL E/E' RATIO 12.3 m/s    LV SEPTAL E/E' RATIO 16.3 m/s    RV/LV Ratio 0.65 cm    FS 13.0 (A) 28 - 44 %    LV mass 279.8 g    Left Ventricle Relative Wall Thickness 0.48 cm    AV valve area 2.6 cm²    AV Velocity Ratio 0.80     AV index (prosthetic) 0.82     E/A ratio 1.09     Mean e' 0.04 m/s    LVOT area 3.1 cm2    LVOT stroke volume 45.8 cm3    AV peak gradient 4 mmHg    E/E' ratio 14 m/s    EVA by Velocity Ratio 2.5 cm²    BSA 2.23 m2    LV Systolic Volume Index 47.2 mL/m2    LV Diastolic Volume Index 64.95 mL/m2    LV Mass Index 127.8 g/m2    MK (MOD) 33 mL/m2    ZLVIDS 0.35     ZLVIDD -3.15     MK 46 mL/m2    LA Vol 101 cm3    LA WIDTH 4.3 cm    EF 30 %    Est. RA pres 3 mmHg    Narrative      Left Ventricle: The left ventricle is normal in size. Normal wall   thickness. There is concentric hypertrophy. There is moderately reduced   systolic function with a visually estimated ejection fraction of 30 - 40%.   Ejection fraction is approximately 30%. Grade I diastolic dysfunction.    Right Ventricle: Normal right ventricular cavity size. Wall thickness   is normal. Systolic function is mildly reduced.    Left Atrium: Left atrium is moderately dilated.    IVC/SVC: Normal venous pressure at 3 mmHg.    Patient sent to ER due to increased dyspnea, HTN,  and drop in EF       and EKG: Reviewed  Assessment and Plan:   Patient who presents with decompensated CHF/CMPY/uncontrolled HTN. Felyed well  overnight. Meds adjusted. Can consider repeat ischemic evaluation as OP once compensated.     * Combined systolic and diastolic congestive heart failure  -Presents with new onset decompensated CHF/cardiomyopathy  -TTE with drop in EF to 30-40%  -Diuretics held given bumped creatinine  -Continue BB, Entresto added  -Strict I's/O's, dietary restrictions discussed   -Prior LHC in 2019 with non-obs CAD, consider repeat ischemic evaluation as OP once better compensated      Mixed hyperlipidemia  -Statin once LFT's normalize    Essential hypertension  -Monitor BP trend  -Continue BB    HIV disease  -Per hospital medicine        VTE Risk Mitigation (From admission, onward)           Ordered     enoxaparin injection 40 mg  Daily         02/05/25 2124     IP VTE HIGH RISK PATIENT  Once         02/05/25 2124     Place sequential compression device  Until discontinued         02/05/25 2124                    Thank you for your consult. I will follow-up with patient. Please contact us if you have any additional questions.    Shea Rojas PA-C  Cardiology   [facility] - Telemetry ([facility])

## 2025-02-06 NOTE — DISCHARGE SUMMARY
[facility] Medicine  Discharge Summary      Patient Name: Haritha Valle  MRN: [id number]  YODIT: [id number]  Patient Class: OP- Observation  Admission Date: 2/5/2025  Hospital Length of Stay: 0 days  Discharge Date and Time: No discharge date for patient encounter.  Attending Physician: Mona Rice,*   Discharging Provider: Anastasia Sears NP  Primary Care Provider: Max Herbert MD    Primary Care Team: Networked reference to record PCT     HPI:   Haritha Valle is a 79 y.o. male with a PMH  has a past medical history of Anxiety (07/03/2019), Basal cell carcinoma, CAD in native artery (11/27/2023), Depression, Dyslipidemia (11/16/2023), Essential hypertension (01/24/2013), Hepatitis B, Hepatitis C antibody test positive, HIV infection, Hypertension, Immune disorder, Mild cognitive impairment (10/01/2010), Nonrheumatic aortic valve insufficiency (12/19/2023), and Nonrheumatic mitral valve regurgitation (12/19/2023).presented to the Emergency Department for evaluation of at request of cardiologist (Dr. Nguyen) due to abnormal results from Echo, which he had performed earlier today at the [facility]. Per pt's son pt has had fluid in his chest and legs. Pt's metoprolol and lasix were doubled recently; pt's BP has not improved. Pt is on 2L O2 at home. Pt complains of SOB which has not abated with medication changes for past 2 weeks. Symptoms are constant and moderate in severity. No mitigating or exacerbating factors reported. Associated sxs include nausea. Patient denies any CP, vomiting, palpitations, fever, swelling of legs, and all other sxs at this time. No further complaints or concerns at this time.     ER workup revealed CBC to be unremarkable.  CMP revealed potassium of 5.4.  .  Troponin negative x2.  Hepatitis C and HIV positive.  CTA of the chest negative for PE.  Chest x-ray negative for acute findings.  Echocardiogram revealed LVEF 30-40% with grade 1  diastolic dysfunction and moderately reduced systolic function.  EKG revealed normal sinus rhythm with nonspecific ST and T-wave abnormalities with a ventricular rate of 69 beats per minute and a QT/QTC of 414/443.  BP moderately elevated with systolic readings 150s-170s.  O2 saturation 97% on 2 liters/minute via nasal cannula.  Afebrile.  Patient received 80 mg Lasix IV in the ER.  Hospital Medicine consulted to admit patient for CHF exacerbation.  Patient in agreement with treatment plan.  Patient admitted under observation status.    PCP: Max Herbert      * No surgery found *      Hospital Course:   Haritha Valle is a 79 year old male who presented to ED from Cardiology clinic due to abnormal echocardiogram findings (EF 30-40%) and progressive exertional dyspnea despite oral diuretics. He as admitted for decompensated heart failure and diuresed. Entresto added for medication optimization. Serial troponin levels were negative. With management, he reports significant improvement in symptoms and expressed desire to discharge. He was counseled on importance of dietary and medication compliance. He will continue same dose of Lasix (40 mg) daily for volume control. He was asked to keep scheduled appt on Tuesday 2/11/25 with Dr. Nguyen. He will need OP ischemia work up as well. He was asked to monitor weight daily and record. Patient seen and examined on date of discharge and deemed suitable.      Goals of Care Treatment Preferences:  Code Status: Full Code      SDOH Screening:  The patient was screened for utility difficulties, food insecurity, transport difficulties, housing insecurity, and interpersonal safety and there were no concerns identified this admission.     Consults:   Consults (From admission, onward)          Status Ordering Provider     Inpatient consult to Cardiology  Once        Provider:  Deni Nguyen MD    Completed CAROLINA FERNANDEZ     Inpatient consult to Social Work/Case Management   Once        Provider:  (Not yet assigned)    Completed CAROLINA FERNANDEZ     Inpatient consult to Registered Dietitian/Nutritionist  Once        Provider:  (Not yet assigned)    Acknowledged CAROLINA FERNANDEZ     Inpatient consult to Cardiology  Once        Provider:  Deni Nguyen MD    Completed GINGER CONTRERAS            * Combined systolic and diastolic congestive heart failure  Patient has Combined Systolic and Diastolic heart failure that is Acute on chronic. On presentation their CHF was decompensated. Evidence of decompensated CHF on presentation includes: orthopnea, paroxysmal nocturnal dyspnea (PND), dyspnea on exertion (MAIER), and shortness of breath. The etiology of their decompensation is likely dietary indiscretion and increased fluid intake. Most recent BNP and echo results are listed below.  Recent Labs     02/05/25  1552   *     Latest ECHO  Results for orders placed during the hospital encounter of 02/05/25    Echo    Interpretation Summary    Left Ventricle: The left ventricle is normal in size. Normal wall thickness. There is concentric hypertrophy. There is moderately reduced systolic function with a visually estimated ejection fraction of 30 - 40%. Ejection fraction is approximately 30%. Grade I diastolic dysfunction.    Right Ventricle: Normal right ventricular cavity size. Wall thickness is normal. Systolic function is mildly reduced.    Left Atrium: Left atrium is moderately dilated.    IVC/SVC: Normal venous pressure at 3 mmHg.    Patient sent to ER due to increased dyspnea, HTN,  and drop in EF    Current Heart Failure Medications  metoprolol succinate (TOPROL-XL) 24 hr tablet 100 mg, Nightly, Oral  hydrALAZINE injection 10 mg, Every 6 hours PRN, Intravenous    Plan  - Monitor strict I&Os and daily weights.    - Place on telemetry  - Low sodium diet  - Place on fluid restriction of 2 L.   - Cardiology has been consulted  - The patient's volume status is improving but not at their  baseline as indicated by orthopnea, paroxysmal nocturnal dyspnea (PND), dyspnea on exertion (MAIER), and shortness of breath        Anxiety  Chronic. Stable. Not in acute exacerbation and currently denies endorsing any suicidal/homicidal ideations.   Plan:  -Continue home medications (lexapro)        Mixed hyperlipidemia  Patient is chronically on statin.will continue for now. Last Lipid Panel:   Lab Results   Component Value Date    CHOL 172 12/18/2023    HDL 36 (L) 12/18/2023    LDLCALC 73.2 12/18/2023    TRIG 314 (H) 12/18/2023    CHOLHDL 20.9 12/18/2023     Plan:  -Continue home medication  -low fat/low calorie diet        Essential hypertension  Chronic, controlled.  Latest blood pressure and vitals reviewed-   Temp:  [97.5 °F (36.4 °C)-98.5 °F (36.9 °C)]   Pulse:  [69-85]   Resp:  [16-22]   BP: (133-180)/()   SpO2:  [92 %-97 %] .   Home meds for hypertension were reviewed and noted below.   Hypertension Medications               furosemide (LASIX) 40 MG tablet Take 1 tablet (40 mg total) by mouth once daily. Do not take if BP is below 110/70 or feeling dry; for next 4 days take twice a day (7 am and noon)    losartan (COZAAR) 25 MG tablet Take 1 tablet (25 mg total) by mouth once daily.    metoprolol succinate (TOPROL-XL) 100 MG 24 hr tablet Take 1 tablet (100 mg total) by mouth every evening.            While in the hospital, will manage blood pressure as follows; Continue home antihypertensive regimen    Will utilize p.r.n. blood pressure medication only if patient's blood pressure greater than  160/90 and he develops symptoms such as worsening chest pain or shortness of breath.      HIV disease  -continue Biktarvy      Mild cognitive impairment  Dementia is controlled currently. Continue home dementia meds and non-pharmacologic interventions to prevent delirium (No VS between 11PM-5AM, activity during day, opening blinds, providing glasses/hearing aids, and up in chair during daytime). Use PRN  anti-psychotics to prevent behavior of self harm during sundowning, and avoid narcotics and benzos unless absolutely necessary. PRN anti-psychotics prescribed to avoid self harm behaviors.          Final Active Diagnoses:    Diagnosis Date Noted POA    PRINCIPAL PROBLEM:  Combined systolic and diastolic congestive heart failure [I50.40] 02/05/2025 Unknown    Anxiety [F41.9] 07/03/2019 Yes    Mixed hyperlipidemia [E78.2] 07/23/2015 Yes    HIV disease [B20] 01/24/2013 Yes     Chronic    Essential hypertension [I10] 01/24/2013 Yes    Mild cognitive impairment [G31.84] 10/01/2010 Yes     Chronic      Problems Resolved During this Admission:       Discharged Condition: stable    Disposition: Home or Self Care    Follow Up:    Patient Instructions:      Call MD for:  temperature >100.4     Call MD for:  persistent nausea and vomiting or diarrhea     Call MD for:  severe uncontrolled pain     Call MD for:  redness, tenderness, or signs of infection (pain, swelling, redness, odor or green/yellow discharge around incision site)     Call MD for:  difficulty breathing or increased cough     Call MD for:  severe persistent headache     Call MD for:  worsening rash     Call MD for:  persistent dizziness, light-headedness, or visual disturbances     Call MD for:  increased confusion or weakness       Significant Diagnostic Studies: Labs: CMP   Recent Labs   Lab 02/05/25  1552 02/06/25  0500    139   K 5.4* 4.1    98   CO2 23 31*    118*   BUN 18 19   CREATININE 1.4 1.6*   CALCIUM 10.0 10.0   PROT 8.7*  --    ALBUMIN 3.5  --    BILITOT 0.9  --    ALKPHOS 36*  --    AST 61*  --    ALT 52*  --    ANIONGAP 10 10    and CBC   Recent Labs   Lab 02/05/25  1552   WBC 9.14   HGB 17.8   HCT 55.7*          Pending Diagnostic Studies:       None           Medications:  Reconciled Home Medications:      Medication List        START taking these medications      ENTRESTO 24-26 mg per tablet  Generic drug:  sacubitriL-valsartan  Take 1 tablet by mouth 2 (two) times daily.            CHANGE how you take these medications      rosuvastatin 20 MG tablet  Commonly known as: CRESTOR  TAKE 1 TABLET EVERY DAY  What changed: when to take this  Notes to patient: No change            CONTINUE taking these medications      * albuterol 90 mcg/actuation inhaler  Commonly known as: PROVENTIL/VENTOLIN HFA  Inhale 2 puffs into the lungs every 4 (four) hours as needed for Wheezing. Rescue     * albuterol 2.5 mg /3 mL (0.083 %) nebulizer solution  Commonly known as: PROVENTIL  Take 3 mLs (2.5 mg total) by nebulization every 4 to 6 hours as needed for Wheezing or Shortness of Breath.     BIKTARVY -25 mg (25 kg or greater)  Generic drug: lspdgnjyn-dbafpdtb-burwogn ala  Take 1 tablet by mouth once daily.     busPIRone 7.5 MG tablet  Commonly known as: BUSPAR  TAKE ONE TABLET BY MOUTH DAILY AS NEEDED.     COENZYME Q10 ORAL  Take by mouth once daily.     donepeziL 5 MG tablet  Commonly known as: ARICEPT  Take 1 tablet (5 mg total) by mouth every evening.     EYE MULTIVIT (LUTEIN-ZEAXAN) 923-61-85-2-5 mg Cap  Generic drug: vit C,W-Kx-flqfa-lutein-zeaxan  Take 1 tablet by mouth once daily.     FLUoxetine 40 MG capsule  TAKE 1 CAPSULE EVERY DAY     fluticasone propionate 50 mcg/actuation nasal spray  Commonly known as: FLONASE  Spray 1 spray every day by intranasal route.     furosemide 40 MG tablet  Commonly known as: LASIX  Take 1 tablet (40 mg total) by mouth once daily. Do not take if BP is below 110/70 or feeling dry; for next 4 days take twice a day (7 am and noon)     magnesium oxide 400 mg (241.3 mg magnesium) tablet  Commonly known as: MAG-OX  Take 1 tablet (400 mg total) by mouth once daily.     metoprolol succinate 100 MG 24 hr tablet  Commonly known as: TOPROL-XL  Take 1 tablet (100 mg total) by mouth every evening.     potassium chloride SA 10 MEQ tablet  Commonly known as: K-DUR,KLOR-CON M  Take 2 tablets (20 mEq total) by  mouth once daily.     promethazine 12.5 MG Tab  Commonly known as: PHENERGAN  Take 1 tablet (12.5 mg total) by mouth daily as needed for Nausea.     VITAMIN C 1000 MG tablet  Generic drug: ascorbic acid (vitamin C)  Take 1,000 mg by mouth once daily.           * This list has 2 medication(s) that are the same as other medications prescribed for you. Read the directions carefully, and ask your doctor or other care provider to review them with you.                STOP taking these medications      losartan 25 MG tablet  Commonly known as: COZAAR              Indwelling Lines/Drains at time of discharge:   Lines/Drains/Airways       None                   Time spent on the discharge of patient: >35 minutes         Anastasia Sears NP  Department of Hospital Medicine  [facility] - Telemetry ([facility])

## 2025-02-06 NOTE — ASSESSMENT & PLAN NOTE
-Presents with new onset decompensated CHF/cardiomyopathy  -TTE with drop in EF to 30-40%  -Diuretics held given bumped creatinine  -Continue BB, Entresto added  -Strict I's/O's, dietary restrictions discussed   -Prior LHC in 2019 with non-obs CAD, consider repeat ischemic evaluation as OP once better compensated

## 2025-02-06 NOTE — HPI
Mr. Valle is a 79 year old male patient whose current medical conditions include HTN, HIV, BCC, CAD, depression, and dyslipidemia who was sent to [facility] ED from cardiology clinic due to abnormal echo findings. Patient complained of increased SOB, ongoing for the past few months. Associated symptoms included BLE edema, nausea, and elevated BP readings. He denied any associated fever, chills, nikhil CP, palpitations, near syncope, or syncope. Initial workup in ED revealed hyperkalemia (K 5.4), and BNP of 100. CTA was negative for PE but did reveal reticular/ground-glass pulmonary opacity bilateral mild dependent bronchial wall thickening possible. Patient was subsequently admitted for further evaluation and treatment. Cardiology consulted to assist with management. Patient seen and examined today, resting in bed. Feeling better. Still SOB but very anxious, asking to be discharged home. No CP symptoms. BP improved. Diuresed well overnight, creatinine bumped to 1.6. Followed routinely in clinic by Dr. Nguyen. Recently had his dosage of Lasix and Metoprolol increased without any improvement in his symptoms. Prior LHC in 2019 with non-obs CAD. Troponin negative. EKG reviewed, no acute ischemic changes appreciated.

## 2025-02-06 NOTE — PLAN OF CARE
[facility] - Telemetry (Hospital)  Initial Discharge Assessment       Primary Care Provider: Max Herbert MD    Admission Diagnosis: CHF (congestive heart failure) [I50.9]  SOB (shortness of breath) [R06.02]  Chest pain [R07.9]    Admission Date: 2/5/2025  Expected Discharge Date:     Transition of Care Barriers: None    Payor: MEDICARE / Plan: MEDICARE PART A & B / Product Type: Government /     Extended Emergency Contact Information  Primary Emergency Contact: Dianamaryam,Duane  Address: [address] [place] [address] [place]  Mobile Phone: [phone]  Relation: Significant other  Secondary Emergency Contact: Ulkins,Duane  Address: [address]           [place] [address] [facility]  Home Phone: [phone]  Mobile Phone: [phone]  Relation: Significant other    Discharge Plan A: Home with family         [facility] - [place] - [address] [address]  [address] [address]  [address]  [place] [address]  Phone: [phone] Fax: [phone]    [facility] [address] [place]  [address] [address]  Phone: [phone] Fax: [phone]    [facility] - [facility] [address]  [address] [id number]  Phone: [phone] Fax: [phone]    [facility] - [facility] [address]  [address] [id number]  Phone: [phone] Fax: [phone]    [facility] - [place] - [address]  [address]  [place] [address]  Phone: [phone] Fax: [phone]      Initial Assessment (most recent)       Adult Discharge Assessment - 02/06/25 0851          Discharge Assessment    Assessment Type Discharge Planning Assessment     Confirmed/corrected address, phone number and insurance Yes     Confirmed Demographics Correct on Facesheet     Source of  Information patient;family     Communicated DAVONTE with patient/caregiver Date not available/Unable to determine     Reason For Admission CHF     People in Home significant other     Facility Arrived From: home     Do you expect to return to your current living situation? Yes     Do you have help at home or someone to help you manage your care at home? Yes     Who are your caregiver(s) and their phone number(s)? significant other, Duane     Prior to hospitilization cognitive status: Alert/Oriented     Current cognitive status: Alert/Oriented     Walking or Climbing Stairs Difficulty no     Dressing/Bathing Difficulty yes     Dressing/Bathing bathing difficulty, requires equipment     Home Accessibility wheelchair accessible     Equipment Currently Used at Home oxygen;nebulizer;shower chair;grab bar     Readmission within 30 days? No     Patient currently being followed by outpatient case management? No     Do you currently have service(s) that help you manage your care at home? No     Do you take prescription medications? Yes     Do you have prescription coverage? Yes     Do you have any problems affording any of your prescribed medications? TBD     Is the patient taking medications as prescribed? yes     Who is going to help you get home at discharge? Duane     How do you get to doctors appointments? car, drives self;family or friend will provide     Are you on dialysis? No     Discharge Plan A Home with family     DME Needed Upon Discharge  none     Discharge Plan discussed with: Patient;Spouse/sig other     Transition of Care Barriers None                      Anticipated DC Dispo: home with family  Prior Level of Function: independent in ADLS, use of home O2  PCP: Dr. Herbert  Comments:  CM met with patient/significant other (Duane) at bedside to introduce role and discuss d/c planning. Patient is independent, drives self.Per patient/significant other patient does not use walker but does have access to one if  needed. Duane assists with medication management and will provide transportation home upon discharge. Will need physical address should patient have DC needs, CM following.

## 2025-02-06 NOTE — ASSESSMENT & PLAN NOTE
Patient is chronically on statin.will continue for now. Last Lipid Panel:   Lab Results   Component Value Date    CHOL 172 12/18/2023    HDL 36 (L) 12/18/2023    LDLCALC 73.2 12/18/2023    TRIG 314 (H) 12/18/2023    CHOLHDL 20.9 12/18/2023     Plan:  -Continue home medication  -low fat/low calorie diet

## 2025-02-06 NOTE — PROGRESS NOTES
[facility] ([facility]) Team notified of pt referral via [facility] (automated inbasket notification) .    Patient screened today by provider and HF nurse for enrollment to program.      Pt not enrolled into H[facility] at this time. Business card given. Will call once they talk with his primary cardiologist.    Pt will require additional follow up planning per primary team.

## 2025-02-06 NOTE — H&P
"[facility] ER Medicine  History & Physical    Patient Name: Haritha Valle  MRN: [id number]  Patient Class: OP- Observation  Admission Date: 2/5/2025  Attending Physician: Ian Orlando MD   Primary Care Provider: Max Herbert MD         Patient information was obtained from patient, past medical records, and ER records.     Subjective:     Principal Problem:Combined systolic and diastolic congestive heart failure    Chief Complaint:   Chief Complaint   Patient presents with    Chest Pain     Chest pain. States he was sent from MD office "for serious heart issues". Pt denies pain at this moment. States he is very SOB. On home o2 at 2 LPM. States symptoms for 4-6 months and getting worse.        HPI: Haritha Valle is a 79 y.o. male with a PMH  has a past medical history of Anxiety (07/03/2019), Basal cell carcinoma, CAD in native artery (11/27/2023), Depression, Dyslipidemia (11/16/2023), Essential hypertension (01/24/2013), Hepatitis B, Hepatitis C antibody test positive, HIV infection, Hypertension, Immune disorder, Mild cognitive impairment (10/01/2010), Nonrheumatic aortic valve insufficiency (12/19/2023), and Nonrheumatic mitral valve regurgitation (12/19/2023).presented to the Emergency Department for evaluation of at request of cardiologist (Dr. Nguyen) due to abnormal results from Echo, which he had performed earlier today at the [facility]. Per pt's son pt has had fluid in his chest and legs. Pt's metoprolol and lasix were doubled recently; pt's BP has not improved. Pt is on 2L O2 at home. Pt complains of SOB which has not abated with medication changes for past 2 weeks. Symptoms are constant and moderate in severity. No mitigating or exacerbating factors reported. Associated sxs include nausea. Patient denies any CP, vomiting, palpitations, fever, swelling of legs, and all other sxs at this time. No further complaints or concerns at this time.     ER workup revealed CBC " to be unremarkable.  CMP revealed potassium of 5.4.  .  Troponin negative x2.  Hepatitis C and HIV positive.  CTA of the chest negative for PE.  Chest x-ray negative for acute findings.  Echocardiogram revealed LVEF 30-40% with grade 1 diastolic dysfunction and moderately reduced systolic function.  EKG revealed normal sinus rhythm with nonspecific ST and T-wave abnormalities with a ventricular rate of 69 beats per minute and a QT/QTC of 414/443.  BP moderately elevated with systolic readings 150s-170s.  O2 saturation 97% on 2 liters/minute via nasal cannula.  Afebrile.  Patient received 80 mg Lasix IV in the ER.  Hospital Medicine consulted to admit patient for CHF exacerbation.  Patient in agreement with treatment plan.  Patient admitted under observation status.    PCP: Max Herbert      Past Medical History:   Diagnosis Date    Anxiety 07/03/2019    Basal cell carcinoma     CAD in native artery 11/27/2023    Depression     Dyslipidemia 11/16/2023    Essential hypertension 01/24/2013    Hepatitis B     Hepatitis C antibody test positive     RNA NEG 8/29/2019    HIV infection     Hypertension     Immune disorder     Mild cognitive impairment 10/01/2010    Nonrheumatic aortic valve insufficiency 12/19/2023    Nonrheumatic mitral valve regurgitation 12/19/2023       Past Surgical History:   Procedure Laterality Date    APPENDECTOMY      CARDIAC CATHETERIZATION      no stent    CHOLECYSTECTOMY      COLONOSCOPY N/A 11/3/2016    Procedure: COLONOSCOPY;  Surgeon: Maxi Villasenor MD;  Location: [address];  Service: Endoscopy;  Laterality: N/A;  last colonoscopy with Dr Jarrell    COLONOSCOPY N/A 1/25/2022    Procedure: COLONOSCOPY;  Surgeon: Silvino Rosales MD;  Location: [facility] ENDO;  Service: Endoscopy;  Laterality: N/A;    LEFT HEART CATHETERIZATION Left 2/3/2020    Procedure: CATHETERIZATION, HEART, LEFT;  Surgeon: Chele Ko MD;  Location: [facility] CATH LAB;  Service: Cardiology;   Laterality: Left;  malur pt       Review of patient's allergies indicates:   Allergen Reactions    No known drug allergies        No current facility-administered medications on file prior to encounter.     Current Outpatient Medications on File Prior to Encounter   Medication Sig    albuterol (PROVENTIL) 2.5 mg /3 mL (0.083 %) nebulizer solution Take 3 mLs (2.5 mg total) by nebulization every 4 to 6 hours as needed for Wheezing or Shortness of Breath.    albuterol (PROVENTIL/VENTOLIN HFA) 90 mcg/actuation inhaler Inhale 2 puffs into the lungs every 4 (four) hours as needed for Wheezing. Rescue    ascorbic acid, vitamin C, (VITAMIN C) 1000 MG tablet Take 1,000 mg by mouth once daily.    izhfshlvi-hrdlyzdk-enlkfnr ala (BIKTARVY) -25 mg (25 kg or greater) Take 1 tablet by mouth once daily.    donepeziL (ARICEPT) 5 MG tablet Take 1 tablet (5 mg total) by mouth every evening.    FLUoxetine 40 MG capsule TAKE 1 CAPSULE EVERY DAY (Patient taking differently: Take 40 mg by mouth once daily.)    fluticasone propionate (FLONASE) 50 mcg/actuation nasal spray [place] 1 spray every day by intranasal route.    furosemide (LASIX) 40 MG tablet Take 1 tablet (40 mg total) by mouth once daily. Do not take if BP is below 110/70 or feeling dry; for next 4 days take twice a day (7 am and noon)    losartan (COZAAR) 25 MG tablet Take 1 tablet (25 mg total) by mouth once daily.    magnesium oxide (MAG-OX) 400 mg (241.3 mg magnesium) tablet Take 1 tablet (400 mg total) by mouth once daily.    metoprolol succinate (TOPROL-XL) 100 MG 24 hr tablet Take 1 tablet (100 mg total) by mouth every evening.    potassium chloride SA (K-DUR,KLOR-CON M) 10 MEQ tablet Take 2 tablets (20 mEq total) by mouth once daily.    promethazine (PHENERGAN) 12.5 MG Tab Take 1 tablet (12.5 mg total) by mouth daily as needed for Nausea.    rosuvastatin (CRESTOR) 20 MG tablet TAKE 1 TABLET EVERY DAY (Patient taking differently: Take 20 mg by mouth every evening.)     ubidecarenone (COENZYME Q10 ORAL) Take by mouth once daily.    vit C,M-Gr-dxipi-lutein-zeaxan (EYE MULTIVIT, LUTEIN-ZEAXAN,) 431-26-60-2-5 mg Cap Take 1 tablet by mouth once daily.    [DISCONTINUED] furosemide (LASIX) 20 MG tablet Take 20 mg by mouth.    busPIRone (BUSPAR) 7.5 MG tablet TAKE ONE TABLET BY MOUTH DAILY AS NEEDED.    [DISCONTINUED] budesonide-glycopyr-formoterol 160-9-4.8 mcg/actuation HFAA Inhale 2 puffs into the lungs 2 (two) times a day. Wash out mouth after use    [DISCONTINUED] chlorhexidine (PERIDEX) 0.12 % solution     [DISCONTINUED] pep injection Inject 0.25 ml as directed     For compounding pharmacy use:   Add PAPAVERINE 30 mcg  Add PHENTOLAMINE 1 mg  Add PGE 10 mcg     Family History       Problem Relation (Age of Onset)    Hearing loss Mother    Heart disease Father    Heart failure Father    Vision loss Mother          Tobacco Use    Smoking status: Never    Smokeless tobacco: Never   Substance and Sexual Activity    Alcohol use: No    Drug use: Never    Sexual activity: Not Currently     Partners: Male     Birth control/protection: Abstinence, See Surgical Hx, None     Review of Systems   Constitutional:  Negative for chills, diaphoresis, fatigue and fever.   Respiratory:  Positive for shortness of breath. Negative for cough.    Cardiovascular:  Negative for chest pain, palpitations and leg swelling.   Gastrointestinal:  Positive for nausea. Negative for abdominal pain, diarrhea and vomiting.   All other systems reviewed and are negative.    Objective:     Vital Signs (Most Recent):  Temp: 98.5 °F (36.9 °C) (02/05/25 2134)  Pulse: 85 (02/05/25 2134)  Resp: 16 (02/05/25 2134)  BP: (!) 180/107 (02/05/25 2134)  SpO2: (!) 92 % (02/05/25 2134) Vital Signs (24h Range):  Temp:  [97.5 °F (36.4 °C)-98.5 °F (36.9 °C)] 98.5 °F (36.9 °C)  Pulse:  [69-85] 85  Resp:  [16-22] 16  SpO2:  [92 %-97 %] 92 %  BP: (133-180)/() 180/107     Weight: 98.5 kg (217 lb 2.5 oz)  Body mass index is 31.16  kg/m².     Physical Exam  Vitals and nursing note reviewed.   Constitutional:       General: He is awake. He is not in acute distress.     Appearance: Normal appearance. He is well-developed and well-groomed. He is not ill-appearing, toxic-appearing or diaphoretic.   HENT:      Head: Normocephalic and atraumatic.      Mouth/Throat:      Mouth: Mucous membranes are moist.      Pharynx: Oropharynx is clear.   Eyes:      Extraocular Movements: Extraocular movements intact.      Conjunctiva/sclera: Conjunctivae normal.      Pupils: Pupils are equal, round, and reactive to light.   Cardiovascular:      Rate and Rhythm: Normal rate and regular rhythm.      Pulses: Normal pulses.      Heart sounds: Normal heart sounds. No murmur heard.  Pulmonary:      Effort: Pulmonary effort is normal.      Breath sounds: Decreased breath sounds present. No wheezing, rhonchi or rales.   Abdominal:      General: Bowel sounds are normal.      Palpations: Abdomen is soft.      Tenderness: There is no abdominal tenderness.   Musculoskeletal:      Cervical back: Normal range of motion and neck supple.      Right lower leg: No edema.      Left lower leg: No edema.      Comments: 5/5 strength throughout   Skin:     General: Skin is warm and dry.      Capillary Refill: Capillary refill takes less than 2 seconds.   Neurological:      General: No focal deficit present.      Mental Status: He is alert and oriented to person, place, and time. Mental status is at baseline.      GCS: GCS eye subscore is 4. GCS verbal subscore is 5. GCS motor subscore is 6.      Cranial Nerves: Cranial nerves 2-12 are intact.      Sensory: Sensation is intact.      Motor: Motor function is intact.   Psychiatric:         Mood and Affect: Mood normal.         Behavior: Behavior normal. Behavior is cooperative.              CRANIAL NERVES     CN III, IV, VI   Pupils are equal, round, and reactive to light.     LABS:  Recent Results (from the past 24 hours)   Echo     Collection Time: 02/05/25  2:15 PM   Result Value Ref Range    RA Width 3.36 cm    LA Vol (MOD) 73 mL    LV ESV A2C 68.23 mL    LA area A4C 22.79 cm2    LA area A2C 21.51 cm2    LV ESV A4C 77.84 mL    Left Atrium Major Axis 5.9 cm    Left Atrium Minor Axis 5.9 cm    RA Major Axis 5.41 cm    LV Diastolic Volume 142.24 mL    LV Systolic Volume 103.46 mL    MV Peak A Mark 0.45 m/s    MV Peak E Mark 0.49 m/s    PV mean gradient 1 mmHg    RVOT peak VTI 9.7 cm    RVOT peak mark 0.58 m/s    Ao VTI 17.8 cm    Ao peak mrak 1.0 m/s    LVOT peak VTI 14.6 cm    LVOT peak mark 0.8 m/s    LVOT diameter 2.0 cm    IVRT 129 msec    E wave deceleration time 272 msec    PV peak gradient 2 mmHg    AV mean gradient 2 mmHg    RV- lozano basal diam 3.5 cm    TAPSE 1.55 cm    RVDD 3.46 cm    LA size 4.7 cm    Ascending aorta 3.40 cm    STJ 2.91 cm    Sinus 3.18 cm    LVIDs 4.7 (A) 2.1 - 4.0 cm    Ao root annulus 3.29 cm    PW 1.3 (A) 0.6 - 1.1 cm    IVS 1.2 (A) 0.6 - 1.1 cm    LVIDd 5.4 3.5 - 6.0 cm    PV PEAK VELOCITY 0.75 m/s    TDI LATERAL 0.04 m/s    Left Ventricular Outflow Tract Mean Gradient 1.52 mmHg    Left Ventricular Outflow Tract Mean Velocity 0.56 cm/s    Left Ventricular End Systolic Volume by Teichholz Method 103.46 mL    Left Ventricular End Diastolic Volume by Teichholz Method 142.24 mL    IVC diameter 1.72 cm    TDI SEPTAL 0.03 m/s    LV LATERAL E/E' RATIO 12.3 m/s    LV SEPTAL E/E' RATIO 16.3 m/s    RV/LV Ratio 0.65 cm    FS 13.0 (A) 28 - 44 %    LV mass 279.8 g    Left Ventricle Relative Wall Thickness 0.48 cm    AV valve area 2.6 cm²    AV Velocity Ratio 0.80     AV index (prosthetic) 0.82     E/A ratio 1.09     Mean e' 0.04 m/s    LVOT area 3.1 cm2    LVOT stroke volume 45.8 cm3    AV peak gradient 4 mmHg    E/E' ratio 14 m/s    EVA by Velocity Ratio 2.5 cm²    BSA 2.23 m2    LV Systolic Volume Index 47.2 mL/m2    LV Diastolic Volume Index 64.95 mL/m2    LV Mass Index 127.8 g/m2    MK (MOD) 33 mL/m2    ZLVIDS 0.35      ZLVIDD -3.15     MK 46 mL/m2    LA Vol 101 cm3    LA WIDTH 4.3 cm    EF 30 %    Est. RA pres 3 mmHg   EKG 12-lead    Collection Time: 02/05/25  3:07 PM   Result Value Ref Range    QRS Duration 86 ms    [facility] QTC Calculation 443 ms   Hepatitis C Antibody    Collection Time: 02/05/25  3:52 PM   Result Value Ref Range    Hepatitis C Ab Positive (A) Negative   HCV Virus Hold Specimen    Collection Time: 02/05/25  3:52 PM   Result Value Ref Range    HEP C Virus Hold Specimen Hold for HCV sendout    HIV 1/2 Ag/Ab (4th Gen)    Collection Time: 02/05/25  3:52 PM   Result Value Ref Range    HIV 1/2 Ag/Ab Positive (A) Negative   CBC auto differential    Collection Time: 02/05/25  3:52 PM   Result Value Ref Range    WBC 9.14 3.90 - 12.70 K/uL    RBC 6.36 (H) 4.60 - 6.20 M/uL    Hemoglobin 17.8 14.0 - 18.0 g/dL    Hematocrit 55.7 (H) 40.0 - 54.0 %    MCV 88 82 - 98 fL    MCH 28.0 27.0 - 31.0 pg    MCHC 32.0 32.0 - 36.0 g/dL    RDW 17.2 (H) 11.5 - 14.5 %    Platelets 259 150 - 450 K/uL    MPV 9.0 (L) 9.2 - 12.9 fL    Immature Granulocytes 0.3 0.0 - 0.5 %    Gran # (ANC) 5.0 1.8 - 7.7 K/uL    Immature Grans (Abs) 0.03 0.00 - 0.04 K/uL    Lymph # 3.0 1.0 - 4.8 K/uL    Mono # 0.9 0.3 - 1.0 K/uL    Eos # 0.1 0.0 - 0.5 K/uL    Baso # 0.03 0.00 - 0.20 K/uL    nRBC 0 0 /100 WBC    Gran % 54.9 38.0 - 73.0 %    Lymph % 33.2 18.0 - 48.0 %    Mono % 10.0 4.0 - 15.0 %    Eosinophil % 1.3 0.0 - 8.0 %    Basophil % 0.3 0.0 - 1.9 %    Differential Method Automated    Comprehensive metabolic panel    Collection Time: 02/05/25  3:52 PM   Result Value Ref Range    Sodium 138 136 - 145 mmol/L    Potassium 5.4 (H) 3.5 - 5.1 mmol/L    Chloride 105 95 - 110 mmol/L    CO2 23 23 - 29 mmol/L    Glucose 104 70 - 110 mg/dL    BUN 18 8 - 23 mg/dL    Creatinine 1.4 0.5 - 1.4 mg/dL    Calcium 10.0 8.7 - 10.5 mg/dL    Total Protein 8.7 (H) 6.0 - 8.4 g/dL    Albumin 3.5 3.5 - 5.2 g/dL    Total Bilirubin 0.9 0.1 - 1.0 mg/dL    Alkaline Phosphatase 36 (L) 40 -  150 U/L    AST 61 (H) 10 - 40 U/L    ALT 52 (H) 10 - 44 U/L    eGFR 51 (A) >60 mL/min/1.73 m^2    Anion Gap 10 8 - 16 mmol/L   Troponin I #1    Collection Time: 02/05/25  3:52 PM   Result Value Ref Range    Troponin I 0.011 0.000 - 0.026 ng/mL   BNP    Collection Time: 02/05/25  3:52 PM   Result Value Ref Range     (H) 0 - 99 pg/mL   Troponin I    Collection Time: 02/05/25  7:51 PM   Result Value Ref Range    Troponin I 0.007 0.000 - 0.026 ng/mL   Magnesium    Collection Time: 02/05/25  7:51 PM   Result Value Ref Range    Magnesium 1.9 1.6 - 2.6 mg/dL       RADIOLOGY  CTA Chest Non-Coronary (PE Studies)    Result Date: 2/5/2025  EXAMINATION: CTA CHEST NON CORONARY (PE STUDIES) CLINICAL HISTORY: Pulmonary embolism (PE) suspected, unknown D-dimer; TECHNIQUE: Angiographic technique PE protocol with MIPS and post processing volumetric Iterative technique with low-dose parameters for diminishing radiation dose as reasonably achievable COMPARISON: No relevant comparison CT, radiographic comparison FINDINGS: No pulmonary thromboemboli seen.  Thoracic aorta ectatic. No pleural effusion Reticular/ground-glass pulmonary opacity bilateral mild dependent.  Bronchial wall thickening possible     Negative for PE Electronically signed by: Dilma Pedroza Date:    02/05/2025 Time:    17:52    X-Ray Chest AP Portable    Result Date: 2/5/2025  EXAMINATION: XR CHEST AP PORTABLE CLINICAL HISTORY: Chest Pain; TECHNIQUE: Single frontal portable view of the chest was performed. COMPARISON: October 29, 2024 FINDINGS: No new pulmonary opacity or sizable pleural effusion     Stable chest exam Electronically signed by: Dilma Pedroza Date:    02/05/2025 Time:    16:49    Echo    Result Date: 2/5/2025    Left Ventricle: The left ventricle is normal in size. Normal wall thickness. There is concentric hypertrophy. There is moderately reduced systolic function with a visually estimated ejection fraction of 30 - 40%. Ejection  fraction is approximately 30%. Grade I diastolic dysfunction.   Right Ventricle: Normal right ventricular cavity size. Wall thickness is normal. Systolic function is mildly reduced.   Left Atrium: Left atrium is moderately dilated.   IVC/SVC: Normal venous pressure at 3 mmHg. Patient sent to ER due to increased dyspnea, HTN,  and drop in EF     Echo    Result Date: 6/5/24    Left Ventricle: The left ventricle is normal in size. Normal wall thickness. There is concentric hypertrophy. Normal wall motion. There is normal systolic function with a visually estimated ejection fraction of 55 - 60%. There is normal diastolic function.    Right Ventricle: Normal right ventricular cavity size. Wall thickness is normal. Systolic function is normal.    Left Atrium: Left atrium is mildly dilated.    Pulmonary Artery: The estimated pulmonary artery systolic pressure is 18 mmHg.    IVC/SVC: Normal venous pressure at 3 mmHg.    Cardiac Monitor - 3-15 Day Adult (Cupid Only)    Result Date: 1/18/2025  The patient was monitored for a total of 6d 15h, underlying rhythm is Sinus. The minimum heart rate was 56 bpm; the maximum 115 bpm; the average 79 bpm. 0 % of Atrial fibrillation/Atrial flutter with longest episode of 0 ms. The total burden of AV Block present was 0 % [Complete Heart Block: 0 %; Advanced (High Grade): 0 %; 2nd Degree, Mobitz II: 0 %; 2nd Degree, Mobitz I: 0 %]. There were 0 pauses, the longest pause was 0 ms at --. Total count of Ventricular Tachycardia (VT): 1 episode(s). Longest VT: 3 beats on Day 5 / 04:39:08 pm. Fastest VT: 125 bpm on Day 5 / 04:39:08 pm. 264 supraventricular episodes were found. Longest SVT Episode 6 beats, Fastest  bpm There were a total of 4716 PVCs with 3 morphologies and 82 couplets. Overall PVC [place] at 0.62 % There were a total of 0 Other Beats. There were 0 total number of paced beats. There were a total of [id number] PSVCs with 22145 couplets. Overall PSVC [place] at 23.12 % There is a  total of 1 patient events. - PVCs       EKG    MICROBIOLOGY    MDM     Amount and/or Complexity of Data Reviewed  Clinical lab tests: reviewed  Tests in the radiology section of CPT®: reviewed  Tests in the medicine section of CPT®: reviewed  Discussion of test results with the performing providers: yes  Decide to obtain previous medical records or to obtain history from someone other than the patient: yes  Obtain history from someone other than the patient: yes  Review and summarize past medical records: yes  Discuss the patient with other providers: yes  Independent visualization of images, tracings, or specimens: yes        Assessment/Plan:     * Combined systolic and diastolic congestive heart failure  Patient has Combined Systolic and Diastolic heart failure that is Acute on chronic. On presentation their CHF was decompensated. Evidence of decompensated CHF on presentation includes: orthopnea, paroxysmal nocturnal dyspnea (PND), dyspnea on exertion (MAIER), and shortness of breath. The etiology of their decompensation is likely dietary indiscretion and increased fluid intake. Most recent BNP and echo results are listed below.  Recent Labs     02/05/25  1552   *     Latest ECHO  Results for orders placed during the hospital encounter of 02/05/25    Echo    Interpretation Summary    Left Ventricle: The left ventricle is normal in size. Normal wall thickness. There is concentric hypertrophy. There is moderately reduced systolic function with a visually estimated ejection fraction of 30 - 40%. Ejection fraction is approximately 30%. Grade I diastolic dysfunction.    Right Ventricle: Normal right ventricular cavity size. Wall thickness is normal. Systolic function is mildly reduced.    Left Atrium: Left atrium is moderately dilated.    IVC/SVC: Normal venous pressure at 3 mmHg.    Patient sent to ER due to increased dyspnea, HTN,  and drop in EF    Current Heart Failure Medications  metoprolol succinate  (TOPROL-XL) 24 hr tablet 100 mg, Nightly, Oral  hydrALAZINE injection 10 mg, Every 6 hours PRN, Intravenous    Plan  - Monitor strict I&Os and daily weights.    - Place on telemetry  - Low sodium diet  - Place on fluid restriction of 2 L.   - Cardiology has been consulted  - The patient's volume status is improving but not at their baseline as indicated by orthopnea, paroxysmal nocturnal dyspnea (PND), dyspnea on exertion (MAIER), and shortness of breath      Essential hypertension  Chronic, controlled.  Latest blood pressure and vitals reviewed-   Temp:  [97.5 °F (36.4 °C)-98.5 °F (36.9 °C)]   Pulse:  [69-85]   Resp:  [16-22]   BP: (133-180)/()   SpO2:  [92 %-97 %] .   Home meds for hypertension were reviewed and noted below.   Hypertension Medications               furosemide (LASIX) 40 MG tablet Take 1 tablet (40 mg total) by mouth once daily. Do not take if BP is below 110/70 or feeling dry; for next 4 days take twice a day (7 am and noon)    losartan (COZAAR) 25 MG tablet Take 1 tablet (25 mg total) by mouth once daily.    metoprolol succinate (TOPROL-XL) 100 MG 24 hr tablet Take 1 tablet (100 mg total) by mouth every evening.     While in the hospital, will manage blood pressure as follows; Continue home antihypertensive regimen    Will utilize p.r.n. blood pressure medication only if patient's blood pressure greater than  160/90 and he develops symptoms such as worsening chest pain or shortness of breath.      HIV disease  -continue Biktarvy      Mild cognitive impairment  Dementia is controlled currently. Continue home dementia meds and non-pharmacologic interventions to prevent delirium (No VS between 11PM-5AM, activity during day, opening blinds, providing glasses/hearing aids, and up in chair during daytime). Use PRN anti-psychotics to prevent behavior of self harm during sundowning, and avoid narcotics and benzos unless absolutely necessary. PRN anti-psychotics prescribed to avoid self harm  behaviors.      Mixed hyperlipidemia  Patient is chronically on statin.will continue for now. Last Lipid Panel:   Lab Results   Component Value Date    CHOL 172 12/18/2023    HDL 36 (L) 12/18/2023    LDLCALC 73.2 12/18/2023    TRIG 314 (H) 12/18/2023    CHOLHDL 20.9 12/18/2023     Plan:  -Continue home medication  -low fat/low calorie diet      Anxiety  Chronic. Stable. Not in acute exacerbation and currently denies endorsing any suicidal/homicidal ideations.   Plan:  -Continue home medications (lexapro)          VTE Risk Mitigation (From admission, onward)           Ordered     enoxaparin injection 40 mg  Daily         02/05/25 2124     IP VTE HIGH RISK PATIENT  Once         02/05/25 2124     Place sequential compression device  Until discontinued         02/05/25 2124                  //Core Measures   -DVT proph: SCDs, lovenox  -Code status Full    -Surrogate:son    Components of this note were documented using a voice recognition system and are subject to errors not corrected at the time the document was proof read. Please contact the author for any clarifications.     Diomedes Orlando NP  Department of Hospital Medicine  [facility] - Telemetry ([facility])

## 2025-02-06 NOTE — SUBJECTIVE & OBJECTIVE
Past Medical History:   Diagnosis Date    Anxiety 07/03/2019    Basal cell carcinoma     CAD in native artery 11/27/2023    Depression     Dyslipidemia 11/16/2023    Essential hypertension 01/24/2013    Hepatitis B     Hepatitis C antibody test positive     RNA NEG 8/29/2019    HIV infection     Hypertension     Immune disorder     Mild cognitive impairment 10/01/2010    Nonrheumatic aortic valve insufficiency 12/19/2023    Nonrheumatic mitral valve regurgitation 12/19/2023       Past Surgical History:   Procedure Laterality Date    APPENDECTOMY      CARDIAC CATHETERIZATION      no stent    CHOLECYSTECTOMY      COLONOSCOPY N/A 11/3/2016    Procedure: COLONOSCOPY;  Surgeon: Maxi Villasenor MD;  Location: [facility] ENDO ([facility] FLR);  Service: Endoscopy;  Laterality: N/A;  last colonoscopy with Dr Jarrell    COLONOSCOPY N/A 1/25/2022    Procedure: COLONOSCOPY;  Surgeon: Silvino Rosales MD;  Location: [facility] ENDO;  Service: Endoscopy;  Laterality: N/A;    LEFT HEART CATHETERIZATION Left 2/3/2020    Procedure: CATHETERIZATION, HEART, LEFT;  Surgeon: Chele Ko MD;  Location: [facility] CATH LAB;  Service: Cardiology;  Laterality: Left;  malur pt       Review of patient's allergies indicates:   Allergen Reactions    No known drug allergies        No current facility-administered medications on file prior to encounter.     Current Outpatient Medications on File Prior to Encounter   Medication Sig    albuterol (PROVENTIL) 2.5 mg /3 mL (0.083 %) nebulizer solution Take 3 mLs (2.5 mg total) by nebulization every 4 to 6 hours as needed for Wheezing or Shortness of Breath.    albuterol (PROVENTIL/VENTOLIN HFA) 90 mcg/actuation inhaler Inhale 2 puffs into the lungs every 4 (four) hours as needed for Wheezing. Rescue    ascorbic acid, vitamin C, (VITAMIN C) 1000 MG tablet Take 1,000 mg by mouth once daily.    qsanpfbqe-cqojtlxf-grthtcv ala (BIKTARVY) -25 mg (25 kg or greater) Take 1 tablet by mouth once daily.    donepeziL  (ARICEPT) 5 MG tablet Take 1 tablet (5 mg total) by mouth every evening.    FLUoxetine 40 MG capsule TAKE 1 CAPSULE EVERY DAY (Patient taking differently: Take 40 mg by mouth once daily.)    fluticasone propionate (FLONASE) 50 mcg/actuation nasal spray [place] 1 spray every day by intranasal route.    furosemide (LASIX) 40 MG tablet Take 1 tablet (40 mg total) by mouth once daily. Do not take if BP is below 110/70 or feeling dry; for next 4 days take twice a day (7 am and noon)    losartan (COZAAR) 25 MG tablet Take 1 tablet (25 mg total) by mouth once daily.    magnesium oxide (MAG-OX) 400 mg (241.3 mg magnesium) tablet Take 1 tablet (400 mg total) by mouth once daily.    metoprolol succinate (TOPROL-XL) 100 MG 24 hr tablet Take 1 tablet (100 mg total) by mouth every evening.    potassium chloride SA (K-DUR,KLOR-CON M) 10 MEQ tablet Take 2 tablets (20 mEq total) by mouth once daily.    promethazine (PHENERGAN) 12.5 MG Tab Take 1 tablet (12.5 mg total) by mouth daily as needed for Nausea.    rosuvastatin (CRESTOR) 20 MG tablet TAKE 1 TABLET EVERY DAY (Patient taking differently: Take 20 mg by mouth every evening.)    ubidecarenone (COENZYME Q10 ORAL) Take by mouth once daily.    vit C,F-Gs-motac-lutein-zeaxan (EYE MULTIVIT, LUTEIN-ZEAXAN,) 518-28-04-2-5 mg Cap Take 1 tablet by mouth once daily.    [DISCONTINUED] furosemide (LASIX) 20 MG tablet Take 20 mg by mouth.    busPIRone (BUSPAR) 7.5 MG tablet TAKE ONE TABLET BY MOUTH DAILY AS NEEDED.    [DISCONTINUED] budesonide-glycopyr-formoterol 160-9-4.8 mcg/actuation HFAA Inhale 2 puffs into the lungs 2 (two) times a day. Wash out mouth after use    [DISCONTINUED] chlorhexidine (PERIDEX) 0.12 % solution     [DISCONTINUED] pep injection Inject 0.25 ml as directed     For compounding pharmacy use:   Add PAPAVERINE 30 mcg  Add PHENTOLAMINE 1 mg  Add PGE 10 mcg     Family History       Problem Relation (Age of Onset)    Hearing loss Mother    Heart disease Father    Heart  failure Father    Vision loss Mother          Tobacco Use    Smoking status: Never    Smokeless tobacco: Never   Substance and Sexual Activity    Alcohol use: No    Drug use: Never    Sexual activity: Not Currently     Partners: Male     Birth control/protection: Abstinence, See Surgical Hx, None     Review of Systems   Constitutional:  Negative for chills, diaphoresis, fatigue and fever.   Respiratory:  Positive for shortness of breath. Negative for cough.    Cardiovascular:  Negative for chest pain, palpitations and leg swelling.   Gastrointestinal:  Positive for nausea. Negative for abdominal pain, diarrhea and vomiting.   All other systems reviewed and are negative.    Objective:     Vital Signs (Most Recent):  Temp: 98.5 °F (36.9 °C) (02/05/25 2134)  Pulse: 85 (02/05/25 2134)  Resp: 16 (02/05/25 2134)  BP: (!) 180/107 (02/05/25 2134)  SpO2: (!) 92 % (02/05/25 2134) Vital Signs (24h Range):  Temp:  [97.5 °F (36.4 °C)-98.5 °F (36.9 °C)] 98.5 °F (36.9 °C)  Pulse:  [69-85] 85  Resp:  [16-22] 16  SpO2:  [92 %-97 %] 92 %  BP: (133-180)/() 180/107     Weight: 98.5 kg (217 lb 2.5 oz)  Body mass index is 31.16 kg/m².     Physical Exam  Vitals and nursing note reviewed.   Constitutional:       General: He is awake. He is not in acute distress.     Appearance: Normal appearance. He is well-developed and well-groomed. He is not ill-appearing, toxic-appearing or diaphoretic.   HENT:      Head: Normocephalic and atraumatic.      Mouth/Throat:      Mouth: Mucous membranes are moist.      Pharynx: Oropharynx is clear.   Eyes:      Extraocular Movements: Extraocular movements intact.      Conjunctiva/sclera: Conjunctivae normal.      Pupils: Pupils are equal, round, and reactive to light.   Cardiovascular:      Rate and Rhythm: Normal rate and regular rhythm.      Pulses: Normal pulses.      Heart sounds: Normal heart sounds. No murmur heard.  Pulmonary:      Effort: Pulmonary effort is normal.      Breath sounds:  Decreased breath sounds present. No wheezing, rhonchi or rales.   Abdominal:      General: Bowel sounds are normal.      Palpations: Abdomen is soft.      Tenderness: There is no abdominal tenderness.   Musculoskeletal:      Cervical back: Normal range of motion and neck supple.      Right lower leg: No edema.      Left lower leg: No edema.      Comments: 5/5 strength throughout   Skin:     General: Skin is warm and dry.      Capillary Refill: Capillary refill takes less than 2 seconds.   Neurological:      General: No focal deficit present.      Mental Status: He is alert and oriented to person, place, and time. Mental status is at baseline.      GCS: GCS eye subscore is 4. GCS verbal subscore is 5. GCS motor subscore is 6.      Cranial Nerves: Cranial nerves 2-12 are intact.      Sensory: Sensation is intact.      Motor: Motor function is intact.   Psychiatric:         Mood and Affect: Mood normal.         Behavior: Behavior normal. Behavior is cooperative.              CRANIAL NERVES     CN III, IV, VI   Pupils are equal, round, and reactive to light.     LABS:  Recent Results (from the past 24 hours)   Echo    Collection Time: 02/05/25  2:15 PM   Result Value Ref Range    RA Width 3.36 cm    LA Vol (MOD) 73 mL    LV ESV A2C 68.23 mL    LA area A4C 22.79 cm2    LA area A2C 21.51 cm2    LV ESV A4C 77.84 mL    Left Atrium Major Axis 5.9 cm    Left Atrium Minor Axis 5.9 cm    RA Major Axis 5.41 cm    LV Diastolic Volume 142.24 mL    LV Systolic Volume 103.46 mL    MV Peak A Mark 0.45 m/s    MV Peak E Mark 0.49 m/s    PV mean gradient 1 mmHg    RVOT peak VTI 9.7 cm    RVOT peak mark 0.58 m/s    Ao VTI 17.8 cm    Ao peak mark 1.0 m/s    LVOT peak VTI 14.6 cm    LVOT peak mark 0.8 m/s    LVOT diameter 2.0 cm    IVRT 129 msec    E wave deceleration time 272 msec    PV peak gradient 2 mmHg    AV mean gradient 2 mmHg    RV- lozano basal diam 3.5 cm    TAPSE 1.55 cm    RVDD 3.46 cm    LA size 4.7 cm    Ascending aorta 3.40 cm     STJ 2.91 cm    Sinus 3.18 cm    LVIDs 4.7 (A) 2.1 - 4.0 cm    Ao root annulus 3.29 cm    PW 1.3 (A) 0.6 - 1.1 cm    IVS 1.2 (A) 0.6 - 1.1 cm    LVIDd 5.4 3.5 - 6.0 cm    PV PEAK VELOCITY 0.75 m/s    TDI LATERAL 0.04 m/s    Left Ventricular Outflow Tract Mean Gradient 1.52 mmHg    Left Ventricular Outflow Tract Mean Velocity 0.56 cm/s    Left Ventricular End Systolic Volume by Teichholz Method 103.46 mL    Left Ventricular End Diastolic Volume by Teichholz Method 142.24 mL    IVC diameter 1.72 cm    TDI SEPTAL 0.03 m/s    LV LATERAL E/E' RATIO 12.3 m/s    LV SEPTAL E/E' RATIO 16.3 m/s    RV/LV Ratio 0.65 cm    FS 13.0 (A) 28 - 44 %    LV mass 279.8 g    Left Ventricle Relative Wall Thickness 0.48 cm    AV valve area 2.6 cm²    AV Velocity Ratio 0.80     AV index (prosthetic) 0.82     E/A ratio 1.09     Mean e' 0.04 m/s    LVOT area 3.1 cm2    LVOT stroke volume 45.8 cm3    AV peak gradient 4 mmHg    E/E' ratio 14 m/s    EVA by Velocity Ratio 2.5 cm²    BSA 2.23 m2    LV Systolic Volume Index 47.2 mL/m2    LV Diastolic Volume Index 64.95 mL/m2    LV Mass Index 127.8 g/m2    MK (MOD) 33 mL/m2    ZLVIDS 0.35     ZLVIDD -3.15     MK 46 mL/m2    LA Vol 101 cm3    LA WIDTH 4.3 cm    EF 30 %    Est. RA pres 3 mmHg   EKG 12-lead    Collection Time: 02/05/25  3:07 PM   Result Value Ref Range    QRS Duration 86 ms    [facility] QTC Calculation 443 ms   Hepatitis C Antibody    Collection Time: 02/05/25  3:52 PM   Result Value Ref Range    Hepatitis C Ab Positive (A) Negative   HCV Virus Hold Specimen    Collection Time: 02/05/25  3:52 PM   Result Value Ref Range    HEP C Virus Hold Specimen Hold for HCV sendout    HIV 1/2 Ag/Ab (4th Gen)    Collection Time: 02/05/25  3:52 PM   Result Value Ref Range    HIV 1/2 Ag/Ab Positive (A) Negative   CBC auto differential    Collection Time: 02/05/25  3:52 PM   Result Value Ref Range    WBC 9.14 3.90 - 12.70 K/uL    RBC 6.36 (H) 4.60 - 6.20 M/uL    Hemoglobin 17.8 14.0 - 18.0 g/dL     Hematocrit 55.7 (H) 40.0 - 54.0 %    MCV 88 82 - 98 fL    MCH 28.0 27.0 - 31.0 pg    MCHC 32.0 32.0 - 36.0 g/dL    RDW 17.2 (H) 11.5 - 14.5 %    Platelets 259 150 - 450 K/uL    MPV 9.0 (L) 9.2 - 12.9 fL    Immature Granulocytes 0.3 0.0 - 0.5 %    Gran # (ANC) 5.0 1.8 - 7.7 K/uL    Immature Grans (Abs) 0.03 0.00 - 0.04 K/uL    Lymph # 3.0 1.0 - 4.8 K/uL    Mono # 0.9 0.3 - 1.0 K/uL    Eos # 0.1 0.0 - 0.5 K/uL    Baso # 0.03 0.00 - 0.20 K/uL    nRBC 0 0 /100 WBC    Gran % 54.9 38.0 - 73.0 %    Lymph % 33.2 18.0 - 48.0 %    Mono % 10.0 4.0 - 15.0 %    Eosinophil % 1.3 0.0 - 8.0 %    Basophil % 0.3 0.0 - 1.9 %    Differential Method Automated    Comprehensive metabolic panel    Collection Time: 02/05/25  3:52 PM   Result Value Ref Range    Sodium 138 136 - 145 mmol/L    Potassium 5.4 (H) 3.5 - 5.1 mmol/L    Chloride 105 95 - 110 mmol/L    CO2 23 23 - 29 mmol/L    Glucose 104 70 - 110 mg/dL    BUN 18 8 - 23 mg/dL    Creatinine 1.4 0.5 - 1.4 mg/dL    Calcium 10.0 8.7 - 10.5 mg/dL    Total Protein 8.7 (H) 6.0 - 8.4 g/dL    Albumin 3.5 3.5 - 5.2 g/dL    Total Bilirubin 0.9 0.1 - 1.0 mg/dL    Alkaline Phosphatase 36 (L) 40 - 150 U/L    AST 61 (H) 10 - 40 U/L    ALT 52 (H) 10 - 44 U/L    eGFR 51 (A) >60 mL/min/1.73 m^2    Anion Gap 10 8 - 16 mmol/L   Troponin I #1    Collection Time: 02/05/25  3:52 PM   Result Value Ref Range    Troponin I 0.011 0.000 - 0.026 ng/mL   BNP    Collection Time: 02/05/25  3:52 PM   Result Value Ref Range     (H) 0 - 99 pg/mL   Troponin I    Collection Time: 02/05/25  7:51 PM   Result Value Ref Range    Troponin I 0.007 0.000 - 0.026 ng/mL   Magnesium    Collection Time: 02/05/25  7:51 PM   Result Value Ref Range    Magnesium 1.9 1.6 - 2.6 mg/dL       RADIOLOGY  CTA Chest Non-Coronary (PE Studies)    Result Date: 2/5/2025  EXAMINATION: CTA CHEST NON CORONARY (PE STUDIES) CLINICAL HISTORY: Pulmonary embolism (PE) suspected, unknown D-dimer; TECHNIQUE: Angiographic technique PE protocol with  MIPS and post processing volumetric Iterative technique with low-dose parameters for diminishing radiation dose as reasonably achievable COMPARISON: No relevant comparison CT, radiographic comparison FINDINGS: No pulmonary thromboemboli seen.  Thoracic aorta ectatic. No pleural effusion Reticular/ground-glass pulmonary opacity bilateral mild dependent.  Bronchial wall thickening possible     Negative for PE Electronically signed by: Dilma Pedroza Date:    02/05/2025 Time:    17:52    X-Ray Chest AP Portable    Result Date: 2/5/2025  EXAMINATION: XR CHEST AP PORTABLE CLINICAL HISTORY: Chest Pain; TECHNIQUE: Single frontal portable view of the chest was performed. COMPARISON: October 29, 2024 FINDINGS: No new pulmonary opacity or sizable pleural effusion     Stable chest exam Electronically signed by: Dilma Pedroza Date:    02/05/2025 Time:    16:49    Echo    Result Date: 2/5/2025    Left Ventricle: The left ventricle is normal in size. Normal wall thickness. There is concentric hypertrophy. There is moderately reduced systolic function with a visually estimated ejection fraction of 30 - 40%. Ejection fraction is approximately 30%. Grade I diastolic dysfunction.   Right Ventricle: Normal right ventricular cavity size. Wall thickness is normal. Systolic function is mildly reduced.   Left Atrium: Left atrium is moderately dilated.   IVC/SVC: Normal venous pressure at 3 mmHg. Patient sent to ER due to increased dyspnea, HTN,  and drop in EF     Echo    Result Date: 6/5/24    Left Ventricle: The left ventricle is normal in size. Normal wall thickness. There is concentric hypertrophy. Normal wall motion. There is normal systolic function with a visually estimated ejection fraction of 55 - 60%. There is normal diastolic function.    Right Ventricle: Normal right ventricular cavity size. Wall thickness is normal. Systolic function is normal.    Left Atrium: Left atrium is mildly dilated.    Pulmonary Artery: The  estimated pulmonary artery systolic pressure is 18 mmHg.    IVC/SVC: Normal venous pressure at 3 mmHg.    Cardiac Monitor - 3-15 Day Adult (Cupid Only)    Result Date: 1/18/2025  The patient was monitored for a total of 6d 15h, underlying rhythm is Sinus. The minimum heart rate was 56 bpm; the maximum 115 bpm; the average 79 bpm. 0 % of Atrial fibrillation/Atrial flutter with longest episode of 0 ms. The total burden of AV Block present was 0 % [Complete Heart Block: 0 %; Advanced (High Grade): 0 %; 2nd Degree, Mobitz II: 0 %; 2nd Degree, Mobitz I: 0 %]. There were 0 pauses, the longest pause was 0 ms at --. Total count of Ventricular Tachycardia (VT): 1 episode(s). Longest VT: 3 beats on Day 5 / 04:39:08 pm. Fastest VT: 125 bpm on Day 5 / 04:39:08 pm. 264 supraventricular episodes were found. Longest SVT Episode 6 beats, Fastest  bpm There were a total of 4716 PVCs with 3 morphologies and 82 couplets. Overall PVC [place] at 0.62 % There were a total of 0 Other Beats. There were 0 total number of paced beats. There were a total of [id number] PSVCs with [id number] couplets. Overall PSVC [place] at 23.12 % There is a total of 1 patient events. - PVCs       EKG    MICROBIOLOGY    MDM     Amount and/or Complexity of Data Reviewed  Clinical lab tests: reviewed  Tests in the radiology section of CPT®: reviewed  Tests in the medicine section of CPT®: reviewed  Discussion of test results with the performing providers: yes  Decide to obtain previous medical records or to obtain history from someone other than the patient: yes  Obtain history from someone other than the patient: yes  Review and summarize past medical records: yes  Discuss the patient with other providers: yes  Independent visualization of images, tracings, or specimens: yes

## 2025-02-06 NOTE — ASSESSMENT & PLAN NOTE
Chronic. Stable. Not in acute exacerbation and currently denies endorsing any suicidal/homicidal ideations.   Plan:  -Continue home medications (lexapro)

## 2025-02-06 NOTE — NURSING
AVS virtually reviewed with patient and his  Shadi in its entirety with emphasis on diet, medications, follow-up appointments and reasons to return to the ED or contact the [facility] On Call Nurse Care Line. Patient also encouraged to utilize their patient portal. Ease and convenience of use reiterated. Education complete and patient voiced understanding. All questions answered. Discharge teaching complete.

## 2025-02-06 NOTE — SUBJECTIVE & OBJECTIVE
Past Medical History:   Diagnosis Date    Anxiety 07/03/2019    Basal cell carcinoma     CAD in native artery 11/27/2023    Depression     Dyslipidemia 11/16/2023    Essential hypertension 01/24/2013    Hepatitis B     Hepatitis C antibody test positive     RNA NEG 8/29/2019    HIV infection     Hypertension     Immune disorder     Mild cognitive impairment 10/01/2010    Nonrheumatic aortic valve insufficiency 12/19/2023    Nonrheumatic mitral valve regurgitation 12/19/2023       Past Surgical History:   Procedure Laterality Date    APPENDECTOMY      CARDIAC CATHETERIZATION      no stent    CHOLECYSTECTOMY      COLONOSCOPY N/A 11/3/2016    Procedure: COLONOSCOPY;  Surgeon: Maxi Villasenor MD;  Location: [facility] ENDO ([facility] FLR);  Service: Endoscopy;  Laterality: N/A;  last colonoscopy with Dr Jarrell    COLONOSCOPY N/A 1/25/2022    Procedure: COLONOSCOPY;  Surgeon: Silvino Rosales MD;  Location: [facility] ENDO;  Service: Endoscopy;  Laterality: N/A;    LEFT HEART CATHETERIZATION Left 2/3/2020    Procedure: CATHETERIZATION, HEART, LEFT;  Surgeon: Chele Ko MD;  Location: [facility] [place] CATH LAB;  Service: Cardiology;  Laterality: Left;  malur pt       Review of patient's allergies indicates:   Allergen Reactions    No known drug allergies        No current facility-administered medications on file prior to encounter.     Current Outpatient Medications on File Prior to Encounter   Medication Sig    albuterol (PROVENTIL) 2.5 mg /3 mL (0.083 %) nebulizer solution Take 3 mLs (2.5 mg total) by nebulization every 4 to 6 hours as needed for Wheezing or Shortness of Breath.    albuterol (PROVENTIL/VENTOLIN HFA) 90 mcg/actuation inhaler Inhale 2 puffs into the lungs every 4 (four) hours as needed for Wheezing. Rescue    ascorbic acid, vitamin C, (VITAMIN C) 1000 MG tablet Take 1,000 mg by mouth once daily.    zlhaawrsh-dujfefnr-xkzzxuk ala (BIKTARVY) -25 mg (25 kg or greater) Take 1 tablet by mouth once daily.    donepeziL  (ARICEPT) 5 MG tablet Take 1 tablet (5 mg total) by mouth every evening.    FLUoxetine 40 MG capsule TAKE 1 CAPSULE EVERY DAY (Patient taking differently: Take 40 mg by mouth once daily.)    fluticasone propionate (FLONASE) 50 mcg/actuation nasal spray [place] 1 spray every day by intranasal route.    furosemide (LASIX) 40 MG tablet Take 1 tablet (40 mg total) by mouth once daily. Do not take if BP is below 110/70 or feeling dry; for next 4 days take twice a day (7 am and noon)    losartan (COZAAR) 25 MG tablet Take 1 tablet (25 mg total) by mouth once daily.    magnesium oxide (MAG-OX) 400 mg (241.3 mg magnesium) tablet Take 1 tablet (400 mg total) by mouth once daily.    metoprolol succinate (TOPROL-XL) 100 MG 24 hr tablet Take 1 tablet (100 mg total) by mouth every evening.    potassium chloride SA (K-DUR,KLOR-CON M) 10 MEQ tablet Take 2 tablets (20 mEq total) by mouth once daily.    promethazine (PHENERGAN) 12.5 MG Tab Take 1 tablet (12.5 mg total) by mouth daily as needed for Nausea.    rosuvastatin (CRESTOR) 20 MG tablet TAKE 1 TABLET EVERY DAY (Patient taking differently: Take 20 mg by mouth every evening.)    ubidecarenone (COENZYME Q10 ORAL) Take by mouth once daily.    vit C,X-Bh-hyhyb-lutein-zeaxan (EYE MULTIVIT, LUTEIN-ZEAXAN,) 338-43-17-2-5 mg Cap Take 1 tablet by mouth once daily.    busPIRone (BUSPAR) 7.5 MG tablet TAKE ONE TABLET BY MOUTH DAILY AS NEEDED.     Family History       Problem Relation (Age of Onset)    Hearing loss Mother    Heart disease Father    Heart failure Father    Vision loss Mother          Tobacco Use    Smoking status: Never    Smokeless tobacco: Never   Substance and Sexual Activity    Alcohol use: No    Drug use: Never    Sexual activity: Not Currently     Partners: Male     Birth control/protection: Abstinence, See Surgical Hx, None     Review of Systems   Constitutional: Positive for malaise/fatigue.   HENT: Negative.     Eyes: Negative.    Cardiovascular:  Positive for  dyspnea on exertion and orthopnea.   Respiratory:  Positive for shortness of breath.    Endocrine: Negative.    Hematologic/Lymphatic: Negative.    Skin: Negative.    Musculoskeletal: Negative.    Gastrointestinal:  Positive for nausea.   Genitourinary: Negative.    Neurological: Negative.    Psychiatric/Behavioral: Negative.     Allergic/Immunologic: Negative.      Objective:     Vital Signs (Most Recent):  Temp: 97.4 °F (36.3 °C) (02/06/25 0352)  Pulse: 74 (02/06/25 0400)  Resp: 18 (02/06/25 0400)  BP: (!) 143/86 (02/06/25 0352)  SpO2: 97 % (02/06/25 0400) Vital Signs (24h Range):  Temp:  [97.4 °F (36.3 °C)-98.6 °F (37 °C)] 97.4 °F (36.3 °C)  Pulse:  [69-85] 74  Resp:  [16-22] 18  SpO2:  [92 %-97 %] 97 %  BP: (133-180)/() 143/86     Weight: 98.5 kg (217 lb 2.5 oz)  Body mass index is 31.16 kg/m².    SpO2: 97 %         Intake/Output Summary (Last 24 hours) at 2/6/2025 0800  Last data filed at 2/6/2025 0245  Gross per 24 hour   Intake 550 ml   Output 3975 ml   Net -3425 ml       Lines/Drains/Airways       Peripheral Intravenous Line  Duration                  Peripheral IV - Single Lumen 02/05/25 1546 20 G Left;Posterior Forearm <1 day                     Physical Exam  Vitals and nursing note reviewed.   Constitutional:       General: He is not in acute distress.     Appearance: Normal appearance. He is well-developed. He is not diaphoretic.      Comments: On supplemental o2   HENT:      Head: Normocephalic and atraumatic.   Eyes:      General:         Right eye: No discharge.         Left eye: No discharge.      Pupils: Pupils are equal, round, and reactive to light.   Cardiovascular:      Rate and Rhythm: Normal rate and regular rhythm.      Heart sounds: Normal heart sounds, S1 normal and S2 normal. No murmur heard.  Pulmonary:      Effort: Pulmonary effort is normal. No respiratory distress.      Comments: Slightly diminished right base/coarse  Abdominal:      General: There is no distension.  "  Musculoskeletal:      Right lower leg: Edema present.   Skin:     General: Skin is warm and dry.      Findings: No erythema.   Neurological:      General: No focal deficit present.      Mental Status: He is alert and oriented to person, place, and time.   Psychiatric:         Mood and Affect: Mood normal.         Behavior: Behavior normal.          Significant Labs: CMP   Recent Labs   Lab 02/05/25  1552 02/06/25  0500    139   K 5.4* 4.1    98   CO2 23 31*    118*   BUN 18 19   CREATININE 1.4 1.6*   CALCIUM 10.0 10.0   PROT 8.7*  --    ALBUMIN 3.5  --    BILITOT 0.9  --    ALKPHOS 36*  --    AST 61*  --    ALT 52*  --    ANIONGAP 10 10   , CBC   Recent Labs   Lab 02/05/25  1552   WBC 9.14   HGB 17.8   HCT 55.7*      , Troponin No results for input(s): "TROPONINIHS" in the last 48 hours., and All pertinent lab results from the last 24 hours have been reviewed.    Significant Imaging: Echocardiogram: Transthoracic echo (TTE) complete (Cupid Only):   Results for orders placed or performed during the hospital encounter of 02/05/25   Echo   Result Value Ref Range    RA Width 3.36 cm    LA Vol (MOD) 73 mL    LV ESV A2C 68.23 mL    LA area A4C 22.79 cm2    LA area A2C 21.51 cm2    LV ESV A4C 77.84 mL    Left Atrium Major Axis 5.9 cm    Left Atrium Minor Axis 5.9 cm    RA Major Axis 5.41 cm    LV Diastolic Volume 142.24 mL    LV Systolic Volume 103.46 mL    MV Peak A Mark 0.45 m/s    MV Peak E Mark 0.49 m/s    PV mean gradient 1 mmHg    RVOT peak VTI 9.7 cm    RVOT peak mark 0.58 m/s    Ao VTI 17.8 cm    Ao peak mark 1.0 m/s    LVOT peak VTI 14.6 cm    LVOT peak mark 0.8 m/s    LVOT diameter 2.0 cm    IVRT 129 msec    E wave deceleration time 272 msec    PV peak gradient 2 mmHg    AV mean gradient 2 mmHg    RV- lozano basal diam 3.5 cm    TAPSE 1.55 cm    RVDD 3.46 cm    LA size 4.7 cm    Ascending aorta 3.40 cm    STJ 2.91 cm    Sinus 3.18 cm    LVIDs 4.7 (A) 2.1 - 4.0 cm    Ao root annulus 3.29 cm    " PW 1.3 (A) 0.6 - 1.1 cm    IVS 1.2 (A) 0.6 - 1.1 cm    LVIDd 5.4 3.5 - 6.0 cm    PV PEAK VELOCITY 0.75 m/s    TDI LATERAL 0.04 m/s    Left Ventricular Outflow Tract Mean Gradient 1.52 mmHg    Left Ventricular Outflow Tract Mean Velocity 0.56 cm/s    Left Ventricular End Systolic Volume by Teichholz Method 103.46 mL    Left Ventricular End Diastolic Volume by Teichholz Method 142.24 mL    IVC diameter 1.72 cm    TDI SEPTAL 0.03 m/s    LV LATERAL E/E' RATIO 12.3 m/s    LV SEPTAL E/E' RATIO 16.3 m/s    RV/LV Ratio 0.65 cm    FS 13.0 (A) 28 - 44 %    LV mass 279.8 g    Left Ventricle Relative Wall Thickness 0.48 cm    AV valve area 2.6 cm²    AV Velocity Ratio 0.80     AV index (prosthetic) 0.82     E/A ratio 1.09     Mean e' 0.04 m/s    LVOT area 3.1 cm2    LVOT stroke volume 45.8 cm3    AV peak gradient 4 mmHg    E/E' ratio 14 m/s    EVA by Velocity Ratio 2.5 cm²    BSA 2.23 m2    LV Systolic Volume Index 47.2 mL/m2    LV Diastolic Volume Index 64.95 mL/m2    LV Mass Index 127.8 g/m2    MK (MOD) 33 mL/m2    ZLVIDS 0.35     ZLVIDD -3.15     MK 46 mL/m2    LA Vol 101 cm3    LA WIDTH 4.3 cm    EF 30 %    Est. RA pres 3 mmHg    Narrative      Left Ventricle: The left ventricle is normal in size. Normal wall   thickness. There is concentric hypertrophy. There is moderately reduced   systolic function with a visually estimated ejection fraction of 30 - 40%.   Ejection fraction is approximately 30%. Grade I diastolic dysfunction.    Right Ventricle: Normal right ventricular cavity size. Wall thickness   is normal. Systolic function is mildly reduced.    Left Atrium: Left atrium is moderately dilated.    IVC/SVC: Normal venous pressure at 3 mmHg.    Patient sent to ER due to increased dyspnea, HTN,  and drop in EF       and EKG: Reviewed

## 2025-02-06 NOTE — HOSPITAL COURSE
Haritha Valle is a 79 year old male who presented to ED from Cardiology clinic due to abnormal echocardiogram findings (EF 30-40%) and progressive exertional dyspnea despite oral diuretics. He as admitted for decompensated heart failure and diuresed. Entresto added for medication optimization. Serial troponin levels were negative. With management, he reports significant improvement in symptoms and expressed desire to discharge. He was counseled on importance of dietary and medication compliance. He will continue same dose of Lasix (40 mg) daily for volume control. He was asked to keep scheduled appt on Tuesday 2/11/25 with Dr. Nguyen. He will need OP ischemia work up as well. He was asked to monitor weight daily and record. Patient seen and examined on date of discharge and deemed suitable.

## 2025-02-06 NOTE — ASSESSMENT & PLAN NOTE
Chronic, controlled.  Latest blood pressure and vitals reviewed-   Temp:  [97.5 °F (36.4 °C)-98.5 °F (36.9 °C)]   Pulse:  [69-85]   Resp:  [16-22]   BP: (133-180)/()   SpO2:  [92 %-97 %] .   Home meds for hypertension were reviewed and noted below.   Hypertension Medications               furosemide (LASIX) 40 MG tablet Take 1 tablet (40 mg total) by mouth once daily. Do not take if BP is below 110/70 or feeling dry; for next 4 days take twice a day (7 am and noon)    losartan (COZAAR) 25 MG tablet Take 1 tablet (25 mg total) by mouth once daily.    metoprolol succinate (TOPROL-XL) 100 MG 24 hr tablet Take 1 tablet (100 mg total) by mouth every evening.            While in the hospital, will manage blood pressure as follows; Continue home antihypertensive regimen    Will utilize p.r.n. blood pressure medication only if patient's blood pressure greater than  160/90 and he develops symptoms such as worsening chest pain or shortness of breath.

## 2025-02-10 ENCOUNTER — PATIENT OUTREACH (OUTPATIENT)
Dept: ADMINISTRATIVE | Facility: CLINIC | Age: 80
End: 2025-02-10
Payer: MEDICARE

## 2025-02-10 NOTE — PROGRESS NOTES
C3 nurse attempted to contact Haritha Valle  for a TCC post hospital discharge follow up call. No answer. Left voicemail with callback information. The patient does not have a scheduled HOSFU appointment. Message sent to PCP staff for assistance with scheduling visit with patient.

## 2025-02-11 ENCOUNTER — OFFICE VISIT (OUTPATIENT)
Dept: CARDIOLOGY | Facility: CLINIC | Age: 80
End: 2025-02-11
Payer: MEDICARE

## 2025-02-11 ENCOUNTER — PATIENT MESSAGE (OUTPATIENT)
Dept: CARDIOLOGY | Facility: HOSPITAL | Age: 80
End: 2025-02-11
Payer: MEDICARE

## 2025-02-11 ENCOUNTER — LAB VISIT (OUTPATIENT)
Dept: LAB | Facility: HOSPITAL | Age: 80
End: 2025-02-11
Attending: INTERNAL MEDICINE
Payer: MEDICARE

## 2025-02-11 VITALS
HEART RATE: 71 BPM | DIASTOLIC BLOOD PRESSURE: 80 MMHG | WEIGHT: 219.25 LBS | HEIGHT: 70 IN | OXYGEN SATURATION: 97 % | BODY MASS INDEX: 31.39 KG/M2 | RESPIRATION RATE: 16 BRPM | SYSTOLIC BLOOD PRESSURE: 127 MMHG

## 2025-02-11 DIAGNOSIS — R06.09 DOE (DYSPNEA ON EXERTION): ICD-10-CM

## 2025-02-11 DIAGNOSIS — I50.43 ACUTE ON CHRONIC COMBINED SYSTOLIC AND DIASTOLIC CONGESTIVE HEART FAILURE: ICD-10-CM

## 2025-02-11 DIAGNOSIS — B20 HIV DISEASE: ICD-10-CM

## 2025-02-11 DIAGNOSIS — E78.2 MIXED HYPERLIPIDEMIA: ICD-10-CM

## 2025-02-11 DIAGNOSIS — I34.0 NONRHEUMATIC MITRAL VALVE REGURGITATION: ICD-10-CM

## 2025-02-11 DIAGNOSIS — E78.1 HYPERTRIGLYCERIDEMIA: ICD-10-CM

## 2025-02-11 DIAGNOSIS — I50.43 ACUTE ON CHRONIC COMBINED SYSTOLIC AND DIASTOLIC CONGESTIVE HEART FAILURE: Primary | ICD-10-CM

## 2025-02-11 DIAGNOSIS — R06.02 SOB (SHORTNESS OF BREATH): ICD-10-CM

## 2025-02-11 DIAGNOSIS — I25.10 CAD IN NATIVE ARTERY: ICD-10-CM

## 2025-02-11 DIAGNOSIS — I70.0 AORTIC ATHEROSCLEROSIS: ICD-10-CM

## 2025-02-11 DIAGNOSIS — I11.0 HYPERTENSIVE HEART DISEASE WITH HEART FAILURE: ICD-10-CM

## 2025-02-11 DIAGNOSIS — G47.33 OBSTRUCTIVE SLEEP APNEA: ICD-10-CM

## 2025-02-11 DIAGNOSIS — I35.1 NONRHEUMATIC AORTIC VALVE INSUFFICIENCY: ICD-10-CM

## 2025-02-11 LAB
ANION GAP SERPL CALC-SCNC: 11 MMOL/L (ref 8–16)
BNP SERPL-MCNC: 50 PG/ML (ref 0–99)
BUN SERPL-MCNC: 25 MG/DL (ref 8–23)
CALCIUM SERPL-MCNC: 9.6 MG/DL (ref 8.7–10.5)
CHLORIDE SERPL-SCNC: 102 MMOL/L (ref 95–110)
CO2 SERPL-SCNC: 26 MMOL/L (ref 23–29)
CREAT SERPL-MCNC: 1.4 MG/DL (ref 0.5–1.4)
EST. GFR  (NO RACE VARIABLE): 51 ML/MIN/1.73 M^2
GLUCOSE SERPL-MCNC: 162 MG/DL (ref 70–110)
MAGNESIUM SERPL-MCNC: 2 MG/DL (ref 1.6–2.6)
POTASSIUM SERPL-SCNC: 4.6 MMOL/L (ref 3.5–5.1)
SODIUM SERPL-SCNC: 139 MMOL/L (ref 136–145)

## 2025-02-11 PROCEDURE — 99214 OFFICE O/P EST MOD 30 MIN: CPT | Mod: S$PBB,,, | Performed by: INTERNAL MEDICINE

## 2025-02-11 PROCEDURE — 99214 OFFICE O/P EST MOD 30 MIN: CPT | Mod: PBBFAC | Performed by: INTERNAL MEDICINE

## 2025-02-11 PROCEDURE — 99999 PR PBB SHADOW E&M-EST. PATIENT-LVL IV: CPT | Mod: PBBFAC,,, | Performed by: INTERNAL MEDICINE

## 2025-02-11 PROCEDURE — 80048 BASIC METABOLIC PNL TOTAL CA: CPT | Performed by: INTERNAL MEDICINE

## 2025-02-11 PROCEDURE — 83735 ASSAY OF MAGNESIUM: CPT | Performed by: INTERNAL MEDICINE

## 2025-02-11 PROCEDURE — G2211 COMPLEX E/M VISIT ADD ON: HCPCS | Mod: S$PBB,,, | Performed by: INTERNAL MEDICINE

## 2025-02-11 PROCEDURE — 83880 ASSAY OF NATRIURETIC PEPTIDE: CPT | Performed by: INTERNAL MEDICINE

## 2025-02-11 PROCEDURE — 36415 COLL VENOUS BLD VENIPUNCTURE: CPT | Performed by: INTERNAL MEDICINE

## 2025-02-11 RX ORDER — FUROSEMIDE 40 MG/1
40 TABLET ORAL DAILY PRN
Qty: 90 TABLET | Refills: 1 | Status: SHIPPED | OUTPATIENT
Start: 2025-02-11 | End: 2026-02-11

## 2025-02-11 RX ORDER — SACUBITRIL AND VALSARTAN 24; 26 MG/1; MG/1
1 TABLET, FILM COATED ORAL 2 TIMES DAILY
Qty: 60 TABLET | Refills: 1 | Status: SHIPPED | OUTPATIENT
Start: 2025-02-11

## 2025-02-11 RX ORDER — METOPROLOL SUCCINATE 25 MG/1
25 TABLET, EXTENDED RELEASE ORAL NIGHTLY
Qty: 90 TABLET | Refills: 1 | Status: SHIPPED | OUTPATIENT
Start: 2025-02-11

## 2025-02-11 RX ORDER — POTASSIUM CHLORIDE 750 MG/1
20 TABLET, EXTENDED RELEASE ORAL DAILY PRN
Qty: 90 TABLET | Refills: 1 | Status: SHIPPED | OUTPATIENT
Start: 2025-02-11

## 2025-02-11 NOTE — PROGRESS NOTES
Subjective:   Patient ID:  Haritha Valle is a 79 y.o. male who presents for cardiac consult of No chief complaint on file.        The patient came in today for cardiac consult of No chief complaint on file.    Referring Physician: Hesham Andrade II, MD   Reason for initial consult: LOC, palpitations      Haritha Valle is a 79 y.o. male pt with HFpEF, HTN, HLD, HIV, anxiety, depression, hypogonadism, ED presents for follow up CV eval         1/27/25  Vital monitor - 1/2025 with freq PACs, SVT episdoes, AVG HR 79 BPM. Overall PSVC [place] at 23.12 %  Pt had ER eval for SOB/fluid build up and elevated BP.   BP elevated today 178/110. HR 77. BMI 32 - 223 lbs   He was given IV lasix at ER - diursed 2L.   He was not taking lasix daily due to worsening breathing.     2/11/25 - HOSP FOLLOW UP  Hospital Course:   Haritha Valle is a 79 year old male who presented to ED from Cardiology clinic due to abnormal echocardiogram findings (EF 30-40%) and progressive exertional dyspnea despite oral diuretics. He as admitted for decompensated heart failure and diuresed. Entresto added for medication optimization. Serial troponin levels were negative. With management, he reports significant improvement in symptoms and expressed desire to discharge. He was counseled on importance of dietary and medication compliance. He will continue same dose of Lasix (40 mg) daily for volume control. He was asked to keep scheduled appt on Tuesday 2/11/25 with Dr. Nguyen. He will need OP ischemia work up as well. He was asked to monitor weight daily and record. Patient seen and examined on date of discharge and deemed suitable.      PT recently admitted and diuresed.   His BP was low so he stopped BB.  ECHO 2/2025 EF 30%, grade 1 DD, mild reduced RV function.   BP and HR stable. BMI 31 - 219 lbs       Results for orders placed during the hospital encounter of 02/05/25    Echo    Interpretation Summary    Left Ventricle: The left ventricle is normal in  size. Normal wall thickness. There is concentric hypertrophy. There is moderately reduced systolic function with a visually estimated ejection fraction of 30 - 40%. Ejection fraction is approximately 30%. Grade I diastolic dysfunction.    Right Ventricle: Normal right ventricular cavity size. Wall thickness is normal. Systolic function is mildly reduced.    Left Atrium: Left atrium is moderately dilated.    IVC/SVC: Normal venous pressure at 3 mmHg.    Patient sent to ER due to increased dyspnea, HTN,  and drop in EF      MRI brain   Moderate global cortical atrophy with mild frontal lobe predominance.  Score 1 bilateral medial temporal lobe atrophy.  Minimal nonspecific white matter changes, likely senescent.    Results for orders placed during the hospital encounter of 06/05/24    Echo    Interpretation Summary    Left Ventricle: The left ventricle is normal in size. Normal wall thickness. There is concentric hypertrophy. Normal wall motion. There is normal systolic function with a visually estimated ejection fraction of 55 - 60%. There is normal diastolic function.    Right Ventricle: Normal right ventricular cavity size. Wall thickness is normal. Systolic function is normal.    Left Atrium: Left atrium is mildly dilated.    Pulmonary Artery: The estimated pulmonary artery systolic pressure is 18 mmHg.    IVC/SVC: Normal venous pressure at 3 mmHg.      Nuclear Quantitative Functional Analysis:   LVEF: 44 %    Impression: ABNORMAL MYOCARDIAL PERFUSION  1. There is evidence for mild myocardial ischemia in the inferior and inferoapical walls of the left ventricle.   2. The perfusion scan is free of evidence for myocardial injury.   3. Resting wall motion is physiologic.   4. There is resting LV dysfunction with a reduced ejection fraction of 44 %.   5. The ventricular volumes are normal at rest and stress.   6. The extracardiac distribution of radioactivity is normal.     This document has been electronically     SIGNED BY: Stephon Corcoran MD On: 01/27/2020 16:38    2/3/20   1.   Non-obstructive CAD.   2.   low normal to mildy depressed ef.      Past Medical History:   Diagnosis Date    Anxiety 07/03/2019    Basal cell carcinoma     CAD in native artery 11/27/2023    Depression     Dyslipidemia 11/16/2023    Essential hypertension 01/24/2013    Hepatitis B     Hepatitis C antibody test positive     RNA NEG 8/29/2019    HIV infection     Hypertension     Immune disorder     Mild cognitive impairment 10/01/2010    Nonrheumatic aortic valve insufficiency 12/19/2023    Nonrheumatic mitral valve regurgitation 12/19/2023       Past Surgical History:   Procedure Laterality Date    APPENDECTOMY      CARDIAC CATHETERIZATION      no stent    CHOLECYSTECTOMY      COLONOSCOPY N/A 11/3/2016    Procedure: COLONOSCOPY;  Surgeon: Maxi Villasenor MD;  Location: [facility] ([address] [place] [address]);  Service: Endoscopy;  Laterality: N/A;  last colonoscopy with Dr Jarrell    COLONOSCOPY N/A 1/25/2022    Procedure: COLONOSCOPY;  Surgeon: Silvino Rosales MD;  Location: [facility] ENDO;  Service: Endoscopy;  Laterality: N/A;    LEFT HEART CATHETERIZATION Left 2/3/2020    Procedure: CATHETERIZATION, HEART, LEFT;  Surgeon: Chele Ko MD;  Location: [facility] CATH LAB;  Service: Cardiology;  Laterality: Left;  malur pt       Social History     Tobacco Use    Smoking status: Never    Smokeless tobacco: Never   Substance Use Topics    Alcohol use: No    Drug use: Never       Family History   Problem Relation Name Age of Onset    Hearing loss Mother Sonam Dease     Vision loss Mother Sonam Dease     Heart disease Father Edalbaro Dease     Heart failure Father Edalbaro Dease     Diabetes Neg Hx      Hypertension Neg Hx         Patient's Medications   New Prescriptions    No medications on file   Previous Medications    ALBUTEROL (PROVENTIL) 2.5 MG /3 ML (0.083 %) NEBULIZER SOLUTION    Take 3 mLs (2.5 mg total) by nebulization every 4 to 6 hours as needed for Wheezing or  Shortness of Breath.    ALBUTEROL (PROVENTIL/VENTOLIN HFA) 90 MCG/ACTUATION INHALER    Inhale 2 puffs into the lungs every 4 (four) hours as needed for Wheezing. Rescue    ASCORBIC ACID, VITAMIN C, (VITAMIN C) 1000 MG TABLET    Take 1,000 mg by mouth once daily.    REDHYCBTW-HPFKRNCZ-ALESIYX ALA (BIKTARVY) -25 MG (25 KG OR GREATER)    Take 1 tablet by mouth once daily.    BUSPIRONE (BUSPAR) 7.5 MG TABLET    TAKE ONE TABLET BY MOUTH DAILY AS NEEDED.    DONEPEZIL (ARICEPT) 5 MG TABLET    Take 1 tablet (5 mg total) by mouth every evening.    FLUOXETINE 40 MG CAPSULE    TAKE 1 CAPSULE EVERY DAY    FLUTICASONE PROPIONATE (FLONASE) 50 MCG/ACTUATION NASAL SPRAY    [place] 1 spray every day by intranasal route.    FUROSEMIDE (LASIX) 40 MG TABLET    Take 1 tablet (40 mg total) by mouth once daily. Do not take if BP is below 110/70 or feeling dry; for next 4 days take twice a day (7 am and noon)    MAGNESIUM OXIDE (MAG-OX) 400 MG (241.3 MG MAGNESIUM) TABLET    Take 1 tablet (400 mg total) by mouth once daily.    POTASSIUM CHLORIDE SA (K-DUR,KLOR-CON M) 10 MEQ TABLET    Take 2 tablets (20 mEq total) by mouth once daily.    PROMETHAZINE (PHENERGAN) 12.5 MG TAB    Take 1 tablet (12.5 mg total) by mouth daily as needed for Nausea.    ROSUVASTATIN (CRESTOR) 20 MG TABLET    TAKE 1 TABLET EVERY DAY    UBIDECARENONE (COENZYME Q10 ORAL)    Take by mouth once daily.    VIT C,S-CI-ZLBTF-LUTEIN-ZEAXAN (EYE MULTIVIT, LUTEIN-ZEAXAN,) 486-71-77-2-5 MG CAP    Take 1 tablet by mouth once daily.   Modified Medications    Modified Medication Previous Medication    METOPROLOL SUCCINATE (TOPROL-XL) 25 MG 24 HR TABLET metoprolol succinate (TOPROL-XL) 100 MG 24 hr tablet       Take 1 tablet (25 mg total) by mouth every evening.    Take 1 tablet (100 mg total) by mouth every evening.    SACUBITRIL-VALSARTAN (ENTRESTO) 24-26 MG PER TABLET sacubitriL-valsartan (ENTRESTO) 24-26 mg per tablet       Take 1 tablet by mouth 2 (two) times daily.    Take  "1 tablet by mouth 2 (two) times daily.   Discontinued Medications    No medications on file       Review of Systems   Constitutional:  Positive for malaise/fatigue.   HENT: Negative.     Eyes: Negative.    Respiratory:  Positive for shortness of breath.    Cardiovascular:  Positive for chest pain and palpitations.   Gastrointestinal: Negative.    Genitourinary: Negative.    Musculoskeletal: Negative.    Skin: Negative.    Neurological:  Positive for dizziness and loss of consciousness.   Endo/Heme/Allergies: Negative.    Psychiatric/Behavioral: Negative.     All 12 systems otherwise negative.      Wt Readings from Last 3 Encounters:   02/11/25 99.5 kg (219 lb 4 oz)   02/05/25 98.5 kg (217 lb 2.5 oz)   02/05/25 101.2 kg (223 lb)     Temp Readings from Last 3 Encounters:   02/06/25 97.4 °F (36.3 °C) (Oral)   01/06/25 98.3 °F (36.8 °C) (Tympanic)   08/21/24 96.3 °F (35.7 °C) (Temporal)     BP Readings from Last 3 Encounters:   02/11/25 127/80   02/06/25 (!) 165/96   02/05/25 (!) 147/79     Pulse Readings from Last 3 Encounters:   02/11/25 71   02/06/25 77   01/27/25 77       /80   Pulse 71   Resp 16   Ht 5' 10" (1.778 m)   Wt 99.5 kg (219 lb 4 oz)   SpO2 97%   BMI 31.46 kg/m²     Objective:   Physical Exam  Vitals and nursing note reviewed.   Constitutional:       General: He is not in acute distress.     Appearance: He is well-developed. He is not diaphoretic.   HENT:      Head: Normocephalic and atraumatic.      Nose: Nose normal.   Eyes:      General: No scleral icterus.     Conjunctiva/sclera: Conjunctivae normal.   Neck:      Thyroid: No thyromegaly.      Vascular: No JVD.   Cardiovascular:      Rate and Rhythm: Normal rate and regular rhythm.      Heart sounds: S1 normal and S2 normal. No murmur heard.     No friction rub. No gallop. No S3 or S4 sounds.   Pulmonary:      Effort: Pulmonary effort is normal. No respiratory distress.      Breath sounds: Normal breath sounds. No stridor. No wheezing or " rales.   Chest:      Chest wall: No tenderness.   Abdominal:      General: Bowel sounds are normal. There is no distension.      Palpations: Abdomen is soft. There is no mass.      Tenderness: There is no abdominal tenderness. There is no rebound.   Genitourinary:     Comments: Deferred  Musculoskeletal:         General: No tenderness or deformity. Normal range of motion.      Cervical back: Normal range of motion and neck supple.   Lymphadenopathy:      Cervical: No cervical adenopathy.   Skin:     General: Skin is warm and dry.      Coloration: Skin is not pale.      Findings: No erythema or rash.   Neurological:      Mental Status: He is alert and oriented to person, place, and time.      Motor: No abnormal muscle tone.      Coordination: Coordination normal.   Psychiatric:         Behavior: Behavior normal.         Thought Content: Thought content normal.         Judgment: Judgment normal.         Lab Results   Component Value Date     02/06/2025    K 4.1 02/06/2025    CL 98 02/06/2025    CO2 31 (H) 02/06/2025    BUN 19 02/06/2025    CREATININE 1.6 (H) 02/06/2025     (H) 02/06/2025    HGBA1C 6.5 (H) 02/05/2025    MG 1.9 02/05/2025    AST 61 (H) 02/05/2025    ALT 52 (H) 02/05/2025    ALBUMIN 3.5 02/05/2025    ALBUMIN 4.3 04/30/2018    PROT 8.7 (H) 02/05/2025    BILITOT 0.9 02/05/2025    WBC 9.14 02/05/2025    HGB 17.8 02/05/2025    HCT 55.7 (H) 02/05/2025    MCV 88 02/05/2025     02/05/2025    INR 1.0 01/28/2020    TSH 1.137 04/27/2023    CHOL 172 12/18/2023    HDL 36 (L) 12/18/2023    LDLCALC 73.2 12/18/2023    TRIG 314 (H) 12/18/2023     (H) 02/05/2025     Assessment:      1. Aortic atherosclerosis    2. SOB (shortness of breath)    3. Acute on chronic combined systolic and diastolic congestive heart failure    4. Hypertensive heart disease with heart failure    5. Acute on chronic heart failure with preserved ejection fraction    6. Obstructive sleep apnea    7. CAD in native artery     8. HIV disease    9. Nonrheumatic mitral valve regurgitation    10. MAIER (dyspnea on exertion)    11. Nonrheumatic aortic valve insufficiency    12. Mixed hyperlipidemia    13. Hypertriglyceridemia            Plan:   1. HTN   - titrate meds  - episodes of HTN urgency - r/o sec causes - labs and renal u/s ordered - neg  - ECHO 6/2024 with normal bi V function and valves, PASP 18 mmHg.     2. HLD with elevated TGs  - cont meds  - added- vascepa in past    3. HIV  - cont meds per PCP/ID    4. ED/hypogonadism  - cont meds per PCP PRN    5. Syncope/dizziness/palpitations with CP - non obs CAD  - prior exercise nuclear stress test to r/o ischemia - abnormal, LHC neg  - ECHO 6/2024 with normal bi V function and valves, PASP 18 mmHg.   - carotids - neg   - has been to ENT and Neuro - has atrophy and hearing aids now  -Vital monitor - 1/2025 with freq PACs, SVT episdoes, AVG HR 79 BPM. Overall PSVC [place] at 23.12 %    6.ECHO 2/2025 EF 30%, grade 1 DD, mild reduced RV function.   - cont meds - Toprol and ARB - changed to Entresto  - cont lasix   - repeat labs today    7. Anxiety  - refer to psych    8. RAMONITA  - cont CPAP    9. Obesity - . BMI 31 - 219 lbs   - cont weight loss    Visit today included increased complexity associated with the care of the episodic problem dyspnea addressed and managing the longitudinal care of the patient due to the serious and/or complex managed problem(s) .      Thank you for allowing me to participate in this patient's care. Please do not hesitate to contact me with any questions or concerns. Consult note has been forwarded to the referral physician.

## 2025-02-12 ENCOUNTER — TELEPHONE (OUTPATIENT)
Dept: CARDIOLOGY | Facility: CLINIC | Age: 80
End: 2025-02-12
Payer: MEDICARE

## 2025-02-12 NOTE — TELEPHONE ENCOUNTER
Attempted to reach patient to advise per Dr. Nguyen: lab results reveal improved BNP to normal level - do not take any lasix or potassium unless needed due to extra fluid build up, continue Entresto and rest of meds; if BP remains low still call office to adjust meds further. Monitor BP and weights daily and continue low salt diet. Left VM and will send message via [employer].    ----- Message from Deni Nguyen MD sent at 2/11/2025  5:54 PM CST -----  Please contact the patient and let them know that their results reveal improved BNP to normal level - do not take any lasix or potassium unless needed due to extra fluid build up, continue Entresto and rest of meds; if BP remains low still call office to adjust meds further. Monitor BP and weights daily and continue low salt diet.

## 2025-02-12 NOTE — PROGRESS NOTES
2nd Attempt made to reach patient for [facility] call. Left voicemail please call [phone] leave first name, last name, and  Autumn will return your call.

## 2025-02-13 NOTE — PROGRESS NOTES
3rd Attempt made to reach patient for [facility] call. Left voicemail please call [phone] leave first name, last name, and  Autumn will return your call.

## 2025-02-20 ENCOUNTER — HOSPITAL ENCOUNTER (OUTPATIENT)
Dept: RADIOLOGY | Facility: HOSPITAL | Age: 80
Discharge: HOME | End: 2025-02-20
Attending: INTERNAL MEDICINE
Payer: MEDICARE

## 2025-02-20 ENCOUNTER — HOSPITAL ENCOUNTER (OUTPATIENT)
Dept: CARDIOLOGY | Facility: HOSPITAL | Age: 80
Discharge: HOME | End: 2025-02-20
Attending: INTERNAL MEDICINE
Payer: MEDICARE

## 2025-02-20 DIAGNOSIS — I50.43 ACUTE ON CHRONIC COMBINED SYSTOLIC AND DIASTOLIC CONGESTIVE HEART FAILURE: ICD-10-CM

## 2025-02-20 DIAGNOSIS — I70.0 AORTIC ATHEROSCLEROSIS: ICD-10-CM

## 2025-02-20 DIAGNOSIS — I25.10 CAD IN NATIVE ARTERY: ICD-10-CM

## 2025-02-20 DIAGNOSIS — G47.33 OBSTRUCTIVE SLEEP APNEA: ICD-10-CM

## 2025-02-20 DIAGNOSIS — R06.02 SOB (SHORTNESS OF BREATH): ICD-10-CM

## 2025-02-20 DIAGNOSIS — I11.0 HYPERTENSIVE HEART DISEASE WITH HEART FAILURE: ICD-10-CM

## 2025-02-20 LAB
CV STRESS BASE HR: 78 BPM
DIASTOLIC BLOOD PRESSURE: 78 MMHG
NUC REST EJECTION FRACTION: 48
NUC STRESS EJECTION FRACTION: 48 %
OHS CV CPX 85 PERCENT MAX PREDICTED HEART RATE MALE: 121
OHS CV CPX ESTIMATED METS: 1
OHS CV CPX MAX PREDICTED HEART RATE: 142
OHS CV CPX PATIENT IS FEMALE: 0
OHS CV CPX PATIENT IS MALE: 1
OHS CV CPX PEAK DIASTOLIC BLOOD PRESSURE: 75 MMHG
OHS CV CPX PEAK HEAR RATE: 92 BPM
OHS CV CPX PEAK RATE PRESSURE PRODUCT: 9936
OHS CV CPX PEAK SYSTOLIC BLOOD PRESSURE: 108 MMHG
OHS CV CPX PERCENT MAX PREDICTED HEART RATE ACHIEVED: 65
OHS CV CPX RATE PRESSURE PRODUCT PRESENTING: 9750
OHS CV INITIAL DOSE: 10.1 MCG/KG/MIN
OHS CV PEAK DOSE: 30.7 MCG/KG/MIN
STRESS ECHO POST EXERCISE DUR MIN: 1 MINUTES
STRESS ECHO POST EXERCISE DUR SEC: 3 SECONDS
SYSTOLIC BLOOD PRESSURE: 125 MMHG

## 2025-02-20 PROCEDURE — A9502 TC99M TETROFOSMIN: HCPCS | Performed by: INTERNAL MEDICINE

## 2025-02-20 PROCEDURE — 93017 CV STRESS TEST TRACING ONLY: CPT

## 2025-02-20 PROCEDURE — 63600175 PHARM REV CODE 636 W HCPCS: Performed by: INTERNAL MEDICINE

## 2025-02-20 PROCEDURE — 78452 HT MUSCLE IMAGE SPECT MULT: CPT

## 2025-02-20 RX ORDER — REGADENOSON 0.08 MG/ML
0.4 INJECTION, SOLUTION INTRAVENOUS ONCE
Status: COMPLETED | OUTPATIENT
Start: 2025-02-20 | End: 2025-02-20

## 2025-02-20 RX ADMIN — TETROFOSMIN 30.7 MILLICURIE: 1.38 INJECTION, POWDER, LYOPHILIZED, FOR SOLUTION INTRAVENOUS at 01:02

## 2025-02-20 RX ADMIN — TETROFOSMIN 10.1 MILLICURIE: 1.38 INJECTION, POWDER, LYOPHILIZED, FOR SOLUTION INTRAVENOUS at 11:02

## 2025-02-20 RX ADMIN — REGADENOSON 0.4 MG: 0.08 INJECTION, SOLUTION INTRAVENOUS at 01:02

## 2025-02-21 ENCOUNTER — RESULTS FOLLOW-UP (OUTPATIENT)
Dept: CARDIOLOGY | Facility: CLINIC | Age: 80
End: 2025-02-21
Payer: MEDICARE

## 2025-02-22 DIAGNOSIS — Z00.00 ENCOUNTER FOR MEDICARE ANNUAL WELLNESS EXAM: ICD-10-CM

## 2025-02-25 RX ORDER — PROMETHAZINE HYDROCHLORIDE 12.5 MG/1
TABLET ORAL
Qty: 30 TABLET | Refills: 3 | Status: SHIPPED | OUTPATIENT
Start: 2025-02-25

## 2025-02-25 NOTE — TELEPHONE ENCOUNTER
No care due was identified.  Health [facility] Embedded Care Due Messages. Reference number: [id number].   2/25/2025 3:13:48 PM CST

## 2025-03-04 ENCOUNTER — TELEPHONE (OUTPATIENT)
Dept: CARDIOLOGY | Facility: CLINIC | Age: 80
End: 2025-03-04
Payer: MEDICARE

## 2025-03-04 ENCOUNTER — PATIENT MESSAGE (OUTPATIENT)
Dept: CARDIOLOGY | Facility: CLINIC | Age: 80
End: 2025-03-04
Payer: MEDICARE

## 2025-03-04 DIAGNOSIS — I50.43 ACUTE ON CHRONIC COMBINED SYSTOLIC AND DIASTOLIC CONGESTIVE HEART FAILURE: ICD-10-CM

## 2025-03-04 DIAGNOSIS — I50.42 CHRONIC COMBINED SYSTOLIC AND DIASTOLIC CONGESTIVE HEART FAILURE: Primary | ICD-10-CM

## 2025-03-04 RX ORDER — SACUBITRIL AND VALSARTAN 49; 51 MG/1; MG/1
1 TABLET, FILM COATED ORAL 2 TIMES DAILY
Qty: 60 TABLET | Refills: 3 | Status: SHIPPED | OUTPATIENT
Start: 2025-03-04

## 2025-03-04 NOTE — TELEPHONE ENCOUNTER
Patient states that he received message saying his appt for March 12 had been cancelled. Advised that it was not. Patient states that he really does not need the appt canceled because he has been running extremely high blood pressures even at night for several days with dizziness. Asked what his bp is right now since he said he just took it and it was 176/116. Advised patient that he needed to go to the ER for further evaluation and treatment. Patient states he understands. I asked if he was going to go to the ER and he said if I can find transportation. Advised if he could not then he needed to call 911. Patient voiced understanding.

## 2025-03-05 ENCOUNTER — LAB VISIT (OUTPATIENT)
Dept: LAB | Facility: HOSPITAL | Age: 80
End: 2025-03-05
Attending: INTERNAL MEDICINE
Payer: MEDICARE

## 2025-03-05 ENCOUNTER — OFFICE VISIT (OUTPATIENT)
Dept: CARDIOLOGY | Facility: CLINIC | Age: 80
End: 2025-03-05
Payer: MEDICARE

## 2025-03-05 VITALS
DIASTOLIC BLOOD PRESSURE: 62 MMHG | BODY MASS INDEX: 31.19 KG/M2 | HEART RATE: 62 BPM | SYSTOLIC BLOOD PRESSURE: 92 MMHG | OXYGEN SATURATION: 95 % | WEIGHT: 217.38 LBS

## 2025-03-05 DIAGNOSIS — R06.02 SOB (SHORTNESS OF BREATH): ICD-10-CM

## 2025-03-05 DIAGNOSIS — F41.9 ANXIETY: ICD-10-CM

## 2025-03-05 DIAGNOSIS — I50.42 CHRONIC COMBINED SYSTOLIC AND DIASTOLIC CONGESTIVE HEART FAILURE: ICD-10-CM

## 2025-03-05 DIAGNOSIS — I35.1 NONRHEUMATIC AORTIC VALVE INSUFFICIENCY: ICD-10-CM

## 2025-03-05 DIAGNOSIS — R55 NEAR SYNCOPE: ICD-10-CM

## 2025-03-05 DIAGNOSIS — I11.0 HYPERTENSIVE HEART DISEASE WITH HEART FAILURE: Primary | ICD-10-CM

## 2025-03-05 DIAGNOSIS — E78.1 HYPERTRIGLYCERIDEMIA: ICD-10-CM

## 2025-03-05 DIAGNOSIS — E78.2 MIXED HYPERLIPIDEMIA: ICD-10-CM

## 2025-03-05 DIAGNOSIS — I34.0 NONRHEUMATIC MITRAL VALVE REGURGITATION: ICD-10-CM

## 2025-03-05 DIAGNOSIS — R06.09 DOE (DYSPNEA ON EXERTION): ICD-10-CM

## 2025-03-05 DIAGNOSIS — G47.33 OBSTRUCTIVE SLEEP APNEA: ICD-10-CM

## 2025-03-05 DIAGNOSIS — I25.10 CAD IN NATIVE ARTERY: ICD-10-CM

## 2025-03-05 DIAGNOSIS — B20 HIV DISEASE: ICD-10-CM

## 2025-03-05 LAB
ANION GAP SERPL CALC-SCNC: 11 MMOL/L (ref 8–16)
BNP SERPL-MCNC: 78 PG/ML (ref 0–99)
BUN SERPL-MCNC: 18 MG/DL (ref 8–23)
CALCIUM SERPL-MCNC: 9.9 MG/DL (ref 8.7–10.5)
CHLORIDE SERPL-SCNC: 103 MMOL/L (ref 95–110)
CO2 SERPL-SCNC: 26 MMOL/L (ref 23–29)
CREAT SERPL-MCNC: 1.6 MG/DL (ref 0.5–1.4)
EST. GFR  (NO RACE VARIABLE): 44 ML/MIN/1.73 M^2
GLUCOSE SERPL-MCNC: 104 MG/DL (ref 70–110)
MAGNESIUM SERPL-MCNC: 1.9 MG/DL (ref 1.6–2.6)
POTASSIUM SERPL-SCNC: 4.1 MMOL/L (ref 3.5–5.1)
SODIUM SERPL-SCNC: 140 MMOL/L (ref 136–145)

## 2025-03-05 PROCEDURE — 99999 PR PBB SHADOW E&M-EST. PATIENT-LVL IV: CPT | Mod: PBBFAC,,, | Performed by: INTERNAL MEDICINE

## 2025-03-05 PROCEDURE — 83735 ASSAY OF MAGNESIUM: CPT | Performed by: INTERNAL MEDICINE

## 2025-03-05 PROCEDURE — G2211 COMPLEX E/M VISIT ADD ON: HCPCS | Mod: S$PBB,,, | Performed by: INTERNAL MEDICINE

## 2025-03-05 PROCEDURE — 99214 OFFICE O/P EST MOD 30 MIN: CPT | Mod: PBBFAC | Performed by: INTERNAL MEDICINE

## 2025-03-05 PROCEDURE — 80048 BASIC METABOLIC PNL TOTAL CA: CPT | Performed by: INTERNAL MEDICINE

## 2025-03-05 PROCEDURE — 83880 ASSAY OF NATRIURETIC PEPTIDE: CPT | Performed by: INTERNAL MEDICINE

## 2025-03-05 PROCEDURE — 36415 COLL VENOUS BLD VENIPUNCTURE: CPT | Performed by: INTERNAL MEDICINE

## 2025-03-05 PROCEDURE — 99214 OFFICE O/P EST MOD 30 MIN: CPT | Mod: S$PBB,,, | Performed by: INTERNAL MEDICINE

## 2025-03-05 RX ORDER — ONDANSETRON 8 MG/1
8 TABLET, ORALLY DISINTEGRATING ORAL EVERY 6 HOURS PRN
Qty: 30 TABLET | Refills: 1 | Status: SHIPPED | OUTPATIENT
Start: 2025-03-05

## 2025-03-05 RX ORDER — CLONIDINE HYDROCHLORIDE 0.1 MG/1
0.1 TABLET ORAL EVERY 6 HOURS PRN
Qty: 90 TABLET | Refills: 1 | Status: SHIPPED | OUTPATIENT
Start: 2025-03-05 | End: 2026-03-05

## 2025-03-05 RX ORDER — METOPROLOL SUCCINATE 25 MG/1
25 TABLET, EXTENDED RELEASE ORAL 2 TIMES DAILY
Qty: 90 TABLET | Refills: 1 | Status: SHIPPED | OUTPATIENT
Start: 2025-03-05

## 2025-03-05 NOTE — PROGRESS NOTES
Subjective:   Patient ID:  Haritha Valle is a 79 y.o. male who presents for cardiac consult of No chief complaint on file.        The patient came in today for cardiac consult of No chief complaint on file.    Referring Physician: Hesham Andrade II, MD   Reason for initial consult: LOC, palpitations      Haritha Valle is a 79 y.o. male pt with HFpEF, HTN, HLD, HIV, anxiety, depression, hypogonadism, ED presents for follow up CV eval         1/27/25  Vital monitor - 1/2025 with freq PACs, SVT episdoes, AVG HR 79 BPM. Overall PSVC [place] at 23.12 %  Pt had ER eval for SOB/fluid build up and elevated BP.   BP elevated today 178/110. HR 77. BMI 32 - 223 lbs   He was given IV lasix at ER - diursed 2L.   He was not taking lasix daily due to worsening breathing.     2/11/25 - HOSP FOLLOW UP  Hospital Course:   Haritha Valle is a 79 year old male who presented to ED from Cardiology clinic due to abnormal echocardiogram findings (EF 30-40%) and progressive exertional dyspnea despite oral diuretics. He as admitted for decompensated heart failure and diuresed. Entresto added for medication optimization. Serial troponin levels were negative. With management, he reports significant improvement in symptoms and expressed desire to discharge. He was counseled on importance of dietary and medication compliance. He will continue same dose of Lasix (40 mg) daily for volume control. He was asked to keep scheduled appt on Tuesday 2/11/25 with Dr. Nguyen. He will need OP ischemia work up as well. He was asked to monitor weight daily and record. Patient seen and examined on date of discharge and deemed suitable.      PT recently admitted and diuresed.   His BP was low so he stopped BB.  ECHO 2/2025 EF 30%, grade 1 DD, mild reduced RV function.   BP and HR stable. BMI 31 - 219 lbs       3/5/25  Nuc stress 2/2025 mild to mod fixed defect, equivocal, EF improved to 48%.     From pt    Patient states that he received message saying his appt  for March 12 had been cancelled. Advised that it was not. Patient states that he really does not need the appt canceled because he has been running extremely high blood pressures even at night for several days with dizziness. Asked what his bp is right now since he said he just took it and it was 176/116. Advised patient that he needed to go to the ER for further evaluation and treatment. Patient states he understands. I asked if he was going to go to the ER and he said if I can find transportation. Advised if he could not then he needed to call 911. Patient voiced understanding.     BP was elevated - told to increase Entresto dose.   He has BP 180s/100s he takes gummies   He has diarrhea for 1 month   He has panic attacks       Results for orders placed during the hospital encounter of 02/20/25    Nuclear Stress - Cardiology Interpreted    Interpretation Summary    Equivocal myocardial perfusion scan.    There is a mild to moderate intensity, small to medium sized, equivocal perfusion abnormality that is fixed in the apical and anteroapical wall(s). This finding is equivocal due to soft tissue shadow.    There are no other significant perfusion abnormalities.    The gated perfusion images showed an ejection fraction of 48% at rest. The gated perfusion images showed an ejection fraction of 48% post stress.    There is mild global hypokinesis at rest and post-stress.    The ECG portion of the study is negative for ischemia.      Results for orders placed during the hospital encounter of 02/05/25    Echo    Interpretation Summary    Left Ventricle: The left ventricle is normal in size. Normal wall thickness. There is concentric hypertrophy. There is moderately reduced systolic function with a visually estimated ejection fraction of 30 - 40%. Ejection fraction is approximately 30%. Grade I diastolic dysfunction.    Right Ventricle: Normal right ventricular cavity size. Wall thickness is normal. Systolic function is  mildly reduced.    Left Atrium: Left atrium is moderately dilated.    IVC/SVC: Normal venous pressure at 3 mmHg.    Patient sent to ER due to increased dyspnea, HTN,  and drop in EF      MRI brain   Moderate global cortical atrophy with mild frontal lobe predominance.  Score 1 bilateral medial temporal lobe atrophy.  Minimal nonspecific white matter changes, likely senescent.    Results for orders placed during the hospital encounter of 06/05/24    Echo    Interpretation Summary    Left Ventricle: The left ventricle is normal in size. Normal wall thickness. There is concentric hypertrophy. Normal wall motion. There is normal systolic function with a visually estimated ejection fraction of 55 - 60%. There is normal diastolic function.    Right Ventricle: Normal right ventricular cavity size. Wall thickness is normal. Systolic function is normal.    Left Atrium: Left atrium is mildly dilated.    Pulmonary Artery: The estimated pulmonary artery systolic pressure is 18 mmHg.    IVC/SVC: Normal venous pressure at 3 mmHg.      Nuclear Quantitative Functional Analysis:   LVEF: 44 %    Impression: ABNORMAL MYOCARDIAL PERFUSION  1. There is evidence for mild myocardial ischemia in the inferior and inferoapical walls of the left ventricle.   2. The perfusion scan is free of evidence for myocardial injury.   3. Resting wall motion is physiologic.   4. There is resting LV dysfunction with a reduced ejection fraction of 44 %.   5. The ventricular volumes are normal at rest and stress.   6. The extracardiac distribution of radioactivity is normal.     This document has been electronically    SIGNED BY: Stephon Corcoran MD On: 01/27/2020 16:38    2/3/20   1.   Non-obstructive CAD.   2.   low normal to mildy depressed ef.      Past Medical History:   Diagnosis Date    Anxiety 07/03/2019    Basal cell carcinoma     CAD in native artery 11/27/2023    Depression     Dyslipidemia 11/16/2023    Essential hypertension 01/24/2013    Hepatitis  B     Hepatitis C antibody test positive     RNA NEG 8/29/2019    HIV infection     Hypertension     Immune disorder     Mild cognitive impairment 10/01/2010    Nonrheumatic aortic valve insufficiency 12/19/2023    Nonrheumatic mitral valve regurgitation 12/19/2023       Past Surgical History:   Procedure Laterality Date    APPENDECTOMY      CARDIAC CATHETERIZATION      no stent    CHOLECYSTECTOMY      COLONOSCOPY N/A 11/3/2016    Procedure: COLONOSCOPY;  Surgeon: Maxi Villasenor MD;  [facility]  Service: Endoscopy;  Laterality: N/A;  last colonoscopy with Dr Jarrell    COLONOSCOPY N/A 1/25/2022    Procedure: COLONOSCOPY;  Surgeon: Silvino Rosales MD;  Location: [facility] ENDO;  Service: Endoscopy;  Laterality: N/A;    LEFT HEART CATHETERIZATION Left 2/3/2020    Procedure: CATHETERIZATION, HEART, LEFT;  Surgeon: Chele Ko MD;  Location: [facility] CATH LAB;  Service: Cardiology;  Laterality: Left;  malur pt       Social History     Tobacco Use    Smoking status: Never    Smokeless tobacco: Never   Substance Use Topics    Alcohol use: No    Drug use: Never       Family History   Problem Relation Name Age of Onset    Hearing loss Mother Sonam Dease     Vision loss Mother Sonam Valle     Heart disease Father Demarco Dea     Heart failure Father Demarco Dea     Diabetes Neg Hx      Hypertension Neg Hx         Patient's Medications   New Prescriptions    CLONIDINE (CATAPRES) 0.1 MG TABLET    Take 1 tablet (0.1 mg total) by mouth every 6 (six) hours as needed (BP > 180/90).    ONDANSETRON (ZOFRAN-ODT) 8 MG TBDL    Take 1 tablet (8 mg total) by mouth every 6 (six) hours as needed (nausea).   Previous Medications    ALBUTEROL (PROVENTIL) 2.5 MG /3 ML (0.083 %) NEBULIZER SOLUTION    Take 3 mLs (2.5 mg total) by nebulization every 4 to 6 hours as needed for Wheezing or Shortness of Breath.    ALBUTEROL (PROVENTIL/VENTOLIN HFA) 90 MCG/ACTUATION INHALER    Inhale 2 puffs into the lungs every 4 (four)  hours as needed for Wheezing. Rescue    ASCORBIC ACID, VITAMIN C, (VITAMIN C) 1000 MG TABLET    Take 1,000 mg by mouth once daily.    ZDMUMRPEE-UPJVUMHH-OYORRAH ALA (BIKTARVY) -25 MG (25 KG OR GREATER)    Take 1 tablet by mouth once daily.    BUSPIRONE (BUSPAR) 7.5 MG TABLET    TAKE ONE TABLET BY MOUTH DAILY AS NEEDED.    DONEPEZIL (ARICEPT) 5 MG TABLET    Take 1 tablet (5 mg total) by mouth every evening.    FLUOXETINE 40 MG CAPSULE    TAKE 1 CAPSULE EVERY DAY    FLUTICASONE PROPIONATE (FLONASE) 50 MCG/ACTUATION NASAL SPRAY    [place] 1 spray every day by intranasal route.    FUROSEMIDE (LASIX) 40 MG TABLET    Take 1 tablet (40 mg total) by mouth daily as needed (edema, swelling, with BP over 140/90). Do not take if BP is below 110/70 or feeling dry;    MAGNESIUM OXIDE (MAG-OX) 400 MG (241.3 MG MAGNESIUM) TABLET    Take 1 tablet (400 mg total) by mouth once daily.    POTASSIUM CHLORIDE SA (K-DUR,KLOR-CON M) 10 MEQ TABLET    Take 2 tablets (20 mEq total) by mouth daily as needed (take with lasix).    PROMETHAZINE (PHENERGAN) 12.5 MG TAB    TAKE ONE TABLET BY MOUTH DAILY AS NEEDED FOR NAUSEA.    ROSUVASTATIN (CRESTOR) 20 MG TABLET    TAKE 1 TABLET EVERY DAY    SACUBITRIL-VALSARTAN (ENTRESTO) 49-51 MG PER TABLET    Take 1 tablet by mouth 2 (two) times daily.    UBIDECARENONE (COENZYME Q10 ORAL)    Take by mouth once daily.    VIT C,T-ZF-OMLLW-LUTEIN-ZEAXAN (EYE MULTIVIT, LUTEIN-ZEAXAN,) 537-02-52-2-5 MG CAP    Take 1 tablet by mouth once daily.   Modified Medications    Modified Medication Previous Medication    METOPROLOL SUCCINATE (TOPROL-XL) 25 MG 24 HR TABLET metoprolol succinate (TOPROL-XL) 25 MG 24 hr tablet       Take 1 tablet (25 mg total) by mouth 2 (two) times daily.    Take 1 tablet (25 mg total) by mouth every evening.   Discontinued Medications    No medications on file       Review of Systems   Constitutional:  Positive for malaise/fatigue.   HENT: Negative.     Eyes: Negative.    Respiratory:   Positive for shortness of breath.    Cardiovascular:  Positive for chest pain and palpitations.   Gastrointestinal: Negative.    Genitourinary: Negative.    Musculoskeletal: Negative.    Skin: Negative.    Neurological:  Positive for dizziness and loss of consciousness.   Endo/Heme/Allergies: Negative.    Psychiatric/Behavioral: Negative.     All 12 systems otherwise negative.      Wt Readings from Last 3 Encounters:   03/05/25 98.6 kg (217 lb 6 oz)   02/11/25 99.5 kg (219 lb 4 oz)   02/05/25 98.5 kg (217 lb 2.5 oz)     Temp Readings from Last 3 Encounters:   02/06/25 97.4 °F (36.3 °C) (Oral)   01/06/25 98.3 °F (36.8 °C) (Tympanic)   08/21/24 96.3 °F (35.7 °C) (Temporal)     BP Readings from Last 3 Encounters:   03/05/25 92/62   02/11/25 127/80   02/06/25 (!) 165/96     Pulse Readings from Last 3 Encounters:   03/05/25 62   02/11/25 71   02/06/25 77       BP 92/62 (BP Location: Right arm, Patient Position: Sitting)   Pulse 62   Wt 98.6 kg (217 lb 6 oz)   SpO2 95%   BMI 31.19 kg/m²     Objective:   Physical Exam  Vitals and nursing note reviewed.   Constitutional:       General: He is not in acute distress.     Appearance: He is well-developed. He is not diaphoretic.   HENT:      Head: Normocephalic and atraumatic.      Nose: Nose normal.   Eyes:      General: No scleral icterus.     Conjunctiva/sclera: Conjunctivae normal.   Neck:      Thyroid: No thyromegaly.      Vascular: No JVD.   Cardiovascular:      Rate and Rhythm: Normal rate and regular rhythm.      Heart sounds: S1 normal and S2 normal. No murmur heard.     No friction rub. No gallop. No S3 or S4 sounds.   Pulmonary:      Effort: Pulmonary effort is normal. No respiratory distress.      Breath sounds: Normal breath sounds. No stridor. No wheezing or rales.   Chest:      Chest wall: No tenderness.   Abdominal:      General: Bowel sounds are normal. There is no distension.      Palpations: Abdomen is soft. There is no mass.      Tenderness: There is no  abdominal tenderness. There is no rebound.   Genitourinary:     Comments: Deferred  Musculoskeletal:         General: No tenderness or deformity. Normal range of motion.      Cervical back: Normal range of motion and neck supple.   Lymphadenopathy:      Cervical: No cervical adenopathy.   Skin:     General: Skin is warm and dry.      Coloration: Skin is not pale.      Findings: No erythema or rash.   Neurological:      Mental Status: He is alert and oriented to person, place, and time.      Motor: No abnormal muscle tone.      Coordination: Coordination normal.   Psychiatric:         Behavior: Behavior normal.         Thought Content: Thought content normal.         Judgment: Judgment normal.         Lab Results   Component Value Date     02/11/2025    K 4.6 02/11/2025     02/11/2025    CO2 26 02/11/2025    BUN 25 (H) 02/11/2025    CREATININE 1.4 02/11/2025     (H) 02/11/2025    HGBA1C 6.5 (H) 02/05/2025    MG 2.0 02/11/2025    AST 61 (H) 02/05/2025    ALT 52 (H) 02/05/2025    ALBUMIN 3.5 02/05/2025    ALBUMIN 4.3 04/30/2018    PROT 8.7 (H) 02/05/2025    BILITOT 0.9 02/05/2025    WBC 9.14 02/05/2025    HGB 17.8 02/05/2025    HCT 55.7 (H) 02/05/2025    MCV 88 02/05/2025     02/05/2025    INR 1.0 01/28/2020    TSH 1.137 04/27/2023    CHOL 172 12/18/2023    HDL 36 (L) 12/18/2023    LDLCALC 73.2 12/18/2023    TRIG 314 (H) 12/18/2023    BNP 50 02/11/2025     Assessment:      1. Hypertensive heart disease with heart failure    2. Anxiety    3. CAD in native artery    4. SOB (shortness of breath)    5. Chronic combined systolic and diastolic congestive heart failure    6. Obstructive sleep apnea    7. Nonrheumatic mitral valve regurgitation    8. HIV disease    9. Nonrheumatic aortic valve insufficiency    10. MAIER (dyspnea on exertion)    11. Mixed hyperlipidemia    12. Hypertriglyceridemia    13. Near syncope            Plan:   1. HTN  - elevated - hypertensive urgency    - titrate meds  -  episodes of HTN urgency - r/o sec causes - labs and renal u/s ordered - neg  - ECHO 6/2024 with normal bi V function and valves, PASP 18 mmHg.   - start clonidine 0.1 mg PRN     2. HLD with elevated TGs  - cont meds  - added- vascepa in past    3. HIV  - cont meds per PCP/ID    4. ED/hypogonadism  - cont meds per PCP PRN    5. Syncope/dizziness/palpitations with CP - non obs CAD  - prior exercise nuclear stress test to r/o ischemia - abnormal, LHC neg  - ECHO 6/2024 with normal bi V function and valves, PASP 18 mmHg.   - carotids - neg   - has been to ENT and Neuro - has atrophy and hearing aids now  -Vital monitor - 1/2025 with freq PACs, SVT episdoes, AVG HR 79 BPM. Overall PSVC [place] at 23.12 %  - Nuc stress 2/2025 mild to mod fixed defect, equivocal, EF improved to 48%.       6.ECHO 2/2025 EF 30%, grade 1 DD, mild reduced RV function.   - cont meds - Toprol and ARB - changed to Entresto  - cont lasix   - Nuc stress 2/2025 mild to mod fixed defect, equivocal, EF improved to 48%.   - repeat labs today; increase Metoprolol to 25 mg BID    7. Anxiety  - refer to psych    8. RAMONITA  - cont CPAP    9. Obesity - . BMI 31 - 219 lbs   - cont weight loss    Visit today included increased complexity associated with the care of the episodic problem dyspnea addressed and managing the longitudinal care of the patient due to the serious and/or complex managed problem(s) .      Thank you for allowing me to participate in this patient's care. Please do not hesitate to contact me with any questions or concerns. Consult note has been forwarded to the referral physician.

## 2025-03-06 ENCOUNTER — RESULTS FOLLOW-UP (OUTPATIENT)
Dept: CARDIOLOGY | Facility: CLINIC | Age: 80
End: 2025-03-06
Payer: MEDICARE

## 2025-03-06 NOTE — TELEPHONE ENCOUNTER
Attempted to reach patient, No answer. Left VM that per Dr. Nguyen: results of labs are overall stable, kidney function is borderline dry - needs to increase water intake if feeling more dry or having any more diarrhea/nausea symptoms. If BP remains very low then need to hold off on BP meds for the day and re-assess the next day. Continue to log BP and weights daily. Advised that Dr. Nguyen also sent results to him via his [employer]t.        ----- Message from Deni Nguyen MD sent at 3/6/2025  8:05 AM CST -----  Please contact the patient and let them know that their results of labs are overall stable, kidney function is borderline dry - needs to increase water intake if feeling more dry or having any more   diarrhea/nausea symptoms. If BP remains very low then need to hold off on BP meds for the day and re-assess the next day. Continue to log BP and weights daily. Thanks   ----- Message -----  From: Adryan, [employer] [facility]  Sent: 3/5/2025   3:11 PM CST  To: Deni Nguyen MD

## 2025-03-19 ENCOUNTER — TELEPHONE (OUTPATIENT)
Dept: PSYCHIATRY | Facility: CLINIC | Age: 80
End: 2025-03-19
Payer: MEDICARE

## 2025-03-19 ENCOUNTER — OFFICE VISIT (OUTPATIENT)
Dept: CARDIOLOGY | Facility: CLINIC | Age: 80
End: 2025-03-19
Payer: MEDICARE

## 2025-03-19 VITALS
DIASTOLIC BLOOD PRESSURE: 72 MMHG | BODY MASS INDEX: 31.43 KG/M2 | SYSTOLIC BLOOD PRESSURE: 110 MMHG | HEART RATE: 64 BPM | OXYGEN SATURATION: 96 % | HEIGHT: 70 IN | WEIGHT: 219.56 LBS

## 2025-03-19 DIAGNOSIS — R42 POSTURAL DIZZINESS WITH PRESYNCOPE: ICD-10-CM

## 2025-03-19 DIAGNOSIS — I35.1 NONRHEUMATIC AORTIC VALVE INSUFFICIENCY: ICD-10-CM

## 2025-03-19 DIAGNOSIS — I10 ESSENTIAL HYPERTENSION: ICD-10-CM

## 2025-03-19 DIAGNOSIS — E78.2 MIXED HYPERLIPIDEMIA: ICD-10-CM

## 2025-03-19 DIAGNOSIS — F41.9 ANXIETY: ICD-10-CM

## 2025-03-19 DIAGNOSIS — B20 HIV DISEASE: ICD-10-CM

## 2025-03-19 DIAGNOSIS — I50.42 CHRONIC COMBINED SYSTOLIC AND DIASTOLIC CONGESTIVE HEART FAILURE: Primary | ICD-10-CM

## 2025-03-19 DIAGNOSIS — R07.9 CHEST PAIN, UNSPECIFIED TYPE: ICD-10-CM

## 2025-03-19 DIAGNOSIS — I34.0 NONRHEUMATIC MITRAL VALVE REGURGITATION: ICD-10-CM

## 2025-03-19 DIAGNOSIS — R06.09 DOE (DYSPNEA ON EXERTION): ICD-10-CM

## 2025-03-19 DIAGNOSIS — G47.33 OBSTRUCTIVE SLEEP APNEA: ICD-10-CM

## 2025-03-19 DIAGNOSIS — R55 POSTURAL DIZZINESS WITH PRESYNCOPE: ICD-10-CM

## 2025-03-19 DIAGNOSIS — E78.1 HYPERTRIGLYCERIDEMIA: ICD-10-CM

## 2025-03-19 DIAGNOSIS — I25.10 CAD IN NATIVE ARTERY: ICD-10-CM

## 2025-03-19 DIAGNOSIS — I11.0 HYPERTENSIVE HEART DISEASE WITH HEART FAILURE: ICD-10-CM

## 2025-03-19 PROCEDURE — 99214 OFFICE O/P EST MOD 30 MIN: CPT | Mod: S$PBB,,, | Performed by: INTERNAL MEDICINE

## 2025-03-19 PROCEDURE — 99999 PR PBB SHADOW E&M-EST. PATIENT-LVL IV: CPT | Mod: PBBFAC,,, | Performed by: INTERNAL MEDICINE

## 2025-03-19 PROCEDURE — G2211 COMPLEX E/M VISIT ADD ON: HCPCS | Mod: S$PBB,,, | Performed by: INTERNAL MEDICINE

## 2025-03-19 PROCEDURE — 99214 OFFICE O/P EST MOD 30 MIN: CPT | Mod: PBBFAC | Performed by: INTERNAL MEDICINE

## 2025-03-19 RX ORDER — SACUBITRIL AND VALSARTAN 97; 103 MG/1; MG/1
1 TABLET, FILM COATED ORAL 2 TIMES DAILY
Qty: 180 TABLET | Refills: 1 | Status: SHIPPED | OUTPATIENT
Start: 2025-03-19

## 2025-03-19 RX ORDER — METOPROLOL SUCCINATE 50 MG/1
50 TABLET, EXTENDED RELEASE ORAL 2 TIMES DAILY
Qty: 180 TABLET | Refills: 1 | Status: SHIPPED | OUTPATIENT
Start: 2025-03-19

## 2025-03-19 NOTE — TELEPHONE ENCOUNTER
----- Message from Leyla sent at 3/19/2025  2:30 PM CDT -----  Type:  Appointment Request  Name of Caller:Sonnymarielle is the first available appointment?No accessSymptoms:pt states that he Doctor referred him to see a Psychologist and he had been calling for almost 2 months now unable to leave messages and has not been able to speak with anyone regarding getting an appt for as soon as possible. Pt is becoming frustrated.Would the patient rather a call back or a response via [facility]? Call Best Call Back Number:[phone] Information: please give pt a call as soon as possible in regards to setting that appt. Thank you.

## 2025-03-19 NOTE — PROGRESS NOTES
Subjective:   Patient ID:  Haritha Valle is a 79 y.o. male who presents for cardiac consult of Dizziness and Shortness of Breath        The patient came in today for cardiac consult of Dizziness and Shortness of Breath    Referring Physician: Hesham Andrade II, MD   Reason for initial consult: LOC, palpitations      Haritha Valle is a 79 y.o. male pt with HFpEF, HTN, HLD, HIV, anxiety, depression, hypogonadism, ED presents for follow up CV eval           3/5/25  Nuc stress 2/2025 mild to mod fixed defect, equivocal, EF improved to 48%.     From pt    Patient states that he received message saying his appt for March 12 had been cancelled. Advised that it was not. Patient states that he really does not need the appt canceled because he has been running extremely high blood pressures even at night for several days with dizziness. Asked what his bp is right now since he said he just took it and it was 176/116. Advised patient that he needed to go to the ER for further evaluation and treatment. Patient states he understands. I asked if he was going to go to the ER and he said if I can find transportation. Advised if he could not then he needed to call 911. Patient voiced understanding.     BP was elevated - told to increase Entresto dose.   He has BP 180s/100s he takes gummies   He has diarrhea for 1 month   He has panic attacks       3/19/25  BP and HR stable here. BMI 31 - 219 lbs  His breathing has improved.   BP at home 180s/100s- 110.       Results for orders placed during the hospital encounter of 02/20/25    Nuclear Stress - Cardiology Interpreted    Interpretation Summary    Equivocal myocardial perfusion scan.    There is a mild to moderate intensity, small to medium sized, equivocal perfusion abnormality that is fixed in the apical and anteroapical wall(s). This finding is equivocal due to soft tissue shadow.    There are no other significant perfusion abnormalities.    The gated perfusion images showed an  ejection fraction of 48% at rest. The gated perfusion images showed an ejection fraction of 48% post stress.    There is mild global hypokinesis at rest and post-stress.    The ECG portion of the study is negative for ischemia.      Results for orders placed during the hospital encounter of 02/05/25    Echo    Interpretation Summary    Left Ventricle: The left ventricle is normal in size. Normal wall thickness. There is concentric hypertrophy. There is moderately reduced systolic function with a visually estimated ejection fraction of 30 - 40%. Ejection fraction is approximately 30%. Grade I diastolic dysfunction.    Right Ventricle: Normal right ventricular cavity size. Wall thickness is normal. Systolic function is mildly reduced.    Left Atrium: Left atrium is moderately dilated.    IVC/SVC: Normal venous pressure at 3 mmHg.    Patient sent to ER due to increased dyspnea, HTN,  and drop in EF      MRI brain   Moderate global cortical atrophy with mild frontal lobe predominance.  Score 1 bilateral medial temporal lobe atrophy.  Minimal nonspecific white matter changes, likely senescent.    Results for orders placed during the hospital encounter of 06/05/24    Echo    Interpretation Summary    Left Ventricle: The left ventricle is normal in size. Normal wall thickness. There is concentric hypertrophy. Normal wall motion. There is normal systolic function with a visually estimated ejection fraction of 55 - 60%. There is normal diastolic function.    Right Ventricle: Normal right ventricular cavity size. Wall thickness is normal. Systolic function is normal.    Left Atrium: Left atrium is mildly dilated.    Pulmonary Artery: The estimated pulmonary artery systolic pressure is 18 mmHg.    IVC/SVC: Normal venous pressure at 3 mmHg.      Nuclear Quantitative Functional Analysis:   LVEF: 44 %    Impression: ABNORMAL MYOCARDIAL PERFUSION  1. There is evidence for mild myocardial ischemia in the inferior and inferoapical  walls of the left ventricle.   2. The perfusion scan is free of evidence for myocardial injury.   3. Resting wall motion is physiologic.   4. There is resting LV dysfunction with a reduced ejection fraction of 44 %.   5. The ventricular volumes are normal at rest and stress.   6. The extracardiac distribution of radioactivity is normal.     This document has been electronically    SIGNED BY: Stephon Corcoran MD On: 01/27/2020 16:38    2/3/20   1.   Non-obstructive CAD.   2.   low normal to mildy depressed ef.      Past Medical History:   Diagnosis Date    Anxiety 07/03/2019    Basal cell carcinoma     CAD in native artery 11/27/2023    Depression     Dyslipidemia 11/16/2023    Essential hypertension 01/24/2013    Hepatitis B     Hepatitis C antibody test positive 02/05/2025    RNA NEGATIVE    HIV infection     Hypertension     Immune disorder     Mild cognitive impairment 10/01/2010    Nonrheumatic aortic valve insufficiency 12/19/2023    Nonrheumatic mitral valve regurgitation 12/19/2023       Past Surgical History:   Procedure Laterality Date    APPENDECTOMY      CARDIAC CATHETERIZATION      no stent    CHOLECYSTECTOMY      COLONOSCOPY N/A 11/3/2016    Procedure: COLONOSCOPY;  Surgeon: Maxi Villasenor MD;  Location: [facility] ([address] [place] [address]);  Service: Endoscopy;  Laterality: N/A;  last colonoscopy with Dr Jarrell    COLONOSCOPY N/A 1/25/2022    Procedure: COLONOSCOPY;  Surgeon: Silvino Rosales MD;  Location: [facility] ENDO;  Service: Endoscopy;  Laterality: N/A;    LEFT HEART CATHETERIZATION Left 2/3/2020    Procedure: CATHETERIZATION, HEART, LEFT;  Surgeon: Chele Ko MD;  Location: [facility] CATH LAB;  Service: Cardiology;  Laterality: Left;  malur pt       Social History     Tobacco Use    Smoking status: Never    Smokeless tobacco: Never   Substance Use Topics    Alcohol use: No    Drug use: Never       Family History   Problem Relation Name Age of Onset    Hearing loss Mother Sonam Valle     Vision loss Mother  Sonam Dea     Heart disease Father Demarco Valle     Heart failure Father Demarco Valle     Diabetes Neg Hx      Hypertension Neg Hx         Patient's Medications   New Prescriptions    SACUBITRIL-VALSARTAN (ENTRESTO)  MG PER TABLET    Take 1 tablet by mouth 2 (two) times daily.   Previous Medications    ALBUTEROL (PROVENTIL) 2.5 MG /3 ML (0.083 %) NEBULIZER SOLUTION    Take 3 mLs (2.5 mg total) by nebulization every 4 to 6 hours as needed for Wheezing or Shortness of Breath.    ALBUTEROL (PROVENTIL/VENTOLIN HFA) 90 MCG/ACTUATION INHALER    Inhale 2 puffs into the lungs every 4 (four) hours as needed for Wheezing. Rescue    ASCORBIC ACID, VITAMIN C, (VITAMIN C) 1000 MG TABLET    Take 1,000 mg by mouth once daily.    UMXOZGSDE-HGWORNNQ-ZOUZUON ALA (BIKTARVY) -25 MG (25 KG OR GREATER)    Take 1 tablet by mouth once daily.    BUSPIRONE (BUSPAR) 7.5 MG TABLET    TAKE ONE TABLET BY MOUTH DAILY AS NEEDED.    CLONIDINE (CATAPRES) 0.1 MG TABLET    Take 1 tablet (0.1 mg total) by mouth every 6 (six) hours as needed (BP > 180/90).    DONEPEZIL (ARICEPT) 5 MG TABLET    Take 1 tablet (5 mg total) by mouth every evening.    FLUOXETINE 40 MG CAPSULE    TAKE 1 CAPSULE EVERY DAY    FLUTICASONE PROPIONATE (FLONASE) 50 MCG/ACTUATION NASAL SPRAY    [place] 1 spray every day by intranasal route.    FUROSEMIDE (LASIX) 40 MG TABLET    Take 1 tablet (40 mg total) by mouth daily as needed (edema, swelling, with BP over 140/90). Do not take if BP is below 110/70 or feeling dry;    MAGNESIUM OXIDE (MAG-OX) 400 MG (241.3 MG MAGNESIUM) TABLET    Take 1 tablet (400 mg total) by mouth once daily.    ONDANSETRON (ZOFRAN-ODT) 8 MG TBDL    Take 1 tablet (8 mg total) by mouth every 6 (six) hours as needed (nausea).    POTASSIUM CHLORIDE SA (K-DUR,KLOR-CON M) 10 MEQ TABLET    Take 2 tablets (20 mEq total) by mouth daily as needed (take with lasix).    PROMETHAZINE (PHENERGAN) 12.5 MG TAB    TAKE ONE TABLET BY MOUTH DAILY AS NEEDED FOR  "NAUSEA.    ROSUVASTATIN (CRESTOR) 20 MG TABLET    TAKE 1 TABLET EVERY DAY    UBIDECARENONE (COENZYME Q10 ORAL)    Take by mouth once daily.    VIT C,T-LY-HGVFI-LUTEIN-ZEAXAN (EYE MULTIVIT, LUTEIN-ZEAXAN,) 340-83-31-2-5 MG CAP    Take 1 tablet by mouth once daily.   Modified Medications    Modified Medication Previous Medication    METOPROLOL SUCCINATE (TOPROL-XL) 50 MG 24 HR TABLET metoprolol succinate (TOPROL-XL) 25 MG 24 hr tablet       Take 1 tablet (50 mg total) by mouth 2 (two) times daily.    Take 1 tablet (25 mg total) by mouth 2 (two) times daily.   Discontinued Medications    SACUBITRIL-VALSARTAN (ENTRESTO) 49-51 MG PER TABLET    Take 1 tablet by mouth 2 (two) times daily.       Review of Systems   Constitutional:  Positive for malaise/fatigue.   HENT: Negative.     Eyes: Negative.    Respiratory:  Positive for shortness of breath.    Cardiovascular:  Positive for chest pain and palpitations.   Gastrointestinal: Negative.    Genitourinary: Negative.    Musculoskeletal: Negative.    Skin: Negative.    Neurological:  Positive for dizziness and loss of consciousness.   Endo/Heme/Allergies: Negative.    Psychiatric/Behavioral: Negative.     All 12 systems otherwise negative.      Wt Readings from Last 3 Encounters:   03/19/25 99.6 kg (219 lb 9.3 oz)   03/05/25 98.6 kg (217 lb 6 oz)   02/11/25 99.5 kg (219 lb 4 oz)     Temp Readings from Last 3 Encounters:   02/06/25 97.4 °F (36.3 °C) (Oral)   01/06/25 98.3 °F (36.8 °C) (Tympanic)   08/21/24 96.3 °F (35.7 °C) (Temporal)     BP Readings from Last 3 Encounters:   03/19/25 110/72   03/05/25 92/62   02/11/25 127/80     Pulse Readings from Last 3 Encounters:   03/19/25 64   03/05/25 62   02/11/25 71       /72 (BP Location: Left arm, Patient Position: Sitting)   Pulse 64   Ht 5' 10" (1.778 m)   Wt 99.6 kg (219 lb 9.3 oz)   SpO2 96%   BMI 31.51 kg/m²     Objective:   Physical Exam  Vitals and nursing note reviewed.   Constitutional:       General: He is not " in acute distress.     Appearance: He is well-developed. He is not diaphoretic.   HENT:      Head: Normocephalic and atraumatic.      Nose: Nose normal.   Eyes:      General: No scleral icterus.     Conjunctiva/sclera: Conjunctivae normal.   Neck:      Thyroid: No thyromegaly.      Vascular: No JVD.   Cardiovascular:      Rate and Rhythm: Normal rate and regular rhythm.      Heart sounds: S1 normal and S2 normal. No murmur heard.     No friction rub. No gallop. No S3 or S4 sounds.   Pulmonary:      Effort: Pulmonary effort is normal. No respiratory distress.      Breath sounds: Normal breath sounds. No stridor. No wheezing or rales.   Chest:      Chest wall: No tenderness.   Abdominal:      General: Bowel sounds are normal. There is no distension.      Palpations: Abdomen is soft. There is no mass.      Tenderness: There is no abdominal tenderness. There is no rebound.   Genitourinary:     Comments: Deferred  Musculoskeletal:         General: No tenderness or deformity. Normal range of motion.      Cervical back: Normal range of motion and neck supple.   Lymphadenopathy:      Cervical: No cervical adenopathy.   Skin:     General: Skin is warm and dry.      Coloration: Skin is not pale.      Findings: No erythema or rash.   Neurological:      Mental Status: He is alert and oriented to person, place, and time.      Motor: No abnormal muscle tone.      Coordination: Coordination normal.   Psychiatric:         Behavior: Behavior normal.         Thought Content: Thought content normal.         Judgment: Judgment normal.         Lab Results   Component Value Date     03/05/2025    K 4.1 03/05/2025     03/05/2025    CO2 26 03/05/2025    BUN 18 03/05/2025    CREATININE 1.6 (H) 03/05/2025     03/05/2025    HGBA1C 6.5 (H) 02/05/2025    MG 1.9 03/05/2025    AST 61 (H) 02/05/2025    ALT 52 (H) 02/05/2025    ALBUMIN 3.5 02/05/2025    ALBUMIN 4.3 04/30/2018    PROT 8.7 (H) 02/05/2025    BILITOT 0.9 02/05/2025     WBC 9.14 02/05/2025    HGB 17.8 02/05/2025    HCT 55.7 (H) 02/05/2025    MCV 88 02/05/2025     02/05/2025    INR 1.0 01/28/2020    TSH 1.137 04/27/2023    CHOL 172 12/18/2023    HDL 36 (L) 12/18/2023    LDLCALC 73.2 12/18/2023    TRIG 314 (H) 12/18/2023    BNP 78 03/05/2025     Assessment:      1. Chronic combined systolic and diastolic congestive heart failure    2. Hypertensive heart disease with heart failure    3. CAD in native artery    4. Obstructive sleep apnea    5. Nonrheumatic mitral valve regurgitation    6. MAIER (dyspnea on exertion)    7. HIV disease    8. Mixed hyperlipidemia    9. Hypertriglyceridemia    10. Nonrheumatic aortic valve insufficiency    11. Chest pain, unspecified type    12. Essential hypertension    13. Postural dizziness with presyncope    14. Anxiety            Plan:   1. HTN  - elevated -   - titrate meds  - episodes of HTN urgency - r/o sec causes - labs and renal u/s ordered - neg  - ECHO 6/2024 with normal bi V function and valves, PASP 18 mmHg.   - cont clonidine 0.1 mg PRN     2. HLD with elevated TGs  - cont meds  - added- vascepa in past    3. HIV  - cont meds per PCP/ID    4. ED/hypogonadism  - cont meds per PCP PRN    5. Syncope/dizziness/palpitations with CP - non obs CAD  - prior exercise nuclear stress test to r/o ischemia - abnormal, C neg  - ECHO 6/2024 with normal bi V function and valves, PASP 18 mmHg.   - carotids - neg   - has been to ENT and Neuro - has atrophy and hearing aids now  -Vital monitor - 1/2025 with freq PACs, SVT episdoes, AVG HR 79 BPM. Overall PSVC [place] at 23.12 %  - Nuc stress 2/2025 mild to mod fixed defect, equivocal, EF improved to 48%.       6.ECHO 2/2025 EF 30%, grade 1 DD, mild reduced RV function.   - cont meds - Toprol and ARB - changed to Entresto --> increase to max dose  - cont lasix PRN  - Nuc stress 2/2025 mild to mod fixed defect, equivocal, EF improved to 48%.   - repeat labs PRN; increase Metoprolol to 25 mg BID    7.  Anxiety  - refer to psych    8. RAMONITA  - cont CPAP; has not been using   - moderate to severe obstructive sleep apnea     9. Obesity - . BMI 31 - 219 lbs  BMI 31 - 219 lbs  - cont weight loss     Visit today included increased complexity associated with the care of the episodic problem dyspnea addressed and managing the longitudinal care of the patient due to the serious and/or complex managed problem(s) .      Thank you for allowing me to participate in this patient's care. Please do not hesitate to contact me with any questions or concerns. Consult note has been forwarded to the referral physician.

## 2025-03-19 NOTE — TELEPHONE ENCOUNTER
Lvm availability is mid-July for new Med Mgmt with Dr Castellano and before booking we need any Psych records pertinent for the last 2 years

## 2025-03-20 ENCOUNTER — PATIENT MESSAGE (OUTPATIENT)
Dept: PSYCHIATRY | Facility: CLINIC | Age: 80
End: 2025-03-20
Payer: MEDICARE

## 2025-03-20 ENCOUNTER — TELEPHONE (OUTPATIENT)
Dept: PSYCHIATRY | Facility: CLINIC | Age: 80
End: 2025-03-20
Payer: MEDICARE

## 2025-03-20 NOTE — TELEPHONE ENCOUNTER
----- Message from [employer] sent at 3/20/2025  1:06 PM CDT -----  Contact: self  Type:  Sooner Apoointment RequestCaller is requesting a sooner appointment.  Caller declined first available appointment listed below.  Caller will not accept being placed on the waitlist and is requesting a message be sent to doctor.Name of Caller:Haritha Bonner is the first available appointment?n/aSymptoms:high blood pressure. AnxietyWould the patient rather a call back or a response via [facility]? [facility]:[phone] Information: the patient wants to see he can be seen sooner then July due to his cardiologist telling him he should be seen soon.

## 2025-03-27 ENCOUNTER — OFFICE VISIT (OUTPATIENT)
Dept: PULMONOLOGY | Facility: CLINIC | Age: 80
End: 2025-03-27
Payer: MEDICARE

## 2025-03-27 VITALS
WEIGHT: 214.94 LBS | HEIGHT: 70 IN | RESPIRATION RATE: 19 BRPM | OXYGEN SATURATION: 98 % | SYSTOLIC BLOOD PRESSURE: 120 MMHG | HEART RATE: 67 BPM | BODY MASS INDEX: 30.77 KG/M2 | DIASTOLIC BLOOD PRESSURE: 60 MMHG

## 2025-03-27 DIAGNOSIS — F41.9 ANXIETY: ICD-10-CM

## 2025-03-27 DIAGNOSIS — J41.0 SIMPLE CHRONIC BRONCHITIS: ICD-10-CM

## 2025-03-27 DIAGNOSIS — Z99.81 CHRONIC RESPIRATORY FAILURE WITH HYPOXIA, ON HOME O2 THERAPY: ICD-10-CM

## 2025-03-27 DIAGNOSIS — J96.11 CHRONIC RESPIRATORY FAILURE WITH HYPOXIA, ON HOME O2 THERAPY: ICD-10-CM

## 2025-03-27 DIAGNOSIS — G47.33 OSA (OBSTRUCTIVE SLEEP APNEA): Primary | ICD-10-CM

## 2025-03-27 DIAGNOSIS — R06.09 DOE (DYSPNEA ON EXERTION): ICD-10-CM

## 2025-03-27 DIAGNOSIS — K31.84 GASTROPARESIS: ICD-10-CM

## 2025-03-27 PROCEDURE — 99999 PR PBB SHADOW E&M-EST. PATIENT-LVL V: CPT | Mod: PBBFAC,,, | Performed by: INTERNAL MEDICINE

## 2025-03-27 PROCEDURE — 99215 OFFICE O/P EST HI 40 MIN: CPT | Mod: PBBFAC | Performed by: INTERNAL MEDICINE

## 2025-03-27 RX ORDER — METOCLOPRAMIDE 5 MG/1
5 TABLET ORAL NIGHTLY PRN
Qty: 30 TABLET | Refills: 11 | Status: SHIPPED | OUTPATIENT
Start: 2025-03-27

## 2025-03-27 RX ORDER — BUSPIRONE HYDROCHLORIDE 15 MG/1
15 TABLET ORAL 3 TIMES DAILY
Qty: 90 TABLET | Refills: 11 | Status: SHIPPED | OUTPATIENT
Start: 2025-03-27 | End: 2026-03-27

## 2025-03-27 NOTE — PROGRESS NOTES
Subjective:     Patient ID: Haritha Valle is a 79 y.o. male.    Chief Complaint:  Worsening shortness of breath and MAIER , paroxysmal nocturnal dyspnea . On oxygen at night    HPI 79 y.o. with anxiety ,unable to use Continuous Positive Airway Pressure machine regularly. Requesting referral for INSPIRE device. Referred to ENT on 5/8/2024, seen by Dr. Fulton on 5/20/2024 -Rex Fulton M.D. Requesting re-evaluation for INSPIRE  Patient does not recall this visit.    Anxiety  Patient is here for evaluation of anxiety.  He has the following anxiety symptoms: dizziness, fatigue, feelings of losing control, insomnia, irritable, shortness of breath. Onset of symptoms was approximately 3 years ago.  Symptoms have been gradually worsening since that time. He denies current suicidal and homicidal ideation. Family history significant for no psychiatric illness.Possible organic causes contributing are: drug abuse, altzheimers . Risk factors: previous episode of depression Previous treatment includes medication BuSpar.   He complains of the following medication side effects: drowsiness.      Dyspnea  Patient complains of shortness of breath. Symptoms occur while getting dressed, unable to walk one block walking.Worse over the past 1-2 months. Also with paroxysmal nocturnal dyspnea . Left side -unable to breath compared to right     Symptoms began 4 years ago, gradually worsening since. Associated symptoms include  dry cough. He denies chest pain, located left chest. He does not have had recent travel. Weight has been stable. Symptoms are exacerbated by moderate activity. Symptoms are alleviated by rest.    C/o nausea   He reports stable cardiac history with no significant cardiac problems    Obstructive Sleep Apnea on Continuous Positive Airway Pressure:  Patient is using not CPAP as prescribed and not benefiting from therapy. Patient has complaints of unable to tolerate Continuous Positive Airway Pressure mask.  Prescription for  oxygen to be used at night sent during last visit  - patient did not have delivery    history of HTN, HIV (followed by Dr. Larios), RAMONITA (moderate, CPAP intolerant) who presents to Pulmonary clinic for evaluation of dyspnea. He was last seen in clinic 11/2023 by Dr. Chavarria- he was continued on Trelegy 200mcg, oxygen ordered, pt referred to ENT for snoring/RAMONITA.      Short of breath, dizzy, light-headed with minimal exertion over the past 3 years. Worse over the past 2-3 months. Last Friday- had an episode overnight where he thought he may be having a heart attack- couldn't breathe, short of breath. Stayed up much of the night until it improved and then took a sleeping pill.      Has tried albuterol, nothing helped. Worsening symptoms with albuterol breathing treatment with corine.     Past Medical History:   Diagnosis Date    Anxiety 07/03/2019    Basal cell carcinoma     CAD in native artery 11/27/2023    Depression     Dyslipidemia 11/16/2023    Essential hypertension 01/24/2013    Hepatitis B     Hepatitis C antibody test positive 02/05/2025    RNA NEGATIVE    HIV infection     Hypertension     Immune disorder     Mild cognitive impairment 10/01/2010    Nonrheumatic aortic valve insufficiency 12/19/2023    Nonrheumatic mitral valve regurgitation 12/19/2023     Past Surgical History:   Procedure Laterality Date    APPENDECTOMY      CARDIAC CATHETERIZATION      no stent    CHOLECYSTECTOMY      COLONOSCOPY N/A 11/3/2016    Procedure: COLONOSCOPY;  Surgeon: Maxi Villasenor MD;  Location: [facility] ([address] [place] [address]);  Service: Endoscopy;  Laterality: N/A;  last colonoscopy with Dr Jarrell    COLONOSCOPY N/A 1/25/2022    Procedure: COLONOSCOPY;  Surgeon: Silvino Rosales MD;  Location: [facility] ENDO;  Service: Endoscopy;  Laterality: N/A;    LEFT HEART CATHETERIZATION Left 2/3/2020    Procedure: CATHETERIZATION, HEART, LEFT;  Surgeon: Chele Ko MD;  Location: [facility] CATH LAB;  Service: Cardiology;  Laterality: Left;  malur pt      Review of patient's allergies indicates:   Allergen Reactions    No known drug allergies      Current Outpatient Medications on File Prior to Visit   Medication Sig Dispense Refill    albuterol (PROVENTIL) 2.5 mg /3 mL (0.083 %) nebulizer solution Take 3 mLs (2.5 mg total) by nebulization every 4 to 6 hours as needed for Wheezing or Shortness of Breath. 360 mL 11    albuterol (PROVENTIL/VENTOLIN HFA) 90 mcg/actuation inhaler Inhale 2 puffs into the lungs every 4 (four) hours as needed for Wheezing. Rescue 18 g 11    ascorbic acid, vitamin C, (VITAMIN C) 1000 MG tablet Take 1,000 mg by mouth once daily.      yslapcmmw-yyjuzqss-bghmyze ala (BIKTARVY) -25 mg (25 kg or greater) Take 1 tablet by mouth once daily. 90 tablet 6    cloNIDine (CATAPRES) 0.1 MG tablet Take 1 tablet (0.1 mg total) by mouth every 6 (six) hours as needed (BP > 180/90). 90 tablet 1    donepeziL (ARICEPT) 5 MG tablet Take 1 tablet (5 mg total) by mouth every evening. 90 tablet 3    FLUoxetine 40 MG capsule TAKE 1 CAPSULE EVERY DAY (Patient taking differently: Take 40 mg by mouth once daily.) 90 capsule 3    fluticasone propionate (FLONASE) 50 mcg/actuation nasal spray [place] 1 spray every day by intranasal route.      furosemide (LASIX) 40 MG tablet Take 1 tablet (40 mg total) by mouth daily as needed (edema, swelling, with BP over 140/90). Do not take if BP is below 110/70 or feeling dry; 90 tablet 1    magnesium oxide (MAG-OX) 400 mg (241.3 mg magnesium) tablet Take 1 tablet (400 mg total) by mouth once daily. 90 tablet 1    metoprolol succinate (TOPROL-XL) 50 MG 24 hr tablet Take 1 tablet (50 mg total) by mouth 2 (two) times daily. 180 tablet 1    ondansetron (ZOFRAN-ODT) 8 MG TbDL Take 1 tablet (8 mg total) by mouth every 6 (six) hours as needed (nausea). 30 tablet 1    potassium chloride SA (K-DUR,KLOR-CON M) 10 MEQ tablet Take 2 tablets (20 mEq total) by mouth daily as needed (take with lasix). 90 tablet 1    promethazine (PHENERGAN)  12.5 MG Tab TAKE ONE TABLET BY MOUTH DAILY AS NEEDED FOR NAUSEA. 30 tablet 3    rosuvastatin (CRESTOR) 20 MG tablet TAKE 1 TABLET EVERY DAY (Patient taking differently: Take 20 mg by mouth every evening.) 90 tablet 3    sacubitriL-valsartan (ENTRESTO)  mg per tablet Take 1 tablet by mouth 2 (two) times daily. 180 tablet 1    ubidecarenone (COENZYME Q10 ORAL) Take by mouth once daily.      [DISCONTINUED] busPIRone (BUSPAR) 7.5 MG tablet TAKE ONE TABLET BY MOUTH DAILY AS NEEDED. 30 tablet 0    [DISCONTINUED] vit C,A-Um-kfuql-lutein-zeaxan (EYE MULTIVIT, LUTEIN-ZEAXAN,) 394-76-01-2-5 mg Cap Take 1 tablet by mouth once daily.       No current facility-administered medications on file prior to visit.     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  [place] [employer]   Tobacco Use    Smoking status: Never    Smokeless tobacco: Never   Substance and Sexual Activity    Alcohol use: No    Drug use: Never    Sexual activity: Not Currently     Partners: Male     Birth control/protection: Abstinence, See Surgical Hx, None     Social Drivers of Health     Financial Resource Strain: Low Risk  (2/6/2025)    Overall Financial Resource Strain (CARDIA)     Difficulty of Paying Living Expenses: Not hard at all   Food Insecurity: No Food Insecurity (2/6/2025)    Hunger Vital Sign     Worried About Running Out of Food in the Last Year: Never true     Ran Out of Food in the Last Year: Never true   Transportation Needs: No Transportation Needs (2/6/2025)    TRANSPORTATION NEEDS     Transportation : No   Physical Activity: Inactive (2/6/2025)    Exercise Vital Sign     Days of Exercise per Week: 0 days     Minutes of Exercise per Session: 0 min   Stress: No Stress Concern Present (2/6/2025)    [place] of  "Occupational Health - Occupational Stress Questionnaire     Feeling of Stress : Only a little   Housing Stability: Unknown (2/6/2025)    Housing Stability Vital Sign     Unable to Pay for Housing in the Last Year: No     Homeless in the Last Year: No     Family History   Problem Relation Name Age of Onset    Hearing loss Mother Sonam Valle     Vision loss Mother Sonam Valle     Heart disease Father Demarco Valle     Heart failure Father Demarco Valle     Diabetes Neg Hx      Hypertension Neg Hx         Review of Systems   Constitutional:  Negative for fever and fatigue.   HENT:  Negative for postnasal drip and rhinorrhea.    Eyes:  Negative for redness and itching.   Respiratory:  Positive for apnea, snoring and dyspnea on extertion. Negative for cough, shortness of breath, wheezing and Paroxysmal Nocturnal Dyspnea.    Cardiovascular:  Negative for chest pain.   Genitourinary:  Negative for difficulty urinating and hematuria.   Endocrine:  Negative for polyphagia, cold intolerance and heat intolerance.    Musculoskeletal:  Positive for arthralgias.   Skin:  Negative for rash.   Gastrointestinal:  Negative for nausea, vomiting, abdominal pain and abdominal distention.   Neurological:  Positive for light-headedness. Negative for dizziness and headaches.        Poor memory   Hematological:  Negative for adenopathy. Does not bruise/bleed easily and no excessive bruising.   Psychiatric/Behavioral:  Positive for confusion. The patient is not nervous/anxious.        Objective:      /60   Pulse 67   Resp 19   Ht 5' 10" (1.778 m)   Wt 97.5 kg (214 lb 15.2 oz)   SpO2 98%   BMI 30.84 kg/m²   Physical Exam  Vitals and nursing note reviewed.   Constitutional:       Appearance: He is well-developed.   HENT:      Head: Normocephalic and atraumatic.      Nose: Nose normal.   Eyes:      Conjunctiva/sclera: Conjunctivae normal.      Pupils: Pupils are equal, round, and reactive to light.   Neck:      Thyroid: No " thyromegaly.      Vascular: No JVD.      Trachea: No tracheal deviation.   Cardiovascular:      Rate and Rhythm: Normal rate and regular rhythm.      Heart sounds: Normal heart sounds.   Pulmonary:      Effort: Pulmonary effort is normal.      Breath sounds: Normal breath sounds.   Abdominal:      Palpations: Abdomen is soft.   Musculoskeletal:         General: Normal range of motion.      Cervical back: Neck supple.   Lymphadenopathy:      Cervical: No cervical adenopathy.   Skin:     General: Skin is warm and dry.   Neurological:      Mental Status: He is alert and oriented to person, place, and time.      Gait: Gait abnormal.      Comments: Poor memory and judgement   Psychiatric:         Mood and Affect: Mood normal.         Behavior: Behavior normal.       Diagnoses     Codes Comments   Obstructive sleep apnea syndrome  - Primary ICD-10-CM: G47.33  ICD-9-CM: 327.23    Periodic limb movement disorder (PLMD) ICD-10-CM: G47.61  ICD-9-CM: 327.51    Behaviorally induced insufficient sleep syndrome ICD-10-CM: F51.12  ICD-9-CM: 307.44    Inadequate sleep hygiene ICD-10-CM: Z72.821  ICD-9-CM: 307.49            Reason for Visit    Reason for Visit History       Progress Notes    No notes of this type exist for this encounter.  Procedure Notes    Author Status Last  Updated Created   Golden Allison, PhD Signed Golden Allison, PhD 2020  5:22 PM 2020  5:19 PM          Assoc. Orders Procedures   POLYSOMNOGRAM POLYSOMNOGRAM       Pre-Op Dx Post-Op Dx   Obstructive sleep apnea syndrome None            Patient Name: Haritha Valle                            Date of Report: 20           Date of PS/10/2020   [facility] No.: [id number]                            : 1945                      Time of PSG:  10:25:49 PM - 5:02:54 AM  Sex:  Male   Age:  75   Weight:  212.0 lbs      Height:  5  11                         Type of PSG:  Diagnostic      REASONS FOR REFERRAL: Mr. Valle is a 75 year old  male, referred to Dr. Rico Pineda and the SD by Dr. Deni Nguyen for evaluation of possible obstructive sleep apnea / hypopnea syndrome (OSAHS).  Dr. Pineda requested diagnostic polysomnography based on the patients reported loud snoring, observed respiratory pauses in sleep, unrefreshing sleep and daytime somnolence (all positive on STOP-bang).  His Fisher Sleepiness Scale score was 13, clinically significant, and his STOP - BANG score was 7, high risk of RAMONITA.  Dr. Hesham Andrade is the patients primary care physician.      STUDY PARAMETERS: This diagnostic study involved analysis of the patient's sleep pattern while breathing unassisted. The study was performed with a sleep technologist in attendance for the entire test period, with video monitoring throughout the study, and routine laboratory clinical parameters recorded:  NOTE: The polysomnography electrophysiological record for the patient has been reviewed in its entirety by Dr. Allison.     SUMMARY STATEMENTS  DIAGNOSTIC IMPRESSIONS  G47.33  /  327.23                    Moderate to Severe Obstructive Sleep Apnea, Adult (OSAHS)  G47.61  /  327.51                    Severe Periodic Limb Movement (PLM) Disorder   F51.12   /  307.44                    Mild Insufficient Sleep Syndrome  Z72.821 /  V69.4                     Inadequate Sleep Hygiene      PRIMARY TREATMENT RECOMMENDATIONS  Treat, or refer to Sleep Disorders Center.  The diagnostic polysomnography revealed a moderate to severe obstructive sleep apnea / hypopnea syndrome (A + H Index = 26.8 events / hr asleep with 7.0 respiratory event - related arousals / hr asleep for the study, and no RERAs (respiratory effort -  related arousals).  The mean SpO2 value was 90.0 %, significant, minimum oxygen saturation during sleep was   79.0 %, and waking baseline SpO2 was 97 %.   Sporadic / infrequent , moderately loud snoring was noted.  A  CPAP titration polysomnography is recommended.      Weight loss  to the normal range is recommended as it can decrease respiratory events and snoring in overweight patients.  The following changes in sleep hygiene / sleep - related behavior are recommended after medical treatments are successful  Regular bedtimes and wake times, including weekends: Total sleep time / night should not be more than one hour more             than usual, and bedtime or wake time should not be more than one hour earlier or later than usual.    Do not attempt to make up lost sleep by extending sleep periods.    Avoid naps; none longer than 20 min or later than mid - afternoon.     SECONDARY TREATMENT RECOMMENDATIONS  Treat, or refer to SDC if problems are not satisfactorily resolved by the above.  A severe  PLM disorder was observed (PLMS Index = 67.6 / hr asleep, but treatment might not be optimal because the PLMS were not very disruptive of sleep (6.6 arousals / hr asleep), and there were no signs of restless legs syndrome in the SDI,   H & P or PSG;  consider treatment of PLM disorder if PLMS symptoms are sufficiently bothersome to the patient.  Note that the benzodiazepine medications sometimes used to treat PLM disorder (e.g., alprazolam / Xanax) may exacerbate some sleep - related respiratory disorders, and that dopaminergic medications such as Mirapex and Requip can be used in such instances.    Mr. Valle is taking fluoxetine / Prozac.  Most tricyclic, and many SSRI antidepressants (e.g., fluoxetine - Prozac, sertraline   Zoloft, venlafaxine - Effexor), are associated with increased PLMS in some studies and increased RLS in others.  Consider   replacing Prozac with a medication known not to exacerbate PLMS or RLS.  Consider behavioral and cognitive / behavioral treatments for anxiety and depression to complement the medication and to increase the probability of long - term adaptive change.  Sleep (quality) might be expected to further improve.  IMPORTANT  NOTE:   RAMONITA, PLMS, and stress - related  "arousals (anxiety and depression)  interact to significantly impair sleep quality and restoratives, making treatment of each and all of these disorders very important  for restorative sleep.     See below for a complete interpretation of data from the polysomnography and Sleep Disorders Inventory.     Thank you for referring this patient to the [facility].      Golden Allison, Ph.D., ABPP; Diplomate, American Board of Sleep Medicine          Echocardiogram Summary         Left Ventricle: The left ventricle is normal in size. Normal wall thickness. There is mild concentric hypertrophy. Normal wall motion. There is low normal systolic function with a visually estimated ejection fraction of 50 - 55%. Grade I diastolic dysfunction.    Right Ventricle: Normal right ventricular cavity size. Wall thickness is normal. Right ventricle wall motion  is normal. Systolic function is normal.    Aortic Valve: There is mild aortic regurgitation.    Mitral Valve: There is mild regurgitation.    Tricuspid Valve: There is mild regurgitation.    IVC/SVC: Normal venous pressure at 3 mmHg.    Personal Diagnostic Review  Spirometry shows a reduced vital capacity suggesting restriction. Spirometry remains unimproved following bronchodilator. Lung volumes show mild restriction is present. Airway mechanics show normal airway resistance and conductance. DLCO is mildly   decreased. MVV is severely decreased.   Â      [facility]-Pulmonary Function 3rdFl  Six Minute Walk   SUMMARY   Ordering Provider: Maksim Chavarria MD        Interpreting Provider: Maksim Chavarria MD  Performing nurse/tech/RT: VT, RT  Diagnosis:  (MAIER (dyspnea on exertion); Chronic restrictive lung disease; Exercise hypoxemia)  Height: 5' 10" (177.8 cm)  Weight: 98.7 kg (217 lb 9.5 oz)  BMI (Calculated): 31.2              Patient Race:           Phase Oxygen Assessment Supplemental O2 Heart   Rate Blood Pressure Johnnie Dyspnea Scale Rating   Resting " 95 % Room Air 73 bpm 151/83 3   Exercise             Minute             1 94 % Room Air 82 bpm       2 96 % Room Air 83 bpm       3 95 % Room Air 82 bpm       4 95 % Room Air 84 bpm       5 95 % Room Air 84 bpm       6  96 % Room Air 85 bpm 165/83 4   Recovery             Minute             1 95 % Room Air 74 bpm       2 97 % Room Air 76 bpm       3 97 % Room Air 76 bpm       4 97 % Room Air 74 bpm 170/89 3      Six Minute Walk Summary  6MWT Status: completed without stopping  Patient Reported: Dyspnea, Dizziness                Interpretation:  Did the patient stop or pause?: No  Total Time Walked (Calculated): 360 seconds  Final Partial Lap Distance (feet): 0 feet  Total Distance Meters (Calculated): 304.8 meters  Predicted Distance Meters (Calculated): 476.69 meters  Percentage of Predicted (Calculated): 63.94  Peak VO2 (Calculated): 13.12  Mets: 3.75  Has The Patient Had a Previous Six Minute Walk Test?: Yes     Previous 6MWT Results  Has The Patient Had a Previous Six Minute Walk Test?: Yes  Date of Previous Test: 10/23/20  Total Time Walked: 344 seconds  Total Distance (meters): 284.07  Predicted Distance (meters): 297.85 meters  Percent Change (Calculated): -0.07        Interpretation:  Total distance walked in six minutes is mildly reduced indicating a reduction in overall  functional capacity. The patient did not meet criteria for supplemental oxygen prescription.  Clinical correlation suggested.  [] Mild exercise-induced hypoxemia described as an arterial oxygen saturation of 93-95% (with a fall of 3-4% with exercise),   [] Moderate exercise-induced hypoxemia as a fall in oxygen saturation to  89-93% (with a fall of 3-4 % with exercise)  [] Severe exercise induced hypoxemia as < 89% O2 saturation (88% and below).  Medicare Criteria for Oxygen prescription comments: When arterial oxygen saturation is at or below 88% during exercise (severe exercise induced hypoxemia) then the patient falls under   Details  "about Medicare Group Criteria coverage can be found at http://[email or web address].[email or web address].gov/manuals/downloads/      Maksim Chavarria MD                     3/27/2025     2:44 PM   Pulmonary Studies Review   SpO2 98 %   Height 5' 10" (1.778 m)   Weight 97.5 kg (214 lb 15.2 oz)   BMI (Calculated) 30.8   Predicted Distance 272.89   Predicted Distance Meters (Calculated) 473.79 meters       Nuclear Stress - Cardiology Interpreted    Equivocal myocardial perfusion scan.    There is a mild to moderate intensity, small to medium sized, equivocal   perfusion abnormality that is fixed in the apical and anteroapical   wall(s). This finding is equivocal due to soft tissue shadow.    There are no other significant perfusion abnormalities.    The gated perfusion images showed an ejection fraction of 48% at rest.   The gated perfusion images showed an ejection fraction of 48% post stress.    There is mild global hypokinesis at rest and post-stress.    The ECG portion of the study is negative for ischemia.      Office Spirometry Results:         3/27/2025     2:44 PM 3/19/2025     3:07 PM 3/5/2025     2:05 PM 2/11/2025    10:15 AM 2/6/2025     2:07 PM 2/6/2025    12:01 PM 2/6/2025    11:34 AM   Pulmonary Function Tests   SpO2 98 % 96 % 95 % 97 % 97 % 96 % 97 %   Height 5' 10" (1.778 m) 5' 10" (1.778 m)  5' 10" (1.778 m)      Weight 97.5 kg (214 lb 15.2 oz) 99.6 kg (219 lb 9.3 oz) 98.6 kg (217 lb 6 oz) 99.5 kg (219 lb 4 oz)      BMI (Calculated) 30.8 31.5 31.2 31.5            3/27/2025     2:44 PM   Pulmonary Studies Review   SpO2 98 %   Height 5' 10" (1.778 m)   Weight 97.5 kg (214 lb 15.2 oz)   BMI (Calculated) 30.8   Predicted Distance 272.89   Predicted Distance Meters (Calculated) 473.79 meters     Procedure Notes    Author Status Last  Updated Created   Golden Allison, PhD Signed Golden Allison, PhD 6/18/2020  5:22 PM 6/18/2020  5:19 PM          Assoc. Orders Procedures   POLYSOMNOGRAM POLYSOMNOGRAM       Pre-Op Dx Post-Op Dx "   Obstructive sleep apnea syndrome None               Patient Name: Haritha Valle                            Date of Report: 20           Date of PS/10/2020   [facility] No.: [id number]                            : 1945                      Time of PSG:  10:25:49 PM - 5:02:54 AM  Sex:  Male   Age:  75   Weight:  212.0 lbs      Height:  5  11                         Type of PSG:  Diagnostic      REASONS FOR REFERRAL: Mr. Valle is a 75 year old male, referred to Dr. Rico Pineda and the [facility] by Dr. Deni Nguyen for evaluation of possible obstructive sleep apnea / hypopnea syndrome (OSAHS).  Dr. Pineda requested diagnostic polysomnography based on the patients reported loud snoring, observed respiratory pauses in sleep, unrefreshing sleep and daytime somnolence (all positive on STOP-bang).  His [place] Sleepiness Scale score was 13, clinically significant, and his STOP - BANG score was 7, high risk of RAMONITA.  Dr. Hesham Andrade is the patients primary care physician.      STUDY PARAMETERS: This diagnostic study involved analysis of the patient's sleep pattern while breathing unassisted. The study was performed with a sleep technologist in attendance for the entire test period, with video monitoring throughout the study, and routine laboratory clinical parameters recorded:  NOTE: The polysomnography electrophysiological record for the patient has been reviewed in its entirety by Dr. Allison.     SUMMARY STATEMENTS  DIAGNOSTIC IMPRESSIONS  G47.33  /  327.23                    Moderate to Severe Obstructive Sleep Apnea, Adult (OSAHS)  G47.61  /  327.51                    Severe Periodic Limb Movement (PLM) Disorder   F51.12   /  307.44                    Mild Insufficient Sleep Syndrome  Z72.821 /  V69.4                     Inadequate Sleep Hygiene      PRIMARY TREATMENT RECOMMENDATIONS  Treat, or refer to Sleep Disorders Center.  The diagnostic polysomnography revealed a moderate to severe  obstructive sleep apnea / hypopnea syndrome (A + H Index = 26.8 events / hr asleep with 7.0 respiratory event - related arousals / hr asleep for the study, and no RERAs (respiratory effort -  related arousals).  The mean SpO2 value was 90.0 %, significant, minimum oxygen saturation during sleep was   79.0 %, and waking baseline SpO2 was 97 %.   Sporadic / infrequent , moderately loud snoring was noted.  A  CPAP titration polysomnography is recommended.      Weight loss to the normal range is recommended as it can decrease respiratory events and snoring in overweight patients.  The following changes in sleep hygiene / sleep - related behavior are recommended after medical treatments are successful  Regular bedtimes and wake times, including weekends: Total sleep time / night should not be more than one hour more             than usual, and bedtime or wake time should not be more than one hour earlier or later than usual.    Do not attempt to make up lost sleep by extending sleep periods.    Avoid naps; none longer than 20 min or later than mid - afternoon.     SECONDARY TREATMENT RECOMMENDATIONS  Treat, or refer to SDC if problems are not satisfactorily resolved by the above.  A severe  PLM disorder was observed (PLMS Index = 67.6 / hr asleep, but treatment might not be optimal because the PLMS were not very disruptive of sleep (6.6 arousals / hr asleep), and there were no signs of restless legs syndrome in the SDI,   H & P or PSG;  consider treatment of PLM disorder if PLMS symptoms are sufficiently bothersome to the patient.  Note that the benzodiazepine medications sometimes used to treat PLM disorder (e.g., alprazolam / Xanax) may exacerbate some sleep - related respiratory disorders, and that dopaminergic medications such as Mirapex and Requip can be used in such instances.    Mr. Valle is taking fluoxetine / Prozac.  Most tricyclic, and many SSRI antidepressants (e.g., fluoxetine - Prozac, sertraline   Zoloft,  venlafaxine - Effexor), are associated with increased PLMS in some studies and increased RLS in others.  Consider   replacing Prozac with a medication known not to exacerbate PLMS or RLS.  Consider behavioral and cognitive / behavioral treatments for anxiety and depression to complement the medication and to increase the probability of long - term adaptive change.  Sleep (quality) might be expected to further improve.  IMPORTANT  NOTE:   RAMONITA, PLMS, and stress - related arousals (anxiety and depression)  interact to significantly impair sleep quality and restoratives, making treatment of each and all of these disorders very important  for restorative sleep.     See below for a complete interpretation of data from the polysomnography and Sleep Disorders Inventory.     Thank you for referring this patient to the [facility].      Golden Allison, Ph.D., ABPP; Diplomate, American Board of Sleep Medicine                Assessment:       RAMONITA (obstructive sleep apnea)  Not tolerant to Continuous Positive Airway Pressure  Referred to ENT for INSPIRE    RAMONITA (obstructive sleep apnea)  -     Ambulatory referral/consult to ENT; Future; Expected date: 04/03/2025    Anxiety  -     busPIRone (BUSPAR) 15 MG tablet; Take 1 tablet (15 mg total) by mouth 3 (three) times daily.  Dispense: 90 tablet; Refill: 11    Gastroparesis  -     metoclopramide HCl (REGLAN) 5 MG tablet; Take 1 tablet (5 mg total) by mouth nightly as needed (nausea).  Dispense: 30 tablet; Refill: 11    Simple chronic bronchitis  -     X-Ray Chest PA And Lateral; Future; Expected date: 03/27/2025    Chronic respiratory failure with hypoxia, on home O2 therapy  -     X-Ray Chest PA And Lateral; Future; Expected date: 03/27/2025    MAIER (dyspnea on exertion)                Outpatient Encounter Medications as of 3/27/2025   Medication Sig Dispense Refill    albuterol (PROVENTIL) 2.5 mg /3 mL (0.083 %) nebulizer solution Take 3 mLs (2.5 mg total) by  nebulization every 4 to 6 hours as needed for Wheezing or Shortness of Breath. 360 mL 11    albuterol (PROVENTIL/VENTOLIN HFA) 90 mcg/actuation inhaler Inhale 2 puffs into the lungs every 4 (four) hours as needed for Wheezing. Rescue 18 g 11    ascorbic acid, vitamin C, (VITAMIN C) 1000 MG tablet Take 1,000 mg by mouth once daily.      kxnqmcjed-hcagkokc-ddszbvo ala (BIKTARVY) -25 mg (25 kg or greater) Take 1 tablet by mouth once daily. 90 tablet 6    busPIRone (BUSPAR) 15 MG tablet Take 1 tablet (15 mg total) by mouth 3 (three) times daily. 90 tablet 11    cloNIDine (CATAPRES) 0.1 MG tablet Take 1 tablet (0.1 mg total) by mouth every 6 (six) hours as needed (BP > 180/90). 90 tablet 1    donepeziL (ARICEPT) 5 MG tablet Take 1 tablet (5 mg total) by mouth every evening. 90 tablet 3    FLUoxetine 40 MG capsule TAKE 1 CAPSULE EVERY DAY (Patient taking differently: Take 40 mg by mouth once daily.) 90 capsule 3    fluticasone propionate (FLONASE) 50 mcg/actuation nasal spray [place] 1 spray every day by intranasal route.      furosemide (LASIX) 40 MG tablet Take 1 tablet (40 mg total) by mouth daily as needed (edema, swelling, with BP over 140/90). Do not take if BP is below 110/70 or feeling dry; 90 tablet 1    magnesium oxide (MAG-OX) 400 mg (241.3 mg magnesium) tablet Take 1 tablet (400 mg total) by mouth once daily. 90 tablet 1    metoclopramide HCl (REGLAN) 5 MG tablet Take 1 tablet (5 mg total) by mouth nightly as needed (nausea). 30 tablet 11    metoprolol succinate (TOPROL-XL) 50 MG 24 hr tablet Take 1 tablet (50 mg total) by mouth 2 (two) times daily. 180 tablet 1    ondansetron (ZOFRAN-ODT) 8 MG TbDL Take 1 tablet (8 mg total) by mouth every 6 (six) hours as needed (nausea). 30 tablet 1    potassium chloride SA (K-DUR,KLOR-CON M) 10 MEQ tablet Take 2 tablets (20 mEq total) by mouth daily as needed (take with lasix). 90 tablet 1    promethazine (PHENERGAN) 12.5 MG Tab TAKE ONE TABLET BY MOUTH DAILY AS NEEDED  FOR NAUSEA. 30 tablet 3    rosuvastatin (CRESTOR) 20 MG tablet TAKE 1 TABLET EVERY DAY (Patient taking differently: Take 20 mg by mouth every evening.) 90 tablet 3    sacubitriL-valsartan (ENTRESTO)  mg per tablet Take 1 tablet by mouth 2 (two) times daily. 180 tablet 1    ubidecarenone (COENZYME Q10 ORAL) Take by mouth once daily.      [DISCONTINUED] busPIRone (BUSPAR) 7.5 MG tablet TAKE ONE TABLET BY MOUTH DAILY AS NEEDED. 30 tablet 0    [DISCONTINUED] vit C,H-Nx-nwdta-lutein-zeaxan (EYE MULTIVIT, LUTEIN-ZEAXAN,) 350-38-75-2-5 mg Cap Take 1 tablet by mouth once daily.       No facility-administered encounter medications on file as of 3/27/2025.     Plan:       Requested Prescriptions     Signed Prescriptions Disp Refills    busPIRone (BUSPAR) 15 MG tablet 90 tablet 11     Sig: Take 1 tablet (15 mg total) by mouth 3 (three) times daily.    metoclopramide HCl (REGLAN) 5 MG tablet 30 tablet 11     Sig: Take 1 tablet (5 mg total) by mouth nightly as needed (nausea).     Problem List Items Addressed This Visit       Anxiety    Relevant Medications    busPIRone (BUSPAR) 15 MG tablet    Chronic respiratory failure with hypoxia, on home O2 therapy    Relevant Orders    X-Ray Chest PA And Lateral    MAIER (dyspnea on exertion)    RAMONITA (obstructive sleep apnea) - Primary    Current Assessment & Plan   Not tolerant to Continuous Positive Airway Pressure  Referred to ENT for INSPIRE         Relevant Orders    Ambulatory referral/consult to ENT    Simple chronic bronchitis    Relevant Orders    X-Ray Chest PA And Lateral     Other Visit Diagnoses         Gastroparesis        Relevant Medications    metoclopramide HCl (REGLAN) 5 MG tablet                     Follow up in about 7 months (around 10/27/2025) for CXR on return.    MEDICAL DECISION MAKING: Moderate to high complexity.  Overall, the multiple problems listed are of moderate to high severity that may impact quality of life and activities of daily living. Side  effects of medications, treatment plan as well as options and alternatives reviewed and discussed with patient. There was counseling of patient concerning these issues.    Total time spent in counseling and coordination of care - 40  minutes of total time spent on the encounter, which includes face to face time and non-face to face time preparing to see the patient (eg, review of tests), Obtaining and/or reviewing separately obtained history, Documenting clinical information in the electronic or other health record, Independently interpreting results (not separately reported) and communicating results to the patient/family/caregiver, or Care coordination (not separately reported).    Time was used in discussion of prognosis, risks, benefits of treatment, instructions and compliance with regimen . Discussion with other physicians and/or health care providers - home health or for use of durable medical equipment (oxygen, nebulizers, CPAP, BiPAP) occurred.  40

## 2025-03-27 NOTE — PATIENT INSTRUCTIONS
Dr. Caleb Tierney MD  [address]  [place] [address]  [phone]    Dr. Mihir White  [facility]  [address] [place] [address]  [phone]    Dr. Pascual Krueger MD  [facility]  [address] [place] [address]  [phone]    Dr. Casey Grant MD  [facility]  [address] [place] [address]  [place] [address]  [phone]    Dr. Ameya Ng MD  [facility]  [address] [place] [address]  [phone]    Dr. Anca Lipscomb  [facility]  [address] [place] [address]  [phone]       Dr. Stephanie Rachel MD  [facility]  [address] [place] [address]  [phone]       Dr. Flaquita Araujo MD  [address] [place] [address]  [phone]       Hussain Pearson MD  [facility]  [address] [place] [address], [place] [address]  [phone]       Dr. Frank Mancia  [facility]  [address]  [place] [address]  [phone]       Bimal Robledo MD  [address] [place] [address]  [phone]    Dr. Fermin Roldan  [facility]  [address]  [place] [address]  [phone]       Dr. CHER Mcneill  [place] [address] [place] [address]  [phone]       Angeles Gates MD  [address] [place], [place] [id number] [phone]    Dr. Rafael Barrett  [address] [place] [address].  [place], [place] [id number] [phone]       Leonard Ayala MD  [address] [place], [place] [address] [phone]        Dr. Isaura Holland  [place] [address]  [place]  [address]  [phone]    Dr. Tamir Rasmussen  [facility]  [address]  [place] [address]  [phone]    Dr. [facility]  [address]  [place] [address]  [phone]    Dr. Fermin [facility]  [address]  [place] [address]  [phone]    Dr. Henry [facility]  [address] [place] [address]  [phone]    Dr. Herminia Avila  [address]  [place] [address]  [phone]

## 2025-04-01 ENCOUNTER — PATIENT MESSAGE (OUTPATIENT)
Dept: OTOLARYNGOLOGY | Facility: CLINIC | Age: 80
End: 2025-04-01
Payer: MEDICARE

## 2025-04-02 ENCOUNTER — TELEPHONE (OUTPATIENT)
Dept: PSYCHIATRY | Facility: CLINIC | Age: 80
End: 2025-04-02
Payer: MEDICARE

## 2025-04-02 NOTE — TELEPHONE ENCOUNTER
----- Message from Adela sent at 4/2/2025 12:00 PM CDT -----  Contact: self  .Type:  Sooner Apoointment RequestCaller is requesting a sooner appointment.  Caller declined first available appointment listed below.  Caller will not accept being placed on the waitlist and is requesting a message be sent to doctor.Name of Caller:.Haritha Bonner is the first available appointment?Symptoms:Would the patient rather a call back or a response via [employer]? Call [facility] Call Back Number:.[phone] Information: Pt states he is needing to get schedule with psych he has a referral .

## 2025-04-03 ENCOUNTER — TELEPHONE (OUTPATIENT)
Dept: OTOLARYNGOLOGY | Facility: CLINIC | Age: 80
End: 2025-04-03
Payer: MEDICARE

## 2025-04-03 NOTE — TELEPHONE ENCOUNTER
----- Message from Betsy Stout PA-C sent at 4/3/2025  8:05 AM CDT -----  Please call and reschedule him to see Dr. Hair.RAMONITA, follows with Pulm; Requesting re-evaluation for CAITLIN

## 2025-04-04 ENCOUNTER — TELEPHONE (OUTPATIENT)
Dept: OTOLARYNGOLOGY | Facility: CLINIC | Age: 80
End: 2025-04-04
Payer: MEDICARE

## 2025-04-04 NOTE — TELEPHONE ENCOUNTER
----- Message from Betsy Stout PA-C sent at 4/4/2025 11:50 AM CDT -----  Please call and reschedule him to see Dr. Hair.  Pt has RAMONITA, follows with Pulm; Requesting re-evaluation for INSPCARMELLA

## 2025-04-10 ENCOUNTER — OFFICE VISIT (OUTPATIENT)
Dept: FAMILY MEDICINE | Facility: CLINIC | Age: 80
End: 2025-04-10
Payer: MEDICARE

## 2025-04-10 VITALS
BODY MASS INDEX: 30.62 KG/M2 | TEMPERATURE: 97 F | HEIGHT: 70 IN | OXYGEN SATURATION: 98 % | WEIGHT: 213.88 LBS | RESPIRATION RATE: 18 BRPM | DIASTOLIC BLOOD PRESSURE: 62 MMHG | HEART RATE: 62 BPM | SYSTOLIC BLOOD PRESSURE: 80 MMHG

## 2025-04-10 DIAGNOSIS — H35.3231 EXUDATIVE AGE-RELATED MACULAR DEGENERATION OF BOTH EYES WITH ACTIVE CHOROIDAL NEOVASCULARIZATION: ICD-10-CM

## 2025-04-10 DIAGNOSIS — G63 NEUROPATHY DUE TO HIV: ICD-10-CM

## 2025-04-10 DIAGNOSIS — R73.03 PREDIABETES: ICD-10-CM

## 2025-04-10 DIAGNOSIS — B20 HIV DISEASE: Chronic | ICD-10-CM

## 2025-04-10 DIAGNOSIS — E29.1 HYPOGONADISM MALE: ICD-10-CM

## 2025-04-10 DIAGNOSIS — I10 ESSENTIAL HYPERTENSION: ICD-10-CM

## 2025-04-10 DIAGNOSIS — E78.5 DYSLIPIDEMIA: ICD-10-CM

## 2025-04-10 DIAGNOSIS — D69.2 SOLAR PURPURA: ICD-10-CM

## 2025-04-10 DIAGNOSIS — Z00.00 ENCOUNTER FOR MEDICARE ANNUAL WELLNESS EXAM: Primary | ICD-10-CM

## 2025-04-10 DIAGNOSIS — F03.90 DEMENTIA, UNSPECIFIED DEMENTIA SEVERITY, UNSPECIFIED DEMENTIA TYPE, UNSPECIFIED WHETHER BEHAVIORAL, PSYCHOTIC, OR MOOD DISTURBANCE OR ANXIETY: ICD-10-CM

## 2025-04-10 DIAGNOSIS — G47.33 OSA (OBSTRUCTIVE SLEEP APNEA): ICD-10-CM

## 2025-04-10 DIAGNOSIS — F34.89 OTHER SPECIFIED PERSISTENT MOOD DISORDERS: ICD-10-CM

## 2025-04-10 DIAGNOSIS — Z99.81 DEPENDENCE ON SUPPLEMENTAL OXYGEN: ICD-10-CM

## 2025-04-10 DIAGNOSIS — F32.1 CURRENT MODERATE EPISODE OF MAJOR DEPRESSIVE DISORDER WITHOUT PRIOR EPISODE: ICD-10-CM

## 2025-04-10 DIAGNOSIS — B20 NEUROPATHY DUE TO HIV: ICD-10-CM

## 2025-04-10 DIAGNOSIS — I11.0 HYPERTENSIVE HEART DISEASE WITH HEART FAILURE: ICD-10-CM

## 2025-04-10 DIAGNOSIS — E78.2 MIXED HYPERLIPIDEMIA: ICD-10-CM

## 2025-04-10 DIAGNOSIS — I95.9 HYPOTENSION, UNSPECIFIED HYPOTENSION TYPE: ICD-10-CM

## 2025-04-10 PROBLEM — Z01.810 PREOP CARDIOVASCULAR EXAM: Status: RESOLVED | Noted: 2022-01-25 | Resolved: 2025-04-10

## 2025-04-10 PROCEDURE — 99215 OFFICE O/P EST HI 40 MIN: CPT | Mod: PBBFAC,PO | Performed by: NURSE PRACTITIONER

## 2025-04-10 PROCEDURE — 99999 PR PBB SHADOW E&M-EST. PATIENT-LVL V: CPT | Mod: PBBFAC,,, | Performed by: NURSE PRACTITIONER

## 2025-04-10 NOTE — PATIENT INSTRUCTIONS
Counseling and Referral of Other Preventative  (Italic type indicates deductible and co-insurance are waived)    Patient Name: Haritha Valle  Today's Date: 4/10/2025    Health Maintenance       Date Due Completion Date    Monkeypox Vaccine (2 - Risk post-exposure 2-dose series) 09/29/2022 9/1/2022    COVID-19 Vaccine (6 - 2024-25 season) 09/01/2024 12/2/2022    Colorectal Cancer Screening 01/25/2025 1/25/2022    Lipid Panel 05/17/2025 5/17/2024    Hemoglobin A1c (Prediabetes) 02/05/2026 2/5/2025    TETANUS VACCINE 07/03/2029 7/3/2019        No orders of the defined types were placed in this encounter.      The following information is provided to all patients.  This information is to help you find resources for any of the problems found today that may be affecting your health:                  Living healthy guide: www.[facility].[place]      Understanding Diabetes: www.diabetes.org      Eating healthy: www.cdc.gov/healthyweight      CDC home safety checklist: www.cdc.gov/steadi/patient.html      Agency on Aging: www.goea.[place]      Alcoholics anonymous (AA): www.aa.org      Physical Activity: www.mino.nih.gov/yb1jwbj      Tobacco use: www.quitwithusla.org         Counseling and Referral of Other Preventative  (Italic type indicates deductible and co-insurance are waived)    Patient Name: Haritha Valle  Today's Date: 4/10/2025    Health Maintenance       Date Due Completion Date    Monkeypox Vaccine (2 - Risk post-exposure 2-dose series) 09/29/2022 9/1/2022    COVID-19 Vaccine (6 - 2024-25 season) 09/01/2024 12/2/2022    Colorectal Cancer Screening 01/25/2025 1/25/2022    Lipid Panel 05/17/2025 5/17/2024    Hemoglobin A1c (Prediabetes) 02/05/2026 2/5/2025    TETANUS VACCINE 07/03/2029 7/3/2019        No orders of the defined types were placed in this encounter.      The following information is provided to all patients.  This information is to help you find resources for any of the problems found today that may be  affecting your health:                  Living healthy guide: www.[facility].[place].[facility]      Understanding Diabetes: www.diabetes.org      Eating healthy: www.cdc.gov/healthyweight      CDC home safety checklist: www.cdc.gov/steadi/patient.html      Agency on Aging: www.goea.[place]      Alcoholics anonymous (AA): www.aa.org      Physical Activity: www.mino.nih.gov/mv1wbpk      Tobacco use: www.quitwithusla.org          No

## 2025-04-10 NOTE — PROGRESS NOTES
"  Haritha Valle presented for a  Medicare AWV and comprehensive Health Risk Assessment today. The following components were reviewed and updated:    .Patient accompanied by  caregiver , who was present throughout the visit and aided in information gathering.        Medical history  Family History  Social history  Allergies and Current Medications  Health Risk Assessment  Health Maintenance  Care Team         ** See Completed Assessments for Annual Wellness Visit within the encounter summary.**         The following assessments were completed:  Living Situation  CAGE  Depression Screening  Timed Get Up and Go  Whisper Test  Cognitive Function Screening  Nutrition Screening  ADL Screening  PAQ Screening      Opioid documentation:      Patient does not have a current opioid prescription.        Vitals:    04/10/25 1241   BP: (!) 80/62   Patient Position: Sitting   Pulse: 62   Resp: 18   Temp: 97.4 °F (36.3 °C)   SpO2: 98%   Weight: 97 kg (213 lb 13.5 oz)   Height: 5' 10" (1.778 m)     Body mass index is 30.68 kg/m².  Physical Exam  Vitals and nursing note reviewed.   Constitutional:       General: He is not in acute distress.     Appearance: He is well-developed.   HENT:      Head: Normocephalic and atraumatic.   Eyes:      Pupils: Pupils are equal, round, and reactive to light.   Neck:      Vascular: No carotid bruit.   Cardiovascular:      Rate and Rhythm: Normal rate and regular rhythm.      Pulses: Normal pulses.      Heart sounds: Normal heart sounds. No murmur heard.     No gallop.   Pulmonary:      Effort: Pulmonary effort is normal.      Breath sounds: Normal breath sounds.   Abdominal:      General: Bowel sounds are normal. There is no distension.      Palpations: Abdomen is soft.      Tenderness: There is no abdominal tenderness.   Musculoskeletal:         General: Normal range of motion.   Skin:     General: Skin is warm and dry.   Neurological:      Motor: No abnormal muscle tone.   Psychiatric:         " Speech: Speech normal.         Behavior: Behavior normal.         Thought Content: Thought content normal.         Cognition and Memory: Cognition is impaired. Memory is impaired. He exhibits impaired recent memory and impaired remote memory.         Judgment: Judgment normal.       Current Outpatient Medications   Medication Instructions    albuterol (PROVENTIL) 2.5 mg, Nebulization, Every 4-6 hours PRN    albuterol (PROVENTIL/VENTOLIN HFA) 90 mcg/actuation inhaler 2 puffs, Inhalation, Every 4 hours PRN, Rescue    ascorbic acid (vitamin C) (VITAMIN C) 1,000 mg, Daily    zkqxyslbq-psydyiid-lsclsyp ala (BIKTARVY) -25 mg (25 kg or greater) 1 tablet, Oral, Daily    busPIRone (BUSPAR) 15 mg, Oral, 3 times daily    cloNIDine (CATAPRES) 0.1 mg, Oral, Every 6 hours PRN    donepeziL (ARICEPT) 5 mg, Oral, Nightly    FLUoxetine 40 mg, Oral    fluticasone propionate (FLONASE) 50 mcg/actuation nasal spray [place] 1 spray every day by intranasal route.    furosemide (LASIX) 40 mg, Oral, Daily PRN, Do not take if BP is below 110/70 or feeling dry;    magnesium oxide (MAG-OX) 400 mg, Oral, Daily    metoclopramide HCl (REGLAN) 5 mg, Oral, Nightly PRN    metoprolol succinate (TOPROL-XL) 50 mg, Oral, 2 times daily    ondansetron (ZOFRAN-ODT) 8 mg, Oral, Every 6 hours PRN    potassium chloride SA (K-DUR,KLOR-CON M) 10 MEQ tablet 20 mEq, Oral, Daily PRN    promethazine (PHENERGAN) 12.5 MG Tab TAKE ONE TABLET BY MOUTH DAILY AS NEEDED FOR NAUSEA.    rosuvastatin (CRESTOR) 20 MG tablet TAKE 1 TABLET EVERY DAY    sacubitriL-valsartan (ENTRESTO)  mg per tablet 1 tablet, Oral, 2 times daily    ubidecarenone (COENZYME Q10 ORAL) Daily                 Diagnoses and health risks identified today and associated recommendations/orders:    1. Encounter for Medicare annual wellness exam Referral to Enhanced Annual Wellness Visit (eAWV) W+1    2. Hypotension, unspecified hypotension type  BP low 80/62 with dizziness no syncope  C/G and pt  report pt took 2 clonidine before am scheduled meds/xanax due BP greater SBP/ 200/100  Spoke with bala- appt cancelled for  today  Soke with card- instructed to mointer/fluid and follow up tomorrow - mointer BP  ER if symptomatic  Isntructed pt and c./g - no BP meds today  If BP OVER 130/80 in am resume schedule med only am med- SEE CARD TOMORROW      3. Dementia, unspecified dementia severity, unspecified dementia type, unspecified whether behavioral, psychotic, or mood disturbance or anxiety  Chronic and Ongoing- see meds    Continue current treatment plan as previously prescribed with your PCP      4. Hypertensive heart disease with heart failure  Chronic and Ongoing. See med Continue current treatment plan as previously prescribed with your card md    5. Current moderate episode of major depressive disorder without prior episode  Chronic and Ongoing.  See meds  Continue current treatment plan as previously prescribed with your p p    6. RAMONITA (obstructive sleep apnea)  Chronic and Ongoing.     evaluation for INSPIRE   Scheduled to see ENT      7. HIV disease  Chronic and Ongoing on   bacteria-emtricit-tenofov ala (BIKTARVY) -25 mg daily -followed by infectious controll md      8. Mixed hyperlipidemia  Chronic and Ongoing. See meds  Continue current treatment plan as previously prescribed with your PCP    9. Solar purpura  Chronic and Ongoing. See meds. Followed by PCP    10. Exudative age-related macular degeneration of both eyes with active choroidal neovascularization  Chronic and Ongoing.- see meds  Continue current treatment plan as previously prescribed with your PCP    11. Other specified persistent mood disorders  Chronic and Ongoing. See meds . Continue current treatment plan as previously prescribed with your PCP    12. Prediabetes  Chronic and stable with diets . 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 See meds.  Followed.infection controlled md    14. Hypogonadism male  Chronic and Ongoing.   Rescheduled to see urologist for penis tx.    15. Essential hypertension  Chronic and Ongoing. See meds. See # 2  Scheduled to see card tomorrow    I offered to discuss advanced care planning, including how to pick a person who would make decisions for you if you were unable to make them for yourself, called a health care power of , and what kind of decisions you might make such as use of life sustaining treatments such as ventilators and tube feeding when faced with a life limiting illness recorded on a living will that they will need to know. (How you want to be cared for as you near the end of your natural life)     X  Patient is unable to engage in a discussion regarding advanced directives due to severe physical and/or cognitive impairment.      Provided Haritha with a 5-10 year written screening schedule and personal prevention plan. Recommendations were developed using the USPSTF age appropriate recommendations. Education, counseling, and referrals were provided as needed. After Visit Summary printed and given to patient which includes a list of additional screenings\tests needed.    Follow up in about 1 year (around 4/10/2026).    Beth Stout NP

## 2025-04-11 ENCOUNTER — OFFICE VISIT (OUTPATIENT)
Dept: CARDIOLOGY | Facility: CLINIC | Age: 80
End: 2025-04-11
Payer: MEDICARE

## 2025-04-11 VITALS
HEART RATE: 74 BPM | SYSTOLIC BLOOD PRESSURE: 142 MMHG | HEIGHT: 71 IN | DIASTOLIC BLOOD PRESSURE: 90 MMHG | WEIGHT: 213.88 LBS | BODY MASS INDEX: 29.94 KG/M2

## 2025-04-11 DIAGNOSIS — E78.2 MIXED HYPERLIPIDEMIA: ICD-10-CM

## 2025-04-11 DIAGNOSIS — B20 HIV DISEASE: ICD-10-CM

## 2025-04-11 DIAGNOSIS — I50.42 CHRONIC COMBINED SYSTOLIC AND DIASTOLIC CONGESTIVE HEART FAILURE: ICD-10-CM

## 2025-04-11 DIAGNOSIS — I35.1 NONRHEUMATIC AORTIC VALVE INSUFFICIENCY: ICD-10-CM

## 2025-04-11 DIAGNOSIS — E78.1 HYPERTRIGLYCERIDEMIA: ICD-10-CM

## 2025-04-11 DIAGNOSIS — R06.09 DOE (DYSPNEA ON EXERTION): ICD-10-CM

## 2025-04-11 DIAGNOSIS — I11.0 HYPERTENSIVE HEART DISEASE WITH HEART FAILURE: ICD-10-CM

## 2025-04-11 DIAGNOSIS — R42 POSTURAL DIZZINESS WITH PRESYNCOPE: ICD-10-CM

## 2025-04-11 DIAGNOSIS — B20 HIV DISEASE: Chronic | ICD-10-CM

## 2025-04-11 DIAGNOSIS — R55 POSTURAL DIZZINESS WITH PRESYNCOPE: ICD-10-CM

## 2025-04-11 DIAGNOSIS — I10 ESSENTIAL HYPERTENSION: ICD-10-CM

## 2025-04-11 DIAGNOSIS — R06.02 SOB (SHORTNESS OF BREATH): ICD-10-CM

## 2025-04-11 DIAGNOSIS — G47.33 OBSTRUCTIVE SLEEP APNEA: Primary | ICD-10-CM

## 2025-04-11 DIAGNOSIS — R07.9 CHEST PAIN, UNSPECIFIED TYPE: ICD-10-CM

## 2025-04-11 DIAGNOSIS — I70.0 AORTIC ATHEROSCLEROSIS: ICD-10-CM

## 2025-04-11 DIAGNOSIS — I25.10 CAD IN NATIVE ARTERY: ICD-10-CM

## 2025-04-11 DIAGNOSIS — I34.0 NONRHEUMATIC MITRAL VALVE REGURGITATION: ICD-10-CM

## 2025-04-11 PROCEDURE — 99999 PR PBB SHADOW E&M-EST. PATIENT-LVL IV: CPT | Mod: PBBFAC,,, | Performed by: INTERNAL MEDICINE

## 2025-04-11 PROCEDURE — 99214 OFFICE O/P EST MOD 30 MIN: CPT | Mod: PBBFAC | Performed by: INTERNAL MEDICINE

## 2025-04-11 RX ORDER — METOPROLOL SUCCINATE 100 MG/1
100 TABLET, EXTENDED RELEASE ORAL 2 TIMES DAILY
Qty: 180 TABLET | Refills: 1 | Status: SHIPPED | OUTPATIENT
Start: 2025-04-11

## 2025-04-11 RX ORDER — BICTEGRAVIR SODIUM, EMTRICITABINE, AND TENOFOVIR ALAFENAMIDE FUMARATE 50; 200; 25 MG/1; MG/1; MG/1
1 TABLET ORAL DAILY
Qty: 90 TABLET | Refills: 6 | Status: ACTIVE | OUTPATIENT
Start: 2025-04-11

## 2025-04-11 NOTE — PROGRESS NOTES
Subjective:   Patient ID:  Haritha Valle is a 79 y.o. male who presents for cardiac consult of No chief complaint on file.        The patient came in today for cardiac consult of No chief complaint on file.    Referring Physician: Hesham Andrade II, MD   Reason for initial consult: LOC, palpitations      Haritha Valle is a 79 y.o. male pt with HFpEF, HTN, HLD, HIV, anxiety, depression, hypogonadism, ED presents for follow up CV eval         3/5/25  Nuc stress 2/2025 mild to mod fixed defect, equivocal, EF improved to 48%.     From pt    Patient states that he received message saying his appt for March 12 had been cancelled. Advised that it was not. Patient states that he really does not need the appt canceled because he has been running extremely high blood pressures even at night for several days with dizziness. Asked what his bp is right now since he said he just took it and it was 176/116. Advised patient that he needed to go to the ER for further evaluation and treatment. Patient states he understands. I asked if he was going to go to the ER and he said if I can find transportation. Advised if he could not then he needed to call 911. Patient voiced understanding.     BP was elevated - told to increase Entresto dose.   He has BP 180s/100s he takes gummies   He has diarrhea for 1 month   He has panic attacks     3/19/25  BP and HR stable here. BMI 31 - 219 lbs  His breathing has improved.   BP at home 180s/100s- 110.     4/11/25  BP was very low yest - 80/62; took two clonidine in the AM along with Xanax.   BP today is high 142/90. BMI 29 - 213 lbs   He has nausea in AM       Results for orders placed during the hospital encounter of 02/20/25    Nuclear Stress - Cardiology Interpreted    Interpretation Summary    Equivocal myocardial perfusion scan.    There is a mild to moderate intensity, small to medium sized, equivocal perfusion abnormality that is fixed in the apical and anteroapical wall(s). This finding  is equivocal due to soft tissue shadow.    There are no other significant perfusion abnormalities.    The gated perfusion images showed an ejection fraction of 48% at rest. The gated perfusion images showed an ejection fraction of 48% post stress.    There is mild global hypokinesis at rest and post-stress.    The ECG portion of the study is negative for ischemia.      Results for orders placed during the hospital encounter of 02/05/25    Echo    Interpretation Summary    Left Ventricle: The left ventricle is normal in size. Normal wall thickness. There is concentric hypertrophy. There is moderately reduced systolic function with a visually estimated ejection fraction of 30 - 40%. Ejection fraction is approximately 30%. Grade I diastolic dysfunction.    Right Ventricle: Normal right ventricular cavity size. Wall thickness is normal. Systolic function is mildly reduced.    Left Atrium: Left atrium is moderately dilated.    IVC/SVC: Normal venous pressure at 3 mmHg.    Patient sent to ER due to increased dyspnea, HTN,  and drop in EF      MRI brain   Moderate global cortical atrophy with mild frontal lobe predominance.  Score 1 bilateral medial temporal lobe atrophy.  Minimal nonspecific white matter changes, likely senescent.    Results for orders placed during the hospital encounter of 06/05/24    Echo    Interpretation Summary    Left Ventricle: The left ventricle is normal in size. Normal wall thickness. There is concentric hypertrophy. Normal wall motion. There is normal systolic function with a visually estimated ejection fraction of 55 - 60%. There is normal diastolic function.    Right Ventricle: Normal right ventricular cavity size. Wall thickness is normal. Systolic function is normal.    Left Atrium: Left atrium is mildly dilated.    Pulmonary Artery: The estimated pulmonary artery systolic pressure is 18 mmHg.    IVC/SVC: Normal venous pressure at 3 mmHg.      Nuclear Quantitative Functional Analysis:    LVEF: 44 %    Impression: ABNORMAL MYOCARDIAL PERFUSION  1. There is evidence for mild myocardial ischemia in the inferior and inferoapical walls of the left ventricle.   2. The perfusion scan is free of evidence for myocardial injury.   3. Resting wall motion is physiologic.   4. There is resting LV dysfunction with a reduced ejection fraction of 44 %.   5. The ventricular volumes are normal at rest and stress.   6. The extracardiac distribution of radioactivity is normal.     This document has been electronically    SIGNED BY: Stephon Corcoran MD On: 01/27/2020 16:38    2/3/20   1.   Non-obstructive CAD.   2.   low normal to mildy depressed ef.      Past Medical History:   Diagnosis Date    Anxiety 07/03/2019    Basal cell carcinoma     CAD in native artery 11/27/2023    Depression     Dyslipidemia 11/16/2023    Essential hypertension 01/24/2013    Hepatitis B     Hepatitis C antibody test positive 02/05/2025    RNA NEGATIVE    HIV infection     Hypertension     Immune disorder     Mild cognitive impairment 10/01/2010    Nonrheumatic aortic valve insufficiency 12/19/2023    Nonrheumatic mitral valve regurgitation 12/19/2023       Past Surgical History:   Procedure Laterality Date    APPENDECTOMY      CARDIAC CATHETERIZATION      no stent    CHOLECYSTECTOMY      COLONOSCOPY N/A 11/3/2016    Procedure: COLONOSCOPY;  Surgeon: Maxi Villasenor MD;  Location: [facility] ([address] [place] [address]);  Service: Endoscopy;  Laterality: N/A;  last colonoscopy with Dr Jarrell    COLONOSCOPY N/A 1/25/2022    Procedure: COLONOSCOPY;  Surgeon: Silvino Rosales MD;  Location: [facility] ENDO;  Service: Endoscopy;  Laterality: N/A;    LEFT HEART CATHETERIZATION Left 2/3/2020    Procedure: CATHETERIZATION, HEART, LEFT;  Surgeon: Chele Ko MD;  Location: [facility] CATH LAB;  Service: Cardiology;  Laterality: Left;  malur pt       Social History     Tobacco Use    Smoking status: Never    Smokeless tobacco: Never   Substance Use Topics    Alcohol use: No     Drug use: Never       Family History   Problem Relation Name Age of Onset    Hearing loss Mother Sonam Valle     Vision loss Mother Sonam Valle     Heart disease Father Demarco Valle     Heart failure Father Demarco Valle     Diabetes Neg Hx      Hypertension Neg Hx         Patient's Medications   New Prescriptions    No medications on file   Previous Medications    ALBUTEROL (PROVENTIL) 2.5 MG /3 ML (0.083 %) NEBULIZER SOLUTION    Take 3 mLs (2.5 mg total) by nebulization every 4 to 6 hours as needed for Wheezing or Shortness of Breath.    ALBUTEROL (PROVENTIL/VENTOLIN HFA) 90 MCG/ACTUATION INHALER    Inhale 2 puffs into the lungs every 4 (four) hours as needed for Wheezing. Rescue    ASCORBIC ACID, VITAMIN C, (VITAMIN C) 1000 MG TABLET    Take 1,000 mg by mouth once daily.    RDXTKDKVM-XMRSOEDE-MDFTSRK ALA (BIKTARVY) -25 MG (25 KG OR GREATER)    Take 1 tablet by mouth once daily.    BUSPIRONE (BUSPAR) 15 MG TABLET    Take 1 tablet (15 mg total) by mouth 3 (three) times daily.    CLONIDINE (CATAPRES) 0.1 MG TABLET    Take 1 tablet (0.1 mg total) by mouth every 6 (six) hours as needed (BP > 180/90).    DONEPEZIL (ARICEPT) 5 MG TABLET    Take 1 tablet (5 mg total) by mouth every evening.    FLUOXETINE 40 MG CAPSULE    TAKE 1 CAPSULE EVERY DAY    FLUTICASONE PROPIONATE (FLONASE) 50 MCG/ACTUATION NASAL SPRAY    [place] 1 spray every day by intranasal route.    FUROSEMIDE (LASIX) 40 MG TABLET    Take 1 tablet (40 mg total) by mouth daily as needed (edema, swelling, with BP over 140/90). Do not take if BP is below 110/70 or feeling dry;    MAGNESIUM OXIDE (MAG-OX) 400 MG (241.3 MG MAGNESIUM) TABLET    Take 1 tablet (400 mg total) by mouth once daily.    METOCLOPRAMIDE HCL (REGLAN) 5 MG TABLET    Take 1 tablet (5 mg total) by mouth nightly as needed (nausea).    ONDANSETRON (ZOFRAN-ODT) 8 MG TBDL    Take 1 tablet (8 mg total) by mouth every 6 (six) hours as needed (nausea).    POTASSIUM CHLORIDE SA  "(K-DUR,KLOR-CON M) 10 MEQ TABLET    Take 2 tablets (20 mEq total) by mouth daily as needed (take with lasix).    PROMETHAZINE (PHENERGAN) 12.5 MG TAB    TAKE ONE TABLET BY MOUTH DAILY AS NEEDED FOR NAUSEA.    ROSUVASTATIN (CRESTOR) 20 MG TABLET    TAKE 1 TABLET EVERY DAY    SACUBITRIL-VALSARTAN (ENTRESTO)  MG PER TABLET    Take 1 tablet by mouth 2 (two) times daily.    UBIDECARENONE (COENZYME Q10 ORAL)    Take by mouth once daily.   Modified Medications    Modified Medication Previous Medication    METOPROLOL SUCCINATE (TOPROL-XL) 100 MG 24 HR TABLET metoprolol succinate (TOPROL-XL) 50 MG 24 hr tablet       Take 1 tablet (100 mg total) by mouth 2 (two) times daily.    Take 1 tablet (50 mg total) by mouth 2 (two) times daily.   Discontinued Medications    No medications on file       Review of Systems   Constitutional:  Positive for malaise/fatigue.   HENT: Negative.     Eyes: Negative.    Respiratory:  Positive for shortness of breath.    Cardiovascular:  Positive for chest pain and palpitations.   Gastrointestinal: Negative.    Genitourinary: Negative.    Musculoskeletal: Negative.    Skin: Negative.    Neurological:  Positive for dizziness and loss of consciousness.   Endo/Heme/Allergies: Negative.    Psychiatric/Behavioral: Negative.     All 12 systems otherwise negative.      Wt Readings from Last 3 Encounters:   04/11/25 97 kg (213 lb 13.5 oz)   04/10/25 97 kg (213 lb 13.5 oz)   03/27/25 97.5 kg (214 lb 15.2 oz)     Temp Readings from Last 3 Encounters:   04/10/25 97.4 °F (36.3 °C)   02/06/25 97.4 °F (36.3 °C) (Oral)   01/06/25 98.3 °F (36.8 °C) (Tympanic)     BP Readings from Last 3 Encounters:   04/11/25 (!) 142/90   04/10/25 (!) 80/62   03/27/25 120/60     Pulse Readings from Last 3 Encounters:   04/11/25 74   04/10/25 62   03/27/25 67       BP (!) 142/90 (BP Location: Right arm, Patient Position: Sitting)   Pulse 74   Ht 5' 11" (1.803 m)   Wt 97 kg (213 lb 13.5 oz)   BMI 29.83 kg/m²     Objective: "   Physical Exam  Vitals and nursing note reviewed.   Constitutional:       General: He is not in acute distress.     Appearance: He is well-developed. He is not diaphoretic.   HENT:      Head: Normocephalic and atraumatic.      Nose: Nose normal.   Eyes:      General: No scleral icterus.     Conjunctiva/sclera: Conjunctivae normal.   Neck:      Thyroid: No thyromegaly.      Vascular: No JVD.   Cardiovascular:      Rate and Rhythm: Normal rate and regular rhythm.      Heart sounds: S1 normal and S2 normal. No murmur heard.     No friction rub. No gallop. No S3 or S4 sounds.   Pulmonary:      Effort: Pulmonary effort is normal. No respiratory distress.      Breath sounds: Normal breath sounds. No stridor. No wheezing or rales.   Chest:      Chest wall: No tenderness.   Abdominal:      General: Bowel sounds are normal. There is no distension.      Palpations: Abdomen is soft. There is no mass.      Tenderness: There is no abdominal tenderness. There is no rebound.   Genitourinary:     Comments: Deferred  Musculoskeletal:         General: No tenderness or deformity. Normal range of motion.      Cervical back: Normal range of motion and neck supple.   Lymphadenopathy:      Cervical: No cervical adenopathy.   Skin:     General: Skin is warm and dry.      Coloration: Skin is not pale.      Findings: No erythema or rash.   Neurological:      Mental Status: He is alert and oriented to person, place, and time.      Motor: No abnormal muscle tone.      Coordination: Coordination normal.   Psychiatric:         Behavior: Behavior normal.         Thought Content: Thought content normal.         Judgment: Judgment normal.         Lab Results   Component Value Date     03/05/2025    K 4.1 03/05/2025     03/05/2025    CO2 26 03/05/2025    BUN 18 03/05/2025    CREATININE 1.6 (H) 03/05/2025     03/05/2025    HGBA1C 6.5 (H) 02/05/2025    MG 1.9 03/05/2025    AST 61 (H) 02/05/2025    ALT 52 (H) 02/05/2025    ALBUMIN  3.5 02/05/2025    ALBUMIN 4.3 04/30/2018    PROT 8.7 (H) 02/05/2025    BILITOT 0.9 02/05/2025    WBC 9.14 02/05/2025    HGB 17.8 02/05/2025    HCT 55.7 (H) 02/05/2025    MCV 88 02/05/2025     02/05/2025    INR 1.0 01/28/2020    TSH 1.137 04/27/2023    CHOL 172 12/18/2023    HDL 36 (L) 12/18/2023    LDLCALC 73.2 12/18/2023    TRIG 314 (H) 12/18/2023    BNP 78 03/05/2025     Assessment:      1. Obstructive sleep apnea    2. Chronic combined systolic and diastolic congestive heart failure    3. Hypertensive heart disease with heart failure    4. Nonrheumatic mitral valve regurgitation    5. MAIER (dyspnea on exertion)    6. CAD in native artery    7. Mixed hyperlipidemia    8. HIV disease    9. Chest pain, unspecified type    10. Hypertriglyceridemia    11. Nonrheumatic aortic valve insufficiency    12. Postural dizziness with presyncope    13. Essential hypertension    14. SOB (shortness of breath)    15. Aortic atherosclerosis          Plan:   1. HTN  - elevated    - titrate meds - increase to Toprol 100mg BID   - episodes of HTN urgency - r/o sec causes - labs and renal u/s ordered - neg  - ECHO 6/2024 with normal bi V function and valves, PASP 18 mmHg.   - cont clonidine 0.1 mg PRN     2. HLD with elevated TGs  - cont meds  - added- vascepa in past    3. HIV  - cont meds per PCP/ID    4. ED/hypogonadism  - cont meds per PCP PRN    5. Syncope/dizziness/palpitations with CP - non obs CAD  - prior exercise nuclear stress test to r/o ischemia - abnormal, LHC neg  - ECHO 6/2024 with normal bi V function and valves, PASP 18 mmHg.   - carotids - neg   - has been to ENT and Neuro - has atrophy and hearing aids now  -Vital monitor - 1/2025 with freq PACs, SVT episdoes, AVG HR 79 BPM. Overall PSVC [place] at 23.12 %  - Nuc stress 2/2025 mild to mod fixed defect, equivocal, EF improved to 48%.       6.ECHO 2/2025 EF 30%, grade 1 DD, mild reduced RV function.   - cont meds - Toprol and ARB - changed to Entresto -->  increase to max dose  - cont lasix PRN  - Nuc stress 2/2025 mild to mod fixed defect, equivocal, EF improved to 48%.   - repeat labs PRN; increase Metoprolol to 25 mg BID --> increase to Toprol 100mg BID     7. Anxiety  - refer to psych    8. RAMONITA  - cont CPAP; has not been using   - moderate to severe obstructive sleep apnea  - needs to eval for Inspire     9. Obesity - . BMI 31 - 219 lbs  BMI 31 - 219 lbs  - cont weight loss     10. DM2  - f/u with PCP      Visit today included increased complexity associated with the care of the episodic problem dyspnea addressed and managing the longitudinal care of the patient due to the serious and/or complex managed problem(s) .      Thank you for allowing me to participate in this patient's care. Please do not hesitate to contact me with any questions or concerns. Consult note has been forwarded to the referral physician.

## 2025-04-16 ENCOUNTER — PATIENT MESSAGE (OUTPATIENT)
Dept: PSYCHIATRY | Facility: CLINIC | Age: 80
End: 2025-04-16
Payer: MEDICARE

## 2025-04-17 ENCOUNTER — OFFICE VISIT (OUTPATIENT)
Dept: UROLOGY | Facility: CLINIC | Age: 80
End: 2025-04-17
Payer: MEDICARE

## 2025-04-17 DIAGNOSIS — R97.21 RISING PSA FOLLOWING TREATMENT FOR MALIGNANT NEOPLASM OF PROSTATE: ICD-10-CM

## 2025-04-17 DIAGNOSIS — E29.1 HYPOGONADISM MALE: Primary | ICD-10-CM

## 2025-04-17 PROCEDURE — 99999PBSHW PR PBB SHADOW TECHNICAL ONLY FILED TO HB: Mod: PBBFAC,,,

## 2025-04-17 PROCEDURE — 99499 UNLISTED E&M SERVICE: CPT | Mod: S$PBB,,, | Performed by: UROLOGY

## 2025-04-17 PROCEDURE — 99999 PR PBB SHADOW E&M-EST. PATIENT-LVL II: CPT | Mod: PBBFAC,,, | Performed by: UROLOGY

## 2025-04-17 PROCEDURE — 99212 OFFICE O/P EST SF 10 MIN: CPT | Mod: PBBFAC | Performed by: UROLOGY

## 2025-04-17 RX ORDER — LEVOFLOXACIN 500 MG/1
500 TABLET, FILM COATED ORAL DAILY
Qty: 3 TABLET | Refills: 0 | Status: SHIPPED | OUTPATIENT
Start: 2025-04-17 | End: 2025-04-20

## 2025-04-17 NOTE — PROGRESS NOTES
Chief Complaint:   Encounter Diagnoses   Name Primary?    Hypogonadism male Yes    Rising PSA following treatment for malignant neoplasm of prostate        HPI:    4/17/25- here for testopel insertion.    78-year-old gentleman who comes in with a longstanding history of hypogonadism.  Patient states that years ago he was treated by testosterone injections every 2 weeks, uncertain of the dosage.  Cutaneous therapy did not assist.  Patient is interested in pellets though.  Patient has decreased energy with fatigue, libido is not an issue nor as erections.  Patient is otherwise voiding well with no other urological history, no family history of urological cancers or stones.    Allergies:  No known drug allergies    Medications:  See MAR    Review of Systems:  General: No fever, chills, fatigability, or weight loss.  Skin: No rashes, itching, or changes in color or texture of skin.  Chest: Denies MAIER, cyanosis, wheezing, cough, and sputum production.  Abdomen: Appetite fine. No weight loss. Denies diarrhea, abdominal pain, hematemesis, or blood in stool.  Musculoskeletal: No joint stiffness or swelling. Denies back pain.  : As above.  All other review of systems negative.    PMH:   has a past medical history of Anxiety (07/03/2019), Basal cell carcinoma, CAD in native artery (11/27/2023), Depression, Dyslipidemia (11/16/2023), Essential hypertension (01/24/2013), Hepatitis B, Hepatitis C antibody test positive, HIV infection, Hypertension, Immune disorder, Mild cognitive impairment (10/01/2010), Nonrheumatic aortic valve insufficiency (12/19/2023), and Nonrheumatic mitral valve regurgitation (12/19/2023).    PSH:   has a past surgical history that includes Colonoscopy (N/A, 11/3/2016); Cholecystectomy; Appendectomy; Left heart catheterization (Left, 2/3/2020); Cardiac catheterization; and Colonoscopy (N/A, 1/25/2022).    FamHx: family history includes Heart disease in his father; Heart failure in his father.    SocHx:   reports that he has never smoked. He has never used smokeless tobacco. He reports that he does not currently use drugs. He reports that he does not drink alcohol.      Physical Exam:  There were no vitals filed for this visit.  General: A&Ox3, no apparent distress, no deformities  Neck: No masses, normal ROM  Lungs: normal inspiration, no use of accessory muscles  Heart: normal pulse, no arrhythmias  Abdomen: Soft, NT, ND, no masses, no hernias, no hepatosplenomegaly  Skin: The skin is warm and dry. No jaundice.  Ext: No c/c/e.  : 1/24- Test desc sam, no abnormalities of epididymus. Normal penile and scrotal skin. Meatus normal.    Labs/Studies:   Testopel  4/17/25, 12/12/24, 9/12/24, 5/30/24, 2/29/24  PSA 1.7 12/23   Testosterone 39 1/24  Estrogen 107 1/24    Patient was sterilely prepped and draped, then positioned in the right side up, lateral decubitus position.  Lidocaine was used for local anesthesia.  Eleven blade was used to incise the skin, included trocars with the testopel kit were then used.  They were inserted within the incision site and we placed the pellets in a V location.  10 pellets total were inserted without difficulty.  Trocars were removed and pressure was applied for 2 min.  Steri-Strips applied for local coverage, he will return for lab assessment in 3 months.      Impression/Plan:     Hypogonadism- patient tolerated the procedure well and will call with any issues in the meantime.  Will see him back in 3 months with labs and to repeat insertion.  Of note the patient has previously used injections and has failed creams.

## 2025-04-25 ENCOUNTER — OFFICE VISIT (OUTPATIENT)
Dept: OTOLARYNGOLOGY | Facility: CLINIC | Age: 80
End: 2025-04-25
Payer: MEDICARE

## 2025-04-25 VITALS — BODY MASS INDEX: 40.96 KG/M2 | WEIGHT: 216.94 LBS | HEIGHT: 61 IN

## 2025-04-25 DIAGNOSIS — G47.33 OSA ON CPAP: Primary | ICD-10-CM

## 2025-04-25 DIAGNOSIS — Z78.9 INTOLERANCE OF CONTINUOUS POSITIVE AIRWAY PRESSURE (CPAP) VENTILATION: ICD-10-CM

## 2025-04-25 PROCEDURE — 99999 PR PBB SHADOW E&M-EST. PATIENT-LVL III: CPT | Mod: PBBFAC,,, | Performed by: STUDENT IN AN ORGANIZED HEALTH CARE EDUCATION/TRAINING PROGRAM

## 2025-04-25 PROCEDURE — 99213 OFFICE O/P EST LOW 20 MIN: CPT | Mod: PBBFAC | Performed by: STUDENT IN AN ORGANIZED HEALTH CARE EDUCATION/TRAINING PROGRAM

## 2025-04-25 NOTE — PROGRESS NOTES
Chief complaint:    Chief Complaint   Patient presents with    Sleep Apnea    Snoring     Pt is coming in today for inspire consultation/per Betsy           Referring Provider:  No referring provider defined for this encounter.      History of present illness:     Haritha is here for Obstructive sleep apnea and intolerance of CPAP.  he was diagnosed with RAMONITA 5+  years ago.   Last sleep study: 2020  he has issues with CPAP compliance, due to nasal obstruction,     Treated with O2 instead due to poor CPAP tolerance.     Previous surgical treatments for sleep apnea: none     Pertinent medical issues: HTN, HIV, CAD  Anticoagulation: none    Body mass index is 40.56 kg/m².     History      Past Medical History:   Past Medical History:   Diagnosis Date    Anxiety 07/03/2019    Basal cell carcinoma     CAD in native artery 11/27/2023    Depression     Dyslipidemia 11/16/2023    Essential hypertension 01/24/2013    Hepatitis B     Hepatitis C antibody test positive 02/05/2025    RNA NEGATIVE    HIV infection     Hypertension     Immune disorder     Mild cognitive impairment 10/01/2010    Nonrheumatic aortic valve insufficiency 12/19/2023    Nonrheumatic mitral valve regurgitation 12/19/2023         Past Surgical History:  Past Surgical History:   Procedure Laterality Date    APPENDECTOMY      CARDIAC CATHETERIZATION      no stent    CHOLECYSTECTOMY      COLONOSCOPY N/A 11/3/2016    Procedure: COLONOSCOPY;  Surgeon: Maxi Villasenor MD;  Location: [facility] ([address] [place] [address]);  Service: Endoscopy;  Laterality: N/A;  last colonoscopy with Dr Jarrell    COLONOSCOPY N/A 1/25/2022    Procedure: COLONOSCOPY;  Surgeon: Silvino Rosales MD;  Location: [facility] ENDO;  Service: Endoscopy;  Laterality: N/A;    LEFT HEART CATHETERIZATION Left 2/3/2020    Procedure: CATHETERIZATION, HEART, LEFT;  Surgeon: Chele Ko MD;  Location: [facility] CATH LAB;  Service: Cardiology;  Laterality: Left;  malur pt         Medications: Medication list reviewed.  "He  has a current medication list which includes the following prescription(s): albuterol, albuterol, ascorbic acid (vitamin c), biktarvy, buspirone, clonidine, donepezil, fluoxetine, fluticasone propionate, furosemide, magnesium oxide, metoclopramide hcl, metoprolol succinate, ondansetron, alprostadil, potassium chloride sa, promethazine, rosuvastatin, entresto, and ubidecarenone.     Allergies:   Review of patient's allergies indicates:   Allergen Reactions    No known drug allergies          Family history: family history includes Hearing loss in his mother; Heart disease in his father; Heart failure in his father; Vision loss in his mother.         Social History          Alcohol use:  reports no history of alcohol use.            Tobacco:  reports that he has never smoked. He has never used smokeless tobacco.         Physical Examination      Vitals: Height 5' 1.32" (1.558 m), weight 98.4 kg (216 lb 14.9 oz).      General: Well developed, well nourished, well hydrated.     Voice: no dysphonia, no dysarthria      Head/Face: Normocephalic, atraumatic. No scars or lesions. Facial musculature equal.     Eyes: No scleral icterus or conjunctival hemorrhage. EOMI. PERRLA.     Ears:     Right ear: No gross deformity. EAC is clear of debris and erythema. TM are intact with a pneumatized middle ear. No signs of retraction, fluid or infection.      Left ear: No gross deformity. EAC is clear of debris and erythema. TM are intact with a pneumatized middle ear. No signs of retraction, fluid or infection.      Nose: No gross deformity or lesions. No purulent discharge. No significant NSD.      Mouth/Oropharynx: Lips without any lesions. No mucosal lesions within the oropharynx. No tonsillar exudate or lesions. Pharyngeal walls symmetrical. Uvula midline. Tongue midline without lesions.     Neck: Trachea midline. No masses. No thyromegaly or nodules palpated.     Lymphatic: No lymphadenopathy in the neck.     Extremities: No " cyanosis. Warm and well-perfused.     Skin: No scars or lesions on face or neck.      Neurologic: Moving all extremities without gross abnormality.CN II-XII grossly intact. House-Brackmann 1/6. No signs of nystagmus.          Data reviewed      Review of records:      I reviewed records from the referring provider's office visits.  These describe the history, workup, and/or treatment of this problem thus far:     Pulm and prior ENT notes reviewed      Imaging:      I have independently reviewed the following imaging with the findings noted below:     MRI brain 6/27/24  Septum midline  Clear mastoids and paranasal sinuses    Polysomnogram 2020  results independently reviewed:    AHI 26.8  O2% matilda 79%  PLMS Index = 67.6 / hr asleep    RAMONITA, PLMS, and stress - related arousals (anxiety and depression)  interact to significantly impair sleep quality and restoratives, making treatment of each and all of these disorders very important  for restorative sleep.    Procedures:    Procedure -Transnasal fiberoptic laryngoscopy     Surgeon: Darin Hair M.D. .      Anesthesia: topical 0.05% oxymetazoline with 4% lidocaine      Complications: None.     Description of Procedure: With the patient in the sitting position, topical lidocaine and oxymetazoline was applied to the nose. The scope was passed through the nose. Examination was carried out of the nose, nasopharynx, oropharynx, hypopharynx, and larynx with findings as noted above. Scope was removed. The patient tolerated the procedure well.      Findings: No masses or lesions in the nose, nasopharynx, oropharynx, hypopharynx, or larynx. Vocal fold abduction and adduction is normal. No pooling of secretions in the piriform sinuses, penetration, or aspiration.        Assessment/Plan:    1. RAMONITA on CPAP    2. Intolerance of continuous positive airway pressure (CPAP) ventilation         Haritha and I discussed at length that obstructive sleep apnea is a serious condition that may  lead to hypertension, cardiovascular compromise and possibly stroke.  We discussed that CPAP is first line therapy and Haritha has tried this in the past.  Haritha does not tolerate CPAP well.  We discussed that obstructive sleep apnea is frequently a multilevel disease and the first goal of surgery is to make CPAP more tolerable.  The most common means of accomplishing this is by reducing nasal airway resistance with septoplasty and turbinate reduction for those with septal deviation and turbinate hypertrophy. Even in this situation, surgery will no eliminate the need for CPAP, but instead can improve CPAP tolerance.      Additionally, in patients with obstruction at the level of the soft palate, base of tongue, or epiglottis there are some surgeries that can specifically address these areas of obstruction, such as a UPPP (uvulopalatopharyngoplasty). However, most such surgical options have relatively low success rates, are very painful, and a large percentage will end up still needing CPAP.       There is one surgical procedure that is proving to be quite successful for well-qualified patients. The hypoglossal nerve stimulator (HGNS) is currently approved for the treatment of obstructive sleep apnea in patients meeting specific AHI and BMI criteria with appropriate velopharyngeal collapse on drug-induced sleep endoscopy, who have failed CPAP. Work-up requires a sedated sleep endoscopy in the operating room to ensure the appropriate collapse is identified.     The patient has elected to proceed with:     home sleep study with belt for central component  Then DISE if qualified          Darin Hair MD  [facility]   [facility]  [address]  [place] [id number]  P: [phone]  F: [phone]

## 2025-06-02 ENCOUNTER — PATIENT MESSAGE (OUTPATIENT)
Dept: CARDIOLOGY | Facility: CLINIC | Age: 80
End: 2025-06-02
Payer: MEDICARE

## 2025-06-02 RX ORDER — METOPROLOL SUCCINATE 100 MG/1
100 TABLET, EXTENDED RELEASE ORAL 2 TIMES DAILY
Qty: 180 TABLET | Refills: 1 | Status: SHIPPED | OUTPATIENT
Start: 2025-06-02

## 2025-06-09 ENCOUNTER — LAB VISIT (OUTPATIENT)
Dept: LAB | Facility: HOSPITAL | Age: 80
End: 2025-06-09
Attending: STUDENT IN AN ORGANIZED HEALTH CARE EDUCATION/TRAINING PROGRAM
Payer: MEDICARE

## 2025-06-09 DIAGNOSIS — B20 HIV DISEASE: ICD-10-CM

## 2025-06-09 DIAGNOSIS — R97.21 RISING PSA FOLLOWING TREATMENT FOR MALIGNANT NEOPLASM OF PROSTATE: ICD-10-CM

## 2025-06-09 DIAGNOSIS — E29.1 HYPOGONADISM MALE: ICD-10-CM

## 2025-06-09 PROCEDURE — 36415 COLL VENOUS BLD VENIPUNCTURE: CPT | Mod: PN

## 2025-06-09 PROCEDURE — 85025 COMPLETE CBC W/AUTO DIFF WBC: CPT

## 2025-06-09 PROCEDURE — 84153 ASSAY OF PSA TOTAL: CPT

## 2025-06-09 PROCEDURE — 80076 HEPATIC FUNCTION PANEL: CPT

## 2025-06-09 PROCEDURE — 86361 T CELL ABSOLUTE COUNT: CPT

## 2025-06-09 PROCEDURE — 84403 ASSAY OF TOTAL TESTOSTERONE: CPT

## 2025-06-10 ENCOUNTER — RESULTS FOLLOW-UP (OUTPATIENT)
Dept: UROLOGY | Facility: CLINIC | Age: 80
End: 2025-06-10

## 2025-06-10 DIAGNOSIS — R97.21 RISING PSA FOLLOWING TREATMENT FOR MALIGNANT NEOPLASM OF PROSTATE: Primary | ICD-10-CM

## 2025-06-10 LAB
ABSOLUTE EOSINOPHIL (OHS): 0.17 K/UL
ABSOLUTE MONOCYTE (OHS): 1.08 K/UL (ref 0.3–1)
ABSOLUTE NEUTROPHIL COUNT (OHS): 5.55 K/UL (ref 1.8–7.7)
ALBUMIN SERPL BCP-MCNC: 3.7 G/DL (ref 3.5–5.2)
ALP SERPL-CCNC: 36 UNIT/L (ref 40–150)
ALT SERPL W/O P-5'-P-CCNC: 27 UNIT/L (ref 10–44)
AST SERPL-CCNC: 24 UNIT/L (ref 11–45)
BASOPHILS # BLD AUTO: 0.02 K/UL
BASOPHILS NFR BLD AUTO: 0.2 %
BILIRUB DIRECT SERPL-MCNC: 0.2 MG/DL (ref 0.1–0.3)
BILIRUB SERPL-MCNC: 0.9 MG/DL (ref 0.1–1)
ERYTHROCYTE [DISTWIDTH] IN BLOOD BY AUTOMATED COUNT: 16.4 % (ref 11.5–14.5)
HCT VFR BLD AUTO: 57 % (ref 40–54)
HGB BLD-MCNC: 18.6 GM/DL (ref 14–18)
IMM GRANULOCYTES # BLD AUTO: 0.02 K/UL (ref 0–0.04)
IMM GRANULOCYTES NFR BLD AUTO: 0.2 % (ref 0–0.5)
LYMPHOCYTES # BLD AUTO: 3.48 K/UL (ref 1–4.8)
MCH RBC QN AUTO: 30.2 PG (ref 27–31)
MCHC RBC AUTO-ENTMCNC: 32.6 G/DL (ref 32–36)
MCV RBC AUTO: 93 FL (ref 82–98)
NUCLEATED RBC (/100WBC) (OHS): 0 /100 WBC
PLATELET # BLD AUTO: 243 K/UL (ref 150–450)
PMV BLD AUTO: 9.6 FL (ref 9.2–12.9)
PROT SERPL-MCNC: 7.7 GM/DL (ref 6–8.4)
PSA SERPL-MCNC: 6.07 NG/ML
RBC # BLD AUTO: 6.15 M/UL (ref 4.6–6.2)
RELATIVE EOSINOPHIL (OHS): 1.6 %
RELATIVE LYMPHOCYTE (OHS): 33.7 % (ref 18–48)
RELATIVE MONOCYTE (OHS): 10.5 % (ref 4–15)
RELATIVE NEUTROPHIL (OHS): 53.8 % (ref 38–73)
WBC # BLD AUTO: 10.32 K/UL (ref 3.9–12.7)

## 2025-06-11 ENCOUNTER — PATIENT MESSAGE (OUTPATIENT)
Dept: UROLOGY | Facility: CLINIC | Age: 80
End: 2025-06-11
Payer: MEDICARE

## 2025-06-11 ENCOUNTER — OFFICE VISIT (OUTPATIENT)
Dept: CARDIOLOGY | Facility: CLINIC | Age: 80
End: 2025-06-11
Payer: MEDICARE

## 2025-06-11 VITALS
BODY MASS INDEX: 29.89 KG/M2 | SYSTOLIC BLOOD PRESSURE: 104 MMHG | WEIGHT: 208.75 LBS | DIASTOLIC BLOOD PRESSURE: 70 MMHG | OXYGEN SATURATION: 97 % | HEART RATE: 68 BPM | HEIGHT: 70 IN

## 2025-06-11 DIAGNOSIS — B20 HIV DISEASE: ICD-10-CM

## 2025-06-11 DIAGNOSIS — R06.09 DOE (DYSPNEA ON EXERTION): ICD-10-CM

## 2025-06-11 DIAGNOSIS — R42 POSTURAL DIZZINESS WITH PRESYNCOPE: ICD-10-CM

## 2025-06-11 DIAGNOSIS — E78.1 HYPERTRIGLYCERIDEMIA: ICD-10-CM

## 2025-06-11 DIAGNOSIS — I10 ESSENTIAL HYPERTENSION: ICD-10-CM

## 2025-06-11 DIAGNOSIS — R55 POSTURAL DIZZINESS WITH PRESYNCOPE: ICD-10-CM

## 2025-06-11 DIAGNOSIS — I35.1 NONRHEUMATIC AORTIC VALVE INSUFFICIENCY: ICD-10-CM

## 2025-06-11 DIAGNOSIS — I25.10 CAD IN NATIVE ARTERY: ICD-10-CM

## 2025-06-11 DIAGNOSIS — I70.0 AORTIC ATHEROSCLEROSIS: ICD-10-CM

## 2025-06-11 DIAGNOSIS — I11.0 HYPERTENSIVE HEART DISEASE WITH HEART FAILURE: ICD-10-CM

## 2025-06-11 DIAGNOSIS — G47.33 OBSTRUCTIVE SLEEP APNEA: ICD-10-CM

## 2025-06-11 DIAGNOSIS — E78.2 MIXED HYPERLIPIDEMIA: Primary | ICD-10-CM

## 2025-06-11 DIAGNOSIS — I34.0 NONRHEUMATIC MITRAL VALVE REGURGITATION: ICD-10-CM

## 2025-06-11 DIAGNOSIS — I50.42 CHRONIC COMBINED SYSTOLIC AND DIASTOLIC CONGESTIVE HEART FAILURE: ICD-10-CM

## 2025-06-11 LAB
CD3+CD4+ CELLS # SPEC: 671 CELLS/UL (ref 300–1400)
CD3+CD4+ CELLS NFR BLD: 16.73 % (ref 28–57)
LABORATORY COMMENT REPORT: ABNORMAL
TESTOST SERPL-MCNC: 978 NG/DL (ref 304–1227)

## 2025-06-11 PROCEDURE — 99214 OFFICE O/P EST MOD 30 MIN: CPT | Mod: PBBFAC | Performed by: INTERNAL MEDICINE

## 2025-06-11 PROCEDURE — 99214 OFFICE O/P EST MOD 30 MIN: CPT | Mod: S$PBB,,, | Performed by: INTERNAL MEDICINE

## 2025-06-11 PROCEDURE — 99999 PR PBB SHADOW E&M-EST. PATIENT-LVL IV: CPT | Mod: PBBFAC,,, | Performed by: INTERNAL MEDICINE

## 2025-06-11 PROCEDURE — G2211 COMPLEX E/M VISIT ADD ON: HCPCS | Mod: ,,, | Performed by: INTERNAL MEDICINE

## 2025-06-11 RX ORDER — POTASSIUM CHLORIDE 750 MG/1
10 TABLET, EXTENDED RELEASE ORAL DAILY
Qty: 90 TABLET | Refills: 1 | Status: SHIPPED | OUTPATIENT
Start: 2025-06-11

## 2025-06-11 RX ORDER — ISOSORBIDE MONONITRATE 30 MG/1
30 TABLET, EXTENDED RELEASE ORAL NIGHTLY
Qty: 90 TABLET | Refills: 1 | Status: SHIPPED | OUTPATIENT
Start: 2025-06-11 | End: 2026-06-11

## 2025-06-11 RX ORDER — SACUBITRIL AND VALSARTAN 97; 103 MG/1; MG/1
1 TABLET, FILM COATED ORAL 2 TIMES DAILY
Qty: 180 TABLET | Refills: 1 | Status: SHIPPED | OUTPATIENT
Start: 2025-06-11

## 2025-06-11 RX ORDER — METOPROLOL SUCCINATE 100 MG/1
100 TABLET, EXTENDED RELEASE ORAL 2 TIMES DAILY
Qty: 180 TABLET | Refills: 1 | Status: SHIPPED | OUTPATIENT
Start: 2025-06-11

## 2025-06-11 RX ORDER — FUROSEMIDE 40 MG/1
40 TABLET ORAL DAILY PRN
Qty: 90 TABLET | Refills: 1 | Status: SHIPPED | OUTPATIENT
Start: 2025-06-11 | End: 2026-06-11

## 2025-06-11 RX ORDER — LANOLIN ALCOHOL/MO/W.PET/CERES
400 CREAM (GRAM) TOPICAL DAILY
Qty: 90 TABLET | Refills: 1 | Status: SHIPPED | OUTPATIENT
Start: 2025-06-11

## 2025-06-11 RX ORDER — CLONIDINE HYDROCHLORIDE 0.1 MG/1
0.2 TABLET ORAL EVERY 6 HOURS PRN
Qty: 90 TABLET | Refills: 1 | Status: SHIPPED | OUTPATIENT
Start: 2025-06-11 | End: 2026-06-11

## 2025-06-11 NOTE — Clinical Note
Hey man just reviewing lab with pt, his HGB is much higher, maybe caused by the T supplements, along with the elevated PSA - do you lower the T supplements and repeat? He may be having symptoms of elevated H&H..

## 2025-06-11 NOTE — PROGRESS NOTES
Subjective:   Patient ID:  Haritha Valle is a 80 y.o. male who presents for cardiac consult of Dizziness and Shortness of Breath        The patient came in today for cardiac consult of Dizziness and Shortness of Breath    Referring Physician: Hesham Andrade II, MD   Reason for initial consult: LOC, palpitations      Haritha Valle is a 80 y.o. male pt with HFpEF, HTN, HLD, HIV, anxiety, depression, hypogonadism, ED presents for follow up CV eval         3/5/25  Nuc stress 2/2025 mild to mod fixed defect, equivocal, EF improved to 48%.     From pt    Patient states that he received message saying his appt for March 12 had been cancelled. Advised that it was not. Patient states that he really does not need the appt canceled because he has been running extremely high blood pressures even at night for several days with dizziness. Asked what his bp is right now since he said he just took it and it was 176/116. Advised patient that he needed to go to the ER for further evaluation and treatment. Patient states he understands. I asked if he was going to go to the ER and he said if I can find transportation. Advised if he could not then he needed to call 911. Patient voiced understanding.     BP was elevated - told to increase Entresto dose.   He has BP 180s/100s he takes gummies   He has diarrhea for 1 month   He has panic attacks     3/19/25  BP and HR stable here. BMI 31 - 219 lbs  His breathing has improved.   BP at home 180s/100s- 110.     4/11/25  BP was very low yest - 80/62; took two clonidine in the AM along with Xanax.   BP today is high 142/90. BMI 29 - 213 lbs   He has nausea in AM     6/11/25  BP low normal here but occ elevated . HR 60s.   BMI 29 - 208 lbs, losing weight  Occ BP elevated that improves with gummies       Results for orders placed during the hospital encounter of 02/20/25    Nuclear Stress - Cardiology Interpreted    Interpretation Summary    Equivocal myocardial perfusion scan.    There is a  mild to moderate intensity, small to medium sized, equivocal perfusion abnormality that is fixed in the apical and anteroapical wall(s). This finding is equivocal due to soft tissue shadow.    There are no other significant perfusion abnormalities.    The gated perfusion images showed an ejection fraction of 48% at rest. The gated perfusion images showed an ejection fraction of 48% post stress.    There is mild global hypokinesis at rest and post-stress.    The ECG portion of the study is negative for ischemia.      Results for orders placed during the hospital encounter of 02/05/25    Echo    Interpretation Summary    Left Ventricle: The left ventricle is normal in size. Normal wall thickness. There is concentric hypertrophy. There is moderately reduced systolic function with a visually estimated ejection fraction of 30 - 40%. Ejection fraction is approximately 30%. Grade I diastolic dysfunction.    Right Ventricle: Normal right ventricular cavity size. Wall thickness is normal. Systolic function is mildly reduced.    Left Atrium: Left atrium is moderately dilated.    IVC/SVC: Normal venous pressure at 3 mmHg.    Patient sent to ER due to increased dyspnea, HTN,  and drop in EF      MRI brain   Moderate global cortical atrophy with mild frontal lobe predominance.  Score 1 bilateral medial temporal lobe atrophy.  Minimal nonspecific white matter changes, likely senescent.    Results for orders placed during the hospital encounter of 06/05/24    Echo    Interpretation Summary    Left Ventricle: The left ventricle is normal in size. Normal wall thickness. There is concentric hypertrophy. Normal wall motion. There is normal systolic function with a visually estimated ejection fraction of 55 - 60%. There is normal diastolic function.    Right Ventricle: Normal right ventricular cavity size. Wall thickness is normal. Systolic function is normal.    Left Atrium: Left atrium is mildly dilated.    Pulmonary Artery: The  estimated pulmonary artery systolic pressure is 18 mmHg.    IVC/SVC: Normal venous pressure at 3 mmHg.      Nuclear Quantitative Functional Analysis:   LVEF: 44 %    Impression: ABNORMAL MYOCARDIAL PERFUSION  1. There is evidence for mild myocardial ischemia in the inferior and inferoapical walls of the left ventricle.   2. The perfusion scan is free of evidence for myocardial injury.   3. Resting wall motion is physiologic.   4. There is resting LV dysfunction with a reduced ejection fraction of 44 %.   5. The ventricular volumes are normal at rest and stress.   6. The extracardiac distribution of radioactivity is normal.     This document has been electronically    SIGNED BY: Stephon Corcoran MD On: 01/27/2020 16:38    2/3/20   1.   Non-obstructive CAD.   2.   low normal to mildy depressed ef.      Past Medical History:   Diagnosis Date    Anxiety 07/03/2019    Basal cell carcinoma     CAD in native artery 11/27/2023    Depression     Dyslipidemia 11/16/2023    Essential hypertension 01/24/2013    Hepatitis B     Hepatitis C antibody test positive 02/05/2025    RNA NEGATIVE    HIV infection     Hypertension     Immune disorder     Mild cognitive impairment 10/01/2010    Nonrheumatic aortic valve insufficiency 12/19/2023    Nonrheumatic mitral valve regurgitation 12/19/2023       Past Surgical History:   Procedure Laterality Date    APPENDECTOMY      CARDIAC CATHETERIZATION      no stent    CHOLECYSTECTOMY      COLONOSCOPY N/A 11/3/2016    Procedure: COLONOSCOPY;  Surgeon: Maxi Villasenor MD;  Location: [address];  Service: Endoscopy;  Laterality: N/A;  last colonoscopy with Dr Jarrell    COLONOSCOPY N/A 1/25/2022    Procedure: COLONOSCOPY;  Surgeon: Silvino Rosales MD;  Location: [facility] ENDO;  Service: Endoscopy;  Laterality: N/A;    LEFT HEART CATHETERIZATION Left 2/3/2020    Procedure: CATHETERIZATION, HEART, LEFT;  Surgeon: Chele Ko MD;  Location: [facility] CATH LAB;  Service: Cardiology;  Laterality:  Left;  malur pt       Social History     Tobacco Use    Smoking status: Never    Smokeless tobacco: Never   Substance Use Topics    Alcohol use: No    Drug use: Never       Family History   Problem Relation Name Age of Onset    Hearing loss Mother Sonam Valle     Vision loss Mother Sonam Valle     Heart disease Father Demarco Valle     Heart failure Father Demarco Valle     Diabetes Neg Hx      Hypertension Neg Hx         Patient's Medications   New Prescriptions    FUROSEMIDE (LASIX) 40 MG TABLET    Take 1 tablet (40 mg total) by mouth daily as needed (for edema, swelling, fluid with BP over 140/80).    ISOSORBIDE MONONITRATE (IMDUR) 30 MG 24 HR TABLET    Take 1 tablet (30 mg total) by mouth every evening.   Previous Medications    ALBUTEROL (PROVENTIL) 2.5 MG /3 ML (0.083 %) NEBULIZER SOLUTION    Take 3 mLs (2.5 mg total) by nebulization every 4 to 6 hours as needed for Wheezing or Shortness of Breath.    ALBUTEROL (PROVENTIL/VENTOLIN HFA) 90 MCG/ACTUATION INHALER    Inhale 2 puffs into the lungs every 4 (four) hours as needed for Wheezing. Rescue    ASCORBIC ACID, VITAMIN C, (VITAMIN C) 1000 MG TABLET    Take 1,000 mg by mouth once daily.    LXPBUVGBD-PUBCJJOD-MSVVAIF ALA (BIKTARVY) -25 MG (25 KG OR GREATER)    Take 1 tablet by mouth once daily.    BUSPIRONE (BUSPAR) 15 MG TABLET    Take 1 tablet (15 mg total) by mouth 3 (three) times daily.    DONEPEZIL (ARICEPT) 5 MG TABLET    Take 1 tablet (5 mg total) by mouth every evening.    FLUOXETINE 40 MG CAPSULE    TAKE 1 CAPSULE EVERY DAY    METOCLOPRAMIDE HCL (REGLAN) 5 MG TABLET    Take 1 tablet (5 mg total) by mouth nightly as needed (nausea).    ONDANSETRON (ZOFRAN-ODT) 8 MG TBDL    Take 1 tablet (8 mg total) by mouth every 6 (six) hours as needed (nausea).    PEP INJECTION    Inject 0.3 ml as directed     For compounding pharmacy use:   Add PAPAVERINE 30 mcg  Add PHENTOLAMINE 10 mg  Add ALPROSTADIL 100 mcg    PROMETHAZINE (PHENERGAN) 12.5 MG TAB    TAKE  ONE TABLET BY MOUTH DAILY AS NEEDED FOR NAUSEA.    ROSUVASTATIN (CRESTOR) 20 MG TABLET    TAKE 1 TABLET EVERY DAY    UBIDECARENONE (COENZYME Q10 ORAL)    Take by mouth once daily.   Modified Medications    Modified Medication Previous Medication    CLONIDINE (CATAPRES) 0.1 MG TABLET cloNIDine (CATAPRES) 0.1 MG tablet       Take 2 tablets (0.2 mg total) by mouth every 6 (six) hours as needed (BP > 180/90). Can repeat in 30min if needed    Take 1 tablet (0.1 mg total) by mouth every 6 (six) hours as needed (BP > 180/90).    METOPROLOL SUCCINATE (TOPROL-XL) 100 MG 24 HR TABLET metoprolol succinate (TOPROL-XL) 100 MG 24 hr tablet       Take 1 tablet (100 mg total) by mouth 2 (two) times daily.    Take 1 tablet (100 mg total) by mouth 2 (two) times daily.    SACUBITRIL-VALSARTAN (ENTRESTO)  MG PER TABLET sacubitriL-valsartan (ENTRESTO)  mg per tablet       Take 1 tablet by mouth 2 (two) times daily.    Take 1 tablet by mouth 2 (two) times daily.   Discontinued Medications    No medications on file       Review of Systems   Constitutional:  Positive for malaise/fatigue.   HENT: Negative.     Eyes: Negative.    Respiratory:  Positive for shortness of breath.    Cardiovascular:  Positive for chest pain and palpitations.   Gastrointestinal: Negative.    Genitourinary: Negative.    Musculoskeletal: Negative.    Skin: Negative.    Neurological:  Positive for dizziness and loss of consciousness.   Endo/Heme/Allergies: Negative.    Psychiatric/Behavioral: Negative.     All 12 systems otherwise negative.      Wt Readings from Last 3 Encounters:   06/11/25 94.7 kg (208 lb 12.4 oz)   04/25/25 98.4 kg (216 lb 14.9 oz)   04/11/25 97 kg (213 lb 13.5 oz)     Temp Readings from Last 3 Encounters:   04/10/25 97.4 °F (36.3 °C)   02/06/25 97.4 °F (36.3 °C) (Oral)   01/06/25 98.3 °F (36.8 °C) (Tympanic)     BP Readings from Last 3 Encounters:   06/11/25 104/70   04/11/25 (!) 142/90   04/10/25 (!) 80/62     Pulse Readings from  "Last 3 Encounters:   06/11/25 68   04/11/25 74   04/10/25 62       /70 (BP Location: Right arm, Patient Position: Sitting)   Pulse 68   Ht 5' 10" (1.778 m)   Wt 94.7 kg (208 lb 12.4 oz)   SpO2 97%   BMI 29.96 kg/m²     Objective:   Physical Exam  Vitals and nursing note reviewed.   Constitutional:       General: He is not in acute distress.     Appearance: He is well-developed. He is not diaphoretic.   HENT:      Head: Normocephalic and atraumatic.      Nose: Nose normal.   Eyes:      General: No scleral icterus.     Conjunctiva/sclera: Conjunctivae normal.   Neck:      Thyroid: No thyromegaly.      Vascular: No JVD.   Cardiovascular:      Rate and Rhythm: Normal rate and regular rhythm.      Heart sounds: S1 normal and S2 normal. No murmur heard.     No friction rub. No gallop. No S3 or S4 sounds.   Pulmonary:      Effort: Pulmonary effort is normal. No respiratory distress.      Breath sounds: Normal breath sounds. No stridor. No wheezing or rales.   Chest:      Chest wall: No tenderness.   Abdominal:      General: Bowel sounds are normal. There is no distension.      Palpations: Abdomen is soft. There is no mass.      Tenderness: There is no abdominal tenderness. There is no rebound.   Genitourinary:     Comments: Deferred  Musculoskeletal:         General: No tenderness or deformity. Normal range of motion.      Cervical back: Normal range of motion and neck supple.   Lymphadenopathy:      Cervical: No cervical adenopathy.   Skin:     General: Skin is warm and dry.      Coloration: Skin is not pale.      Findings: No erythema or rash.   Neurological:      Mental Status: He is alert and oriented to person, place, and time.      Motor: No abnormal muscle tone.      Coordination: Coordination normal.   Psychiatric:         Behavior: Behavior normal.         Thought Content: Thought content normal.         Judgment: Judgment normal.         Lab Results   Component Value Date     03/05/2025    K 4.1 " 03/05/2025     03/05/2025    CO2 26 03/05/2025    BUN 18 03/05/2025    CREATININE 1.6 (H) 03/05/2025     03/05/2025    HGBA1C 6.5 (H) 02/05/2025    MG 1.9 03/05/2025    AST 24 06/09/2025    AST 61 (H) 02/05/2025    ALT 27 06/09/2025    ALT 52 (H) 02/05/2025    ALBUMIN 3.7 06/09/2025    ALBUMIN 3.5 02/05/2025    ALBUMIN 4.3 04/30/2018    PROT 7.7 06/09/2025    PROT 8.7 (H) 02/05/2025    BILITOT 0.9 06/09/2025    BILITOT 0.9 02/05/2025    WBC 10.32 06/09/2025    HGB 18.6 (H) 06/09/2025    HGB 17.8 02/05/2025    HCT 57.0 (H) 06/09/2025    HCT 55.7 (H) 02/05/2025    MCV 93 06/09/2025    MCV 88 02/05/2025     06/09/2025     02/05/2025    INR 1.0 01/28/2020    TSH 1.137 04/27/2023    CHOL 172 12/18/2023    HDL 36 (L) 12/18/2023    LDLCALC 73.2 12/18/2023    TRIG 314 (H) 12/18/2023    BNP 78 03/05/2025     Assessment:      1. Mixed hyperlipidemia    2. Obstructive sleep apnea    3. Nonrheumatic mitral valve regurgitation    4. MAIER (dyspnea on exertion)    5. Chronic combined systolic and diastolic congestive heart failure    6. CAD in native artery    7. Hypertensive heart disease with heart failure    8. HIV disease    9. Hypertriglyceridemia    10. Nonrheumatic aortic valve insufficiency    11. Postural dizziness with presyncope    12. Essential hypertension    13. Aortic atherosclerosis            Plan:   1. HTN  - elevated    - titrate meds - increase to Toprol 100mg BID   - episodes of HTN urgency - r/o sec causes - labs and renal u/s ordered - neg  - ECHO 6/2024 with normal bi V function and valves, PASP 18 mmHg.   - cont clonidine 0.2 mg PRN   - cont lasix PRN   - add Imdur 30mg QHS     2. HLD with elevated TGs  - cont meds  - added- vascepa in past    3. HIV  - cont meds per PCP/ID    4. ED/hypogonadism  - cont meds per PCP PRN  - may need to decrease Testosterone due to elevated HGB     5. Syncope/dizziness/palpitations with CP - non obs CAD  - prior exercise nuclear stress test to r/o  ischemia - abnormal, [facility] neg  - ECHO 6/2024 with normal bi V function and valves, PASP 18 mmHg.   - carotids - neg   - has been to ENT and Neuro - has atrophy and hearing aids now  -Vital monitor - 1/2025 with freq PACs, SVT episdoes, AVG HR 79 BPM. Overall PSVC [place] at 23.12 %  - Nuc stress 2/2025 mild to mod fixed defect, equivocal, EF improved to 48%.       6.ECHO 2/2025 EF 30%, grade 1 DD, mild reduced RV function.   - cont meds - Toprol and ARB - changed to Entresto --> increase to max dose  - cont lasix PRN  - Nuc stress 2/2025 mild to mod fixed defect, equivocal, EF improved to 48%.   - repeat labs PRN; increase Metoprolol to 25 mg BID --> increase to Toprol 100mg BID     7. Anxiety  - refer to psych    8. RAMONITA  - cont CPAP; has not been using   - moderate to severe obstructive sleep apnea  - needs to eval for Inspire     9. Obesity - . BMI 31 - 219 lbs  BMI 31 - 219 lbs --> BMI 29 - 208   - cont weight loss     10. DM2  - f/u with PCP      Visit today included increased complexity associated with the care of the episodic problem dyspnea addressed and managing the longitudinal care of the patient due to the serious and/or complex managed problem(s) .      Thank you for allowing me to participate in this patient's care. Please do not hesitate to contact me with any questions or concerns. Consult note has been forwarded to the referral physician.

## 2025-06-12 ENCOUNTER — TELEPHONE (OUTPATIENT)
Dept: UROLOGY | Facility: CLINIC | Age: 80
End: 2025-06-12
Payer: MEDICARE

## 2025-06-16 ENCOUNTER — TELEPHONE (OUTPATIENT)
Dept: UROLOGY | Facility: CLINIC | Age: 80
End: 2025-06-16
Payer: MEDICARE

## 2025-06-17 ENCOUNTER — OFFICE VISIT (OUTPATIENT)
Dept: FAMILY MEDICINE | Facility: CLINIC | Age: 80
End: 2025-06-17
Payer: MEDICARE

## 2025-06-17 VITALS
SYSTOLIC BLOOD PRESSURE: 110 MMHG | BODY MASS INDEX: 30.79 KG/M2 | TEMPERATURE: 97 F | HEART RATE: 63 BPM | OXYGEN SATURATION: 95 % | WEIGHT: 215.06 LBS | DIASTOLIC BLOOD PRESSURE: 70 MMHG | HEIGHT: 70 IN

## 2025-06-17 DIAGNOSIS — F41.9 ANXIETY: ICD-10-CM

## 2025-06-17 DIAGNOSIS — R73.03 PREDIABETES: Primary | ICD-10-CM

## 2025-06-17 DIAGNOSIS — E29.1 HYPOGONADISM MALE: ICD-10-CM

## 2025-06-17 DIAGNOSIS — I11.0 HYPERTENSIVE HEART DISEASE WITH HEART FAILURE: ICD-10-CM

## 2025-06-17 DIAGNOSIS — E78.5 DYSLIPIDEMIA: ICD-10-CM

## 2025-06-17 DIAGNOSIS — I10 ESSENTIAL HYPERTENSION: ICD-10-CM

## 2025-06-17 DIAGNOSIS — B20 HIV DISEASE: Chronic | ICD-10-CM

## 2025-06-17 PROCEDURE — 99215 OFFICE O/P EST HI 40 MIN: CPT | Mod: PBBFAC,PO | Performed by: INTERNAL MEDICINE

## 2025-06-17 PROCEDURE — 99999 PR PBB SHADOW E&M-EST. PATIENT-LVL V: CPT | Mod: PBBFAC,,, | Performed by: INTERNAL MEDICINE

## 2025-06-17 NOTE — PROGRESS NOTES
Subjective:       Patient ID: Haritha Valle is a 80 y.o. male.    Chief Complaint: Results (Discuss lab results), Hypertension, Diabetes, and Hyperlipidemia    Elevated bp-------elevated hgb and psa---------    Hypertension  Pertinent negatives include no chest pain, headaches, neck pain, palpitations or shortness of breath.   Diabetes  Pertinent negatives for hypoglycemia include no confusion, dizziness, headaches, nervousness/anxiousness, pallor, seizures, speech difficulty or tremors. Pertinent negatives for diabetes include no chest pain, no fatigue, no polydipsia, no polyphagia, no polyuria and no weakness.   Hyperlipidemia  Pertinent negatives include no chest pain, myalgias or shortness of breath.     Past Medical History:   Diagnosis Date    Anxiety 07/03/2019    Basal cell carcinoma     CAD in native artery 11/27/2023    Depression     Dyslipidemia 11/16/2023    Essential hypertension 01/24/2013    Hepatitis B     Hepatitis C antibody test positive 02/05/2025    RNA NEGATIVE    HIV infection     Hypertension     Immune disorder     Mild cognitive impairment 10/01/2010    Nonrheumatic aortic valve insufficiency 12/19/2023    Nonrheumatic mitral valve regurgitation 12/19/2023     Past Surgical History:   Procedure Laterality Date    APPENDECTOMY      CARDIAC CATHETERIZATION      no stent    CHOLECYSTECTOMY      COLONOSCOPY N/A 11/3/2016    Procedure: COLONOSCOPY;  Surgeon: Maxi Villasenor MD;  Location: [address];  Service: Endoscopy;  Laterality: N/A;  last colonoscopy with Dr Jarrell    COLONOSCOPY N/A 1/25/2022    Procedure: COLONOSCOPY;  Surgeon: Silvino Rosales MD;  Location: [facility] ENDO;  Service: Endoscopy;  Laterality: N/A;    LEFT HEART CATHETERIZATION Left 2/3/2020    Procedure: CATHETERIZATION, HEART, LEFT;  Surgeon: Chele Ko MD;  Location: [facility] CATH LAB;  Service: Cardiology;  Laterality: Left;  malur pt     Family History   Problem Relation Name Age of Onset    Hearing loss  Mother Sonam Valle     Vision loss Mother Sonam Valle     Heart disease Father Demarco Valle     Heart failure Father Demarco Valle     Diabetes Neg Hx      Hypertension Neg Hx       Social History[1]  Review of Systems   Constitutional:  Negative for activity change, appetite change, chills, diaphoresis, fatigue, fever and unexpected weight change.   HENT:  Negative for drooling, ear discharge, ear pain, facial swelling, hearing loss, mouth sores, nosebleeds, postnasal drip, rhinorrhea, sinus pressure, sneezing, sore throat, tinnitus, trouble swallowing and voice change.    Eyes:  Negative for photophobia, redness and visual disturbance.   Respiratory:  Negative for apnea, cough, choking, chest tightness, shortness of breath and wheezing.    Cardiovascular:  Negative for chest pain and palpitations.   Gastrointestinal:  Negative for abdominal distention, abdominal pain, anal bleeding, blood in stool, constipation, diarrhea, nausea and vomiting.   Endocrine: Negative for cold intolerance, heat intolerance, polydipsia, polyphagia and polyuria.   Genitourinary:  Negative for difficulty urinating, dysuria, enuresis, flank pain, frequency, genital sores, hematuria and urgency.   Musculoskeletal:  Positive for arthralgias. Negative for back pain, gait problem, joint swelling, myalgias, neck pain and neck stiffness.   Skin:  Negative for color change, pallor, rash and wound.   Allergic/Immunologic: Negative for food allergies and immunocompromised state.   Neurological:  Negative for dizziness, tremors, seizures, syncope, facial asymmetry, speech difficulty, weakness, light-headedness, numbness and headaches.   Hematological:  Negative for adenopathy. Does not bruise/bleed easily.   Psychiatric/Behavioral:  Negative for agitation, behavioral problems, confusion, decreased concentration, dysphoric mood, hallucinations, self-injury, sleep disturbance and suicidal ideas. The patient is not nervous/anxious and is not  hyperactive.        Objective:      Physical Exam  Vitals and nursing note reviewed.   Constitutional:       General: He is not in acute distress.     Appearance: Normal appearance. He is well-developed. He is not diaphoretic.   HENT:      Head: Normocephalic and atraumatic.      Mouth/Throat:      Pharynx: No oropharyngeal exudate.   Eyes:      General: No scleral icterus.     Pupils: Pupils are equal, round, and reactive to light.   Neck:      Thyroid: No thyromegaly.      Vascular: No carotid bruit or JVD.      Trachea: No tracheal deviation.   Cardiovascular:      Rate and Rhythm: Normal rate and regular rhythm.      Heart sounds: Normal heart sounds.   Pulmonary:      Effort: Pulmonary effort is normal. No respiratory distress.      Breath sounds: Normal breath sounds. No wheezing or rales.   Chest:      Chest wall: No tenderness.   Abdominal:      General: Bowel sounds are normal. There is no distension.      Palpations: Abdomen is soft.      Tenderness: There is no abdominal tenderness. There is no guarding or rebound.   Musculoskeletal:         General: No tenderness. Normal range of motion.      Cervical back: Normal range of motion and neck supple.   Lymphadenopathy:      Cervical: No cervical adenopathy.   Skin:     General: Skin is warm and dry.      Coloration: Skin is not pale.      Findings: No erythema or rash.   Neurological:      Mental Status: He is alert and oriented to person, place, and time.      Cranial Nerves: No cranial nerve deficit.      Coordination: Coordination normal.   Psychiatric:         Behavior: Behavior normal.         Thought Content: Thought content normal.         Judgment: Judgment normal.         CMP  Sodium   Date Value Ref Range Status   03/05/2025 140 136 - 145 mmol/L Final     Potassium   Date Value Ref Range Status   03/05/2025 4.1 3.5 - 5.1 mmol/L Final     Chloride   Date Value Ref Range Status   03/05/2025 103 95 - 110 mmol/L Final     CO2   Date Value Ref Range  Status   03/05/2025 26 23 - 29 mmol/L Final     Glucose   Date Value Ref Range Status   03/05/2025 104 70 - 110 mg/dL Final     BUN   Date Value Ref Range Status   03/05/2025 18 8 - 23 mg/dL Final     Creatinine   Date Value Ref Range Status   03/05/2025 1.6 (H) 0.5 - 1.4 mg/dL Final     Calcium   Date Value Ref Range Status   03/05/2025 9.9 8.7 - 10.5 mg/dL Final     Protein Total   Date Value Ref Range Status   06/09/2025 7.7 6.0 - 8.4 gm/dL Final     Total Protein   Date Value Ref Range Status   02/05/2025 8.7 (H) 6.0 - 8.4 g/dL Final     Albumin   Date Value Ref Range Status   06/09/2025 3.7 3.5 - 5.2 g/dL Final   02/05/2025 3.5 3.5 - 5.2 g/dL Final   04/30/2018 4.3 3.6 - 5.1 g/dL Final     Comment:     @ Test Performed By:  [id number]  Latrell Campos M.D., Ph.D.,   [address] [place] [id number]  [facility]  [id number]       Total Bilirubin   Date Value Ref Range Status   02/05/2025 0.9 0.1 - 1.0 mg/dL Final     Comment:     For infants and newborns, interpretation of results should be based  on gestational age, weight and in agreement with clinical  observations.    Premature Infant recommended reference ranges:  Up to 24 hours.............<8.0 mg/dL  Up to 48 hours............<12.0 mg/dL  3-5 days..................<15.0 mg/dL  6-29 days.................<15.0 mg/dL       Bilirubin Total   Date Value Ref Range Status   06/09/2025 0.9 0.1 - 1.0 mg/dL Final     Comment:     For infants and newborns, interpretation of results should be based   on gestational age, weight and in agreement with clinical   observations.    Premature Infant recommended reference ranges:   0-24 hours:  <8.0 mg/dL   24-48 hours: <12.0 mg/dL   3-5 days:    <15.0 mg/dL   6-29 days:   <15.0 mg/dL     Alkaline Phosphatase   Date Value Ref Range Status   02/05/2025 36 (L) 40 - 150 U/L Final     ALP   Date Value Ref Range Status   06/09/2025 36 (L) 40 - 150 unit/L Final     AST   Date Value  Ref Range Status   06/09/2025 24 11 - 45 unit/L Final   02/05/2025 61 (H) 10 - 40 U/L Final     ALT   Date Value Ref Range Status   06/09/2025 27 10 - 44 unit/L Final   02/05/2025 52 (H) 10 - 44 U/L Final     Anion Gap   Date Value Ref Range Status   03/05/2025 11 8 - 16 mmol/L Final     eGFR if    Date Value Ref Range Status   10/14/2021 >60.0 >60 mL/min/1.73 m^2 Final     eGFR if non    Date Value Ref Range Status   10/14/2021 >60.0 >60 mL/min/1.73 m^2 Final     Comment:     Calculation used to obtain the estimated glomerular filtration  rate (eGFR) is the CKD-EPI equation.        Lab Results   Component Value Date    WBC 10.32 06/09/2025    HGB 18.6 (H) 06/09/2025    HCT 57.0 (H) 06/09/2025    MCV 93 06/09/2025     06/09/2025     Lab Results   Component Value Date    CHOL 172 12/18/2023     Lab Results   Component Value Date    HDL 36 (L) 12/18/2023     Lab Results   Component Value Date    LDLCALC 73.2 12/18/2023     Lab Results   Component Value Date    TRIG 314 (H) 12/18/2023     Lab Results   Component Value Date    CHOLHDL 20.9 12/18/2023     Lab Results   Component Value Date    TSH 1.137 04/27/2023     Lab Results   Component Value Date    HGBA1C 6.5 (H) 02/05/2025     Assessment:       1. Prediabetes    2. Hypertensive heart disease with heart failure    3. HIV disease    4. Essential hypertension    5. Dyslipidemia    6. Hypogonadism male    7. Anxiety        Plan:   Prediabetes  -     Hemoglobin A1C; Future; Expected date: 06/17/2025    Hypertensive heart disease with heart failure----------------sees cards------------------    HIV disease-----------f/u I.D.------    Essential hypertension-------------------continue current meds----------and follow-----------------------  -     TSH; Future; Expected date: 06/17/2025  -     Basic Metabolic Panel; Future; Expected date: 06/17/2025    Dyslipidemia  -     Lipid Panel; Future; Expected date: 06/17/2025    Hypogonadism  male--------------holding testosterone -----------f/u urology------for repeat labs-----------------psa and H/H--------------     Anxiety      Continue meds--------------as above---------------f/u 6 weeks--------review-       [1]   Social History  Socioeconomic History    Marital status: Significant Other     Spouse name: Shadi    Number of children: 0    Highest education level: Some college, no degree   Occupational History    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  [place] [employer]   Tobacco Use    Smoking status: Never    Smokeless tobacco: Never   Substance and Sexual Activity    Alcohol use: No    Drug use: Never    Sexual activity: Not Currently     Partners: Male     Birth control/protection: Abstinence, See Surgical Hx, None     Social Drivers of Health     Financial Resource Strain: Low Risk  (4/10/2025)    Overall Financial Resource Strain (CARDIA)     Difficulty of Paying Living Expenses: Not hard at all   Food Insecurity: No Food Insecurity (4/10/2025)    Hunger Vital Sign     Worried About Running Out of Food in the Last Year: Never true     Ran Out of Food in the Last Year: Never true   Transportation Needs: No Transportation Needs (4/10/2025)    PRAPARE - Transportation     Lack of Transportation (Medical): No     Lack of Transportation (Non-Medical): No   Physical Activity: Inactive (4/10/2025)    Exercise Vital Sign     Days of Exercise per Week: 0 days     Minutes of Exercise per Session: 0 min   Stress: No Stress Concern Present (4/10/2025)    [employer] [place] [employer] - Occupational Stress Questionnaire     Feeling of Stress : Only a little   Housing Stability: Low Risk  (4/10/2025)    Housing Stability Vital Sign     Unable to Pay for Housing in the Last Year: No     Homeless in the Last Year: No

## 2025-06-18 ENCOUNTER — HOSPITAL ENCOUNTER (OUTPATIENT)
Dept: SLEEP MEDICINE | Facility: HOSPITAL | Age: 80
Discharge: HOME | End: 2025-06-18
Attending: STUDENT IN AN ORGANIZED HEALTH CARE EDUCATION/TRAINING PROGRAM
Payer: MEDICARE

## 2025-06-18 DIAGNOSIS — Z78.9 INTOLERANCE OF CONTINUOUS POSITIVE AIRWAY PRESSURE (CPAP) VENTILATION: ICD-10-CM

## 2025-06-18 DIAGNOSIS — G47.33 OSA ON CPAP: ICD-10-CM

## 2025-06-22 PROCEDURE — 95800 SLP STDY UNATTENDED: CPT | Mod: 26,,, | Performed by: INTERNAL MEDICINE

## 2025-06-23 NOTE — PROCEDURES
[facility] Sleep Study Report  Patient Name Haritha Valle Study Ordered By Darin Hair  Date of Night 1 06/18/2025 10:32:56 AM Date of Birth 1945  Identification Number [id number]  Overall AHI (4%)* Overall RDI % time < 90% Sp02 Mean Sp02 % time snoring > 30dB  48 56 4% 94.9% 3.2%  PHYSICIAN INTERPRETATION AND COMMENTS: Findings are consistent with severe obstructive sleep apnea (RAMONITA).  CLINICAL HISTORY: 80 year old male presented with: 17 inch neck, BMI of 30.4, an [place] sleepiness score of 14, history of  hypertension, a previous diagnosis of RAMONITA and symptoms of nocturnal snoring, waking up choking and witnessed apneas.  Based on the clinical history, the patient has a high pre-test probability of having Severe RAMONITA.  CHEST BELTS USED  SLEEP STUDY FINDINGS: Patient underwent a 1 night Home Sleep Test (with respiratory effort belt) and by behavioral  criteria, slept for approximately 6.04 hours, with a sleep latency of 18 minutes and a sleep efficiency of 92%. Severe sleep  disordered breathing (AHI=48) is noted based on a 4% hypopnea desaturation criteria. The apneas/hypopneas are  accompanied by minimal oxygen desaturation (percent time below 90% SpO2: 4%, Min SpO2: 74.7%). The average  desaturation across all sleep disordered breathing events is 5.5%. Snoring occurs for 3.2% (30 dB) of the study. The mean  pulse rate is 62.5 BPM, with very frequent pulse rate variability (70 events with >= 6 BPM increase/decrease per hour).No  central apnea events  TREATMENT CONSIDERATIONS: Consider nasal continuous positive airway pressure (CPAP/AutoPAP) as the initial  treatment choice for Severe obstructive sleep apnea. A mandibular advancement splint (MAS) or referral to an ENT  surgeon for modification to the airway should be considered to reduce the risk of mortality caused by Severe RAMONITA if the  patient prefers an alternative therapy or the CPAP trial is unsuccessful.  DISEASE MANAGEMENT CONSIDERATIONS:  "None.      Dear Darin Hair MD  [id number] [facility]  [place] [id number]/Max Herbert MD         The sleep study that you ordered is complete.  You have ordered sleep LAB services to perform the sleep study for Haritha Valle .      Please find Sleep Study result in  the "Media tab" of Chart Review menu.        You can look  for the report in the  Media by the document type "Sleep Study Documents". Alphabetizing  "Document type" column helps to find the SLEEP STUDY report  Faster.       As the ordering provider, you are responsible for reviewing the results and implementing a treatment plan with your patient.    If you need a Sleep Medicine provider to explain the sleep study findings and arrange treatment for the patient, please refer patient for consultation to our Sleep Clinic via [facility] with Ambulatory Consult Sleep.     To do that please place an order for an  "Ambulatory Consult Sleep" -  order , it will go to our clinic work queue for our staff  to contact the patient for an appointment.      For any questions, please contact our sleep lab  staff at [phone] to talk to clinical staff          Rico Pineda MD    "

## 2025-06-24 ENCOUNTER — RESULTS FOLLOW-UP (OUTPATIENT)
Dept: OTOLARYNGOLOGY | Facility: CLINIC | Age: 80
End: 2025-06-24

## 2025-06-24 ENCOUNTER — PATIENT MESSAGE (OUTPATIENT)
Dept: OTOLARYNGOLOGY | Facility: CLINIC | Age: 80
End: 2025-06-24
Payer: MEDICARE

## 2025-06-24 DIAGNOSIS — Z78.9 INTOLERANCE OF CONTINUOUS POSITIVE AIRWAY PRESSURE (CPAP) VENTILATION: ICD-10-CM

## 2025-06-24 DIAGNOSIS — G47.33 OSA ON CPAP: Primary | ICD-10-CM

## 2025-07-07 ENCOUNTER — PATIENT MESSAGE (OUTPATIENT)
Dept: OTOLARYNGOLOGY | Facility: CLINIC | Age: 80
End: 2025-07-07
Payer: MEDICARE

## 2025-07-10 ENCOUNTER — PATIENT MESSAGE (OUTPATIENT)
Dept: OTOLARYNGOLOGY | Facility: CLINIC | Age: 80
End: 2025-07-10

## 2025-07-10 NOTE — PRE-PROCEDURE INSTRUCTIONS
Dr. Thomas made aware of patient's EF, ok to proceed with procedure on 7/14, no new orders received

## 2025-07-11 ENCOUNTER — HOSPITAL ENCOUNTER (OUTPATIENT)
Dept: CARDIOLOGY | Facility: HOSPITAL | Age: 80
Discharge: HOME | End: 2025-07-11
Attending: INTERNAL MEDICINE
Payer: MEDICARE

## 2025-07-11 ENCOUNTER — TELEPHONE (OUTPATIENT)
Dept: CARDIOLOGY | Facility: CLINIC | Age: 80
End: 2025-07-11
Payer: MEDICARE

## 2025-07-11 ENCOUNTER — TELEPHONE (OUTPATIENT)
Dept: OTOLARYNGOLOGY | Facility: CLINIC | Age: 80
End: 2025-07-11
Payer: MEDICARE

## 2025-07-11 ENCOUNTER — OFFICE VISIT (OUTPATIENT)
Dept: CARDIOLOGY | Facility: CLINIC | Age: 80
End: 2025-07-11
Payer: MEDICARE

## 2025-07-11 VITALS
OXYGEN SATURATION: 95 % | WEIGHT: 211.63 LBS | DIASTOLIC BLOOD PRESSURE: 82 MMHG | BODY MASS INDEX: 30.37 KG/M2 | HEIGHT: 70 IN | SYSTOLIC BLOOD PRESSURE: 124 MMHG | HEART RATE: 71 BPM | WEIGHT: 211.63 LBS | BODY MASS INDEX: 30.3 KG/M2

## 2025-07-11 DIAGNOSIS — G47.33 OBSTRUCTIVE SLEEP APNEA: ICD-10-CM

## 2025-07-11 DIAGNOSIS — I70.0 AORTIC ATHEROSCLEROSIS: ICD-10-CM

## 2025-07-11 DIAGNOSIS — I35.1 NONRHEUMATIC AORTIC VALVE INSUFFICIENCY: ICD-10-CM

## 2025-07-11 DIAGNOSIS — Z01.810 PREOPERATIVE CARDIOVASCULAR EXAMINATION: ICD-10-CM

## 2025-07-11 DIAGNOSIS — E78.2 MIXED HYPERLIPIDEMIA: ICD-10-CM

## 2025-07-11 DIAGNOSIS — I10 ESSENTIAL HYPERTENSION: ICD-10-CM

## 2025-07-11 DIAGNOSIS — E78.1 HYPERTRIGLYCERIDEMIA: ICD-10-CM

## 2025-07-11 DIAGNOSIS — I11.0 HYPERTENSIVE HEART DISEASE WITH HEART FAILURE: ICD-10-CM

## 2025-07-11 DIAGNOSIS — Z01.810 PREOPERATIVE CARDIOVASCULAR EXAMINATION: Primary | ICD-10-CM

## 2025-07-11 DIAGNOSIS — I25.10 CAD IN NATIVE ARTERY: ICD-10-CM

## 2025-07-11 DIAGNOSIS — R06.09 DOE (DYSPNEA ON EXERTION): Primary | ICD-10-CM

## 2025-07-11 DIAGNOSIS — B20 HIV DISEASE: ICD-10-CM

## 2025-07-11 DIAGNOSIS — I50.42 CHRONIC COMBINED SYSTOLIC AND DIASTOLIC CONGESTIVE HEART FAILURE: ICD-10-CM

## 2025-07-11 DIAGNOSIS — Z01.818 PRE-OP TESTING: Primary | ICD-10-CM

## 2025-07-11 DIAGNOSIS — I34.0 NONRHEUMATIC MITRAL VALVE REGURGITATION: ICD-10-CM

## 2025-07-11 LAB
OHS QRS DURATION: 90 MS
OHS QTC CALCULATION: 462 MS

## 2025-07-11 PROCEDURE — 99214 OFFICE O/P EST MOD 30 MIN: CPT | Mod: PBBFAC,25 | Performed by: INTERNAL MEDICINE

## 2025-07-11 PROCEDURE — 93005 ELECTROCARDIOGRAM TRACING: CPT

## 2025-07-11 PROCEDURE — 93010 ELECTROCARDIOGRAM REPORT: CPT | Mod: ,,, | Performed by: INTERNAL MEDICINE

## 2025-07-11 PROCEDURE — 99999 PR PBB SHADOW E&M-EST. PATIENT-LVL IV: CPT | Mod: PBBFAC,,, | Performed by: INTERNAL MEDICINE

## 2025-07-11 NOTE — TELEPHONE ENCOUNTER
----- Message from MERRILL Johnson sent at 7/11/2025  8:53 AM CDT -----  Regarding: SX MONDAY 7/14/25  Hello, Pre-Admit Testing Department is inquiring about this surgery patient's CMP for their procedure on MONDAY 7/14/25. Please let us know if it will be available in Epic, or will be done in Preop the day of surgery.       Thank you

## 2025-07-11 NOTE — PROGRESS NOTES
Subjective:   Patient ID:  Haritha Valle is a 80 y.o. male who presents for cardiac consult of No chief complaint on file.        The patient came in today for cardiac consult of No chief complaint on file.    Referring Physician: Hesham Andrade II, MD   Reason for initial consult: LOC, palpitations      Haritha Valle is a 80 y.o. male pt with CHF EF 45-50%, HTN, HLD,  DM2, severe RAMONITA, HIV, anxiety, depression, hypogonadism, ED presents for follow up CV eval         4/11/25  BP was very low yest - 80/62; took two clonidine in the AM along with Xanax.   BP today is high 142/90. BMI 29 - 213 lbs   He has nausea in AM     6/11/25  BP low normal here but occ elevated . HR 60s.   BMI 29 - 208 lbs, losing weight  Occ BP elevated that improves with gummies     7/11/25  Recent sleep study  PHYSICIAN INTERPRETATION AND COMMENTS: Findings are consistent with severe obstructive sleep apnea (RAMONITA).  CLINICAL HISTORY: 80 year old male presented with: 17 inch neck, BMI of 30.4, an [place] sleepiness score of 14, history of  hypertension, a previous diagnosis of RAMONITA and symptoms of nocturnal snoring, waking up choking and witnessed apneas.  Based on the clinical history, the patient has a high pre-test probability of having Severe RAMONITA    Pt has upcoming sleep endoscopy and Inpsire implant with Dr. Hair  BP and HR stable.   Overall no CP/SOB.   He did not take lasix this AM.     ECG - NSR, sinus arrythmia, LAD, LVH, nonsp T wave       Results for orders placed during the hospital encounter of 02/20/25    Nuclear Stress - Cardiology Interpreted    Interpretation Summary    Equivocal myocardial perfusion scan.    There is a mild to moderate intensity, small to medium sized, equivocal perfusion abnormality that is fixed in the apical and anteroapical wall(s). This finding is equivocal due to soft tissue shadow.    There are no other significant perfusion abnormalities.    The gated perfusion images showed an ejection fraction of  48% at rest. The gated perfusion images showed an ejection fraction of 48% post stress.    There is mild global hypokinesis at rest and post-stress.    The ECG portion of the study is negative for ischemia.      Results for orders placed during the hospital encounter of 02/05/25    Echo    Interpretation Summary    Left Ventricle: The left ventricle is normal in size. Normal wall thickness. There is concentric hypertrophy. There is moderately reduced systolic function with a visually estimated ejection fraction of 30 - 40%. Ejection fraction is approximately 30%. Grade I diastolic dysfunction.    Right Ventricle: Normal right ventricular cavity size. Wall thickness is normal. Systolic function is mildly reduced.    Left Atrium: Left atrium is moderately dilated.    IVC/SVC: Normal venous pressure at 3 mmHg.    Patient sent to ER due to increased dyspnea, HTN,  and drop in EF    MRI brain   Moderate global cortical atrophy with mild frontal lobe predominance.  Score 1 bilateral medial temporal lobe atrophy.  Minimal nonspecific white matter changes, likely senescent.    Results for orders placed during the hospital encounter of 06/05/24    Echo    Interpretation Summary    Left Ventricle: The left ventricle is normal in size. Normal wall thickness. There is concentric hypertrophy. Normal wall motion. There is normal systolic function with a visually estimated ejection fraction of 55 - 60%. There is normal diastolic function.    Right Ventricle: Normal right ventricular cavity size. Wall thickness is normal. Systolic function is normal.    Left Atrium: Left atrium is mildly dilated.    Pulmonary Artery: The estimated pulmonary artery systolic pressure is 18 mmHg.    IVC/SVC: Normal venous pressure at 3 mmHg.        Past Medical History:   Diagnosis Date    Anxiety 07/03/2019    Basal cell carcinoma     CAD in native artery 11/27/2023    Depression     Dyslipidemia 11/16/2023    Essential hypertension 01/24/2013     Hepatitis B     Hepatitis C antibody test positive 02/05/2025    RNA NEGATIVE    HIV infection     Hypertension     Immune disorder     Mild cognitive impairment 10/01/2010    Nonrheumatic aortic valve insufficiency 12/19/2023    Nonrheumatic mitral valve regurgitation 12/19/2023       Past Surgical History:   Procedure Laterality Date    APPENDECTOMY      CARDIAC CATHETERIZATION      no stent    CHOLECYSTECTOMY      COLONOSCOPY N/A 11/3/2016    Procedure: COLONOSCOPY;  Surgeon: Maxi Villasenor MD;  [facility]  Service: Endoscopy;  Laterality: N/A;  last colonoscopy with Dr Jarrell    COLONOSCOPY N/A 1/25/2022    Procedure: COLONOSCOPY;  Surgeon: Silvino Rosales MD;  Location: [facility] ENDO;  Service: Endoscopy;  Laterality: N/A;    LEFT HEART CATHETERIZATION Left 2/3/2020    Procedure: CATHETERIZATION, HEART, LEFT;  Surgeon: Chele Ko MD;  Location: [facility] CATH LAB;  Service: Cardiology;  Laterality: Left;  malur pt       Social History     Tobacco Use    Smoking status: Never    Smokeless tobacco: Never   Substance Use Topics    Alcohol use: No    Drug use: Never       Family History   Problem Relation Name Age of Onset    Hearing loss Mother Sonam Dease     Vision loss Mother Sonam Dea     Heart disease Father Demarco Dea     Heart failure Father Demarco Dea     Diabetes Neg Hx      Hypertension Neg Hx         Patient's Medications   New Prescriptions    No medications on file   Previous Medications    ALBUTEROL (PROVENTIL) 2.5 MG /3 ML (0.083 %) NEBULIZER SOLUTION    Take 3 mLs (2.5 mg total) by nebulization every 4 to 6 hours as needed for Wheezing or Shortness of Breath.    ALBUTEROL (PROVENTIL/VENTOLIN HFA) 90 MCG/ACTUATION INHALER    Inhale 2 puffs into the lungs every 4 (four) hours as needed for Wheezing. Rescue    ASCORBIC ACID, VITAMIN C, (VITAMIN C) 1000 MG TABLET    Take 1,000 mg by mouth once daily.    TWNBSQVFG-KKHYKLQS-BXPWTYV ALA (BIKTARVY) -25 MG (25 KG OR  GREATER)    Take 1 tablet by mouth once daily.    BUSPIRONE (BUSPAR) 15 MG TABLET    Take 1 tablet (15 mg total) by mouth 3 (three) times daily.    CLONIDINE (CATAPRES) 0.1 MG TABLET    Take 2 tablets (0.2 mg total) by mouth every 6 (six) hours as needed (BP > 180/90). Can repeat in 30min if needed    DONEPEZIL (ARICEPT) 5 MG TABLET    Take 1 tablet (5 mg total) by mouth every evening.    FLUOXETINE 40 MG CAPSULE    TAKE 1 CAPSULE EVERY DAY    FUROSEMIDE (LASIX) 40 MG TABLET    Take 1 tablet (40 mg total) by mouth daily as needed (for edema, swelling, fluid with BP over 140/80).    ISOSORBIDE MONONITRATE (IMDUR) 30 MG 24 HR TABLET    Take 1 tablet (30 mg total) by mouth every evening.    MAGNESIUM OXIDE (MAG-OX) 400 MG (241.3 MG MAGNESIUM) TABLET    Take 1 tablet (400 mg total) by mouth once daily. Take with lasix    METOCLOPRAMIDE HCL (REGLAN) 5 MG TABLET    Take 1 tablet (5 mg total) by mouth nightly as needed (nausea).    METOPROLOL SUCCINATE (TOPROL-XL) 100 MG 24 HR TABLET    Take 1 tablet (100 mg total) by mouth 2 (two) times daily.    ONDANSETRON (ZOFRAN-ODT) 8 MG TBDL    Take 1 tablet (8 mg total) by mouth every 6 (six) hours as needed (nausea).    PEP INJECTION    Inject 0.3 ml as directed     For compounding pharmacy use:   Add PAPAVERINE 30 mcg  Add PHENTOLAMINE 10 mg  Add ALPROSTADIL 100 mcg    POTASSIUM CHLORIDE SA (K-DUR,KLOR-CON M) 10 MEQ TABLET    Take 1 tablet (10 mEq total) by mouth once daily. Take with lasix    PROMETHAZINE (PHENERGAN) 12.5 MG TAB    TAKE ONE TABLET BY MOUTH DAILY AS NEEDED FOR NAUSEA.    ROSUVASTATIN (CRESTOR) 20 MG TABLET    TAKE 1 TABLET EVERY DAY    SACUBITRIL-VALSARTAN (ENTRESTO)  MG PER TABLET    Take 1 tablet by mouth 2 (two) times daily.    UBIDECARENONE (COENZYME Q10 ORAL)    Take by mouth once daily.   Modified Medications    No medications on file   Discontinued Medications    No medications on file       Review of Systems   Constitutional:  Positive for  "malaise/fatigue.   HENT: Negative.     Eyes: Negative.    Respiratory:  Positive for shortness of breath.    Cardiovascular:  Positive for chest pain and palpitations.   Gastrointestinal: Negative.    Genitourinary: Negative.    Musculoskeletal: Negative.    Skin: Negative.    Neurological:  Positive for dizziness and loss of consciousness.   Endo/Heme/Allergies: Negative.    Psychiatric/Behavioral: Negative.     All 12 systems otherwise negative.      Wt Readings from Last 3 Encounters:   07/11/25 96 kg (211 lb 10.3 oz)   07/11/25 96 kg (211 lb 10.3 oz)   06/17/25 97.6 kg (215 lb 0.9 oz)     Temp Readings from Last 3 Encounters:   06/17/25 96.9 °F (36.1 °C) (Tympanic)   04/10/25 97.4 °F (36.3 °C)   02/06/25 97.4 °F (36.3 °C) (Oral)     BP Readings from Last 3 Encounters:   06/17/25 110/70   06/11/25 104/70   04/11/25 (!) 142/90     Pulse Readings from Last 3 Encounters:   07/11/25 71   06/17/25 63   06/11/25 68       Pulse 71   Ht 5' 10" (1.778 m)   Wt 96 kg (211 lb 10.3 oz)   SpO2 95%   BMI 30.37 kg/m²     Objective:   Physical Exam  Vitals and nursing note reviewed.   Constitutional:       General: He is not in acute distress.     Appearance: He is well-developed. He is not diaphoretic.   HENT:      Head: Normocephalic and atraumatic.      Nose: Nose normal.   Eyes:      General: No scleral icterus.     Conjunctiva/sclera: Conjunctivae normal.   Neck:      Thyroid: No thyromegaly.      Vascular: No JVD.   Cardiovascular:      Rate and Rhythm: Normal rate and regular rhythm.      Heart sounds: S1 normal and S2 normal. No murmur heard.     No friction rub. No gallop. No S3 or S4 sounds.   Pulmonary:      Effort: Pulmonary effort is normal. No respiratory distress.      Breath sounds: Normal breath sounds. No stridor. No wheezing or rales.   Chest:      Chest wall: No tenderness.   Abdominal:      General: Bowel sounds are normal. There is no distension.      Palpations: Abdomen is soft. There is no mass.      " Tenderness: There is no abdominal tenderness. There is no rebound.   Genitourinary:     Comments: Deferred  Musculoskeletal:         General: No tenderness or deformity. Normal range of motion.      Cervical back: Normal range of motion and neck supple.   Lymphadenopathy:      Cervical: No cervical adenopathy.   Skin:     General: Skin is warm and dry.      Coloration: Skin is not pale.      Findings: No erythema or rash.   Neurological:      Mental Status: He is alert and oriented to person, place, and time.      Motor: No abnormal muscle tone.      Coordination: Coordination normal.   Psychiatric:         Behavior: Behavior normal.         Thought Content: Thought content normal.         Judgment: Judgment normal.         Lab Results   Component Value Date     03/05/2025    K 4.1 03/05/2025     03/05/2025    CO2 26 03/05/2025    BUN 18 03/05/2025    CREATININE 1.6 (H) 03/05/2025     03/05/2025    HGBA1C 6.5 (H) 02/05/2025    MG 1.9 03/05/2025    AST 24 06/09/2025    AST 61 (H) 02/05/2025    ALT 27 06/09/2025    ALT 52 (H) 02/05/2025    ALBUMIN 3.7 06/09/2025    ALBUMIN 3.5 02/05/2025    ALBUMIN 4.3 04/30/2018    PROT 7.7 06/09/2025    PROT 8.7 (H) 02/05/2025    BILITOT 0.9 06/09/2025    BILITOT 0.9 02/05/2025    WBC 10.32 06/09/2025    HGB 18.6 (H) 06/09/2025    HGB 17.8 02/05/2025    HCT 57.0 (H) 06/09/2025    HCT 55.7 (H) 02/05/2025    MCV 93 06/09/2025    MCV 88 02/05/2025     06/09/2025     02/05/2025    INR 1.0 01/28/2020    TSH 1.137 04/27/2023    CHOL 172 12/18/2023    HDL 36 (L) 12/18/2023    LDLCALC 73.2 12/18/2023    TRIG 314 (H) 12/18/2023    BNP 78 03/05/2025     Assessment:      1. MAIER (dyspnea on exertion)    2. Mixed hyperlipidemia    3. Obstructive sleep apnea    4. Chronic combined systolic and diastolic congestive heart failure    5. CAD in native artery    6. HIV disease    7. Hypertensive heart disease with heart failure    8. Nonrheumatic mitral valve  regurgitation    9. Hypertriglyceridemia    10. Nonrheumatic aortic valve insufficiency    11. Essential hypertension              Plan:   1. HTN  - labile    - titrate meds - increase to Toprol 100mg BID   - episodes of HTN urgency - r/o sec causes - labs and renal u/s ordered - neg  - ECHO 6/2024 with normal bi V function and valves, PASP 18 mmHg.   - cont clonidine 0.2 mg PRN   - cont lasix PRN   - add Imdur 30mg QHS     2. HLD with elevated TGs  - cont meds  - added- vascepa in past    3. HIV  - cont meds per PCP/ID    4. ED/hypogonadism  - cont meds per PCP PRN  - may need to decrease Testosterone due to elevated HGB     5. Syncope/dizziness/palpitations with CP - non obs CAD  - prior exercise nuclear stress test to r/o ischemia - abnormal, LHC neg  - ECHO 6/2024 with normal bi V function and valves, PASP 18 mmHg.   - carotids - neg   - has been to ENT and Neuro - has atrophy and hearing aids now  -Vital monitor - 1/2025 with freq PACs, SVT episdoes, AVG HR 79 BPM. Overall PSVC [place] at 23.12 %  - Nuc stress 2/2025 mild to mod fixed defect, equivocal, EF improved to 48%.     6.CHF -mildly reduced   -ECHO 2/2025 EF 30%, grade 1 DD, mild reduced RV function.   - cont meds - Toprol and ARB - changed to Entresto --> increase to max dose  - cont lasix PRN  - Nuc stress 2/2025 mild to mod fixed defect, equivocal, EF improved to 48%.   - repeat labs PRN; increased Metoprolol to 25 mg BID --> increase to Toprol 100mg BID     7. Anxiety  - referred to psych    8. RAMONITA, severe  - cannot tolerate CPAP  - severe RAMONITA --> for Inspire     9. Obesity - .BMI 31 - 219 lbs  BMI 31 - 219 lbs --> BMI 29 - 208  --> BMI 30 211 lbs   - cont weight loss     10. DM2  - f/u with PCP    11. Pre-OP CV evaluation prior to sleep endoscopy and Inpsire implant with Dr. Hair  Elevated periop risk of CV events for moderate risk procedure.  No chest pain, active arrhythmia and CHF symptoms.  Ok to proceed to the scheduled surgery without  further cardiac study.  Stable CV status, ECG stable.   Continue Metoprolol and Statin periop.      Visit today included increased complexity associated with the care of the episodic problem dyspnea addressed and managing the longitudinal care of the patient due to the serious and/or complex managed problem(s) .      Thank you for allowing me to participate in this patient's care. Please do not hesitate to contact me with any questions or concerns. Consult note has been forwarded to the referral physician.

## 2025-07-11 NOTE — TELEPHONE ENCOUNTER
Left message for patient that we added him to the schedule today for cardiovascular risk assessment. Will send message via his [place].

## 2025-07-11 NOTE — Clinical Note
Pre-OP CV evaluation prior to sleep endoscopy and Inpsire implant with Dr. Hair Elevated periop risk of CV events for moderate risk procedure. No chest pain, active arrhythmia and CHF symptoms. Ok to proceed to the scheduled surgery without further cardiac study.  Stable CV status, ECG stable.  Continue Metoprolol and Statin periop.

## 2025-07-11 NOTE — PRE-PROCEDURE INSTRUCTIONS
Pre op instructions reviewed with ARLIN and DUANE over telephone, verbalized understanding.    Procedure Date: MONDAY 7/14/25  Arrival Time:  6:00 AM    Address:   [facility] (Off [facility], 2nd Building on the left)  [address] [place] [address] [place]. [id number]  >>>Please enter through front entrance Lobby of 1st floor to Registration desk<<<      !!!INSTRUCTIONS IMPORTANT!!!  NO FOOD, DRINK OR TOBACCO PRODUCTS AFTER MIDNIGHT THE NIGHT BEFORE SURGERY.     Do not eat OR drink after 12 midnight, Do not smoke or use chewing tobacco after 12 midnight!  OK to brush teeth, but no gum, candy, or mints!        >>>MEDICATION INSTRUCTIONS<<<: Morning of Surgery, take small sip of water with ONLY these medications:  Metoprolol  Clonidine  Albuterol Inhaler  Prozac   Imdur      *ADHD Medication: Stop taking ADHD/ADD medications 48 hrs prior to surgery, as this can affect the anesthesia used.     *Diabetic/ Prediabetic Patients: !!!If you take diabetic or weight loss medication, Do NOT take morning of surgery unless instructed by Doctor!!!  Metformin to be stopped 24 hrs prior to surgery.   Long Acting Insulin Instructions: HOLD the night before surgery unless instructed differently by Provider!  Ozempic/ Mounjaro/ Wegovy/ Trulicity/ Semaglutide injections or weight loss medication to be stopped 7 days prior to surgery.    If you take Ozempic/ Mounjaro / Wegovy / Trulicity / Semaglutide, Tirzepatide any weight loss injections OR PHENTERMINE --->>> PLEASE LET US KNOW IMMEDIATELY, as these medications need to be stopped 7 days prior to surgery!      !!!STOP ALL Aspirins, NSAIDS, WEIGHT LOSS INJECTIONS/PILLS, Herbal supplements, & Vitamins 7 DAYS BEFORE SURGERY!!!    ____  Avoid Alcoholic beverages 3 days prior to surgery, as it can thin the blood.  ____  NO Acrylic/fake nails or nail polish worn day of surgery (specifically hand/arm & foot surgeries).  ____  NO powder, lotions, deodorants, oils or cream on body.  ____  Reason for Call:  Other call back    Detailed comments: Patient would like to talk with Kaylene. He needs a new referral for pain management. Does not want to go to the [facility] [place].     Phone Number Patient can be reached at: Cell number on file:    Telephone Information:   Mobile [phone]       Best Time: any     Can we leave a detailed message on this number? YES    Call taken on 10/14/2019 at 9:55 AM by Ursula Garza      Remove all jewelry & piercings & foreign objects before arrival & leave at home.  ____  Remove Dentures, Hearing Aids & Contact Lens prior to surgery.  ____  Bring photo ID and insurance information to hospital (Leave Valuables at Home).  ____  If going home the same day, arrange for a ride home. You will not be able to drive for 24 hrs if Anesthesia was used.   ____  Females (ages 11-60): may need to give a urine sample the morning of surgery; please see Pre op Nurse prior to using the restroom.  ____  Males: Stop ED medications (Viagra, Cialis) 24 hrs prior to surgery.  ____  Wear clean, loose fitting clothing to allow for dressings/ bandages.      Bathing Instructions:    -Shower with anti-bacterial Soap (Hibiclens or Dial) the night before surgery and the morning of.   -Do not use Hibiclens on your face or genitals.   -Apply clean clothes after shower.  -Do not shave your face or body 2 days prior to surgery unless instructed otherwise by your Surgeon.  -Do not apply lotions, creams, powders, oils or deodorant after the morning shower.              -Do not use oil, lotion, cream, powder or deodorant  -Do not shave your pubic area 7 days prior to surgery (GYN PATIENTS)    [facility] Visitor/Ride Policy:  Only 2 adults allowed in pre op/recovery area during your procedure. You MUST HAVE A RIDE HOME from a responsible adult that you know and trust. Medical Transport, Uber or Lyft can ONLY be used if patient has a responsible adult to accompany them during ride home.       *Signs and symptoms of Infection Before or After Surgery:               !!!If you experience any fever, chills, nausea/ vomiting, foul odor/ excessive drainage from surgical site, flu-like symptoms, new wounds or cuts, PLEASE CALL THE SURGEON OFFICE at [phone] or SEND MESSAGE THROUGH [employer]!!!     *If you are running late the morning of surgery, please call the Hospital Surgery Dept @ [phone].     *Billing  questions:  [phone]  [phone]     Thank you,  -[facility] Pre Admit Dept.  [phone] or [phone]  M-F 7:30 am-4:00 pm (Closed Major Holidays)

## 2025-07-14 ENCOUNTER — HOSPITAL ENCOUNTER (OUTPATIENT)
Facility: HOSPITAL | Age: 80
Discharge: HOME | End: 2025-07-14
Attending: STUDENT IN AN ORGANIZED HEALTH CARE EDUCATION/TRAINING PROGRAM | Admitting: STUDENT IN AN ORGANIZED HEALTH CARE EDUCATION/TRAINING PROGRAM
Payer: MEDICARE

## 2025-07-14 ENCOUNTER — ANESTHESIA EVENT (OUTPATIENT)
Dept: SURGERY | Facility: HOSPITAL | Age: 80
End: 2025-07-14
Payer: MEDICARE

## 2025-07-14 ENCOUNTER — ANESTHESIA (OUTPATIENT)
Dept: SURGERY | Facility: HOSPITAL | Age: 80
End: 2025-07-14
Payer: MEDICARE

## 2025-07-14 VITALS
DIASTOLIC BLOOD PRESSURE: 83 MMHG | WEIGHT: 211.06 LBS | BODY MASS INDEX: 30.22 KG/M2 | TEMPERATURE: 98 F | RESPIRATION RATE: 20 BRPM | HEIGHT: 70 IN | HEART RATE: 65 BPM | SYSTOLIC BLOOD PRESSURE: 147 MMHG | OXYGEN SATURATION: 97 %

## 2025-07-14 DIAGNOSIS — I50.42 CHRONIC COMBINED SYSTOLIC AND DIASTOLIC CONGESTIVE HEART FAILURE: ICD-10-CM

## 2025-07-14 DIAGNOSIS — Z78.9 INTOLERANCE OF CONTINUOUS POSITIVE AIRWAY PRESSURE (CPAP) VENTILATION: Primary | ICD-10-CM

## 2025-07-14 PROCEDURE — 71000015 HC POSTOP RECOV 1ST HR: Performed by: STUDENT IN AN ORGANIZED HEALTH CARE EDUCATION/TRAINING PROGRAM

## 2025-07-14 PROCEDURE — 42975 DISE EVAL SLP DO BRTH FLX DX: CPT | Mod: ,,, | Performed by: STUDENT IN AN ORGANIZED HEALTH CARE EDUCATION/TRAINING PROGRAM

## 2025-07-14 PROCEDURE — 37000008 HC ANESTHESIA 1ST 15 MINUTES: Performed by: STUDENT IN AN ORGANIZED HEALTH CARE EDUCATION/TRAINING PROGRAM

## 2025-07-14 PROCEDURE — 63600175 PHARM REV CODE 636 W HCPCS: Performed by: STUDENT IN AN ORGANIZED HEALTH CARE EDUCATION/TRAINING PROGRAM

## 2025-07-14 PROCEDURE — 36000708 HC OR TIME LEV III 1ST 15 MIN: Performed by: STUDENT IN AN ORGANIZED HEALTH CARE EDUCATION/TRAINING PROGRAM

## 2025-07-14 PROCEDURE — 25000003 PHARM REV CODE 250: Performed by: STUDENT IN AN ORGANIZED HEALTH CARE EDUCATION/TRAINING PROGRAM

## 2025-07-14 PROCEDURE — 71000033 HC RECOVERY, INTIAL HOUR: Performed by: STUDENT IN AN ORGANIZED HEALTH CARE EDUCATION/TRAINING PROGRAM

## 2025-07-14 PROCEDURE — 63600175 PHARM REV CODE 636 W HCPCS: Performed by: NURSE ANESTHETIST, CERTIFIED REGISTERED

## 2025-07-14 RX ORDER — LIDOCAINE HYDROCHLORIDE 10 MG/ML
INJECTION, SOLUTION EPIDURAL; INFILTRATION; INTRACAUDAL; PERINEURAL
Status: DISCONTINUED | OUTPATIENT
Start: 2025-07-14 | End: 2025-07-14

## 2025-07-14 RX ORDER — OXYMETAZOLINE HCL 0.05 %
SPRAY, NON-AEROSOL (ML) NASAL
Status: DISCONTINUED | OUTPATIENT
Start: 2025-07-14 | End: 2025-07-14 | Stop reason: HOSPADM

## 2025-07-14 RX ORDER — HYDROMORPHONE HYDROCHLORIDE 2 MG/ML
0.2 INJECTION, SOLUTION INTRAMUSCULAR; INTRAVENOUS; SUBCUTANEOUS EVERY 5 MIN PRN
Status: DISCONTINUED | OUTPATIENT
Start: 2025-07-14 | End: 2025-07-14 | Stop reason: HOSPADM

## 2025-07-14 RX ORDER — ONDANSETRON HYDROCHLORIDE 2 MG/ML
4 INJECTION, SOLUTION INTRAVENOUS DAILY PRN
Status: DISCONTINUED | OUTPATIENT
Start: 2025-07-14 | End: 2025-07-14 | Stop reason: HOSPADM

## 2025-07-14 RX ORDER — SACUBITRIL AND VALSARTAN 97; 103 MG/1; MG/1
1 TABLET, FILM COATED ORAL 2 TIMES DAILY
Qty: 180 TABLET | Refills: 1 | Status: SHIPPED | OUTPATIENT
Start: 2025-07-14

## 2025-07-14 RX ORDER — LIDOCAINE HYDROCHLORIDE 10 MG/ML
INJECTION, SOLUTION INFILTRATION; PERINEURAL
Status: DISCONTINUED | OUTPATIENT
Start: 2025-07-14 | End: 2025-07-14 | Stop reason: HOSPADM

## 2025-07-14 RX ORDER — GLUCAGON 1 MG
1 KIT INJECTION
Status: DISCONTINUED | OUTPATIENT
Start: 2025-07-14 | End: 2025-07-14 | Stop reason: HOSPADM

## 2025-07-14 RX ORDER — PROPOFOL 10 MG/ML
VIAL (ML) INTRAVENOUS
Status: DISCONTINUED | OUTPATIENT
Start: 2025-07-14 | End: 2025-07-14

## 2025-07-14 RX ADMIN — LIDOCAINE HYDROCHLORIDE 50 MG: 10 SOLUTION INTRAVENOUS at 07:07

## 2025-07-14 RX ADMIN — PROPOFOL 70 MG: 10 INJECTION, EMULSION INTRAVENOUS at 07:07

## 2025-07-14 RX ADMIN — PROPOFOL 30 MG: 10 INJECTION, EMULSION INTRAVENOUS at 07:07

## 2025-07-14 RX ADMIN — SODIUM CHLORIDE, POTASSIUM CHLORIDE, SODIUM LACTATE AND CALCIUM CHLORIDE: 600; 310; 30; 20 INJECTION, SOLUTION INTRAVENOUS at 07:07

## 2025-07-14 NOTE — BRIEF OP NOTE
[facility]  Brief Operative Note     SUMMARY     Surgery Date: 7/14/2025     Surgeons and Role:     * Darin Hair MD - Primary    Assisting Surgeon: None    Pre-op Diagnosis:  RAMONITA on CPAP [G47.33]  Intolerance of continuous positive airway pressure (CPAP) ventilation [Z78.9]    Post-op Diagnosis:  Post-Op Diagnosis Codes:     * RAMONITA on CPAP [G47.33]     * Intolerance of continuous positive airway pressure (CPAP) ventilation [Z78.9]    Procedure(s) (LRB):  SLEEP ENDOSCOPY,DRUG-INDUCED (N/A)    Anesthesia: Monitor Anesthesia Care    Findings/Key Components:  see op note    Estimated Blood Loss: none         Specimens:   Specimen (24h ago, onward)      None            Discharge Note    SUMMARY     Admit Date: 7/14/2025    Discharge Date and Time: No discharge date for patient encounter.    Attending Physician: Darin Hair MD     Discharge Provider: Darin Hair    Final Diagnosis: Post-Op Diagnosis Codes:     * RAMONITA on CPAP [G47.33]     * Intolerance of continuous positive airway pressure (CPAP) ventilation [Z78.9]    Disposition: Home or Self Care, discharged in good condition    Follow Up/Patient Instructions:       Medications:  Reconciled Home Medications:   Current Discharge Medication List        CONTINUE these medications which have NOT CHANGED    Details   albuterol (PROVENTIL) 2.5 mg /3 mL (0.083 %) nebulizer solution Take 3 mLs (2.5 mg total) by nebulization every 4 to 6 hours as needed for Wheezing or Shortness of Breath.  Qty: 360 mL, Refills: 11    Comments: ChronicObstructivePulmonaryDiseaseCode:[id number]  Associated Diagnoses: Simple chronic bronchitis      albuterol (PROVENTIL/VENTOLIN HFA) 90 mcg/actuation inhaler Inhale 2 puffs into the lungs every 4 (four) hours as needed for Wheezing. Rescue  Qty: 18 g, Refills: 11    Associated Diagnoses: Simple chronic bronchitis      APPLE CIDER VINEGAR ORAL Take 1 capsule by mouth once daily.      ascorbic acid, vitamin  C, (VITAMIN C) 1000 MG tablet Take 1,000 mg by mouth once daily.      cgftxyrrz-ltnczsrc-pqwgwnt ala (BIKTARVY) -25 mg (25 kg or greater) Take 1 tablet by mouth once daily.  Qty: 90 tablet, Refills: 6    Associated Diagnoses: HIV disease      busPIRone (BUSPAR) 15 MG tablet Take 1 tablet (15 mg total) by mouth 3 (three) times daily.  Qty: 90 tablet, Refills: 11    Associated Diagnoses: Anxiety      cloNIDine (CATAPRES) 0.1 MG tablet Take 2 tablets (0.2 mg total) by mouth every 6 (six) hours as needed (BP > 180/90). Can repeat in 30min if needed  Qty: 90 tablet, Refills: 1    Comments: .      FLUoxetine 40 MG capsule TAKE 1 CAPSULE EVERY DAY  Qty: 90 capsule, Refills: 3      furosemide (LASIX) 40 MG tablet Take 1 tablet (40 mg total) by mouth daily as needed (for edema, swelling, fluid with BP over 140/80).  Qty: 90 tablet, Refills: 1      isosorbide mononitrate (IMDUR) 30 MG 24 hr tablet Take 1 tablet (30 mg total) by mouth every evening.  Qty: 90 tablet, Refills: 1      magnesium oxide (MAG-OX) 400 mg (241.3 mg magnesium) tablet Take 1 tablet (400 mg total) by mouth once daily. Take with lasix  Qty: 90 tablet, Refills: 1      metoclopramide HCl (REGLAN) 5 MG tablet Take 1 tablet (5 mg total) by mouth nightly as needed (nausea).  Qty: 30 tablet, Refills: 11    Associated Diagnoses: Gastroparesis      metoprolol succinate (TOPROL-XL) 100 MG 24 hr tablet Take 1 tablet (100 mg total) by mouth 2 (two) times daily.  Qty: 180 tablet, Refills: 1    Comments: .      ondansetron (ZOFRAN-ODT) 8 MG TbDL Take 1 tablet (8 mg total) by mouth every 6 (six) hours as needed (nausea).  Qty: 30 tablet, Refills: 1      potassium chloride SA (K-DUR,KLOR-CON M) 10 MEQ tablet Take 1 tablet (10 mEq total) by mouth once daily. Take with lasix  Qty: 90 tablet, Refills: 1      promethazine (PHENERGAN) 12.5 MG Tab TAKE ONE TABLET BY MOUTH DAILY AS NEEDED FOR NAUSEA.  Qty: 30 tablet, Refills: 3      rosuvastatin (CRESTOR) 20 MG tablet  TAKE 1 TABLET EVERY DAY  Qty: 90 tablet, Refills: 3    Associated Diagnoses: Mixed hyperlipidemia      sacubitriL-valsartan (ENTRESTO)  mg per tablet Take 1 tablet by mouth 2 (two) times daily.  Qty: 180 tablet, Refills: 1    Associated Diagnoses: Chronic combined systolic and diastolic congestive heart failure      ubidecarenone (COENZYME Q10 ORAL) Take by mouth once daily.      donepeziL (ARICEPT) 5 MG tablet Take 1 tablet (5 mg total) by mouth every evening.  Qty: 90 tablet, Refills: 3    Associated Diagnoses: Dementia, unspecified dementia severity, unspecified dementia type, unspecified whether behavioral, psychotic, or mood disturbance or anxiety      pep injection Inject 0.3 ml as directed     For compounding pharmacy use:   Add PAPAVERINE 30 mcg  Add PHENTOLAMINE 10 mg  Add ALPROSTADIL 100 mcg  Qty: 10 vial, Refills: 10      UNABLE TO FIND Take 1 capsule by mouth once daily. medication name: Beet Juice Capsule           No discharge procedures on file.

## 2025-07-14 NOTE — OP NOTE
DATE OF PROCEDURE: 07/14/2025      PRE-OPERATIVE DIAGNOSIS:   Obstructive sleep apnea      POST-OPERATIVE DIAGNOSIS:   same      PROCEDURE:   Drug induced sleep endoscopy (99357)      SURGEON:   Darin Hair MD      ANESTHESIA:   Conscious sedation     ESTIMATED BLOOD LOSS:   scant      SPECIMENS:   None      COMPLICATIONS:   None apparent       INDICATIONS:    Haritha Valle is a 80 y.o. male  with RAMONITA who presents today for drug induced sleep endoscopy to diagnose the level and type of obstruction. Risks, benefits, and expectations were thoroughly discussed and the patient/patient's family wished to proceed. Informed consent was obtained. All questions were answered.       OPERATIVE FINDINGS:      Structure Degree of obstruction Configuration   Velopharynx  Near complete Equal AP and concentric components   Oropharynx Near complete Predominantly lateral and concentric   Tongue base Mild to moderate A-P   Epiglottis  Mild  A-P         OPERATIVE DETAILS:   After informed consent was obtained, the patient was?taken to the operating room and placed in the supine position.??Once anesthesia was induced, a time-out was performed. A 50/50 mixture of 0.05% oxymetazoline and 1% lidocaine was sprayed in the bilateral nares. Rubinol was given to reduce secretions. A level of sedation was created to match physiologic sleep as close as possible.      With the patient in a normal position for supine sleeping, the flexible bronchoscope was advanced through the nasal cavity. It was first positioned at the level of the velopharynx with the results noted above. Satisfied with our assessment of the velopharynx the scope was advanced past the palate and the oropharynx was visualized. This same process was repeated for the tongue base and epiglottis as well, with the findings noted above. Satsified with our assessment, the bronchoscope was removed from the nare.      The patient was returned to the care of the Anesthesia, awakened,  and transferred to the PACU in good condition.

## 2025-07-14 NOTE — DISCHARGE INSTRUCTIONS
Drug-induced sleep endoscopy  Discharge Instructions:     1. Call your doctor or go to the emergency room if you have:    - Fever of 101.5 degrees or higher   - Severe pain that has increased greatly since your surgery or is uncontrolled by your current pain medications   - Nausea and vomiting that does not go away   - Chest pain/shortness of breath   - Any other acute events, problems, or concerns       2. Wound Care/Dressings/Drain Instructions:    - no changes or post op care is needed  - some mild nose bleeding is not uncommon. Use afrin nasal spray and hold firm pressure if there is persistent bleeding. Call us if the bleeding does not stop after 15-20 minutes       3. Follow Up:    - A follow up appointment will be scheduled for you based on the results.    4. Activity/Restrictions:    - Resume your regular activities, as tolerated     5. Diet:   - Resume your regular diet, as tolerated     6. Additional Instructions:   - pain should be mild. Try using acetaminophen/Tylenol and ibuprofen/Motrin to control your pain.       For any questions, please call our clinic our leave us a My Chart message. [facility] Line: [phone], then ask for ENT Clinic.   For after hours questions and/or urgent concerns, call the same number above ([phone]) and ask for the on-call ENT physician.

## 2025-07-14 NOTE — ANESTHESIA PREPROCEDURE EVALUATION
07/14/2025  Haritha Valle is a 80 y.o., male.    Past Medical History:   Diagnosis Date    Anxiety 07/03/2019    Basal cell carcinoma     CAD in native artery 11/27/2023    Depression     Dyslipidemia 11/16/2023    Essential hypertension 01/24/2013    Hepatitis B     Hepatitis C antibody test positive 02/05/2025    RNA NEGATIVE    HIV infection     Hypertension     Immune disorder     Mild cognitive impairment 10/01/2010    Nonrheumatic aortic valve insufficiency 12/19/2023    Nonrheumatic mitral valve regurgitation 12/19/2023     Past Surgical History:   Procedure Laterality Date    APPENDECTOMY      CARDIAC CATHETERIZATION      no stent    CHOLECYSTECTOMY      COLONOSCOPY N/A 11/3/2016    Procedure: COLONOSCOPY;  Surgeon: Maxi Villasenor MD;  Location: [facility] ENDO ([address] [place] [address]);  Service: Endoscopy;  Laterality: N/A;  last colonoscopy with Dr Jarrell    COLONOSCOPY N/A 1/25/2022    Procedure: COLONOSCOPY;  Surgeon: Silvino Rosales MD;  Location: [facility] ENDO;  Service: Endoscopy;  Laterality: N/A;    LEFT HEART CATHETERIZATION Left 2/3/2020    Procedure: CATHETERIZATION, HEART, LEFT;  Surgeon: Chele Ko MD;  Location: [facility] CATH LAB;  Service: Cardiology;  Laterality: Left;  malur pt       ECHO (1/27/25):  Interpretation Summary         Equivocal myocardial perfusion scan.    There is a mild to moderate intensity, small to medium sized, equivocal perfusion abnormality that is fixed in the apical and anteroapical wall(s). This finding is equivocal due to soft tissue shadow.    There are no other significant perfusion abnormalities.    The gated perfusion images showed an ejection fraction of 48% at rest. The gated perfusion images showed an ejection fraction of 48% post stress.    There is mild global hypokinesis at rest and post-stress.    The ECG portion of the study is negative for ischemia.      Pre-op  Assessment    I have reviewed the Patient Summary Reports.    I have reviewed the NPO Status.   I have reviewed the Medications.     Review of Systems  Anesthesia Hx:  No problems with previous Anesthesia   History of prior surgery of interest to airway management or planning:  Previous anesthesia: General, MAC          Denies Personal Hx of Anesthesia complications.                    Social:  Non-Smoker       Hematology/Oncology:  Hematology Normal   Oncology Normal                                   Cardiovascular:     Hypertension   CAD       CHF     MAIER  ECG has been reviewed. Sinus rhythm with marked sinus arrythmia   Left axis deviation   Moderate voltage criteria for LVH, may be normal variant ( R in aVL ,   Rober product )   Nonspecific ST abnormality   Abnormal ECG   When compared with ECG of 20-Feb-2025 13:22,   No significant change was found   Confirmed by Luisito Morgan ([id number]) on 7/11/2025 4:42:58 PM                              Pulmonary:      Shortness of breath  Sleep Apnea, CPAP                Renal/:  Renal/ Normal                 Hepatic/GI:      Liver Disease, Hepatitis, B              Musculoskeletal:  Musculoskeletal Normal                Neurological:           Alzheimer's     Dementia                         Endocrine:  Endocrine Normal    Bmi 30        Psych:  Psychiatric History                  Chemistry        Component Value Date/Time     07/11/2025 1407     03/05/2025 1443    K 3.8 07/11/2025 1407    K 4.1 03/05/2025 1443     07/11/2025 1407     03/05/2025 1443    CO2 29 07/11/2025 1407    CO2 26 03/05/2025 1443    BUN 21 07/11/2025 1407    CREATININE 1.7 (H) 07/11/2025 1407     (H) 07/11/2025 1407     03/05/2025 1443        Component Value Date/Time    CALCIUM 9.7 07/11/2025 1407    CALCIUM 9.9 03/05/2025 1443    ALKPHOS 44 07/11/2025 1407    ALKPHOS 36 (L) 02/05/2025 1552    AST 23 07/11/2025 1407    AST 61 (H) 02/05/2025 1552    ALT 30  07/11/2025 1407    ALT 52 (H) 02/05/2025 1552    BILITOT 1.0 07/11/2025 1407    BILITOT 0.9 02/05/2025 1552    ESTGFRAFRICA >60.0 10/14/2021 0715    EGFRNONAA >60.0 10/14/2021 0715        Lab Results   Component Value Date    WBC 10.32 06/09/2025    HGB 18.6 (H) 06/09/2025    HCT 57.0 (H) 06/09/2025    MCV 93 06/09/2025     06/09/2025              Anesthesia Plan  Type of Anesthesia, risks & benefits discussed:    Anesthesia Type: MAC  Intra-op Monitoring Plan: Standard ASA Monitors  Post Op Pain Control Plan: multimodal analgesia and IV/PO Opioids PRN  Induction:  IV  Informed Consent: Informed consent signed with the Patient and all parties understand the risks and agree with anesthesia plan.  All questions answered.   ASA Score: 2  Day of Surgery Review of History & Physical: H&P Update referred to the surgeon/provider.    Ready For Surgery From Anesthesia Perspective.     .

## 2025-07-14 NOTE — ANESTHESIA POSTPROCEDURE EVALUATION
Anesthesia Post Evaluation    Patient: Haritha Valle    Procedure(s) Performed: Procedure(s) (LRB):  SLEEP ENDOSCOPY,DRUG-INDUCED (N/A)    Final Anesthesia Type: MAC      Patient location during evaluation: PACU  Patient participation: Yes- Able to Participate  Level of consciousness: awake and alert and oriented  Post-procedure vital signs: reviewed and stable  Pain management: adequate  Airway patency: patent  RAMONITA mitigation strategies: Multimodal analgesia  PONV status at discharge: No PONV  Anesthetic complications: no      Cardiovascular status: blood pressure returned to baseline and hemodynamically stable  Respiratory status: unassisted  Hydration status: euvolemic  Follow-up not needed.              Vitals Value Taken Time   /83 07/14/25 08:20   Temp 36.6 °C (97.9 °F) 07/14/25 08:20   Pulse 65 07/14/25 08:20   Resp 20 07/14/25 08:20   SpO2 97 % 07/14/25 08:20         Event Time   Out of Recovery 08:26:09         Pain/Timothy Score: Timothy Score: 10 (7/14/2025  8:31 AM)

## 2025-07-14 NOTE — TRANSFER OF CARE
"Anesthesia Transfer of Care Note    Patient: Haritha Valle    Procedure(s) Performed: Procedure(s) (LRB):  SLEEP ENDOSCOPY,DRUG-INDUCED (N/A)    Patient location: PACU    Anesthesia Type: MAC    Transport from OR: Transported from OR on room air with adequate spontaneous ventilation    Post pain: adequate analgesia    Post assessment: no apparent anesthetic complications and tolerated procedure well    Post vital signs: stable    Level of consciousness: sedated    Nausea/Vomiting: no nausea/vomiting    Complications: none    Transfer of care protocol was followed    Last vitals: Visit Vitals  BP (!) 209/94   Pulse 64   Temp 36.3 °C (97.3 °F) (Temporal)   Resp 16   Ht 5' 10" (1.778 m)   Wt 95.8 kg (211 lb 1.5 oz)   SpO2 97%   BMI 30.29 kg/m²     "

## 2025-07-14 NOTE — H&P
"Day of Surgery History & Physical Exam    Patient Name: Haritha Valle     Date: 7/14/2025          HPI: Haritha is a 80 y.o. male who presents today for operative treatment of No diagnosis found.. No recent illnesses.          ROS:    A complete review of systems was obtained and is otherwise negative.       PMH:      Past Medical History:   Diagnosis Date    Anxiety 07/03/2019    Basal cell carcinoma     CAD in native artery 11/27/2023    Depression     Dyslipidemia 11/16/2023    Essential hypertension 01/24/2013    Hepatitis B     Hepatitis C antibody test positive 02/05/2025    RNA NEGATIVE    HIV infection     Hypertension     Immune disorder     Mild cognitive impairment 10/01/2010    Nonrheumatic aortic valve insufficiency 12/19/2023    Nonrheumatic mitral valve regurgitation 12/19/2023          PSH:      Past Surgical History:   Procedure Laterality Date    APPENDECTOMY      CARDIAC CATHETERIZATION      no stent    CHOLECYSTECTOMY      COLONOSCOPY N/A 11/3/2016    Procedure: COLONOSCOPY;  Surgeon: Maxi Villasenor MD;  Location: [address]);  Service: Endoscopy;  Laterality: N/A;  last colonoscopy with Dr Jarrell    COLONOSCOPY N/A 1/25/2022    Procedure: COLONOSCOPY;  Surgeon: Silvino Rosales MD;  Location: [facility] ENDO;  Service: Endoscopy;  Laterality: N/A;    LEFT HEART CATHETERIZATION Left 2/3/2020    Procedure: CATHETERIZATION, HEART, LEFT;  Surgeon: Chele Ko MD;  Location: [facility] CATH LAB;  Service: Cardiology;  Laterality: Left;  malur pt          MEDS:      Current Medications[1]       ALLERGIES:     No known drug allergies       EXAM:     Vitals: BP (!) 209/94   Pulse 64   Temp 97.3 °F (36.3 °C) (Temporal)   Resp 16   Ht 5' 10" (1.778 m)   Wt 95.8 kg (211 lb 1.5 oz)   SpO2 97%   BMI 30.29 kg/m²      General: Awake, at baseline alertness.      HEENT: No scleral icterus or conjunctival hemorrhage. Globe position appears normal. External ears  normal. Nose patent without rhinorrhea. " No lymphadenopathy. No thyromegaly     Cardiovascular: No cyanosis.     Pulmonary: No audible stridor. Breathing easily with no labor.     Neuro: Symmetric facial movement.      Psychiatry: Appropriate affect and mood.     Skin: No scars or lesions on face or neck.     Extremities: Moves all extremities with normal range of motion.      Other Findings: None.       Assessment & Plan: Haritha has diagnoses of No diagnosis found. and will go to the OR today for DISE. Informed consent was obtained in clinic and available in the EMR today. All questions have been answered.  \       [1] No current facility-administered medications for this encounter.

## 2025-07-18 ENCOUNTER — OFFICE VISIT (OUTPATIENT)
Dept: OTOLARYNGOLOGY | Facility: CLINIC | Age: 80
End: 2025-07-18
Payer: MEDICARE

## 2025-07-18 ENCOUNTER — LAB VISIT (OUTPATIENT)
Dept: LAB | Facility: HOSPITAL | Age: 80
End: 2025-07-18
Attending: UROLOGY
Payer: MEDICARE

## 2025-07-18 VITALS
SYSTOLIC BLOOD PRESSURE: 186 MMHG | WEIGHT: 211.19 LBS | BODY MASS INDEX: 30.23 KG/M2 | HEIGHT: 70 IN | DIASTOLIC BLOOD PRESSURE: 93 MMHG

## 2025-07-18 DIAGNOSIS — G47.33 OSA ON CPAP: Primary | ICD-10-CM

## 2025-07-18 DIAGNOSIS — I10 ESSENTIAL HYPERTENSION: ICD-10-CM

## 2025-07-18 DIAGNOSIS — E78.5 DYSLIPIDEMIA: ICD-10-CM

## 2025-07-18 DIAGNOSIS — R73.03 PREDIABETES: ICD-10-CM

## 2025-07-18 LAB
ANION GAP (OHS): 10 MMOL/L (ref 8–16)
BUN SERPL-MCNC: 19 MG/DL (ref 8–23)
CALCIUM SERPL-MCNC: 9.9 MG/DL (ref 8.7–10.5)
CHLORIDE SERPL-SCNC: 100 MMOL/L (ref 95–110)
CHOLEST SERPL-MCNC: 171 MG/DL (ref 120–199)
CHOLEST/HDLC SERPL: 5.7 {RATIO} (ref 2–5)
CO2 SERPL-SCNC: 29 MMOL/L (ref 23–29)
CREAT SERPL-MCNC: 1.5 MG/DL (ref 0.5–1.4)
EAG (OHS): 137 MG/DL (ref 68–131)
GFR SERPLBLD CREATININE-BSD FMLA CKD-EPI: 47 ML/MIN/1.73/M2
GLUCOSE SERPL-MCNC: 116 MG/DL (ref 70–110)
HBA1C MFR BLD: 6.4 % (ref 4–5.6)
HDLC SERPL-MCNC: 30 MG/DL (ref 40–75)
HDLC SERPL: 17.5 % (ref 20–50)
LDLC SERPL CALC-MCNC: ABNORMAL MG/DL
NONHDLC SERPL-MCNC: 141 MG/DL
POTASSIUM SERPL-SCNC: 3.7 MMOL/L (ref 3.5–5.1)
SODIUM SERPL-SCNC: 139 MMOL/L (ref 136–145)
TRIGL SERPL-MCNC: 481 MG/DL (ref 30–150)
TSH SERPL-ACNC: 1.26 UIU/ML (ref 0.4–4)

## 2025-07-18 PROCEDURE — 83036 HEMOGLOBIN GLYCOSYLATED A1C: CPT

## 2025-07-18 PROCEDURE — 36415 COLL VENOUS BLD VENIPUNCTURE: CPT

## 2025-07-18 PROCEDURE — 80048 BASIC METABOLIC PNL TOTAL CA: CPT

## 2025-07-18 PROCEDURE — 99213 OFFICE O/P EST LOW 20 MIN: CPT | Mod: PBBFAC | Performed by: STUDENT IN AN ORGANIZED HEALTH CARE EDUCATION/TRAINING PROGRAM

## 2025-07-18 PROCEDURE — 84443 ASSAY THYROID STIM HORMONE: CPT

## 2025-07-18 PROCEDURE — 99999 PR PBB SHADOW E&M-EST. PATIENT-LVL III: CPT | Mod: PBBFAC,,, | Performed by: STUDENT IN AN ORGANIZED HEALTH CARE EDUCATION/TRAINING PROGRAM

## 2025-07-18 PROCEDURE — 80061 LIPID PANEL: CPT

## 2025-07-18 NOTE — PROGRESS NOTES
Chief complaint:    Chief Complaint   Patient presents with    Sleep Apnea     Pt states he has more questions about the inspire            Referring Provider:  No referring provider defined for this encounter.      History of present illness:     Haritha is here for Obstructive sleep apnea and intolerance of CPAP.  he was diagnosed with RAMONITA 5+  years ago.   Last sleep study: 2020  he has issues with CPAP compliance, due to nasal obstruction,     Treated with O2 instead due to poor CPAP tolerance.     Previous surgical treatments for sleep apnea: none     Pertinent medical issues: HTN, HIV, CAD  Anticoagulation: none    Body mass index is 30.3 kg/m².     Here to review DISE and discuss options.    History      Past Medical History:   Past Medical History:   Diagnosis Date    Anxiety 07/03/2019    Basal cell carcinoma     CAD in native artery 11/27/2023    Depression     Dyslipidemia 11/16/2023    Essential hypertension 01/24/2013    Hepatitis B     Hepatitis C antibody test positive 02/05/2025    RNA NEGATIVE    HIV infection     Hypertension     Immune disorder     Mild cognitive impairment 10/01/2010    Nonrheumatic aortic valve insufficiency 12/19/2023    Nonrheumatic mitral valve regurgitation 12/19/2023         Past Surgical History:  Past Surgical History:   Procedure Laterality Date    APPENDECTOMY      CARDIAC CATHETERIZATION      no stent    CHOLECYSTECTOMY      COLONOSCOPY N/A 11/3/2016    Procedure: COLONOSCOPY;  Surgeon: Maxi Villasenor MD;  Location: [facility] ENDO ([address] [place] [address]);  Service: Endoscopy;  Laterality: N/A;  last colonoscopy with Dr Jarerll    COLONOSCOPY N/A 1/25/2022    Procedure: COLONOSCOPY;  Surgeon: Silvino Rosales MD;  Location: [facility] ENDO;  Service: Endoscopy;  Laterality: N/A;    LEFT HEART CATHETERIZATION Left 2/3/2020    Procedure: CATHETERIZATION, HEART, LEFT;  Surgeon: Chele Ko MD;  Location: [facility] CATH LAB;  Service: Cardiology;  Laterality: Left;  malur pt    SLEEP ENDOSCOPY,  "DRUG-INDUCED N/A 7/14/2025    Procedure: SLEEP ENDOSCOPY,DRUG-INDUCED;  Surgeon: Darin Hair MD;  Location: [facility];  Service: ENT;  Laterality: N/A;         Medications: Medication list reviewed. He  has a current medication list which includes the following prescription(s): albuterol, albuterol, cider vinegar, ascorbic acid (vitamin c), biktarvy, buspirone, clonidine, donepezil, fluoxetine, furosemide, isosorbide mononitrate, magnesium oxide, metoclopramide hcl, metoprolol succinate, ondansetron, alprostadil, potassium chloride sa, promethazine, rosuvastatin, entresto, ubidecarenone, and UNABLE TO FIND.     Allergies:   Review of patient's allergies indicates:   Allergen Reactions    No known drug allergies          Family history: family history includes Hearing loss in his mother; Heart disease in his father; Heart failure in his father; Vision loss in his mother.         Social History          Alcohol use:  reports no history of alcohol use.            Tobacco:  reports that he has never smoked. He has never used smokeless tobacco.         Physical Examination      Vitals: Blood pressure (!) 186/93, height 5' 10" (1.778 m), weight 95.8 kg (211 lb 3.2 oz).      General: Well developed, well nourished, well hydrated.     Voice: no dysphonia, no dysarthria      Head/Face: Normocephalic, atraumatic. No scars or lesions. Facial musculature equal.     Eyes: No scleral icterus or conjunctival hemorrhage. EOMI. PERRLA.     Ears:     Right ear: No gross deformity. EAC is clear of debris and erythema. TM are intact with a pneumatized middle ear. No signs of retraction, fluid or infection.      Left ear: No gross deformity. EAC is clear of debris and erythema. TM are intact with a pneumatized middle ear. No signs of retraction, fluid or infection.      Nose: No gross deformity or lesions. No purulent discharge. No significant NSD.      Mouth/Oropharynx: Lips without any lesions. No mucosal lesions within the " oropharynx. No tonsillar exudate or lesions. Pharyngeal walls symmetrical. Uvula midline. Tongue midline without lesions.     Neck: Trachea midline. No masses. No thyromegaly or nodules palpated.     Lymphatic: No lymphadenopathy in the neck.     Extremities: No cyanosis. Warm and well-perfused.     Skin: No scars or lesions on face or neck.      Neurologic: Moving all extremities without gross abnormality.CN II-XII grossly intact. House-Brackmann 1/6. No signs of nystagmus.          Data reviewed      Review of records:      I reviewed records from the referring provider's office visits.  These describe the history, workup, and/or treatment of this problem thus far:     Pulm and prior ENT notes reviewed      Imaging:      I have independently reviewed the following imaging with the findings noted below:     MRI brain 6/27/24  Septum midline  Clear mastoids and paranasal sinuses    Polysomnogram 2020  results independently reviewed:    AHI 26.8  O2% matilda 79%  PLMS Index = 67.6 / hr asleep    RAMONITA, PLMS, and stress - related arousals (anxiety and depression)  interact to significantly impair sleep quality and restoratives, making treatment of each and all of these disorders very important  for restorative sleep.    DISE review     Structure Degree of obstruction Configuration   Velopharynx  Near complete Equal AP and concentric components   Oropharynx Near complete Predominantly lateral and concentric   Tongue base Mild to moderate A-P   Epiglottis  Mild  A-P          Assessment/Plan:    No diagnosis found.       Haritha and I discussed at length that obstructive sleep apnea is a serious condition that may lead to hypertension, cardiovascular compromise and possibly stroke.  We discussed that CPAP is first line therapy and Haritha has tried this in the past.  Haritha does not tolerate CPAP well.  He underwent DISE which showed equal contributions of lateral and AP collapse, making him a less than ideal candidate. We  reviewed the video from his DISE, as well. Due to his significant comorbidities and somewhat elevated risk of HGNS failure, I would recommend he first work with our sleep medicine doctors again to see if there are any further adjustments that can be made to his CPAP or if he may be a candidate for MAS, as this may not resolve his RAMONITA, but could still reduce mortalitiy risk. Finally, we will save for HGNS for last-resort if all other measures are exhausted.      Darin Hair MD  [facility]   [facility]  [id number] [place] [address].  [place] [id number]  P: [phone]  F: [phone]

## 2025-07-24 ENCOUNTER — OFFICE VISIT (OUTPATIENT)
Dept: PULMONOLOGY | Facility: CLINIC | Age: 80
End: 2025-07-24
Payer: MEDICARE

## 2025-07-24 VITALS
DIASTOLIC BLOOD PRESSURE: 80 MMHG | WEIGHT: 212.94 LBS | RESPIRATION RATE: 18 BRPM | SYSTOLIC BLOOD PRESSURE: 122 MMHG | HEART RATE: 63 BPM | OXYGEN SATURATION: 95 % | BODY MASS INDEX: 30.49 KG/M2 | HEIGHT: 70 IN

## 2025-07-24 DIAGNOSIS — J96.11 CHRONIC RESPIRATORY FAILURE WITH HYPOXIA, ON HOME O2 THERAPY: ICD-10-CM

## 2025-07-24 DIAGNOSIS — G47.33 OSA ON CPAP: ICD-10-CM

## 2025-07-24 DIAGNOSIS — G47.33 OSA (OBSTRUCTIVE SLEEP APNEA): Primary | ICD-10-CM

## 2025-07-24 DIAGNOSIS — J41.0 SIMPLE CHRONIC BRONCHITIS: ICD-10-CM

## 2025-07-24 DIAGNOSIS — G47.34 NOCTURNAL HYPOXEMIA: ICD-10-CM

## 2025-07-24 DIAGNOSIS — Z99.81 CHRONIC RESPIRATORY FAILURE WITH HYPOXIA, ON HOME O2 THERAPY: ICD-10-CM

## 2025-07-24 PROCEDURE — 99999 PR PBB SHADOW E&M-EST. PATIENT-LVL IV: CPT | Mod: PBBFAC,,, | Performed by: INTERNAL MEDICINE

## 2025-07-24 PROCEDURE — 99214 OFFICE O/P EST MOD 30 MIN: CPT | Mod: PBBFAC | Performed by: INTERNAL MEDICINE

## 2025-07-24 RX ORDER — ALBUTEROL SULFATE 90 UG/1
2 INHALANT RESPIRATORY (INHALATION) EVERY 4 HOURS PRN
Qty: 18 G | Refills: 11 | Status: SHIPPED | OUTPATIENT
Start: 2025-07-24

## 2025-07-24 NOTE — PATIENT INSTRUCTIONS
Adjusting to CPAP for some people is takes time to adjust. Hope the techniques below will help you to adapt to CPAP the main thing is to not give up, you will adapt eventually for some it takes time. Let us know if we can help.     This is not unusual or hard to understand:  Breathing with CPAP is different from ordinary breathing, and this difference is aversive to some.  The problem can be overcome, however, and the benefits CPAP provides are certainly worth the effort.   Below you will find a simple and gradual way to get used to CPAP before you try to use it all night, every night.   The essence of this procedure is to relax and let breathing with CPAP become a habit.  It may take about 2 weeks, for some it takes 3-6 months to adapt to CPAP use nightly.   Please consider the following methods to help with adapting to CPAP:   1.  Wear CPAP while awake and comfortably seated, during the late evening.   2.  Wear CPAP in bed while attempting sleep at night.   3.  If your discomfort is too great any time, discontinue an attempt again later the same night, for the same amount of time.   4.  We may need to alter pressure if necessary, the down today icon reveals current settings are optimal to control your sleep apnea.   5.  If you find that it is very easy to get used to  CPAP, you may start using it every night when your comfortable enough to do so.   6.  IMPORTANT REMINDER:  If you have a cold her sinus congestion is okay to miss in either to of CPAP.  Consider using antihistamines or decongestants to clear up year sinus congestion prior to sleeping.     DAYS 1-3   Start CPAP while awake and comfortably seated during the late evening in bed, after having prepare for bed.  You may do this while watching television, listening to music or reading.  Use for 1 hr, then take off CPAP and go directly to sleep in your bed     Days 4-6   Start CPAP when you go to bed and use for 1 hr, or until you fall asleep.  If your  discomfort is too great any time, discontinue attempt again later the same night, for the same designated amount of time (1 hr).     Day 7-9   Increased time with CPAP to 2 hrs at night.  If your discomfort is too great any time, discontinue attempt again later the same night for the same designated amount of time (2 hrs).     Days 10-12   Increase time with CPAP to 3 hrs a night if your discomfort is too great any time, discontinue attempt again later the same night, for the same designated amount of time (3 hr).     Days 13-15   Sleep the entire night with CPAP.     OPTIONAL:  You may need Progressive Muscle Relaxation (PMR) to help put she would ease when using CPAP; do PRM twice each day, once in the morning or afternoon, and once in the evening just before using CPAP.  You may do PRM prior to any attempted to you are comfortable with Continuous Positive Airway Pressure    Humidification in the Continuous Positive Airway Pressure machine is important and you can adjust this to your own comfort level.  Use distilled water (infant water) and empty the water chamber every morning after use and place fresh water in the chamber every night.  If you are using a nasal cpap mask and you awaken with a dry mouth this probably occurs because you have your mouth open while sleeping and you may need a chin strap to keep your mouth closed. You may need to switch to a full face or hybrid mask that covers your mouth and nose.               Continuous Positive Airway Pressure Machine Cleaning    One of the most important factors in maintaining CPAP compliance is taking proper care of your CPAP equipment. In order to have successful CPAP therapy, you must be willing to make your treatment a priority in your life, and that means regularly cleaning and maintaining your CPAP equipment. Fortunately, taking proper care of your equipment is pretty easy, and not very time consuming. With a little adjustment to your regular morning  "routine, your device and accessories will be working at 100% efficiency to get you that much needed sleep you've been longing for.    One of the most frequent questions we get asked is "how often do I need to clean my CPAP equipment?" .    CPAP Humidifier Cleaning and Replacement:  Nearly all current CPAP machines now come with a heated humidification system that helps cut down on morning dry mouth as well as keeping your nasal turbinates from drying out and becoming irritated and inflamed. However, the humidification chamber needs to be cleaned out daily to prevent bacteria build-up as well as calcification. Recommended routine:    Remove chamber from humidifier carefully so water doesn't enter your CPAP machine.    Open chamber and wash with warm, soapy water.    Rinse well with water and allow to dry on a clean cloth or paper towel out in direct sunlight.    Fill with distilled or sterile water (obtained at any grocery store) Do not use tap water as it may contain minerals and chemicals that can damage components of the machine. It is also not recommended to use filtered water (i.e. through a Angela filter) for the same reasons.    Once a week the humidifier chamber should be soaked in a solution of 1 part white vinegar 3 parts water for approximately 15-20 minutes before rinsing thoroughly with distilled water.    Some humidifier chambers are  safe, but make sure to check your CPAP machine's manual before cleaning in a .    Humidifier chambers should be replaced as needed.    CPAP Mask Cleaning and Replacement:  Most CPAP mask cushions are made of silicone, a gentle, non-irritating material. However, while silicone is a very comfortable material for masks, it doesn't have a very long lifespan, and without proper care can breakdown quicker than expected. The oil from your skin interacts with the silicone mask and causes breakdown and stiffness. Therefore, cleaning your CPAP mask is crucial in " making it efficient as possible. Here's some tips on CPAP mask cleaning and replacement:    Wash mask daily with warm water and mild, non-fragrant soap or purchase CPAP mask specific wipes and detergents. You can use the same type of mild basic facial soap that you use on your face.    Rinse with water and allow to air dry on a clean cloth or paper towel out of direct sunlight.    Before using mask at night, wash your face thoroughly and don't use facial moisturizers. Facial oils and moisturizers can breakdown the silicone faster.    Once a week soak mask in solution of 1 part white vinegar 3 parts water before rinsing in distilled water.    Headgear and chinstraps should be washed as needed by hand using warm soapy water, rinsed well, and air dried. Do not place headgear or chinstraps in washing machine or dryer.  For replacement schedules of CPAP masks you should check both your 's recommendations and your insurance allowance. However, for most masks it is recommended that you  replace the cushions 1-2 times per month, and the mask every 3-6 months.    CPAP tubing should be cleaned weekly in a sink of warm, soapy water, rinsed well, and left to hang-dry out of direct sunlight.    CPAP Filters Cleaning and Replacement  Your filters are located near the back of the CPAP machine where the device draws air from the room that it compresses to your pressure settings. Nearly all CPAP machines have a disposable white paper filter and some have an additional non-disposable grey filter as well. Here are some cleaning tips for your CPAP filters:    You should clean the grey non-disposable filter at least on a weekly basis. You may have to clean it more regularly if you have pets, smoke inside your house, or if your home is especially janet.    Rinse grey filters with water and allow to dry before placing back into your machine.    The grey re-usable filters should be replaced when it begins to look worn or after  6 months.    Replace disposable white paper filters monthly or more frequently if it appears dingy or dirty.    Your CPAP machine itself does not need to be cleaned but you may want to dust it down with a slightly damp cloth as desired.    General CPAP Maintenance & CPAP Cleaning Tips  Make your CPAP equipment cleaning part of your morning routine, allowing the equipment ample time to dry during the day.    Keep machine and accessories out of direct sunlight to avoid damaging them.    Never use bleach to clean accessories.    Other machine accessories such as power cords and data cards may need to be replaced due to equipment malfunctions.    Place machine on a level surface away from objects such as curtains that may interfere with the air intake.    Always use distilled or sterile water when cleaning components.    Keep track of when you should order replacement parts for your mask and accessories so that you always get the most out of your therapy.    With these simple tips on cleaning and maintaining your CPAP device and accessories, you will assuredly have a much better CPAP therapy experience.    There are a number of machines advertised that clean Continuous Positive Airway Pressure equipment. For most patients this is not necessary. If you have a history of chronic sinus or lung infections this type of cleaning device may be helpful. Unfortunately, at this time most insurance companies and Medicare are not paying for these devices.

## 2025-07-24 NOTE — PROGRESS NOTES
Subjective:     Patient ID: Haritha Valle is a 80 y.o. male.    Chief Complaint:  Worsening shortness of breath and MAIER , paroxysmal nocturnal dyspnea . On oxygen at night    HPI 80 y.o. with anxiety ,able to use Continuous Positive Airway Pressure machine regularly. Patient does not recall this visit.    Anxiety  Patient is here for evaluation of anxiety.  He has the following anxiety symptoms: dizziness, fatigue, feelings of losing control, insomnia, irritable, shortness of breath. Onset of symptoms was approximately 3 years ago.  Symptoms have been gradually worsening since that time. He denies current suicidal and homicidal ideation. Family history significant for no psychiatric illness.Possible organic causes contributing are: drug abuse, altzheimers. Risk factors: previous episode of depression Previous treatment includes medication BuSpar.   He complains of the following medication side effects: drowsiness.      Dyspnea  Patient complains of shortness of breath. Symptoms occur while getting dressed, unable to walk one block walking.Worse over the past 1-2 months. Also with paroxysmal nocturnal dyspnea . Left side -unable to breath compared to right     Symptoms began 4 years ago, gradually worsening since. Associated symptoms include  dry cough. He denies chest pain, located left chest. He does not have had recent travel. Weight has been stable. Symptoms are exacerbated by moderate activity. Symptoms are alleviated by rest.    C/o nausea   He reports stable cardiac history with no significant cardiac problems    Obstructive Sleep Apnea on Continuous Positive Airway Pressure:  Patient is using not CPAP as prescribed and not benefiting from therapy. Patient has complaints of unable to tolerate Continuous Positive Airway Pressure mask but wants to try using it again  Prescription for oxygen to be used at night sent.     history of HTN, HIV (followed by Dr. Larios), RAMONITA (moderate, CPAP intolerant) who presents  to Pulmonary clinic for evaluation of dyspnea. He was last seen in clinic 11/2023 by Dr. Chavarria- he was continued on Trelegy 200mcg, oxygen ordered, pt referred to ENT for snoring/RAMONITA.      Short of breath, dizzy, light-headed with minimal exertion over the past 3 years. Worse over the past 2-3 months. Last Friday- had an episode overnight where he thought he may be having a heart attack- couldn't breathe, short of breath. Stayed up much of the night until it improved and then took a sleeping pill.      Has tried albuterol, nothing helped. Worsening symptoms with albuterol breathing treatment with corine.     Past Medical History:   Diagnosis Date    Anxiety 07/03/2019    Basal cell carcinoma     CAD in native artery 11/27/2023    Depression     Dyslipidemia 11/16/2023    Essential hypertension 01/24/2013    Hepatitis B     Hepatitis C antibody test positive 02/05/2025    RNA NEGATIVE    HIV infection     Hypertension     Immune disorder     Mild cognitive impairment 10/01/2010    Nonrheumatic aortic valve insufficiency 12/19/2023    Nonrheumatic mitral valve regurgitation 12/19/2023     Past Surgical History:   Procedure Laterality Date    APPENDECTOMY      CARDIAC CATHETERIZATION      no stent    CHOLECYSTECTOMY      COLONOSCOPY N/A 11/3/2016    Procedure: COLONOSCOPY;  Surgeon: Maxi Villasenor MD;  [facility]  Service: Endoscopy;  Laterality: N/A;  last colonoscopy with Dr Jarrell    COLONOSCOPY N/A 1/25/2022    Procedure: COLONOSCOPY;  Surgeon: Silvino Rosales MD;  Location: [facility] ENDO;  Service: Endoscopy;  Laterality: N/A;    LEFT HEART CATHETERIZATION Left 2/3/2020    Procedure: CATHETERIZATION, HEART, LEFT;  Surgeon: Chele Ko MD;  Location: [facility] CATH LAB;  Service: Cardiology;  Laterality: Left;  saima pt    SLEEP ENDOSCOPY, DRUG-INDUCED N/A 7/14/2025    Procedure: SLEEP ENDOSCOPY,DRUG-INDUCED;  Surgeon: Darin Hair MD;  Location: [facility] OR;  Service: ENT;  Laterality: N/A;      Review of patient's allergies indicates:   Allergen Reactions    No known drug allergies      Current Outpatient Medications on File Prior to Visit   Medication Sig Dispense Refill    albuterol (PROVENTIL) 2.5 mg /3 mL (0.083 %) nebulizer solution Take 3 mLs (2.5 mg total) by nebulization every 4 to 6 hours as needed for Wheezing or Shortness of Breath. 360 mL 11    APPLE CIDER VINEGAR ORAL Take 1 capsule by mouth once daily.      ascorbic acid, vitamin C, (VITAMIN C) 1000 MG tablet Take 1,000 mg by mouth once daily.      rumkaycvl-lixexzxw-begjzjq ala (BIKTARVY) -25 mg (25 kg or greater) Take 1 tablet by mouth once daily. 90 tablet 6    busPIRone (BUSPAR) 15 MG tablet Take 1 tablet (15 mg total) by mouth 3 (three) times daily. 90 tablet 11    cloNIDine (CATAPRES) 0.1 MG tablet Take 2 tablets (0.2 mg total) by mouth every 6 (six) hours as needed (BP > 180/90). Can repeat in 30min if needed 90 tablet 1    FLUoxetine 40 MG capsule TAKE 1 CAPSULE EVERY DAY 90 capsule 3    furosemide (LASIX) 40 MG tablet Take 1 tablet (40 mg total) by mouth daily as needed (for edema, swelling, fluid with BP over 140/80). 90 tablet 1    isosorbide mononitrate (IMDUR) 30 MG 24 hr tablet Take 1 tablet (30 mg total) by mouth every evening. 90 tablet 1    magnesium oxide (MAG-OX) 400 mg (241.3 mg magnesium) tablet Take 1 tablet (400 mg total) by mouth once daily. Take with lasix 90 tablet 1    metoclopramide HCl (REGLAN) 5 MG tablet Take 1 tablet (5 mg total) by mouth nightly as needed (nausea). 30 tablet 11    metoprolol succinate (TOPROL-XL) 100 MG 24 hr tablet Take 1 tablet (100 mg total) by mouth 2 (two) times daily. 180 tablet 1    ondansetron (ZOFRAN-ODT) 8 MG TbDL Take 1 tablet (8 mg total) by mouth every 6 (six) hours as needed (nausea). 30 tablet 1    pep injection Inject 0.3 ml as directed     For compounding pharmacy use:   Add PAPAVERINE 30 mcg  Add PHENTOLAMINE 10 mg  Add ALPROSTADIL 100 mcg 10 vial 10    potassium  chloride SA (K-DUR,KLOR-CON M) 10 MEQ tablet Take 1 tablet (10 mEq total) by mouth once daily. Take with lasix 90 tablet 1    promethazine (PHENERGAN) 12.5 MG Tab TAKE ONE TABLET BY MOUTH DAILY AS NEEDED FOR NAUSEA. 30 tablet 3    rosuvastatin (CRESTOR) 20 MG tablet TAKE 1 TABLET EVERY DAY 90 tablet 3    sacubitriL-valsartan (ENTRESTO)  mg per tablet Take 1 tablet by mouth 2 (two) times daily. 180 tablet 1    ubidecarenone (COENZYME Q10 ORAL) Take by mouth once daily.      UNABLE TO FIND Take 1 capsule by mouth once daily. medication name: Beet Juice Capsule      [DISCONTINUED] albuterol (PROVENTIL/VENTOLIN HFA) 90 mcg/actuation inhaler Inhale 2 puffs into the lungs every 4 (four) hours as needed for Wheezing. Rescue 18 g 11    donepeziL (ARICEPT) 5 MG tablet Take 1 tablet (5 mg total) by mouth every evening. 90 tablet 3     No current facility-administered medications on file prior to visit.     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  [place] [employer]   Tobacco Use    Smoking status: Never    Smokeless tobacco: Never   Vaping Use    Vaping status: Never Used   Substance and Sexual Activity    Alcohol use: No    Drug use: Never    Sexual activity: Not Currently     Partners: Male     Birth control/protection: Abstinence, See Surgical Hx, None     Social Drivers of Health     Financial Resource Strain: Low Risk  (4/10/2025)    Overall Financial Resource Strain (CARDIA)     Difficulty of Paying Living Expenses: Not hard at all   Food Insecurity: No Food Insecurity (4/10/2025)    Hunger Vital Sign     Worried About Running Out of Food in the Last Year: Never true     Ran Out of Food in the Last Year: Never true   Transportation Needs: No Transportation Needs (4/10/2025)    PRAPARE -  "Transportation     Lack of Transportation (Medical): No     Lack of Transportation (Non-Medical): No   Physical Activity: Inactive (4/10/2025)    Exercise Vital Sign     Days of Exercise per Week: 0 days     Minutes of Exercise per Session: 0 min   Stress: No Stress Concern Present (4/10/2025)    [employer] - Occupational Stress Questionnaire     Feeling of Stress : Only a little   Housing Stability: Low Risk  (4/10/2025)    Housing Stability Vital Sign     Unable to Pay for Housing in the Last Year: No     Homeless in the Last Year: No     Family History   Problem Relation Name Age of Onset    Hearing loss Mother Sonam Valle     Vision loss Mother Sonam Valle     Heart disease Father Demarco Valle     Heart failure Father Demarco Valle     Diabetes Neg Hx      Hypertension Neg Hx         Review of Systems   Constitutional:  Negative for fever and fatigue.   HENT:  Negative for postnasal drip and rhinorrhea.    Eyes:  Negative for redness and itching.   Respiratory:  Positive for apnea, snoring and dyspnea on extertion. Negative for cough, shortness of breath, wheezing and Paroxysmal Nocturnal Dyspnea.    Cardiovascular:  Negative for chest pain.   Genitourinary:  Negative for difficulty urinating and hematuria.   Endocrine:  Negative for polyphagia, cold intolerance and heat intolerance.    Musculoskeletal:  Positive for arthralgias.   Skin:  Negative for rash.   Gastrointestinal:  Negative for nausea, vomiting, abdominal pain and abdominal distention.   Neurological:  Positive for light-headedness. Negative for dizziness and headaches.        Poor memory   Hematological:  Negative for adenopathy. Does not bruise/bleed easily and no excessive bruising.   Psychiatric/Behavioral:  Positive for confusion. The patient is not nervous/anxious.        Objective:      /80 (BP Location: Left arm, Patient Position: Sitting)   Pulse 63   Resp 18   Ht 5' 10" (1.778 m)   Wt 96.6 kg (212 " lb 15.4 oz)   SpO2 95%   BMI 30.56 kg/m²   Physical Exam  Vitals and nursing note reviewed.   Constitutional:       Appearance: He is well-developed.   HENT:      Head: Normocephalic and atraumatic.      Nose: Nose normal.   Eyes:      Conjunctiva/sclera: Conjunctivae normal.      Pupils: Pupils are equal, round, and reactive to light.   Neck:      Thyroid: No thyromegaly.      Vascular: No JVD.      Trachea: No tracheal deviation.   Cardiovascular:      Rate and Rhythm: Normal rate and regular rhythm.      Heart sounds: Normal heart sounds.   Pulmonary:      Effort: Pulmonary effort is normal.      Breath sounds: Normal breath sounds.   Abdominal:      Palpations: Abdomen is soft.   Musculoskeletal:         General: Normal range of motion.      Cervical back: Neck supple.   Lymphadenopathy:      Cervical: No cervical adenopathy.   Skin:     General: Skin is warm and dry.   Neurological:      Mental Status: He is alert and oriented to person, place, and time.      Gait: Gait abnormal.      Comments: Poor memory and judgement   Psychiatric:         Mood and Affect: Mood normal.         Behavior: Behavior normal.       Diagnoses     Codes Comments   Obstructive sleep apnea syndrome  - Primary ICD-10-CM: G47.33  ICD-9-CM: 327.23    Periodic limb movement disorder (PLMD) ICD-10-CM: G47.61  ICD-9-CM: 327.51    Behaviorally induced insufficient sleep syndrome ICD-10-CM: F51.12  ICD-9-CM: 307.44    Inadequate sleep hygiene ICD-10-CM: Z72.821  ICD-9-CM: 307.49            Reason for Visit    Reason for Visit History       Progress Notes    No notes of this type exist for this encounter.  Procedure Notes    Author Status Last  Updated Created   Golden Allison, PhD Signed Golden Allison, PhD 6/18/2020  5:22 PM 6/18/2020  5:19 PM          Assoc. Orders Procedures   POLYSOMNOGRAM POLYSOMNOGRAM       Pre-Op Dx Post-Op Dx   Obstructive sleep apnea syndrome None            Patient Name: Haritha Valle                             Date of Report: 20           Date of PS/10/2020   [facility] No.: [id number]                            : 1945                      Time of PSG:  10:25:49 PM - 5:02:54 AM  Sex:  Male   Age:  75   Weight:  212.0 lbs      Height:  5  11                         Type of PSG:  Diagnostic      REASONS FOR REFERRAL: Mr. Valle is a 75 year old male, [place]. Rico Pineda [place] [facility] [place] for evaluation of possible obstructive sleep apnea / hypopnea syndrome (OSAHS).  Dr. Pineda requested diagnostic polysomnography based on the patients reported loud snoring, observed respiratory pauses in sleep, unrefreshing sleep and daytime somnolence (all positive on STOP-bang).  His Adrian Sleepiness Scale score was 13, clinically significant, and his STOP - BANG score was 7, high risk of RAMONITA.  Dr. Hesham Andrade is the patients primary care physician.      STUDY PARAMETERS: This diagnostic study involved analysis of the patient's sleep pattern while breathing unassisted. The study was performed with a sleep technologist in attendance for the entire test period, with video monitoring throughout the study, and routine laboratory clinical parameters recorded:  NOTE: The polysomnography electrophysiological record for the patient has been reviewed in its entirety by Dr. Allison.     SUMMARY STATEMENTS  DIAGNOSTIC IMPRESSIONS  G47.33  /  327.23                    Moderate to Severe Obstructive Sleep Apnea, Adult (OSAHS)  G47.61  /  327.51                    Severe Periodic Limb Movement (PLM) Disorder   F51.12   /  307.44                    Mild Insufficient Sleep Syndrome  Z72.821 /  V69.4                     Inadequate Sleep Hygiene      PRIMARY TREATMENT RECOMMENDATIONS  Treat, or refer to Sleep Disorders Center.  The diagnostic polysomnography revealed a moderate to severe obstructive sleep apnea / hypopnea syndrome (A + H Index = 26.8 events / hr asleep with 7.0 respiratory event  - related arousals / hr asleep for the study, and no RERAs (respiratory effort -  related arousals).  The mean SpO2 value was 90.0 %, significant, minimum oxygen saturation during sleep was   79.0 %, and waking baseline SpO2 was 97 %.   Sporadic / infrequent , moderately loud snoring was noted.  A  CPAP titration polysomnography is recommended.      Weight loss to the normal range is recommended as it can decrease respiratory events and snoring in overweight patients.  The following changes in sleep hygiene / sleep - related behavior are recommended after medical treatments are successful  Regular bedtimes and wake times, including weekends: Total sleep time / night should not be more than one hour more             than usual, and bedtime or wake time should not be more than one hour earlier or later than usual.    Do not attempt to make up lost sleep by extending sleep periods.    Avoid naps; none longer than 20 min or later than mid - afternoon.     SECONDARY TREATMENT RECOMMENDATIONS  Treat, or refer to SDC if problems are not satisfactorily resolved by the above.  A severe  PLM disorder was observed (PLMS Index = 67.6 / hr asleep, but treatment might not be optimal because the PLMS were not very disruptive of sleep (6.6 arousals / hr asleep), and there were no signs of restless legs syndrome in the SDI,   H & P or PSG;  consider treatment of PLM disorder if PLMS symptoms are sufficiently bothersome to the patient.  Note that the benzodiazepine medications sometimes used to treat PLM disorder (e.g., alprazolam / Xanax) may exacerbate some sleep - related respiratory disorders, and that dopaminergic medications such as Mirapex and Requip can be used in such instances.    Mr. Valle is taking fluoxetine / Prozac.  Most tricyclic, and many SSRI antidepressants (e.g., fluoxetine - Prozac, sertraline   Zoloft, venlafaxine - Effexor), are associated with increased PLMS in some studies and increased RLS in others.   Consider   replacing Prozac with a medication known not to exacerbate PLMS or RLS.  Consider behavioral and cognitive / behavioral treatments for anxiety and depression to complement the medication and to increase the probability of long - term adaptive change.  Sleep (quality) might be expected to further improve.  IMPORTANT  NOTE:   RAMONITA, PLMS, and stress - related arousals (anxiety and depression)  interact to significantly impair sleep quality and restoratives, making treatment of each and all of these disorders very important  for restorative sleep.     See below for a complete interpretation of data from the polysomnography and Sleep Disorders Inventory.     Thank you for referring this patient to the [facility].      Golden Allison, Ph.D., ABPP; Diplomate, American Board of Sleep Medicine          Echocardiogram Summary         Left Ventricle: The left ventricle is normal in size. Normal wall thickness. There is mild concentric hypertrophy. Normal wall motion. There is low normal systolic function with a visually estimated ejection fraction of 50 - 55%. Grade I diastolic dysfunction.    Right Ventricle: Normal right ventricular cavity size. Wall thickness is normal. Right ventricle wall motion  is normal. Systolic function is normal.    Aortic Valve: There is mild aortic regurgitation.    Mitral Valve: There is mild regurgitation.    Tricuspid Valve: There is mild regurgitation.    IVC/SVC: Normal venous pressure at 3 mmHg.    Personal Diagnostic Review  Spirometry shows a reduced vital capacity suggesting restriction. Spirometry remains unimproved following bronchodilator. Lung volumes show mild restriction is present. Airway mechanics show normal airway resistance and conductance. DLCO is mildly   decreased. MVV is severely decreased.   Â      [facility]-Pulmonary Function 3rdFl  Six Minute Walk   SUMMARY   Ordering Provider: Maksim Chavarria MD        Interpreting Provider: Maksim PATEL  "MD Deon  Performing nurse/tech/RT: VT, RT  Diagnosis:  (MAIER (dyspnea on exertion); Chronic restrictive lung disease; Exercise hypoxemia)  Height: 5' 10" (177.8 cm)  Weight: 98.7 kg (217 lb 9.5 oz)  BMI (Calculated): 31.2              Patient Race:           Phase Oxygen Assessment Supplemental O2 Heart   Rate Blood Pressure Johnnie Dyspnea Scale Rating   Resting 95 % Room Air 73 bpm 151/83 3   Exercise             Minute             1 94 % Room Air 82 bpm       2 96 % Room Air 83 bpm       3 95 % Room Air 82 bpm       4 95 % Room Air 84 bpm       5 95 % Room Air 84 bpm       6  96 % Room Air 85 bpm 165/83 4   Recovery             Minute             1 95 % Room Air 74 bpm       2 97 % Room Air 76 bpm       3 97 % Room Air 76 bpm       4 97 % Room Air 74 bpm 170/89 3      Six Minute Walk Summary  6MWT Status: completed without stopping  Patient Reported: Dyspnea, Dizziness                Interpretation:  Did the patient stop or pause?: No  Total Time Walked (Calculated): 360 seconds  Final Partial Lap Distance (feet): 0 feet  Total Distance Meters (Calculated): 304.8 meters  Predicted Distance Meters (Calculated): 476.69 meters  Percentage of Predicted (Calculated): 63.94  Peak VO2 (Calculated): 13.12  Mets: 3.75  Has The Patient Had a Previous Six Minute Walk Test?: Yes     Previous 6MWT Results  Has The Patient Had a Previous Six Minute Walk Test?: Yes  Date of Previous Test: 10/23/20  Total Time Walked: 344 seconds  Total Distance (meters): 284.07  Predicted Distance (meters): 297.85 meters  Percent Change (Calculated): -0.07        Interpretation:  Total distance walked in six minutes is mildly reduced indicating a reduction in overall  functional capacity. The patient did not meet criteria for supplemental oxygen prescription.  Clinical correlation suggested.  [] Mild exercise-induced hypoxemia described as an arterial oxygen saturation of 93-95% (with a fall of 3-4% with exercise),   [] Moderate " "exercise-induced hypoxemia as a fall in oxygen saturation to  89-93% (with a fall of 3-4 % with exercise)  [] Severe exercise induced hypoxemia as < 89% O2 saturation (88% and below).  Medicare Criteria for Oxygen prescription comments: When arterial oxygen saturation is at or below 88% during exercise (severe exercise induced hypoxemia) then the patient falls under   Details about Medicare Group Criteria coverage can be found at http://www.cms.[facility].gov/manuals/downloads/      Maksim Chavarria MD                     7/24/2025     1:33 PM   Pulmonary Studies Review   SpO2 95 %   Height 5' 10" (1.778 m)   Weight 96.6 kg (212 lb 15.4 oz)   BMI (Calculated) 30.6   Predicted Distance 267.07   Predicted Distance Meters (Calculated) 470.35 meters       Nuclear Stress - Cardiology Interpreted    Equivocal myocardial perfusion scan.    There is a mild to moderate intensity, small to medium sized, equivocal   perfusion abnormality that is fixed in the apical and anteroapical   wall(s). This finding is equivocal due to soft tissue shadow.    There are no other significant perfusion abnormalities.    The gated perfusion images showed an ejection fraction of 48% at rest.   The gated perfusion images showed an ejection fraction of 48% post stress.    There is mild global hypokinesis at rest and post-stress.    The ECG portion of the study is negative for ischemia.      Office Spirometry Results:         7/24/2025     1:33 PM 7/18/2025     1:38 PM 7/14/2025     8:20 AM 7/14/2025     8:15 AM 7/14/2025     8:10 AM 7/14/2025     8:05 AM 7/14/2025     8:00 AM   Pulmonary Function Tests   SpO2 95 %  97 % 95 % 94 % 94 % 95 %   Height 5' 10" (1.778 m) 5' 10" (1.778 m)        Weight 96.6 kg (212 lb 15.4 oz) 95.8 kg (211 lb 3.2 oz)        BMI (Calculated) 30.6 30.3              7/24/2025     1:33 PM   Pulmonary Studies Review   SpO2 95 %   Height 5' 10" (1.778 m)   Weight 96.6 kg (212 lb 15.4 oz)   BMI (Calculated) 30.6   Predicted Distance " 267.07   Predicted Distance Meters (Calculated) 470.35 meters     Procedure Notes    Author Status Last  Updated Created   Golden Allison, PhD Signed Golden Allison, PhD 2020  5:22 PM 2020  5:19 PM          Assoc. Orders Procedures   POLYSOMNOGRAM POLYSOMNOGRAM       Pre-Op Dx Post-Op Dx   Obstructive sleep apnea syndrome None               Patient Name: Haritha Valle                            Date of Report: 20           Date of PS/10/2020   [facility] No.: [id number]                            : 1945                      Time of PSG:  10:25:49 PM - 5:02:54 AM  Sex:  Male   Age:  75   Weight:  212.0 lbs      Height:  5  11                         Type of PSG:  Diagnostic      REASONS FOR REFERRAL: Mr. Valle is a 75 year old male, referred to Dr. Rico Pineda and the [facility] by Dr. Deni Nguyen for evaluation of possible obstructive sleep apnea / hypopnea syndrome (OSAHS).  Dr. Pineda requested diagnostic polysomnography based on the patients reported loud snoring, observed respiratory pauses in sleep, unrefreshing sleep and daytime somnolence (all positive on STOP-bang).  His Morris Sleepiness Scale score was 13, clinically significant, and his STOP - BANG score was 7, high risk of RAMONITA.  Dr. Hesham Andrade is the patients primary care physician.      STUDY PARAMETERS: This diagnostic study involved analysis of the patient's sleep pattern while breathing unassisted. The study was performed with a sleep technologist in attendance for the entire test period, with video monitoring throughout the study, and routine laboratory clinical parameters recorded:  NOTE: The polysomnography electrophysiological record for the patient has been reviewed in its entirety by Dr. Allison.     SUMMARY STATEMENTS  DIAGNOSTIC IMPRESSIONS  G47.33  /  327.23                    Moderate to Severe Obstructive Sleep Apnea, Adult (OSAHS)  G47.61  /  327.51                    Severe Periodic Limb  Movement (PLM) Disorder   F51.12   /  307.44                    Mild Insufficient Sleep Syndrome  Z72.821 /  V69.4                     Inadequate Sleep Hygiene      PRIMARY TREATMENT RECOMMENDATIONS  Treat, or refer to Sleep Disorders Center.  The diagnostic polysomnography revealed a moderate to severe obstructive sleep apnea / hypopnea syndrome (A + H Index = 26.8 events / hr asleep with 7.0 respiratory event - related arousals / hr asleep for the study, and no RERAs (respiratory effort -  related arousals).  The mean SpO2 value was 90.0 %, significant, minimum oxygen saturation during sleep was   79.0 %, and waking baseline SpO2 was 97 %.   Sporadic / infrequent , moderately loud snoring was noted.  A  CPAP titration polysomnography is recommended.      Weight loss to the normal range is recommended as it can decrease respiratory events and snoring in overweight patients.  The following changes in sleep hygiene / sleep - related behavior are recommended after medical treatments are successful  Regular bedtimes and wake times, including weekends: Total sleep time / night should not be more than one hour more             than usual, and bedtime or wake time should not be more than one hour earlier or later than usual.    Do not attempt to make up lost sleep by extending sleep periods.    Avoid naps; none longer than 20 min or later than mid - afternoon.     SECONDARY TREATMENT RECOMMENDATIONS  Treat, or refer to SDC if problems are not satisfactorily resolved by the above.  A severe  PLM disorder was observed (PLMS Index = 67.6 / hr asleep, but treatment might not be optimal because the PLMS were not very disruptive of sleep (6.6 arousals / hr asleep), and there were no signs of restless legs syndrome in the SDI,   H & P or PSG;  consider treatment of PLM disorder if PLMS symptoms are sufficiently bothersome to the patient.  Note that the benzodiazepine medications sometimes used to treat PLM disorder (e.g.,  alprazolam / Xanax) may exacerbate some sleep - related respiratory disorders, and that dopaminergic medications such as Mirapex and Requip can be used in such instances.    Mr. Valle is taking fluoxetine / Prozac.  Most tricyclic, and many SSRI antidepressants (e.g., fluoxetine - Prozac, sertraline   Zoloft, venlafaxine - Effexor), are associated with increased PLMS in some studies and increased RLS in others.  Consider   replacing Prozac with a medication known not to exacerbate PLMS or RLS.  Consider behavioral and cognitive / behavioral treatments for anxiety and depression to complement the medication and to increase the probability of long - term adaptive change.  Sleep (quality) might be expected to further improve.  IMPORTANT  NOTE:   RAMONITA, PLMS, and stress - related arousals (anxiety and depression)  interact to significantly impair sleep quality and restoratives, making treatment of each and all of these disorders very important  for restorative sleep.     See below for a complete interpretation of data from the polysomnography and Sleep Disorders Inventory.     Thank you for referring this patient to the [facility].      Golden Allison, Ph.D., ABPP; Diplomate, American Board of Sleep Medicine                Assessment:              RAMONITA (obstructive sleep apnea)  -     CPAP/BIPAP SUPPLIES  -     CPAP FOR HOME USE  -     HME - OTHER    Simple chronic bronchitis  -     albuterol (PROVENTIL/VENTOLIN HFA) 90 mcg/actuation inhaler; Inhale 2 puffs into the lungs every 4 (four) hours as needed for Wheezing. Rescue  Dispense: 18 g; Refill: 11    Nocturnal hypoxemia  -     HME - OTHER  -     HME - OTHER    RAMONITA on CPAP    Chronic respiratory failure with hypoxia, on home O2 therapy                  Outpatient Encounter Medications as of 7/24/2025   Medication Sig Dispense Refill    albuterol (PROVENTIL) 2.5 mg /3 mL (0.083 %) nebulizer solution Take 3 mLs (2.5 mg total) by nebulization every 4 to  6 hours as needed for Wheezing or Shortness of Breath. 360 mL 11    APPLE CIDER VINEGAR ORAL Take 1 capsule by mouth once daily.      ascorbic acid, vitamin C, (VITAMIN C) 1000 MG tablet Take 1,000 mg by mouth once daily.      idqfdinfm-nfqutjbd-sczwyja ala (BIKTARVY) -25 mg (25 kg or greater) Take 1 tablet by mouth once daily. 90 tablet 6    busPIRone (BUSPAR) 15 MG tablet Take 1 tablet (15 mg total) by mouth 3 (three) times daily. 90 tablet 11    cloNIDine (CATAPRES) 0.1 MG tablet Take 2 tablets (0.2 mg total) by mouth every 6 (six) hours as needed (BP > 180/90). Can repeat in 30min if needed 90 tablet 1    FLUoxetine 40 MG capsule TAKE 1 CAPSULE EVERY DAY 90 capsule 3    furosemide (LASIX) 40 MG tablet Take 1 tablet (40 mg total) by mouth daily as needed (for edema, swelling, fluid with BP over 140/80). 90 tablet 1    isosorbide mononitrate (IMDUR) 30 MG 24 hr tablet Take 1 tablet (30 mg total) by mouth every evening. 90 tablet 1    magnesium oxide (MAG-OX) 400 mg (241.3 mg magnesium) tablet Take 1 tablet (400 mg total) by mouth once daily. Take with lasix 90 tablet 1    metoclopramide HCl (REGLAN) 5 MG tablet Take 1 tablet (5 mg total) by mouth nightly as needed (nausea). 30 tablet 11    metoprolol succinate (TOPROL-XL) 100 MG 24 hr tablet Take 1 tablet (100 mg total) by mouth 2 (two) times daily. 180 tablet 1    ondansetron (ZOFRAN-ODT) 8 MG TbDL Take 1 tablet (8 mg total) by mouth every 6 (six) hours as needed (nausea). 30 tablet 1    pep injection Inject 0.3 ml as directed     For compounding pharmacy use:   Add PAPAVERINE 30 mcg  Add PHENTOLAMINE 10 mg  Add ALPROSTADIL 100 mcg 10 vial 10    potassium chloride SA (K-DUR,KLOR-CON M) 10 MEQ tablet Take 1 tablet (10 mEq total) by mouth once daily. Take with lasix 90 tablet 1    promethazine (PHENERGAN) 12.5 MG Tab TAKE ONE TABLET BY MOUTH DAILY AS NEEDED FOR NAUSEA. 30 tablet 3    rosuvastatin (CRESTOR) 20 MG tablet TAKE 1 TABLET EVERY DAY 90 tablet 3     sacubitriL-valsartan (ENTRESTO)  mg per tablet Take 1 tablet by mouth 2 (two) times daily. 180 tablet 1    ubidecarenone (COENZYME Q10 ORAL) Take by mouth once daily.      UNABLE TO FIND Take 1 capsule by mouth once daily. medication name: Beet Juice Capsule      [DISCONTINUED] albuterol (PROVENTIL/VENTOLIN HFA) 90 mcg/actuation inhaler Inhale 2 puffs into the lungs every 4 (four) hours as needed for Wheezing. Rescue 18 g 11    albuterol (PROVENTIL/VENTOLIN HFA) 90 mcg/actuation inhaler Inhale 2 puffs into the lungs every 4 (four) hours as needed for Wheezing. Rescue 18 g 11    donepeziL (ARICEPT) 5 MG tablet Take 1 tablet (5 mg total) by mouth every evening. 90 tablet 3     No facility-administered encounter medications on file as of 7/24/2025.     Plan:       Requested Prescriptions     Signed Prescriptions Disp Refills    albuterol (PROVENTIL/VENTOLIN HFA) 90 mcg/actuation inhaler 18 g 11     Sig: Inhale 2 puffs into the lungs every 4 (four) hours as needed for Wheezing. Rescue     Problem List Items Addressed This Visit       Chronic respiratory failure with hypoxia, on home O2 therapy    RAMONITA on CPAP    Simple chronic bronchitis    Relevant Medications    albuterol (PROVENTIL/VENTOLIN HFA) 90 mcg/actuation inhaler     Other Visit Diagnoses         RAMONITA (obstructive sleep apnea)    -  Primary    Relevant Orders    CPAP/BIPAP SUPPLIES    CPAP FOR HOME USE    HME - OTHER      Nocturnal hypoxemia        Relevant Orders    HME - OTHER    HME - OTHER                       Follow up in about 9 weeks (around 9/25/2025) for CPAP download - day of visit.    MEDICAL DECISION MAKING: Moderate to high complexity.  Overall, the multiple problems listed are of moderate to high severity that may impact quality of life and activities of daily living. Side effects of medications, treatment plan as well as options and alternatives reviewed and discussed with patient. There was counseling of patient concerning these  issues.    Total time spent in counseling and coordination of care - 40  minutes of total time spent on the encounter, which includes face to face time and non-face to face time preparing to see the patient (eg, review of tests), Obtaining and/or reviewing separately obtained history, Documenting clinical information in the electronic or other health record, Independently interpreting results (not separately reported) and communicating results to the patient/family/caregiver, or Care coordination (not separately reported).    Time was used in discussion of prognosis, risks, benefits of treatment, instructions and compliance with regimen . Discussion with other physicians and/or health care providers - home health or for use of durable medical equipment (oxygen, nebulizers, CPAP, BiPAP) occurred.  40

## 2025-08-04 ENCOUNTER — OFFICE VISIT (OUTPATIENT)
Dept: INFECTIOUS DISEASES | Facility: CLINIC | Age: 80
End: 2025-08-04
Payer: MEDICARE

## 2025-08-04 ENCOUNTER — LAB VISIT (OUTPATIENT)
Dept: LAB | Facility: HOSPITAL | Age: 80
End: 2025-08-04
Attending: STUDENT IN AN ORGANIZED HEALTH CARE EDUCATION/TRAINING PROGRAM
Payer: MEDICARE

## 2025-08-04 VITALS
DIASTOLIC BLOOD PRESSURE: 78 MMHG | BODY MASS INDEX: 30.42 KG/M2 | WEIGHT: 212.5 LBS | HEART RATE: 60 BPM | HEIGHT: 70 IN | SYSTOLIC BLOOD PRESSURE: 110 MMHG

## 2025-08-04 DIAGNOSIS — I10 ESSENTIAL HYPERTENSION: ICD-10-CM

## 2025-08-04 DIAGNOSIS — B20 HIV DISEASE: ICD-10-CM

## 2025-08-04 DIAGNOSIS — E78.2 MIXED HYPERLIPIDEMIA: ICD-10-CM

## 2025-08-04 DIAGNOSIS — B20 HIV DISEASE: Primary | ICD-10-CM

## 2025-08-04 PROCEDURE — 99214 OFFICE O/P EST MOD 30 MIN: CPT | Mod: PBBFAC | Performed by: STUDENT IN AN ORGANIZED HEALTH CARE EDUCATION/TRAINING PROGRAM

## 2025-08-04 PROCEDURE — 99214 OFFICE O/P EST MOD 30 MIN: CPT | Mod: S$PBB,,, | Performed by: STUDENT IN AN ORGANIZED HEALTH CARE EDUCATION/TRAINING PROGRAM

## 2025-08-04 PROCEDURE — 87536 HIV-1 QUANT&REVRSE TRNSCRPJ: CPT

## 2025-08-04 PROCEDURE — 99999 PR PBB SHADOW E&M-EST. PATIENT-LVL IV: CPT | Mod: PBBFAC,,, | Performed by: STUDENT IN AN ORGANIZED HEALTH CARE EDUCATION/TRAINING PROGRAM

## 2025-08-04 PROCEDURE — 36415 COLL VENOUS BLD VENIPUNCTURE: CPT

## 2025-08-04 PROCEDURE — 86361 T CELL ABSOLUTE COUNT: CPT

## 2025-08-04 NOTE — PROGRESS NOTES
Infectious Disease Clinic Note    Patient Name: Haritha Valle  YOB: 1945    PRESENTING HISTORY       History of Present Illness:  Mr. Haritha Valle is a 80 y.o. male w/ significant PMHx of  HIV diagnosed in 1984 here for HIV follow up. Per last ID note, last documented visit with ID in 2019 with [facility] providers. Had been on Reyataz and Combivir with plans to switch to Biktarvy, but this proved cost prohibitive. No recent labs on file. In 2019 HIV VL was undetectable with CD4 570. Expressed concern about lipodystrophy and memory loss. Vaccine history populated below. More recent CD4 count 464 on 4/27/23. Today patient reports taking Reyataz and Combivir over 30 years. Stopped taking month ago due to issues with pharmacy. Has been undetectable for years. Not currently sexually active. No travel outside [place]. No sick contacts. Would like to try new ART. Has two dogs. Agreeable to baseline labs today and switching to Biktarvy or Symtuza based on cost.      Labs below reviewed and interpreted. Biktarvy was approved by [employer]. Today patient reports he has been taking it each day with no missed doses. Has nighttime low oxygen and apnea but won't wear CPAP. Seeing pulmonology for alternative. Establishing care with PCP as well. Discussed prior labs and explained that mutation likely minor from Genosure. He is clearly responding to ART. Will recheck in 1 month to ensure undetectable. Vaccines discussed as well. Patient voiced understanding.      3/11: Blip in viral load at 27. Will recheck in 1 month. Feels well. No missed doses of Biktarvy. Would like hep A vaccine today.      6/3: Blip confirmed as VL back to undetectable. Still with vertigo. Seeing neurologist today. No missed doses of Biktarvy.      9/30: VL remains undetectable. Vertigo gets better some mornings. Has followed with neurology and PCP. BP better controlled.     8/4/25: Due for labs. Patient remains on Biktarvy. Denies missed doses. Only  health concern currently is BP. Says it runs high at home. Normal in office today. Advised he keep a daily AM log to review with PCP at next appt. Up to date on vaccines.      Hep A IgG non reactive  Hep B surface Ab reactive  Hep C ab reactive but had negative viral load back in 2019 so likely SVR    RPR non-reactive  HIV RNA quant undetectable     Review of Systems:  Constitutional: no fever or chills  Respiratory: no cough or shortness of breath  Cardiovascular: no chest pain  Gastrointestinal: no nausea or vomiting  Skin: no rash  Neurological: +dizziness when BP drops     The following portions of the patient's history were reviewed and updated as appropriate: allergies, current medications, past family history, past medical history, past social history, past surgical history, and problem list.    PAST HISTORY:     Immunization History   Administered Date(s) Administered    COVID-19, MRNA, LN-S, PF (Pfizer) (Gray Cap) 04/22/2022    COVID-19, MRNA, LN-S, PF (Pfizer) (Purple Cap) 01/11/2021, 02/01/2021, 10/29/2021    COVID-19, mRNA, LNP-S, bivalent booster, PF (PFIZER OMICRON) 12/02/2022    Hepatitis A, Adult 03/11/2024    Hepatitis A, Pediatric/Adolescent, 2 Dose 09/30/2024    Influenza (FLUAD) - Quadrivalent - Adjuvanted - PF *Preferred* (65+) 09/28/2020, 10/11/2021, 10/20/2022, 11/09/2023    Influenza - Quadrivalent - PF *Preferred* (6 months and older) 10/24/2007, 01/08/2009, 09/23/2010, 01/24/2013    Influenza - Trivalent - Fluad - Adjuvanted - PF (65 years and older 09/30/2024    Influenza - Trivalent - Fluzone High Dose - PF (65 years and older) 09/24/2013, 12/02/2014, 09/27/2016, 09/27/2017, 12/12/2018, 10/01/2019    Influenza A (H1N1) 2009 Monovalent - IM 11/03/2009    Influenza Split 01/24/2013    Pneumococcal Conjugate - 13 Valent 07/23/2015    Pneumococcal Polysaccharide - 23 Valent 09/27/2016    RSVpreF (Arexvy) 01/24/2024    Tdap 01/08/2009, 07/03/2019    Vaccinia, smallpox monkeypox vaccine live,  PF 09/01/2022    Zoster 01/08/2009    Zoster Recombinant 05/09/2018, 06/08/2018, 07/18/2018       Past Medical History:   Diagnosis Date    Anxiety 07/03/2019    Basal cell carcinoma     CAD in native artery 11/27/2023    Depression     Dyslipidemia 11/16/2023    Essential hypertension 01/24/2013    Hepatitis B     Hepatitis C antibody test positive 02/05/2025    RNA NEGATIVE    HIV infection     Hypertension     Immune disorder     Mild cognitive impairment 10/01/2010    Nonrheumatic aortic valve insufficiency 12/19/2023    Nonrheumatic mitral valve regurgitation 12/19/2023       Past Surgical History:   Procedure Laterality Date    APPENDECTOMY      CARDIAC CATHETERIZATION      no stent    CHOLECYSTECTOMY      COLONOSCOPY N/A 11/3/2016    Procedure: COLONOSCOPY;  Surgeon: Maxi Villasenor MD;  [facility]  Service: Endoscopy;  Laterality: N/A;  last colonoscopy with Dr Jarrell    COLONOSCOPY N/A 1/25/2022    Procedure: COLONOSCOPY;  Surgeon: Silvino Rosales MD;  Location: [facility] ENDO;  Service: Endoscopy;  Laterality: N/A;    LEFT HEART CATHETERIZATION Left 2/3/2020    Procedure: CATHETERIZATION, HEART, LEFT;  Surgeon: Chele Ko MD;  Location: [facility] CATH LAB;  Service: Cardiology;  Laterality: Left;  malur pt    SLEEP ENDOSCOPY, DRUG-INDUCED N/A 7/14/2025    Procedure: SLEEP ENDOSCOPY,DRUG-INDUCED;  Surgeon: Darin Hair MD;  Location: [facility] OR;  Service: ENT;  Laterality: N/A;       Family History   Problem Relation Name Age of Onset    Hearing loss Mother Sonam Dease     Vision loss Mother Sonam Dease     Heart disease Father Edward Dease     Heart failure Father Edward Dease     Diabetes Neg Hx      Hypertension Neg Hx         Social History     Socioeconomic History    Marital status: Significant Other     Spouse name: Shadi    Number of children: 0    Highest education level: Some college, no degree   Occupational History    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  [place] [employer]   Tobacco Use    Smoking status: Never    Smokeless tobacco: Never   Vaping Use    Vaping status: Never Used   Substance and Sexual Activity    Alcohol use: No    Drug use: Never    Sexual activity: Not Currently     Partners: Male     Birth control/protection: Abstinence, See Surgical Hx, None     Social Drivers of Health     Financial Resource Strain: Low Risk  (4/10/2025)    Overall Financial Resource Strain (CARDIA)     Difficulty of Paying Living Expenses: Not hard at all   Food Insecurity: No Food Insecurity (4/10/2025)    Hunger Vital Sign     Worried About Running Out of Food in the Last Year: Never true     Ran Out of Food in the Last Year: Never true   Transportation Needs: No Transportation Needs (4/10/2025)    PRAPARE - Transportation     Lack of Transportation (Medical): No     Lack of Transportation (Non-Medical): No   Physical Activity: Inactive (4/10/2025)    Exercise Vital Sign     Days of Exercise per Week: 0 days     Minutes of Exercise per Session: 0 min   Stress: No Stress Concern Present (4/10/2025)    [employer] - Occupational Stress Questionnaire     Feeling of Stress : Only a little   Housing Stability: Low Risk  (4/10/2025)    Housing Stability Vital Sign     Unable to Pay for Housing in the Last Year: No     Homeless in the Last Year: No       MEDICATIONS & ALLERGIES:     Current Outpatient Medications on File Prior to Visit   Medication Sig    albuterol (PROVENTIL) 2.5 mg /3 mL (0.083 %) nebulizer solution Take 3 mLs (2.5 mg total) by nebulization every 4 to 6 hours as needed for Wheezing or Shortness of Breath.    albuterol (PROVENTIL/VENTOLIN HFA) 90 mcg/actuation inhaler Inhale 2 puffs into the lungs every 4 (four) hours as needed for Wheezing. Rescue    APPLE CIDER VINEGAR ORAL Take 1 capsule by mouth once daily.    ascorbic acid, vitamin C, (VITAMIN C) 1000 MG tablet Take 1,000 mg by mouth once daily.     zxstgzlqj-dhwhqase-djzimra ala (BIKTARVY) -25 mg (25 kg or greater) Take 1 tablet by mouth once daily.    busPIRone (BUSPAR) 15 MG tablet Take 1 tablet (15 mg total) by mouth 3 (three) times daily.    cloNIDine (CATAPRES) 0.1 MG tablet Take 2 tablets (0.2 mg total) by mouth every 6 (six) hours as needed (BP > 180/90). Can repeat in 30min if needed    FLUoxetine 40 MG capsule TAKE 1 CAPSULE EVERY DAY    furosemide (LASIX) 40 MG tablet Take 1 tablet (40 mg total) by mouth daily as needed (for edema, swelling, fluid with BP over 140/80).    isosorbide mononitrate (IMDUR) 30 MG 24 hr tablet Take 1 tablet (30 mg total) by mouth every evening.    magnesium oxide (MAG-OX) 400 mg (241.3 mg magnesium) tablet Take 1 tablet (400 mg total) by mouth once daily. Take with lasix    metoclopramide HCl (REGLAN) 5 MG tablet Take 1 tablet (5 mg total) by mouth nightly as needed (nausea).    metoprolol succinate (TOPROL-XL) 100 MG 24 hr tablet Take 1 tablet (100 mg total) by mouth 2 (two) times daily.    ondansetron (ZOFRAN-ODT) 8 MG TbDL Take 1 tablet (8 mg total) by mouth every 6 (six) hours as needed (nausea).    pep injection Inject 0.3 ml as directed     For compounding pharmacy use:   Add PAPAVERINE 30 mcg  Add PHENTOLAMINE 10 mg  Add ALPROSTADIL 100 mcg    potassium chloride SA (K-DUR,KLOR-CON M) 10 MEQ tablet Take 1 tablet (10 mEq total) by mouth once daily. Take with lasix    promethazine (PHENERGAN) 12.5 MG Tab TAKE ONE TABLET BY MOUTH DAILY AS NEEDED FOR NAUSEA.    rosuvastatin (CRESTOR) 20 MG tablet TAKE 1 TABLET EVERY DAY    sacubitriL-valsartan (ENTRESTO)  mg per tablet Take 1 tablet by mouth 2 (two) times daily.    ubidecarenone (COENZYME Q10 ORAL) Take by mouth once daily.    UNABLE TO FIND Take 1 capsule by mouth once daily. medication name: Beet Juice Capsule    donepeziL (ARICEPT) 5 MG tablet Take 1 tablet (5 mg total) by mouth every evening.     No current facility-administered medications on file prior  "to visit.       Review of patient's allergies indicates:   Allergen Reactions    No known drug allergies        OBJECTIVE:   Vital Signs:  Vitals:    08/04/25 1043   BP: 110/78   Pulse: 60   Weight: 96.4 kg (212 lb 8.4 oz)   Height: 5' 10" (1.778 m)       No results found for this or any previous visit (from the past 24 hours).      Physical Exam:   General:  Well developed, well nourished, no acute distress  HEENT:  Normocephalic, atraumatic  CVS:  RRR, S1 and S2 normal, no murmurs, rubs, gallops  Resp:  Lungs clear to auscultation, no wheezes, rales, rhonchi  MSK:  No muscle atrophy, peripheral edema, full range of motion  Skin:  No rashes, ulcers, erythema  Psych:  Alert and oriented to person, place, and time    ASSESSMENT:     HIV disease  --Due for VL and CD4 today   --Standing order for every 6 months   --Genosure archive showed less common mutations for tenofovir and Zidovudine; Biktarvy appears to be overcoming resistance   --Continue daily Biktarvy   --No OI ppx indicated at this time based on CD4   --UTD on vaccines   --Follow up with me in 6 months      Essential hypertension  Continue current medications. Follow up with PCP.      Mixed hyperlipidemia  Continue Crestor. Follow up with PCP.       PLAN:     Haritha was seen today for hiv disease.    Diagnoses and all orders for this visit:    HIV disease  -     HIV RNA, Quantitative, PCR; Standing  -     CD4 T-[place] Cells; Standing    Essential hypertension    Mixed hyperlipidemia          The total time for evaluation and management services performed on 8/4/25 was greater than 30 minutes.        Vaughn Larios, DO   Infectious Diseases     "

## 2025-08-05 LAB
CD3+CD4+ CELLS # SPEC: 505 CELLS/UL (ref 300–1400)
CD3+CD4+ CELLS NFR BLD: 15.49 % (ref 28–57)
HIV1 RNA # SERPL NAA+PROBE: <20 COPIES/ML
HIV1 RNA SERPL NAA+PROBE-LOG#: <1.3 LOG COPIES/ML
HIV1 RNA SERPL NAA+PROBE-LOG#: DETECTED {LOG_COPIES}/ML
LABORATORY COMMENT REPORT: ABNORMAL

## 2025-08-13 ENCOUNTER — PATIENT MESSAGE (OUTPATIENT)
Dept: CARDIOLOGY | Facility: CLINIC | Age: 80
End: 2025-08-13
Payer: MEDICARE

## 2025-08-21 ENCOUNTER — OFFICE VISIT (OUTPATIENT)
Dept: CARDIOLOGY | Facility: CLINIC | Age: 80
End: 2025-08-21
Payer: MEDICARE

## 2025-08-21 VITALS
HEIGHT: 70 IN | DIASTOLIC BLOOD PRESSURE: 90 MMHG | BODY MASS INDEX: 30.55 KG/M2 | HEART RATE: 63 BPM | OXYGEN SATURATION: 94 % | SYSTOLIC BLOOD PRESSURE: 150 MMHG | WEIGHT: 213.38 LBS

## 2025-08-21 DIAGNOSIS — E78.2 MIXED HYPERLIPIDEMIA: ICD-10-CM

## 2025-08-21 DIAGNOSIS — I25.10 CAD IN NATIVE ARTERY: ICD-10-CM

## 2025-08-21 DIAGNOSIS — I35.1 NONRHEUMATIC AORTIC VALVE INSUFFICIENCY: ICD-10-CM

## 2025-08-21 DIAGNOSIS — I34.0 NONRHEUMATIC MITRAL VALVE REGURGITATION: ICD-10-CM

## 2025-08-21 DIAGNOSIS — E78.5 DYSLIPIDEMIA: ICD-10-CM

## 2025-08-21 DIAGNOSIS — I50.43 ACUTE ON CHRONIC COMBINED SYSTOLIC AND DIASTOLIC CONGESTIVE HEART FAILURE: ICD-10-CM

## 2025-08-21 DIAGNOSIS — I10 ESSENTIAL HYPERTENSION: Primary | ICD-10-CM

## 2025-08-21 DIAGNOSIS — I11.0 HYPERTENSIVE HEART DISEASE WITH HEART FAILURE: ICD-10-CM

## 2025-08-21 PROCEDURE — 99214 OFFICE O/P EST MOD 30 MIN: CPT | Mod: PBBFAC,PO

## 2025-08-21 PROCEDURE — 99999 PR PBB SHADOW E&M-EST. PATIENT-LVL IV: CPT | Mod: PBBFAC,,,

## 2025-08-21 PROCEDURE — 99214 OFFICE O/P EST MOD 30 MIN: CPT | Mod: S$PBB,,,

## 2025-08-21 RX ORDER — CARVEDILOL 25 MG/1
25 TABLET ORAL 2 TIMES DAILY WITH MEALS
Qty: 180 TABLET | Refills: 3 | Status: SHIPPED | OUTPATIENT
Start: 2025-08-21 | End: 2026-08-21

## 2025-09-03 ENCOUNTER — OFFICE VISIT (OUTPATIENT)
Dept: OPHTHALMOLOGY | Facility: CLINIC | Age: 80
End: 2025-09-03
Payer: MEDICARE

## 2025-09-03 ENCOUNTER — TELEPHONE (OUTPATIENT)
Dept: OPHTHALMOLOGY | Facility: CLINIC | Age: 80
End: 2025-09-03
Payer: MEDICARE

## 2025-09-03 DIAGNOSIS — H35.3231 EXUDATIVE AGE-RELATED MACULAR DEGENERATION OF BOTH EYES WITH ACTIVE CHOROIDAL NEOVASCULARIZATION: Primary | ICD-10-CM

## 2025-09-03 DIAGNOSIS — H52.203 ASTIGMATISM WITH PRESBYOPIA, BILATERAL: ICD-10-CM

## 2025-09-03 DIAGNOSIS — H52.4 ASTIGMATISM WITH PRESBYOPIA, BILATERAL: ICD-10-CM

## 2025-09-03 PROCEDURE — 99213 OFFICE O/P EST LOW 20 MIN: CPT | Mod: S$PBB,,, | Performed by: OPTOMETRIST

## 2025-09-03 PROCEDURE — 99213 OFFICE O/P EST LOW 20 MIN: CPT | Mod: PBBFAC | Performed by: OPTOMETRIST

## 2025-09-03 PROCEDURE — 92015 DETERMINE REFRACTIVE STATE: CPT | Mod: ,,, | Performed by: OPTOMETRIST

## 2025-09-03 PROCEDURE — 99999 PR PBB SHADOW E&M-EST. PATIENT-LVL III: CPT | Mod: PBBFAC,,, | Performed by: OPTOMETRIST

## 2025-09-04 ENCOUNTER — OFFICE VISIT (OUTPATIENT)
Dept: CARDIOLOGY | Facility: CLINIC | Age: 80
End: 2025-09-04
Payer: MEDICARE

## 2025-09-04 DIAGNOSIS — I25.10 CAD IN NATIVE ARTERY: ICD-10-CM

## 2025-09-04 DIAGNOSIS — I35.1 NONRHEUMATIC AORTIC VALVE INSUFFICIENCY: ICD-10-CM

## 2025-09-04 DIAGNOSIS — I10 ESSENTIAL HYPERTENSION: Primary | ICD-10-CM

## 2025-09-04 DIAGNOSIS — I34.0 NONRHEUMATIC MITRAL VALVE REGURGITATION: ICD-10-CM

## 2025-09-04 DIAGNOSIS — E78.2 MIXED HYPERLIPIDEMIA: ICD-10-CM

## 2025-09-04 DIAGNOSIS — I50.42 CHRONIC COMBINED SYSTOLIC AND DIASTOLIC CONGESTIVE HEART FAILURE: ICD-10-CM

## 2025-09-04 RX ORDER — SPIRONOLACTONE 25 MG/1
25 TABLET ORAL DAILY
Qty: 90 TABLET | Refills: 3 | Status: SHIPPED | OUTPATIENT
Start: 2025-09-04 | End: 2026-09-04

## 2025-09-04 RX ORDER — ISOSORBIDE MONONITRATE 30 MG/1
30 TABLET, EXTENDED RELEASE ORAL NIGHTLY
Qty: 90 TABLET | Refills: 3 | Status: SHIPPED | OUTPATIENT
Start: 2025-09-04 | End: 2026-09-04

## 2025-09-04 RX ORDER — CLONIDINE HYDROCHLORIDE 0.1 MG/1
0.2 TABLET ORAL EVERY 6 HOURS PRN
Qty: 180 TABLET | Refills: 3 | Status: SHIPPED | OUTPATIENT
Start: 2025-09-04 | End: 2026-09-04